# Patient Record
Sex: MALE | Race: BLACK OR AFRICAN AMERICAN | Employment: FULL TIME | URBAN - METROPOLITAN AREA
[De-identification: names, ages, dates, MRNs, and addresses within clinical notes are randomized per-mention and may not be internally consistent; named-entity substitution may affect disease eponyms.]

---

## 2017-03-21 ENCOUNTER — HOSPITAL ENCOUNTER (OUTPATIENT)
Dept: GENERAL RADIOLOGY | Age: 62
Discharge: HOME | End: 2017-03-21
Attending: FAMILY MEDICINE
Payer: COMMERCIAL

## 2017-03-21 DIAGNOSIS — J18.9 PNEUMONIA OF BOTH LOWER LOBES DUE TO INFECTIOUS ORGANISM: ICD-10-CM

## 2017-03-21 PROCEDURE — 71020 XR CHEST PA LAT: CPT

## 2017-03-27 ENCOUNTER — HOSPITAL ENCOUNTER (OUTPATIENT)
Dept: LAB | Age: 62
Discharge: HOME | End: 2017-03-27
Payer: COMMERCIAL

## 2017-03-27 DIAGNOSIS — C92.10 CML (CHRONIC MYELOID LEUKEMIA) (HCC): ICD-10-CM

## 2017-03-27 LAB
ALBUMIN SERPL BCP-MCNC: 3.6 G/DL (ref 3.2–4.6)
ALBUMIN/GLOB SERPL: 1.3 {RATIO} (ref 1.2–3.5)
ALP SERPL-CCNC: 87 U/L (ref 50–136)
ALT SERPL-CCNC: 25 U/L (ref 12–65)
ANION GAP BLD CALC-SCNC: 8 MMOL/L (ref 7–16)
AST SERPL W P-5'-P-CCNC: 17 U/L (ref 15–37)
BASOPHILS # BLD AUTO: 0.1 K/UL (ref 0–0.2)
BASOPHILS # BLD: 1 % (ref 0–2)
BILIRUB SERPL-MCNC: 0.6 MG/DL (ref 0.2–1.1)
BUN SERPL-MCNC: 16 MG/DL (ref 8–23)
CALCIUM SERPL-MCNC: 8.6 MG/DL (ref 8.3–10.4)
CHLORIDE SERPL-SCNC: 106 MMOL/L (ref 98–107)
CO2 SERPL-SCNC: 28 MMOL/L (ref 23–32)
CREAT SERPL-MCNC: 1.63 MG/DL (ref 0.8–1.5)
DIFFERENTIAL METHOD BLD: ABNORMAL
EOSINOPHIL # BLD: 0.3 K/UL (ref 0–0.8)
EOSINOPHIL NFR BLD: 6 % (ref 0.5–7.8)
ERYTHROCYTE [DISTWIDTH] IN BLOOD BY AUTOMATED COUNT: 14 % (ref 11.9–14.6)
GLOBULIN SER CALC-MCNC: 2.8 G/DL (ref 2.3–3.5)
GLUCOSE SERPL-MCNC: 107 MG/DL (ref 65–100)
HCT VFR BLD AUTO: 43.6 % (ref 41.1–50.3)
HGB BLD-MCNC: 14.9 G/DL (ref 13.6–17.2)
LYMPHOCYTES # BLD AUTO: 27 % (ref 13–44)
LYMPHOCYTES # BLD: 1.4 K/UL (ref 0.5–4.6)
MCH RBC QN AUTO: 31.2 PG (ref 26.1–32.9)
MCHC RBC AUTO-ENTMCNC: 34.2 G/DL (ref 31.4–35)
MCV RBC AUTO: 91.2 FL (ref 79.6–97.8)
MONOCYTES # BLD: 0.4 K/UL (ref 0.1–1.3)
MONOCYTES NFR BLD AUTO: 8 % (ref 4–12)
NEUTS SEG # BLD: 2.9 K/UL (ref 1.7–8.2)
NEUTS SEG NFR BLD AUTO: 58 % (ref 43–78)
NRBC # BLD: 0 K/UL (ref 0–0.2)
PLATELET # BLD AUTO: 191 K/UL (ref 150–450)
PMV BLD AUTO: 10 FL (ref 10.8–14.1)
POTASSIUM SERPL-SCNC: 4.4 MMOL/L (ref 3.5–5.1)
PROT SERPL-MCNC: 6.4 G/DL (ref 6.3–8.2)
RBC # BLD AUTO: 4.78 M/UL (ref 4.23–5.67)
SODIUM SERPL-SCNC: 142 MMOL/L (ref 136–145)
WBC # BLD AUTO: 5.1 K/UL (ref 4.3–11.1)

## 2017-03-27 PROCEDURE — 80053 COMPREHEN METABOLIC PANEL: CPT | Performed by: INTERNAL MEDICINE

## 2017-03-27 PROCEDURE — 36415 COLL VENOUS BLD VENIPUNCTURE: CPT | Performed by: INTERNAL MEDICINE

## 2017-03-27 PROCEDURE — 85025 COMPLETE CBC W/AUTO DIFF WBC: CPT | Performed by: INTERNAL MEDICINE

## 2017-04-13 ENCOUNTER — HOSPITAL ENCOUNTER (OUTPATIENT)
Dept: LAB | Age: 62
Discharge: HOME | End: 2017-04-13
Payer: COMMERCIAL

## 2017-04-13 DIAGNOSIS — I50.22 SYSTOLIC CHF, CHRONIC (HCC): ICD-10-CM

## 2017-04-13 LAB
ALBUMIN SERPL BCP-MCNC: 3.7 G/DL (ref 3.2–4.6)
ALBUMIN/GLOB SERPL: 1.3 {RATIO} (ref 1.2–3.5)
ALP SERPL-CCNC: 77 U/L (ref 50–136)
ALT SERPL-CCNC: 37 U/L (ref 12–65)
ANION GAP BLD CALC-SCNC: 8 MMOL/L (ref 7–16)
ANION GAP BLD CALC-SCNC: 9 MMOL/L
AST SERPL W P-5'-P-CCNC: 25 U/L (ref 15–37)
BASOPHILS # BLD AUTO: 0 K/UL (ref 0–0.2)
BASOPHILS # BLD: 1 % (ref 0–2)
BILIRUB SERPL-MCNC: 0.8 MG/DL (ref 0.2–1.1)
BNP SERPL-MCNC: 412 PG/ML
BUN SERPL-MCNC: 14 MG/DL (ref 8–23)
BUN SERPL-MCNC: 14 MG/DL (ref 8–23)
CALCIUM SERPL-MCNC: 8.3 MG/DL (ref 8.3–10.4)
CALCIUM SERPL-MCNC: 8.4 MG/DL (ref 8.3–10.4)
CHLORIDE SERPL-SCNC: 107 MMOL/L (ref 98–107)
CHLORIDE SERPL-SCNC: 107 MMOL/L (ref 98–107)
CO2 SERPL-SCNC: 27 MMOL/L (ref 23–32)
CO2 SERPL-SCNC: 30 MMOL/L (ref 21–32)
CREAT SERPL-MCNC: 1.3 MG/DL (ref 0.6–1)
CREAT SERPL-MCNC: 1.44 MG/DL (ref 0.8–1.5)
DIFFERENTIAL METHOD BLD: ABNORMAL
EOSINOPHIL # BLD: 0.2 K/UL (ref 0–0.8)
EOSINOPHIL NFR BLD: 5 % (ref 0.5–7.8)
ERYTHROCYTE [DISTWIDTH] IN BLOOD BY AUTOMATED COUNT: 13.8 % (ref 11.9–14.6)
GLOBULIN SER CALC-MCNC: 2.8 G/DL (ref 2.3–3.5)
GLUCOSE SERPL-MCNC: 76 MG/DL (ref 65–100)
GLUCOSE SERPL-MCNC: 96 MG/DL (ref 65–100)
HCT VFR BLD AUTO: 41.2 % (ref 41.1–50.3)
HGB BLD-MCNC: 14.2 G/DL (ref 13.6–17.2)
INR PPP: 1.1 (ref 0.9–1.2)
LYMPHOCYTES # BLD AUTO: 29 % (ref 13–44)
LYMPHOCYTES # BLD: 1.4 K/UL (ref 0.5–4.6)
MCH RBC QN AUTO: 31.3 PG (ref 26.1–32.9)
MCHC RBC AUTO-ENTMCNC: 34.5 G/DL (ref 31.4–35)
MCV RBC AUTO: 90.7 FL (ref 79.6–97.8)
MONOCYTES # BLD: 0.4 K/UL (ref 0.1–1.3)
MONOCYTES NFR BLD AUTO: 9 % (ref 4–12)
NEUTS SEG # BLD: 2.6 K/UL (ref 1.7–8.2)
NEUTS SEG NFR BLD AUTO: 56 % (ref 43–78)
PLATELET # BLD AUTO: 190 K/UL (ref 150–450)
PMV BLD AUTO: 10.2 FL (ref 10.8–14.1)
POTASSIUM SERPL-SCNC: 3.9 MMOL/L (ref 3.5–5.1)
POTASSIUM SERPL-SCNC: 4.3 MMOL/L (ref 3.5–5.1)
PROT SERPL-MCNC: 6.5 G/DL (ref 6.3–8.2)
PROTHROMBIN TIME: 10.9 SEC (ref 9.6–12)
RBC # BLD AUTO: 4.54 M/UL (ref 4.23–5.67)
SODIUM SERPL-SCNC: 143 MMOL/L (ref 136–145)
SODIUM SERPL-SCNC: 145 MMOL/L (ref 136–145)
WBC # BLD AUTO: 4.7 K/UL (ref 4.3–11.1)

## 2017-04-13 PROCEDURE — 80053 COMPREHEN METABOLIC PANEL: CPT | Performed by: FAMILY MEDICINE

## 2017-04-13 PROCEDURE — 83880 ASSAY OF NATRIURETIC PEPTIDE: CPT | Performed by: FAMILY MEDICINE

## 2017-04-20 ENCOUNTER — HOSPITAL ENCOUNTER (OUTPATIENT)
Dept: CARDIAC CATH/INVASIVE PROCEDURES | Age: 62
Discharge: HOME | End: 2017-04-20
Attending: INTERNAL MEDICINE | Admitting: INTERNAL MEDICINE
Payer: COMMERCIAL

## 2017-04-20 VITALS
SYSTOLIC BLOOD PRESSURE: 113 MMHG | BODY MASS INDEX: 26.31 KG/M2 | RESPIRATION RATE: 16 BRPM | OXYGEN SATURATION: 97 % | DIASTOLIC BLOOD PRESSURE: 70 MMHG | HEIGHT: 74 IN | WEIGHT: 205 LBS | TEMPERATURE: 97.9 F | HEART RATE: 80 BPM

## 2017-04-20 LAB
ANION GAP BLD CALC-SCNC: 9 MMOL/L (ref 7–16)
ATRIAL RATE: 93 BPM
BUN SERPL-MCNC: 17 MG/DL (ref 8–23)
CALCIUM SERPL-MCNC: 8.7 MG/DL (ref 8.3–10.4)
CALCULATED P AXIS, ECG09: 64 DEGREES
CALCULATED R AXIS, ECG10: 55 DEGREES
CALCULATED T AXIS, ECG11: 60 DEGREES
CHLORIDE SERPL-SCNC: 107 MMOL/L (ref 98–107)
CO2 SERPL-SCNC: 28 MMOL/L (ref 21–32)
CREAT SERPL-MCNC: 1.42 MG/DL (ref 0.8–1.5)
DIAGNOSIS, 93000: NORMAL
ERYTHROCYTE [DISTWIDTH] IN BLOOD BY AUTOMATED COUNT: 13.7 % (ref 11.9–14.6)
GLUCOSE SERPL-MCNC: 119 MG/DL (ref 65–100)
HCT VFR BLD AUTO: 43.9 % (ref 41.1–50.3)
HGB BLD-MCNC: 15.3 G/DL (ref 13.6–17.2)
INR PPP: 1 (ref 0.9–1.2)
MCH RBC QN AUTO: 31.2 PG (ref 26.1–32.9)
MCHC RBC AUTO-ENTMCNC: 34.9 G/DL (ref 31.4–35)
MCV RBC AUTO: 89.6 FL (ref 79.6–97.8)
P-R INTERVAL, ECG05: 172 MS
PLATELET # BLD AUTO: 224 K/UL (ref 150–450)
PMV BLD AUTO: 10.9 FL (ref 10.8–14.1)
POTASSIUM SERPL-SCNC: 4.2 MMOL/L (ref 3.5–5.1)
PROTHROMBIN TIME: 11 SEC (ref 9.6–12)
Q-T INTERVAL, ECG07: 390 MS
QRS DURATION, ECG06: 92 MS
QTC CALCULATION (BEZET), ECG08: 484 MS
RBC # BLD AUTO: 4.9 M/UL (ref 4.23–5.67)
SODIUM SERPL-SCNC: 144 MMOL/L (ref 136–145)
VENTRICULAR RATE, ECG03: 93 BPM
WBC # BLD AUTO: 5.1 K/UL (ref 4.3–11.1)

## 2017-04-20 PROCEDURE — 99152 MOD SED SAME PHYS/QHP 5/>YRS: CPT

## 2017-04-20 PROCEDURE — C1769 GUIDE WIRE: HCPCS

## 2017-04-20 PROCEDURE — 74011250636 HC RX REV CODE- 250/636: Performed by: INTERNAL MEDICINE

## 2017-04-20 PROCEDURE — 85610 PROTHROMBIN TIME: CPT | Performed by: INTERNAL MEDICINE

## 2017-04-20 PROCEDURE — 74011636320 HC RX REV CODE- 636/320: Performed by: INTERNAL MEDICINE

## 2017-04-20 PROCEDURE — C1894 INTRO/SHEATH, NON-LASER: HCPCS

## 2017-04-20 PROCEDURE — 93005 ELECTROCARDIOGRAM TRACING: CPT | Performed by: INTERNAL MEDICINE

## 2017-04-20 PROCEDURE — 80048 BASIC METABOLIC PNL TOTAL CA: CPT | Performed by: INTERNAL MEDICINE

## 2017-04-20 PROCEDURE — 74011250637 HC RX REV CODE- 250/637: Performed by: INTERNAL MEDICINE

## 2017-04-20 PROCEDURE — 85027 COMPLETE CBC AUTOMATED: CPT | Performed by: INTERNAL MEDICINE

## 2017-04-20 PROCEDURE — 77030004534 HC CATH ANGI DX INFN CARD -A

## 2017-04-20 PROCEDURE — 74011250636 HC RX REV CODE- 250/636

## 2017-04-20 PROCEDURE — 77030029997 HC DEV COM RDL R BND TELE -B

## 2017-04-20 PROCEDURE — 74011000250 HC RX REV CODE- 250: Performed by: INTERNAL MEDICINE

## 2017-04-20 PROCEDURE — 77030015766

## 2017-04-20 PROCEDURE — 93458 L HRT ARTERY/VENTRICLE ANGIO: CPT

## 2017-04-20 RX ORDER — ACETAMINOPHEN 325 MG/1
1000 TABLET ORAL ONCE
Status: DISCONTINUED | OUTPATIENT
Start: 2017-04-20 | End: 2017-04-20

## 2017-04-20 RX ORDER — SODIUM CHLORIDE 0.9 % (FLUSH) 0.9 %
5-10 SYRINGE (ML) INJECTION EVERY 8 HOURS
Status: CANCELLED | OUTPATIENT
Start: 2017-04-20

## 2017-04-20 RX ORDER — DIAZEPAM 5 MG/1
5 TABLET ORAL ONCE
Status: COMPLETED | OUTPATIENT
Start: 2017-04-20 | End: 2017-04-20

## 2017-04-20 RX ORDER — SODIUM CHLORIDE 0.9 % (FLUSH) 0.9 %
5-10 SYRINGE (ML) INJECTION AS NEEDED
Status: CANCELLED | OUTPATIENT
Start: 2017-04-20

## 2017-04-20 RX ORDER — MIDAZOLAM HYDROCHLORIDE 1 MG/ML
.5-5 INJECTION, SOLUTION INTRAMUSCULAR; INTRAVENOUS
Status: DISCONTINUED | OUTPATIENT
Start: 2017-04-20 | End: 2017-04-20 | Stop reason: HOSPADM

## 2017-04-20 RX ORDER — LIDOCAINE HYDROCHLORIDE 20 MG/ML
1-20 INJECTION, SOLUTION INFILTRATION; PERINEURAL
Status: DISCONTINUED | OUTPATIENT
Start: 2017-04-20 | End: 2017-04-20 | Stop reason: HOSPADM

## 2017-04-20 RX ORDER — HEPARIN SODIUM 200 [USP'U]/100ML
3 INJECTION, SOLUTION INTRAVENOUS CONTINUOUS
Status: DISCONTINUED | OUTPATIENT
Start: 2017-04-20 | End: 2017-04-20 | Stop reason: HOSPADM

## 2017-04-20 RX ORDER — GUAIFENESIN 100 MG/5ML
324 LIQUID (ML) ORAL ONCE
Status: COMPLETED | OUTPATIENT
Start: 2017-04-20 | End: 2017-04-20

## 2017-04-20 RX ORDER — SODIUM CHLORIDE 9 MG/ML
75 INJECTION, SOLUTION INTRAVENOUS CONTINUOUS
Status: DISCONTINUED | OUTPATIENT
Start: 2017-04-20 | End: 2017-04-20 | Stop reason: HOSPADM

## 2017-04-20 RX ORDER — SODIUM CHLORIDE 9 MG/ML
75 INJECTION, SOLUTION INTRAVENOUS CONTINUOUS
Status: CANCELLED | OUTPATIENT
Start: 2017-04-20

## 2017-04-20 RX ORDER — FENTANYL CITRATE 50 UG/ML
25-100 INJECTION, SOLUTION INTRAMUSCULAR; INTRAVENOUS
Status: DISCONTINUED | OUTPATIENT
Start: 2017-04-20 | End: 2017-04-20 | Stop reason: HOSPADM

## 2017-04-20 RX ORDER — ACETAMINOPHEN 325 MG/1
650 TABLET ORAL ONCE
Status: COMPLETED | OUTPATIENT
Start: 2017-04-20 | End: 2017-04-20

## 2017-04-20 RX ADMIN — ASPIRIN 324 MG: 81 TABLET, CHEWABLE ORAL at 10:03

## 2017-04-20 RX ADMIN — IOPAMIDOL 86 ML: 755 INJECTION, SOLUTION INTRAVENOUS at 13:10

## 2017-04-20 RX ADMIN — DIAZEPAM 5 MG: 5 TABLET ORAL at 10:03

## 2017-04-20 RX ADMIN — HEPARIN SODIUM 2 ML: 10000 INJECTION, SOLUTION INTRAVENOUS; SUBCUTANEOUS at 13:00

## 2017-04-20 RX ADMIN — HEPARIN SODIUM 3 ML/HR: 200 INJECTION, SOLUTION INTRAVENOUS at 12:29

## 2017-04-20 RX ADMIN — FENTANYL CITRATE 50 MCG: 50 INJECTION, SOLUTION INTRAMUSCULAR; INTRAVENOUS at 12:53

## 2017-04-20 RX ADMIN — ACETAMINOPHEN 650 MG: 325 TABLET, FILM COATED ORAL at 16:00

## 2017-04-20 RX ADMIN — MIDAZOLAM HYDROCHLORIDE 2 MG: 1 INJECTION, SOLUTION INTRAMUSCULAR; INTRAVENOUS at 12:54

## 2017-04-20 RX ADMIN — LIDOCAINE HYDROCHLORIDE 40 MG: 20 INJECTION, SOLUTION INFILTRATION; PERINEURAL at 12:58

## 2017-04-20 RX ADMIN — SODIUM CHLORIDE 75 ML/HR: 900 INJECTION, SOLUTION INTRAVENOUS at 10:03

## 2017-04-20 NOTE — ROUTINE PROCESS
TRANSFER - OUT REPORT:    [facility]  Dr. Chuyita Ruth  RRA access    Versed 2mg, fentanyl 50mcg  Diagnostic cath, medical management  R band placed @ 1310 with 12ml air    Verbal report given to [facility] RN(name) on [employer].  being transferred to cpru(unit) for routine progression of care       Report consisted of patients Situation, Background, Assessment and   Recommendations(SBAR). Information from the following report(s) Procedure Summary was reviewed with the receiving nurse. Lines:   Peripheral IV 04/20/17 Right Antecubital (Active)       Peripheral IV 04/20/17 Left Arm (Active)        Opportunity for questions and clarification was provided.       Patient transported with:   [employer]

## 2017-04-20 NOTE — DISCHARGE INSTRUCTIONS
HEART CATHETERIZATION/ANGIOGRAPHY DISCHARGE INSTRUCTIONS  Follow-up appointment: May 3rd @ 1:00pm    You will  Need to stop the ENTRESTO and COREG   Dr. Colby Blood office will be call in a prescription for Lisonopril    1. Check puncture site frequently for swelling or bleeding. If there is any bleeding, lie down and apply pressure over the area with a clean towel or washcloth. Notify your doctor for any redness, swelling, drainage, or oozing from the puncture site. Notify your doctor for any fever or chills. 2. If the extremity becomes cold, numb, or painful call [facility] Cardiology at [phone].  3. Activity should be limited for the next 48 hours. Climb stairs as little as possible and avoid any stooping, bending, or strenuous activity for 48 hours. No heavy lifting (anything over 10 pounds) for 3 days. 4. You may resume your usual diet. Drink more fluids than usual.  5. Have a responsible person drive you home and stay with you for at least 24 hours after your heart catheterization/angiography. 6. You may remove bandage from your Right wrist in 24 hours. You may shower in 24 hours. No tub baths, hot tubs, or swimming for 1 week. Do not place any lotions, creams, powders, or ointments over puncture site for 1 week. You may place a clean band-aid over the puncture site each day for 5 days. Change daily. I have read the above instructions and have had the opportunity to ask questions.       Patient: ________________________   Date: 4/20/2017    Witness: _______________________   Date: 4/20/2017

## 2017-04-20 NOTE — PROGRESS NOTES
BP in 70'L to 454'B systolic. Dr. Tilman Seip notifed. Orders given to discontinue Entresto and Coreg and to start Lisinopril. Spoke with Dr. Rika Velázquez. Verified prescription would be called in to [facility] today. Susan Mendez

## 2017-04-20 NOTE — PROGRESS NOTES
Report received from [address] [place] [address] Procedural findings communicated. Intra procedural  medication administration reviewed. Progression of care discussed.      Patient received into [id number] [facility] 2 post sheath removal.     Access site without bleeding or swelling yes    Dressing dry and intact yes    Patient instructed to limit movement to right upper extremity    Routine post procedural vital signs and site assessment initiated yes

## 2017-04-20 NOTE — OP NOTES
Cardiac Catheterization Procedure Note    Patient ID:     Name: Nelida Dsouza. Medical Record Number: [id number]   YOB: 1955    Date of Procedure: 4/20/2017    Physician: Adelaida López MD    Referring [facility]    Indications: This is a 62 yrs male who presents with chf.     Blood loss less than 5 ml    Sedation. Pt received 2 mg versed and 50 mcg fentanyl for monitored conscious sedation in 1 15 minute intervals. Specimen: None    No complications    No assistants    Time out, Mallampati, and ASA performed    Procedure:  After informed consent, patient was prepped and draped in the usual sterile fashion. The right wrist was infiltrated with lidocaine. The right radial artery was accessed via the modified Seldinger technique with a 6 [place] sheath. 80cc Visipaque contrast were utilized for the entire procedure. no closure device used    Catheters. TIG4, PIG    FINDINGS    Left Ventricle: 20%  LVEDP: 20    Left Main:normal large artery    Left Anterior descending coronary artery: large normal system with no disease in the lad or diagonals    Left Circumflex coronary artery: non dominant but two moderate OM vessels.  No disease    Right coronary artery: dominant artery with pda and brian normal      Graft anatomy: NA    Intervention if done: NA    Conclusions: normal coronaries but low ef    Recommentations: med therapy    No complications      Signed By: Adelaida López MD

## 2017-04-20 NOTE — IP AVS SNAPSHOT
[facility] 
 
 
 [address] [facility] 
[phone] Patient: Marisol Montgomery. MRN: [id number] :1955 Discharge Summary 2017 Marisol Madrigal MRN[de-[id number]]  [id number] Admission Information Provider Pager Service Admission Date Expected D/C Date Sonia Rosario MD  CARDIAC CATH LAB 2017 Actual LOS Patient Class 0 days OUTPATIENT Follow-up Information None Current Discharge Medication List  
  
ASK your doctor about these medications Dose & Instructions Dispensing Information Comments Morning Noon Evening Bedtime  
 albuterol 90 mcg/actuation inhaler Commonly known as:  PROVENTIL HFA, VENTOLIN HFA, PROAIR HFA Your last dose was: Your next dose is:    
   
   
 Dose:  2 Puff Take 2 Puffs by inhalation every four (4) hours as needed for Wheezing. Quantity:  1 Inhaler Refills:  0  
     
   
   
   
  
 carvedilol 6.25 mg tablet Commonly known as:  Anoop Cowdrey Your last dose was: Your next dose is:    
   
   
 Dose:  6.25 mg Take 1 Tab by mouth two (2) times daily (with meals). Quantity:  60 Tab Refills:  1  
     
   
   
   
  
 furosemide 40 mg tablet Commonly known as:  LASIX Your last dose was: Your next dose is:    
   
   
 Dose:  40 mg Take 1 Tab by mouth daily. Quantity:  90 Tab Refills:  3  
     
   
   
   
  
 lisinopril 5 mg tablet Commonly known as:  Dorean Peeks Your last dose was: Your next dose is:    
   
   
 Dose:  5 mg Take 1 Tab by mouth daily. Quantity:  30 Tab Refills:  5  
     
   
   
   
  
 multivitamin, stress formula tablet Commonly known as:  STRESS TAB Your last dose was: Your next dose is:    
   
   
 Dose:  1 Tab Take 1 Tab by mouth daily. Refills:  0  
     
   
   
   
  
 potassium chloride 20 mEq tablet Commonly known as:  K-DUR, KLOR-CON Your last dose was: Your next dose is:    
   
   
 Dose:  20 mEq Take 1 Tab by mouth two (2) times a day. Quantity:  30 Tab Refills:  0  
     
   
   
   
  
 sacubitril-valsartan 24-26 mg tablet Commonly known as:  ENTRESTO Your last dose was: Your next dose is:    
   
   
 Dose:  1 Tab Take 1 Tab by mouth two (2) times a day. Quantity:  30 Tab Refills:  1 Where to Get Your Medications These medications were sent to [facility], [address] [place] [address] 28, [address] [place] [address] Way [address] Hours:  24-hours Phone:  [phone]  
  lisinopril 5 mg tablet General Information Please provide this summary of care documentation to your next provider. Allergies No Known Allergies Current Immunizations  Never Reviewed Name Date Tdap 2/27/2016 Discharge Instructions Discharge Instructions HEART CATHETERIZATION/ANGIOGRAPHY DISCHARGE INSTRUCTIONS Follow-up appointment: May 3rd @ 1:00pm 
 
You will  Need to stop the ENTRESTO and COREG Dr. Zoey Moreno office will be call in a prescription for Lisonopril 1. Check puncture site frequently for swelling or bleeding. If there is any bleeding, lie down and apply pressure over the area with a clean towel or washcloth. Notify your doctor for any redness, swelling, drainage, or oozing from the puncture site. Notify your doctor for any fever or chills. 2. If the extremity becomes cold, numb, or painful call [address] Cardiology at [phone]. 
3. Activity should be limited for the next 48 hours. Climb stairs as little as possible and avoid any stooping, bending, or strenuous activity for 48 hours. No heavy lifting (anything over 10 pounds) for 3 days. 4. You may resume your usual diet.  Drink more fluids than usual. 
 5. Have a responsible person drive you home and stay with you for at least 24 hours after your heart catheterization/angiography. 6. You may remove bandage from your Right wrist in 24 hours. You may shower in 24 hours. No tub baths, hot tubs, or swimming for 1 week. Do not place any lotions, creams, powders, or ointments over puncture site for 1 week. You may place a clean band-aid over the puncture site each day for 5 days. Change daily. I have read the above instructions and have had the opportunity to ask questions. Patient: ________________________   Date: 4/20/2017 Witness: _______________________   Date: 4/20/2017 Discharge Orders None  
  
` Patient Signature:  ____________________________________________________________ Date:  ____________________________________________________________  
  
 Pancho Has Provider Signature:  ____________________________________________________________ Date:  ____________________________________________________________

## 2017-05-01 ENCOUNTER — HOSPITAL ENCOUNTER (OUTPATIENT)
Dept: LAB | Age: 62
Discharge: HOME | End: 2017-05-01
Attending: INTERNAL MEDICINE
Payer: COMMERCIAL

## 2017-05-01 DIAGNOSIS — C92.10 CML (CHRONIC MYELOID LEUKEMIA) (HCC): ICD-10-CM

## 2017-05-01 DIAGNOSIS — M79.10 MUSCLE ACHE: ICD-10-CM

## 2017-05-01 DIAGNOSIS — R53.83 FATIGUE, UNSPECIFIED TYPE: ICD-10-CM

## 2017-05-01 LAB
ANION GAP BLD CALC-SCNC: 8 MMOL/L
BNP SERPL-MCNC: 200 PG/ML
BUN SERPL-MCNC: 23 MG/DL (ref 8–23)
CALCIUM SERPL-MCNC: 8.4 MG/DL (ref 8.3–10.4)
CHLORIDE SERPL-SCNC: 105 MMOL/L (ref 98–107)
CO2 SERPL-SCNC: 28 MMOL/L (ref 21–32)
CREAT SERPL-MCNC: 1.5 MG/DL (ref 0.8–1.5)
GLUCOSE SERPL-MCNC: 101 MG/DL (ref 65–100)
POTASSIUM SERPL-SCNC: 3.9 MMOL/L (ref 3.5–5.1)
SODIUM SERPL-SCNC: 141 MMOL/L (ref 136–145)

## 2017-05-01 PROCEDURE — 36415 COLL VENOUS BLD VENIPUNCTURE: CPT | Performed by: INTERNAL MEDICINE

## 2017-05-01 PROCEDURE — 83880 ASSAY OF NATRIURETIC PEPTIDE: CPT | Performed by: INTERNAL MEDICINE

## 2017-05-01 PROCEDURE — 80048 BASIC METABOLIC PNL TOTAL CA: CPT | Performed by: INTERNAL MEDICINE

## 2017-05-03 PROBLEM — I50.22 CHRONIC SYSTOLIC CONGESTIVE HEART FAILURE (HCC): Status: ACTIVE | Noted: 2017-05-03

## 2017-06-07 ENCOUNTER — HOSPITAL ENCOUNTER (OUTPATIENT)
Dept: LAB | Age: 62
Discharge: HOME | End: 2017-06-07
Payer: COMMERCIAL

## 2017-06-07 DIAGNOSIS — C92.10 CML (CHRONIC MYELOID LEUKEMIA) (HCC): ICD-10-CM

## 2017-06-07 LAB
ALBUMIN SERPL BCP-MCNC: 3.5 G/DL (ref 3.2–4.6)
ALBUMIN/GLOB SERPL: 1.1 {RATIO} (ref 1.2–3.5)
ALP SERPL-CCNC: 85 U/L (ref 50–136)
ALT SERPL-CCNC: 29 U/L (ref 12–65)
ANION GAP BLD CALC-SCNC: 10 MMOL/L (ref 7–16)
AST SERPL W P-5'-P-CCNC: 22 U/L (ref 15–37)
BASOPHILS # BLD AUTO: 0 K/UL (ref 0–0.2)
BASOPHILS # BLD: 1 % (ref 0–2)
BILIRUB SERPL-MCNC: 0.4 MG/DL (ref 0.2–1.1)
BUN SERPL-MCNC: 23 MG/DL (ref 8–23)
CALCIUM SERPL-MCNC: 8.8 MG/DL (ref 8.3–10.4)
CHLORIDE SERPL-SCNC: 106 MMOL/L (ref 98–107)
CO2 SERPL-SCNC: 24 MMOL/L (ref 21–32)
CREAT SERPL-MCNC: 1.52 MG/DL (ref 0.8–1.5)
DIFFERENTIAL METHOD BLD: ABNORMAL
EOSINOPHIL # BLD: 0.2 K/UL (ref 0–0.8)
EOSINOPHIL NFR BLD: 4 % (ref 0.5–7.8)
ERYTHROCYTE [DISTWIDTH] IN BLOOD BY AUTOMATED COUNT: 12.8 % (ref 11.9–14.6)
GLOBULIN SER CALC-MCNC: 3.3 G/DL (ref 2.3–3.5)
GLUCOSE SERPL-MCNC: 101 MG/DL (ref 65–100)
HCT VFR BLD AUTO: 39.1 % (ref 41.1–50.3)
HGB BLD-MCNC: 13.9 G/DL (ref 13.6–17.2)
LYMPHOCYTES # BLD AUTO: 26 % (ref 13–44)
LYMPHOCYTES # BLD: 1.4 K/UL (ref 0.5–4.6)
MCH RBC QN AUTO: 31.1 PG (ref 26.1–32.9)
MCHC RBC AUTO-ENTMCNC: 35.5 G/DL (ref 31.4–35)
MCV RBC AUTO: 87.5 FL (ref 79.6–97.8)
MONOCYTES # BLD: 0.5 K/UL (ref 0.1–1.3)
MONOCYTES NFR BLD AUTO: 10 % (ref 4–12)
NEUTS SEG # BLD: 3.3 K/UL (ref 1.7–8.2)
NEUTS SEG NFR BLD AUTO: 60 % (ref 43–78)
NRBC # BLD: 0 K/UL (ref 0–0.2)
PLATELET # BLD AUTO: 192 K/UL (ref 150–450)
PMV BLD AUTO: 10.5 FL (ref 10.8–14.1)
POTASSIUM SERPL-SCNC: 3.9 MMOL/L (ref 3.5–5.1)
PROT SERPL-MCNC: 6.8 G/DL (ref 6.3–8.2)
RBC # BLD AUTO: 4.47 M/UL (ref 4.23–5.67)
SODIUM SERPL-SCNC: 140 MMOL/L (ref 136–145)
WBC # BLD AUTO: 5.5 K/UL (ref 4.3–11.1)

## 2017-06-07 PROCEDURE — 36415 COLL VENOUS BLD VENIPUNCTURE: CPT | Performed by: INTERNAL MEDICINE

## 2017-06-07 PROCEDURE — 80053 COMPREHEN METABOLIC PANEL: CPT | Performed by: INTERNAL MEDICINE

## 2017-06-07 PROCEDURE — 85025 COMPLETE CBC W/AUTO DIFF WBC: CPT | Performed by: INTERNAL MEDICINE

## 2017-06-12 LAB
Lab: NORMAL
TEST DESCRIPTION:,ATST: NORMAL

## 2017-07-12 ENCOUNTER — HOSPITAL ENCOUNTER (OUTPATIENT)
Dept: LAB | Age: 62
Discharge: HOME | End: 2017-07-12
Payer: COMMERCIAL

## 2017-07-12 DIAGNOSIS — C92.10 CML (CHRONIC MYELOID LEUKEMIA) (HCC): ICD-10-CM

## 2017-07-12 LAB
ALBUMIN SERPL BCP-MCNC: 3.7 G/DL (ref 3.2–4.6)
ALBUMIN/GLOB SERPL: 1 {RATIO} (ref 1.2–3.5)
ALP SERPL-CCNC: 76 U/L (ref 50–136)
ALT SERPL-CCNC: 26 U/L (ref 12–65)
ANION GAP BLD CALC-SCNC: 8 MMOL/L (ref 7–16)
AST SERPL W P-5'-P-CCNC: 21 U/L (ref 15–37)
BASOPHILS # BLD AUTO: 0 K/UL (ref 0–0.2)
BASOPHILS # BLD: 1 % (ref 0–2)
BILIRUB SERPL-MCNC: 0.8 MG/DL (ref 0.2–1.1)
BUN SERPL-MCNC: 21 MG/DL (ref 8–23)
CALCIUM SERPL-MCNC: 9.1 MG/DL (ref 8.3–10.4)
CHLORIDE SERPL-SCNC: 109 MMOL/L (ref 98–107)
CO2 SERPL-SCNC: 25 MMOL/L (ref 21–32)
CREAT SERPL-MCNC: 1.43 MG/DL (ref 0.8–1.5)
DIFFERENTIAL METHOD BLD: ABNORMAL
EOSINOPHIL # BLD: 0.1 K/UL (ref 0–0.8)
EOSINOPHIL NFR BLD: 2 % (ref 0.5–7.8)
ERYTHROCYTE [DISTWIDTH] IN BLOOD BY AUTOMATED COUNT: 12.7 % (ref 11.9–14.6)
GLOBULIN SER CALC-MCNC: 3.7 G/DL (ref 2.3–3.5)
GLUCOSE SERPL-MCNC: 93 MG/DL (ref 65–100)
HCT VFR BLD AUTO: 40.7 % (ref 41.1–50.3)
HGB BLD-MCNC: 14.4 G/DL (ref 13.6–17.2)
LYMPHOCYTES # BLD AUTO: 20 % (ref 13–44)
LYMPHOCYTES # BLD: 1 K/UL (ref 0.5–4.6)
MAGNESIUM SERPL-MCNC: 2.2 MG/DL (ref 1.8–2.4)
MCH RBC QN AUTO: 30.1 PG (ref 26.1–32.9)
MCHC RBC AUTO-ENTMCNC: 35.4 G/DL (ref 31.4–35)
MCV RBC AUTO: 85 FL (ref 79.6–97.8)
MONOCYTES # BLD: 0.4 K/UL (ref 0.1–1.3)
MONOCYTES NFR BLD AUTO: 8 % (ref 4–12)
NEUTS SEG # BLD: 3.6 K/UL (ref 1.7–8.2)
NEUTS SEG NFR BLD AUTO: 69 % (ref 43–78)
NRBC # BLD: 0 K/UL (ref 0–0.2)
PLATELET # BLD AUTO: 181 K/UL (ref 150–450)
PMV BLD AUTO: 10.2 FL (ref 10.8–14.1)
POTASSIUM SERPL-SCNC: 4 MMOL/L (ref 3.5–5.1)
PROT SERPL-MCNC: 7.4 G/DL (ref 6.3–8.2)
RBC # BLD AUTO: 4.79 M/UL (ref 4.23–5.67)
SODIUM SERPL-SCNC: 142 MMOL/L (ref 136–145)
WBC # BLD AUTO: 5.1 K/UL (ref 4.3–11.1)

## 2017-07-12 PROCEDURE — 83735 ASSAY OF MAGNESIUM: CPT | Performed by: INTERNAL MEDICINE

## 2017-07-12 PROCEDURE — 85025 COMPLETE CBC W/AUTO DIFF WBC: CPT | Performed by: INTERNAL MEDICINE

## 2017-07-12 PROCEDURE — 80053 COMPREHEN METABOLIC PANEL: CPT | Performed by: INTERNAL MEDICINE

## 2017-07-12 PROCEDURE — 36415 COLL VENOUS BLD VENIPUNCTURE: CPT | Performed by: INTERNAL MEDICINE

## 2017-08-23 ENCOUNTER — HOSPITAL ENCOUNTER (OUTPATIENT)
Dept: LAB | Age: 62
Discharge: HOME | End: 2017-08-23
Payer: COMMERCIAL

## 2017-08-23 DIAGNOSIS — C92.10 CML (CHRONIC MYELOID LEUKEMIA) (HCC): ICD-10-CM

## 2017-08-23 LAB
ALBUMIN SERPL-MCNC: 3.6 G/DL (ref 3.2–4.6)
ALBUMIN/GLOB SERPL: 1.1 {RATIO} (ref 1.2–3.5)
ALP SERPL-CCNC: 87 U/L (ref 50–136)
ALT SERPL-CCNC: 27 U/L (ref 12–65)
ANION GAP SERPL CALC-SCNC: 5 MMOL/L (ref 7–16)
AST SERPL-CCNC: 21 U/L (ref 15–37)
BASOPHILS # BLD: 0 K/UL (ref 0–0.2)
BASOPHILS NFR BLD: 1 % (ref 0–2)
BILIRUB SERPL-MCNC: 0.7 MG/DL (ref 0.2–1.1)
BUN SERPL-MCNC: 17 MG/DL (ref 8–23)
CALCIUM SERPL-MCNC: 8.5 MG/DL (ref 8.3–10.4)
CHLORIDE SERPL-SCNC: 106 MMOL/L (ref 98–107)
CO2 SERPL-SCNC: 30 MMOL/L (ref 21–32)
CREAT SERPL-MCNC: 1.6 MG/DL (ref 0.8–1.5)
DIFFERENTIAL METHOD BLD: ABNORMAL
EOSINOPHIL # BLD: 0.1 K/UL (ref 0–0.8)
EOSINOPHIL NFR BLD: 4 % (ref 0.5–7.8)
ERYTHROCYTE [DISTWIDTH] IN BLOOD BY AUTOMATED COUNT: 14.4 % (ref 11.9–14.6)
GLOBULIN SER CALC-MCNC: 3.2 G/DL (ref 2.3–3.5)
GLUCOSE SERPL-MCNC: 111 MG/DL (ref 65–100)
HCT VFR BLD AUTO: 38.5 % (ref 41.1–50.3)
HGB BLD-MCNC: 13.6 G/DL (ref 13.6–17.2)
LYMPHOCYTES # BLD: 1 K/UL (ref 0.5–4.6)
LYMPHOCYTES NFR BLD: 28 % (ref 13–44)
MCH RBC QN AUTO: 30.4 PG (ref 26.1–32.9)
MCHC RBC AUTO-ENTMCNC: 35.3 G/DL (ref 31.4–35)
MCV RBC AUTO: 85.9 FL (ref 79.6–97.8)
MONOCYTES # BLD: 0.4 K/UL (ref 0.1–1.3)
MONOCYTES NFR BLD: 10 % (ref 4–12)
NEUTS SEG # BLD: 2 K/UL (ref 1.7–8.2)
NEUTS SEG NFR BLD: 57 % (ref 43–78)
NRBC # BLD: 0 K/UL (ref 0–0.2)
PLATELET # BLD AUTO: 164 K/UL (ref 150–450)
PMV BLD AUTO: 10.3 FL (ref 10.8–14.1)
POTASSIUM SERPL-SCNC: 3.6 MMOL/L (ref 3.5–5.1)
PROT SERPL-MCNC: 6.8 G/DL (ref 6.3–8.2)
RBC # BLD AUTO: 4.48 M/UL (ref 4.23–5.67)
SODIUM SERPL-SCNC: 141 MMOL/L (ref 136–145)
WBC # BLD AUTO: 3.5 K/UL (ref 4.3–11.1)

## 2017-08-23 PROCEDURE — 85025 COMPLETE CBC W/AUTO DIFF WBC: CPT | Performed by: INTERNAL MEDICINE

## 2017-08-23 PROCEDURE — 36415 COLL VENOUS BLD VENIPUNCTURE: CPT | Performed by: INTERNAL MEDICINE

## 2017-08-23 PROCEDURE — 80053 COMPREHEN METABOLIC PANEL: CPT | Performed by: INTERNAL MEDICINE

## 2017-09-15 ENCOUNTER — HOSPITAL ENCOUNTER (OUTPATIENT)
Dept: LAB | Age: 62
Discharge: HOME | End: 2017-09-15
Payer: COMMERCIAL

## 2017-09-15 DIAGNOSIS — C92.10 CML (CHRONIC MYELOID LEUKEMIA) (HCC): ICD-10-CM

## 2017-09-15 LAB
ALBUMIN SERPL-MCNC: 3.6 G/DL (ref 3.2–4.6)
ALBUMIN/GLOB SERPL: 1.2 {RATIO} (ref 1.2–3.5)
ALP SERPL-CCNC: 95 U/L (ref 50–136)
ALT SERPL-CCNC: 29 U/L (ref 12–65)
ANION GAP SERPL CALC-SCNC: 6 MMOL/L (ref 7–16)
AST SERPL-CCNC: 23 U/L (ref 15–37)
BASOPHILS # BLD: 0 K/UL (ref 0–0.2)
BASOPHILS NFR BLD: 1 % (ref 0–2)
BILIRUB SERPL-MCNC: 0.8 MG/DL (ref 0.2–1.1)
BUN SERPL-MCNC: 23 MG/DL (ref 8–23)
CALCIUM SERPL-MCNC: 8.7 MG/DL (ref 8.3–10.4)
CHLORIDE SERPL-SCNC: 106 MMOL/L (ref 98–107)
CO2 SERPL-SCNC: 28 MMOL/L (ref 21–32)
CREAT SERPL-MCNC: 1.57 MG/DL (ref 0.8–1.5)
DIFFERENTIAL METHOD BLD: ABNORMAL
EOSINOPHIL # BLD: 0.2 K/UL (ref 0–0.8)
EOSINOPHIL NFR BLD: 4 % (ref 0.5–7.8)
ERYTHROCYTE [DISTWIDTH] IN BLOOD BY AUTOMATED COUNT: 15.2 % (ref 11.9–14.6)
GLOBULIN SER CALC-MCNC: 3.1 G/DL (ref 2.3–3.5)
GLUCOSE SERPL-MCNC: 102 MG/DL (ref 65–100)
HCT VFR BLD AUTO: 38.2 % (ref 41.1–50.3)
HGB BLD-MCNC: 13.7 G/DL (ref 13.6–17.2)
LYMPHOCYTES # BLD: 1.1 K/UL (ref 0.5–4.6)
LYMPHOCYTES NFR BLD: 26 % (ref 13–44)
MCH RBC QN AUTO: 31.1 PG (ref 26.1–32.9)
MCHC RBC AUTO-ENTMCNC: 35.9 G/DL (ref 31.4–35)
MCV RBC AUTO: 86.6 FL (ref 79.6–97.8)
MONOCYTES # BLD: 0.4 K/UL (ref 0.1–1.3)
MONOCYTES NFR BLD: 8 % (ref 4–12)
NEUTS SEG # BLD: 2.6 K/UL (ref 1.7–8.2)
NEUTS SEG NFR BLD: 61 % (ref 43–78)
NRBC # BLD: 0 K/UL (ref 0–0.2)
PLATELET # BLD AUTO: 162 K/UL (ref 150–450)
PMV BLD AUTO: 10.4 FL (ref 10.8–14.1)
POTASSIUM SERPL-SCNC: 3.7 MMOL/L (ref 3.5–5.1)
PROT SERPL-MCNC: 6.7 G/DL (ref 6.3–8.2)
RBC # BLD AUTO: 4.41 M/UL (ref 4.23–5.67)
SODIUM SERPL-SCNC: 140 MMOL/L (ref 136–145)
WBC # BLD AUTO: 4.3 K/UL (ref 4.3–11.1)

## 2017-09-15 PROCEDURE — 80053 COMPREHEN METABOLIC PANEL: CPT | Performed by: INTERNAL MEDICINE

## 2017-09-15 PROCEDURE — 85025 COMPLETE CBC W/AUTO DIFF WBC: CPT | Performed by: INTERNAL MEDICINE

## 2017-09-15 PROCEDURE — 36415 COLL VENOUS BLD VENIPUNCTURE: CPT | Performed by: INTERNAL MEDICINE

## 2017-09-22 LAB
Lab: NORMAL
REFERENCE LAB,REFLB: NORMAL
TEST DESCRIPTION:,ATST: NORMAL

## 2017-10-30 ENCOUNTER — HOSPITAL ENCOUNTER (OUTPATIENT)
Dept: LAB | Age: 62
Discharge: HOME | End: 2017-10-30
Payer: COMMERCIAL

## 2017-10-30 DIAGNOSIS — C92.10 CML (CHRONIC MYELOID LEUKEMIA) (HCC): ICD-10-CM

## 2017-10-30 LAB
ALBUMIN SERPL-MCNC: 3.6 G/DL (ref 3.2–4.6)
ALBUMIN/GLOB SERPL: 1.2 {RATIO} (ref 1.2–3.5)
ALP SERPL-CCNC: 94 U/L (ref 50–136)
ALT SERPL-CCNC: 28 U/L (ref 12–65)
ANION GAP SERPL CALC-SCNC: 6 MMOL/L (ref 7–16)
AST SERPL-CCNC: 19 U/L (ref 15–37)
BASOPHILS # BLD: 0.1 K/UL (ref 0–0.2)
BASOPHILS NFR BLD: 1 % (ref 0–2)
BILIRUB SERPL-MCNC: 0.5 MG/DL (ref 0.2–1.1)
BUN SERPL-MCNC: 21 MG/DL (ref 8–23)
CALCIUM SERPL-MCNC: 8.6 MG/DL (ref 8.3–10.4)
CHLORIDE SERPL-SCNC: 105 MMOL/L (ref 98–107)
CO2 SERPL-SCNC: 27 MMOL/L (ref 21–32)
CREAT SERPL-MCNC: 1.47 MG/DL (ref 0.8–1.5)
DIFFERENTIAL METHOD BLD: ABNORMAL
EOSINOPHIL # BLD: 0.2 K/UL (ref 0–0.8)
EOSINOPHIL NFR BLD: 4 % (ref 0.5–7.8)
ERYTHROCYTE [DISTWIDTH] IN BLOOD BY AUTOMATED COUNT: 13.8 % (ref 11.9–14.6)
GLOBULIN SER CALC-MCNC: 3 G/DL (ref 2.3–3.5)
GLUCOSE SERPL-MCNC: 88 MG/DL (ref 65–100)
HCT VFR BLD AUTO: 41.4 % (ref 41.1–50.3)
HGB BLD-MCNC: 14.7 G/DL (ref 13.6–17.2)
LYMPHOCYTES # BLD: 1.2 K/UL (ref 0.5–4.6)
LYMPHOCYTES NFR BLD: 25 % (ref 13–44)
MCH RBC QN AUTO: 31.9 PG (ref 26.1–32.9)
MCHC RBC AUTO-ENTMCNC: 35.5 G/DL (ref 31.4–35)
MCV RBC AUTO: 89.8 FL (ref 79.6–97.8)
MONOCYTES # BLD: 0.4 K/UL (ref 0.1–1.3)
MONOCYTES NFR BLD: 7 % (ref 4–12)
NEUTS SEG # BLD: 3.1 K/UL (ref 1.7–8.2)
NEUTS SEG NFR BLD: 63 % (ref 43–78)
NRBC # BLD: 0 K/UL (ref 0–0.2)
PLATELET # BLD AUTO: 184 K/UL (ref 150–450)
PMV BLD AUTO: 10.4 FL (ref 10.8–14.1)
POTASSIUM SERPL-SCNC: 4.3 MMOL/L (ref 3.5–5.1)
PROT SERPL-MCNC: 6.6 G/DL (ref 6.3–8.2)
RBC # BLD AUTO: 4.61 M/UL (ref 4.23–5.67)
SODIUM SERPL-SCNC: 138 MMOL/L (ref 136–145)
WBC # BLD AUTO: 4.9 K/UL (ref 4.3–11.1)

## 2017-10-30 PROCEDURE — 36415 COLL VENOUS BLD VENIPUNCTURE: CPT | Performed by: INTERNAL MEDICINE

## 2017-10-30 PROCEDURE — 85025 COMPLETE CBC W/AUTO DIFF WBC: CPT | Performed by: INTERNAL MEDICINE

## 2017-10-30 PROCEDURE — 80053 COMPREHEN METABOLIC PANEL: CPT | Performed by: INTERNAL MEDICINE

## 2017-12-29 ENCOUNTER — HOSPITAL ENCOUNTER (OUTPATIENT)
Dept: LAB | Age: 62
Discharge: HOME | End: 2017-12-29
Payer: COMMERCIAL

## 2017-12-29 DIAGNOSIS — C92.10 CML (CHRONIC MYELOID LEUKEMIA) (HCC): ICD-10-CM

## 2017-12-29 LAB
ALBUMIN SERPL-MCNC: 3.7 G/DL (ref 3.2–4.6)
ALBUMIN/GLOB SERPL: 1.2 {RATIO} (ref 1.2–3.5)
ALP SERPL-CCNC: 78 U/L (ref 50–136)
ALT SERPL-CCNC: 34 U/L (ref 12–65)
ANION GAP SERPL CALC-SCNC: 7 MMOL/L (ref 7–16)
AST SERPL-CCNC: 19 U/L (ref 15–37)
BASOPHILS # BLD: 0 K/UL (ref 0–0.2)
BASOPHILS NFR BLD: 1 % (ref 0–2)
BILIRUB SERPL-MCNC: 0.6 MG/DL (ref 0.2–1.1)
BUN SERPL-MCNC: 18 MG/DL (ref 8–23)
CALCIUM SERPL-MCNC: 8.6 MG/DL (ref 8.3–10.4)
CHLORIDE SERPL-SCNC: 104 MMOL/L (ref 98–107)
CO2 SERPL-SCNC: 30 MMOL/L (ref 21–32)
CREAT SERPL-MCNC: 1.44 MG/DL (ref 0.8–1.5)
DIFFERENTIAL METHOD BLD: ABNORMAL
EOSINOPHIL # BLD: 0.2 K/UL (ref 0–0.8)
EOSINOPHIL NFR BLD: 4 % (ref 0.5–7.8)
ERYTHROCYTE [DISTWIDTH] IN BLOOD BY AUTOMATED COUNT: 13.5 % (ref 11.9–14.6)
GLOBULIN SER CALC-MCNC: 3.2 G/DL (ref 2.3–3.5)
GLUCOSE SERPL-MCNC: 130 MG/DL (ref 65–100)
HCT VFR BLD AUTO: 43 % (ref 41.1–50.3)
HGB BLD-MCNC: 15.2 G/DL (ref 13.6–17.2)
LYMPHOCYTES # BLD: 1.2 K/UL (ref 0.5–4.6)
LYMPHOCYTES NFR BLD: 27 % (ref 13–44)
MCH RBC QN AUTO: 31.9 PG (ref 26.1–32.9)
MCHC RBC AUTO-ENTMCNC: 35.3 G/DL (ref 31.4–35)
MCV RBC AUTO: 90.3 FL (ref 79.6–97.8)
MONOCYTES # BLD: 0.4 K/UL (ref 0.1–1.3)
MONOCYTES NFR BLD: 9 % (ref 4–12)
NEUTS SEG # BLD: 2.6 K/UL (ref 1.7–8.2)
NEUTS SEG NFR BLD: 59 % (ref 43–78)
NRBC # BLD: 0 K/UL (ref 0–0.2)
PLATELET # BLD AUTO: 170 K/UL (ref 150–450)
PMV BLD AUTO: 10.6 FL (ref 10.8–14.1)
POTASSIUM SERPL-SCNC: 3.8 MMOL/L (ref 3.5–5.1)
PROT SERPL-MCNC: 6.9 G/DL (ref 6.3–8.2)
RBC # BLD AUTO: 4.76 M/UL (ref 4.23–5.67)
SODIUM SERPL-SCNC: 141 MMOL/L (ref 136–145)
WBC # BLD AUTO: 4.3 K/UL (ref 4.3–11.1)

## 2017-12-29 PROCEDURE — 36415 COLL VENOUS BLD VENIPUNCTURE: CPT | Performed by: INTERNAL MEDICINE

## 2017-12-29 PROCEDURE — 80053 COMPREHEN METABOLIC PANEL: CPT | Performed by: INTERNAL MEDICINE

## 2017-12-29 PROCEDURE — 85025 COMPLETE CBC W/AUTO DIFF WBC: CPT | Performed by: INTERNAL MEDICINE

## 2018-03-02 ENCOUNTER — HOSPITAL ENCOUNTER (OUTPATIENT)
Dept: LAB | Age: 63
Discharge: HOME | End: 2018-03-02
Payer: COMMERCIAL

## 2018-03-02 DIAGNOSIS — C92.10 CML (CHRONIC MYELOID LEUKEMIA) (HCC): ICD-10-CM

## 2018-03-02 LAB
ALBUMIN SERPL-MCNC: 3.7 G/DL (ref 3.2–4.6)
ALBUMIN/GLOB SERPL: 1.1 {RATIO} (ref 1.2–3.5)
ALP SERPL-CCNC: 85 U/L (ref 50–136)
ALT SERPL-CCNC: 31 U/L (ref 12–65)
ANION GAP SERPL CALC-SCNC: 5 MMOL/L (ref 7–16)
AST SERPL-CCNC: 22 U/L (ref 15–37)
BASOPHILS # BLD: 0 K/UL (ref 0–0.2)
BASOPHILS NFR BLD: 1 % (ref 0–2)
BILIRUB SERPL-MCNC: 0.7 MG/DL (ref 0.2–1.1)
BUN SERPL-MCNC: 14 MG/DL (ref 8–23)
CALCIUM SERPL-MCNC: 8.5 MG/DL (ref 8.3–10.4)
CHLORIDE SERPL-SCNC: 107 MMOL/L (ref 98–107)
CO2 SERPL-SCNC: 29 MMOL/L (ref 21–32)
CREAT SERPL-MCNC: 1.46 MG/DL (ref 0.8–1.5)
DIFFERENTIAL METHOD BLD: ABNORMAL
EOSINOPHIL # BLD: 0.1 K/UL (ref 0–0.8)
EOSINOPHIL NFR BLD: 3 % (ref 0.5–7.8)
ERYTHROCYTE [DISTWIDTH] IN BLOOD BY AUTOMATED COUNT: 13.4 % (ref 11.9–14.6)
GLOBULIN SER CALC-MCNC: 3.3 G/DL (ref 2.3–3.5)
GLUCOSE SERPL-MCNC: 102 MG/DL (ref 65–100)
HCT VFR BLD AUTO: 38.9 % (ref 41.1–50.3)
HGB BLD-MCNC: 13.9 G/DL (ref 13.6–17.2)
LYMPHOCYTES # BLD: 1 K/UL (ref 0.5–4.6)
LYMPHOCYTES NFR BLD: 22 % (ref 13–44)
MCH RBC QN AUTO: 31.9 PG (ref 26.1–32.9)
MCHC RBC AUTO-ENTMCNC: 35.7 G/DL (ref 31.4–35)
MCV RBC AUTO: 89.2 FL (ref 79.6–97.8)
MONOCYTES # BLD: 0.3 K/UL (ref 0.1–1.3)
MONOCYTES NFR BLD: 6 % (ref 4–12)
NEUTS SEG # BLD: 3.2 K/UL (ref 1.7–8.2)
NEUTS SEG NFR BLD: 69 % (ref 43–78)
NRBC # BLD: 0 K/UL (ref 0–0.2)
PLATELET # BLD AUTO: 181 K/UL (ref 150–450)
PMV BLD AUTO: 9.9 FL (ref 10.8–14.1)
POTASSIUM SERPL-SCNC: 4.2 MMOL/L (ref 3.5–5.1)
PROT SERPL-MCNC: 7 G/DL (ref 6.3–8.2)
RBC # BLD AUTO: 4.36 M/UL (ref 4.23–5.67)
REFERENCE LAB,REFLB: NORMAL
SODIUM SERPL-SCNC: 141 MMOL/L (ref 136–145)
TEST DESCRIPTION:,ATST: NORMAL
WBC # BLD AUTO: 4.7 K/UL (ref 4.3–11.1)

## 2018-03-02 PROCEDURE — 80053 COMPREHEN METABOLIC PANEL: CPT | Performed by: INTERNAL MEDICINE

## 2018-03-02 PROCEDURE — 36415 COLL VENOUS BLD VENIPUNCTURE: CPT | Performed by: INTERNAL MEDICINE

## 2018-03-02 PROCEDURE — 85025 COMPLETE CBC W/AUTO DIFF WBC: CPT | Performed by: INTERNAL MEDICINE

## 2018-03-27 ENCOUNTER — APPOINTMENT (OUTPATIENT)
Dept: GENERAL RADIOLOGY | Age: 63
DRG: 194 | End: 2018-03-27
Attending: EMERGENCY MEDICINE
Payer: COMMERCIAL

## 2018-03-27 ENCOUNTER — HOSPITAL ENCOUNTER (INPATIENT)
Age: 63
LOS: 1 days | Discharge: HOME | DRG: 194 | End: 2018-03-28
Attending: EMERGENCY MEDICINE | Admitting: FAMILY MEDICINE
Payer: COMMERCIAL

## 2018-03-27 DIAGNOSIS — R06.02 SOB (SHORTNESS OF BREATH): Primary | ICD-10-CM

## 2018-03-27 PROBLEM — J18.9 CAP (COMMUNITY ACQUIRED PNEUMONIA): Status: ACTIVE | Noted: 2018-03-27

## 2018-03-27 LAB
ALBUMIN SERPL-MCNC: 3.5 G/DL (ref 3.2–4.6)
ALBUMIN/GLOB SERPL: 1.1 {RATIO} (ref 1.2–3.5)
ALP SERPL-CCNC: 105 U/L (ref 50–136)
ALT SERPL-CCNC: 34 U/L (ref 12–65)
ANION GAP SERPL CALC-SCNC: 10 MMOL/L (ref 7–16)
ANION GAP SERPL CALC-SCNC: 8 MMOL/L (ref 7–16)
AST SERPL-CCNC: 52 U/L (ref 15–37)
ATRIAL RATE: 104 BPM
BASOPHILS # BLD: 0 K/UL (ref 0–0.2)
BASOPHILS # BLD: 0 K/UL (ref 0–0.2)
BASOPHILS NFR BLD: 0 % (ref 0–2)
BASOPHILS NFR BLD: 0 % (ref 0–2)
BILIRUB SERPL-MCNC: 0.6 MG/DL (ref 0.2–1.1)
BNP SERPL-MCNC: 53 PG/ML
BUN SERPL-MCNC: 15 MG/DL (ref 8–23)
BUN SERPL-MCNC: 17 MG/DL (ref 8–23)
CALCIUM SERPL-MCNC: 8 MG/DL (ref 8.3–10.4)
CALCIUM SERPL-MCNC: 8.5 MG/DL (ref 8.3–10.4)
CALCULATED P AXIS, ECG09: 53 DEGREES
CALCULATED R AXIS, ECG10: 38 DEGREES
CALCULATED T AXIS, ECG11: 20 DEGREES
CHLORIDE SERPL-SCNC: 103 MMOL/L (ref 98–107)
CHLORIDE SERPL-SCNC: 105 MMOL/L (ref 98–107)
CO2 SERPL-SCNC: 26 MMOL/L (ref 21–32)
CO2 SERPL-SCNC: 30 MMOL/L (ref 21–32)
CREAT SERPL-MCNC: 1.44 MG/DL (ref 0.8–1.5)
CREAT SERPL-MCNC: 1.61 MG/DL (ref 0.8–1.5)
DIAGNOSIS, 93000: NORMAL
DIFFERENTIAL METHOD BLD: ABNORMAL
DIFFERENTIAL METHOD BLD: ABNORMAL
EOSINOPHIL # BLD: 0.1 K/UL (ref 0–0.8)
EOSINOPHIL # BLD: 0.2 K/UL (ref 0–0.8)
EOSINOPHIL NFR BLD: 3 % (ref 0.5–7.8)
EOSINOPHIL NFR BLD: 3 % (ref 0.5–7.8)
ERYTHROCYTE [DISTWIDTH] IN BLOOD BY AUTOMATED COUNT: 14.7 % (ref 11.9–14.6)
ERYTHROCYTE [DISTWIDTH] IN BLOOD BY AUTOMATED COUNT: 14.9 % (ref 11.9–14.6)
GLOBULIN SER CALC-MCNC: 3.2 G/DL (ref 2.3–3.5)
GLUCOSE SERPL-MCNC: 165 MG/DL (ref 65–100)
GLUCOSE SERPL-MCNC: 93 MG/DL (ref 65–100)
HCT VFR BLD AUTO: 39.4 % (ref 41.1–50.3)
HCT VFR BLD AUTO: 40.7 % (ref 41.1–50.3)
HGB BLD-MCNC: 13.6 G/DL (ref 13.6–17.2)
HGB BLD-MCNC: 13.9 G/DL (ref 13.6–17.2)
IMM GRANULOCYTES # BLD: 0 K/UL (ref 0–0.5)
IMM GRANULOCYTES # BLD: 0 K/UL (ref 0–0.5)
IMM GRANULOCYTES NFR BLD AUTO: 0 % (ref 0–5)
IMM GRANULOCYTES NFR BLD AUTO: 0 % (ref 0–5)
LYMPHOCYTES # BLD: 0.9 K/UL (ref 0.5–4.6)
LYMPHOCYTES # BLD: 1.2 K/UL (ref 0.5–4.6)
LYMPHOCYTES NFR BLD: 19 % (ref 13–44)
LYMPHOCYTES NFR BLD: 22 % (ref 13–44)
MCH RBC QN AUTO: 31 PG (ref 26.1–32.9)
MCH RBC QN AUTO: 31.2 PG (ref 26.1–32.9)
MCHC RBC AUTO-ENTMCNC: 34.2 G/DL (ref 31.4–35)
MCHC RBC AUTO-ENTMCNC: 34.5 G/DL (ref 31.4–35)
MCV RBC AUTO: 90.4 FL (ref 79.6–97.8)
MCV RBC AUTO: 90.6 FL (ref 79.6–97.8)
MONOCYTES # BLD: 0.4 K/UL (ref 0.1–1.3)
MONOCYTES # BLD: 0.4 K/UL (ref 0.1–1.3)
MONOCYTES NFR BLD: 7 % (ref 4–12)
MONOCYTES NFR BLD: 9 % (ref 4–12)
NEUTS SEG # BLD: 3.3 K/UL (ref 1.7–8.2)
NEUTS SEG # BLD: 3.7 K/UL (ref 1.7–8.2)
NEUTS SEG NFR BLD: 68 % (ref 43–78)
NEUTS SEG NFR BLD: 69 % (ref 43–78)
P-R INTERVAL, ECG05: 184 MS
PLATELET # BLD AUTO: 196 K/UL (ref 150–450)
PLATELET # BLD AUTO: 201 K/UL (ref 150–450)
PMV BLD AUTO: 11.1 FL (ref 10.8–14.1)
PMV BLD AUTO: 11.1 FL (ref 10.8–14.1)
POTASSIUM SERPL-SCNC: 3.8 MMOL/L (ref 3.5–5.1)
POTASSIUM SERPL-SCNC: 4.7 MMOL/L (ref 3.5–5.1)
PROT SERPL-MCNC: 6.7 G/DL (ref 6.3–8.2)
Q-T INTERVAL, ECG07: 352 MS
QRS DURATION, ECG06: 90 MS
QTC CALCULATION (BEZET), ECG08: 462 MS
RBC # BLD AUTO: 4.36 M/UL (ref 4.23–5.67)
RBC # BLD AUTO: 4.49 M/UL (ref 4.23–5.67)
SODIUM SERPL-SCNC: 141 MMOL/L (ref 136–145)
SODIUM SERPL-SCNC: 141 MMOL/L (ref 136–145)
TROPONIN I BLD-MCNC: 0.01 NG/ML (ref 0.02–0.05)
TROPONIN I SERPL-MCNC: <0.04 NG/ML (ref 0.02–0.05)
VENTRICULAR RATE, ECG03: 104 BPM
WBC # BLD AUTO: 4.8 K/UL (ref 4.3–11.1)
WBC # BLD AUTO: 5.4 K/UL (ref 4.3–11.1)

## 2018-03-27 PROCEDURE — 87040 BLOOD CULTURE FOR BACTERIA: CPT | Performed by: EMERGENCY MEDICINE

## 2018-03-27 PROCEDURE — 94760 N-INVAS EAR/PLS OXIMETRY 1: CPT

## 2018-03-27 PROCEDURE — 74011000258 HC RX REV CODE- 258: Performed by: FAMILY MEDICINE

## 2018-03-27 PROCEDURE — 77010033678 HC OXYGEN DAILY

## 2018-03-27 PROCEDURE — 85025 COMPLETE CBC W/AUTO DIFF WBC: CPT | Performed by: EMERGENCY MEDICINE

## 2018-03-27 PROCEDURE — 93005 ELECTROCARDIOGRAM TRACING: CPT | Performed by: EMERGENCY MEDICINE

## 2018-03-27 PROCEDURE — 36415 COLL VENOUS BLD VENIPUNCTURE: CPT | Performed by: FAMILY MEDICINE

## 2018-03-27 PROCEDURE — 80048 BASIC METABOLIC PNL TOTAL CA: CPT | Performed by: FAMILY MEDICINE

## 2018-03-27 PROCEDURE — 84484 ASSAY OF TROPONIN QUANT: CPT | Performed by: EMERGENCY MEDICINE

## 2018-03-27 PROCEDURE — 90471 IMMUNIZATION ADMIN: CPT

## 2018-03-27 PROCEDURE — 99284 EMERGENCY DEPT VISIT MOD MDM: CPT | Performed by: EMERGENCY MEDICINE

## 2018-03-27 PROCEDURE — 96365 THER/PROPH/DIAG IV INF INIT: CPT | Performed by: EMERGENCY MEDICINE

## 2018-03-27 PROCEDURE — 83880 ASSAY OF NATRIURETIC PEPTIDE: CPT | Performed by: EMERGENCY MEDICINE

## 2018-03-27 PROCEDURE — 74011250637 HC RX REV CODE- 250/637: Performed by: FAMILY MEDICINE

## 2018-03-27 PROCEDURE — 80053 COMPREHEN METABOLIC PANEL: CPT | Performed by: EMERGENCY MEDICINE

## 2018-03-27 PROCEDURE — 74011250636 HC RX REV CODE- 250/636: Performed by: FAMILY MEDICINE

## 2018-03-27 PROCEDURE — 90686 IIV4 VACC NO PRSV 0.5 ML IM: CPT | Performed by: FAMILY MEDICINE

## 2018-03-27 PROCEDURE — 74011250636 HC RX REV CODE- 250/636: Performed by: EMERGENCY MEDICINE

## 2018-03-27 PROCEDURE — 71045 X-RAY EXAM CHEST 1 VIEW: CPT

## 2018-03-27 PROCEDURE — 65270000029 HC RM PRIVATE

## 2018-03-27 RX ORDER — LORAZEPAM 1 MG/1
1 TABLET ORAL
Status: DISCONTINUED | OUTPATIENT
Start: 2018-03-27 | End: 2018-03-28 | Stop reason: HOSPADM

## 2018-03-27 RX ORDER — SODIUM CHLORIDE 9 MG/ML
100 INJECTION, SOLUTION INTRAVENOUS CONTINUOUS
Status: DISCONTINUED | OUTPATIENT
Start: 2018-03-27 | End: 2018-03-28

## 2018-03-27 RX ORDER — CARVEDILOL 6.25 MG/1
3.12 TABLET ORAL 2 TIMES DAILY WITH MEALS
Status: DISCONTINUED | OUTPATIENT
Start: 2018-03-27 | End: 2018-03-28 | Stop reason: HOSPADM

## 2018-03-27 RX ORDER — BISACODYL 5 MG
5 TABLET, DELAYED RELEASE (ENTERIC COATED) ORAL DAILY PRN
Status: DISCONTINUED | OUTPATIENT
Start: 2018-03-27 | End: 2018-03-28 | Stop reason: HOSPADM

## 2018-03-27 RX ORDER — AZITHROMYCIN 250 MG/1
500 TABLET, FILM COATED ORAL EVERY 24 HOURS
Status: DISCONTINUED | OUTPATIENT
Start: 2018-03-27 | End: 2018-03-28 | Stop reason: HOSPADM

## 2018-03-27 RX ORDER — ENOXAPARIN SODIUM 100 MG/ML
40 INJECTION SUBCUTANEOUS EVERY 24 HOURS
Status: DISCONTINUED | OUTPATIENT
Start: 2018-03-27 | End: 2018-03-28 | Stop reason: HOSPADM

## 2018-03-27 RX ORDER — SPIRONOLACTONE 25 MG/1
25 TABLET ORAL DAILY
Status: DISCONTINUED | OUTPATIENT
Start: 2018-03-27 | End: 2018-03-28 | Stop reason: HOSPADM

## 2018-03-27 RX ORDER — HYDROCODONE BITARTRATE AND ACETAMINOPHEN 5; 325 MG/1; MG/1
1 TABLET ORAL
Status: DISCONTINUED | OUTPATIENT
Start: 2018-03-27 | End: 2018-03-28 | Stop reason: HOSPADM

## 2018-03-27 RX ORDER — LOSARTAN POTASSIUM 25 MG/1
25 TABLET ORAL DAILY
Status: DISCONTINUED | OUTPATIENT
Start: 2018-03-27 | End: 2018-03-28 | Stop reason: HOSPADM

## 2018-03-27 RX ORDER — FUROSEMIDE 40 MG/1
40 TABLET ORAL DAILY
Status: DISCONTINUED | OUTPATIENT
Start: 2018-03-27 | End: 2018-03-28 | Stop reason: HOSPADM

## 2018-03-27 RX ORDER — SODIUM CHLORIDE 0.9 % (FLUSH) 0.9 %
5-10 SYRINGE (ML) INJECTION AS NEEDED
Status: DISCONTINUED | OUTPATIENT
Start: 2018-03-27 | End: 2018-03-28 | Stop reason: HOSPADM

## 2018-03-27 RX ORDER — LEVOFLOXACIN 5 MG/ML
500 INJECTION, SOLUTION INTRAVENOUS
Status: COMPLETED | OUTPATIENT
Start: 2018-03-27 | End: 2018-03-27

## 2018-03-27 RX ORDER — IMATINIB MESYLATE 400 MG/1
400 TABLET, FILM COATED ORAL DAILY
Status: DISCONTINUED | OUTPATIENT
Start: 2018-03-27 | End: 2018-03-28 | Stop reason: HOSPADM

## 2018-03-27 RX ORDER — ACETAMINOPHEN 325 MG/1
650 TABLET ORAL
Status: DISCONTINUED | OUTPATIENT
Start: 2018-03-27 | End: 2018-03-28 | Stop reason: HOSPADM

## 2018-03-27 RX ORDER — SODIUM CHLORIDE 0.9 % (FLUSH) 0.9 %
5-10 SYRINGE (ML) INJECTION EVERY 8 HOURS
Status: DISCONTINUED | OUTPATIENT
Start: 2018-03-27 | End: 2018-03-28 | Stop reason: HOSPADM

## 2018-03-27 RX ADMIN — SODIUM CHLORIDE 100 ML/HR: 900 INJECTION, SOLUTION INTRAVENOUS at 17:30

## 2018-03-27 RX ADMIN — CEFTRIAXONE 1 G: 1 INJECTION, POWDER, FOR SOLUTION INTRAMUSCULAR; INTRAVENOUS at 04:15

## 2018-03-27 RX ADMIN — AZITHROMYCIN 500 MG: 250 TABLET, FILM COATED ORAL at 04:14

## 2018-03-27 RX ADMIN — FUROSEMIDE 40 MG: 40 TABLET ORAL at 08:45

## 2018-03-27 RX ADMIN — SODIUM CHLORIDE 100 ML/HR: 900 INJECTION, SOLUTION INTRAVENOUS at 04:15

## 2018-03-27 RX ADMIN — LEVOFLOXACIN 500 MG: 5 INJECTION, SOLUTION INTRAVENOUS at 02:34

## 2018-03-27 RX ADMIN — LOSARTAN POTASSIUM 25 MG: 25 TABLET ORAL at 08:45

## 2018-03-27 RX ADMIN — Medication 5 ML: at 22:10

## 2018-03-27 RX ADMIN — SPIRONOLACTONE 25 MG: 25 TABLET, FILM COATED ORAL at 08:44

## 2018-03-27 RX ADMIN — CARVEDILOL 3.12 MG: 6.25 TABLET, FILM COATED ORAL at 08:45

## 2018-03-27 RX ADMIN — CARVEDILOL 3.12 MG: 6.25 TABLET, FILM COATED ORAL at 17:30

## 2018-03-27 RX ADMIN — ENOXAPARIN SODIUM 40 MG: 40 INJECTION SUBCUTANEOUS at 08:45

## 2018-03-27 RX ADMIN — INFLUENZA VIRUS VACCINE 0.5 ML: 15; 15; 15; 15 SUSPENSION INTRAMUSCULAR at 14:11

## 2018-03-27 NOTE — ED PROVIDER NOTES
HPI Comments: Patient is a 66 yo male with PMH significant for CML currently undergoing therapy presents with SOB. States SOB since earlier today and states when he attempted to lay down was unable to due to pain. States also some pain in his right chest since yesterday. States mild cough which is non-productive. No nausea or vomiting, no fevers or chills, no swelling of his legs, no long car trips, no further complaints. Patient is a 65 y.o. male presenting with shortness of breath. The history is provided by the patient. No  was used. Shortness of Breath   Associated symptoms include cough and chest pain. Pertinent negatives include no fever, no headaches, no rhinorrhea, no sore throat, no neck pain, no vomiting, no abdominal pain, no rash and no leg swelling. Past Medical History:   Diagnosis Date    Anxiety     Chronic systolic congestive heart failure ([facility] [address] 5/3/2017    CML (chronic myeloid leukemia) ([facility] [address] 8/25/2012    Depression 11/2/2012    Erectile dysfunction     Leukemia, acute ([facility] [address] 8/24/2012       Past Surgical History:   Procedure Laterality Date    HX HERNIA REPAIR      left inguinal 1996         Family History:   Problem Relation Age of Onset    Lung Disease Father 79     Black lung\"   [facility] Cancer Mother 72     pancreatic cancer       Social History     Social History    Marital status:      Spouse name: N/A    Number of children: N/A    Years of education: N/A     Occupational History          [employer]     Social History Main Topics    Smoking status: Never Smoker    Smokeless tobacco: Never Used    Alcohol use No    Drug use: Not on file    Sexual activity: Not on file     Other Topics Concern    Not on file     Social History Narrative    Pt  with one son         ALLERGIES: Review of patient's allergies indicates no known allergies. Review of Systems   Constitutional: Negative for chills and fever.    HENT: Negative for rhinorrhea and sore throat. Eyes: Negative for visual disturbance. Respiratory: Positive for cough and shortness of breath. Cardiovascular: Positive for chest pain. Negative for leg swelling. Gastrointestinal: Negative for abdominal pain, diarrhea, nausea and vomiting. Genitourinary: Negative for dysuria. Musculoskeletal: Negative for back pain and neck pain. Skin: Negative for rash. Neurological: Negative for weakness and headaches. Psychiatric/Behavioral: The patient is not nervous/anxious. Vitals:    03/27/18 0128   BP: 153/67   Resp: 20   SpO2: 96%            Physical Exam   Constitutional: He is oriented to person, place, and time. He appears well-developed and well-nourished. HENT:   Head: Normocephalic. Right Ear: External ear normal.   Left Ear: External ear normal.   Eyes: Conjunctivae and EOM are normal. Pupils are equal, round, and reactive to light. Neck: Normal range of motion. Neck supple. No tracheal deviation present. Cardiovascular: Normal rate, regular rhythm, normal heart sounds and intact distal pulses. No murmur heard. Pulmonary/Chest: Effort normal and breath sounds normal. No respiratory distress. He has no wheezes. He has no rales. Abdominal: Soft. There is no tenderness. Musculoskeletal: Normal range of motion. Neurological: He is alert and oriented to person, place, and time. No cranial nerve deficit. Skin: No rash noted. Nursing note and vitals reviewed.        MDM  Number of Diagnoses or Management Options  SOB (shortness of breath): new and requires workup     Amount and/or Complexity of Data Reviewed  Clinical lab tests: reviewed and ordered  Tests in the radiology section of CPT®: ordered and reviewed  Tests in the medicine section of CPT®: ordered and reviewed  Review and summarize past medical records: yes    Risk of Complications, Morbidity, and/or Mortality  Presenting problems: high  Diagnostic procedures: high  Management options: high    Patient Progress  Patient progress: stable        ED Course       Procedures  Recent Results (from the past 12 hour(s))   CBC WITH AUTOMATED DIFF    Collection Time: 03/27/18  1:35 AM   Result Value Ref Range    WBC 5.4 4.3 - 11.1 K/uL    RBC 4.36 4.23 - 5.67 M/uL    HGB 13.6 13.6 - 17.2 g/dL    HCT 39.4 (L) 41.1 - 50.3 %    MCV 90.4 79.6 - 97.8 FL    MCH 31.2 26.1 - 32.9 PG    MCHC 34.5 31.4 - 35.0 g/dL    RDW 14.7 (H) 11.9 - 14.6 %    PLATELET 400 157 - 413 K/uL    MPV 11.1 10.8 - 14.1 FL    DF AUTOMATED      NEUTROPHILS 68 43 - 78 %    LYMPHOCYTES 22 13 - 44 %    MONOCYTES 7 4.0 - 12.0 %    EOSINOPHILS 3 0.5 - 7.8 %    BASOPHILS 0 0.0 - 2.0 %    IMMATURE GRANULOCYTES 0 0.0 - 5.0 %    ABS. NEUTROPHILS 3.7 1.7 - 8.2 K/UL    ABS. LYMPHOCYTES 1.2 0.5 - 4.6 K/UL    ABS. MONOCYTES 0.4 0.1 - 1.3 K/UL    ABS. EOSINOPHILS 0.2 0.0 - 0.8 K/UL    ABS. BASOPHILS 0.0 0.0 - 0.2 K/UL    ABS. IMM. GRANS. 0.0 0.0 - 0.5 K/UL   METABOLIC PANEL, COMPREHENSIVE    Collection Time: 03/27/18  1:35 AM   Result Value Ref Range    Sodium 141 136 - 145 mmol/L    Potassium 4.7 3.5 - 5.1 mmol/L    Chloride 103 98 - 107 mmol/L    CO2 30 21 - 32 mmol/L    Anion gap 8 7 - 16 mmol/L    Glucose 165 (H) 65 - 100 mg/dL    BUN 15 8 - 23 MG/DL    Creatinine 1.61 (H) 0.8 - 1.5 MG/DL    GFR est AA 56 (L) >60 ml/min/1.73m2    GFR est non-AA 47 (L) >60 ml/min/1.73m2    Calcium 8.5 8.3 - 10.4 MG/DL    Bilirubin, total 0.6 0.2 - 1.1 MG/DL    ALT (SGPT) 34 12 - 65 U/L    AST (SGOT) 52 (H) 15 - 37 U/L    Alk.  phosphatase 105 50 - 136 U/L    Protein, total 6.7 6.3 - 8.2 g/dL    Albumin 3.5 3.2 - 4.6 g/dL    Globulin 3.2 2.3 - 3.5 g/dL    A-G Ratio 1.1 (L) 1.2 - 3.5     BNP    Collection Time: 03/27/18  1:35 AM   Result Value Ref Range    BNP 53 pg/mL   TROPONIN I    Collection Time: 03/27/18  1:35 AM   Result Value Ref Range    Troponin-I, Qt. <0.04 0.02 - 0.05 NG/ML   POC TROPONIN-I    Collection Time: 03/27/18  1:39 AM   Result Value Ref Range Troponin-I (POC) 0.01 (L) 0.02 - 0.05 ng/ml     Xr Chest Port    Result Date: 3/27/2018  Portable chest x-ray INDICATION: Severe shortness of breath COMPARISON: 03/21/2017 FINDINGS: EKG leads are present over the chest. Enlarged cardiac silhouette. Patchy right lung densities most compatible with multifocal pneumonia. Left lung is clear. No pneumothorax or large pleural effusion. IMPRESSION: Findings most compatible with multifocal right pneumonia.       64 yo male with SOB:       ABX, Cultures, admit

## 2018-03-27 NOTE — PROGRESS NOTES
Problem: Interdisciplinary Rounds  Goal: Interdisciplinary Rounds  Interdisciplinary team rounds were held 3/27/2018 with the following team members:Care Management, Nursing, Nutrition, Pharmacy and Physician and the patient. Plan of care discussed. See clinical pathway and/or care plan for interventions and desired outcomes.

## 2018-03-27 NOTE — LETTER
NOTIFICATION RETURN TO WORK / SCHOOL 
 
3/28/2018 12:11 PM 
 
Mr. Virginia Schulz. [address] [facility] [address] To Whom It May Concern: 
 
Virginia Schulz. is currently under the care of RAHEEM ADAM. Kingsbury Sarika was at the ED on 3/27/2018 to care for family member. If there are questions or concerns please have the patient contact our office. Sincerely, No name on file.

## 2018-03-27 NOTE — PROGRESS NOTES
TRANSFER - IN REPORT:    Verbal report received from [facility] (name) on Una Castillo.  being received from ED (unit) for routine progression of care      Report consisted of patients Situation, Background, Assessment and   Recommendations(SBAR). Information from the following report(s) SBAR, Kardex, ED Summary, Intake/Output, MAR, Accordion and Recent Results was reviewed with the receiving nurse. Opportunity for questions and clarification was provided. Assessment completed upon patients arrival to unit and care assumed.

## 2018-03-27 NOTE — ED NOTES
TRANSFER - OUT REPORT:    Verbal report given to CLARISSA Gabriel 23.  being transferred to [facility] for routine progression of care       Report consisted of patients Situation, Background, Assessment and   Recommendations(SBAR). Information from the following report(s) ED Summary was reviewed with the receiving nurse. Lines:   Peripheral IV 03/27/18 Right Antecubital (Active)   Site Assessment Clean, dry, & intact 3/27/2018  1:41 AM   Phlebitis Assessment 0 3/27/2018  1:41 AM   Infiltration Assessment 0 3/27/2018  1:41 AM   Dressing Status Clean, dry, & intact 3/27/2018  1:41 AM        Opportunity for questions and clarification was provided.       Patient transported with:   Registered Nurse

## 2018-03-27 NOTE — H&P
HOSPITALIST H&P/CONSULT  NAME:  Robert Kaplan. Age:  65 y.o.  :   1955   MRN:   [id number]  PCP: Nakia Daily MD  Treatment Team: Attending Provider: Kassidy Bradley MD; Primary Nurse: Dulce Rossa RN    Prior     CC: Reason for admission is: CAP    HPI:   Patient history was obtained from the ER provider prior to seeing the patient. Patient is a 65 y.o. male who presents to the ER due to SOB. He states that it started a few days ago and has progressively worsened. SOB worst with exertion, and lying flat. Also has rt sided chest pain when lying down. Has mild cough- non-productive. Denies fever/chills, n/v/d, edema or leg pain. In ER, O2 sats low, requiring supplem oxygen. ROS:  All systems have been reviewed and are negative except as stated in HPI or elsewhere. Past Medical History:   Diagnosis Date    Anxiety     Chronic systolic congestive heart failure ([facility] 5/3/2017    CML (chronic myeloid leukemia) ([facility] 2012    Depression 2012    Erectile dysfunction     Leukemia, acute ([facility] 2012      Past Surgical History:   Procedure Laterality Date    HX HERNIA REPAIR      left inguinal       Social History   Substance Use Topics    Smoking status: Never Smoker    Smokeless tobacco: Never Used    Alcohol use No      Family History   Problem Relation Age of Onset    Lung Disease Father 79     Black lung\"    Cancer Mother 72     pancreatic cancer       FH Reviewed and non-contributory to admitting diagnosis    No Known Allergies   Prior to Admission Medications   Prescriptions Last Dose Informant Patient Reported? Taking? HYDROcodone-acetaminophen (NORCO) 5-325 mg per tablet   No No   Sig: Take 1 Tab by mouth every six (6) hours as needed for Pain. Max Daily Amount: 4 Tabs. carvedilol (COREG) 3.125 mg tablet   No No   Sig: Take 1 Tab by mouth two (2) times daily (with meals).    furosemide (LASIX) 40 mg tablet   No No   Sig: Take 2 Tabs by mouth every other day. Patient taking differently: Take 40 mg by mouth daily. imatinib (GLEEVEC) 400 mg tablet   No No   Sig: Take 1 Tab by mouth daily. losartan (COZAAR) 25 mg tablet   No No   Sig: Take 1 Tab by mouth daily. multivitamin, stress formula (STRESS TAB) tablet   Yes No   Sig: Take 1 Tab by mouth daily. mupirocin (BACTROBAN) 2 % ointment   Yes No   Sig: APPLY TO FINGERTIP TWICE DAILY FOR 7 DAYS   spironolactone (ALDACTONE) 25 mg tablet   No No   Sig: Take 1 Tab by mouth daily. valACYclovir (VALTREX) 1 gram tablet   No No   Sig: Take 1 Tab by mouth three (3) times daily. Indications: herpes zoster      Facility-Administered Medications: None         Objective:   Patient Vitals for the past 24 hrs:   Pulse Resp BP SpO2   03/27/18 0309 98 25 - 97 %   03/27/18 0235 96 19 153/70 99 %   03/27/18 0205 99 20 147/65 99 %   03/27/18 0128 - 20 153/67 96 %     No intake or output data in the 24 hours ending 03/27/18 0323   No data recorded. Oxygen Therapy  O2 Sat (%): 97 % (03/27/18 0309)  Pulse via Oximetry: 99 beats per minute (03/27/18 0309)  O2 Device: Room air (03/27/18 0128)   There is no height or weight on file to calculate BMI. Physical Exam:    General:    WD and WN, No apparent distress. Head:   Normocephalic, without obvious abnormality, atraumatic. Eyes:  PERRL; EOMI; sclera normal/non-icteric  ENT:  Hearing is normal.  Oropharynx is clear with tacky mucous membranes   Resp:    Clear to auscultation on left, but diminished on right. No Wheezing or Rhonchi. Resp are even and unlabored  Heart[de-identified]  Regular rate and rhythm,  no murmur,   No LE edema  Abdomen:   Soft, non-tender. Not distended. Bowel sounds normal.  hepato-splenomegaly-none  Musc/SK: Muscle strength is good and tone normal; No cyanosis. No clubbing  Skin:     Texture, turgor normal. No significant rashes or lesions.    Neurologic: CN II - XII are grossly intact - other than Eye exam as noted above  Psych: Alert and oriented x 4;  Judgement and insight are normal     Data Review:   Recent Results (from the past 24 hour(s))   CBC WITH AUTOMATED DIFF    Collection Time: 03/27/18  1:35 AM   Result Value Ref Range    WBC 5.4 4.3 - 11.1 K/uL    RBC 4.36 4.23 - 5.67 M/uL    HGB 13.6 13.6 - 17.2 g/dL    HCT 39.4 (L) 41.1 - 50.3 %    MCV 90.4 79.6 - 97.8 FL    MCH 31.2 26.1 - 32.9 PG    MCHC 34.5 31.4 - 35.0 g/dL    RDW 14.7 (H) 11.9 - 14.6 %    PLATELET 898 294 - 712 K/uL    MPV 11.1 10.8 - 14.1 FL    DF AUTOMATED      NEUTROPHILS 68 43 - 78 %    LYMPHOCYTES 22 13 - 44 %    MONOCYTES 7 4.0 - 12.0 %    EOSINOPHILS 3 0.5 - 7.8 %    BASOPHILS 0 0.0 - 2.0 %    IMMATURE GRANULOCYTES 0 0.0 - 5.0 %    ABS. NEUTROPHILS 3.7 1.7 - 8.2 K/UL    ABS. LYMPHOCYTES 1.2 0.5 - 4.6 K/UL    ABS. MONOCYTES 0.4 0.1 - 1.3 K/UL    ABS. EOSINOPHILS 0.2 0.0 - 0.8 K/UL    ABS. BASOPHILS 0.0 0.0 - 0.2 K/UL    ABS. IMM. GRANS. 0.0 0.0 - 0.5 K/UL   METABOLIC PANEL, COMPREHENSIVE    Collection Time: 03/27/18  1:35 AM   Result Value Ref Range    Sodium 141 136 - 145 mmol/L    Potassium 4.7 3.5 - 5.1 mmol/L    Chloride 103 98 - 107 mmol/L    CO2 30 21 - 32 mmol/L    Anion gap 8 7 - 16 mmol/L    Glucose 165 (H) 65 - 100 mg/dL    BUN 15 8 - 23 MG/DL    Creatinine 1.61 (H) 0.8 - 1.5 MG/DL    GFR est AA 56 (L) >60 ml/min/1.73m2    GFR est non-AA 47 (L) >60 ml/min/1.73m2    Calcium 8.5 8.3 - 10.4 MG/DL    Bilirubin, total 0.6 0.2 - 1.1 MG/DL    ALT (SGPT) 34 12 - 65 U/L    AST (SGOT) 52 (H) 15 - 37 U/L    Alk.  phosphatase 105 50 - 136 U/L    Protein, total 6.7 6.3 - 8.2 g/dL    Albumin 3.5 3.2 - 4.6 g/dL    Globulin 3.2 2.3 - 3.5 g/dL    A-G Ratio 1.1 (L) 1.2 - 3.5     BNP    Collection Time: 03/27/18  1:35 AM   Result Value Ref Range    BNP 53 pg/mL   TROPONIN I    Collection Time: 03/27/18  1:35 AM   Result Value Ref Range    Troponin-I, Qt. <0.04 0.02 - 0.05 NG/ML   POC TROPONIN-I    Collection Time: 03/27/18  1:39 AM   Result Value Ref Range    Troponin-I (POC) 0.01 (L) 0.02 - 0.05 ng/ml     CXR Results  (Last 48 hours)               03/27/18 0145  XR CHEST PORT Final result    Impression:  IMPRESSION: Findings most compatible with multifocal right pneumonia. Narrative:  Portable chest x-ray       INDICATION: Severe shortness of breath       COMPARISON: 03/21/2017       FINDINGS: EKG leads are present over the chest. Enlarged cardiac silhouette. Patchy right lung densities most compatible with multifocal pneumonia. Left lung   is clear. No pneumothorax or large pleural effusion. CT Results  (Last 48 hours)    None              Assessment and Plan: Active Hospital Problems    Diagnosis Date Noted    CAP (community acquired pneumonia) 03/27/2018    Chronic systolic congestive heart failure ([facility] 05/03/2017    [facility] 08/25/2012 8/25/12. Probable diagnosis. Will do bone marrow exam today  8/26/12. Bone marrow aspiration and biopsy done. Marrow aspirate and peripheral blood sent for flow, cytogenetics and molecular testing. No complications with the procedure           · PLAN   · IV abx  · supplem O2  · Cont appropriate home meds (see MAR)  · Control symptoms (pain, n/v, fever, etc)  · Monitor appropriate labs   · DVT prophylaxis:  Lovenox  · Code status: Full  · Risk: high  · Anticipated DC needs:  · Estimated LOS:  Greater than 2 midnights  · Plans discussed with patient and/or caregiver; questions answered.       Med records reviewed if applicable; findings:     Critical care time if applicable:      Signed By: Ashley Mckeon MD     March 27, 2018

## 2018-03-27 NOTE — IP AVS SNAPSHOT
[address] 
[phone] Patient: Benedict Urena. MRN: [id number] :1955 About your hospitalization You were admitted on:  2018 You last received care in the:  [address] [place] [address] You were discharged on:  2018 Why you were hospitalized Your primary diagnosis was:  Cap (Community Acquired Pneumonia) Your diagnoses also included:  Cml (Chronic Myeloid Leukemia) (Hcc), Chronic Systolic Congestive Heart Failure (Hcc) Follow-up Information Follow up With Details Comments Contact Info Xiomara Pedraza MD In 1 week APPT.  @ 2:45 12 [address] [address] 
[phone] Your Scheduled Appointments 2018  2:45 PM EDT Extended Office Visit with Xiomara Pedraza MD  
[facility] ([address]) [address]  
[phone]  10:00 AM EDT Office Visit with Deana Roque MD  
[address] [facility] [place] [facility] ([facility]) [address] 
[address] [facility]  
[phone] Friday May 04, 2018  8:00 AM EDT  
LAB with [address]  
8628 [facility] OUTREACH INSURANCE [facility] [address] [facility] [address] [place] [address]  
[phone] Friday May 04, 2018  8:30 AM EDT Follow Up with Carmelo Navarro MD  
[facility] Hematology and Oncology [facility] RALPH/ Wlaker Diaz [address] [facility] [address]  
[phone] Discharge Orders None A check vijay indicates which time of day the medication should be taken. My Medications START taking these medications Instructions Each Dose to Equal  
 Morning Noon Evening Bedtime  
 levoFLOXacin 750 mg tablet Commonly known as:  Vivian Hernandez  
 Your next dose is:  TOMORROW MORNING Take 1 Tab by mouth daily. 750 mg CHANGE how you take these medications Instructions Each Dose to Equal  
 Morning Noon Evening Bedtime  
 furosemide 40 mg tablet Commonly known as:  LASIX What changed:   
- how much to take - when to take this Your next dose is:  Matt Hung Take 2 Tabs by mouth every other day. 80 mg CONTINUE taking these medications Instructions Each Dose to Equal  
 Morning Noon Evening Bedtime  
 carvedilol 3.125 mg tablet Commonly known as:  Kate Lima Your next dose is:  TODAY Take 1 Tab by mouth two (2) times daily (with meals). 3.125 mg HYDROcodone-acetaminophen 5-325 mg per tablet Commonly known as:  Aletha Milton Take 1 Tab by mouth every six (6) hours as needed for Pain. Max Daily Amount: 4 Tabs. 1 Tab  
    
   
   
   
  
 imatinib 400 mg tablet Commonly known as:  GLEEVEC Take 1 Tab by mouth daily. 400 mg  
    
   
   
   
  
 losartan 25 mg tablet Commonly known as:  COZAAR Your next dose is:  TOMORROW Take 1 Tab by mouth daily. 25 mg  
    
   
   
   
  
 multivitamin, stress formula tablet Commonly known as:  STRESS TAB Your next dose is:  TOMORROW Take 1 Tab by mouth daily. 1 Tab  
    
   
   
   
  
 mupirocin 2 % ointment Commonly known as:  BACTROBAN  
   
 APPLY TO FINGERTIP TWICE DAILY FOR 7 DAYS  
     
   
   
   
  
 spironolactone 25 mg tablet Commonly known as:  ALDACTONE Your next dose is:  TOMORROW Take 1 Tab by mouth daily. 25 mg  
    
   
   
   
  
 valACYclovir 1 gram tablet Commonly known as:  VALTREX Your next dose is:  TODAY Take 1 Tab by mouth three (3) times daily. Indications: herpes zoster 1000 mg Where to Get Your Medications Information on where to get these meds will be given to you by the nurse or doctor. ! Ask your nurse or doctor about these medications  
  levoFLOXacin 750 mg tablet Opioid Education Prescription Opioids: What You Need to Know: 
 
 
Dental Appliances: None Visual Aid: None Home Medications: None Jewelry: None Clothing: Jacket/Coat, Shirt, Undergarments, With patient, Erika Marsh Other Valuables: Cell Phone PATIENT INSTRUCTIONS: 
 
After general anesthesia or intravenous sedation, for 24 hours or while taking prescription Narcotics: · Limit your activities · Do not drive and operate hazardous machinery · Do not make important personal or business decisions · Do  not drink alcoholic beverages · If you have not urinated within 8 hours after discharge, please contact your surgeon on call. Report the following to your surgeon: 
· Excessive pain, swelling, redness or odor of or around the surgical area · Temperature over 100.5 · Nausea and vomiting lasting longer than 4 hours or if unable to take medications · Any signs of decreased circulation or nerve impairment to extremity: change in color, persistent  numbness, tingling, coldness or increase pain · Any questions What to do at Home: 
Recommended activity: Activity as tolerated, per MD 
 
If you experience any of the following symptoms fever>101, pain unrelieved with medication, nausea/vomiting, shortness of breath, dizziness/fainting, chest pain. , please follow up with your doctor. *  Please give a list of your current medications to your Primary Care Provider. *  Please update this list whenever your medications are discontinued, doses are 
    changed, or new medications (including over-the-counter products) are added. *  Please carry medication information at all times in case of emergency situations. These are general instructions for a healthy lifestyle: No smoking/ No tobacco products/ Avoid exposure to second hand smoke Surgeon General's Warning:  Quitting smoking now greatly reduces serious risk to your health. Obesity, smoking, and sedentary lifestyle greatly increases your risk for illness A healthy diet, regular physical exercise & weight monitoring are important for maintaining a healthy lifestyle You may be retaining fluid if you have a history of heart failure or if you experience any of the following symptoms:  Weight gain of 3 pounds or more overnight or 5 pounds in a week, increased swelling in our hands or feet or shortness of breath while lying flat in bed. Please call your doctor as soon as you notice any of these symptoms; do not wait until your next office visit. Recognize signs and symptoms of STROKE: 
 
F-face looks uneven A-arms unable to move or move unevenly S-speech slurred or non-existent T-time-call 911 as soon as signs and symptoms begin-DO NOT go Back to bed or wait to see if you get better-TIME IS BRAIN. Warning Signs of HEART ATTACK Call 911 if you have these symptoms: 
? Chest discomfort. Most heart attacks involve discomfort in the center of the chest that lasts more than a few minutes, or that goes away and comes back. It can feel like uncomfortable pressure, squeezing, fullness, or pain. ? Discomfort in other areas of the upper body. Symptoms can include pain or discomfort in one or both arms, the back, neck, jaw, or stomach. ? Shortness of breath with or without chest discomfort. ? Other signs may include breaking out in a cold sweat, nausea, or lightheadedness. Don't wait more than five minutes to call [address]! Fast action can save your life. Calling 911 is almost always the fastest way to get lifesaving treatment. Emergency Medical Services staff can begin treatment when they arrive  up to an hour sooner than if someone gets to the hospital by car. The discharge information has been reviewed with the patient. The patient verbalized understanding. Discharge medications reviewed with the patient and appropriate educational materials and side effects teaching were provided. Pneumonia: Care Instructions Your Care Instructions Pneumonia is an infection of the lungs. Most cases are caused by infections from bacteria or viruses. Pneumonia may be mild or very severe. If it is caused by bacteria, you will be treated with antibiotics. It may take a few weeks to a few months to recover fully from pneumonia, depending on how sick you were and whether your overall health is good. Follow-up care is a key part of your treatment and safety. Be sure to make and go to all appointments, and call your doctor if you are having problems. It's also a good idea to know your test results and keep a list of the medicines you take. How can you care for yourself at home? · Take your antibiotics exactly as directed. Do not stop taking the medicine just because you are feeling better. You need to take the full course of antibiotics. · Take your medicines exactly as prescribed. Call your doctor if you think you are having a problem with your medicine. · Get plenty of rest and sleep. You may feel weak and tired for a while, but your energy level will improve with time. · To prevent dehydration, drink plenty of fluids, enough so that your urine is light yellow or clear like water. Choose water and other caffeine-free clear liquids until you feel better.  If you have kidney, heart, or liver disease and have to limit fluids, talk with your doctor before you increase the amount of fluids you drink. · Take care of your cough so you can rest. A cough that brings up mucus from your lungs is common with pneumonia. It is one way your body gets rid of the infection. But if coughing keeps you from resting or causes severe fatigue and chest-wall pain, talk to your doctor. He or she may suggest that you take a medicine to reduce the cough. · Use a vaporizer or humidifier to add moisture to your bedroom. Follow the directions for cleaning the machine. · Do not smoke or allow others to smoke around you. Smoke will make your cough last longer. If you need help quitting, talk to your doctor about stop-smoking programs and medicines. These can increase your chances of quitting for good. · Take an over-the-counter pain medicine, such as acetaminophen (Tylenol), ibuprofen (Advil, Motrin), or naproxen (Aleve). Read and follow all instructions on the label. · Do not take two or more pain medicines at the same time unless the doctor told you to. Many pain medicines have acetaminophen, which is Tylenol. Too much acetaminophen (Tylenol) can be harmful. · If you were given a spirometer to measure how well your lungs are working, use it as instructed. This can help your doctor tell how your recovery is going. · To prevent pneumonia in the future, talk to your doctor about getting a flu vaccine (once a year) and a pneumococcal vaccine (one time only for most people). When should you call for help? Call 911 anytime you think you may need emergency care. For example, call if: 
? · You have severe trouble breathing. ?Call your doctor now or seek immediate medical care if: 
? · You cough up dark brown or bloody mucus (sputum). ? · You have new or worse trouble breathing. ? · You are dizzy or lightheaded, or you feel like you may faint. ? Watch closely for changes in your health, and be sure to contact your doctor if: 
? · You have a new or higher fever. ? · You are coughing more deeply or more often. ? · You are not getting better after 2 days (48 hours). ? · You do not get better as expected. Where can you learn more? Go to http://lc-anca.info/. Enter [id number] in the search box to learn more about \"Pneumonia: Care Instructions. \" Current as of: May 12, 2017 Content Version: 11.4 © 4246-7879 [employer]. Care instructions adapted under license by [employer] (which disclaims liability or warranty for this information). If you have questions about a medical condition or this instruction, always ask your healthcare professional. [address] any warranty or liability for your use of this information. Color Promos Announcement We are excited to announce that we are making your provider's discharge notes available to you in Color Promos. You will see these notes when they are completed and signed by the physician that discharged you from your recent hospital stay. If you have any questions or concerns about any information you see in Color Promos, please call the Health Information Department where you were seen or reach out to your Primary Care Provider for more information about your plan of care. Introducing [facility]! Janny Person introduces Color Promos patient portal. Now you can access parts of your medical record, email your doctor's office, and request medication refills online. 1. In your internet browser, go to [email or web address] 2. Click on the First Time User? Click Here link in the Sign In box. You will see the New Member Sign Up page. 3. Enter your Color Promos Access Code exactly as it appears below. You will not need to use this code after youve completed the sign-up process. If you do not sign up before the expiration date, you must request a new code. · Color Promos Access Code: [email or web address] Expires: 3/29/2018 10:10 AM 
 
 4. Enter the last four digits of your Social Security Number (xxxx) and Date of Birth (mm/dd/yyyy) as indicated and click Submit. You will be taken to the next sign-up page. 5. Create a Speedshape ID. This will be your Speedshape login ID and cannot be changed, so think of one that is secure and easy to remember. 6. Create a Speedshape password. You can change your password at any time. 7. Enter your Password Reset Question and Answer. This can be used at a later time if you forget your password. 8. Enter your e-mail address. You will receive e-mail notification when new information is available in [address] 9. Click Sign Up. You can now view and download portions of your medical record. 10. Click the Download Summary menu link to download a portable copy of your medical information. If you have questions, please visit the Frequently Asked Questions section of the Speedshape website. Remember, Speedshape is NOT to be used for urgent needs. For medical emergencies, dial 911. Now available from your iPhone and Android! Introducing Cy Sher As a [facility] patient, I wanted to make you aware of our electronic visit tool called Cy Alhajishandrafin. [facility] [facility] 24/7 allows you to connect within minutes with a medical provider 24 hours a day, seven days a week via a mobile device or tablet or logging into a secure website from your computer. You can access Cy Sher from anywhere in the [place]. A virtual visit might be right for you when you have a simple condition and feel like you just dont want to get out of bed, or cant get away from work for an appointment, when your regular [facility] provider is not available (evenings, weekends or holidays), or when youre out of town and need minor care.   Electronic visits cost only $49 and if the Cy Alhajishandrashane provider determines a prescription is needed to treat your condition, one can be electronically transmitted to a nearby pharmacy*. Please take a moment to enroll today if you have not already done so. The enrollment process is free and takes just a few minutes. To enroll, please download the [place] [facility] 24/7 maryuri to your tablet or phone, or visit [email or web address] to enroll on your computer. And, as an [address] [place] [address] patient with a [employer] account, the results of your visits will be scanned into your electronic medical record and your primary care provider will be able to view the scanned results. We urge you to continue to see your regular [facility] provider for your ongoing medical care. And while your primary care provider may not be the one available when you seek a [employer]ur[employer] virtual visit, the peace of mind you get from getting a real diagnosis real time can be priceless. For more information on Insuritas, view our Frequently Asked Questions (FAQs) at [email or web address]. Sincerely, 
 
Veronica Gonzales MD 
Chief Medical Officer [address] [place] [address] *:  certain medications cannot be prescribed via Ins[employer] Unresulted Labs-Please follow up with your PCP about these lab tests Order Current Status CULTURE, BLOOD Preliminary result CULTURE, BLOOD Preliminary result Providers Seen During Your Hospitalization Provider Specialty Primary office phone Ghazal Carlson MD Emergency Medicine [phone] Andres Jose MD Family Practice [phone] Immunizations Administered for This Admission Name Date Influenza Vaccine (Quad) PF 3/27/2018 Your Primary Care Physician (PCP) Primary Care Physician Office Phone Office Fax Velvet Mcarthur [phone] You are allergic to the following No active allergies Recent Documentation Height Weight BMI Smoking Status 1.88 m 94.4 kg 26.72 kg/m2 Never Smoker Emergency Contacts Name Discharge Info Relation Home Work Mobile [address] [place] CAREGIVER [3] Spouse [3] [phone] Patient Belongings The following personal items are in your possession at time of discharge: 
  Dental Appliances: None  Visual Aid: None      Home Medications: None   Jewelry: None  Clothing: Jacket/Coat, Shirt, Undergarments, With patient, Socks, Slippers    Other Valuables: Cell Phone Please provide this summary of care documentation to your next provider. Signatures-by signing, you are acknowledging that this After Visit Summary has been reviewed with you and you have received a copy. Patient Signature:  ____________________________________________________________ Date:  ____________________________________________________________  
  
[facility] Provider Signature:  ____________________________________________________________ Date:  ____________________________________________________________

## 2018-03-27 NOTE — PROGRESS NOTES
Initial visit with patient by Hillary Worthy, Spiritual Care Volunteer. Support given. Jennifer Anderson M.Div.

## 2018-03-27 NOTE — PROGRESS NOTES
Patient admitted after midnight for CAP. Patient stated is breathing better this morning. Patient also reported having an episode of PNA last year with similar presentation. Family at bedside all questions answered. Physical exam remarkable for slightly diminished breath sound on R side.   BC pending  Cont IV abxs    Patient not to be billed for this progress note

## 2018-03-27 NOTE — PROGRESS NOTES
Assessment done via doc flow sheet. Pt resting in bed, alert & oriented, resp easy & regular, lungs diminished at the bases. Denies pain. Pt instructed how to order meals. Call light within reach.

## 2018-03-28 VITALS
OXYGEN SATURATION: 97 % | TEMPERATURE: 98 F | DIASTOLIC BLOOD PRESSURE: 80 MMHG | HEIGHT: 74 IN | HEART RATE: 87 BPM | WEIGHT: 208.1 LBS | RESPIRATION RATE: 16 BRPM | SYSTOLIC BLOOD PRESSURE: 117 MMHG | BODY MASS INDEX: 26.71 KG/M2

## 2018-03-28 LAB
ANION GAP SERPL CALC-SCNC: 8 MMOL/L (ref 7–16)
BASOPHILS # BLD: 0 K/UL (ref 0–0.2)
BASOPHILS NFR BLD: 1 % (ref 0–2)
BUN SERPL-MCNC: 15 MG/DL (ref 8–23)
CALCIUM SERPL-MCNC: 7.9 MG/DL (ref 8.3–10.4)
CHLORIDE SERPL-SCNC: 106 MMOL/L (ref 98–107)
CO2 SERPL-SCNC: 27 MMOL/L (ref 21–32)
CREAT SERPL-MCNC: 1.28 MG/DL (ref 0.8–1.5)
DIFFERENTIAL METHOD BLD: ABNORMAL
EOSINOPHIL # BLD: 0.1 K/UL (ref 0–0.8)
EOSINOPHIL NFR BLD: 4 % (ref 0.5–7.8)
ERYTHROCYTE [DISTWIDTH] IN BLOOD BY AUTOMATED COUNT: 14.6 % (ref 11.9–14.6)
GLUCOSE SERPL-MCNC: 117 MG/DL (ref 65–100)
HCT VFR BLD AUTO: 40.2 % (ref 41.1–50.3)
HGB BLD-MCNC: 13.6 G/DL (ref 13.6–17.2)
IMM GRANULOCYTES # BLD: 0 K/UL (ref 0–0.5)
IMM GRANULOCYTES NFR BLD AUTO: 0 % (ref 0–5)
LYMPHOCYTES # BLD: 1.1 K/UL (ref 0.5–4.6)
LYMPHOCYTES NFR BLD: 27 % (ref 13–44)
MCH RBC QN AUTO: 30.6 PG (ref 26.1–32.9)
MCHC RBC AUTO-ENTMCNC: 33.8 G/DL (ref 31.4–35)
MCV RBC AUTO: 90.3 FL (ref 79.6–97.8)
MONOCYTES # BLD: 0.4 K/UL (ref 0.1–1.3)
MONOCYTES NFR BLD: 10 % (ref 4–12)
NEUTS SEG # BLD: 2.4 K/UL (ref 1.7–8.2)
NEUTS SEG NFR BLD: 58 % (ref 43–78)
PLATELET # BLD AUTO: 175 K/UL (ref 150–450)
PMV BLD AUTO: 10.6 FL (ref 10.8–14.1)
POTASSIUM SERPL-SCNC: 3.8 MMOL/L (ref 3.5–5.1)
RBC # BLD AUTO: 4.45 M/UL (ref 4.23–5.67)
SODIUM SERPL-SCNC: 141 MMOL/L (ref 136–145)
WBC # BLD AUTO: 4.1 K/UL (ref 4.3–11.1)

## 2018-03-28 PROCEDURE — 74011250636 HC RX REV CODE- 250/636: Performed by: FAMILY MEDICINE

## 2018-03-28 PROCEDURE — 74011250637 HC RX REV CODE- 250/637: Performed by: FAMILY MEDICINE

## 2018-03-28 PROCEDURE — 77030027138 HC INCENT SPIROMETER -A

## 2018-03-28 PROCEDURE — 74011000258 HC RX REV CODE- 258: Performed by: FAMILY MEDICINE

## 2018-03-28 PROCEDURE — 85025 COMPLETE CBC W/AUTO DIFF WBC: CPT | Performed by: FAMILY MEDICINE

## 2018-03-28 PROCEDURE — 36415 COLL VENOUS BLD VENIPUNCTURE: CPT | Performed by: FAMILY MEDICINE

## 2018-03-28 PROCEDURE — 80048 BASIC METABOLIC PNL TOTAL CA: CPT | Performed by: FAMILY MEDICINE

## 2018-03-28 RX ORDER — LEVOFLOXACIN 750 MG/1
750 TABLET ORAL DAILY
Qty: 5 TAB | Refills: 0 | Status: SHIPPED | OUTPATIENT
Start: 2018-03-28 | End: 2018-04-04 | Stop reason: ALTCHOICE

## 2018-03-28 RX ADMIN — ENOXAPARIN SODIUM 40 MG: 40 INJECTION SUBCUTANEOUS at 08:54

## 2018-03-28 RX ADMIN — CEFTRIAXONE 1 G: 1 INJECTION, POWDER, FOR SOLUTION INTRAMUSCULAR; INTRAVENOUS at 04:17

## 2018-03-28 RX ADMIN — LOSARTAN POTASSIUM 25 MG: 25 TABLET ORAL at 08:54

## 2018-03-28 RX ADMIN — AZITHROMYCIN 500 MG: 250 TABLET, FILM COATED ORAL at 04:17

## 2018-03-28 RX ADMIN — FUROSEMIDE 40 MG: 40 TABLET ORAL at 08:54

## 2018-03-28 RX ADMIN — CARVEDILOL 3.12 MG: 6.25 TABLET, FILM COATED ORAL at 08:54

## 2018-03-28 RX ADMIN — SODIUM CHLORIDE 100 ML/HR: 900 INJECTION, SOLUTION INTRAVENOUS at 04:17

## 2018-03-28 RX ADMIN — SPIRONOLACTONE 25 MG: 25 TABLET, FILM COATED ORAL at 08:54

## 2018-03-28 NOTE — PROGRESS NOTES
Admission assessment completed via doc flow sheet. Patient is alert and oriented x 4. Respirations even and unlabored on room air. Lung sounds diminished. Heart sounds S1, S2 auscultated and regular. Abdomen soft and non tender. Bowel sounds active to all 4 quadrants. IV flushed without difficulty. Patient denies pain and other needs at this time. Bed is locked and in low position. Family at bedside. Bed rails x 3. Patient is encouraged to call for assistance. Call light within reach.

## 2018-03-28 NOTE — PROGRESS NOTES
Shift assessment completed via doc flow sheet. Patient is alert and oriented x 4. Respirations even and unlabored on room air. Lung sounds diminished at base. Heart sounds S1, S2 auscultated and regular. Abdomen soft and non tender. Bowel sounds active to all 4 quadrants. IV fluids infusing  without difficulty. Patient ambulates to bathroom as needed with assistance. Patient denies pain and other needs at this time. Bed is locked and in low position. Bed rails x 3. Patient is encouraged to call for assistance. Call light within reach.

## 2018-03-28 NOTE — DISCHARGE INSTRUCTIONS
DISCHARGE SUMMARY from Nurse    The following personal items are in your possession at time of discharge:    Dental Appliances: None  Visual Aid: None     Home Medications: None  Jewelry: None  Clothing: Jacket/Coat, Shirt, Undergarments, With patient, Socks, Slippers  Other Valuables: Cell Phone             PATIENT INSTRUCTIONS:    After general anesthesia or intravenous sedation, for 24 hours or while taking prescription Narcotics:  · Limit your activities  · Do not drive and operate hazardous machinery  · Do not make important personal or business decisions  · Do  not drink alcoholic beverages  · If you have not urinated within 8 hours after discharge, please contact your surgeon on call. Report the following to your surgeon:  · Excessive pain, swelling, redness or odor of or around the surgical area  · Temperature over 100.5  · Nausea and vomiting lasting longer than 4 hours or if unable to take medications  · Any signs of decreased circulation or nerve impairment to extremity: change in color, persistent  numbness, tingling, coldness or increase pain  · Any questions        What to do at Home:  Recommended activity: Activity as tolerated, per MD    If you experience any of the following symptoms fever>101, pain unrelieved with medication, nausea/vomiting, shortness of breath, dizziness/fainting, chest pain. , please follow up with your doctor. *  Please give a list of your current medications to your Primary Care Provider. *  Please update this list whenever your medications are discontinued, doses are      changed, or new medications (including over-the-counter products) are added. *  Please carry medication information at all times in case of emergency situations.           These are general instructions for a healthy lifestyle:    No smoking/ No tobacco products/ Avoid exposure to second hand smoke    Surgeon General's Warning:  Quitting smoking now greatly reduces serious risk to your health. Obesity, smoking, and sedentary lifestyle greatly increases your risk for illness    A healthy diet, regular physical exercise & weight monitoring are important for maintaining a healthy lifestyle    You may be retaining fluid if you have a history of heart failure or if you experience any of the following symptoms:  Weight gain of 3 pounds or more overnight or 5 pounds in a week, increased swelling in our hands or feet or shortness of breath while lying flat in bed. Please call your doctor as soon as you notice any of these symptoms; do not wait until your next office visit. Recognize signs and symptoms of STROKE:    F-face looks uneven    A-arms unable to move or move unevenly    S-speech slurred or non-existent    T-time-call 911 as soon as signs and symptoms begin-DO NOT go       Back to bed or wait to see if you get better-TIME IS BRAIN. Warning Signs of HEART ATTACK     Call 911 if you have these symptoms:   Chest discomfort. Most heart attacks involve discomfort in the center of the chest that lasts more than a few minutes, or that goes away and comes back. It can feel like uncomfortable pressure, squeezing, fullness, or pain.  Discomfort in other areas of the upper body. Symptoms can include pain or discomfort in one or both arms, the back, neck, jaw, or stomach.  Shortness of breath with or without chest discomfort.  Other signs may include breaking out in a cold sweat, nausea, or lightheadedness. Don't wait more than five minutes to call 911 - MINUTES MATTER! Fast action can save your life. Calling 911 is almost always the fastest way to get lifesaving treatment. Emergency Medical Services staff can begin treatment when they arrive -- up to an hour sooner than if someone gets to the hospital by car. The discharge information has been reviewed with the patient. The patient verbalized understanding.     Discharge medications reviewed with the patient and appropriate educational materials and side effects teaching were provided. Pneumonia: Care Instructions  Your Care Instructions    Pneumonia is an infection of the lungs. Most cases are caused by infections from bacteria or viruses. Pneumonia may be mild or very severe. If it is caused by bacteria, you will be treated with antibiotics. It may take a few weeks to a few months to recover fully from pneumonia, depending on how sick you were and whether your overall health is good. Follow-up care is a key part of your treatment and safety. Be sure to make and go to all appointments, and call your doctor if you are having problems. It's also a good idea to know your test results and keep a list of the medicines you take. How can you care for yourself at home? · Take your antibiotics exactly as directed. Do not stop taking the medicine just because you are feeling better. You need to take the full course of antibiotics. · Take your medicines exactly as prescribed. Call your doctor if you think you are having a problem with your medicine. · Get plenty of rest and sleep. You may feel weak and tired for a while, but your energy level will improve with time. · To prevent dehydration, drink plenty of fluids, enough so that your urine is light yellow or clear like water. Choose water and other caffeine-free clear liquids until you feel better. If you have kidney, heart, or liver disease and have to limit fluids, talk with your doctor before you increase the amount of fluids you drink. · Take care of your cough so you can rest. A cough that brings up mucus from your lungs is common with pneumonia. It is one way your body gets rid of the infection. But if coughing keeps you from resting or causes severe fatigue and chest-wall pain, talk to your doctor. He or she may suggest that you take a medicine to reduce the cough. · Use a vaporizer or humidifier to add moisture to your bedroom.  Follow the directions for cleaning the machine. · Do not smoke or allow others to smoke around you. Smoke will make your cough last longer. If you need help quitting, talk to your doctor about stop-smoking programs and medicines. These can increase your chances of quitting for good. · Take an over-the-counter pain medicine, such as acetaminophen (Tylenol), ibuprofen (Advil, Motrin), or naproxen (Aleve). Read and follow all instructions on the label. · Do not take two or more pain medicines at the same time unless the doctor told you to. Many pain medicines have acetaminophen, which is Tylenol. Too much acetaminophen (Tylenol) can be harmful. · If you were given a spirometer to measure how well your lungs are working, use it as instructed. This can help your doctor tell how your recovery is going. · To prevent pneumonia in the future, talk to your doctor about getting a flu vaccine (once a year) and a pneumococcal vaccine (one time only for most people). When should you call for help? Call 911 anytime you think you may need emergency care. For example, call if:  ? · You have severe trouble breathing. ?Call your doctor now or seek immediate medical care if:  ? · You cough up dark brown or bloody mucus (sputum). ? · You have new or worse trouble breathing. ? · You are dizzy or lightheaded, or you feel like you may faint. ? Watch closely for changes in your health, and be sure to contact your doctor if:  ? · You have a new or higher fever. ? · You are coughing more deeply or more often. ? · You are not getting better after 2 days (48 hours). ? · You do not get better as expected. Where can you learn more? Go to http://lc-anca.info/. Enter [id number] in the search box to learn more about \"Pneumonia: Care Instructions. \"  Current as of: May 12, 2017  Content Version: 11.4  © 5632-7436 [employer], [employer].  Care instructions adapted under license by [employer] (which disclaims liability or warranty for this information). If you have questions about a medical condition or this instruction, always ask your healthcare professional. [place] [address] any warranty or liability for your use of this information.

## 2018-03-28 NOTE — DISCHARGE SUMMARY
Hospitalist Discharge Summary     Admit Date:  3/27/2018  1:20 AM   Name:  Una Castillo. Age:  65 y.o.  :  1955   MRN:  [id number]   PCP:  Malvin Vergara MD  Treatment Team: Attending Provider: Blanca Mitchell MD; Utilization Review: Jessica Ortiz RN    Problem List for this Hospitalization:  Hospital Problems as of 3/28/2018  Date Reviewed: 3/2/2018          Codes Class Noted - Resolved POA    * (Principal)CAP (community acquired pneumonia) ICD-10-CM: J18.9  ICD-9-CM: 486  3/27/2018 - Present Yes        Chronic systolic congestive heart failure ([facility] ICD-10-CM: I50.22  ICD-9-CM: 428.22, 428.0  5/3/2017 - Present Yes        CML (chronic myeloid leukemia) ([facility] ICD-10-CM: C92.10  ICD-9-CM: 205.10  2012 - Present Yes    Overview Addendum 2012  7:23 AM by David Lopes MD     12. Probable diagnosis. Will do bone marrow exam today  12. Bone marrow aspiration and biopsy done. Marrow aspirate and peripheral blood sent for flow, cytogenetics and molecular testing. No complications with the procedure                     Admission HPI from 3/27/2018:    65 y.o. male who presents to the ER due to SOB. He states that it started a few days ago and has progressively worsened. SOB worst with exertion, and lying flat. Also has rt sided chest pain when lying down. Has mild cough- non-productive. Denies fever/chills, n/v/d, edema or leg pain. Hospital Course: In the ED troponin was negative and CXR was showing R side PNA likely CAP. Patient was started on IV abxs and was transferred to the floor. In the floor patient had repeated troponin that was negative. Atypical chest pain likely MSK due to cough, pain resolved while in patient. In the floor patient clinically improved. Patient was able to ambulate in the room with no issues. Patient stated he was feeling around his baseline.  Patient is stable to be discharge home to complete abxs treatment and to have a close follow up with PCP upon discharge. Patient will need repeated CXR at PCP office to check for resolution of PNA. Follow up instructions below. Plan was discussed with patient/care team..  All questions answered. Patient was stable at time of discharge and was instructed to call or return if there are any concerns or recurrence of symptoms. Diagnostic Imaging/Tests:        All Micro Results     Procedure Component Value Units Date/Time    CULTURE, BLOOD [[id number]] Collected:  03/27/18 0233    Order Status:  Completed Specimen:  Blood from Blood Updated:  03/28/18 0808     Special Requests: --        NO SPECIAL REQUESTS  RIGHT  Antecubital       Culture result: NO GROWTH 1 DAY       CULTURE, BLOOD [[id number]] Collected:  03/27/18 0233    Order Status:  Completed Specimen:  Blood from Blood Updated:  03/28/18 0808     Special Requests: --        NO SPECIAL REQUESTS  RIGHT  FOREARM       Culture result: NO GROWTH 1 DAY             Labs: Results:       BMP, Mg, Phos Recent Labs      03/28/18   0639  03/27/18   0536 03/27/18 0135   NA  141  141  141   K  3.8  3.8  4.7   CL  106  105  103   CO2  27  26  30   AGAP  8  10  8   BUN  15  17  15   CREA  1.28  1.44  1.61*   CA  7.9*  8.0*  8.5   GLU  117*  93  165*      CBC Recent Labs      03/28/18 0639 03/27/18   0536 03/27/18   0135   WBC  4.1*  4.8  5.4   RBC  4.45  4.49  4.36   HGB  13.6  13.9  13.6   HCT  40.2*  40.7*  39.4*   PLT  175  201  196   GRANS  58  69  68   LYMPH  27  19  22   EOS  4  3  3   MONOS  10  9  7   BASOS  1  0  0   IG  0  0  0   ANEU  2.4  3.3  3.7   ABL  1.1  0.9  1.2   S[place]OR  0.1  0.1  0.2   ABM  0.4  0.4  0.4   ABB  0.0  0.0  0.0   AIG  0.0  0.0  0.0      LFT Recent Labs      03/27/18   0135   SGOT  52*   ALT  34   AP  105   TP  6.7   ALB  3.5   GLOB  3.2   AGRAT  1.1*      Cardiac Testing Lab Results   Component Value Date/Time    BNP 53 03/27/2018 01:35 AM     05/01/2017 09:51 AM     04/13/2017 12:30 PM    Troponin-I, Qt. <0.04 03/27/2018 01:35 AM      Coagulation Tests Lab Results   Component Value Date/Time    Prothrombin time 11.0 04/20/2017 09:33 AM    Prothrombin time 10.9 04/13/2017 05:37 PM    INR 1.0 04/20/2017 09:33 AM    INR 1.1 04/13/2017 05:37 PM      A1c No results found for: HBA1C, HGBE8, ZPY8SRYG, JFX6VIYR   Lipid Panel No results found for: CHOL, CHOLPOCT, CHOLX, CHLST, CHOLV, 591286, HDL, LDL, LDLC, DLDLP, 198423, VLDLC, VLDL, TGLX, TRIGL, TRIGP, TGLPOCT, CHHD, CHHDX   Thyroid Panel No results found for: T4, T3U, TSH, TSHEXT, TSHEXT     Most Recent UA No results found for: COLOR, APPRN, REFSG, VALE, PROTU, GLUCU, KETU, BILU, BLDU, UROU, SOPHIA, LEUKU     No Known Allergies  Immunization History   Administered Date(s) Administered    Influenza Vaccine (Quad) PF 03/27/2018    Tdap 02/27/2016       All Labs from Last 24 Hrs:  Recent Results (from the past 24 hour(s))   METABOLIC PANEL, BASIC    Collection Time: 03/28/18  6:39 AM   Result Value Ref Range    Sodium 141 136 - 145 mmol/L    Potassium 3.8 3.5 - 5.1 mmol/L    Chloride 106 98 - 107 mmol/L    CO2 27 21 - 32 mmol/L    Anion gap 8 7 - 16 mmol/L    Glucose 117 (H) 65 - 100 mg/dL    BUN 15 8 - 23 MG/DL    Creatinine 1.28 0.8 - 1.5 MG/DL    GFR est AA >60 >60 ml/min/1.73m2    GFR est non-AA >60 >60 ml/min/1.73m2    Calcium 7.9 (L) 8.3 - 10.4 MG/DL   CBC WITH AUTOMATED DIFF    Collection Time: 03/28/18  6:39 AM   Result Value Ref Range    WBC 4.1 (L) 4.3 - 11.1 K/uL    RBC 4.45 4.23 - 5.67 M/uL    HGB 13.6 13.6 - 17.2 g/dL    HCT 40.2 (L) 41.1 - 50.3 %    MCV 90.3 79.6 - 97.8 FL    MCH 30.6 26.1 - 32.9 PG    MCHC 33.8 31.4 - 35.0 g/dL    RDW 14.6 11.9 - 14.6 %    PLATELET 351 287 - 180 K/uL    MPV 10.6 (L) 10.8 - 14.1 FL    DF AUTOMATED      NEUTROPHILS 58 43 - 78 %    LYMPHOCYTES 27 13 - 44 %    MONOCYTES 10 4.0 - 12.0 %    EOSINOPHILS 4 0.5 - 7.8 %    BASOPHILS 1 0.0 - 2.0 %    IMMATURE GRANULOCYTES 0 0.0 - 5.0 %    ABS. NEUTROPHILS 2.4 1.7 - 8.2 K/UL    ABS. LYMPHOCYTES 1.1 0.5 - 4.6 K/UL    ABS. MONOCYTES 0.4 0.1 - 1.3 K/UL    ABS. EOSINOPHILS 0.1 0.0 - 0.8 K/UL    ABS. BASOPHILS 0.0 0.0 - 0.2 K/UL    ABS. IMM. GRANS. 0.0 0.0 - 0.5 K/UL       Discharge Exam:  Patient Vitals for the past 24 hrs:   Temp Pulse Resp BP SpO2   03/28/18 1011 98 °F (36.7 °C) 87 16 117/80 97 %   03/28/18 0630 98 °F (36.7 °C) 74 16 112/77 98 %   03/28/18 0236 97.8 °F (36.6 °C) 80 18 128/73 100 %   03/27/18 2215 98.2 °F (36.8 °C) 72 16 125/70 98 %   03/27/18 1840 96.8 °F (36 °C) 83 24 115/73 99 %   03/27/18 1427 97.9 °F (36.6 °C) 81 20 121/74 100 %   03/27/18 1049 96.1 °F (35.6 °C) 82 20 112/65 100 %     Oxygen Therapy  O2 Sat (%): 97 % (03/28/18 1011)  Pulse via Oximetry: 99 beats per minute (03/27/18 0322)  O2 Device: Room air (03/28/18 0850)    Intake/Output Summary (Last 24 hours) at 03/28/18 1041  Last data filed at 03/28/18 0559   Gross per 24 hour   Intake             2120 ml   Output                0 ml   Net             2120 ml       General:    Well nourished. Alert. No distress. Eyes:   Normal sclera. Extraocular movements intact. ENT:  Normocephalic, atraumatic. Moist mucous membranes  CV:   Regular rate and rhythm. No murmur, rub, or gallop. Lungs: No wheezing, rhonchi, or rales. Abdomen: Soft, nontender, nondistended. Bowel sounds normal.   Extremities: Warm and dry. No cyanosis or edema. Neurologic: CN II-XII grossly intact. Sensation intact. Moving all extremities        Discharge Info:   Current Discharge Medication List      START taking these medications    Details   levoFLOXacin (LEVAQUIN) 750 mg tablet Take 1 Tab by mouth daily. Qty: 5 Tab, Refills: 0         CONTINUE these medications which have NOT CHANGED    Details   furosemide (LASIX) 40 mg tablet Take 2 Tabs by mouth every other day. Qty: 180 Tab, Refills: 3      HYDROcodone-acetaminophen (NORCO) 5-325 mg per tablet Take 1 Tab by mouth every six (6) hours as needed for Pain.  Max Daily Amount: 4 Tabs.  Qty: 20 Tab, Refills: 0    Associated Diagnoses: Finger pain, right      spironolactone (ALDACTONE) 25 mg tablet Take 1 Tab by mouth daily. Qty: 90 Tab, Refills: 3      imatinib (GLEEVEC) 400 mg tablet Take 1 Tab by mouth daily. Qty: 30 Tab, Refills: 6    Associated Diagnoses: CML (chronic myeloid leukemia) (HCC)      carvedilol (COREG) 3.125 mg tablet Take 1 Tab by mouth two (2) times daily (with meals). Qty: 180 Tab, Refills: 3      losartan (COZAAR) 25 mg tablet Take 1 Tab by mouth daily. Qty: 30 Tab, Refills: 11      multivitamin, stress formula (STRESS TAB) tablet Take 1 Tab by mouth daily. mupirocin (BACTROBAN) 2 % ointment APPLY TO FINGERTIP TWICE DAILY FOR 7 DAYS  Refills: 0      valACYclovir (VALTREX) 1 gram tablet Take 1 Tab by mouth three (3) times daily. Indications: herpes zoster  Qty: 21 Tab, Refills: 0    Associated Diagnoses: Herpes zoster ophthalmicus               Disposition: home  Activity: Activity as tolerated. Advised to start working next week if stable  Diet: Regular Diet    Follow-up Information     Follow up With Details Pascual Philip MD   [address] [facility]  [phone]                  Signed:   Catrachito Crook MD

## 2018-03-28 NOTE — PROGRESS NOTES
[address] March 28, 2018       RE: Antoinette Beyer. To Whom It May Concern,    This is to certify that Antoinette Beyer. has been in our care from 03/37/18 to 03/28/18 he may return to work on 04/03/18. Please feel free to contact my office if you have any questions or concerns. Thank you for your assistance in this matter.       Sincerely,  Deepak Carroll RN    [phone]

## 2018-03-28 NOTE — PROGRESS NOTES
Received bedside shift report from off going nurse Luis Jordan Rn which included SBAR, MAR, and Plan of Care. Patient is resting quietly with eyes closed and respirations present. Will continue to follow patient's progression through hourly rounding. Will meet all requests as needed and appropriate.

## 2018-03-28 NOTE — PROGRESS NOTES
Shift assessment complete. Patient alert and oriented person, place, and time, respirations present, even and unlabored, crackles present to right middle lobe. HOB is elevated, patient denies any SOB at this time. S1 & S2 auscultated, heart with regular rate and rhythm, abdomen soft and non tender, bowel sounds active in all 4 quadrants. No pressure ulcers or edema noted. Patient's skin assessment shows skin to be intact. Patient is up with no assistance to the restroom and is voiding adequate amounts of clear, yellow urine. IVF infusing without difficulty. Patient denies any pain at this time and instructed to call for assistance. Patient verbalizes understanding. The bed is low and locked in position, side rails x3, call light within reach. Patient re-instructed on use of incentive spirometer and patient can demonstrate it's use as well. Will continue to follow patient's progression through hourly rounding. All patient's needs will be met as requested and as appropriate.

## 2018-04-01 LAB
BACTERIA SPEC CULT: NORMAL
BACTERIA SPEC CULT: NORMAL
SERVICE CMNT-IMP: NORMAL
SERVICE CMNT-IMP: NORMAL

## 2018-04-27 ENCOUNTER — HOSPITAL ENCOUNTER (OUTPATIENT)
Age: 63
Setting detail: OUTPATIENT SURGERY
Discharge: HOME | End: 2018-04-27
Attending: INTERNAL MEDICINE | Admitting: INTERNAL MEDICINE
Payer: COMMERCIAL

## 2018-04-27 VITALS
SYSTOLIC BLOOD PRESSURE: 137 MMHG | HEART RATE: 99 BPM | RESPIRATION RATE: 16 BRPM | DIASTOLIC BLOOD PRESSURE: 85 MMHG | OXYGEN SATURATION: 100 % | TEMPERATURE: 98.7 F | WEIGHT: 207 LBS | HEIGHT: 74 IN | BODY MASS INDEX: 26.56 KG/M2

## 2018-04-27 DIAGNOSIS — J90 PLEURAL EFFUSION: ICD-10-CM

## 2018-04-27 DIAGNOSIS — J18.9 COMMUNITY ACQUIRED PNEUMONIA OF RIGHT MIDDLE LOBE OF LUNG: ICD-10-CM

## 2018-04-27 DIAGNOSIS — R91.8 PULMONARY INFILTRATE: ICD-10-CM

## 2018-04-27 DIAGNOSIS — R93.89 ABNORMAL CHEST CT: ICD-10-CM

## 2018-04-27 DIAGNOSIS — C92.10 CML (CHRONIC MYELOID LEUKEMIA) (HCC): ICD-10-CM

## 2018-04-27 PROCEDURE — 87102 FUNGUS ISOLATION CULTURE: CPT | Performed by: INTERNAL MEDICINE

## 2018-04-27 PROCEDURE — 87116 MYCOBACTERIA CULTURE: CPT | Performed by: INTERNAL MEDICINE

## 2018-04-27 PROCEDURE — 87205 SMEAR GRAM STAIN: CPT | Performed by: INTERNAL MEDICINE

## 2018-04-27 PROCEDURE — 88305 TISSUE EXAM BY PATHOLOGIST: CPT | Performed by: INTERNAL MEDICINE

## 2018-04-27 PROCEDURE — 88112 CYTOPATH CELL ENHANCE TECH: CPT | Performed by: INTERNAL MEDICINE

## 2018-04-27 PROCEDURE — 88341 IMHCHEM/IMCYTCHM EA ADD ANTB: CPT | Performed by: INTERNAL MEDICINE

## 2018-04-27 PROCEDURE — 88185 FLOWCYTOMETRY/TC ADD-ON: CPT | Performed by: INTERNAL MEDICINE

## 2018-04-27 PROCEDURE — 89050 BODY FLUID CELL COUNT: CPT | Performed by: INTERNAL MEDICINE

## 2018-04-27 PROCEDURE — C1729 CATH, DRAINAGE: HCPCS | Performed by: INTERNAL MEDICINE

## 2018-04-27 PROCEDURE — 99205 OFFICE O/P NEW HI 60 MIN: CPT | Performed by: INTERNAL MEDICINE

## 2018-04-27 PROCEDURE — 76040000019: Performed by: INTERNAL MEDICINE

## 2018-04-27 PROCEDURE — 82945 GLUCOSE OTHER FLUID: CPT | Performed by: INTERNAL MEDICINE

## 2018-04-27 PROCEDURE — 32555 ASPIRATE PLEURA W/ IMAGING: CPT | Performed by: INTERNAL MEDICINE

## 2018-04-27 PROCEDURE — 88184 FLOWCYTOMETRY/ TC 1 MARKER: CPT | Performed by: INTERNAL MEDICINE

## 2018-04-27 PROCEDURE — 84157 ASSAY OF PROTEIN OTHER: CPT | Performed by: INTERNAL MEDICINE

## 2018-04-27 PROCEDURE — 88342 IMHCHEM/IMCYTCHM 1ST ANTB: CPT | Performed by: INTERNAL MEDICINE

## 2018-04-27 PROCEDURE — 83615 LACTATE (LD) (LDH) ENZYME: CPT | Performed by: INTERNAL MEDICINE

## 2018-04-27 NOTE — PROGRESS NOTES
Date of Surgery Update:  Roman Sonja. was seen and examined. History and physical has been reviewed. The patient has been examined.  There have been no significant clinical changes since the completion of the originally dated History and Physical.    Signed By: Shayy Jacobs MD     April 27, 2018 12:56 PM

## 2018-04-27 NOTE — PROCEDURES
PROCEDURE:    DIAGNOSTIC/THERAPEUTIC THORACENTESIS. PRE-OP DIAGNOSIS:     R PLEURAL EFFUSION    POST-OP DIAGNOSIS:    R PLEURAL EFFUSION        ANESTHESIA:    LOCAL ANESTHESIA WITH 1% LIDOCAINE 10 CC TOTAL. CHEST ULTRASOUND FINDINGS:    A Turbo-M, Sonosite ultrasound with a 5-16 mHz probe was used to image the chest and localize the pleural effusion on the Right chest.    A moderate anechoic space was seen on the Right consistent with an uncomplicated pleural effusion. DESCRIPTION OF PROCEDURE:    After obtaining informed consent and localizing the safest location for thoracentesis, the  9 intercostal space was marked with a blunt, plastic needle cap in the mid scapular line. An [employer] AK-0100 Pleral-Seal thoracentesis kit was used to perform the procedure. The skin was  cleansed with the supplied  chlorhexididne swab and then draped in the usual fasion. Using the previously marked location as a giude, a 22 G 1.5 inch needle was used to inject 10 cc of 1% lidocaine into the skin and subcutaneous tissue, as well as onto the underlying rib and inter-costal muscles, pleural fluid was aspirated to assure proper location, prior to removing the anesthesia needle. A 3mm  incision was then made, with the supplied scalpel in the usual fashion to facilitate the insertiopn of the thoracentesis needle. The needle with an 8French thoracentesis catheter was then introduced into the chest through the previously made incision in the usual fashion, the rib localized with the needle, and the catheter then marched over the rib into the pleural space. After aspirating fluid, the thoracentesis catheter was then placed into the chest using the needle itself as a trocar. The needle was then removed and the catheter was attached to the supplied tubing without complication. 400  cc of Bloody fluid, was aspirated and sent for analysis.       Fluid was sent for the following tests:    Cell count with differential  LDH  Glucose  Total protein  Cytology  Hematocrit  AFB  Fungus  Routine culture and Gram stain  Flow cytometry     Post procedure US confirmed complete drainage of the effusion. EBL:   1 cc       COMPLICATIONS:    None      Plan :   Will try to schedule for navigational bronchoscopy  Elmer Lima MD

## 2018-04-27 NOTE — CONSULTS
CONSULT NOTE    Mariola Skinner.    4/27/2018    Date of Admission:  4/27/2018    The patient's chart is reviewed and the patient is discussed with the staff. Subjective:     Patient is a 65 y.o.  male seen and evaluated at the request of Dr. Faith Mcdermott for the evaluation of pleural effusion and abnormal chest CT. 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 He was complaining of SOB. He was discharged and then saw his PCP as he was complaining of R sided chest pain and SOB. He had chest CT which showed R pleural effusino and RML infiltrate and he was referred to us in consultation. .      Review of Systems  A comprehensive review of systems was negative except for that written in the HPI. Patient Active Problem List   Diagnosis Code    Allergic rhinitis J30.9    Erectile dysfunction N52.9    CML (chronic myeloid leukemia) ([facility]) C92.10    Depression F32.9    Chronic systolic congestive heart failure ([facility]) I50.22    CAP (community acquired pneumonia) J18.9    Pleural effusion J90    Abnormal chest CT R93.8    Pulmonary infiltrate R91.8           Prior to Admission Medications   Prescriptions Last Dose Informant Patient Reported? Taking?   carvedilol (COREG) 3.125 mg tablet 4/26/2018 at Unknown time  No Yes   Sig: Take 1 Tab by mouth two (2) times daily (with meals). cyclobenzaprine (FLEXERIL) 5 mg tablet 4/26/2018 at Unknown time  No Yes   Sig: Take 1 Tab by mouth three (3) times daily as needed for Muscle Spasm(s). furosemide (LASIX) 40 mg tablet 4/26/2018 at Unknown time  Yes Yes   Sig: Take  by mouth daily. imatinib (GLEEVEC) 400 mg tablet 4/26/2018 at Unknown time  Yes Yes   losartan (COZAAR) 25 mg tablet 4/26/2018 at Unknown time  No Yes   Sig: Take 1 Tab by mouth daily. multivitamin, stress formula (STRESS TAB) tablet Not Taking at Unknown time  Yes No   Sig: Take 1 Tab by mouth daily.      potassium chloride (KLOR-CON M20) 20 mEq tablet 4/26/2018 at Unknown time  Yes Yes   Sig: Take  by mouth two (2) times a day. spironolactone (ALDACTONE) 25 mg tablet 4/26/2018 at Unknown time  No Yes   Sig: Take 1 Tab by mouth daily. Facility-Administered Medications: None       Past Medical History:   Diagnosis Date    Anxiety     Chronic systolic congestive heart failure ([facility] 5/3/2017    CML (chronic myeloid leukemia) ([facility] 8/25/2012    Depression 11/2/2012    Erectile dysfunction     Leukemia, acute ([facility] 8/24/2012     Past Surgical History:   Procedure Laterality Date    HX HERNIA REPAIR      left inguinal 1996     Social History     Social History    Marital status:      Spouse name: N/A    Number of children: N/A    Years of education: N/A     Occupational History          [employer]     Social History Main Topics    Smoking status: Never Smoker    Smokeless tobacco: Never Used    Alcohol use No    Drug use: Not on file    Sexual activity: Not on file     Other Topics Concern    Not on file     Social History Narrative    Pt  with one son     Family History   Problem Relation Age of Onset    Lung Disease Father 79     Black lung\"    Cancer Mother 72     pancreatic cancer     No Known Allergies    No current facility-administered medications for this encounter. Current Outpatient Prescriptions   Medication Sig    imatinib (GLEEVEC) 400 mg tablet     cyclobenzaprine (FLEXERIL) 5 mg tablet Take 1 Tab by mouth three (3) times daily as needed for Muscle Spasm(s).  potassium chloride (KLOR-CON M20) 20 mEq tablet Take  by mouth two (2) times a day.  furosemide (LASIX) 40 mg tablet Take  by mouth daily.  spironolactone (ALDACTONE) 25 mg tablet Take 1 Tab by mouth daily.  carvedilol (COREG) 3.125 mg tablet Take 1 Tab by mouth two (2) times daily (with meals).  losartan (COZAAR) 25 mg tablet Take 1 Tab by mouth daily.     multivitamin, stress formula (STRESS TAB) tablet Take 1 Tab by mouth daily. Objective:     Vitals:    04/27/18 1141 04/27/18 1146 04/27/18 1151 04/27/18 1155   BP: 131/90 131/79 140/87 137/85   Pulse: 100 98 100 99   Resp: 18 16 15 16   Temp:       SpO2: 98% 100% 100% 100%   Weight:       Height:           PHYSICAL EXAM     Constitutional:  the patient is well developed and in no acute distress  EENMT:  Sclera clear, pupils equal, oral mucosa moist  Respiratory: clear,slightly diminished on R  Cardiovascular:  RRR without M,G,R  Gastrointestinal: soft and non-tender; with positive bowel sounds. Musculoskeletal: warm without cyanosis. There is no lower leg edema. Skin:  no jaundice or rashes, no3 wounds   Neurologic: no gross neuro deficits     Psychiatric:  alert and oriented x 3    Chest CT reviewed        Assessment:  (Medical Decision Making)     Hospital Problems  Date Reviewed: 4/25/2018          Codes Class Noted POA    Pleural effusion    Reviewed chest CT by myself effusion seems small ,will try to tap but he has persistent infiltrate 1 month after being treated for PNA and is concerning  / malignancy ,need bronch with navigation  ICD-10-CM: J90  ICD-9-CM: 511.9  4/27/2018 Unknown        Abnormal chest CT ICD-10-CM: R93.8  ICD-9-CM: 793.2  4/27/2018 Unknown        Pulmonary infiltrate ICD-10-CM: R91.8  ICD-9-CM: 793.19  4/27/2018 Unknown              Plan:  (Medical Decision Making)     -- thoracentesis  - he will need navigational biopsy and will discuss with     More than 50% of the time documented was spent in face-to-face contact with the patient and in the care of the patient on the floor/unit where the patient is located. Thank you very much for this referral.  We appreciate the opportunity to participate in this patient's care. Will follow along with above stated plan.     Lon Nava MD\

## 2018-04-27 NOTE — IP AVS SNAPSHOT
Romeo Olmos 
 
 
 [address] [facility] [address] [facility] 
[phone] Patient: Sanchez Montes. MRN: [id number] :1955 A check vijay indicates which time of day the medication should be taken. My Medications ASK your doctor about these medications Instructions Each Dose to Equal  
 Morning Noon Evening Bedtime  
 carvedilol 3.125 mg tablet Commonly known as:  Bonifacio Hirschfeld Your last dose was: Your next dose is: Take 1 Tab by mouth two (2) times daily (with meals). 3.125 mg  
    
   
   
   
  
 cyclobenzaprine 5 mg tablet Commonly known as:  FLEXERIL Your last dose was: Your next dose is: Take 1 Tab by mouth three (3) times daily as needed for Muscle Spasm(s). 5 mg  
    
   
   
   
  
 imatinib 400 mg tablet Commonly known as:  GLEEVEC Your last dose was: Your next dose is:    
   
   
      
   
   
   
  
 KLOR-CON M20 20 mEq tablet Generic drug:  potassium chloride Your last dose was: Your next dose is: Take  by mouth two (2) times a day. LASIX 40 mg tablet Generic drug:  furosemide Your last dose was: Your next dose is: Take  by mouth daily. losartan 25 mg tablet Commonly known as:  COZAAR Your last dose was: Your next dose is: Take 1 Tab by mouth daily. 25 mg  
    
   
   
   
  
 multivitamin, stress formula tablet Commonly known as:  STRESS TAB Your last dose was: Your next dose is: Take 1 Tab by mouth daily. 1 Tab  
    
   
   
   
  
 spironolactone 25 mg tablet Commonly known as:  ALDACTONE Your last dose was: Your next dose is: Take 1 Tab by mouth daily.   
 25 mg

## 2018-04-27 NOTE — PROGRESS NOTES
Pt sat up on side of bed for thoracentesis. Consent obtained. Time out performed. Pts vitals monitored throughout procedure. Left and right ultrasound done and pic taken of pleural fluid. 500 ml bloody pleural fluid from right side removed. Pt tolerated procedure well with no adverse rxn. Specimens sent to the lab x 4 and labeled appropriately per Dr Aracely Kaur. Site dressed appropriately.

## 2018-04-27 NOTE — DISCHARGE INSTRUCTIONS
Thoracentesis: What to Expect at Home  Your Recovery  Thoracentesis (say \"hgkw-fe-cuh-JESUS-sis\") is a procedure to remove fluid from the space between the lungs and the chest wall (pleural space). This procedure may also be called a \"chest tap. \" It is normal to have a small amount of fluid in the pleural space. But too much fluid can build up because of problems such as infection, heart failure, or lung cancer. The procedure may have been done to help with shortness of breath and pain caused by the fluid buildup. Or you may have had this procedure so the doctor could test the fluid to find the cause of the buildup. Your chest may be sore where the doctor put the needle or catheter into your skin (the puncture site). This usually gets better after a day or two. You can go back to work or your normal activities as soon as you feel up to it. If the doctor sent the fluid to a lab for testing, it may take several days to get the results. The doctor or nurse will discuss the results with you. This care sheet gives you a general idea about how long it will take for you to recover. But each person recovers at a different pace. Follow the steps below to feel better as quickly as possible. How can you care for yourself at home? Activity  ? · Rest when you feel tired. Getting enough sleep will help you recover. ? · Avoid strenuous activities, such as bicycle riding, jogging, weight lifting, or aerobic exercise, until your doctor says it is okay. ? · You may shower. Do not take a bath until the puncture site has healed, or until your doctor tells you it is okay. ? · Ask your doctor when you can drive again. ? · You may need to take 1 or 2 days off from work. It depends on the type of work you do and how you feel. Diet  ? · You can eat your normal diet. ? · Drink plenty of fluids (unless your doctor tells you not to). Medicines  ? · Your doctor will tell you if and when you can restart your medicines.  Lalo or she will also give you instructions about taking any new medicines. ? · If you take blood thinners, such as warfarin (Coumadin), clopidogrel (Plavix), or aspirin, be sure to talk to your doctor. He or she will tell you if and when to start taking those medicines again. Make sure that you understand exactly what your doctor wants you to do. ? · Be safe with medicines. Take pain medicines exactly as directed. ¨ If the doctor gave you a prescription medicine for pain, take it as prescribed. ¨ If you are not taking a prescription pain medicine, ask your doctor if you can take an over-the-counter medicine. ¨ Do not take two or more pain medicines at the same time unless the doctor told you to. Many pain medicines have acetaminophen, which is Tylenol. Too much acetaminophen (Tylenol) can be harmful. ? · If you think your pain medicine is making you sick to your stomach:  ¨ Take your medicine after meals (unless your doctor has told you not to). ¨ Ask your doctor for a different pain medicine. ? · If your doctor prescribed antibiotics, take them as directed. Do not stop taking them just because you feel better. You need to take the full course of antibiotics. ?Care of the puncture site  ? · Wash the area daily with warm, soapy water, and pat it dry. Don't use hydrogen peroxide or alcohol, which may delay healing. You may cover the area with a gauze bandage if it weeps or rubs against clothing. Change the bandage every day. ? · Keep the area clean and dry. Follow-up care is a key part of your treatment and safety. Be sure to make and go to all appointments, and call your doctor if you are having problems. It's also a good idea to know your test results and keep a list of the medicines you take. When should you call for help? Call 911 anytime you think you may need emergency care. For example, call if:  ? · You passed out (lost consciousness). ? · You have severe trouble breathing.    ? · You have sudden chest pain and shortness of breath, or you cough up blood. ?Call your doctor now or seek immediate medical care if:  ? · You have shortness of breath that is new or getting worse. ? · You have new or worse pain in your chest, especially when you take a deep breath. ? · You are sick to your stomach or cannot keep fluids down. ? · You have a fever over 100°F.   ? · Bright red blood has soaked through the bandage over your puncture site. ? · You have signs of infection, such as:  ¨ Increased pain, swelling, warmth, or redness. ¨ Red streaks leading from the puncture site. ¨ Pus draining from the puncture site. ¨ Swollen lymph nodes in your neck, armpits, or groin. ¨ A fever. ? · You cough up a lot more mucus than normal, or your mucus changes color. ? Watch closely for changes in your health, and be sure to contact your doctor if you have any problems. Where can you learn more? Go to http://lc-anca.info/. Enter C168 in the search box to learn more about \"Thoracentesis: What to Expect at Home. \"  Current as of: May 12, 2017  Content Version: 11.4  © 1582-8802 [employer], [employer]. Care instructions adapted under license by [employer] (which disclaims liability or warranty for this information). If you have questions about a medical condition or this instruction, always ask your healthcare professional. [address] any warranty or liability for your use of this information. Please call [facility] at [phone] with any issues.

## 2018-04-27 NOTE — IP AVS SNAPSHOT
[address] 
[phone] Patient: Sukhjinder Fisher. MRN: [id number] :1955 About your hospitalization You were admitted on:  2018 You last received care in the:  [facility] You were discharged on:  2018 Why you were hospitalized Your primary diagnosis was:  Not on File Follow-up Information None Your Scheduled Appointments Friday May 04, 2018  8:00 AM EDT  
LAB with [address]  
1808 [facility] INSURANCE [facility] [address] 187 [facility]  
[phone] Friday May 04, 2018  8:30 AM EDT Follow Up with Hya Love MD  
[facility] Hematology and Oncology [facility] RALPH/ Walker Diaz [address] [facility] [address]  
[phone] Wednesday May 09, 2018  9:00 AM EDT Office Visit with Kierra Jimenez MD  
[facility] [address]  
[phone] Discharge Orders None A check vijay indicates which time of day the medication should be taken. My Medications ASK your doctor about these medications Instructions Each Dose to Equal  
 Morning Noon Evening Bedtime  
 carvedilol 3.125 mg tablet Commonly known as:  Jessica Solid Your last dose was: Your next dose is: Take 1 Tab by mouth two (2) times daily (with meals). 3.125 mg  
    
   
   
   
  
 cyclobenzaprine 5 mg tablet Commonly known as:  FLEXERIL Your last dose was: Your next dose is: Take 1 Tab by mouth three (3) times daily as needed for Muscle Spasm(s). 5 mg  
    
   
   
   
  
 imatinib 400 mg tablet Commonly known as:  GLEEVEC Your last dose was: Your next dose is:    
   
   
      
   
   
   
  
 KLOR-CON M20 20 mEq tablet Generic drug:  potassium chloride Your last dose was: Your next dose is: Take  by mouth two (2) times a day. LASIX 40 mg tablet Generic drug:  furosemide Your last dose was: Your next dose is: Take  by mouth daily. losartan 25 mg tablet Commonly known as:  COZAAR Your last dose was: Your next dose is: Take 1 Tab by mouth daily. 25 mg  
    
   
   
   
  
 multivitamin, stress formula tablet Commonly known as:  STRESS TAB Your last dose was: Your next dose is: Take 1 Tab by mouth daily. 1 Tab  
    
   
   
   
  
 spironolactone 25 mg tablet Commonly known as:  ALDACTONE Your last dose was: Your next dose is: Take 1 Tab by mouth daily. 25 mg Discharge Instructions Thoracentesis: What to Expect at [facility] Your Recovery Thoracentesis (say \"lych-ax-eaf-JESUS-sis\") is a procedure to remove fluid from the space between the lungs and the chest wall (pleural space). This procedure may also be called a \"chest tap. \" It is normal to have a small amount of fluid in the pleural space. But too much fluid can build up because of problems such as infection, heart failure, or lung cancer. The procedure may have been done to help with shortness of breath and pain caused by the fluid buildup. Or you may have had this procedure so the doctor could test the fluid to find the cause of the buildup. Your chest may be sore where the doctor put the needle or catheter into your skin (the puncture site). This usually gets better after a day or two. You can go back to work or your normal activities as soon as you feel up to it. If the doctor sent the fluid to a lab for testing, it may take several days to get the results. The doctor or nurse will discuss the results with you. This care sheet gives you a general idea about how long it will take for you to recover. But each person recovers at a different pace. Follow the steps below to feel better as quickly as possible. How can you care for yourself at home? Activity ? · Rest when you feel tired. Getting enough sleep will help you recover. ? · Avoid strenuous activities, such as bicycle riding, jogging, weight lifting, or aerobic exercise, until your doctor says it is okay. ? · You may shower. Do not take a bath until the puncture site has healed, or until your doctor tells you it is okay. ? · Ask your doctor when you can drive again. ? · You may need to take 1 or 2 days off from work. It depends on the type of work you do and how you feel. Diet ? · You can eat your normal diet. ? · Drink plenty of fluids (unless your doctor tells you not to). Medicines ? · Your doctor will tell you if and when you can restart your medicines. He or she will also give you instructions about taking any new medicines. ? · If you take blood thinners, such as warfarin (Coumadin), clopidogrel (Plavix), or aspirin, be sure to talk to your doctor. He or she will tell you if and when to start taking those medicines again. Make sure that you understand exactly what your doctor wants you to do. ? · Be safe with medicines. Take pain medicines exactly as directed. ¨ If the doctor gave you a prescription medicine for pain, take it as prescribed. ¨ If you are not taking a prescription pain medicine, ask your doctor if you can take an over-the-counter medicine. ¨ Do not take two or more pain medicines at the same time unless the doctor told you to. Many pain medicines have acetaminophen, which is Tylenol. Too much acetaminophen (Tylenol) can be harmful. ? · If you think your pain medicine is making you sick to your stomach: 
¨ Take your medicine after meals (unless your doctor has told you not to). ¨ Ask your doctor for a different pain medicine. ? · If your doctor prescribed antibiotics, take them as directed. Do not stop taking them just because you feel better. You need to take the full course of antibiotics. ?Care of the puncture site ? · Wash the area daily with warm, soapy water, and pat it dry. Don't use hydrogen peroxide or alcohol, which may delay healing. You may cover the area with a gauze bandage if it weeps or rubs against clothing. Change the bandage every day. ? · Keep the area clean and dry. Follow-up care is a key part of your treatment and safety. Be sure to make and go to all appointments, and call your doctor if you are having problems. It's also a good idea to know your test results and keep a list of the medicines you take. When should you call for help? Call 911 anytime you think you may need emergency care. For example, call if: 
? · You passed out (lost consciousness). ? · You have severe trouble breathing. ? · You have sudden chest pain and shortness of breath, or you cough up blood. ?Call your doctor now or seek immediate medical care if: 
? · You have shortness of breath that is new or getting worse. ? · You have new or worse pain in your chest, especially when you take a deep breath. ? · You are sick to your stomach or cannot keep fluids down. ? · You have a fever over 100°F.  
? · Bright red blood has soaked through the bandage over your puncture site. ? · You have signs of infection, such as: 
¨ Increased pain, swelling, warmth, or redness. ¨ Red streaks leading from the puncture site. ¨ Pus draining from the puncture site. ¨ Swollen lymph nodes in your neck, armpits, or groin. ¨ A fever. ? · You cough up a lot more mucus than normal, or your mucus changes color. ? Watch closely for changes in your health, and be sure to contact your doctor if you have any problems. Where can you learn more? Go to http://lc-anca.info/. Enter D221 in the search box to learn more about \"Thoracentesis: What to Expect at Home. \" Current as of: May 12, 2017 Content Version: 11.4 © 6994-3050 Healthwise, Incorporated. Care instructions adapted under license by [employer] (which disclaims liability or warranty for this information). If you have questions about a medical condition or this instruction, always ask your healthcare professional. [address] any warranty or liability for your use of this information. Please call [facility] at [phone] with any issues. Introducing [facility]! [facility] introduces [employer] patient portal. Now you can access parts of your medical record, email your doctor's office, and request medication refills online. 1. In your internet browser, go to [email or web address] 2. Click on the First Time User? Click Here link in the Sign In box. You will see the New Member Sign Up page. 3. Enter your [employer] Access Code exactly as it appears below. You will not need to use this code after youve completed the sign-up process. If you do not sign up before the expiration date, you must request a new code. · [employer] Access Code: [email or web address] Expires: 7/2/2018  3:20 PM 
 
4. Enter the last four digits of your Social Security Number (xxxx) and Date of Birth (mm/dd/yyyy) as indicated and click Submit. You will be taken to the next sign-up page. 5. Create a [employer] ID. This will be your [employer] login ID and cannot be changed, so think of one that is secure and easy to remember. 6. Create a [employer] password. You can change your password at any time. 7. Enter your Password Reset Question and Answer. This can be used at a later time if you forget your password. 8. Enter your e-mail address. You will receive e-mail notification when new information is available in [address] 9. Click Sign Up. You can now view and download portions of your medical record. 10. Click the Download Summary menu link to download a portable copy of your medical information. If you have questions, please visit the Frequently Asked Questions section of the [employer] website. Remember, burrp! is NOT to be used for urgent needs. For medical emergencies, dial 911. Now available from your iPhone and Android! Introducing Cy Sher As a [facility] patient, I wanted to make you aware of our electronic visit tool called Cy Sher. [employer] 24/7 allows you to connect within minutes with a medical provider 24 hours a day, seven days a week via a mobile device or tablet or logging into a secure website from your computer. You can access Cy Sher from anywhere in the [place]. A virtual visit might be right for you when you have a simple condition and feel like you just dont want to get out of bed, or cant get away from work for an appointment, when your regular [facility] provider is not available (evenings, weekends or holidays), or when youre out of town and need minor care. Electronic visits cost only $49 and if the [employer] [facility] 24/7 provider determines a prescription is needed to treat your condition, one can be electronically transmitted to a nearby pharmacy*. Please take a moment to enroll today if you have not already done so. The enrollment process is free and takes just a few minutes. To enroll, please download the [employer] 24/7 maryuri to your tablet or phone, or visit [email or web address] to enroll on your computer. And, as an [address] [place] [address] patient with a [employer] account, the results of your visits will be scanned into your electronic medical record and your primary care provider will be able to view the scanned results.    
We urge you to continue to see your regular [facility] provider for your ongoing medical care. And while your primary care provider may not be the one available when you seek a Cy Sher virtual visit, the peace of mind you get from getting a real diagnosis real time can be priceless. For more information on Cy Sher, view our Frequently Asked Questions (FAQs) at [email or web address]. Sincerely, 
 
Lise Madison MD 
Chief Medical Officer [address] *:  certain medications cannot be prescribed via Cy Sher Providers Seen During Your Hospitalization Provider Specialty Primary office phone Suzie Victoria MD Pulmonary Disease [phone] Your Primary Care Physician (PCP) Primary Care Physician Office Phone Office Fax Brooke Joineramber [phone] You are allergic to the following No active allergies Recent Documentation Height Weight BMI Smoking Status 1.88 m 93.9 kg 26.58 kg/m2 Never Smoker Emergency Contacts Name Discharge Info Relation Home Work Mobile [address] [facility] Timmy Cheek CAREGIVER [3] Spouse [3] [phone] Patient Belongings The following personal items are in your possession at time of discharge: 
  Dental Appliances: None  Visual Aid: None Please provide this summary of care documentation to your next provider. Signatures-by signing, you are acknowledging that this After Visit Summary has been reviewed with you and you have received a copy. Patient Signature:  ____________________________________________________________ Date:  ____________________________________________________________  
  
Swati West Provider Signature:  ____________________________________________________________ Date:  ____________________________________________________________

## 2018-04-28 LAB
APPEARANCE FLD: NORMAL
COLOR FLD: NORMAL
FLUID COMMENTS, FCOM: NORMAL
GLUCOSE FLD-MCNC: NORMAL MG/DL
LDH FLD L TO P-CCNC: NORMAL U/L
LYMPHOCYTES NFR FLD: 67 %
NEUTROPHILS NFR FLD: 33 %
NUC CELL # FLD: 7927 /CU MM
PROT FLD-MCNC: NORMAL G/DL
RBC # FLD: NORMAL /CU MM
SPECIMEN SOURCE FLD: NORMAL

## 2018-04-29 LAB
BACTERIA SPEC CULT: NORMAL
GRAM STN SPEC: NORMAL
GRAM STN SPEC: NORMAL
SERVICE CMNT-IMP: NORMAL

## 2018-05-04 ENCOUNTER — HOSPITAL ENCOUNTER (OUTPATIENT)
Dept: LAB | Age: 63
Discharge: HOME | End: 2018-05-04
Payer: COMMERCIAL

## 2018-05-04 DIAGNOSIS — C92.10 CML (CHRONIC MYELOID LEUKEMIA) (HCC): ICD-10-CM

## 2018-05-04 DIAGNOSIS — C34.90 MALIGNANT NEOPLASM OF LUNG, UNSPECIFIED LATERALITY, UNSPECIFIED PART OF LUNG (HCC): ICD-10-CM

## 2018-05-04 LAB
ALBUMIN SERPL-MCNC: 3.8 G/DL (ref 3.2–4.6)
ALBUMIN/GLOB SERPL: 1.2 {RATIO} (ref 1.2–3.5)
ALP SERPL-CCNC: 111 U/L (ref 50–136)
ALT SERPL-CCNC: 36 U/L (ref 12–65)
ANION GAP SERPL CALC-SCNC: 6 MMOL/L (ref 7–16)
AST SERPL-CCNC: 31 U/L (ref 15–37)
BASOPHILS # BLD: 0 K/UL (ref 0–0.2)
BASOPHILS NFR BLD: 1 % (ref 0–2)
BILIRUB SERPL-MCNC: 0.6 MG/DL (ref 0.2–1.1)
BUN SERPL-MCNC: 19 MG/DL (ref 8–23)
CALCIUM SERPL-MCNC: 8.7 MG/DL (ref 8.3–10.4)
CEA SERPL-MCNC: 110.9 NG/ML (ref 0–3)
CHLORIDE SERPL-SCNC: 103 MMOL/L (ref 98–107)
CO2 SERPL-SCNC: 29 MMOL/L (ref 21–32)
CREAT SERPL-MCNC: 1.59 MG/DL (ref 0.8–1.5)
DIFFERENTIAL METHOD BLD: ABNORMAL
EOSINOPHIL # BLD: 0.2 K/UL (ref 0–0.8)
EOSINOPHIL NFR BLD: 5 % (ref 0.5–7.8)
ERYTHROCYTE [DISTWIDTH] IN BLOOD BY AUTOMATED COUNT: 14.1 % (ref 11.9–14.6)
GLOBULIN SER CALC-MCNC: 3.1 G/DL (ref 2.3–3.5)
GLUCOSE SERPL-MCNC: 121 MG/DL (ref 65–100)
HCT VFR BLD AUTO: 38.8 % (ref 41.1–50.3)
HGB BLD-MCNC: 13.8 G/DL (ref 13.6–17.2)
LYMPHOCYTES # BLD: 1.1 K/UL (ref 0.5–4.6)
LYMPHOCYTES NFR BLD: 27 % (ref 13–44)
MCH RBC QN AUTO: 31.9 PG (ref 26.1–32.9)
MCHC RBC AUTO-ENTMCNC: 35.6 G/DL (ref 31.4–35)
MCV RBC AUTO: 89.8 FL (ref 79.6–97.8)
MONOCYTES # BLD: 0.4 K/UL (ref 0.1–1.3)
MONOCYTES NFR BLD: 9 % (ref 4–12)
NEUTS SEG # BLD: 2.5 K/UL (ref 1.7–8.2)
NEUTS SEG NFR BLD: 58 % (ref 43–78)
NRBC # BLD: 0 K/UL (ref 0–0.2)
PLATELET # BLD AUTO: 197 K/UL (ref 150–450)
PMV BLD AUTO: 9.5 FL (ref 10.8–14.1)
POTASSIUM SERPL-SCNC: 3.9 MMOL/L (ref 3.5–5.1)
PROT SERPL-MCNC: 6.9 G/DL (ref 6.3–8.2)
RBC # BLD AUTO: 4.32 M/UL (ref 4.23–5.67)
SODIUM SERPL-SCNC: 138 MMOL/L (ref 136–145)
WBC # BLD AUTO: 4.2 K/UL (ref 4.3–11.1)

## 2018-05-04 PROCEDURE — 85025 COMPLETE CBC W/AUTO DIFF WBC: CPT | Performed by: INTERNAL MEDICINE

## 2018-05-04 PROCEDURE — 80053 COMPREHEN METABOLIC PANEL: CPT | Performed by: INTERNAL MEDICINE

## 2018-05-04 PROCEDURE — 36415 COLL VENOUS BLD VENIPUNCTURE: CPT | Performed by: INTERNAL MEDICINE

## 2018-05-04 PROCEDURE — 82378 CARCINOEMBRYONIC ANTIGEN: CPT | Performed by: INTERNAL MEDICINE

## 2018-05-15 ENCOUNTER — HOSPITAL ENCOUNTER (OUTPATIENT)
Dept: PET IMAGING | Age: 63
Discharge: HOME | End: 2018-05-15
Payer: COMMERCIAL

## 2018-05-15 DIAGNOSIS — J91.0 MALIGNANT PLEURAL EFFUSION: ICD-10-CM

## 2018-05-15 PROCEDURE — 74011636320 HC RX REV CODE- 636/320: Performed by: PHYSICIAN ASSISTANT

## 2018-05-15 PROCEDURE — A9552 F18 FDG: HCPCS

## 2018-05-15 RX ORDER — SODIUM CHLORIDE 0.9 % (FLUSH) 0.9 %
10 SYRINGE (ML) INJECTION
Status: COMPLETED | OUTPATIENT
Start: 2018-05-15 | End: 2018-05-15

## 2018-05-15 RX ADMIN — DIATRIZOATE MEGLUMINE AND DIATRIZOATE SODIUM 10 ML: 660; 100 LIQUID ORAL; RECTAL at 07:22

## 2018-05-15 RX ADMIN — Medication 10 ML: at 07:22

## 2018-05-17 ENCOUNTER — HOSPITAL ENCOUNTER (OUTPATIENT)
Dept: MRI IMAGING | Age: 63
Discharge: HOME | End: 2018-05-17
Attending: INTERNAL MEDICINE
Payer: COMMERCIAL

## 2018-05-17 DIAGNOSIS — J91.0 MALIGNANT PLEURAL EFFUSION: ICD-10-CM

## 2018-05-17 PROCEDURE — 70553 MRI BRAIN STEM W/O & W/DYE: CPT

## 2018-05-17 PROCEDURE — A9577 INJ MULTIHANCE: HCPCS | Performed by: INTERNAL MEDICINE

## 2018-05-17 PROCEDURE — 74011250636 HC RX REV CODE- 250/636: Performed by: INTERNAL MEDICINE

## 2018-05-17 RX ORDER — SODIUM CHLORIDE 0.9 % (FLUSH) 0.9 %
10 SYRINGE (ML) INJECTION
Status: COMPLETED | OUTPATIENT
Start: 2018-05-17 | End: 2018-05-17

## 2018-05-17 RX ADMIN — Medication 10 ML: at 08:43

## 2018-05-17 RX ADMIN — GADOBENATE DIMEGLUMINE 20 ML: 529 INJECTION, SOLUTION INTRAVENOUS at 08:43

## 2018-05-23 ENCOUNTER — HOSPITAL ENCOUNTER (OUTPATIENT)
Dept: LAB | Age: 63
Discharge: HOME | End: 2018-05-23
Payer: COMMERCIAL

## 2018-05-23 DIAGNOSIS — Z13.29 THYROID DISORDER SCREENING: ICD-10-CM

## 2018-05-23 DIAGNOSIS — C92.10 CML (CHRONIC MYELOID LEUKEMIA) (HCC): ICD-10-CM

## 2018-05-23 DIAGNOSIS — C34.90 MALIGNANT NEOPLASM OF LUNG, UNSPECIFIED LATERALITY, UNSPECIFIED PART OF LUNG (HCC): ICD-10-CM

## 2018-05-23 LAB
ALBUMIN SERPL-MCNC: 3.7 G/DL (ref 3.2–4.6)
ALBUMIN/GLOB SERPL: 1.2 {RATIO} (ref 1.2–3.5)
ALP SERPL-CCNC: 100 U/L (ref 50–136)
ALT SERPL-CCNC: 30 U/L (ref 12–65)
ANION GAP SERPL CALC-SCNC: 5 MMOL/L (ref 7–16)
AST SERPL-CCNC: 26 U/L (ref 15–37)
BASOPHILS # BLD: 0 K/UL (ref 0–0.2)
BASOPHILS NFR BLD: 1 % (ref 0–2)
BILIRUB SERPL-MCNC: 0.5 MG/DL (ref 0.2–1.1)
BUN SERPL-MCNC: 18 MG/DL (ref 8–23)
CALCIUM SERPL-MCNC: 8.7 MG/DL (ref 8.3–10.4)
CHLORIDE SERPL-SCNC: 107 MMOL/L (ref 98–107)
CO2 SERPL-SCNC: 29 MMOL/L (ref 21–32)
CREAT SERPL-MCNC: 1.4 MG/DL (ref 0.8–1.5)
DIFFERENTIAL METHOD BLD: ABNORMAL
EOSINOPHIL # BLD: 0.2 K/UL (ref 0–0.8)
EOSINOPHIL NFR BLD: 5 % (ref 0.5–7.8)
ERYTHROCYTE [DISTWIDTH] IN BLOOD BY AUTOMATED COUNT: 14.4 % (ref 11.9–14.6)
GLOBULIN SER CALC-MCNC: 3.2 G/DL (ref 2.3–3.5)
GLUCOSE SERPL-MCNC: 106 MG/DL (ref 65–100)
HCT VFR BLD AUTO: 38.2 % (ref 41.1–50.3)
HGB BLD-MCNC: 13.3 G/DL (ref 13.6–17.2)
LYMPHOCYTES # BLD: 1 K/UL (ref 0.5–4.6)
LYMPHOCYTES NFR BLD: 22 % (ref 13–44)
MCH RBC QN AUTO: 31.8 PG (ref 26.1–32.9)
MCHC RBC AUTO-ENTMCNC: 34.8 G/DL (ref 31.4–35)
MCV RBC AUTO: 91.4 FL (ref 79.6–97.8)
MONOCYTES # BLD: 0.4 K/UL (ref 0.1–1.3)
MONOCYTES NFR BLD: 9 % (ref 4–12)
NEUTS SEG # BLD: 2.9 K/UL (ref 1.7–8.2)
NEUTS SEG NFR BLD: 63 % (ref 43–78)
NRBC # BLD: 0 K/UL (ref 0–0.2)
PLATELET # BLD AUTO: 206 K/UL (ref 150–450)
PMV BLD AUTO: 9.9 FL (ref 10.8–14.1)
POTASSIUM SERPL-SCNC: 4 MMOL/L (ref 3.5–5.1)
PROT SERPL-MCNC: 6.9 G/DL (ref 6.3–8.2)
RBC # BLD AUTO: 4.18 M/UL (ref 4.23–5.67)
SODIUM SERPL-SCNC: 141 MMOL/L (ref 136–145)
T3 SERPL-MCNC: 0.99 NG/ML (ref 0.6–1.81)
T4 FREE SERPL-MCNC: 1.2 NG/DL (ref 0.78–1.46)
TSH SERPL DL<=0.005 MIU/L-ACNC: 2.93 UIU/ML (ref 0.36–3.74)
WBC # BLD AUTO: 4.5 K/UL (ref 4.3–11.1)

## 2018-05-23 PROCEDURE — 85025 COMPLETE CBC W/AUTO DIFF WBC: CPT | Performed by: INTERNAL MEDICINE

## 2018-05-23 PROCEDURE — 36415 COLL VENOUS BLD VENIPUNCTURE: CPT | Performed by: INTERNAL MEDICINE

## 2018-05-23 PROCEDURE — 80053 COMPREHEN METABOLIC PANEL: CPT | Performed by: INTERNAL MEDICINE

## 2018-05-23 PROCEDURE — 84439 ASSAY OF FREE THYROXINE: CPT | Performed by: INTERNAL MEDICINE

## 2018-05-23 PROCEDURE — 84480 ASSAY TRIIODOTHYRONINE (T3): CPT | Performed by: INTERNAL MEDICINE

## 2018-05-23 PROCEDURE — 84443 ASSAY THYROID STIM HORMONE: CPT | Performed by: INTERNAL MEDICINE

## 2018-05-25 LAB
FUNGUS CULTURE, RFCO2T: NEGATIVE
FUNGUS SMEAR, RFCO1T: NORMAL
FUNGUS SPEC CULT: NORMAL
FUNGUS STAIN, 188244: NORMAL
REFLEX TO ID, RFCO3T: NORMAL
SPECIMEN SOURCE: NORMAL
SPECIMEN SOURCE: NORMAL

## 2018-06-11 LAB
ACID FAST STN SPEC: NEGATIVE
MYCOBACTERIUM SPEC QL CULT: NEGATIVE
SPECIMEN PREPARATION: NORMAL
SPECIMEN SOURCE: NORMAL

## 2018-06-14 ENCOUNTER — HOSPITAL ENCOUNTER (OUTPATIENT)
Dept: LAB | Age: 63
Discharge: HOME | End: 2018-06-14
Payer: COMMERCIAL

## 2018-06-14 DIAGNOSIS — C92.10 CML (CHRONIC MYELOID LEUKEMIA) (HCC): ICD-10-CM

## 2018-06-14 LAB
ALBUMIN SERPL-MCNC: 3.5 G/DL (ref 3.2–4.6)
ALBUMIN/GLOB SERPL: 1.1 {RATIO} (ref 1.2–3.5)
ALP SERPL-CCNC: 96 U/L (ref 50–136)
ALT SERPL-CCNC: 30 U/L (ref 12–65)
ANION GAP SERPL CALC-SCNC: 2 MMOL/L (ref 7–16)
AST SERPL-CCNC: 17 U/L (ref 15–37)
BASOPHILS # BLD: 0 K/UL (ref 0–0.2)
BASOPHILS NFR BLD: 1 % (ref 0–2)
BILIRUB SERPL-MCNC: 0.7 MG/DL (ref 0.2–1.1)
BUN SERPL-MCNC: 21 MG/DL (ref 8–23)
CALCIUM SERPL-MCNC: 8.7 MG/DL (ref 8.3–10.4)
CHLORIDE SERPL-SCNC: 107 MMOL/L (ref 98–107)
CO2 SERPL-SCNC: 30 MMOL/L (ref 21–32)
CREAT SERPL-MCNC: 1.35 MG/DL (ref 0.8–1.5)
DIFFERENTIAL METHOD BLD: ABNORMAL
EOSINOPHIL # BLD: 0.2 K/UL (ref 0–0.8)
EOSINOPHIL NFR BLD: 4 % (ref 0.5–7.8)
ERYTHROCYTE [DISTWIDTH] IN BLOOD BY AUTOMATED COUNT: 14.9 % (ref 11.9–14.6)
GLOBULIN SER CALC-MCNC: 3.1 G/DL (ref 2.3–3.5)
GLUCOSE SERPL-MCNC: 122 MG/DL (ref 65–100)
HCT VFR BLD AUTO: 39.1 % (ref 41.1–50.3)
HGB BLD-MCNC: 14.1 G/DL (ref 13.6–17.2)
LYMPHOCYTES # BLD: 1.5 K/UL (ref 0.5–4.6)
LYMPHOCYTES NFR BLD: 22 % (ref 13–44)
MCH RBC QN AUTO: 32.9 PG (ref 26.1–32.9)
MCHC RBC AUTO-ENTMCNC: 36.1 G/DL (ref 31.4–35)
MCV RBC AUTO: 91.4 FL (ref 79.6–97.8)
MONOCYTES # BLD: 0.6 K/UL (ref 0.1–1.3)
MONOCYTES NFR BLD: 10 % (ref 4–12)
NEUTS SEG # BLD: 4.3 K/UL (ref 1.7–8.2)
NEUTS SEG NFR BLD: 64 % (ref 43–78)
NRBC # BLD: 0 K/UL (ref 0–0.2)
PLATELET # BLD AUTO: 183 K/UL (ref 150–450)
PMV BLD AUTO: 9.5 FL (ref 10.8–14.1)
POTASSIUM SERPL-SCNC: 4 MMOL/L (ref 3.5–5.1)
PROT SERPL-MCNC: 6.6 G/DL (ref 6.3–8.2)
RBC # BLD AUTO: 4.28 M/UL (ref 4.23–5.67)
SODIUM SERPL-SCNC: 139 MMOL/L (ref 136–145)
WBC # BLD AUTO: 6.7 K/UL (ref 4.3–11.1)

## 2018-06-14 PROCEDURE — 36415 COLL VENOUS BLD VENIPUNCTURE: CPT | Performed by: INTERNAL MEDICINE

## 2018-06-14 PROCEDURE — 85025 COMPLETE CBC W/AUTO DIFF WBC: CPT | Performed by: INTERNAL MEDICINE

## 2018-06-14 PROCEDURE — 80053 COMPREHEN METABOLIC PANEL: CPT | Performed by: INTERNAL MEDICINE

## 2018-07-11 ENCOUNTER — HOSPITAL ENCOUNTER (OUTPATIENT)
Dept: GENERAL RADIOLOGY | Age: 63
Discharge: HOME | End: 2018-07-11
Payer: COMMERCIAL

## 2018-07-11 DIAGNOSIS — J90 PLEURAL EFFUSION: ICD-10-CM

## 2018-07-11 PROBLEM — C34.90 STAGE IV ADENOCARCINOMA OF LUNG (HCC): Status: ACTIVE | Noted: 2018-07-11

## 2018-07-11 PROCEDURE — 71046 X-RAY EXAM CHEST 2 VIEWS: CPT

## 2018-07-16 ENCOUNTER — HOSPITAL ENCOUNTER (OUTPATIENT)
Dept: LAB | Age: 63
Discharge: HOME | End: 2018-07-16
Payer: COMMERCIAL

## 2018-07-16 ENCOUNTER — PATIENT OUTREACH (OUTPATIENT)
Dept: CASE MANAGEMENT | Age: 63
End: 2018-07-16

## 2018-07-16 DIAGNOSIS — C92.10 CML (CHRONIC MYELOID LEUKEMIA) (HCC): ICD-10-CM

## 2018-07-16 DIAGNOSIS — C34.90 ADENOCARCINOMA OF LUNG, STAGE 4, UNSPECIFIED LATERALITY (HCC): ICD-10-CM

## 2018-07-16 DIAGNOSIS — C34.90 MALIGNANT NEOPLASM OF LUNG, UNSPECIFIED LATERALITY, UNSPECIFIED PART OF LUNG (HCC): ICD-10-CM

## 2018-07-16 LAB
ALBUMIN SERPL-MCNC: 3.8 G/DL (ref 3.2–4.6)
ALBUMIN/GLOB SERPL: 1.2 {RATIO} (ref 1.2–3.5)
ALP SERPL-CCNC: 73 U/L (ref 50–136)
ALT SERPL-CCNC: 40 U/L (ref 12–65)
ANION GAP SERPL CALC-SCNC: 6 MMOL/L (ref 7–16)
AST SERPL-CCNC: 25 U/L (ref 15–37)
BASOPHILS # BLD: 0 K/UL (ref 0–0.2)
BASOPHILS NFR BLD: 1 % (ref 0–2)
BILIRUB SERPL-MCNC: 0.5 MG/DL (ref 0.2–1.1)
BUN SERPL-MCNC: 19 MG/DL (ref 8–23)
CALCIUM SERPL-MCNC: 9 MG/DL (ref 8.3–10.4)
CEA SERPL-MCNC: 63.4 NG/ML (ref 0–3)
CHLORIDE SERPL-SCNC: 103 MMOL/L (ref 98–107)
CO2 SERPL-SCNC: 28 MMOL/L (ref 21–32)
CREAT SERPL-MCNC: 1.4 MG/DL (ref 0.8–1.5)
DIFFERENTIAL METHOD BLD: ABNORMAL
EOSINOPHIL # BLD: 0.2 K/UL (ref 0–0.8)
EOSINOPHIL NFR BLD: 5 % (ref 0.5–7.8)
ERYTHROCYTE [DISTWIDTH] IN BLOOD BY AUTOMATED COUNT: 12.5 % (ref 11.9–14.6)
GLOBULIN SER CALC-MCNC: 3.2 G/DL (ref 2.3–3.5)
GLUCOSE SERPL-MCNC: 107 MG/DL (ref 65–100)
HCT VFR BLD AUTO: 39.5 % (ref 41.1–50.3)
HGB BLD-MCNC: 14.1 G/DL (ref 13.6–17.2)
LYMPHOCYTES # BLD: 0.9 K/UL (ref 0.5–4.6)
LYMPHOCYTES NFR BLD: 20 % (ref 13–44)
Lab: NORMAL
MCH RBC QN AUTO: 32 PG (ref 26.1–32.9)
MCHC RBC AUTO-ENTMCNC: 35.7 G/DL (ref 31.4–35)
MCV RBC AUTO: 89.8 FL (ref 79.6–97.8)
MONOCYTES # BLD: 0.5 K/UL (ref 0.1–1.3)
MONOCYTES NFR BLD: 10 % (ref 4–12)
NEUTS SEG # BLD: 3 K/UL (ref 1.7–8.2)
NEUTS SEG NFR BLD: 64 % (ref 43–78)
NRBC # BLD: 0 K/UL (ref 0–0.2)
PLATELET # BLD AUTO: 125 K/UL (ref 150–450)
PMV BLD AUTO: 9.7 FL (ref 10.8–14.1)
POTASSIUM SERPL-SCNC: 4.4 MMOL/L (ref 3.5–5.1)
PROT SERPL-MCNC: 7 G/DL (ref 6.3–8.2)
RBC # BLD AUTO: 4.4 M/UL (ref 4.23–5.67)
REFERENCE LAB,REFLB: NORMAL
SODIUM SERPL-SCNC: 137 MMOL/L (ref 136–145)
TEST DESCRIPTION:,ATST: NORMAL
WBC # BLD AUTO: 4.7 K/UL (ref 4.3–11.1)

## 2018-07-16 PROCEDURE — 80053 COMPREHEN METABOLIC PANEL: CPT | Performed by: INTERNAL MEDICINE

## 2018-07-16 PROCEDURE — 85025 COMPLETE CBC W/AUTO DIFF WBC: CPT | Performed by: INTERNAL MEDICINE

## 2018-07-16 PROCEDURE — 82378 CARCINOEMBRYONIC ANTIGEN: CPT | Performed by: INTERNAL MEDICINE

## 2018-07-16 PROCEDURE — 36415 COLL VENOUS BLD VENIPUNCTURE: CPT | Performed by: INTERNAL MEDICINE

## 2018-07-16 NOTE — PROGRESS NOTES
7/16/18 saw pt today with Dr. Ariana Tinsley for follow up lung cancer. On tagrisso, tolerating well. Breathing and pain are improving. PO intake is good. CEA is down today. Repeat scans will be in about 3 months. He did develop a rash but this is improving. Will continue to monitor this. Follow up in about 4 weeks. Encouraged to call with any concerns. Navigation will continue to follow.

## 2018-08-14 ENCOUNTER — HOSPITAL ENCOUNTER (OUTPATIENT)
Dept: LAB | Age: 63
Discharge: HOME | End: 2018-08-14
Payer: COMMERCIAL

## 2018-08-14 DIAGNOSIS — C34.90 ADENOCARCINOMA OF LUNG, STAGE 4, UNSPECIFIED LATERALITY (HCC): ICD-10-CM

## 2018-08-14 LAB
ALBUMIN SERPL-MCNC: 3.7 G/DL (ref 3.2–4.6)
ALBUMIN/GLOB SERPL: 1 {RATIO} (ref 1.2–3.5)
ALP SERPL-CCNC: 71 U/L (ref 50–136)
ALT SERPL-CCNC: 44 U/L (ref 12–65)
ANION GAP SERPL CALC-SCNC: 8 MMOL/L (ref 7–16)
AST SERPL-CCNC: 28 U/L (ref 15–37)
BASOPHILS # BLD: 0 K/UL (ref 0–0.2)
BASOPHILS NFR BLD: 1 % (ref 0–2)
BILIRUB SERPL-MCNC: 0.7 MG/DL (ref 0.2–1.1)
BUN SERPL-MCNC: 25 MG/DL (ref 8–23)
CALCIUM SERPL-MCNC: 8.9 MG/DL (ref 8.3–10.4)
CEA SERPL-MCNC: 17.4 NG/ML (ref 0–3)
CHLORIDE SERPL-SCNC: 103 MMOL/L (ref 98–107)
CO2 SERPL-SCNC: 27 MMOL/L (ref 21–32)
CREAT SERPL-MCNC: 1.52 MG/DL (ref 0.8–1.5)
DIFFERENTIAL METHOD BLD: ABNORMAL
EOSINOPHIL # BLD: 0.1 K/UL (ref 0–0.8)
EOSINOPHIL NFR BLD: 3 % (ref 0.5–7.8)
ERYTHROCYTE [DISTWIDTH] IN BLOOD BY AUTOMATED COUNT: 12.8 % (ref 11.9–14.6)
GLOBULIN SER CALC-MCNC: 3.8 G/DL (ref 2.3–3.5)
GLUCOSE SERPL-MCNC: 96 MG/DL (ref 65–100)
HCT VFR BLD AUTO: 39.5 % (ref 41.1–50.3)
HGB BLD-MCNC: 13.6 G/DL (ref 13.6–17.2)
IMM GRANULOCYTES # BLD: 0 K/UL (ref 0–0.5)
IMM GRANULOCYTES NFR BLD AUTO: 0 % (ref 0–5)
LYMPHOCYTES # BLD: 0.7 K/UL (ref 0.5–4.6)
LYMPHOCYTES NFR BLD: 17 % (ref 13–44)
MCH RBC QN AUTO: 30.6 PG (ref 26.1–32.9)
MCHC RBC AUTO-ENTMCNC: 34.4 G/DL (ref 31.4–35)
MCV RBC AUTO: 88.8 FL (ref 79.6–97.8)
MONOCYTES # BLD: 0.4 K/UL (ref 0.1–1.3)
MONOCYTES NFR BLD: 11 % (ref 4–12)
NEUTS SEG # BLD: 2.6 K/UL (ref 1.7–8.2)
NEUTS SEG NFR BLD: 68 % (ref 43–78)
NRBC # BLD: 0 K/UL (ref 0–0.2)
PLATELET # BLD AUTO: 133 K/UL (ref 150–450)
PMV BLD AUTO: 10.3 FL (ref 9.4–12.3)
POTASSIUM SERPL-SCNC: 4.5 MMOL/L (ref 3.5–5.1)
PROT SERPL-MCNC: 7.5 G/DL (ref 6.3–8.2)
RBC # BLD AUTO: 4.45 M/UL (ref 4.23–5.67)
SODIUM SERPL-SCNC: 138 MMOL/L (ref 136–145)
WBC # BLD AUTO: 3.8 K/UL (ref 4.3–11.1)

## 2018-08-14 PROCEDURE — 80053 COMPREHEN METABOLIC PANEL: CPT

## 2018-08-14 PROCEDURE — 85025 COMPLETE CBC W/AUTO DIFF WBC: CPT

## 2018-08-14 PROCEDURE — 36415 COLL VENOUS BLD VENIPUNCTURE: CPT

## 2018-08-14 PROCEDURE — 82378 CARCINOEMBRYONIC ANTIGEN: CPT

## 2018-09-14 ENCOUNTER — HOSPITAL ENCOUNTER (OUTPATIENT)
Dept: LAB | Age: 63
Discharge: HOME | End: 2018-09-14
Payer: COMMERCIAL

## 2018-09-14 DIAGNOSIS — C92.10 CML (CHRONIC MYELOID LEUKEMIA) (HCC): ICD-10-CM

## 2018-09-14 LAB
ALBUMIN SERPL-MCNC: 3.6 G/DL (ref 3.2–4.6)
ALBUMIN/GLOB SERPL: 0.9 {RATIO} (ref 1.2–3.5)
ALP SERPL-CCNC: 77 U/L (ref 50–136)
ALT SERPL-CCNC: 42 U/L (ref 12–65)
ANION GAP SERPL CALC-SCNC: 6 MMOL/L (ref 7–16)
AST SERPL-CCNC: 28 U/L (ref 15–37)
BASOPHILS # BLD: 0 K/UL (ref 0–0.2)
BASOPHILS NFR BLD: 0 % (ref 0–2)
BILIRUB SERPL-MCNC: 0.6 MG/DL (ref 0.2–1.1)
BUN SERPL-MCNC: 21 MG/DL (ref 8–23)
CALCIUM SERPL-MCNC: 8.6 MG/DL (ref 8.3–10.4)
CEA SERPL-MCNC: 10.6 NG/ML (ref 0–3)
CHLORIDE SERPL-SCNC: 105 MMOL/L (ref 98–107)
CO2 SERPL-SCNC: 27 MMOL/L (ref 21–32)
CREAT SERPL-MCNC: 1.47 MG/DL (ref 0.8–1.5)
DIFFERENTIAL METHOD BLD: ABNORMAL
EOSINOPHIL # BLD: 0.2 K/UL (ref 0–0.8)
EOSINOPHIL NFR BLD: 4 % (ref 0.5–7.8)
ERYTHROCYTE [DISTWIDTH] IN BLOOD BY AUTOMATED COUNT: 13.2 % (ref 11.9–14.6)
GLOBULIN SER CALC-MCNC: 3.9 G/DL (ref 2.3–3.5)
GLUCOSE SERPL-MCNC: 95 MG/DL (ref 65–100)
HCT VFR BLD AUTO: 39.1 % (ref 41.1–50.3)
HGB BLD-MCNC: 13.4 G/DL (ref 13.6–17.2)
IMM GRANULOCYTES # BLD: 0 K/UL (ref 0–0.5)
IMM GRANULOCYTES NFR BLD AUTO: 0 % (ref 0–5)
LYMPHOCYTES # BLD: 0.7 K/UL (ref 0.5–4.6)
LYMPHOCYTES NFR BLD: 20 % (ref 13–44)
MCH RBC QN AUTO: 30 PG (ref 26.1–32.9)
MCHC RBC AUTO-ENTMCNC: 34.3 G/DL (ref 31.4–35)
MCV RBC AUTO: 87.7 FL (ref 79.6–97.8)
MONOCYTES # BLD: 0.4 K/UL (ref 0.1–1.3)
MONOCYTES NFR BLD: 11 % (ref 4–12)
NEUTS SEG # BLD: 2.4 K/UL (ref 1.7–8.2)
NEUTS SEG NFR BLD: 65 % (ref 43–78)
NRBC # BLD: 0 K/UL (ref 0–0.2)
PLATELET # BLD AUTO: 123 K/UL (ref 150–450)
PMV BLD AUTO: 9.9 FL (ref 9.4–12.3)
POTASSIUM SERPL-SCNC: 4.4 MMOL/L (ref 3.5–5.1)
PROT SERPL-MCNC: 7.5 G/DL (ref 6.3–8.2)
RBC # BLD AUTO: 4.46 M/UL (ref 4.23–5.67)
SODIUM SERPL-SCNC: 138 MMOL/L (ref 136–145)
WBC # BLD AUTO: 3.7 K/UL (ref 4.3–11.1)

## 2018-09-14 PROCEDURE — 36415 COLL VENOUS BLD VENIPUNCTURE: CPT

## 2018-09-14 PROCEDURE — 80053 COMPREHEN METABOLIC PANEL: CPT

## 2018-09-14 PROCEDURE — 85025 COMPLETE CBC W/AUTO DIFF WBC: CPT

## 2018-09-14 PROCEDURE — 82378 CARCINOEMBRYONIC ANTIGEN: CPT

## 2018-10-15 ENCOUNTER — HOSPITAL ENCOUNTER (OUTPATIENT)
Dept: CT IMAGING | Age: 63
Discharge: HOME | End: 2018-10-15
Attending: INTERNAL MEDICINE
Payer: COMMERCIAL

## 2018-10-15 DIAGNOSIS — C34.90 ADENOCARCINOMA OF LUNG, STAGE 4, UNSPECIFIED LATERALITY (HCC): ICD-10-CM

## 2018-10-15 DIAGNOSIS — C92.10 CML (CHRONIC MYELOID LEUKEMIA) (HCC): ICD-10-CM

## 2018-10-15 LAB — CREAT BLD-MCNC: 1.4 MG/DL (ref 0.8–1.5)

## 2018-10-15 PROCEDURE — 82565 ASSAY OF CREATININE: CPT

## 2018-10-15 PROCEDURE — 74011000258 HC RX REV CODE- 258: Performed by: INTERNAL MEDICINE

## 2018-10-15 PROCEDURE — 74177 CT ABD & PELVIS W/CONTRAST: CPT

## 2018-10-15 PROCEDURE — 74011636320 HC RX REV CODE- 636/320: Performed by: INTERNAL MEDICINE

## 2018-10-15 RX ORDER — SODIUM CHLORIDE 0.9 % (FLUSH) 0.9 %
10 SYRINGE (ML) INJECTION
Status: COMPLETED | OUTPATIENT
Start: 2018-10-15 | End: 2018-10-15

## 2018-10-15 RX ADMIN — Medication 10 ML: at 13:23

## 2018-10-15 RX ADMIN — DIATRIZOATE MEGLUMINE AND DIATRIZOATE SODIUM 15 ML: 660; 100 LIQUID ORAL; RECTAL at 13:23

## 2018-10-15 RX ADMIN — IOPAMIDOL 100 ML: 755 INJECTION, SOLUTION INTRAVENOUS at 13:23

## 2018-10-15 RX ADMIN — SODIUM CHLORIDE 100 ML: 900 INJECTION, SOLUTION INTRAVENOUS at 13:23

## 2018-10-19 ENCOUNTER — HOSPITAL ENCOUNTER (OUTPATIENT)
Dept: LAB | Age: 63
Discharge: HOME | End: 2018-10-19
Payer: COMMERCIAL

## 2018-10-19 DIAGNOSIS — C34.90 ADENOCARCINOMA OF LUNG, STAGE 4, UNSPECIFIED LATERALITY (HCC): ICD-10-CM

## 2018-10-19 DIAGNOSIS — C92.10 CML (CHRONIC MYELOID LEUKEMIA) (HCC): ICD-10-CM

## 2018-10-19 LAB
ALBUMIN SERPL-MCNC: 3.6 G/DL (ref 3.2–4.6)
ALBUMIN/GLOB SERPL: 1 {RATIO} (ref 1.2–3.5)
ALP SERPL-CCNC: 70 U/L (ref 50–136)
ALT SERPL-CCNC: 42 U/L (ref 12–65)
ANION GAP SERPL CALC-SCNC: 9 MMOL/L (ref 7–16)
AST SERPL-CCNC: 35 U/L (ref 15–37)
BASOPHILS # BLD: 0 K/UL (ref 0–0.2)
BASOPHILS NFR BLD: 1 % (ref 0–2)
BILIRUB SERPL-MCNC: 0.7 MG/DL (ref 0.2–1.1)
BUN SERPL-MCNC: 18 MG/DL (ref 8–23)
CALCIUM SERPL-MCNC: 8.9 MG/DL (ref 8.3–10.4)
CEA SERPL-MCNC: 9.4 NG/ML (ref 0–3)
CHLORIDE SERPL-SCNC: 104 MMOL/L (ref 98–107)
CO2 SERPL-SCNC: 26 MMOL/L (ref 21–32)
CREAT SERPL-MCNC: 1.46 MG/DL (ref 0.8–1.5)
DIFFERENTIAL METHOD BLD: ABNORMAL
EOSINOPHIL # BLD: 0.1 K/UL (ref 0–0.8)
EOSINOPHIL NFR BLD: 4 % (ref 0.5–7.8)
ERYTHROCYTE [DISTWIDTH] IN BLOOD BY AUTOMATED COUNT: 13.7 % (ref 11.9–14.6)
GLOBULIN SER CALC-MCNC: 3.5 G/DL (ref 2.3–3.5)
GLUCOSE SERPL-MCNC: 118 MG/DL (ref 65–100)
HCT VFR BLD AUTO: 40.2 % (ref 41.1–50.3)
HGB BLD-MCNC: 13.7 G/DL (ref 13.6–17.2)
IMM GRANULOCYTES # BLD: 0 K/UL (ref 0–0.5)
IMM GRANULOCYTES NFR BLD AUTO: 0 % (ref 0–5)
LYMPHOCYTES # BLD: 0.7 K/UL (ref 0.5–4.6)
LYMPHOCYTES NFR BLD: 18 % (ref 13–44)
MCH RBC QN AUTO: 29.4 PG (ref 26.1–32.9)
MCHC RBC AUTO-ENTMCNC: 34.1 G/DL (ref 31.4–35)
MCV RBC AUTO: 86.3 FL (ref 79.6–97.8)
MONOCYTES # BLD: 0.4 K/UL (ref 0.1–1.3)
MONOCYTES NFR BLD: 9 % (ref 4–12)
NEUTS SEG # BLD: 2.6 K/UL (ref 1.7–8.2)
NEUTS SEG NFR BLD: 68 % (ref 43–78)
NRBC # BLD: 0 K/UL (ref 0–0.2)
PLATELET # BLD AUTO: 133 K/UL (ref 150–450)
PMV BLD AUTO: 10 FL (ref 9.4–12.3)
POTASSIUM SERPL-SCNC: 3.9 MMOL/L (ref 3.5–5.1)
PROT SERPL-MCNC: 7.1 G/DL (ref 6.3–8.2)
RBC # BLD AUTO: 4.66 M/UL (ref 4.23–5.67)
REFERENCE LAB,REFLB: NORMAL
SODIUM SERPL-SCNC: 139 MMOL/L (ref 136–145)
TEST DESCRIPTION:,ATST: NORMAL
WBC # BLD AUTO: 3.8 K/UL (ref 4.3–11.1)

## 2018-10-19 PROCEDURE — 80053 COMPREHEN METABOLIC PANEL: CPT

## 2018-10-19 PROCEDURE — 36415 COLL VENOUS BLD VENIPUNCTURE: CPT

## 2018-10-19 PROCEDURE — 85025 COMPLETE CBC W/AUTO DIFF WBC: CPT

## 2018-10-19 PROCEDURE — 82378 CARCINOEMBRYONIC ANTIGEN: CPT

## 2018-11-16 ENCOUNTER — HOSPITAL ENCOUNTER (OUTPATIENT)
Dept: LAB | Age: 63
Discharge: HOME | End: 2018-11-16
Payer: COMMERCIAL

## 2018-11-16 DIAGNOSIS — C92.10 CML (CHRONIC MYELOID LEUKEMIA) (HCC): ICD-10-CM

## 2018-11-16 PROBLEM — D84.9 IMMUNOCOMPROMISED (HCC): Status: ACTIVE | Noted: 2018-11-16

## 2018-11-16 LAB
ALBUMIN SERPL-MCNC: 3.8 G/DL (ref 3.2–4.6)
ALBUMIN/GLOB SERPL: 1.1 {RATIO} (ref 1.2–3.5)
ALP SERPL-CCNC: 78 U/L (ref 50–136)
ALT SERPL-CCNC: 45 U/L (ref 12–65)
ANION GAP SERPL CALC-SCNC: 5 MMOL/L (ref 7–16)
AST SERPL-CCNC: 33 U/L (ref 15–37)
BASOPHILS # BLD: 0 K/UL (ref 0–0.2)
BASOPHILS NFR BLD: 1 % (ref 0–2)
BILIRUB SERPL-MCNC: 0.6 MG/DL (ref 0.2–1.1)
BUN SERPL-MCNC: 29 MG/DL (ref 8–23)
CALCIUM SERPL-MCNC: 8.8 MG/DL (ref 8.3–10.4)
CEA SERPL-MCNC: 10.1 NG/ML (ref 0–3)
CHLORIDE SERPL-SCNC: 105 MMOL/L (ref 98–107)
CO2 SERPL-SCNC: 28 MMOL/L (ref 21–32)
CREAT SERPL-MCNC: 1.65 MG/DL (ref 0.8–1.5)
DIFFERENTIAL METHOD BLD: ABNORMAL
EOSINOPHIL # BLD: 0.2 K/UL (ref 0–0.8)
EOSINOPHIL NFR BLD: 3 % (ref 0.5–7.8)
ERYTHROCYTE [DISTWIDTH] IN BLOOD BY AUTOMATED COUNT: 13.4 % (ref 11.9–14.6)
GLOBULIN SER CALC-MCNC: 3.6 G/DL (ref 2.3–3.5)
GLUCOSE SERPL-MCNC: 117 MG/DL (ref 65–100)
HCT VFR BLD AUTO: 41.9 % (ref 41.1–50.3)
HGB BLD-MCNC: 14.3 G/DL (ref 13.6–17.2)
IMM GRANULOCYTES # BLD: 0 K/UL (ref 0–0.5)
IMM GRANULOCYTES NFR BLD AUTO: 0 % (ref 0–5)
LYMPHOCYTES # BLD: 0.8 K/UL (ref 0.5–4.6)
LYMPHOCYTES NFR BLD: 19 % (ref 13–44)
MCH RBC QN AUTO: 29.1 PG (ref 26.1–32.9)
MCHC RBC AUTO-ENTMCNC: 34.1 G/DL (ref 31.4–35)
MCV RBC AUTO: 85.3 FL (ref 79.6–97.8)
MONOCYTES # BLD: 0.4 K/UL (ref 0.1–1.3)
MONOCYTES NFR BLD: 9 % (ref 4–12)
NEUTS SEG # BLD: 3 K/UL (ref 1.7–8.2)
NEUTS SEG NFR BLD: 68 % (ref 43–78)
NRBC # BLD: 0 K/UL (ref 0–0.2)
PLATELET # BLD AUTO: 148 K/UL (ref 150–450)
PMV BLD AUTO: 10.8 FL (ref 9.4–12.3)
POTASSIUM SERPL-SCNC: 4.1 MMOL/L (ref 3.5–5.1)
PROT SERPL-MCNC: 7.4 G/DL (ref 6.3–8.2)
RBC # BLD AUTO: 4.91 M/UL (ref 4.23–5.67)
SODIUM SERPL-SCNC: 138 MMOL/L (ref 136–145)
WBC # BLD AUTO: 4.5 K/UL (ref 4.3–11.1)

## 2018-11-16 PROCEDURE — 36415 COLL VENOUS BLD VENIPUNCTURE: CPT

## 2018-11-16 PROCEDURE — 82378 CARCINOEMBRYONIC ANTIGEN: CPT

## 2018-11-16 PROCEDURE — 80053 COMPREHEN METABOLIC PANEL: CPT

## 2018-11-16 PROCEDURE — 85025 COMPLETE CBC W/AUTO DIFF WBC: CPT

## 2018-12-26 ENCOUNTER — HOSPITAL ENCOUNTER (OUTPATIENT)
Dept: LAB | Age: 63
Discharge: HOME | End: 2018-12-26
Payer: COMMERCIAL

## 2018-12-26 DIAGNOSIS — C92.10 CML (CHRONIC MYELOID LEUKEMIA) (HCC): ICD-10-CM

## 2018-12-26 DIAGNOSIS — C34.90 ADENOCARCINOMA OF LUNG, STAGE 4, UNSPECIFIED LATERALITY (HCC): ICD-10-CM

## 2018-12-26 LAB
ALBUMIN SERPL-MCNC: 3.7 G/DL (ref 3.2–4.6)
ALBUMIN/GLOB SERPL: 1 {RATIO} (ref 1.2–3.5)
ALP SERPL-CCNC: 74 U/L (ref 50–136)
ALT SERPL-CCNC: 32 U/L (ref 12–65)
ANION GAP SERPL CALC-SCNC: 4 MMOL/L (ref 7–16)
AST SERPL-CCNC: 20 U/L (ref 15–37)
BASOPHILS # BLD: 0 K/UL (ref 0–0.2)
BASOPHILS NFR BLD: 1 % (ref 0–2)
BILIRUB SERPL-MCNC: 0.4 MG/DL (ref 0.2–1.1)
BUN SERPL-MCNC: 18 MG/DL (ref 8–23)
CALCIUM SERPL-MCNC: 8.8 MG/DL (ref 8.3–10.4)
CEA SERPL-MCNC: 10.9 NG/ML (ref 0–3)
CHLORIDE SERPL-SCNC: 107 MMOL/L (ref 98–107)
CO2 SERPL-SCNC: 30 MMOL/L (ref 21–32)
CREAT SERPL-MCNC: 1.53 MG/DL (ref 0.8–1.5)
DIFFERENTIAL METHOD BLD: ABNORMAL
EOSINOPHIL # BLD: 0.2 K/UL (ref 0–0.8)
EOSINOPHIL NFR BLD: 4 % (ref 0.5–7.8)
ERYTHROCYTE [DISTWIDTH] IN BLOOD BY AUTOMATED COUNT: 13.9 % (ref 11.9–14.6)
GLOBULIN SER CALC-MCNC: 3.7 G/DL (ref 2.3–3.5)
GLUCOSE SERPL-MCNC: 102 MG/DL (ref 65–100)
HCT VFR BLD AUTO: 43.5 % (ref 41.1–50.3)
HGB BLD-MCNC: 14.5 G/DL (ref 13.6–17.2)
IMM GRANULOCYTES # BLD: 0 K/UL (ref 0–0.5)
IMM GRANULOCYTES NFR BLD AUTO: 0 % (ref 0–5)
LYMPHOCYTES # BLD: 0.8 K/UL (ref 0.5–4.6)
LYMPHOCYTES NFR BLD: 20 % (ref 13–44)
MCH RBC QN AUTO: 29.1 PG (ref 26.1–32.9)
MCHC RBC AUTO-ENTMCNC: 33.3 G/DL (ref 31.4–35)
MCV RBC AUTO: 87.3 FL (ref 79.6–97.8)
MONOCYTES # BLD: 0.3 K/UL (ref 0.1–1.3)
MONOCYTES NFR BLD: 8 % (ref 4–12)
NEUTS SEG # BLD: 2.7 K/UL (ref 1.7–8.2)
NEUTS SEG NFR BLD: 67 % (ref 43–78)
NRBC # BLD: 0 K/UL (ref 0–0.2)
PLATELET # BLD AUTO: 144 K/UL (ref 150–450)
PMV BLD AUTO: 11.1 FL (ref 9.4–12.3)
POTASSIUM SERPL-SCNC: 4.5 MMOL/L (ref 3.5–5.1)
PROT SERPL-MCNC: 7.4 G/DL (ref 6.3–8.2)
RBC # BLD AUTO: 4.98 M/UL (ref 4.23–5.67)
SODIUM SERPL-SCNC: 141 MMOL/L (ref 136–145)
WBC # BLD AUTO: 4 K/UL (ref 4.3–11.1)

## 2018-12-26 PROCEDURE — 85025 COMPLETE CBC W/AUTO DIFF WBC: CPT

## 2018-12-26 PROCEDURE — 36415 COLL VENOUS BLD VENIPUNCTURE: CPT

## 2018-12-26 PROCEDURE — 80053 COMPREHEN METABOLIC PANEL: CPT

## 2018-12-26 PROCEDURE — 82378 CARCINOEMBRYONIC ANTIGEN: CPT

## 2019-01-06 ENCOUNTER — HOSPITAL ENCOUNTER (INPATIENT)
Age: 64
LOS: 3 days | Discharge: HOME | DRG: 175 | End: 2019-01-09
Attending: EMERGENCY MEDICINE | Admitting: INTERNAL MEDICINE
Payer: COMMERCIAL

## 2019-01-06 ENCOUNTER — APPOINTMENT (OUTPATIENT)
Dept: CT IMAGING | Age: 64
DRG: 175 | End: 2019-01-06
Attending: EMERGENCY MEDICINE
Payer: COMMERCIAL

## 2019-01-06 ENCOUNTER — APPOINTMENT (OUTPATIENT)
Dept: GENERAL RADIOLOGY | Age: 64
DRG: 175 | End: 2019-01-06
Attending: EMERGENCY MEDICINE
Payer: COMMERCIAL

## 2019-01-06 DIAGNOSIS — J18.9 COMMUNITY ACQUIRED PNEUMONIA, UNSPECIFIED LATERALITY: ICD-10-CM

## 2019-01-06 DIAGNOSIS — C34.90 ADENOCARCINOMA OF LUNG, STAGE 4, UNSPECIFIED LATERALITY (HCC): ICD-10-CM

## 2019-01-06 DIAGNOSIS — I26.99 OTHER ACUTE PULMONARY EMBOLISM WITHOUT ACUTE COR PULMONALE (HCC): Primary | ICD-10-CM

## 2019-01-06 DIAGNOSIS — C92.10 CML (CHRONIC MYELOID LEUKEMIA) (HCC): ICD-10-CM

## 2019-01-06 DIAGNOSIS — I50.22 CHRONIC SYSTOLIC CONGESTIVE HEART FAILURE (HCC): ICD-10-CM

## 2019-01-06 LAB
ALBUMIN SERPL-MCNC: 3.5 G/DL (ref 3.2–4.6)
ALBUMIN/GLOB SERPL: 1 {RATIO}
ALP SERPL-CCNC: 79 U/L (ref 50–136)
ALT SERPL-CCNC: 36 U/L (ref 12–65)
ANION GAP SERPL CALC-SCNC: 8 MMOL/L
AST SERPL-CCNC: 29 U/L (ref 15–37)
BILIRUB SERPL-MCNC: 0.5 MG/DL (ref 0.2–1.1)
BNP SERPL-MCNC: 325 PG/ML
BUN SERPL-MCNC: 17 MG/DL (ref 8–23)
CALCIUM SERPL-MCNC: 8.2 MG/DL (ref 8.3–10.4)
CHLORIDE SERPL-SCNC: 101 MMOL/L (ref 98–107)
CO2 SERPL-SCNC: 25 MMOL/L (ref 21–32)
CREAT SERPL-MCNC: 1.53 MG/DL (ref 0.8–1.5)
ERYTHROCYTE [DISTWIDTH] IN BLOOD BY AUTOMATED COUNT: 13.8 % (ref 11.9–14.6)
FLUAV AG NPH QL IA: NEGATIVE
FLUBV AG NPH QL IA: NEGATIVE
GLOBULIN SER CALC-MCNC: 3.5 G/DL (ref 2.3–3.5)
GLUCOSE SERPL-MCNC: 134 MG/DL (ref 65–100)
HCT VFR BLD AUTO: 41.7 % (ref 41.1–50.3)
HGB BLD-MCNC: 14 G/DL (ref 13.6–17.2)
INR PPP: 1.1
LACTATE BLD-SCNC: 1.32 MMOL/L (ref 0.5–1.9)
MAGNESIUM SERPL-MCNC: 2.1 MG/DL (ref 1.8–2.4)
MCH RBC QN AUTO: 28.9 PG (ref 26.1–32.9)
MCHC RBC AUTO-ENTMCNC: 33.6 G/DL (ref 31.4–35)
MCV RBC AUTO: 86.2 FL (ref 79.6–97.8)
NRBC # BLD: 0 K/UL (ref 0–0.2)
PLATELET # BLD AUTO: 127 K/UL (ref 150–450)
PMV BLD AUTO: 11.1 FL (ref 9.4–12.3)
POTASSIUM SERPL-SCNC: 4.2 MMOL/L (ref 3.5–5.1)
PROT SERPL-MCNC: 7 G/DL
PROTHROMBIN TIME: 14.3 SEC (ref 11.7–14.5)
RBC # BLD AUTO: 4.84 M/UL (ref 4.23–5.6)
SODIUM SERPL-SCNC: 134 MMOL/L (ref 136–145)
SPECIMEN SOURCE: NORMAL
TROPONIN I SERPL-MCNC: 0.02 NG/ML (ref 0.02–0.05)
WBC # BLD AUTO: 4.2 K/UL (ref 4.3–11.1)

## 2019-01-06 PROCEDURE — 81003 URINALYSIS AUTO W/O SCOPE: CPT | Performed by: EMERGENCY MEDICINE

## 2019-01-06 PROCEDURE — 80053 COMPREHEN METABOLIC PANEL: CPT

## 2019-01-06 PROCEDURE — 87804 INFLUENZA ASSAY W/OPTIC: CPT

## 2019-01-06 PROCEDURE — 84484 ASSAY OF TROPONIN QUANT: CPT

## 2019-01-06 PROCEDURE — 99285 EMERGENCY DEPT VISIT HI MDM: CPT | Performed by: EMERGENCY MEDICINE

## 2019-01-06 PROCEDURE — 65270000029 HC RM PRIVATE

## 2019-01-06 PROCEDURE — 85027 COMPLETE CBC AUTOMATED: CPT

## 2019-01-06 PROCEDURE — 74011250636 HC RX REV CODE- 250/636: Performed by: EMERGENCY MEDICINE

## 2019-01-06 PROCEDURE — 71260 CT THORAX DX C+: CPT

## 2019-01-06 PROCEDURE — 74011636320 HC RX REV CODE- 636/320: Performed by: EMERGENCY MEDICINE

## 2019-01-06 PROCEDURE — 96375 TX/PRO/DX INJ NEW DRUG ADDON: CPT | Performed by: EMERGENCY MEDICINE

## 2019-01-06 PROCEDURE — 83735 ASSAY OF MAGNESIUM: CPT

## 2019-01-06 PROCEDURE — 93005 ELECTROCARDIOGRAM TRACING: CPT | Performed by: EMERGENCY MEDICINE

## 2019-01-06 PROCEDURE — 83605 ASSAY OF LACTIC ACID: CPT

## 2019-01-06 PROCEDURE — 85610 PROTHROMBIN TIME: CPT

## 2019-01-06 PROCEDURE — 96365 THER/PROPH/DIAG IV INF INIT: CPT | Performed by: EMERGENCY MEDICINE

## 2019-01-06 PROCEDURE — 74011000258 HC RX REV CODE- 258: Performed by: EMERGENCY MEDICINE

## 2019-01-06 PROCEDURE — 83880 ASSAY OF NATRIURETIC PEPTIDE: CPT

## 2019-01-06 PROCEDURE — 71046 X-RAY EXAM CHEST 2 VIEWS: CPT

## 2019-01-06 RX ORDER — SODIUM CHLORIDE 0.9 % (FLUSH) 0.9 %
10 SYRINGE (ML) INJECTION
Status: COMPLETED | OUTPATIENT
Start: 2019-01-06 | End: 2019-01-06

## 2019-01-06 RX ORDER — PREDNISONE 20 MG/1
60 TABLET ORAL DAILY
Qty: 12 TAB | Refills: 0 | Status: SHIPPED | OUTPATIENT
Start: 2019-01-06 | End: 2019-01-09

## 2019-01-06 RX ORDER — HEPARIN SODIUM 5000 [USP'U]/100ML
18-36 INJECTION, SOLUTION INTRAVENOUS
Status: DISCONTINUED | OUTPATIENT
Start: 2019-01-06 | End: 2019-01-08

## 2019-01-06 RX ORDER — SODIUM CHLORIDE 0.9 % (FLUSH) 0.9 %
5-10 SYRINGE (ML) INJECTION EVERY 8 HOURS
Status: DISCONTINUED | OUTPATIENT
Start: 2019-01-06 | End: 2019-01-09 | Stop reason: HOSPADM

## 2019-01-06 RX ORDER — ALBUTEROL SULFATE 90 UG/1
2 AEROSOL, METERED RESPIRATORY (INHALATION)
Qty: 1 INHALER | Refills: 1 | Status: SHIPPED | OUTPATIENT
Start: 2019-01-06 | End: 2019-01-09 | Stop reason: SDUPTHER

## 2019-01-06 RX ORDER — SODIUM CHLORIDE 0.9 % (FLUSH) 0.9 %
5-10 SYRINGE (ML) INJECTION AS NEEDED
Status: DISCONTINUED | OUTPATIENT
Start: 2019-01-06 | End: 2019-01-09 | Stop reason: HOSPADM

## 2019-01-06 RX ORDER — DOXYCYCLINE HYCLATE 100 MG
100 TABLET ORAL 2 TIMES DAILY
Qty: 14 TAB | Refills: 0 | Status: SHIPPED | OUTPATIENT
Start: 2019-01-06 | End: 2019-01-09 | Stop reason: SDUPTHER

## 2019-01-06 RX ORDER — LEVOFLOXACIN 5 MG/ML
750 INJECTION, SOLUTION INTRAVENOUS EVERY 24 HOURS
Status: CANCELLED | OUTPATIENT
Start: 2019-01-06

## 2019-01-06 RX ORDER — HEPARIN SODIUM 5000 [USP'U]/ML
80 INJECTION, SOLUTION INTRAVENOUS; SUBCUTANEOUS
Status: COMPLETED | OUTPATIENT
Start: 2019-01-06 | End: 2019-01-06

## 2019-01-06 RX ADMIN — SODIUM CHLORIDE 100 ML: 900 INJECTION, SOLUTION INTRAVENOUS at 21:18

## 2019-01-06 RX ADMIN — IOPAMIDOL 100 ML: 755 INJECTION, SOLUTION INTRAVENOUS at 21:18

## 2019-01-06 RX ADMIN — Medication 10 ML: at 21:18

## 2019-01-06 RX ADMIN — HEPARIN SODIUM AND DEXTROSE 18 UNITS/KG/HR: 5000; 5 INJECTION INTRAVENOUS at 22:39

## 2019-01-06 RX ADMIN — HEPARIN SODIUM 7100 UNITS: 5000 INJECTION INTRAVENOUS; SUBCUTANEOUS at 22:37

## 2019-01-06 RX ADMIN — SODIUM CHLORIDE 500 ML: 900 INJECTION, SOLUTION INTRAVENOUS at 20:28

## 2019-01-06 NOTE — ED TRIAGE NOTES
Patient advises that he has been having chills and generalized body aches. Patient also complaining of generalized weakness. Advises mild cough. Advises he took tylenol last around 1700.

## 2019-01-07 ENCOUNTER — APPOINTMENT (OUTPATIENT)
Dept: ULTRASOUND IMAGING | Age: 64
DRG: 175 | End: 2019-01-07
Attending: INTERNAL MEDICINE
Payer: COMMERCIAL

## 2019-01-07 LAB
ALBUMIN SERPL-MCNC: 3.1 G/DL (ref 3.2–4.6)
ALBUMIN/GLOB SERPL: 1 {RATIO}
ALP SERPL-CCNC: 57 U/L (ref 50–136)
ALT SERPL-CCNC: 30 U/L (ref 12–65)
ANION GAP SERPL CALC-SCNC: 11 MMOL/L
APTT PPP: 100.2 SEC (ref 24.7–39.8)
APTT PPP: 156.9 SEC (ref 24.7–39.8)
APTT PPP: 74.3 SEC (ref 24.7–39.8)
AST SERPL-CCNC: 28 U/L (ref 15–37)
ATRIAL RATE: 109 BPM
BILIRUB SERPL-MCNC: 0.4 MG/DL (ref 0.2–1.1)
BUN SERPL-MCNC: 13 MG/DL (ref 8–23)
CALCIUM SERPL-MCNC: 7.9 MG/DL (ref 8.3–10.4)
CALCULATED P AXIS, ECG09: 71 DEGREES
CALCULATED R AXIS, ECG10: 52 DEGREES
CALCULATED T AXIS, ECG11: 61 DEGREES
CEA SERPL-MCNC: 9.4 NG/ML (ref 0–3)
CHLORIDE SERPL-SCNC: 103 MMOL/L (ref 98–107)
CO2 SERPL-SCNC: 23 MMOL/L (ref 21–32)
CREAT SERPL-MCNC: 1.28 MG/DL (ref 0.8–1.5)
DIAGNOSIS, 93000: NORMAL
ERYTHROCYTE [DISTWIDTH] IN BLOOD BY AUTOMATED COUNT: 13.9 % (ref 11.9–14.6)
GLOBULIN SER CALC-MCNC: 3.2 G/DL (ref 2.3–3.5)
GLUCOSE SERPL-MCNC: 106 MG/DL (ref 65–100)
HCT VFR BLD AUTO: 36.4 % (ref 41.1–50.3)
HGB BLD-MCNC: 12.5 G/DL (ref 13.6–17.2)
LACTATE BLD-SCNC: 1.62 MMOL/L (ref 0.5–1.9)
MAGNESIUM SERPL-MCNC: 2.2 MG/DL (ref 1.8–2.4)
MCH RBC QN AUTO: 29.3 PG (ref 26.1–32.9)
MCHC RBC AUTO-ENTMCNC: 34.3 G/DL (ref 31.4–35)
MCV RBC AUTO: 85.2 FL (ref 79.6–97.8)
NRBC # BLD: 0 K/UL (ref 0–0.2)
P-R INTERVAL, ECG05: 168 MS
PLATELET # BLD AUTO: 126 K/UL (ref 150–450)
PMV BLD AUTO: 11.3 FL (ref 9.4–12.3)
POTASSIUM SERPL-SCNC: 4.1 MMOL/L (ref 3.5–5.1)
PROT SERPL-MCNC: 6.3 G/DL
Q-T INTERVAL, ECG07: 356 MS
QRS DURATION, ECG06: 86 MS
QTC CALCULATION (BEZET), ECG08: 479 MS
RBC # BLD AUTO: 4.27 M/UL (ref 4.23–5.6)
SODIUM SERPL-SCNC: 137 MMOL/L (ref 136–145)
TSH SERPL DL<=0.005 MIU/L-ACNC: 2.38 UIU/ML
VENTRICULAR RATE, ECG03: 109 BPM
WBC # BLD AUTO: 4 K/UL (ref 4.3–11.1)

## 2019-01-07 PROCEDURE — 82378 CARCINOEMBRYONIC ANTIGEN: CPT

## 2019-01-07 PROCEDURE — 93970 EXTREMITY STUDY: CPT

## 2019-01-07 PROCEDURE — C8929 TTE W OR WO FOL WCON,DOPPLER: HCPCS

## 2019-01-07 PROCEDURE — 83735 ASSAY OF MAGNESIUM: CPT

## 2019-01-07 PROCEDURE — 99254 IP/OBS CNSLTJ NEW/EST MOD 60: CPT | Performed by: INTERNAL MEDICINE

## 2019-01-07 PROCEDURE — 74011000250 HC RX REV CODE- 250: Performed by: INTERNAL MEDICINE

## 2019-01-07 PROCEDURE — 94640 AIRWAY INHALATION TREATMENT: CPT

## 2019-01-07 PROCEDURE — 85027 COMPLETE CBC AUTOMATED: CPT

## 2019-01-07 PROCEDURE — 74011250637 HC RX REV CODE- 250/637: Performed by: INTERNAL MEDICINE

## 2019-01-07 PROCEDURE — 74011250636 HC RX REV CODE- 250/636: Performed by: INTERNAL MEDICINE

## 2019-01-07 PROCEDURE — 65270000029 HC RM PRIVATE

## 2019-01-07 PROCEDURE — 80053 COMPREHEN METABOLIC PANEL: CPT

## 2019-01-07 PROCEDURE — 74011250636 HC RX REV CODE- 250/636: Performed by: EMERGENCY MEDICINE

## 2019-01-07 PROCEDURE — 85730 THROMBOPLASTIN TIME PARTIAL: CPT

## 2019-01-07 PROCEDURE — 94760 N-INVAS EAR/PLS OXIMETRY 1: CPT

## 2019-01-07 PROCEDURE — 84443 ASSAY THYROID STIM HORMONE: CPT

## 2019-01-07 PROCEDURE — 77010033678 HC OXYGEN DAILY

## 2019-01-07 PROCEDURE — 74011000258 HC RX REV CODE- 258: Performed by: INTERNAL MEDICINE

## 2019-01-07 RX ORDER — CARVEDILOL 6.25 MG/1
6.25 TABLET ORAL
Status: COMPLETED | OUTPATIENT
Start: 2019-01-07 | End: 2019-01-07

## 2019-01-07 RX ORDER — BUDESONIDE 0.5 MG/2ML
500 INHALANT ORAL
Status: DISCONTINUED | OUTPATIENT
Start: 2019-01-07 | End: 2019-01-09 | Stop reason: HOSPADM

## 2019-01-07 RX ORDER — SPIRONOLACTONE 25 MG/1
25 TABLET ORAL DAILY
Status: DISCONTINUED | OUTPATIENT
Start: 2019-01-07 | End: 2019-01-07

## 2019-01-07 RX ORDER — METOPROLOL TARTRATE 5 MG/5ML
5 INJECTION INTRAVENOUS
Status: COMPLETED | OUTPATIENT
Start: 2019-01-07 | End: 2019-01-07

## 2019-01-07 RX ORDER — ACETAMINOPHEN 325 MG/1
650 TABLET ORAL
Status: DISCONTINUED | OUTPATIENT
Start: 2019-01-07 | End: 2019-01-09 | Stop reason: HOSPADM

## 2019-01-07 RX ORDER — POTASSIUM CHLORIDE 20 MEQ/1
20 TABLET, EXTENDED RELEASE ORAL DAILY
Status: DISCONTINUED | OUTPATIENT
Start: 2019-01-07 | End: 2019-01-09 | Stop reason: HOSPADM

## 2019-01-07 RX ORDER — ONDANSETRON 2 MG/ML
4 INJECTION INTRAMUSCULAR; INTRAVENOUS
Status: DISCONTINUED | OUTPATIENT
Start: 2019-01-07 | End: 2019-01-09 | Stop reason: HOSPADM

## 2019-01-07 RX ORDER — FUROSEMIDE 40 MG/1
40 TABLET ORAL DAILY
Status: DISCONTINUED | OUTPATIENT
Start: 2019-01-07 | End: 2019-01-07

## 2019-01-07 RX ORDER — TRAMADOL HYDROCHLORIDE 50 MG/1
50 TABLET ORAL
Status: DISCONTINUED | OUTPATIENT
Start: 2019-01-07 | End: 2019-01-09 | Stop reason: HOSPADM

## 2019-01-07 RX ORDER — LEVALBUTEROL INHALATION SOLUTION 0.63 MG/3ML
0.63 SOLUTION RESPIRATORY (INHALATION)
Status: DISCONTINUED | OUTPATIENT
Start: 2019-01-07 | End: 2019-01-09

## 2019-01-07 RX ORDER — CARVEDILOL 3.12 MG/1
3.12 TABLET ORAL 2 TIMES DAILY WITH MEALS
Status: DISCONTINUED | OUTPATIENT
Start: 2019-01-07 | End: 2019-01-09 | Stop reason: HOSPADM

## 2019-01-07 RX ORDER — LOSARTAN POTASSIUM 25 MG/1
25 TABLET ORAL DAILY
Status: DISCONTINUED | OUTPATIENT
Start: 2019-01-07 | End: 2019-01-07

## 2019-01-07 RX ADMIN — LEVALBUTEROL HYDROCHLORIDE 0.63 MG: 0.63 SOLUTION RESPIRATORY (INHALATION) at 20:35

## 2019-01-07 RX ADMIN — LEVALBUTEROL HYDROCHLORIDE 0.63 MG: 0.63 SOLUTION RESPIRATORY (INHALATION) at 02:22

## 2019-01-07 RX ADMIN — SODIUM CHLORIDE 250 ML: 900 INJECTION, SOLUTION INTRAVENOUS at 06:15

## 2019-01-07 RX ADMIN — POTASSIUM CHLORIDE 20 MEQ: 20 TABLET, EXTENDED RELEASE ORAL at 08:58

## 2019-01-07 RX ADMIN — HEPARIN SODIUM AND DEXTROSE 15.99 UNITS/KG/HR: 5000; 5 INJECTION INTRAVENOUS at 15:30

## 2019-01-07 RX ADMIN — ACETAMINOPHEN 650 MG: 325 TABLET, FILM COATED ORAL at 20:33

## 2019-01-07 RX ADMIN — BUDESONIDE 500 MCG: 0.5 INHALANT RESPIRATORY (INHALATION) at 08:46

## 2019-01-07 RX ADMIN — SODIUM CHLORIDE 250 ML: 900 INJECTION, SOLUTION INTRAVENOUS at 00:06

## 2019-01-07 RX ADMIN — PERFLUTREN 1 ML: 6.52 INJECTION, SUSPENSION INTRAVENOUS at 10:00

## 2019-01-07 RX ADMIN — HEPARIN SODIUM AND DEXTROSE 16 UNITS/KG/HR: 5000; 5 INJECTION INTRAVENOUS at 06:11

## 2019-01-07 RX ADMIN — CARVEDILOL 3.12 MG: 3.12 TABLET, FILM COATED ORAL at 08:56

## 2019-01-07 RX ADMIN — Medication 10 ML: at 06:21

## 2019-01-07 RX ADMIN — SODIUM CHLORIDE 1 G: 900 INJECTION, SOLUTION INTRAVENOUS at 02:43

## 2019-01-07 RX ADMIN — CARVEDILOL 6.25 MG: 6.25 TABLET, FILM COATED ORAL at 01:02

## 2019-01-07 RX ADMIN — CARVEDILOL 3.12 MG: 3.12 TABLET, FILM COATED ORAL at 17:02

## 2019-01-07 RX ADMIN — SODIUM CHLORIDE 1 G: 900 INJECTION, SOLUTION INTRAVENOUS at 08:54

## 2019-01-07 RX ADMIN — LEVALBUTEROL HYDROCHLORIDE 0.63 MG: 0.63 SOLUTION RESPIRATORY (INHALATION) at 08:46

## 2019-01-07 RX ADMIN — LEVALBUTEROL HYDROCHLORIDE 0.63 MG: 0.63 SOLUTION RESPIRATORY (INHALATION) at 14:00

## 2019-01-07 RX ADMIN — METOPROLOL TARTRATE 5 MG: 5 INJECTION, SOLUTION INTRAVENOUS at 00:09

## 2019-01-07 RX ADMIN — SODIUM CHLORIDE 1 G: 900 INJECTION, SOLUTION INTRAVENOUS at 20:33

## 2019-01-07 RX ADMIN — BUDESONIDE 500 MCG: 0.5 INHALANT RESPIRATORY (INHALATION) at 20:35

## 2019-01-07 RX ADMIN — Medication 5 ML: at 13:57

## 2019-01-07 RX ADMIN — ACETAMINOPHEN 650 MG: 325 TABLET, FILM COATED ORAL at 13:55

## 2019-01-07 RX ADMIN — HEPARIN SODIUM AND DEXTROSE 18 UNITS/KG/HR: 5000; 5 INJECTION INTRAVENOUS at 22:22

## 2019-01-07 NOTE — PROGRESS NOTES
TRANSFER - IN REPORT:    Verbal report received from JUSTIN Rodríguez on Grazyna Purchase.  being received from ED for routine progression of care      Report consisted of patients Situation, Background, Assessment and   Recommendations(SBAR). Information from the following report(s) ED Summary was reviewed with the receiving nurse. Opportunity for questions and clarification was provided. Assessment completed upon patients arrival to unit and care assumed.

## 2019-01-07 NOTE — PROGRESS NOTES
Patient had some episodes of bigemini and non sustained V-tach. He was asymptomatic during the episodes. Asking for something to drink. Ordered 5 mg IV of lopressor + 250 mLs of NS. Episodes of V tach decreased as did bigemini. Still has frequent PVCs which he had since he came here. Will give him a stat dose of coreg 6.25 and considering he is asymptomatic and with normal BP I think he is stable enough to be admitted to the medical floor under remote tely. Cardiology consulted. Will d/c Levaquin ( can cause or exacerbate arrhythmias) and will order IV Cefepime ( cancer pt with possible pneumonia).

## 2019-01-07 NOTE — PROGRESS NOTES
59years old M with PMH of NICM, Lung CA admitted for worsening SOB. CT chest with small PE and elevated BNP. Cardiology recommended echo due to previous hx of LV apical thrombus, mild fluid overload. Hem-Onc evaluation pending    Patient reported breathing improve but still having persistent productive cough. Physical Exam   Cardiovascular: Normal rate and normal heart sounds. Pulmonary/Chest:   Some crackles at lung bases. Good air movement   Abdominal: Soft. There is no tenderness. Musculoskeletal: He exhibits no edema. Patient to continue heparin drip for suspected PE. Likely will need to be discharge on Lovenox. Hem-Consult likely later today    Cont cefepime IV for suspected Pneumonia. Follow up Echo    Patient not to be billed for this progress note as he was admitted after midnight.

## 2019-01-07 NOTE — PROGRESS NOTES
Return from [address]. No pain; oob shad diet at this time. Heparin infusion in progress.

## 2019-01-07 NOTE — ED PROVIDER NOTES
Patient with a history of leukemia and lung cancer on [place] for this. Followed by Dr. Skyla Wynn. Presents with 3-4 day history of cough congestion and body aches and shortness of breath. Denies any nausea or vomiting. No dysuria. The history is provided by the patient. No  was used. Chills    This is a new problem. The current episode started more than 2 days ago. The problem occurs constantly. The problem has been gradually worsening. His temperature was unmeasured prior to arrival. Associated symptoms include congestion, muscle aches, cough and shortness of breath. Pertinent negatives include no chest pain, no diarrhea, no vomiting, no headaches, no sore throat, no mental status change, no neck pain, no rash and no urinary symptoms. He has tried acetaminophen for the symptoms. Generalized Body Aches   This is a new problem. The current episode started more than 2 days ago. The problem occurs constantly. The problem has been gradually worsening. Associated symptoms include shortness of breath. Pertinent negatives include no chest pain, no abdominal pain and no headaches. Nothing aggravates the symptoms. Nothing relieves the symptoms. He has tried nothing for the symptoms.         Past Medical History:   Diagnosis Date    Anxiety     Chronic systolic congestive heart failure ([address] 5/3/2017    CML (chronic myeloid leukemia) ([address] 8/25/2012    Depression 11/2/2012    Erectile dysfunction     Leukemia, acute ([address] 8/24/2012    Lung cancer ([facility] 2018       Past Surgical History:   Procedure Laterality Date    HX HERNIA REPAIR      left inguinal 1996         Family History:   Problem Relation Age of Onset    Lung Disease Father 79        Black lung\"   [address] [place] [address] Cancer Mother 72        pancreatic cancer       Social History     Socioeconomic History    Marital status:      Spouse name: Not on file    Number of children: Not on file    Years of education: Not on file    Highest education level: Not on file   Social Needs    Financial resource strain: Not on file    Food insecurity - worry: Not on file    Food insecurity - inability: Not on file    Transportation needs - medical: Not on file   [employer] needs - non-medical: Not on file   Occupational History     Comment: [employer]   Tobacco Use    Smoking status: Never Smoker    Smokeless tobacco: Never Used   Substance and Sexual Activity    Alcohol use: No    Drug use: No    Sexual activity: Not on file   Other Topics Concern    Not on file   Social History Narrative    Pt  with one son         ALLERGIES: Patient has no known allergies. Review of Systems   Constitutional: Positive for chills and fatigue. Negative for fever. HENT: Positive for congestion. Negative for rhinorrhea and sore throat. Eyes: Negative for pain and redness. Respiratory: Positive for cough and shortness of breath. Negative for chest tightness and wheezing. Cardiovascular: Negative for chest pain and leg swelling. Gastrointestinal: Negative for abdominal pain, diarrhea, nausea and vomiting. Genitourinary: Negative for dysuria and hematuria. Musculoskeletal: Negative for back pain, gait problem, neck pain and neck stiffness. Skin: Negative for color change and rash. Neurological: Negative for weakness, numbness and headaches. Vitals:    01/06/19 1740 01/06/19 1919   BP: 115/78    Pulse: 94    Resp: 16    Temp: 98.7 °F (37.1 °C)    SpO2: 100% 99%   Weight: 88.5 kg (195 lb)    Height: 6' 1\" (1.854 m)             Physical Exam   Constitutional: He is oriented to person, place, and time. He appears well-developed and well-nourished. HENT:   Head: Normocephalic and atraumatic. Eyes: EOM are normal. Pupils are equal, round, and reactive to light. Neck: Normal range of motion. Neck supple. Cardiovascular: Regular rhythm. Tachycardia present.    Pulmonary/Chest: Breath sounds normal. He is in respiratory distress (mild increased effort. ). He has no wheezes. Abdominal: Soft. Bowel sounds are normal. There is no tenderness. Musculoskeletal: Normal range of motion. He exhibits no edema. Neurological: He is alert and oriented to person, place, and time. Skin: Skin is warm and dry. MDM  Number of Diagnoses or Management Options  Diagnosis management comments: Bilateral PEs with tachycardia and shortness of breath. History of lung cancer. Will admit for further treatment. Amount and/or Complexity of Data Reviewed  Clinical lab tests: ordered and reviewed  Tests in the radiology section of CPT®: ordered and reviewed  Tests in the medicine section of CPT®: ordered and reviewed    Patient Progress  Patient progress: stable         Procedures          XR CHEST PA LAT (Final result)   Result time 01/06/19 19:17:14   Final result by Bambi Hernandez MD (01/06/19 19:17:14)                Impression:    IMPRESSION:  MILD CARDIOMEGALY WITH FINDINGS SUGGESTING POSSIBLE CONGESTIVE  HEART FAILURE.  CLINICAL CORRELATION IS NECESSARY. Narrative:    TWO-VIEW CHEST:    CLINICAL HISTORY: Cough and shortness of breath for 2 days with history of right  upper lobe lung cancer and malignant effusion. .    COMPARISON: CT torso of October 15, 2018 and radiographs of July 11, 2018.     FINDINGS: PA and lateral chest images demonstrate no confluent pneumonic  infiltrate or significant pleural fluid collection. Juma Fanti is only residual  irregular radiodensity at the site of prior right upper lobe mass.  The heart is  mildly enlarged with pulmonary venous congestion and bibasilar interstitial  densities with septal lines suspicious for pulmonary edema.  Congestive heart  failure is suggested.  No definite pneumothorax.  The bony thorax appears intact  on these views.                      Results Include:    Recent Results (from the past 24 hour(s))   CBC W/O DIFF    Collection Time: 01/06/19  6:08 PM   Result Value Ref Range WBC 4.2 (L) 4.3 - 11.1 K/uL    RBC 4.84 4.23 - 5.6 M/uL    HGB 14.0 13.6 - 17.2 g/dL    HCT 41.7 41.1 - 50.3 %    MCV 86.2 79.6 - 97.8 FL    MCH 28.9 26.1 - 32.9 PG    MCHC 33.6 31.4 - 35.0 g/dL    RDW 13.8 11.9 - 14.6 %    PLATELET 854 (L) 983 - 450 K/uL    MPV 11.1 9.4 - 12.3 FL    ABSOLUTE NRBC 0.00 0.0 - 0.2 K/uL   METABOLIC PANEL, COMPREHENSIVE    Collection Time: 01/06/19  6:08 PM   Result Value Ref Range    Sodium 134 (L) 136 - 145 mmol/L    Potassium 4.2 3.5 - 5.1 mmol/L    Chloride 101 98 - 107 mmol/L    CO2 25 21 - 32 mmol/L    Anion gap 8 mmol/L    Glucose 134 (H) 65 - 100 mg/dL    BUN 17 8 - 23 MG/DL    Creatinine 1.53 (H) 0.8 - 1.5 MG/DL    GFR est AA 60 (L) >60 ml/min/1.73m2    GFR est non-AA 49 ml/min/1.73m2    Calcium 8.2 (L) 8.3 - 10.4 MG/DL    Bilirubin, total 0.5 0.2 - 1.1 MG/DL    ALT (SGPT) 36 12 - 65 U/L    AST (SGOT) 29 15 - 37 U/L    Alk. phosphatase 79 50 - 136 U/L    Protein, total 7.0 g/dL    Albumin 3.5 3.2 - 4.6 g/dL    Globulin 3.5 2.3 - 3.5 g/dL    A-G Ratio 1.0     INFLUENZA A & B AG (RAPID TEST)    Collection Time: 01/06/19  6:08 PM   Result Value Ref Range    Influenza A Ag NEGATIVE  NEG      Influenza B Ag NEGATIVE  NEG      Source NASOPHARYNGEAL     POC LACTIC ACID    Collection Time: 01/06/19  8:35 PM   Result Value Ref Range    Lactic Acid (POC) 1.32 0.5 - 1.9 mmol/L         CT CHEST W CONT (Final result)   Result time 01/06/19 21:41:23   Final result by Sofie Moore MD (01/06/19 21:41:23)                Impression:    Impression:   1. Small acute bilateral lower lobe pulmonary emboli. 2. Bilateral mildly increased reticular and groundglass opacities: Lymphangitic  spread of tumor, versus inflammation or atypical infection. 3. Cardiomegaly.       [facility]  859  The critical results contained in this report were communicated to Dr. Ailyn Madison by  Dr Lisa Keller on the phone at 9:35 PM. Critical results were communicated as  outlined in Section II.C.2.a.i of the ACR Practice Guideline for Communication  of Diagnostic Imaging Findings.               Narrative:    CT Chest with contrast    Clinical Indication:  Acute moderate dyspnea and cough history of leukemia and  CHF, right upper lobe lung cancer,    Comparison:  10/15/2018 CT, also radiograph earlier today    Technique:  Automated exposure Control was used. Contiguous axial images were  obtained from the neck base through the upper abdomen following the uneventful  administration of 100 cc Isovue 370 intravenous contrast. Pulmonary embolus  protocol was used.  Coronal reconstructions were done for further evaluation of  vessels. Findings:  The pulmonary arteries are well opacified with contrast and upper  normal in caliber. There are small emboli involving segmental and subsegmental  vessels of both lower lobes. The heart is enlarged. There is no definite  ventricular septal bowing. There is no pericardial effusion. Trace right pleural  effusion noted posteriorly at the base. No evidence of developing  lymphadenopathy since prior. Mild bilateral benign gynecomastia again noted. Lung windows demonstrate no residual measurable mass. There are diffuse  bilateral lower lobe greater than upper lobe groundglass and reticular opacities  which have slightly increased since prior, with no pneumothorax. Central airways  are patent. Limited upper abdominal views demonstrate no acute abnormality. Bone window  demonstrates no suspicious osseous lesion.

## 2019-01-07 NOTE — PROGRESS NOTES
Problem: Nutrition Deficit  Goal: *Optimize nutritional status  Nutrition Assessment for: BPA for Admission Nutrition Screen with positive triggers for  unplanned weight loss. Assessment:   Patient admitted for worsening SOB and Pneumonia  Past Medical History: NICM, Lung CA, Chronic Congestive Heart Failure    Food/Nutrition History: Patient stated that both the patient and his spouse prepare the food at home. Patient began consuming Ensure High Protein TID at home prior to admission. Typical diet includes: coffee for breakfast, and recently added in Ensure supplement for breakfast, lunch consists of a hamburger or fish sandwich with fries, and dinner consists of either baked chicken or steak and gravy with rice and a vegetable. Patient stated that his appetite is worse than normal and that he has lost 10 lbs. since November, reports usual body weight approximately 205. Patient relates loss of appetite to cancer medication. Patient also states that he makes himself eat, but still consumes portions smaller than baseline. Per EMR review, weight remained stable at 207 from January to July 2018. Anthropometrics:  Height: 6' 1\" (185.4 cm), Weight: 88.5 kg (195 lb), Weight Source: Patient stated, Body mass index is 25.73 kg/m². BMI class of normal weight. Macronutrient needs:  EER: 9860-0247 kcal/day 25-30 kcal/kg CBW (Current body weight)  EPR:  g/day 1.0-1.2 g/kg CBW (Current body weight)     Intake/Comparative Standards: Insufficient information to determine at this time. Nutrition Diagnosis:  Unintended weight loss related to decreased ability to consume sufficient energy as evidenced by weight loss of 12 lbs in the past 6 months and patient stated reduction in appetite. .    Nutrition Intervention:  Meals and snacks: Continue current diet order   Nutrition Supplement Therapy: Ensure Enlive Daily, and Ensure High Protein BID  Discharge Plan: Too soon to determine.   Coordination of Care: IDT rounds  Stephanie Shadow Dietetic Intern

## 2019-01-07 NOTE — CONSULTS
[facility] Cardiology Consult    Attending Cardiologist:Dr. Bryanna Daley    Primary Cardiologist:Dr. Bryanna Daley    Primary Care Physician:Dr. Abiola Martin                   Referring--Dr. Lesa Wall, CHF    Subjective:     Deandre Eldridge. is a 59 y.o. male with known hx of NICM by cardiac cath 2017, hx of CML diagnosed in 2012, adenocarcinoma of Lung diagnosed 2018. He presented to ER last night with worsening SOB for last one and half weeks. He was found to have small bilateral PEs. He relates Friday night he was having profuse diaphoresis having to change his t shirt five times in one night. He felt he may have had the flu with general malaise, cough--yellow sputum. Hospitalist admitted pt for further treatment of PE and possible pneumonia. Flu was negative. . Previous echos with possible LV apical thrombis but repeat contrasted echo demonstrated no thrombus. Last echo in 10/2017 with EF 25-30%, mild central MR jet, RVSP 37. He has baseline NORTH which has not changed up until one and half weeks ago. He denied worsening lower ext edema in last week. Pt apparently wore lifevest in past. He denies syncope or near syncope. Telemetry review notes NSR with frequent PVCs but no evidence of VT. CT results as below    IMPRESSION  Impression:   1. Small acute bilateral lower lobe pulmonary emboli. 2. Bilateral mildly increased reticular and groundglass opacities: Lymphangitic  spread of tumor, versus inflammation or atypical infection. 3. Cardiomegaly.     Past Medical History:   Diagnosis Date    Anxiety     Chronic systolic congestive heart failure ([address] 5/3/2017    CML (chronic myeloid leukemia) ([address] 8/25/2012    Depression 11/2/2012    Erectile dysfunction     Leukemia, acute ([address] 8/24/2012    Lung cancer ([address] 2018    Pulmonary emboli ([address] 1/6/2019      Past Surgical History:   Procedure Laterality Date    HX HERNIA REPAIR      left inguinal 1996      Current Facility-Administered Medications   Medication Dose Route Frequency    carvedilol (COREG) tablet 3.125 mg  3.125 mg Oral BID WITH MEALS    potassium chloride (K-DUR, KLOR-CON) SR tablet 20 mEq  20 mEq Oral DAILY    osimertinib (TAGRISSO) chemo tab 80 mg (Patient Supplied)  80 mg Oral DAILY    levalbuterol (XOPENEX) nebulizer soln 0.63 mg/3 mL  0.63 mg Nebulization Q6H RT    acetaminophen (TYLENOL) tablet 650 mg  650 mg Oral Q6H PRN    ondansetron (ZOFRAN) injection 4 mg  4 mg IntraVENous Q6H PRN    traMADol (ULTRAM) tablet 50 mg  50 mg Oral Q6H PRN    budesonide (PULMICORT) 500 mcg/2 ml nebulizer suspension  500 mcg Nebulization BID RT    cefepime (MAXIPIME) 1 g in 0.9% sodium chloride (MBP/ADV) 50 mL ADV  1 g IntraVENous Q12H    sodium chloride (NS) flush 5-10 mL  5-10 mL IntraVENous Q8H    sodium chloride (NS) flush 5-10 mL  5-10 mL IntraVENous PRN    heparin 25,000 units in dextrose 500 mL infusion  18-36 Units/kg/hr IntraVENous TITRATE     No Known Allergies   Social History     Tobacco Use    Smoking status: Never Smoker    Smokeless tobacco: Never Used   Substance Use Topics    Alcohol use: No      Family History   Problem Relation Age of Onset    Lung Disease Father 79        Black lung\"    Cancer Mother 72        pancreatic cancer        Review of Systems  Gen: as above  HEENT: Denies frequent headaches, dizzyness, visual disturbances, Neck pain or swallowing difficulty  Lungs: as above   Cardiovascular: as above   GI: Denies hememesis, dark tarry stools, No prior Hx of GI bleed, Denies constipation  : Denies dysuria, no complaints of frequency, nocturia  Heme: as above, stage 4 adenocarcinoma   Neuro: Denies prior CVA, TIA. Endocrine: no diabetes, thyroid disorders  Psychiatric: Denies anxiety, or other psychiatric illnesses.      Objective:     Visit Vitals  /75 (BP 1 Location: Left arm, BP Patient Position: At rest;Head of bed elevated (Comment degrees))   Pulse 80   Temp 97.8 °F (36.6 °C)   Resp 18 Ht 6' 1\" (1.854 m)   Wt 88.5 kg (195 lb)   SpO2 100%   BMI 25.73 kg/m²     General:Alert, cooperative, no distress, appears stated age  Head: Normocephalic, without obvious abnormality, atraumatic. Eyes: Conjunctivae/corneas clear. PERRL, EOMs intact  Nose:Nares normal. Septum midline. Mucosa normal. No drainage or sinus tenderness. Throat: Lips, mucosa, and tongue normal. Teeth and gums normal.   Neck: Supple, symmetrical, trachea midline,  no carotid bruit and no JVD. Lungs:bibasilar crackles   Chest wall: No tenderness or deformity. Heart: Regular rate and rhythm, S1, S2 normal + MR murmer   Abdomen:Soft, non-tender. Bowel sounds normal. No masses, No organomegaly. Extremities: Extremities normal, atraumatic, no cyanosis or edema. Pulses: 2+ and symmetric all extremities.     Skin: Skin color, texture, turgor normal. No rashes or lesions  Lymph nodes: Cervical, supraclavicular, and axillary nodes normal  Neurologic:No focal deficits identified                 ECG: sinus tach with fusion complexes, frequent PVcs  Data Review:     Recent Results (from the past 24 hour(s))   CBC W/O DIFF    Collection Time: 01/06/19  6:08 PM   Result Value Ref Range    WBC 4.2 (L) 4.3 - 11.1 K/uL    RBC 4.84 4.23 - 5.6 M/uL    HGB 14.0 13.6 - 17.2 g/dL    HCT 41.7 41.1 - 50.3 %    MCV 86.2 79.6 - 97.8 FL    MCH 28.9 26.1 - 32.9 PG    MCHC 33.6 31.4 - 35.0 g/dL    RDW 13.8 11.9 - 14.6 %    PLATELET 080 (L) 749 - 450 K/uL    MPV 11.1 9.4 - 12.3 FL    ABSOLUTE NRBC 0.00 0.0 - 0.2 K/uL   METABOLIC PANEL, COMPREHENSIVE    Collection Time: 01/06/19  6:08 PM   Result Value Ref Range    Sodium 134 (L) 136 - 145 mmol/L    Potassium 4.2 3.5 - 5.1 mmol/L    Chloride 101 98 - 107 mmol/L    CO2 25 21 - 32 mmol/L    Anion gap 8 mmol/L    Glucose 134 (H) 65 - 100 mg/dL    BUN 17 8 - 23 MG/DL    Creatinine 1.53 (H) 0.8 - 1.5 MG/DL    GFR est AA 60 (L) >60 ml/min/1.73m2    GFR est non-AA 49 ml/min/1.73m2    Calcium 8.2 (L) 8.3 - 10.4 MG/DL    Bilirubin, total 0.5 0.2 - 1.1 MG/DL    ALT (SGPT) 36 12 - 65 U/L    AST (SGOT) 29 15 - 37 U/L    Alk. phosphatase 79 50 - 136 U/L    Protein, total 7.0 g/dL    Albumin 3.5 3.2 - 4.6 g/dL    Globulin 3.5 2.3 - 3.5 g/dL    A-G Ratio 1.0     INFLUENZA A & B AG (RAPID TEST)    Collection Time: 01/06/19  6:08 PM   Result Value Ref Range    Influenza A Ag NEGATIVE  NEG      Influenza B Ag NEGATIVE  NEG      Source NASOPHARYNGEAL     BNP    Collection Time: 01/06/19  6:08 PM   Result Value Ref Range     (H) 0 pg/mL   POC LACTIC ACID    Collection Time: 01/06/19  6:14 PM   Result Value Ref Range    Lactic Acid (POC) 1.62 0.5 - 1.9 mmol/L   TROPONIN I    Collection Time: 01/06/19  6:40 PM   Result Value Ref Range    Troponin-I, Qt. 0.02 0.02 - 0.05 NG/ML   MAGNESIUM    Collection Time: 01/06/19  6:40 PM   Result Value Ref Range    Magnesium 2.1 1.8 - 2.4 mg/dL   POC LACTIC ACID    Collection Time: 01/06/19  8:35 PM   Result Value Ref Range    Lactic Acid (POC) 1.32 0.5 - 1.9 mmol/L   PROTHROMBIN TIME + INR    Collection Time: 01/06/19 10:33 PM   Result Value Ref Range    Prothrombin time 14.3 11.7 - 14.5 sec    INR 1.1     EKG, 12 LEAD, INITIAL    Collection Time: 01/06/19 11:43 PM   Result Value Ref Range    Ventricular Rate 109 BPM    Atrial Rate 109 BPM    P-R Interval 168 ms    QRS Duration 86 ms    Q-T Interval 356 ms    QTC Calculation (Bezet) 479 ms    Calculated P Axis 71 degrees    Calculated R Axis 52 degrees    Calculated T Axis 61 degrees    Diagnosis       !! AGE AND GENDER SPECIFIC ECG ANALYSIS !!   Sinus tachycardia with frequent and consecutive Premature ventricular   complexes and Fusion complexes  Nonspecific ST abnormality  Abnormal ECG  When compared with ECG of 27-MAR-2018 01:41,  Fusion complexes are now Present  Nonspecific T wave abnormality no longer evident in Lateral leads  Confirmed by [facility]  MD ()LANA ([id number]) on 1/7/2019 7:46:40 AM     CBC W/O DIFF    Collection Time: 01/07/19  5:25 AM   Result Value Ref Range    WBC 4.0 (L) 4.3 - 11.1 K/uL    RBC 4.27 4.23 - 5.6 M/uL    HGB 12.5 (L) 13.6 - 17.2 g/dL    HCT 36.4 (L) 41.1 - 50.3 %    MCV 85.2 79.6 - 97.8 FL    MCH 29.3 26.1 - 32.9 PG    MCHC 34.3 31.4 - 35.0 g/dL    RDW 13.9 11.9 - 14.6 %    PLATELET 359 (L) 441 - 450 K/uL    MPV 11.3 9.4 - 12.3 FL    ABSOLUTE NRBC 0.00 0.0 - 0.2 K/uL   METABOLIC PANEL, COMPREHENSIVE    Collection Time: 01/07/19  5:25 AM   Result Value Ref Range    Sodium 137 136 - 145 mmol/L    Potassium 4.1 3.5 - 5.1 mmol/L    Chloride 103 98 - 107 mmol/L    CO2 23 21 - 32 mmol/L    Anion gap 11 mmol/L    Glucose 106 (H) 65 - 100 mg/dL    BUN 13 8 - 23 MG/DL    Creatinine 1.28 0.8 - 1.5 MG/DL    GFR est AA >60 >60 ml/min/1.73m2    GFR est non-AA >60 ml/min/1.73m2    Calcium 7.9 (L) 8.3 - 10.4 MG/DL    Bilirubin, total 0.4 0.2 - 1.1 MG/DL    ALT (SGPT) 30 12 - 65 U/L    AST (SGOT) 28 15 - 37 U/L    Alk. phosphatase 57 50 - 136 U/L    Protein, total 6.3 g/dL    Albumin 3.1 (L) 3.2 - 4.6 g/dL    Globulin 3.2 2.3 - 3.5 g/dL    A-G Ratio 1.0     MAGNESIUM    Collection Time: 01/07/19  5:25 AM   Result Value Ref Range    Magnesium 2.2 1.8 - 2.4 mg/dL   TSH 3RD GENERATION    Collection Time: 01/07/19  5:25 AM   Result Value Ref Range    TSH 2.380 uIU/mL   PTT    Collection Time: 01/07/19  5:25 AM   Result Value Ref Range    aPTT 156.9 (H) 24.7 - 39.8 SEC         Assessment / Plan     Active Problems:    Shortness of breath likely multifactorial with advanced Lung CA, known SHF with documented EF 25%, now with new small bilateral PEs, possible pneumonia. Prior hx of possible LV apical thrombus. Will repeat contrasted echo today but will now require systemic anticoagulation due to PE. Concern in his prothrombotic state with lung CA that he will continue to require this if his blood counts can tolerate. Question is which agent will be appropriate --Lovenox vrs. Oral anticoagulant.        Allergic rhinitis (8/24/2012)      CML (chronic myeloid leukemia) ([address] (8/25/2012)      Overview: 8/25/12. Probable diagnosis. Will do bone marrow exam today      8/26/12. Bone marrow aspiration and biopsy done. Marrow aspirate and       peripheral blood sent for flow, cytogenetics and molecular testing. No       complications with the procedure      Depression (11/2/2012)      Chronic systolic congestive heart failure (HCC) (5/3/2017)--mild volume overload by exam but overall volume status appears to be well compensated. He has frequent PVcs but if not uncommonly seen given his EF documented in past--limiited in ability to up titrate coreg given his BP. CAP (community acquired pneumonia) (3/27/2018)--abx per primary      Stage IV adenocarcinoma of lung ([address] (7/11/2018)--as above, defer to oncology regarding appropriate anticoagulant. Pneumonia (1/6/2019)      Pulmonary emboli ([address] (1/6/2019)--see above               Sinai Arreola NP       [facility] CARDIOLOGY     1/7/2019     5:50 PM    I have personally seen and examined Mp Dimas. with the physician extender listed above. .  I agree and confirm findings in history, physical exam, and assessment/plan as outlined above with following pertinent additions/exceptions:   Male with CHF sx stable with PE. Defer anticoagualtion to oncology. Additional holter may be helpful to characterize PVC burden if a high burden is suspected.        Orly Cedeño MD

## 2019-01-07 NOTE — PROGRESS NOTES
01/07/19 0100   Dual Skin Pressure Injury Assessment   Dual Skin Pressure Injury Assessment WDL   Second Care Provider (Based on [address] [place] [address]) Bertha Swanson RN   Skin Integumentary   Skin Integumentary (WDL) WDL   Pressure  Injury Documentation No Pressure Injury Noted-Pressure Ulcer Prevention Initiated

## 2019-01-07 NOTE — H&P
[facility]  HISTORY AND PHYSICAL      Chen Coronel  MR#: [id number]  : 1955  ACCOUNT #: [de-identified]   ADMIT DATE: 2019    TIME OF ADMISSION:  11 p.m. CHIEF COMPLAINT:  Cough and shortness of breath. HISTORY OF PRESENT ILLNESS:  This is a 72-year-old male patient who has a past medical history of chronic myeloid leukemia diagnosed in 2012. At that time, the patient was started on treatment with [address] and he did well. In  The patient was admitted to the hospital with acute systolic congestive heart failure and he was found to have an ejection fraction below 20%. It was thought that [address] could be related to days and for that reason, he stopped the treatment with this oral chemotherapy for a while. He followed up with cardiology. His ejection fraction improved to 30% and at some point his Gleevec was resumed. Unfortunately, last year in April, the patient was diagnosed with a primary lung adenocarcinoma. He had a pleural effusion containing malignant cells and it was considered stage IV disease. At that time, the patient was started on treatment with [place] and because he did not want to be on 2 chemotherapies Gleevec was stopped. The patient follows with Dr. Kierra Singh from oncology for this problem. He had been stable until 3 days ago when he started having cough, whitish sputum production and progressive shortness of breath with exertion. He became so short of breath that he decided to come into the emergency room today. He denies fever, chills, diarrhea, abdominal pain or any other symptoms. When he arrived into the emergency room, his vital signs were blood pressure of 115/78, heart rate of 123, respiratory rate of 16, O2 saturation of 95%. He was awake and alert. He had decreased breath sounds bilaterally. He seemed to be in mild distress.   His initial blood workup reported normal white blood cell count, stable hemoglobin, stable kidney function with a creatinine of 1.5, normal liver enzymes. BNP slightly elevated to 325. A chest x-ray was done and reported mild cardiomegaly with findings suggestive of possible congestive heart failure. A CT scan of the chest with IV contrast was done and reported small acute bilateral lower lobe pulmonary emboli. The CT scan also reported bilateral mildly increased reticular and ground glass opacities, lymphangitic spread of tumor versus inflammation or atypical infection was suggested. The patient was started on a heparin drip in the emergency room and he was presented to the hospitalist team to be admitted. REVIEW OF SYSTEMS:  A review of 14 systems was performed and it was negative except for the findings that are reported in the HPI. PAST MEDICAL HISTORY:  Chronic myeloid leukemia, chronic systolic congestive heart failure, stage IV adenocarcinoma of the lung, anxiety. PAST SURGICAL HISTORY:  Hernia repair. SOCIAL HISTORY:  Denies smoking, alcohol use or illicit drug use. FAMILY HISTORY:  Positive for lung disease. ALLERGIES:  NO KNOWN DRUG ALLERGIES. PHYSICAL EXAMINATION:  VITAL SIGNS:  Blood pressure 116/72, heart rate of 109, respiratory rate of 13, O2 saturation of 95%. EYES:  PERRLA. EARS, NOSE, MOUTH AND THROAT:  There is no evidence of pharyngeal erythema, edema or purulent exudates. RESPIRATORY:  Decreased breath sounds in both lung bases. CARDIOVASCULAR:  Regular rate and rhythm with no murmurs. The patient is tachycardic. GASTROINTESTINAL:  Abdomen is soft, nontender with positive bowel sounds. GENITOURINARY:  Unremarkable. MUSCULOSKELETAL:  No evidence of trauma. SKIN:  No evidence of active skin lesions. NEUROLOGIC:  Patient is alert and oriented with no evidence of focal weakness. PSYCHIATRIC:  Normal mood. HEMATOLOGIC, LYMPHATIC, IMMUNOLOGIC:  No evidence of active bleeding. No abnormal lymphadenopathy.     LABORATORY AND IMAGING RESULTS:  White blood cell count 4.2, hemoglobin 14.0, platelet count 660. Sodium 134, potassium 4.2, CO2 of 25, glucose 134, BUN 17, creatinine 1.53, calcium 8.2, total bilirubin 0.5, troponin I 0.02. . Influenza test negative. CT scan of the chest with IV contrast, small acute bilateral lower lobe pulmonary emboli. Bilateral mildly increased reticular and ground glass opacities, lymphangitic spread of tumor versus inflammation or atypical infection suggested. EKG seen and analyzed by me, sinus tachycardia with several PVCs. ASSESSMENT:  This is a 27-year-old male patient who came into the emergency room with cough and shortness of breath. He was found to have bilateral pulmonary embolus PE and possible lung infection versus advancing malignancy. He will be admitted to the hospital and his length of stay is calculated to be more than 2 midnights. PLAN:  1. Small bilateral pulmonary emboli. The patient has been started on anticoagulation with heparin. Venous ultrasound of the lower extremities has been ordered. An echocardiogram has been ordered. Most likely he can be switched to oral anticoagulation tomorrow. His pulmonary emboli are small and I doubt that they would explain his shortness of breath. 2.  Possible atypical pneumonia versus advancing lung malignancy. The CT scan of his chest has reported bilateral lung infiltrates in both lung bases. For now, he is going to be covered with IV Levaquin and he will receive nebulizations with albuterol and Pulmicort. He has a history of congestive heart failure and his BNP is slightly elevated. I will not give him IV diuretics because today he has received IV contrast and diuresis could make him more prone to develop contrast-induced nephropathy. I will just resume his regular dose of oral diuretics tomorrow. 3.  History of systolic congestive heart failure.   The last echocardiogram in our system is from 10/2017 and at that time, he had an ejection fraction of 30%. A new echocardiogram has been ordered. I have resumed his regular home medications, which are Coreg, losartan, Lasix and spironolactone. 4.  Deep venous thrombosis prophylaxis, on heparin drip.       MD PAN Jaime/ENOC  D: 01/06/2019 23:35     T: 01/07/2019 04:34  JOB #: [id number]

## 2019-01-07 NOTE — PROGRESS NOTES
BP has been on the low side but patient remains asymptomatic. Will order another bolus of NS ( 250 mLs) and will hold for now his regular dose of losartan, lasix, aldactone. Will continue low dose coreg. Re-evaluate VS in the am to decide the best time to resume his BP meds.

## 2019-01-07 NOTE — ED NOTES
Dr Zeeshan Bee called with concerns over the patient's EKG showing Bigeminy and short runs of Ventricular Tachycardia with up to 45 PVC/ minute.

## 2019-01-07 NOTE — PROGRESS NOTES
Admission assessment complete. A&OX4. Lungs clear bilaterally. Respirations present. Even and unlabored. Apical HR is regular. No s/s of distress. Zero c/o pain at this time. Call light within reach. Encouraged patient to call for assistance. Patient verbalizes understanding. See Doc Flowsheet for assessment details. Patient resting in bed. Will continue to monitor.

## 2019-01-07 NOTE — ED NOTES
TRANSFER - OUT REPORT:    Verbal report given to Andrea Connor RN on [employer].  being transferred to [address] [place] [address] for routine progression of care       Report consisted of patients Situation, Background, Assessment and   Recommendations(SBAR). Information from the following report(s) ED Summary, MAR, Recent Results and Cardiac Rhythm Sinus tachycardia was reviewed with the receiving nurse. Lines:   Peripheral IV 01/06/19 Right Antecubital (Active)   Site Assessment Clean, dry, & intact 1/6/2019  6:06 PM   Phlebitis Assessment 0 1/6/2019  6:06 PM   Infiltration Assessment 0 1/6/2019  6:06 PM   Dressing Status Clean, dry, & intact 1/6/2019  6:06 PM   Dressing Type Transparent;Tape 1/6/2019  6:06 PM   Hub Color/Line Status Pink;Patent 1/6/2019  6:06 PM   Action Taken Blood drawn 1/6/2019  6:06 PM       Peripheral IV 01/06/19 Anterior;Distal;Left;Superior; Upper Arm (Active)        Opportunity for questions and clarification was provided.       Patient transported with:   Monitor  O2 @ 4 liters  Registered Nurse

## 2019-01-08 LAB
ANION GAP SERPL CALC-SCNC: 9 MMOL/L
APTT PPP: 49 SEC (ref 24.7–39.8)
BASOPHILS # BLD: 0 K/UL (ref 0–0.2)
BASOPHILS NFR BLD: 0 % (ref 0–2)
BUN SERPL-MCNC: 10 MG/DL (ref 8–23)
CALCIUM SERPL-MCNC: 8.6 MG/DL (ref 8.3–10.4)
CHLORIDE SERPL-SCNC: 106 MMOL/L (ref 98–107)
CO2 SERPL-SCNC: 25 MMOL/L (ref 21–32)
CREAT SERPL-MCNC: 1.3 MG/DL (ref 0.8–1.5)
DIFFERENTIAL METHOD BLD: ABNORMAL
EOSINOPHIL # BLD: 0.2 K/UL (ref 0–0.8)
EOSINOPHIL NFR BLD: 5 % (ref 0.5–7.8)
ERYTHROCYTE [DISTWIDTH] IN BLOOD BY AUTOMATED COUNT: 14.1 % (ref 11.9–14.6)
GLUCOSE SERPL-MCNC: 128 MG/DL (ref 65–100)
HCT VFR BLD AUTO: 37.7 % (ref 41.1–50.3)
HGB BLD-MCNC: 12.8 G/DL (ref 13.6–17.2)
IMM GRANULOCYTES # BLD: 0 K/UL (ref 0–0.5)
IMM GRANULOCYTES NFR BLD AUTO: 0 % (ref 0–5)
LYMPHOCYTES # BLD: 0.8 K/UL (ref 0.5–4.6)
LYMPHOCYTES NFR BLD: 22 % (ref 13–44)
MCH RBC QN AUTO: 29.3 PG (ref 26.1–32.9)
MCHC RBC AUTO-ENTMCNC: 34 G/DL (ref 31.4–35)
MCV RBC AUTO: 86.3 FL (ref 79.6–97.8)
MONOCYTES # BLD: 0.3 K/UL (ref 0.1–1.3)
MONOCYTES NFR BLD: 8 % (ref 4–12)
NEUTS SEG # BLD: 2.4 K/UL (ref 1.7–8.2)
NEUTS SEG NFR BLD: 64 % (ref 43–78)
NRBC # BLD: 0 K/UL (ref 0–0.2)
PLATELET # BLD AUTO: 135 K/UL (ref 150–450)
PMV BLD AUTO: 11.8 FL (ref 9.4–12.3)
POTASSIUM SERPL-SCNC: 4.2 MMOL/L (ref 3.5–5.1)
RBC # BLD AUTO: 4.37 M/UL (ref 4.23–5.6)
SODIUM SERPL-SCNC: 140 MMOL/L (ref 136–145)
WBC # BLD AUTO: 3.7 K/UL (ref 4.3–11.1)

## 2019-01-08 PROCEDURE — 74011250637 HC RX REV CODE- 250/637: Performed by: FAMILY MEDICINE

## 2019-01-08 PROCEDURE — 65270000029 HC RM PRIVATE

## 2019-01-08 PROCEDURE — 74011250636 HC RX REV CODE- 250/636: Performed by: INTERNAL MEDICINE

## 2019-01-08 PROCEDURE — 85025 COMPLETE CBC W/AUTO DIFF WBC: CPT

## 2019-01-08 PROCEDURE — 94640 AIRWAY INHALATION TREATMENT: CPT

## 2019-01-08 PROCEDURE — 74011250637 HC RX REV CODE- 250/637: Performed by: INTERNAL MEDICINE

## 2019-01-08 PROCEDURE — 74011000250 HC RX REV CODE- 250: Performed by: INTERNAL MEDICINE

## 2019-01-08 PROCEDURE — 94760 N-INVAS EAR/PLS OXIMETRY 1: CPT

## 2019-01-08 PROCEDURE — 74011000258 HC RX REV CODE- 258: Performed by: INTERNAL MEDICINE

## 2019-01-08 PROCEDURE — 85730 THROMBOPLASTIN TIME PARTIAL: CPT

## 2019-01-08 PROCEDURE — 80048 BASIC METABOLIC PNL TOTAL CA: CPT

## 2019-01-08 PROCEDURE — 36415 COLL VENOUS BLD VENIPUNCTURE: CPT

## 2019-01-08 RX ADMIN — LEVALBUTEROL HYDROCHLORIDE 0.63 MG: 0.63 SOLUTION RESPIRATORY (INHALATION) at 08:06

## 2019-01-08 RX ADMIN — LEVALBUTEROL HYDROCHLORIDE 0.63 MG: 0.63 SOLUTION RESPIRATORY (INHALATION) at 19:32

## 2019-01-08 RX ADMIN — SODIUM CHLORIDE 1 G: 900 INJECTION, SOLUTION INTRAVENOUS at 22:17

## 2019-01-08 RX ADMIN — BUDESONIDE 500 MCG: 0.5 INHALANT RESPIRATORY (INHALATION) at 19:32

## 2019-01-08 RX ADMIN — CARVEDILOL 3.12 MG: 3.12 TABLET, FILM COATED ORAL at 17:10

## 2019-01-08 RX ADMIN — Medication 5 ML: at 14:04

## 2019-01-08 RX ADMIN — CARVEDILOL 3.12 MG: 3.12 TABLET, FILM COATED ORAL at 07:58

## 2019-01-08 RX ADMIN — POTASSIUM CHLORIDE 20 MEQ: 20 TABLET, EXTENDED RELEASE ORAL at 08:26

## 2019-01-08 RX ADMIN — Medication 10 ML: at 22:17

## 2019-01-08 RX ADMIN — LEVALBUTEROL HYDROCHLORIDE 0.63 MG: 0.63 SOLUTION RESPIRATORY (INHALATION) at 13:33

## 2019-01-08 RX ADMIN — BUDESONIDE 500 MCG: 0.5 INHALANT RESPIRATORY (INHALATION) at 08:06

## 2019-01-08 RX ADMIN — RIVAROXABAN 15 MG: 15 TABLET, FILM COATED ORAL at 15:03

## 2019-01-08 RX ADMIN — Medication 10 ML: at 06:29

## 2019-01-08 RX ADMIN — SODIUM CHLORIDE 1 G: 900 INJECTION, SOLUTION INTRAVENOUS at 08:27

## 2019-01-08 RX ADMIN — LEVALBUTEROL HYDROCHLORIDE 0.63 MG: 0.63 SOLUTION RESPIRATORY (INHALATION) at 00:53

## 2019-01-08 NOTE — PROGRESS NOTES
Problem: Interdisciplinary Rounds  Goal: Interdisciplinary Rounds  Interdisciplinary team rounds were held 1/8/2019 with the following team members:Care Management, Nursing, Nutrition, Pharmacy, Physical Therapy and Physician. Plan of care discussed. See clinical pathway and/or care plan for interventions and desired outcomes.

## 2019-01-08 NOTE — PROGRESS NOTES
Care Management Interventions  PCP Verified by CM: Yes  Mode of Transport at Discharge: Other (see comment)  Transition of Care Consult (CM Consult): Other  Current Support Network: Lives with Spouse  Confirm Follow Up Transport: Family  Plan discussed with Pt/Family/Caregiver: Yes  Freedom of Choice Offered: Yes  Discharge Location  Discharge Placement: Home  Visited with pt regarding plans for discharge, pt plans to return home with spouse, he works/drives and otherwise completely inde with all ADL's. Has no needs at this time, but will continue to follow until d/c.

## 2019-01-08 NOTE — PROGRESS NOTES
Shift assessment complete. A&OX4. Lungs diminished bilaterally in lower bases. Respirations present. Even and unlabored. No s/s of distress. Zero c/o pain at this time. Call light within reach. Encouraged patient to call for assistance. Patient verbalizes understanding. See Doc Flowsheet for assessment details. Patient resting in bed. Will continue to monitor.

## 2019-01-08 NOTE — PROGRESS NOTES
Care assumed, assess complete. No new issues. Pt feeling well; anticipating discharge today. Continues on heparin gtt for now which has been therapeutic last 24 hours.

## 2019-01-08 NOTE — PROGRESS NOTES
Initial visit by  to convey care and concern and encourage patient that  services are available if desired. Mr. Kalia Quarles and his wife welcomed the visit. I offered spiritual interventions, including affirmation of emotions and rossi along with prayer as requested. Provided business card for future reference.      Barney Mcmahan 69  Board Certified

## 2019-01-09 VITALS
DIASTOLIC BLOOD PRESSURE: 69 MMHG | OXYGEN SATURATION: 100 % | HEART RATE: 92 BPM | SYSTOLIC BLOOD PRESSURE: 103 MMHG | WEIGHT: 178.8 LBS | HEIGHT: 73 IN | TEMPERATURE: 97 F | BODY MASS INDEX: 23.7 KG/M2 | RESPIRATION RATE: 18 BRPM

## 2019-01-09 LAB
ANION GAP SERPL CALC-SCNC: 9 MMOL/L
BUN SERPL-MCNC: 14 MG/DL (ref 8–23)
CALCIUM SERPL-MCNC: 8.5 MG/DL (ref 8.3–10.4)
CHLORIDE SERPL-SCNC: 105 MMOL/L (ref 98–107)
CO2 SERPL-SCNC: 24 MMOL/L (ref 21–32)
CREAT SERPL-MCNC: 1.21 MG/DL (ref 0.8–1.5)
GLUCOSE SERPL-MCNC: 99 MG/DL (ref 65–100)
POTASSIUM SERPL-SCNC: 4 MMOL/L (ref 3.5–5.1)
SODIUM SERPL-SCNC: 138 MMOL/L (ref 136–145)

## 2019-01-09 PROCEDURE — 36415 COLL VENOUS BLD VENIPUNCTURE: CPT

## 2019-01-09 PROCEDURE — 94760 N-INVAS EAR/PLS OXIMETRY 1: CPT

## 2019-01-09 PROCEDURE — 74011250637 HC RX REV CODE- 250/637: Performed by: INTERNAL MEDICINE

## 2019-01-09 PROCEDURE — 74011000250 HC RX REV CODE- 250: Performed by: FAMILY MEDICINE

## 2019-01-09 PROCEDURE — 74011000258 HC RX REV CODE- 258: Performed by: INTERNAL MEDICINE

## 2019-01-09 PROCEDURE — 80048 BASIC METABOLIC PNL TOTAL CA: CPT

## 2019-01-09 PROCEDURE — 74011250637 HC RX REV CODE- 250/637: Performed by: FAMILY MEDICINE

## 2019-01-09 PROCEDURE — 94640 AIRWAY INHALATION TREATMENT: CPT

## 2019-01-09 PROCEDURE — 74011250636 HC RX REV CODE- 250/636: Performed by: INTERNAL MEDICINE

## 2019-01-09 PROCEDURE — 74011000250 HC RX REV CODE- 250: Performed by: INTERNAL MEDICINE

## 2019-01-09 RX ORDER — DOXYCYCLINE HYCLATE 100 MG
100 TABLET ORAL 2 TIMES DAILY
Qty: 14 TAB | Refills: 0 | Status: SHIPPED | OUTPATIENT
Start: 2019-01-09 | End: 2019-01-16

## 2019-01-09 RX ORDER — ALBUTEROL SULFATE 90 UG/1
2 AEROSOL, METERED RESPIRATORY (INHALATION)
Qty: 1 INHALER | Refills: 1 | Status: SHIPPED | OUTPATIENT
Start: 2019-01-09 | End: 2019-05-16 | Stop reason: SDUPTHER

## 2019-01-09 RX ORDER — POTASSIUM CHLORIDE 750 MG/1
10 TABLET, EXTENDED RELEASE ORAL DAILY
Qty: 30 TAB | Refills: 0 | Status: SHIPPED | OUTPATIENT
Start: 2019-01-09 | End: 2019-01-11 | Stop reason: ALTCHOICE

## 2019-01-09 RX ORDER — LISINOPRIL 2.5 MG/1
2.5 TABLET ORAL DAILY
Qty: 30 TAB | Refills: 0 | Status: SHIPPED | OUTPATIENT
Start: 2019-01-09 | End: 2019-02-11 | Stop reason: SDUPTHER

## 2019-01-09 RX ORDER — LEVALBUTEROL INHALATION SOLUTION 0.63 MG/3ML
0.63 SOLUTION RESPIRATORY (INHALATION)
Status: DISCONTINUED | OUTPATIENT
Start: 2019-01-09 | End: 2019-01-09 | Stop reason: HOSPADM

## 2019-01-09 RX ORDER — FUROSEMIDE 20 MG/1
20 TABLET ORAL DAILY
Qty: 30 TAB | Refills: 0 | Status: SHIPPED | OUTPATIENT
Start: 2019-01-09 | End: 2019-01-11 | Stop reason: ALTCHOICE

## 2019-01-09 RX ADMIN — BUDESONIDE 500 MCG: 0.5 INHALANT RESPIRATORY (INHALATION) at 07:27

## 2019-01-09 RX ADMIN — RIVAROXABAN 15 MG: 15 TABLET, FILM COATED ORAL at 08:11

## 2019-01-09 RX ADMIN — Medication 10 ML: at 08:12

## 2019-01-09 RX ADMIN — SODIUM CHLORIDE 1 G: 900 INJECTION, SOLUTION INTRAVENOUS at 08:12

## 2019-01-09 RX ADMIN — POTASSIUM CHLORIDE 20 MEQ: 20 TABLET, EXTENDED RELEASE ORAL at 08:11

## 2019-01-09 RX ADMIN — Medication 10 ML: at 05:40

## 2019-01-09 RX ADMIN — CARVEDILOL 3.12 MG: 3.12 TABLET, FILM COATED ORAL at 08:11

## 2019-01-09 RX ADMIN — LEVALBUTEROL HYDROCHLORIDE 0.63 MG: 0.63 SOLUTION RESPIRATORY (INHALATION) at 07:27

## 2019-01-09 NOTE — PROGRESS NOTES
1/9/2019      RE: George Carranza. To Whom it May Concern: This is to certify that George Carranza. may return to work on 1/10/19      Please feel free to contact my office if you have any questions or concerns. Thank you for your assistance in this matter.     Sincerely,      Saritha Pickett MD

## 2019-01-09 NOTE — PROGRESS NOTES
[facility] CARDIOLOGY PROGRESS NOTE           1/8/2019 8:26 PM    Admit Date: 1/6/2019      Subjective:     No o/e; states feels better    ROS:  Cardiovascular:  As noted above    Objective:      Vitals:    01/08/19 1140 01/08/19 1333 01/08/19 1610 01/08/19 1932   BP: 113/73  120/71    Pulse: 77  94    Resp: 18  16    Temp: 97.3 °F (36.3 °C)  97.4 °F (36.3 °C)    SpO2: 97% 98% 95% 98%   Weight:       Height:           Physical Exam:  General-No Acute Distress  Neck- supple, no JVD  CV- regular rate and rhythm; HSM at SB  Lung- clear bilaterally  Abd- soft, nontender, nondistended  Ext- no edema bilaterally. Skin- warm and dry    Data Review:   Recent Labs     01/08/19  0839 01/07/19  0525 01/06/19  2233 01/06/19  1840    137  --   --    K 4.2 4.1  --   --    MG  --  2.2  --  2.1   BUN 10 13  --   --    CREA 1.30 1.28  --   --    * 106*  --   --    WBC 3.7* 4.0*  --   --    HGB 12.8* 12.5*  --   --    HCT 37.7* 36.4*  --   --    * 126*  --   --    INR  --   --  1.1  --    TROIQ  --   --   --  0.02       Assessment/Plan:     Active Problems:    Allergic rhinitis (8/24/2012)      CML [facility]chronic myeloid leukemia) ([facility] (8/25/2012)      Depression (11/2/2012)      Chronic systolic congestive heart failure ([facility] (5/3/2017)      CAP (community acquired pneumonia) (3/27/2018)      Stage IV adenocarcinoma of lung ([facility] (7/11/2018)      Pneumonia (1/6/2019)      Pulmonary emboli ([facility] (1/6/2019)    Hx of NICM (cath in 3958); uncertain etiology but consideration for prior chemo. Also reported prior increased PVCs and consideration for PVC induced CM. Holter consideration as outpt. Exam with no sig vol o/l. Restart LV dysfn meds as BPs tolerate  Started on xarelto per onc with noted PE.        Km Herrera MD  1/8/2019 8:26 PM

## 2019-01-09 NOTE — DISCHARGE INSTRUCTIONS
DISCHARGE SUMMARY from Nurse    PATIENT INSTRUCTIONS:    After general anesthesia or intravenous sedation, for 24 hours or while taking prescription Narcotics:  · Limit your activities  · Do not drive and operate hazardous machinery  · Do not make important personal or business decisions  · Do  not drink alcoholic beverages  · If you have not urinated within 8 hours after discharge, please contact your surgeon on call. Report the following to your surgeon:  · Excessive pain, swelling, redness or odor of or around the surgical area  · Temperature over 100.5  · Nausea and vomiting lasting longer than 4 hours or if unable to take medications  · Any signs of decreased circulation or nerve impairment to extremity: change in color, persistent  numbness, tingling, coldness or increase pain  · Any questions    What to do at Home:  Recommended activity: Activity as tolerated, regular diet, follow up with Cardiology, PCP, and Pulm as scheduled. If you experience any of the following symptoms worsening chest pain/shortness of breath, fever, chills, racing heart, any other concerning symptoms, return to ER and/or, please follow up with PCP. *  Please give a list of your current medications to your Primary Care Provider. *  Please update this list whenever your medications are discontinued, doses are      changed, or new medications (including over-the-counter products) are added. *  Please carry medication information at all times in case of emergency situations. These are general instructions for a healthy lifestyle:    No smoking/ No tobacco products/ Avoid exposure to second hand smoke  Surgeon General's Warning:  Quitting smoking now greatly reduces serious risk to your health.     Obesity, smoking, and sedentary lifestyle greatly increases your risk for illness    A healthy diet, regular physical exercise & weight monitoring are important for maintaining a healthy lifestyle    You may be retaining fluid if you have a history of heart failure or if you experience any of the following symptoms:  Weight gain of 3 pounds or more overnight or 5 pounds in a week, increased swelling in our hands or feet or shortness of breath while lying flat in bed. Please call your doctor as soon as you notice any of these symptoms; do not wait until your next office visit. Recognize signs and symptoms of STROKE:    F-face looks uneven    A-arms unable to move or move unevenly    S-speech slurred or non-existent    T-time-call 911 as soon as signs and symptoms begin-DO NOT go       Back to bed or wait to see if you get better-TIME IS BRAIN. Warning Signs of HEART ATTACK     Call 911 if you have these symptoms:   Chest discomfort. Most heart attacks involve discomfort in the center of the chest that lasts more than a few minutes, or that goes away and comes back. It can feel like uncomfortable pressure, squeezing, fullness, or pain.  Discomfort in other areas of the upper body. Symptoms can include pain or discomfort in one or both arms, the back, neck, jaw, or stomach.  Shortness of breath with or without chest discomfort.  Other signs may include breaking out in a cold sweat, nausea, or lightheadedness. Don't wait more than five minutes to call 911 - MINUTES MATTER! Fast action can save your life. Calling 911 is almost always the fastest way to get lifesaving treatment. Emergency Medical Services staff can begin treatment when they arrive -- up to an hour sooner than if someone gets to the hospital by car. The discharge information has been reviewed with the patient. The patient verbalized understanding. Discharge medications reviewed with the patient and appropriate educational materials and side effects teaching were provided.   ___________________________________________________________________________________________________________________________________    Rivaroxaban (By mouth)   Rivaroxaban ([id number]-a-ban)  Treats and prevents blood clots, which lowers the risk of stroke, deep vein thrombosis (DVT), pulmonary embolism (PE), and similar conditions. This medicine is a blood thinner. Brand Name(s): Xarelto, Xarelto Starter Pack   There may be other brand names for this medicine. When This Medicine Should Not Be Used: This medicine is not right for everyone. Do not use it if you had an allergic reaction to rivaroxaban, or you have severe bleeding. How to Use This Medicine:   Tablet  · Take this medicine as directed, and take it at the same time each day. · 10-milligram (mg) tablet: Take with or without food. · 15-mg or 20-mg tablet: Take with food. · If you cannot swallow the tablets, you may crush the tablet and mix it with applesauce. Eat some food after you swallow the mixture. · Tube feeding: You may crush the tablet and mix the medicine in 50 milliliters (mL) of water before giving it via the tube. This must be followed by a feeding. · This medicine should come with a Medication Guide. Ask your pharmacist for a copy if you do not have one. · Missed dose:   ¨ Ask your doctor or pharmacist if you are not sure what to do if you miss a dose. ¨ Once-daily dose: If you miss a dose or forget to use your medicine, use it as soon as you can on the same day. Do not use extra medicine to make up for a missed dose. ¨ Twice-daily dose to treat a blood clot (15-mg tablet): If you miss a dose or forget to use your medicine, use it as soon as you can on the same day. You may take 2 doses at the same time to make up for the missed dose. This is only for people who take a total of 30 mg per day. · Store the medicine in a closed container at room temperature, away from heat, moisture, and direct light. Drugs and Foods to Avoid:   Ask your doctor or pharmacist before using any other medicine, including over-the-counter medicines, vitamins, and herbal products.   · Some foods and medicines can affect how rivaroxaban works. Tell your doctor if you are using any of the following:  ¨ NSAID medicine (including aspirin, celecoxib, diclofenac, ibuprofen, naproxen)  ¨ Ketoconazole, itraconazole, lopinavir, ritonavir, indinavir, conivaptan, carbamazepine, phenytoin, rifampin, Jt's wort  ¨ Another blood thinner (including clopidogrel, enoxaparin, heparin, warfarin)  Warnings While Using This Medicine:   · Tell your doctor if you are pregnant or breastfeeding, or if you have kidney disease, liver disease, bleeding problems, or an artificial heart valve. · This medicine may increase your risk of bleeding. Be careful to avoid injuries that could cause bleeding. Stay away from rough sports or other situations where you could be bruised, cut, or hurt. Brush and floss your teeth gently. Be careful when using sharp objects, including razors and fingernail clippers. Avoid picking your nose. If you need to blow your nose, blow it gently. · This medicine may cause nerve damage if you have a medical procedure done to your back, including anesthesia or a spinal puncture. This is more likely to happen if you have a history of back injury, back surgery, problems with your spine, or procedures or punctures to your back. Tell your doctor if you are also taking another blood thinner, because this also increases the risk. · Do not stop using this medicine suddenly without asking your doctor. You might have a higher risk of stroke for a short time after you stop using this medicine. · Tell any doctor or dentist who treats you that you are using this medicine. · Your doctor will do lab tests at regular visits to check on the effects of this medicine. Keep all appointments. · Keep all medicine out of the reach of children. Never share your medicine with anyone.   Possible Side Effects While Using This Medicine:   Call your doctor right away if you notice any of these side effects:  · Allergic reaction: Itching or hives, swelling in your face or hands, swelling or tingling in your mouth or throat, chest tightness, trouble breathing  · Blistering, peeling, or red skin rash  · Decrease in how much or how often you urinate  · Heavy menstrual bleeding, or vaginal bleeding  · Red or brown urine, bloody or black, tarry stools  · Unusual bleeding or bruising, including frequent nosebleeds  · Vomiting blood or material that looks like coffee grounds  If you notice other side effects that you think are caused by this medicine, tell your doctor. Call your doctor for medical advice about side effects. You may report side effects to FDA at 3-019-KQY-6506  © 2017 [facility] Information is for End User's use only and may not be sold, redistributed or otherwise used for commercial purposes. The above information is an  only. It is not intended as medical advice for individual conditions or treatments. Talk to your doctor, nurse or pharmacist before following any medical regimen to see if it is safe and effective for you. Pulmonary Embolism: Care Instructions  Your Care Instructions    Pulmonary embolism is the sudden blockage of an artery in the lung. Blood clots in the deep veins of the leg or pelvis (deep vein thrombosis, or DVT) are the most common cause. These blood clots can travel to the lungs. Pulmonary embolism can be very serious. Because you have had one pulmonary embolism, you are at greater risk for having another one. But you can take steps to prevent another pulmonary embolism by following your doctor's instructions. You will probably take a prescription blood-thinning medicine to prevent blood clots. A blood thinner can stop a blood clot from growing larger and prevent new clots from forming. Follow-up care is a key part of your treatment and safety. Be sure to make and go to all appointments, and call your doctor if you are having problems.  It's also a good idea to know your test results and keep a list of the medicines you take. How can you care for yourself at home? · Take your medicines exactly as prescribed. Call your doctor if you think you are having a problem with your medicine. You will get more details on the specific medicines your doctor prescribes. · If you are taking a blood thinner, be sure you get instructions about how to take your medicine safely. Blood thinners can cause serious bleeding problems. Preventing future pulmonary embolisms  · Exercise. Keep blood moving in your legs to keep clots from forming. If you are traveling by car, stop every hour or so. Get out and walk around for a few minutes. If you are traveling by bus, train, or plane, get out of your seat and walk up and down the aisles every hour or so. You also can do leg exercises while you are seated. Pump your feet up and down by pulling your toes up toward your knees then pointing them down. · Get up out of bed as soon as possible after an illness or surgery. · Do not smoke. If you need help quitting, talk to your doctor about stop-smoking programs and medicines. These can increase your chances of quitting for good. · Check with your doctor before taking hormone or birth control pills. These may increase your risk of blot clots. · Ask your doctor about wearing compression stockings to help prevent blood clots in your legs. There are different types of stockings, and they need to fit right. So your doctor will recommend what you need. When should you call for help? Call 911 anytime you think you may need emergency care. For example, call if:    · You have shortness of breath.     · You have chest pain.     · You passed out (lost consciousness).     · You cough up blood.    Call your doctor now or seek immediate medical care if:    · You have new or worsening pain or swelling in your leg.    Watch closely for changes in your health, and be sure to contact your doctor if:    · You do not get better as expected.    Where can you learn more? Go to http://lc-anca.info/. Enter B600 in the search box to learn more about \"Pulmonary Embolism: Care Instructions. \"  Current as of: November 21, 2017  Content Version: 11.8  © 9533-6408 [employer]. Care instructions adapted under license by [employer] (which disclaims liability or warranty for this information). If you have questions about a medical condition or this instruction, always ask your healthcare professional. [place] [address] any warranty or liability for your use of this information.

## 2019-01-09 NOTE — PROGRESS NOTES
Respiratory Care Services     Policy Number: -[id number]    Title: Patient Care Assessment Program    Effective Date: 01/1999    Revised Date: 05/2014    Reviewed Date: 06/2013/ 03/2015, 03/2016, 06/2017     Overview  In an effort to improve quality and reduce costs of respiratory care at [facility], the Respiratory Department has developed a number of Patient Care Protocols. These protocols have been developed according to Gissell 3 and are utilized for those patients who are ordered respiratory therapy using therapeutic indications and standardized approaches for accomplishing objectives. Patient Care Protocols are intended to improve care by:   Defining the indications and standards of care agreed upon by the Pulmonary Medicine and [address] [place] [address] of [facility].  Training respiratory care practitioners to apply those criteria to individual patients and modify therapy as indicated by the protocols.  Documenting the indication and care plan as part of the initial ordering process.  Tapering or discontinuing treatments once the indication for therapy changes. The Patient Care Protocols shall be universally applied throughout the hospital as determined by   the Pulmonary Medicine and [address] [place] [address].     Rationale for Patient Care Assessment Protocols:   Continuous Quality Improvement   Cost containment   Standardization of care   Enhanced continuity of care   Utilization review   Timely intervention    The following patient care assessment protocols have been developed:   Aerosolized Medication    Bronchial Hygiene    Oxygen Weaning Protocol   Volume Expansion/Secretion Clearance Non-Vented   Ventilator Weaning Protocol   CVRU Fast Track Weaning Protocol   Asthma Treatment Protocol ER   Pediatric Asthma Treatment Protocol ER   Alpha-1 Antitrypsin Deficiency Protocol   Prone Positioning Protocol    The Director of [address] [place] [address] oversees the Patient Care Assessment Program. The Clinical/ is responsible for protocol development and training. The Supervisor is responsible for implementation and  activities. Each patient with an order for respiratory treatments will receive an evaluation. All Registered Respiratory Care Practitioners (RCPs) will perform the evaluations. The same evaluation tool will be utilized for all initial and follow-up assessments. If the patient does not meet criteria for ordered therapy, the therapy will be discontinued. If the patient demonstrates an adverse response to initially ordered therapy, the therapy will be discontinued and the physician will be contacted. Specific physician's orders that deviate from protocols and are deemed \"inappropriate\" or \"unsafe\" will be addressed with ordering physician and/or medical director as required. Patients of [facility] will not be assessed for the first 24 hours as the Medical Director of [address] [place] [address] has requested that changes in therapy should not be made during that time frame. Respiratory Patient Care Assessment Protocols                           I.  Policy: In an effort to provide quality patient care and effective utilization of services, physicians who order respiratory therapy will have their patients treated via the protocols established (see attached). All Registered [address] (RCPs) will complete the initial assessment. This assessment will indicate patient needs, and the care plan developed for the patient and will performed within 24 hours of admission. Frequency of the therapy will be set according to the results of the respiratory therapy evaluation and frequency guidelines policy.  Reassessment will be continued done every 48 hours and more frequently as needed for the individual patient. II. Purpose:     A. To provide a process that will allow for ongoing assessment and care plan modification for patients receiving respiratory services based on both objective and or subjective patient responses to interventions. This process of protocol utilization will assist in patient care progression while eliminating the need for the physician to continually update respiratory therapy orders. B. To assure continuity of respiratory care that meets [facility] Clinical Practice Guidelines. III. Initiation:  Implement Respiratory Care Protocols for patients who are ordered by physician          to receive respiratory therapy procedures or for ventilator management. IV. Protocol:  A. Upon receiving an order for therapy the RCP will review the patients EMR (electronic medical record) for all pertinent information includin. Physicians order for therapy  2. Patient history and physical examination  3. Physician progress notes  4. Diagnostic. X-rays, PFTs, arterial blood gases etc.      B. The RCP will perform a respiratory assessment in the following manner:  1. Perform hand hygiene per hospital policy utilizing Standard Precautions for all patients and following transmission-based isolation as indicated per policy. 2. Identify patient via ID bracelet verifying patient name and birth date. 3. General observations: color, pattern and effort of breathing, chest expansion, (symmetrical and bilateral), level of consciousness and the ability to ambulate. 4. The RCP will assess patients cough ability and determine if Nasotracheal suctioning is needed. If patient is unable to produce sputum, at that time, the RCP should question the patient with regard to their sputum: production, color consistency, frequency and amount.   5. Auscultation: Using a stethoscope, the RCP will listen and note quality of breath sounds and presence or absence of adventitious breath sounds in all lung fields, both anteriorly and posteriorly. 6. Upon completing the EMR review and physical assessment, the RCP will document findings in the RT Assessment section of the EMR. The score level will be provided and will be used to determine the frequency of therapy. V. Indications:   A. Indications for Bronchial Hygiene Protocol will include:  1. Potential for or presence of atelectasis. 2. Need for hydration and removal of retained secretions. 3. Need for improvement of cough effectiveness. 4. Presence of conditions associated with disorder of pulmonary clearance:  a. Cystic fibrosis  b. Bronchiectasis  B. Indications for Aerosolized Medication(s) Protocol should include:  1. Treatment of bronchospasm/wheezing  2. Improvement of mucociliary clearance  3. Treatment of stridor  4. History of Asthma or COPD             C.  Indications for Oxygen Therapy Protocol should include:  1. Documented hypoxemia  2. Severe trauma  3. Acute myocardial infarction  4. Short-term therapy (e.g. post anesthesia recovery)    VI. Maintenance:    A. Timely patient assessment is an integral part of this protocol therefore the following will be applied:  1. All non- critical care patients will be evaluated upon receiving initial respiratory care orders within 24 hours and re-evaluated within 48 hours (or more as needed). 2. Orders requesting a Respiratory Consult will be responded to in the following manner:  a. In patient emergency situations, the RCP assigned to the floor will respond immediately to the patient, provide an initial respiratory assessment, and contact the patients physician as necessary for appropriate orders. b. In non-emergent situations, the RCP assigned to the floor will respond to the patient within 90 minutes and provide an initial respiratory assessment and contact patients physician as necessary for appropriate orders. c. An RCP will provide a comprehensive assessment as soon as possible.   3. Upon completion of an evaluation, the RCP will complete documentation in the patients EMR in the RT Assessment section. 4. The RCP who completes the assessment will document orders for therapy in the orders section of the patients EMR selecting new order. Next, per protocol should be selected indicating it is a protocol order and sign orders should be selected to complete the process. 5. The Pharmacy and Therapeutics (P&T) Committee has mandated that the medication Xopenex may be changed to unit dose albuterol without an order, except for those patients receiving Xopenex due to cardiac arrhythmias. The dosage for these patients should be 0.63 mg. and may be changed from 1.25 mg. to 0.63 mg per P & T Committee by the RCP completing the assessment. 6. Patients who are not experiencing cardiac arrhythmias, and are ordered Xopenex and Atrovent may be changed to Duoneb. VII. Safety:        A. The following safety issues shall be monitored:  1. The RCP will perform hand hygiene per hospital policy utilizing Standard Precautions for all patients and following transmission-based isolation as indicated per hospital policy. 2. The RCP must exercise professional judgment in classifying the patient for frequency of therapy. 3. Appropriate classification of the patient will require an evaluation utilizing the Therapy Assessment Protocol Guidelines. 4. The RCP will confer with the physician concerning the care of the patient at any time questions or problems arise. 5. If during therapy, the patient exhibits no improvement or deterioration in clinical status the RCP will notify the physician and the patients nurse. VIII. Interventions:   A. The patients nurse is responsible concerning all items related to his/her care. Ongoing communication with nursing is essential to successful protocol management. B. The RCP recognizes the value of the team approach in meeting the patients needs.  Nursing input regarding the patients pulmonary condition will be sought as needed. IX. Reportable conditions: The RCP will inform the physician if:  1. There are acute changes in patients respiratory status. 2. The therapist is unable to determine appropriate care plan upon assessment. 3. The patient fails to reach therapeutic objective. 4. A change or additional medication is needed. X.  Patient Education:    A. Patient will receive instruction on the followin. The treatment modality, including objectives and proper technique of therapy  2. Respiratory medications  B. Documentation shall occur in the patient education section of the patients EMR. XI. Documentation: Record all findings as described above in the patients EMR. Related Protocols: A. Aerosolized Medication Protocol  B. Bronchial Hygiene  C. Oxygen Protocol   D. Volume Expansion/Secretion Clearance  E. Ventilator Weaning Protocols    References:  N   Joint Commission Consolidated [place] Standard   L    Respiratory Care Department Policy, Procedure and Protocol Guideline Manual, , JAMSHID Garcia. L  Therapist Driven Respiratory Care Protocols - A Practitioners Guide for Criteria-Based Respiratory Care by Ariana Child M.D., and JAMSHID Quiñonez RRT. L  The rationale for therapist-driven protocols: an update. Respiratory Care 1998; [address] [facility] Guidelines. Respiratory Care Services     Policy Number: -[id number]    Title: Aerosolized Medication Protocol    Effective Date: 10/1998    Revised Date: 2013, 2016    Reviewed Date: 2014/ 2015 , 2017       I. Policy: The Aerosolized Medication Protocol shall by implemented by Respiratory Care              Practitioners (RCP) for patients with orders to receive aerosol therapy with medication. II. Purpose:   To open and maintain obstructed airways, the RCP, will utilize the following   protocol to select the indicated aerosolized medication(s) and determine the most effective method of delivery to the patient. III. Patient Type: All patients who are determined to meet aerosolized medication criteria as          outlined in this protocol. IV. Responsibility: Director, [address], registered Respiratory Care                                                     Practitioners (RCPs) with documented competency in the performance of                                     respiratory therapeutic techniques. V. Equipment needed:  A. Stethoscope  B. Pulse oximeter  C. IPPB machine and circuit  D. Aerosol nebulizer  E. MDI Inhaler     VI. Protocol:   A. The following conditions are accepted indications for aerosolized medication therapy. 1. Bronchospasm/wheezing  2. Impaired mucociliary clearance  3. Tracheobronchial mucosal congestion/and laryngeal stridor  4. Diseases which commonly require aerosolized medication therapy include, but are not limited to:  a. Asthma/reactive airway disease  b. Bronchitis/emphysema (COPD)  c. Cystic fibrosis  d. Severe laryngitis/tracheitis  e. Bronchiectasis  f. Smoke inhalation or chemical trauma to the lung or upper airway  g. Physical trauma to the upper airway  h. Laryngotracheobronchitis  i. Bronchiolitis  j. Non-specific wheezing              B. Indications for bronchodilator medications will include:  a. Bronchospasm/ wheezing  b. Asthma/reactive airway disease  c. Chronic obstructive pulmonary disease  d. Obstructive defect on pulmonary function testing  C. Administration of medications  1. If a bronchodilator or any other type of respiratory medication is needed, a physician order must be indicated in the medication section in the patients EMR. 2. When the physician specifies the medication and dosage at the time of request, the ordered medication will be used as part of the care plan. D.  The following guidelines will be utilized in the evaluation and selection of the appropriate delivery device for indicated medication(s):  1. IPPB  a. Ventilation is inadequate (rapid shallow breathing)  b. Refractory atelectasis has developed, other forms of therapy are unsuccessful  c. Inability to clear secretions  d. Need to improve lung expansion  2. Unassisted aerosol (UA) is the preferred method of aerosol delivery and indicated if  a. Ventilation is inadequate  b. Patient demonstrates wheezing   c. patient is unable to perform MDI effectively   d. Patient preference  3. Metered Dose Inhaler (MDI)   a. Patient is alert/cooperative  b. Medication(s) available in this delivery method. c. Able to perform 3 second breath hold. d. Patient has demonstrated ability to use MDI effectively  e. Patient has used MDI therapy previously, either at home or in the hospital.  f. Note: The only approved inhalers on formulary are albuterol and Spiriva. VII. Guidelines:   Monitor patients vital signs and evaluate patients clinical status. The need to change medication and/or modality may be indicated by:  1. A pulse greater than 120 bpm, or if a pulse increase of 20 bpm occurs with bronchodilator medications. 2. Significant worsening of dyspnea or wheezing occurring during or within 30 minutes of discontinuing therapy. 3. Worsening of patients sensorium (e.g. patient becomes confused or obtunded, and unable to follow directions). 4. Worsening of patients chest x-ray. 5. Change in sputum (e.g. increased pulmonary infiltrate, which might indicate need for volume expansion therapy). 6. Patient has difficulty coughing up secretions, which might indicate need for acetylcysteine and/or bronchial hygiene therapy. 7. Call physician immediately if dyspnea worsens and is not responsive to modifications allowed by protocol. VIII. Clinical Responsibility:  A.  The therapy assessment guidelines will be used to evaluate all patients receiving aerosolized medications with the exception of critical care areas. 1. RCPs will perform changes in therapy per protocol. 2. It will be the responsibility of RCP to provide instruction regarding respiratory medications, aerosol therapy and proper MDI technique, as well as, spacer usage to patients ordered MDI therapy. 3. Current therapy that is part of a patients home regimen will not be discontinued. IX. Documentation  A. Document assessment findings in the respiratory assessment section of the patients EMR. B. Document changes in therapy per protocol in the respiratory orders section and in the care plan section of the patients EMR. C. Document patient education in the patient education section of the patients EMR. X. Outcome Criteria:  A. Relief of wheezes and obstruction  B. Improved cough and sputum color and consistency  C. Improved chest x-ray  D. Improved arterial oxygen tension and or SaO2  E. Improved Peak Flow on asthmatic patients        XI. Related Protocols:  A. Respiratory Patient Care Protocols  B. Bronchial Hygiene Therapy  C.  Oxygen Protocol    Reference:

## 2019-01-09 NOTE — PROGRESS NOTES
[facility]. PT is AAO x 4 with normal unlabored respirations present on RA. Both IV sites are CDI and saline locked. Denies pain or other needs. Bed is low and locked with call light in reach. Will continue to monitor.

## 2019-01-09 NOTE — PROGRESS NOTES
Hospitalist Progress Note     Admit Date:  2019  6:59 PM   Name:  Monica Bhatti. Age:  59 y.o.  :  1955   MRN:  [id number]   PCP:  Ruben Hightower MD  Treatment Team: Attending Provider: Osmar Curry MD; Consulting Provider: Marta Pedraza MD; Consulting Provider: Tabitha Mcmillan MD; Utilization Review: Wilfred Moreno RN    Subjective:   59years old M with PMH of NICM, Lung CA admitted for worsening SOB. CT chest with small PE and elevated BNP    19  Pt says his sob better  Blood pressures in 180B systolic  Mild tachycardia on telemetry      Objective:     Patient Vitals for the past 24 hrs:   Temp Pulse Resp BP SpO2   19 1610 97.4 °F (36.3 °C) 94 16 120/71 95 %   19 1333 -- -- -- -- 98 %   19 1140 97.3 °F (36.3 °C) 77 18 113/73 97 %   19 0806 -- -- -- -- 95 %   19 0750 96.7 °F (35.9 °C) 100 18 116/76 98 %   19 0358 97.2 °F (36.2 °C) 86 12 117/82 94 %   19 0104 97.3 °F (36.3 °C) 96 18 108/75 92 %   19 2032 -- -- -- -- 97 %   19 1935 96.5 °F (35.8 °C) 91 22 97/66 99 %     Oxygen Therapy  O2 Sat (%): 95 % (19 1610)  Pulse via Oximetry: 99 beats per minute (19 1333)  O2 Device: Room air (19 1333)  O2 Flow Rate (L/min): 0 l/min (19 08)    Intake/Output Summary (Last 24 hours) at 2019 1901  Last data filed at 2019 1255  Gross per 24 hour   Intake 986.78 ml   Output --   Net 986.78 ml         General:    Well nourished. Alert.    heent- normal  CV:   RRR,mild tachy fluctuation- /min. No murmur, rub, or gallop. Lungs:   Minimal basilar crepitations  Abdomen:   Soft, nontender, nondistended. Cns- no focal neurological deficits  Extremities: Warm and dry. No cyanosis or edema. Skin:     No rashes or jaundice. Data Review:  I have reviewed all labs, meds, telemetry events, and studies from the last 24 hours.     Recent Results (from the past 24 hour(s))   CBC WITH AUTOMATED DIFF    Collection Time: 01/08/19  8:39 AM   Result Value Ref Range    WBC 3.7 (L) 4.3 - 11.1 K/uL    RBC 4.37 4.23 - 5.6 M/uL    HGB 12.8 (L) 13.6 - 17.2 g/dL    HCT 37.7 (L) 41.1 - 50.3 %    MCV 86.3 79.6 - 97.8 FL    MCH 29.3 26.1 - 32.9 PG    MCHC 34.0 31.4 - 35.0 g/dL    RDW 14.1 11.9 - 14.6 %    PLATELET 146 (L) 903 - 450 K/uL    MPV 11.8 9.4 - 12.3 FL    ABSOLUTE NRBC 0.00 0.0 - 0.2 K/uL    DF AUTOMATED      NEUTROPHILS 64 43 - 78 %    LYMPHOCYTES 22 13 - 44 %    MONOCYTES 8 4.0 - 12.0 %    EOSINOPHILS 5 0.5 - 7.8 %    BASOPHILS 0 0.0 - 2.0 %    IMMATURE GRANULOCYTES 0 0.0 - 5.0 %    ABS. NEUTROPHILS 2.4 1.7 - 8.2 K/UL    ABS. LYMPHOCYTES 0.8 0.5 - 4.6 K/UL    ABS. MONOCYTES 0.3 0.1 - 1.3 K/UL    ABS. EOSINOPHILS 0.2 0.0 - 0.8 K/UL    ABS. BASOPHILS 0.0 0.0 - 0.2 K/UL    ABS. IMM. GRANS. 0.0 0.0 - 0.5 K/UL   METABOLIC PANEL, BASIC    Collection Time: 01/08/19  8:39 AM   Result Value Ref Range    Sodium 140 136 - 145 mmol/L    Potassium 4.2 3.5 - 5.1 mmol/L    Chloride 106 98 - 107 mmol/L    CO2 25 21 - 32 mmol/L    Anion gap 9 mmol/L    Glucose 128 (H) 65 - 100 mg/dL    BUN 10 8 - 23 MG/DL    Creatinine 1.30 0.8 - 1.5 MG/DL    GFR est AA >60 >60 ml/min/1.73m2    GFR est non-AA 59 ml/min/1.73m2    Calcium 8.6 8.3 - 10.4 MG/DL        All Micro Results     Procedure Component Value Units Date/Time    INFLUENZA A & B AG (RAPID TEST) [[id number]] Collected:  01/06/19 9948    Order Status:  Completed Specimen:  Nasopharyngeal from Nasal washing Updated:  01/06/19 1827     Influenza A Ag NEGATIVE         Comment: NEGATIVE FOR THE PRESENCE OF INFLUENZA A ANTIGEN  INFECTION DUE TO INFLUENZA A CANNOT BE RULED OUT. BECAUSE THE ANTIGEN PRESENT IN THE SAMPLE MAY BE BELOW  THE DETECTION LIMIT OF THE TEST. A NEGATIVE TEST IS PRESUMPTIVE AND IT IS RECOMMENDED THAT THESE RESULTS BE CONFIRMED BY VIRAL CULTURE OR AN FDA-CLEARED INFLUENZA A AND B MOLECULAR ASSAY.           Influenza B Ag NEGATIVE         Comment: NEGATIVE FOR THE PRESENCE OF INFLUENZA B ANTIGEN  INFECTION DUE TO INFLUENZA B CANNOT BE RULED OUT. BECAUSE THE ANTIGEN PRESENT IN THE SAMPLE MAY BE BELOW  THE DETECTION LIMIT OF THE TEST. A NEGATIVE TEST IS PRESUMPTIVE AND IT IS RECOMMENDED THAT THESE RESULTS BE CONFIRMED BY VIRAL CULTURE OR AN FDA-CLEARED INFLUENZA A AND B MOLECULAR ASSAY. Source NASOPHARYNGEAL             Current Meds:  Current Facility-Administered Medications   Medication Dose Route Frequency    rivaroxaban (XARELTO) tablet 15 mg  15 mg Oral BID WITH MEALS    carvedilol (COREG) tablet 3.125 mg  3.125 mg Oral BID WITH MEALS    potassium chloride (K-DUR, KLOR-CON) SR tablet 20 mEq  20 mEq Oral DAILY    osimertinib (TAGRISSO) chemo tab 80 mg (Patient Supplied)  80 mg Oral DAILY    levalbuterol (XOPENEX) nebulizer soln 0.63 mg/3 mL  0.63 mg Nebulization Q6H RT    acetaminophen (TYLENOL) tablet 650 mg  650 mg Oral Q6H PRN    ondansetron (ZOFRAN) injection 4 mg  4 mg IntraVENous Q6H PRN    traMADol (ULTRAM) tablet 50 mg  50 mg Oral Q6H PRN    budesonide (PULMICORT) 500 mcg/2 ml nebulizer suspension  500 mcg Nebulization BID RT    cefepime (MAXIPIME) 1 g in 0.9% sodium chloride (MBP/ADV) 50 mL ADV  1 g IntraVENous Q12H    sodium chloride (NS) flush 5-10 mL  5-10 mL IntraVENous Q8H    sodium chloride (NS) flush 5-10 mL  5-10 mL IntraVENous PRN       Other Studies (last 24 hours):  No results found.     Assessment and Plan:     Hospital Problems as of 1/8/2019 Date Reviewed: 12/26/2018          Codes Class Noted - Resolved POA    Pneumonia ICD-10-CM: J18.9  ICD-9-CM: 356  1/6/2019 - Present Unknown        Pulmonary emboli ([facility] ICD-10-CM: I26.99  ICD-9-CM: 415.19  1/6/2019 - Present Unknown        Stage IV adenocarcinoma of lung ([facility] ICD-10-CM: C34.90  ICD-9-CM: 162.9  7/11/2018 - Present Yes        CAP (community acquired pneumonia) ICD-10-CM: J18.9  ICD-9-CM: 486  3/27/2018 - Present Yes        Chronic systolic congestive heart failure ([facility] ICD-10-CM: I50.22  ICD-9-CM: 428.22, 428.0  5/3/2017 - Present Yes        Depression ICD-10-CM: F32.9  ICD-9-CM: 187  11/2/2012 - Present Yes        CML (chronic myeloid leukemia) ([facility] ICD-10-CM: C92.10  ICD-9-CM: 205.10  8/25/2012 - Present Yes    Overview Addendum 8/26/2012  7:23 AM by Theodora Rosales MD     8/25/12. Probable diagnosis. Will do bone marrow exam today  8/26/12. Bone marrow aspiration and biopsy done. Marrow aspirate and peripheral blood sent for flow, cytogenetics and molecular testing. No complications with the procedure             Allergic rhinitis ICD-10-CM: J30.9  ICD-9-CM: 477.9  8/24/2012 - Present Yes              PLAN:    Bilateral PE- on heparin drip- will d/c heparin- start on xarelto as per oncology recommendations. Elevated bnp,h/o cardiac thrombus- present echo ef ,no thrombus- cardiology following. Low normal bp- only on coreg- other medications held. Stage 4 adenocarcinoma of lung- only oral cancer medication. ? pna- on cefepime. DC planning/Dispo:  Probable tomorrow.   DVT ppx:  xarelto    Signed:  Jose José MD

## 2019-01-09 NOTE — DISCHARGE SUMMARY
Hospitalist Discharge Summary     Admit Date:  2019  6:59 PM   Name:  Justin Merrill. Age:  59 y.o.  :  1955   MRN:  [id number]   PCP:  John Roblero MD  Treatment Team: Attending Provider: Stanley Vizcaino MD; Consulting Provider: Amber Guardado MD; Consulting Provider: Susan Grey MD; Utilization Review: Jade Alford RN    Problem List for this Hospitalization:  Hospital Problems as of 2019 Date Reviewed: 2018          Codes Class Noted - Resolved POA    Pneumonia ICD-10-CM: J18.9  ICD-9-CM: 660  2019 - Present Unknown        * (Principal) Pulmonary emboli ([facility] ICD-10-CM: I26.99  ICD-9-CM: 415.19  2019 - Present Unknown        Stage IV adenocarcinoma of lung ([facility] ICD-10-CM: C34.90  ICD-9-CM: 162.9  2018 - Present Yes        CAP (community acquired pneumonia) ICD-10-CM: J18.9  ICD-9-CM: 486  3/27/2018 - Present Yes        Chronic systolic congestive heart failure ([facility] ICD-10-CM: I50.22  ICD-9-CM: 428.22, 428.0  5/3/2017 - Present Yes        Depression ICD-10-CM: F32.9  ICD-9-CM: 265  2012 - Present Yes        CML (chronic myeloid leukemia) ([facility] ICD-10-CM: C92.10  ICD-9-CM: 205.10  2012 - Present Yes    Overview Addendum 2012  7:23 AM by Maricarmen Garrido MD     12. Probable diagnosis. Will do bone marrow exam today  12. Bone marrow aspiration and biopsy done. Marrow aspirate and peripheral blood sent for flow, cytogenetics and molecular testing. No complications with the procedure             Allergic rhinitis ICD-10-CM: J30.9  ICD-9-CM: 477.9  2012 - Present Yes                Admission HPI from 2019: This is a 56-year-old male patient who has a past medical history of chronic myeloid leukemia diagnosed in 2012. At that time, the patient was started on treatment with [address] and he did well.   In  The patient was admitted to the hospital with acute systolic congestive heart failure and he was found to have an ejection fraction below 20%. It was thought that [address] could be related to days and for that reason, he stopped the treatment with this oral chemotherapy for a while. He followed up with cardiology. His ejection fraction improved to 30% and at some point his Gleevec was resumed. Unfortunately, last year in April, the patient was diagnosed with a primary lung adenocarcinoma. He had a pleural effusion containing malignant cells and it was considered stage IV disease. At that time, the patient was started on treatment with [place] and because he did not want to be on 2 chemotherapies Gleevec was stopped. The patient follows with Dr. Jorge Luis Lange from oncology for this problem. He had been stable until 3 days ago when he started having cough, whitish sputum production and progressive shortness of breath with exertion. He became so short of breath that he decided to come into the emergency room today. He denies fever, chills, diarrhea, abdominal pain or any other symptoms. When he arrived into the emergency room, his vital signs were blood pressure of 115/78, heart rate of 123, respiratory rate of 16, O2 saturation of 95%. He was awake and alert. He had decreased breath sounds bilaterally. He seemed to be in mild distress. His initial blood workup reported normal white blood cell count, stable hemoglobin, stable kidney function with a creatinine of 1.5, normal liver enzymes. BNP slightly elevated to 325. A chest x-ray was done and reported mild cardiomegaly with findings suggestive of possible congestive heart failure. A CT scan of the chest with IV contrast was done and reported small acute bilateral lower lobe pulmonary emboli. The CT scan also reported bilateral mildly increased reticular and ground glass opacities, lymphangitic spread of tumor versus inflammation or atypical infection was suggested.   The patient was started on a heparin drip in the emergency room and he was presented to the hospitalist team to be admitted. Hospital Course:   Pt was started on heparin drip.started on cefepime. Pt tachypnea and tachycardia gradually improved during the stay. Oncology has seen the pt and recommended xarelto 15 mg bid for 3 weeks and then 20 mg po q daily. Pt hb is stable. Pts echo ef 25-30%,cardiology has been following the pt. Pt has low normal blood pressure- with improvement-- mostly blood pressure 276 -310 mmgh systolic. Unable to give his lisinopril ,lasix and spironolactone. Gave prescription for smaller dose of potassium,lasix and lisinopril. Did put parameters of blood pressure- when to take medications. Pt is stable for discharge. Follow up instructions below. Plan was discussed with patient. .  All questions answered. Patient was stable at time of discharge and was instructed to call or return if there are any concerns or recurrence of symptoms. Diagnostic Imaging/Tests:   Xr Chest Pa Lat    Result Date: 1/6/2019  TWO-VIEW CHEST: CLINICAL HISTORY: Cough and shortness of breath for 2 days with history of right upper lobe lung cancer and malignant effusion. . COMPARISON: CT torso of October 15, 2018 and radiographs of July 11, 2018. FINDINGS: PA and lateral chest images demonstrate no confluent pneumonic infiltrate or significant pleural fluid collection. There is only residual irregular radiodensity at the site of prior right upper lobe mass. The heart is mildly enlarged with pulmonary venous congestion and bibasilar interstitial densities with septal lines suspicious for pulmonary edema. Congestive heart failure is suggested. No definite pneumothorax. The bony thorax appears intact on these views. IMPRESSION:  MILD CARDIOMEGALY WITH FINDINGS SUGGESTING POSSIBLE CONGESTIVE HEART FAILURE. CLINICAL CORRELATION IS NECESSARY.      Ct Chest W Cont    Result Date: 1/6/2019  CT Chest with contrast Clinical Indication:  Acute moderate dyspnea and cough history of leukemia and CHF, right upper lobe lung cancer, Comparison:  10/15/2018 CT, also radiograph earlier today Technique:  Automated exposure Control was used. Contiguous axial images were obtained from the neck base through the upper abdomen following the uneventful administration of 100 cc Isovue 370 intravenous contrast. Pulmonary embolus protocol was used. Coronal reconstructions were done for further evaluation of vessels. Findings:  The pulmonary arteries are well opacified with contrast and upper normal in caliber. There are small emboli involving segmental and subsegmental vessels of both lower lobes. The heart is enlarged. There is no definite ventricular septal bowing. There is no pericardial effusion. Trace right pleural effusion noted posteriorly at the base. No evidence of developing lymphadenopathy since prior. Mild bilateral benign gynecomastia again noted. Lung windows demonstrate no residual measurable mass. There are diffuse bilateral lower lobe greater than upper lobe groundglass and reticular opacities which have slightly increased since prior, with no pneumothorax. Central airways are patent. Limited upper abdominal views demonstrate no acute abnormality. Bone window demonstrates no suspicious osseous lesion. Impression: 1. Small acute bilateral lower lobe pulmonary emboli. 2. Bilateral mildly increased reticular and groundglass opacities: Lymphangitic spread of tumor, versus inflammation or atypical infection. 3. Cardiomegaly. OI5  951 The critical results contained in this report were communicated to Dr. Kane Davis by Dr Bryan Ace on the phone at 9:35 PM. Critical results were communicated as outlined in Section II.C.2.a.i of the ACR Practice Guideline for Communication of Diagnostic Imaging Findings. Duplex Lower Ext Venous Bilat    Result Date: 1/7/2019  Bilateral lower extremity venous Doppler evaluation 01/07/2019 HISTORY: Recent diagnosis of pulmonary embolism. Assess for DVT.  Grayscale, color-flow and Doppler evaluation the major veins of the lower extremities was performed. Comparison: None. FINDINGS: There is normal compression and augmentation of the common femoral, superficial femoral and popliteal veins bilaterally. No grayscale or color-flow findings within the vessels or within the distal greater saphenous or profunda femoral veins were noted to suggest deep venous thrombosis. Interrogated portions of peroneal and posterior tibial veins were also unremarkable bilaterally. No popliteal cyst was identified on either side. IMPRESSION: Negative evaluation for deep venous thrombosis within either lower extremity. Echocardiogram results:  Results for orders placed or performed during the hospital encounter of 19   2D ECHO COMPLETE ADULT (TTE) [address]  [address] [facility], [address] [facility]  [phone]    Transthoracic Echocardiogram  2D, M-mode, Doppler, and Color Doppler    Patient: Sofia Patel  MR #: [id number]  : 1955  Age: 59 years  Gender: Male  Study date: 2019  Account #: [de-identified]  Height: 73 in  Weight: 194.7 lb  BSA: 2.13 mï¾²  Status:Routine  Location: [facility]  BP: 89/ 60    Allergies: NO KNOWN ALLERGENS    Sonographer:  Norberto Roldan RDCS  Group:  [facility]  Referring Physician:  Mushtaq Banks MD  Reading Physician:  LIZETH Sears MD [facility] [place]    INDICATIONS: Assess left ventricular function. PROCEDURE: This was a routine study. A transthoracic echocardiogram was  performed. The study included complete 2D imaging, M-mode, complete spectral  Doppler, and color Doppler. Intravenous contrast (Definity, 2 ml) was  administered to opacify the left ventricle. Image quality was adequate. LEFT VENTRICLE: The ventricle was severely dilated. Systolic function was  severely reduced. Ejection fraction was estimated in the range of 25 % to 30   %. There was severe diffuse hypokinesis. Wall thickness was normal. There was  doppler evidence for diastolic dysfunction. RIGHT VENTRICLE: The size was normal. Systolic function was reduced. The  tricuspid jet envelope definition was inadequate for estimation of RV   systolic  pressure. LEFT ATRIUM: The atrium was markedly dilated. ATRIAL SEPTUM: No defect or patent foramen ovale was identified. RIGHT ATRIUM: Size was normal. No thrombus was identified. SYSTEMIC VEINS: IVC: The inferior vena cava was normal in size and course. AORTIC VALVE: The valve was trileaflet. Leaflets exhibited mild sclerosis. There was no evidence for stenosis. There was moderate regurgitation. MITRAL VALVE: There was mild annular calcification. Valve structure was   normal.  There was no evidence for stenosis. There was moderate regurgitation. TRICUSPID VALVE: The valve structure was normal. There was no evidence for  stenosis. There was moderate regurgitation. PULMONIC VALVE: The valve structure was normal. Not well visualized. There   was  no evidence for stenosis. There was trivial regurgitation. There was no  insufficiency. PERICARDIUM: There was no pericardial effusion. AORTA: The root exhibited mild dilatation. SUMMARY:    -  Left ventricle: The ventricle was severely dilated. Systolic function was  severely reduced. Ejection fraction was estimated in the range of 25 % to 30   %. There was severe diffuse hypokinesis. There was doppler evidence for   diastolic  dysfunction.    -  Right ventricle: Systolic function was reduced. -  Left atrium: The atrium was markedly dilated. -  Atrial septum: No defect or patent foramen ovale was identified. -  Aortic valve: The valve was trileaflet. Leaflets exhibited mild sclerosis. There was moderate regurgitation.    -  Mitral valve: There was mild annular calcification. There was moderate  regurgitation.    -  Tricuspid valve:  There was moderate regurgitation.    -  Aorta, systemic arteries: The root exhibited mild dilatation. SYSTEM MEASUREMENT TABLES    2D mode  AoR Diam (2D): 3.9 cm  LA Dimension (2D): 4.1 cm  Left Atrium Systolic Volume Index; Method of Disks, Biplane; 2D mode;: 45.5  ml/m2  IVS/LVPW (2D): 1.2  IVSd (2D): 1.1 cm  LVIDd (2D): 7.3 cm  LVIDs (2D): 6.2 cm  LVPWd (2D): 0.8 cm  RVIDd (2D): 2.9 cm    Unspecified Scan Mode  Peak Grad; Mean; Antegrade Flow: 7 mm[Hg]  Vmax; Antegrade Flow: 136 cm/s    Prepared and signed by    LIZETH Pablo MD [facility] [place]  Signed 07-Jan-2019 16:38:07           All Micro Results     Procedure Component Value Units Date/Time    INFLUENZA A & B AG (RAPID TEST) [[id number]] Collected:  01/06/19 1808    Order Status:  Completed Specimen:  Nasopharyngeal from Nasal washing Updated:  01/06/19 1827     Influenza A Ag NEGATIVE         Comment: NEGATIVE FOR THE PRESENCE OF INFLUENZA A ANTIGEN  INFECTION DUE TO INFLUENZA A CANNOT BE RULED OUT. BECAUSE THE ANTIGEN PRESENT IN THE SAMPLE MAY BE BELOW  THE DETECTION LIMIT OF THE TEST. A NEGATIVE TEST IS PRESUMPTIVE AND IT IS RECOMMENDED THAT THESE RESULTS BE CONFIRMED BY VIRAL CULTURE OR AN FDA-CLEARED INFLUENZA A AND B MOLECULAR ASSAY. Influenza B Ag NEGATIVE         Comment: NEGATIVE FOR THE PRESENCE OF INFLUENZA B ANTIGEN  INFECTION DUE TO INFLUENZA B CANNOT BE RULED OUT. BECAUSE THE ANTIGEN PRESENT IN THE SAMPLE MAY BE BELOW  THE DETECTION LIMIT OF THE TEST. A NEGATIVE TEST IS PRESUMPTIVE AND IT IS RECOMMENDED THAT THESE RESULTS BE CONFIRMED BY VIRAL CULTURE OR AN FDA-CLEARED INFLUENZA A AND B MOLECULAR ASSAY.           Source NASOPHARYNGEAL             Labs: Results:       BMP, Mg, Phos Recent Labs     01/09/19  0634 01/08/19  0839 01/07/19  0525 01/06/19  1840    140 137  --    K 4.0 4.2 4.1  --     106 103  --    CO2 24 25 23  --    AGAP 9 9 11  --    BUN 14 10 13  --    CREA 1.21 1.30 1.28  --    CA 8.5 8.6 7.9*  --    GLU 99 128* 106*  --    MG  --   --  2.2 2.1      CBC Recent Labs     01/08/19  0839 01/07/19  0525 01/06/19  1808   WBC 3.7* 4.0* 4.2*   RBC 4.37 4.27 4.84   HGB 12.8* 12.5* 14.0   HCT 37.7* 36.4* 41.7   * 126* 127*   GRANS 64  --   --    LYMPH 22  --   --    EOS 5  --   --    MONOS 8  --   --    BASOS 0  --   --    IG 0  --   --    ANEU 2.4  --   --    ABL 0.8  --   --    [place] 0.2  --   --    ABM 0.3  --   --    ABB 0.0  --   --    AIG 0.0  --   --       LFT Recent Labs     01/07/19  0525 01/06/19  1808   SGOT 28 29   ALT 30 36   AP 57 79   TP 6.3 7.0   ALB 3.1* 3.5   GLOB 3.2 3.5   AGRAT 1.0 1.0      Cardiac Testing Lab Results   Component Value Date/Time     (H) 01/06/2019 06:08 PM    BNP 53 03/27/2018 01:35 AM     05/01/2017 09:51 AM    Troponin-I, Qt. 0.02 01/06/2019 06:40 PM    Troponin-I, Qt. <0.04 03/27/2018 01:35 AM      Coagulation Tests Lab Results   Component Value Date/Time    Prothrombin time 14.3 01/06/2019 10:33 PM    Prothrombin time 11.0 04/20/2017 09:33 AM    Prothrombin time 10.9 04/13/2017 05:37 PM    INR 1.1 01/06/2019 10:33 PM    INR 1.0 04/20/2017 09:33 AM    INR 1.1 04/13/2017 05:37 PM    aPTT 49.0 (H) 01/08/2019 07:16 PM    aPTT 100.2 (H) 01/07/2019 07:01 PM    aPTT 74.3 (H) 01/07/2019 12:18 PM      A1c No results found for: HBA1C, HGBE8, BFG2TXFT   Lipid Panel No results found for: CHOL, CHOLPOCT, CHOLX, CHLST, CHOLV, 476311, HDL, LDL, LDLC, DLDLP, 720250, VLDLC, VLDL, TGLX, TRIGL, TRIGP, TGLPOCT, CHHD, [facility]   Thyroid Panel Lab Results   Component Value Date/Time    TSH 2.380 01/07/2019 05:25 AM    TSH 2.930 05/23/2018 09:51 AM        Most Recent UA Lab Results   Component Value Date/Time    Color Yellow 04/25/2018 09:32 AM    Appearance Clear 04/25/2018 09:32 AM    pH (UA) 6.0 04/25/2018 09:32 AM    Protein Negative 04/25/2018 09:32 AM    Glucose Negative 04/25/2018 09:32 AM    Ketone Negative 04/25/2018 09:32 AM    Bilirubin Negative 04/25/2018 09:32 AM    Blood Negative 04/25/2018 09:32 AM    Urobilinogen 0.2 E.U./dL 04/25/2018 09:32 AM    Nitrites Negative 04/25/2018 09:32 AM    Leukocyte Esterase Negative 04/25/2018 09:32 AM        No Known Allergies  Immunization History   Administered Date(s) Administered    Influenza Vaccine (Quad) PF 03/27/2018    Tdap 02/27/2016       All Labs from Last 24 Hrs:  Recent Results (from the past 24 hour(s))   PTT    Collection Time: 01/08/19  7:16 PM   Result Value Ref Range    aPTT 49.0 (H) 24.7 - 51.6 SEC   METABOLIC PANEL, BASIC    Collection Time: 01/09/19  6:34 AM   Result Value Ref Range    Sodium 138 136 - 145 mmol/L    Potassium 4.0 3.5 - 5.1 mmol/L    Chloride 105 98 - 107 mmol/L    CO2 24 21 - 32 mmol/L    Anion gap 9 mmol/L    Glucose 99 65 - 100 mg/dL    BUN 14 8 - 23 MG/DL    Creatinine 1.21 0.8 - 1.5 MG/DL    GFR est AA >60 >60 ml/min/1.73m2    GFR est non-AA >60 ml/min/1.73m2    Calcium 8.5 8.3 - 10.4 MG/DL       Discharge Exam:  Patient Vitals for the past 24 hrs:   Temp Pulse Resp BP SpO2   01/09/19 0810 97 °F (36.1 °C) 92 18 103/69 100 %   01/09/19 0727 -- -- -- -- 96 %   01/09/19 0523 96.5 °F (35.8 °C) 71 16 121/66 98 %   01/09/19 0129 98.8 °F (37.1 °C) 100 16 123/74 100 %   01/08/19 2140 97.9 °F (36.6 °C) (!) 107 18 117/78 99 %   01/08/19 1932 -- -- -- -- 98 %   01/08/19 1610 97.4 °F (36.3 °C) 94 16 120/71 95 %   01/08/19 1333 -- -- -- -- 98 %   01/08/19 1140 97.3 °F (36.3 °C) 77 18 113/73 97 %     Oxygen Therapy  O2 Sat (%): 100 % (01/09/19 0810)  Pulse via Oximetry: 98 beats per minute (01/09/19 0727)  O2 Device: Room air (01/09/19 0727)  O2 Flow Rate (L/min): 0 l/min (01/09/19 0727)    Intake/Output Summary (Last 24 hours) at 1/9/2019 1020  Last data filed at 1/8/2019 1255  Gross per 24 hour   Intake 636.78 ml   Output --   Net 636.78 ml       General:    Well nourished. Alert. No distress. Eyes:   Normal sclera. Extraocular movements intact. ENT:  Normocephalic, atraumatic. Moist mucous membranes  CV:   Regular rate and rhythm. No murmur, rub, or gallop. Lungs:  Clear to auscultation bilaterally. No wheezing, rhonchi, or rales. Abdomen: Soft, nontender, nondistended. Bowel sounds normal.   Extremities: Warm and dry. No cyanosis or edema. Neurologic: CN II-XII grossly intact. Sensation intact. Skin:     No rashes or jaundice. Psych:  Normal mood and affect. Discharge Info:   Current Discharge Medication List      START taking these medications    Details   albuterol (PROVENTIL HFA, VENTOLIN HFA, PROAIR HFA) 90 mcg/actuation inhaler Take 2 Puffs by inhalation every four (4) hours as needed for Wheezing. Qty: 1 Inhaler, Refills: 1      doxycycline (VIBRA-TABS) 100 mg tablet Take 1 Tab by mouth two (2) times a day for 7 days. Qty: 14 Tab, Refills: 0      !! rivaroxaban (XARELTO) 15 mg tab tablet Take 1 Tab by mouth two (2) times daily (with meals) for 20 days. Qty: 40 Tab, Refills: 0      !! rivaroxaban (XARELTO) 20 mg tab tablet Take 1 Tab by mouth daily (with breakfast). Take after finishing 15mg bid course. Qty: 30 Tab, Refills: 0      lisinopril (PRINIVIL, ZESTRIL) 2.5 mg tablet Take 1 Tab by mouth daily. Qty: 30 Tab, Refills: 0       !! - Potential duplicate medications found. Please discuss with provider. CONTINUE these medications which have CHANGED    Details   furosemide (LASIX) 20 mg tablet Take 1 Tab by mouth daily. Qty: 30 Tab, Refills: 0      potassium chloride (KLOR-CON) 10 mEq tablet Take 1 Tab by mouth daily. Qty: 30 Tab, Refills: 0         CONTINUE these medications which have NOT CHANGED    Details   osimertinib (TAGRISSO) 80 mg tablet Take 1 Tab by mouth daily. Qty: 30 Tab, Refills: 3    Associated Diagnoses: CML (chronic myeloid leukemia) ([address] Malignant neoplasm of lung, unspecified laterality, unspecified part of lung ([address]      OXYGEN-AIR DELIVERY SYSTEMS by Does Not Apply route.  3LPM      carvedilol (COREG) 3.125 mg tablet TAKE 1 TABLET BY MOUTH TWICE A DAY WITH FOOD  Qty: 180 Tab, Refills: 2      multivitamin, stress formula (STRESS TAB) tablet Take 1 Tab by mouth daily. STOP taking these medications       spironolactone (ALDACTONE) 25 mg tablet Comments:   Reason for Stopping:         cyclobenzaprine (FLEXERIL) 5 mg tablet Comments:   Reason for Stopping:         losartan (COZAAR) 25 mg tablet Comments:   Reason for Stopping:                 Disposition: Home  Activity: No strenuous work  Diet: Cardiac  DIET NUTRITIONAL SUPPLEMENTS Breakfast; Ensure Enlive  DIET NUTRITIONAL SUPPLEMENTS Lunch, Dinner; Ensure High Protein    Follow-up Appointments   Procedures    FOLLOW UP VISIT Appointment in: One Week F/u with cbc, bmp,magnesium, bnp in a week with pcp. Keep appointment with cardiology on Monday. F/U with pulmonary in 2-3 weeks. Take lasix if blood pressure systolic>100 mmhg. Take lisinopril 2.5 mg if bl. .. F/u with cbc, bmp,magnesium, bnp in a week with pcp. Keep appointment with cardiology on Monday. F/U with pulmonary in 2-3 weeks. Take lasix if blood pressure systolic>100 mmhg. Take lisinopril 2.5 mg if blood pressure >110 mmhg. Stop Xarelto if noticed any bleeding- call pcp or come to er. Standing Status:   Standing     Number of Occurrences:   1     Order Specific Question:   Appointment in     Answer: One Week     Advised to keep a log of blood pressure at home and give it to pcp at follow up. Follow-up Information     Follow up With Specialties Details Why Contact Info    Devon Farias MD [address]  [phone]            Time spent in patient discharge planning and coordination 25 minutes.     Signed:  Clovis Epley, MD

## 2019-01-09 NOTE — PROGRESS NOTES
Assessment done via doc flow sheet. Pt up in chair at bedside, alert & oriented x4, resp easy & regular, denies pain, in NAD. Call light within reach, pt encouraged to call for assistance as needed.

## 2019-01-28 ENCOUNTER — HOSPITAL ENCOUNTER (OUTPATIENT)
Dept: LAB | Age: 64
Discharge: HOME | End: 2019-01-28
Payer: COMMERCIAL

## 2019-01-28 ENCOUNTER — PATIENT OUTREACH (OUTPATIENT)
Dept: CASE MANAGEMENT | Age: 64
End: 2019-01-28

## 2019-01-28 DIAGNOSIS — I50.20 SYSTOLIC CONGESTIVE HEART FAILURE, UNSPECIFIED HF CHRONICITY (HCC): ICD-10-CM

## 2019-01-28 DIAGNOSIS — C34.90 ADENOCARCINOMA OF LUNG, STAGE 4, UNSPECIFIED LATERALITY (HCC): ICD-10-CM

## 2019-01-28 LAB
ALBUMIN SERPL-MCNC: 3.7 G/DL (ref 3.2–4.6)
ALBUMIN/GLOB SERPL: 1 {RATIO} (ref 1.2–3.5)
ALP SERPL-CCNC: 76 U/L (ref 50–136)
ALT SERPL-CCNC: 37 U/L (ref 12–65)
ANION GAP SERPL CALC-SCNC: 4 MMOL/L (ref 7–16)
AST SERPL-CCNC: 18 U/L (ref 15–37)
BASOPHILS # BLD: 0 K/UL (ref 0–0.2)
BASOPHILS NFR BLD: 1 % (ref 0–2)
BILIRUB SERPL-MCNC: 0.5 MG/DL (ref 0.2–1.1)
BUN SERPL-MCNC: 21 MG/DL (ref 8–23)
CALCIUM SERPL-MCNC: 8.5 MG/DL (ref 8.3–10.4)
CEA SERPL-MCNC: 10.9 NG/ML (ref 0–3)
CHLORIDE SERPL-SCNC: 105 MMOL/L (ref 98–107)
CO2 SERPL-SCNC: 29 MMOL/L (ref 21–32)
CREAT SERPL-MCNC: 1.44 MG/DL (ref 0.8–1.5)
DIFFERENTIAL METHOD BLD: ABNORMAL
EOSINOPHIL # BLD: 0.2 K/UL (ref 0–0.8)
EOSINOPHIL NFR BLD: 5 % (ref 0.5–7.8)
ERYTHROCYTE [DISTWIDTH] IN BLOOD BY AUTOMATED COUNT: 14.3 % (ref 11.9–14.6)
GLOBULIN SER CALC-MCNC: 3.6 G/DL (ref 2.3–3.5)
GLUCOSE SERPL-MCNC: 101 MG/DL (ref 65–100)
HCT VFR BLD AUTO: 42.1 % (ref 41.1–50.3)
HGB BLD-MCNC: 14.2 G/DL (ref 13.6–17.2)
IMM GRANULOCYTES # BLD AUTO: 0 K/UL (ref 0–0.5)
IMM GRANULOCYTES NFR BLD AUTO: 0 % (ref 0–5)
LYMPHOCYTES # BLD: 0.7 K/UL (ref 0.5–4.6)
LYMPHOCYTES NFR BLD: 17 % (ref 13–44)
MCH RBC QN AUTO: 29.4 PG (ref 26.1–32.9)
MCHC RBC AUTO-ENTMCNC: 33.7 G/DL (ref 31.4–35)
MCV RBC AUTO: 87.2 FL (ref 79.6–97.8)
MONOCYTES # BLD: 0.3 K/UL (ref 0.1–1.3)
MONOCYTES NFR BLD: 8 % (ref 4–12)
NEUTS SEG # BLD: 2.8 K/UL (ref 1.7–8.2)
NEUTS SEG NFR BLD: 69 % (ref 43–78)
NRBC # BLD: 0 K/UL (ref 0–0.2)
PLATELET # BLD AUTO: 127 K/UL (ref 150–450)
PMV BLD AUTO: 10.2 FL (ref 9.4–12.3)
POTASSIUM SERPL-SCNC: 4.2 MMOL/L (ref 3.5–5.1)
PROT SERPL-MCNC: 7.3 G/DL (ref 6.3–8.2)
RBC # BLD AUTO: 4.83 M/UL (ref 4.23–5.67)
SODIUM SERPL-SCNC: 138 MMOL/L (ref 136–145)
WBC # BLD AUTO: 4.1 K/UL (ref 4.3–11.1)

## 2019-01-28 PROCEDURE — 85025 COMPLETE CBC W/AUTO DIFF WBC: CPT

## 2019-01-28 PROCEDURE — 82378 CARCINOEMBRYONIC ANTIGEN: CPT

## 2019-01-28 PROCEDURE — 36415 COLL VENOUS BLD VENIPUNCTURE: CPT

## 2019-01-28 PROCEDURE — 80053 COMPREHEN METABOLIC PANEL: CPT

## 2019-01-28 NOTE — PROGRESS NOTES
Saw patient today with Dr Juvencio Mcfadden. Pt feels he is doing well and will remain on Tagrisso. Nasacort therapy sent to pharmacy for nasal/sinus allergies. Pt will return 1 wk prior to next visit in 1 month for BCR/ABL PCR labs which are ordered.  Navigation is following

## 2019-02-19 ENCOUNTER — HOSPITAL ENCOUNTER (OUTPATIENT)
Dept: LAB | Age: 64
Discharge: HOME | End: 2019-02-19
Payer: COMMERCIAL

## 2019-02-19 DIAGNOSIS — C92.10 CML (CHRONIC MYELOID LEUKEMIA) (HCC): ICD-10-CM

## 2019-02-19 DIAGNOSIS — C34.90 ADENOCARCINOMA OF LUNG, STAGE 4, UNSPECIFIED LATERALITY (HCC): ICD-10-CM

## 2019-02-19 LAB
ALBUMIN SERPL-MCNC: 3.6 G/DL (ref 3.2–4.6)
ALBUMIN/GLOB SERPL: 1.1 {RATIO} (ref 1.2–3.5)
ALP SERPL-CCNC: 75 U/L (ref 50–136)
ALT SERPL-CCNC: 37 U/L (ref 12–65)
ANION GAP SERPL CALC-SCNC: 5 MMOL/L (ref 7–16)
AST SERPL-CCNC: 20 U/L (ref 15–37)
BASOPHILS # BLD: 0 K/UL (ref 0–0.2)
BASOPHILS NFR BLD: 1 % (ref 0–2)
BILIRUB SERPL-MCNC: 0.3 MG/DL (ref 0.2–1.1)
BUN SERPL-MCNC: 23 MG/DL (ref 8–23)
CALCIUM SERPL-MCNC: 8.6 MG/DL (ref 8.3–10.4)
CEA SERPL-MCNC: 9.5 NG/ML (ref 0–3)
CHLORIDE SERPL-SCNC: 108 MMOL/L (ref 98–107)
CO2 SERPL-SCNC: 27 MMOL/L (ref 21–32)
CREAT SERPL-MCNC: 1.41 MG/DL (ref 0.8–1.5)
DIFFERENTIAL METHOD BLD: ABNORMAL
EOSINOPHIL # BLD: 0.2 K/UL (ref 0–0.8)
EOSINOPHIL NFR BLD: 3 % (ref 0.5–7.8)
ERYTHROCYTE [DISTWIDTH] IN BLOOD BY AUTOMATED COUNT: 14.2 % (ref 11.9–14.6)
GLOBULIN SER CALC-MCNC: 3.4 G/DL (ref 2.3–3.5)
GLUCOSE SERPL-MCNC: 121 MG/DL (ref 65–100)
HCT VFR BLD AUTO: 41.3 % (ref 41.1–50.3)
HGB BLD-MCNC: 13.9 G/DL (ref 13.6–17.2)
IMM GRANULOCYTES # BLD AUTO: 0 K/UL (ref 0–0.5)
IMM GRANULOCYTES NFR BLD AUTO: 0 % (ref 0–5)
LYMPHOCYTES # BLD: 0.8 K/UL (ref 0.5–4.6)
LYMPHOCYTES NFR BLD: 15 % (ref 13–44)
MAGNESIUM SERPL-MCNC: 2.3 MG/DL (ref 1.8–2.4)
MCH RBC QN AUTO: 29.8 PG (ref 26.1–32.9)
MCHC RBC AUTO-ENTMCNC: 33.7 G/DL (ref 31.4–35)
MCV RBC AUTO: 88.6 FL (ref 79.6–97.8)
MONOCYTES # BLD: 0.3 K/UL (ref 0.1–1.3)
MONOCYTES NFR BLD: 6 % (ref 4–12)
NEUTS SEG # BLD: 3.8 K/UL (ref 1.7–8.2)
NEUTS SEG NFR BLD: 75 % (ref 43–78)
NRBC # BLD: 0 K/UL (ref 0–0.2)
PLATELET # BLD AUTO: 140 K/UL (ref 150–450)
PMV BLD AUTO: 11.5 FL (ref 9.4–12.3)
POTASSIUM SERPL-SCNC: 4.3 MMOL/L (ref 3.5–5.1)
PROT SERPL-MCNC: 7 G/DL (ref 6.3–8.2)
RBC # BLD AUTO: 4.66 M/UL (ref 4.23–5.67)
REFERENCE LAB,REFLB: NORMAL
SODIUM SERPL-SCNC: 140 MMOL/L (ref 136–145)
TEST DESCRIPTION:,ATST: NORMAL
WBC # BLD AUTO: 5.1 K/UL (ref 4.3–11.1)

## 2019-02-19 PROCEDURE — 83735 ASSAY OF MAGNESIUM: CPT

## 2019-02-19 PROCEDURE — 80053 COMPREHEN METABOLIC PANEL: CPT

## 2019-02-19 PROCEDURE — 36415 COLL VENOUS BLD VENIPUNCTURE: CPT

## 2019-02-19 PROCEDURE — 82378 CARCINOEMBRYONIC ANTIGEN: CPT

## 2019-02-19 PROCEDURE — 85025 COMPLETE CBC W/AUTO DIFF WBC: CPT

## 2019-02-26 ENCOUNTER — HOSPITAL ENCOUNTER (OUTPATIENT)
Dept: LAB | Age: 64
Discharge: HOME | End: 2019-02-26
Payer: COMMERCIAL

## 2019-02-26 DIAGNOSIS — C92.10 CML (CHRONIC MYELOID LEUKEMIA) (HCC): ICD-10-CM

## 2019-02-26 LAB
ALBUMIN SERPL-MCNC: 3.7 G/DL (ref 3.2–4.6)
ALBUMIN/GLOB SERPL: 1.1 {RATIO} (ref 1.2–3.5)
ALP SERPL-CCNC: 77 U/L (ref 50–136)
ALT SERPL-CCNC: 44 U/L (ref 12–65)
ANION GAP SERPL CALC-SCNC: 3 MMOL/L (ref 7–16)
AST SERPL-CCNC: 22 U/L (ref 15–37)
BASOPHILS # BLD: 0 K/UL (ref 0–0.2)
BASOPHILS NFR BLD: 1 % (ref 0–2)
BILIRUB SERPL-MCNC: 0.4 MG/DL (ref 0.2–1.1)
BUN SERPL-MCNC: 18 MG/DL (ref 8–23)
CALCIUM SERPL-MCNC: 8.5 MG/DL (ref 8.3–10.4)
CHLORIDE SERPL-SCNC: 105 MMOL/L (ref 98–107)
CO2 SERPL-SCNC: 29 MMOL/L (ref 21–32)
CREAT SERPL-MCNC: 1.4 MG/DL (ref 0.8–1.5)
DIFFERENTIAL METHOD BLD: ABNORMAL
EOSINOPHIL # BLD: 0.2 K/UL (ref 0–0.8)
EOSINOPHIL NFR BLD: 4 % (ref 0.5–7.8)
ERYTHROCYTE [DISTWIDTH] IN BLOOD BY AUTOMATED COUNT: 13.7 % (ref 11.9–14.6)
GLOBULIN SER CALC-MCNC: 3.4 G/DL (ref 2.3–3.5)
GLUCOSE SERPL-MCNC: 113 MG/DL (ref 65–100)
HCT VFR BLD AUTO: 40.5 % (ref 41.1–50.3)
HGB BLD-MCNC: 13.5 G/DL (ref 13.6–17.2)
IMM GRANULOCYTES # BLD AUTO: 0 K/UL (ref 0–0.5)
IMM GRANULOCYTES NFR BLD AUTO: 0 % (ref 0–5)
LYMPHOCYTES # BLD: 0.6 K/UL (ref 0.5–4.6)
LYMPHOCYTES NFR BLD: 14 % (ref 13–44)
MCH RBC QN AUTO: 29.4 PG (ref 26.1–32.9)
MCHC RBC AUTO-ENTMCNC: 33.3 G/DL (ref 31.4–35)
MCV RBC AUTO: 88.2 FL (ref 79.6–97.8)
MONOCYTES # BLD: 0.3 K/UL (ref 0.1–1.3)
MONOCYTES NFR BLD: 7 % (ref 4–12)
NEUTS SEG # BLD: 3.4 K/UL (ref 1.7–8.2)
NEUTS SEG NFR BLD: 75 % (ref 43–78)
NRBC # BLD: 0 K/UL (ref 0–0.2)
PLATELET # BLD AUTO: 132 K/UL (ref 150–450)
PMV BLD AUTO: 10 FL (ref 9.4–12.3)
POTASSIUM SERPL-SCNC: 4.4 MMOL/L (ref 3.5–5.1)
PROT SERPL-MCNC: 7.1 G/DL (ref 6.3–8.2)
RBC # BLD AUTO: 4.59 M/UL (ref 4.23–5.67)
SODIUM SERPL-SCNC: 137 MMOL/L (ref 136–145)
WBC # BLD AUTO: 4.5 K/UL (ref 4.3–11.1)

## 2019-02-26 PROCEDURE — 80053 COMPREHEN METABOLIC PANEL: CPT

## 2019-02-26 PROCEDURE — 36415 COLL VENOUS BLD VENIPUNCTURE: CPT

## 2019-02-26 PROCEDURE — 85025 COMPLETE CBC W/AUTO DIFF WBC: CPT

## 2019-03-11 ENCOUNTER — HOSPITAL ENCOUNTER (OUTPATIENT)
Dept: CT IMAGING | Age: 64
Discharge: HOME | End: 2019-03-11
Attending: INTERNAL MEDICINE
Payer: COMMERCIAL

## 2019-03-11 DIAGNOSIS — J90 PLEURAL EFFUSION: ICD-10-CM

## 2019-03-11 DIAGNOSIS — R91.8 PULMONARY INFILTRATE: ICD-10-CM

## 2019-03-11 PROCEDURE — 71250 CT THORAX DX C-: CPT

## 2019-03-20 ENCOUNTER — HOSPITAL ENCOUNTER (OUTPATIENT)
Age: 64
Setting detail: OUTPATIENT SURGERY
Discharge: HOME | End: 2019-03-20
Attending: INTERNAL MEDICINE | Admitting: INTERNAL MEDICINE
Payer: COMMERCIAL

## 2019-03-20 VITALS
TEMPERATURE: 98.8 F | WEIGHT: 193 LBS | RESPIRATION RATE: 14 BRPM | BODY MASS INDEX: 25.58 KG/M2 | HEART RATE: 103 BPM | OXYGEN SATURATION: 97 % | DIASTOLIC BLOOD PRESSURE: 70 MMHG | SYSTOLIC BLOOD PRESSURE: 111 MMHG | HEIGHT: 73 IN

## 2019-03-20 DIAGNOSIS — R91.8 PULMONARY INFILTRATE: ICD-10-CM

## 2019-03-20 DIAGNOSIS — J90 PLEURAL EFFUSION: ICD-10-CM

## 2019-03-20 LAB
APPEARANCE FLD: NORMAL
COLOR FLD: COLORLESS
EOSINOPHIL NFR FLD MANUAL: 6 %
LYMPHOCYTES NFR FLD: 28 %
MONOS+MACROS NFR FLD: 18 %
NEUTROPHILS NFR FLD: 48 %
NUC CELL # FLD: NORMAL /CU MM
RBC # FLD: NORMAL /CU MM
SPECIMEN SOURCE FLD: NORMAL
TOTAL CELLS COUNTED SPEC: 50
WBC MORPH BLD: NORMAL

## 2019-03-20 PROCEDURE — 88305 TISSUE EXAM BY PATHOLOGIST: CPT

## 2019-03-20 PROCEDURE — 74011000250 HC RX REV CODE- 250: Performed by: INTERNAL MEDICINE

## 2019-03-20 PROCEDURE — 76040000025: Performed by: INTERNAL MEDICINE

## 2019-03-20 PROCEDURE — 87799 DETECT AGENT NOS DNA QUANT: CPT

## 2019-03-20 PROCEDURE — 87070 CULTURE OTHR SPECIMN AEROBIC: CPT

## 2019-03-20 PROCEDURE — 99152 MOD SED SAME PHYS/QHP 5/>YRS: CPT | Performed by: INTERNAL MEDICINE

## 2019-03-20 PROCEDURE — 87305 ASPERGILLUS AG IA: CPT

## 2019-03-20 PROCEDURE — 31624 DX BRONCHOSCOPE/LAVAGE: CPT | Performed by: INTERNAL MEDICINE

## 2019-03-20 PROCEDURE — 87102 FUNGUS ISOLATION CULTURE: CPT

## 2019-03-20 PROCEDURE — 74011250636 HC RX REV CODE- 250/636: Performed by: INTERNAL MEDICINE

## 2019-03-20 PROCEDURE — 74011250636 HC RX REV CODE- 250/636

## 2019-03-20 PROCEDURE — 76604 US EXAM CHEST: CPT | Performed by: INTERNAL MEDICINE

## 2019-03-20 PROCEDURE — 77030012699 HC VLV SUC CNTRL OCOA -A: Performed by: INTERNAL MEDICINE

## 2019-03-20 PROCEDURE — 88112 CYTOPATH CELL ENHANCE TECH: CPT

## 2019-03-20 PROCEDURE — 86356 MONONUCLEAR CELL ANTIGEN: CPT

## 2019-03-20 PROCEDURE — 83520 IMMUNOASSAY QUANT NOS NONAB: CPT

## 2019-03-20 PROCEDURE — 32555 ASPIRATE PLEURA W/ IMAGING: CPT | Performed by: INTERNAL MEDICINE

## 2019-03-20 PROCEDURE — 87633 RESP VIRUS 12-25 TARGETS: CPT

## 2019-03-20 PROCEDURE — 87116 MYCOBACTERIA CULTURE: CPT

## 2019-03-20 PROCEDURE — 89050 BODY FLUID CELL COUNT: CPT

## 2019-03-20 PROCEDURE — 87486 CHLMYD PNEUM DNA AMP PROBE: CPT

## 2019-03-20 RX ORDER — FLUMAZENIL 0.1 MG/ML
0.2 INJECTION INTRAVENOUS
Status: DISCONTINUED | OUTPATIENT
Start: 2019-03-20 | End: 2019-03-20 | Stop reason: HOSPADM

## 2019-03-20 RX ORDER — MIDAZOLAM HYDROCHLORIDE 1 MG/ML
.25-5 INJECTION, SOLUTION INTRAMUSCULAR; INTRAVENOUS
Status: DISCONTINUED | OUTPATIENT
Start: 2019-03-20 | End: 2019-03-20 | Stop reason: HOSPADM

## 2019-03-20 RX ORDER — LIDOCAINE HYDROCHLORIDE 40 MG/ML
SOLUTION TOPICAL ONCE
Status: COMPLETED | OUTPATIENT
Start: 2019-03-20 | End: 2019-03-20

## 2019-03-20 RX ORDER — NALOXONE HYDROCHLORIDE 0.4 MG/ML
0.2 INJECTION, SOLUTION INTRAMUSCULAR; INTRAVENOUS; SUBCUTANEOUS
Status: DISCONTINUED | OUTPATIENT
Start: 2019-03-20 | End: 2019-03-20 | Stop reason: HOSPADM

## 2019-03-20 RX ORDER — SODIUM CHLORIDE 9 MG/ML
1000 INJECTION, SOLUTION INTRAVENOUS CONTINUOUS
Status: DISCONTINUED | OUTPATIENT
Start: 2019-03-20 | End: 2019-03-20 | Stop reason: HOSPADM

## 2019-03-20 RX ORDER — LIDOCAINE HYDROCHLORIDE 20 MG/ML
JELLY TOPICAL ONCE
Status: COMPLETED | OUTPATIENT
Start: 2019-03-20 | End: 2019-03-20

## 2019-03-20 RX ORDER — ONDANSETRON 2 MG/ML
4 INJECTION INTRAMUSCULAR; INTRAVENOUS
Status: DISCONTINUED | OUTPATIENT
Start: 2019-03-20 | End: 2019-03-20 | Stop reason: HOSPADM

## 2019-03-20 RX ORDER — FENTANYL CITRATE 50 UG/ML
50 INJECTION, SOLUTION INTRAMUSCULAR; INTRAVENOUS
Status: DISCONTINUED | OUTPATIENT
Start: 2019-03-20 | End: 2019-03-20 | Stop reason: HOSPADM

## 2019-03-20 RX ORDER — SODIUM CHLORIDE 0.9 % (FLUSH) 0.9 %
5-40 SYRINGE (ML) INJECTION EVERY 8 HOURS
Status: DISCONTINUED | OUTPATIENT
Start: 2019-03-20 | End: 2019-03-20 | Stop reason: HOSPADM

## 2019-03-20 RX ORDER — SODIUM CHLORIDE 0.9 % (FLUSH) 0.9 %
5-40 SYRINGE (ML) INJECTION AS NEEDED
Status: DISCONTINUED | OUTPATIENT
Start: 2019-03-20 | End: 2019-03-20 | Stop reason: HOSPADM

## 2019-03-20 RX ADMIN — FENTANYL CITRATE 50 MCG: 50 INJECTION, SOLUTION INTRAMUSCULAR; INTRAVENOUS at 12:05

## 2019-03-20 RX ADMIN — SODIUM CHLORIDE 1000 ML: 900 INJECTION, SOLUTION INTRAVENOUS at 10:33

## 2019-03-20 RX ADMIN — LIDOCAINE HYDROCHLORIDE: 40 SOLUTION TOPICAL at 12:10

## 2019-03-20 RX ADMIN — LIDOCAINE HYDROCHLORIDE: 20 JELLY TOPICAL at 12:10

## 2019-03-20 RX ADMIN — MIDAZOLAM HYDROCHLORIDE 1 MG: 1 INJECTION, SOLUTION INTRAMUSCULAR; INTRAVENOUS at 12:08

## 2019-03-20 RX ADMIN — MIDAZOLAM HYDROCHLORIDE 2 MG: 1 INJECTION, SOLUTION INTRAMUSCULAR; INTRAVENOUS at 12:05

## 2019-03-20 RX ADMIN — FENTANYL CITRATE 50 MCG: 50 INJECTION, SOLUTION INTRAMUSCULAR; INTRAVENOUS at 12:10

## 2019-03-20 RX ADMIN — FENTANYL CITRATE 50 MCG: 50 INJECTION, SOLUTION INTRAMUSCULAR; INTRAVENOUS at 12:08

## 2019-03-20 NOTE — H&P
Date of Surgery Update:  Laureen Roa. was seen and examined. History and physical has been reviewed. The patient has been examined.  There have been no significant clinical changes since the completion of the originally dated History and Physical.    Signed By: Sang Jim MD     March 20, 2019 12:02 PM

## 2019-03-20 NOTE — PROCEDURES
PROCEDURE    Bronchoscopy with airway inspection/cleanout/BAL. TBBX/EBBX. INDICATION   Pulmonary infiltrates    EQUIPMENT:    Olympus Q 180 Bronchhoscope. ANESTHESIA    Concious sedation with: Fentanyl 150 mcg IV; Versed 3 mg IV; Lidocaine 140 mg to tracheo-bronchial tree and vocal cords; . AIRWAY INSPECTION    After obtaining informed consent, using a bite block/ET tube adapter, an Olympus q 180 video/fiberoptic bronchoscope was  introduced into the trachea through the vocal chords/ET tube, without complication. RIGHT    LOCATION NORM/ABNORM DESCRIPTION   VOCAL CORDS NL    TRACHEA NL    OLIVIA NL    RMSB NL Bal 120 ml RML,  Small endobronchial nodule seen   RUL NL    BI NL Moderate amount white secretions   RML NL    SUP SEGM RLL NL    MED BASAL NL    ANTERIOR BASAL NL    LATERAL BASAL NL    POSTERIOR BASAL NL                                              LEFT    LOCATION NORMAL/ABNORMAL TYPE   LMSB NL    KARYN NL    LINGULA NL    SUPERIOR DIVISION NL    SUPERIOR SEG LLL NL    DAVID-MEDIAL LLL NL    LATERAL LLL NL    POSTERIOR LLL NL      The following samples were obtained:    BAL:    Samples were sent for:  Cultures , cytology, cd4/cd8  The procedure was completed  without complication and the patient tolerated the procedure well. Estimated blood loss-  0 to minimum  Recommendations:   Follow up [id number]  Jonh Julien MD

## 2019-03-20 NOTE — PROCEDURES
PROCEDURE:    DIAGNOSTIC/THERAPEUTIC THORACENTESIS/PLEURAL MANNOMETRY.         PRE-OP DIAGNOSIS:    R PLEURAL EFFUSION    POST-OP DIAGNOSIS:    R PLEURAL EFFUSION    ASSISTANT:    Plumbly    ANESTHESIA:    none    CHEST ULTRASOUND FINDINGS:    A Turbo-M, Sonosite ultrasound with a 5-16 mHz probe was used to image the chest and localize the pleural effusion on the Left/and/Right chest.    A very/small anechoic space was seen on the /Right consistent with an uncomplicated pleural effusion too small to tap                :                    Bennett Llanos MD

## 2019-03-20 NOTE — DISCHARGE INSTRUCTIONS
RESPIRATORY CARE - BRONCHOSCOPY - DISCHARGE INSTRUCTIONS      You received a lot of numbing medication for your throat and nose, and you also received medication to make you sleepy during your procedure. Because of this and because the bronchoscopy may have irritated your airways, we ask that you follow these directions:    1. Do not eat or drink until  1:15 . After that, you may have what you please. You may want to try some liquids first, because your throat may be a little sore. 2. Medication may cause drowsiness for several hours, therefore, do not drive or operate machinery for remainder of the day. No alcohol today. 3. You may cough up more mucus than usual and you may see some blood, but this is expected and should subside by the following day. 4. If severe throat irritation, coughing, or bleeding continue, call your doctor. 5.         If you run a fever greater than 102, call [facility] at [phone]. 6.         Dr. Alonso Medina has asked that you:                A. Call the doctor's office at [phone] in 1 week for results from the samples (washing), taken today. Follow-up as needed in the office.              Discharge Medications:  Restart Xarelto            Instructions given to Grazyna Gus and other family members

## 2019-03-20 NOTE — ROUTINE PROCESS
VSS. No complaints noted. Education given and reviewed with wife who voiced understanding.  Pt wheeled out via wheelchair by Marissa.

## 2019-03-22 LAB
C. PNEUMONAIE, CPPT: NOT DETECTED
CD3 CELLS # SPEC: 90 %
CD3+CD4+ CELLS # BLD: 71 %
CD4:CD8 RATIO, C48RBT: 3.94 RATIO
CD8, CD8BT: 18 %
GALACTOMANNAN AG SERPL IA-ACNC: 0.07
HSV1 DNA # BAL NAA+PROBE: NOT DETECTED COPIES/ML
HSV2 DNA # BAL NAA+PROBE: NOT DETECTED COPIES/ML
M. PNEUMONIAE, MPPT: NOT DETECTED
SPECIMEN SOURCE: NORMAL

## 2019-03-23 LAB
A FUMIGATUS DNA SPEC QL NAA+PROBE: NOT DETECTED
A TERREUS DNA SPEC QL NAA+PROBE: NOT DETECTED
ASPERGILLUS DNA SPEC QL NAA+PROBE: NOT DETECTED
BACTERIA SPEC CULT: NORMAL
GRAM STN SPEC: NORMAL
SERVICE CMNT-IMP: NORMAL

## 2019-03-26 LAB
FLUAV RNA SPEC QL NAA+PROBE: NEGATIVE
FLUBV RNA SPEC QL NAA+PROBE: NEGATIVE
HADV DNA SPEC QL NAA+PROBE: NEGATIVE
HMPV RNA SPEC QL NAA+PROBE: NEGATIVE
HPIV1 RNA SPEC QL NAA+PROBE: NEGATIVE
HPIV2 RNA SPEC QL NAA+PROBE: NEGATIVE
HPIV3 RNA SPEC QL NAA+PROBE: NEGATIVE
P JIROVECII DNA # BAL NAA+PROBE: NEGATIVE
RHINOVIRUS RNA SPEC QL NAA+PROBE: NEGATIVE
RSV A RNA SPEC QL NAA+PROBE: NEGATIVE
RSV B RNA SPEC QL NAA+PROBE: NEGATIVE
SOURCE, PJPB1: NORMAL
SPECIMEN SOURCE: NORMAL

## 2019-04-12 ENCOUNTER — PATIENT OUTREACH (OUTPATIENT)
Dept: CASE MANAGEMENT | Age: 64
End: 2019-04-12

## 2019-04-12 ENCOUNTER — HOSPITAL ENCOUNTER (OUTPATIENT)
Dept: LAB | Age: 64
Discharge: HOME | End: 2019-04-12
Payer: COMMERCIAL

## 2019-04-12 DIAGNOSIS — C34.90 ADENOCARCINOMA OF LUNG, STAGE 4, UNSPECIFIED LATERALITY (HCC): ICD-10-CM

## 2019-04-12 LAB
ALBUMIN SERPL-MCNC: 3.2 G/DL (ref 3.2–4.6)
ALBUMIN/GLOB SERPL: 0.9 {RATIO} (ref 1.2–3.5)
ALP SERPL-CCNC: 62 U/L (ref 50–136)
ALT SERPL-CCNC: 37 U/L (ref 12–65)
ANION GAP SERPL CALC-SCNC: 4 MMOL/L (ref 7–16)
AST SERPL-CCNC: 22 U/L (ref 15–37)
BASOPHILS # BLD: 0 K/UL (ref 0–0.2)
BASOPHILS NFR BLD: 1 % (ref 0–2)
BILIRUB SERPL-MCNC: 0.7 MG/DL (ref 0.2–1.1)
BUN SERPL-MCNC: 17 MG/DL (ref 8–23)
CALCIUM SERPL-MCNC: 8.5 MG/DL (ref 8.3–10.4)
CEA SERPL-MCNC: 10 NG/ML (ref 0–3)
CHLORIDE SERPL-SCNC: 105 MMOL/L (ref 98–107)
CO2 SERPL-SCNC: 28 MMOL/L (ref 21–32)
CREAT SERPL-MCNC: 1.27 MG/DL (ref 0.8–1.5)
DIFFERENTIAL METHOD BLD: ABNORMAL
EOSINOPHIL # BLD: 0.3 K/UL (ref 0–0.8)
EOSINOPHIL NFR BLD: 5 % (ref 0.5–7.8)
ERYTHROCYTE [DISTWIDTH] IN BLOOD BY AUTOMATED COUNT: 14.7 % (ref 11.9–14.6)
GLOBULIN SER CALC-MCNC: 3.5 G/DL (ref 2.3–3.5)
GLUCOSE SERPL-MCNC: 110 MG/DL (ref 65–100)
HCT VFR BLD AUTO: 38.7 % (ref 41.1–50.3)
HGB BLD-MCNC: 13 G/DL (ref 13.6–17.2)
IMM GRANULOCYTES # BLD AUTO: 0 K/UL (ref 0–0.5)
IMM GRANULOCYTES NFR BLD AUTO: 0 % (ref 0–5)
LYMPHOCYTES # BLD: 0.9 K/UL (ref 0.5–4.6)
LYMPHOCYTES NFR BLD: 16 % (ref 13–44)
MCH RBC QN AUTO: 29 PG (ref 26.1–32.9)
MCHC RBC AUTO-ENTMCNC: 33.6 G/DL (ref 31.4–35)
MCV RBC AUTO: 86.4 FL (ref 79.6–97.8)
MONOCYTES # BLD: 0.4 K/UL (ref 0.1–1.3)
MONOCYTES NFR BLD: 8 % (ref 4–12)
NEUTS SEG # BLD: 3.8 K/UL (ref 1.7–8.2)
NEUTS SEG NFR BLD: 70 % (ref 43–78)
NRBC # BLD: 0 K/UL (ref 0–0.2)
PLATELET # BLD AUTO: 152 K/UL (ref 150–450)
PMV BLD AUTO: 9.9 FL (ref 9.4–12.3)
POTASSIUM SERPL-SCNC: 4.2 MMOL/L (ref 3.5–5.1)
PROT SERPL-MCNC: 6.7 G/DL (ref 6.3–8.2)
RBC # BLD AUTO: 4.48 M/UL (ref 4.23–5.67)
SODIUM SERPL-SCNC: 137 MMOL/L (ref 136–145)
WBC # BLD AUTO: 5.5 K/UL (ref 4.3–11.1)

## 2019-04-12 PROCEDURE — 85025 COMPLETE CBC W/AUTO DIFF WBC: CPT

## 2019-04-12 PROCEDURE — 82378 CARCINOEMBRYONIC ANTIGEN: CPT

## 2019-04-12 PROCEDURE — 80053 COMPREHEN METABOLIC PANEL: CPT

## 2019-04-12 NOTE — PROGRESS NOTES
Saw patient with Dr Lynda Gil for lung cancer. Pt c/o SOB; currently being treated with prednisone by Pulmonary. Pt is on Nasacort and anti-histamines and has albuterol inhaler as needed. Pt has known heart murmur and is treated by Cardiology. Will continue Suasn Maki and Jigar Prieto. Encouraged to call with questions. Navigation will continue to follow.

## 2019-05-17 ENCOUNTER — HOSPITAL ENCOUNTER (OUTPATIENT)
Dept: LAB | Age: 64
Discharge: HOME | End: 2019-05-17
Payer: COMMERCIAL

## 2019-05-17 DIAGNOSIS — C34.90 MALIGNANT NEOPLASM OF LUNG, UNSPECIFIED LATERALITY, UNSPECIFIED PART OF LUNG (HCC): ICD-10-CM

## 2019-05-17 LAB
ALBUMIN SERPL-MCNC: 3.6 G/DL (ref 3.2–4.6)
ALBUMIN/GLOB SERPL: 1.1 {RATIO} (ref 1.2–3.5)
ALP SERPL-CCNC: 84 U/L (ref 50–136)
ALT SERPL-CCNC: 29 U/L (ref 12–65)
ANION GAP SERPL CALC-SCNC: 7 MMOL/L (ref 7–16)
AST SERPL-CCNC: 20 U/L (ref 15–37)
BASOPHILS # BLD: 0 K/UL (ref 0–0.2)
BASOPHILS NFR BLD: 0 % (ref 0–2)
BILIRUB SERPL-MCNC: 0.4 MG/DL (ref 0.2–1.1)
BUN SERPL-MCNC: 21 MG/DL (ref 8–23)
CALCIUM SERPL-MCNC: 9 MG/DL (ref 8.3–10.4)
CEA SERPL-MCNC: 11.1 NG/ML (ref 0–3)
CHLORIDE SERPL-SCNC: 110 MMOL/L (ref 98–107)
CO2 SERPL-SCNC: 25 MMOL/L (ref 21–32)
CREAT SERPL-MCNC: 1.31 MG/DL (ref 0.8–1.5)
DIFFERENTIAL METHOD BLD: ABNORMAL
EOSINOPHIL # BLD: 0 K/UL (ref 0–0.8)
EOSINOPHIL NFR BLD: 0 % (ref 0.5–7.8)
ERYTHROCYTE [DISTWIDTH] IN BLOOD BY AUTOMATED COUNT: 14.5 % (ref 11.9–14.6)
GLOBULIN SER CALC-MCNC: 3.2 G/DL (ref 2.3–3.5)
GLUCOSE SERPL-MCNC: 103 MG/DL (ref 65–100)
HCT VFR BLD AUTO: 39.5 % (ref 41.1–50.3)
HGB BLD-MCNC: 13.4 G/DL (ref 13.6–17.2)
IMM GRANULOCYTES # BLD AUTO: 0 K/UL (ref 0–0.5)
IMM GRANULOCYTES NFR BLD AUTO: 0 % (ref 0–5)
LYMPHOCYTES # BLD: 1 K/UL (ref 0.5–4.6)
LYMPHOCYTES NFR BLD: 13 % (ref 13–44)
MCH RBC QN AUTO: 29.3 PG (ref 26.1–32.9)
MCHC RBC AUTO-ENTMCNC: 33.9 G/DL (ref 31.4–35)
MCV RBC AUTO: 86.4 FL (ref 79.6–97.8)
MONOCYTES # BLD: 0.8 K/UL (ref 0.1–1.3)
MONOCYTES NFR BLD: 12 % (ref 4–12)
NEUTS SEG # BLD: 5.3 K/UL (ref 1.7–8.2)
NEUTS SEG NFR BLD: 74 % (ref 43–78)
NRBC # BLD: 0 K/UL (ref 0–0.2)
PLATELET # BLD AUTO: 152 K/UL (ref 150–450)
PMV BLD AUTO: 10 FL (ref 9.4–12.3)
POTASSIUM SERPL-SCNC: 4.3 MMOL/L (ref 3.5–5.1)
PROT SERPL-MCNC: 6.8 G/DL (ref 6.3–8.2)
RBC # BLD AUTO: 4.57 M/UL (ref 4.23–5.67)
SODIUM SERPL-SCNC: 142 MMOL/L (ref 136–145)
WBC # BLD AUTO: 7.2 K/UL (ref 4.3–11.1)

## 2019-05-17 PROCEDURE — 36415 COLL VENOUS BLD VENIPUNCTURE: CPT

## 2019-05-17 PROCEDURE — 85025 COMPLETE CBC W/AUTO DIFF WBC: CPT

## 2019-05-17 PROCEDURE — 82378 CARCINOEMBRYONIC ANTIGEN: CPT

## 2019-05-17 PROCEDURE — 80053 COMPREHEN METABOLIC PANEL: CPT

## 2019-06-13 ENCOUNTER — HOSPITAL ENCOUNTER (OUTPATIENT)
Dept: CT IMAGING | Age: 64
Discharge: HOME | End: 2019-06-13
Attending: INTERNAL MEDICINE

## 2019-06-13 DIAGNOSIS — R91.8 PULMONARY INFILTRATE: ICD-10-CM

## 2019-06-17 ENCOUNTER — HOSPITAL ENCOUNTER (OUTPATIENT)
Dept: LAB | Age: 64
Discharge: HOME | End: 2019-06-17
Payer: COMMERCIAL

## 2019-06-17 DIAGNOSIS — C92.10 CML (CHRONIC MYELOID LEUKEMIA) (HCC): ICD-10-CM

## 2019-06-17 DIAGNOSIS — C34.92 ADENOCARCINOMA OF LUNG, LEFT (HCC): ICD-10-CM

## 2019-06-17 LAB
ALBUMIN SERPL-MCNC: 3.6 G/DL (ref 3.2–4.6)
ALBUMIN/GLOB SERPL: 1.2 {RATIO} (ref 1.2–3.5)
ALP SERPL-CCNC: 61 U/L (ref 50–136)
ALT SERPL-CCNC: 26 U/L (ref 12–65)
ANION GAP SERPL CALC-SCNC: 6 MMOL/L (ref 7–16)
AST SERPL-CCNC: 20 U/L (ref 15–37)
BASOPHILS # BLD: 0 K/UL (ref 0–0.2)
BASOPHILS NFR BLD: 0 % (ref 0–2)
BILIRUB SERPL-MCNC: 0.7 MG/DL (ref 0.2–1.1)
BUN SERPL-MCNC: 18 MG/DL (ref 8–23)
CALCIUM SERPL-MCNC: 8.6 MG/DL (ref 8.3–10.4)
CEA SERPL-MCNC: 8.9 NG/ML (ref 0–3)
CHLORIDE SERPL-SCNC: 108 MMOL/L (ref 98–107)
CO2 SERPL-SCNC: 27 MMOL/L (ref 21–32)
CREAT SERPL-MCNC: 1.42 MG/DL (ref 0.8–1.5)
DIFFERENTIAL METHOD BLD: ABNORMAL
EOSINOPHIL # BLD: 0.2 K/UL (ref 0–0.8)
EOSINOPHIL NFR BLD: 5 % (ref 0.5–7.8)
ERYTHROCYTE [DISTWIDTH] IN BLOOD BY AUTOMATED COUNT: 14.3 % (ref 11.9–14.6)
GLOBULIN SER CALC-MCNC: 3 G/DL (ref 2.3–3.5)
GLUCOSE SERPL-MCNC: 107 MG/DL (ref 65–100)
HCT VFR BLD AUTO: 42.3 % (ref 41.1–50.3)
HGB BLD-MCNC: 14.4 G/DL (ref 13.6–17.2)
IMM GRANULOCYTES # BLD AUTO: 0 K/UL (ref 0–0.5)
IMM GRANULOCYTES NFR BLD AUTO: 0 % (ref 0–5)
LYMPHOCYTES # BLD: 0.8 K/UL (ref 0.5–4.6)
LYMPHOCYTES NFR BLD: 21 % (ref 13–44)
Lab: NORMAL
MCH RBC QN AUTO: 29.6 PG (ref 26.1–32.9)
MCHC RBC AUTO-ENTMCNC: 34 G/DL (ref 31.4–35)
MCV RBC AUTO: 87 FL (ref 79.6–97.8)
MONOCYTES # BLD: 0.4 K/UL (ref 0.1–1.3)
MONOCYTES NFR BLD: 11 % (ref 4–12)
NEUTS SEG # BLD: 2.4 K/UL (ref 1.7–8.2)
NEUTS SEG NFR BLD: 62 % (ref 43–78)
NRBC # BLD: 0 K/UL (ref 0–0.2)
PLATELET # BLD AUTO: 145 K/UL (ref 150–450)
PMV BLD AUTO: 10.6 FL (ref 9.4–12.3)
POTASSIUM SERPL-SCNC: 4.3 MMOL/L (ref 3.5–5.1)
PROT SERPL-MCNC: 6.6 G/DL (ref 6.3–8.2)
RBC # BLD AUTO: 4.86 M/UL (ref 4.23–5.67)
REFERENCE LAB,REFLB: NORMAL
SODIUM SERPL-SCNC: 141 MMOL/L (ref 136–145)
TEST DESCRIPTION:,ATST: NORMAL
WBC # BLD AUTO: 3.8 K/UL (ref 4.3–11.1)

## 2019-06-17 PROCEDURE — 36415 COLL VENOUS BLD VENIPUNCTURE: CPT

## 2019-06-17 PROCEDURE — 85025 COMPLETE CBC W/AUTO DIFF WBC: CPT

## 2019-06-17 PROCEDURE — 80053 COMPREHEN METABOLIC PANEL: CPT

## 2019-06-17 PROCEDURE — 82378 CARCINOEMBRYONIC ANTIGEN: CPT

## 2019-07-15 ENCOUNTER — HOSPITAL ENCOUNTER (OUTPATIENT)
Dept: LAB | Age: 64
Discharge: HOME | End: 2019-07-15

## 2019-07-15 DIAGNOSIS — C34.92 ADENOCARCINOMA OF LUNG, LEFT (HCC): ICD-10-CM

## 2019-08-12 ENCOUNTER — HOSPITAL ENCOUNTER (OUTPATIENT)
Dept: LAB | Age: 64
Discharge: HOME | End: 2019-08-12

## 2019-08-12 DIAGNOSIS — C34.92 ADENOCARCINOMA OF LUNG, LEFT (HCC): ICD-10-CM

## 2019-09-14 ENCOUNTER — HOSPITAL ENCOUNTER (OUTPATIENT)
Dept: CT IMAGING | Age: 64
Discharge: HOME | DRG: 292 | End: 2019-09-14
Attending: INTERNAL MEDICINE
Payer: COMMERCIAL

## 2019-09-14 DIAGNOSIS — C34.92 ADENOCARCINOMA OF LUNG, LEFT (HCC): ICD-10-CM

## 2019-09-14 LAB — CREAT BLD-MCNC: 1.2 MG/DL (ref 0.8–1.5)

## 2019-09-14 PROCEDURE — 82565 ASSAY OF CREATININE: CPT

## 2019-09-14 PROCEDURE — 74177 CT ABD & PELVIS W/CONTRAST: CPT

## 2019-09-14 PROCEDURE — 74011636320 HC RX REV CODE- 636/320: Performed by: INTERNAL MEDICINE

## 2019-09-14 PROCEDURE — 74011000258 HC RX REV CODE- 258: Performed by: INTERNAL MEDICINE

## 2019-09-14 RX ORDER — SODIUM CHLORIDE 0.9 % (FLUSH) 0.9 %
10 SYRINGE (ML) INJECTION
Status: COMPLETED | OUTPATIENT
Start: 2019-09-14 | End: 2019-09-14

## 2019-09-14 RX ADMIN — Medication 10 ML: at 10:30

## 2019-09-14 RX ADMIN — DIATRIZOATE MEGLUMINE AND DIATRIZOATE SODIUM 15 ML: 660; 100 LIQUID ORAL; RECTAL at 10:30

## 2019-09-14 RX ADMIN — IOPAMIDOL 100 ML: 755 INJECTION, SOLUTION INTRAVENOUS at 10:30

## 2019-09-14 RX ADMIN — SODIUM CHLORIDE 100 ML: 900 INJECTION, SOLUTION INTRAVENOUS at 10:30

## 2019-09-16 ENCOUNTER — HOSPITAL ENCOUNTER (INPATIENT)
Age: 64
LOS: 2 days | Discharge: HOME | DRG: 292 | End: 2019-09-18
Attending: EMERGENCY MEDICINE | Admitting: HOSPITALIST
Payer: COMMERCIAL

## 2019-09-16 DIAGNOSIS — I50.9 ACUTE ON CHRONIC CONGESTIVE HEART FAILURE, UNSPECIFIED HEART FAILURE TYPE (HCC): Primary | ICD-10-CM

## 2019-09-16 DIAGNOSIS — C95.90 LEUKEMIA NOT HAVING ACHIEVED REMISSION, UNSPECIFIED LEUKEMIA TYPE (HCC): ICD-10-CM

## 2019-09-16 LAB
ALBUMIN SERPL-MCNC: 3.2 G/DL (ref 3.2–4.6)
ALBUMIN/GLOB SERPL: 1 {RATIO} (ref 1.2–3.5)
ALP SERPL-CCNC: 63 U/L (ref 50–136)
ALT SERPL-CCNC: 62 U/L (ref 12–65)
ANION GAP SERPL CALC-SCNC: 6 MMOL/L (ref 7–16)
AST SERPL-CCNC: 48 U/L (ref 15–37)
ATRIAL RATE: 110 BPM
BASOPHILS # BLD: 0 K/UL (ref 0–0.2)
BASOPHILS NFR BLD: 1 % (ref 0–2)
BILIRUB SERPL-MCNC: 0.9 MG/DL (ref 0.2–1.1)
BNP SERPL-MCNC: 1331 PG/ML
BUN SERPL-MCNC: 15 MG/DL (ref 8–23)
CALCIUM SERPL-MCNC: 8.6 MG/DL (ref 8.3–10.4)
CALCULATED P AXIS, ECG09: 67 DEGREES
CALCULATED R AXIS, ECG10: 59 DEGREES
CALCULATED T AXIS, ECG11: 79 DEGREES
CHLORIDE SERPL-SCNC: 101 MMOL/L (ref 98–107)
CO2 SERPL-SCNC: 25 MMOL/L (ref 21–32)
CREAT SERPL-MCNC: 1.24 MG/DL (ref 0.8–1.5)
DIAGNOSIS, 93000: NORMAL
DIFFERENTIAL METHOD BLD: ABNORMAL
EOSINOPHIL # BLD: 0.1 K/UL (ref 0–0.8)
EOSINOPHIL NFR BLD: 3 % (ref 0.5–7.8)
ERYTHROCYTE [DISTWIDTH] IN BLOOD BY AUTOMATED COUNT: 14.1 % (ref 11.9–14.6)
GLOBULIN SER CALC-MCNC: 3.1 G/DL (ref 2.3–3.5)
GLUCOSE SERPL-MCNC: 125 MG/DL (ref 65–100)
HCT VFR BLD AUTO: 35.7 % (ref 41.1–50.3)
HGB BLD-MCNC: 12.1 G/DL (ref 13.6–17.2)
IMM GRANULOCYTES # BLD AUTO: 0 K/UL (ref 0–0.5)
IMM GRANULOCYTES NFR BLD AUTO: 0 % (ref 0–5)
LYMPHOCYTES # BLD: 0.8 K/UL (ref 0.5–4.6)
LYMPHOCYTES NFR BLD: 19 % (ref 13–44)
MCH RBC QN AUTO: 30.3 PG (ref 26.1–32.9)
MCHC RBC AUTO-ENTMCNC: 33.9 G/DL (ref 31.4–35)
MCV RBC AUTO: 89.5 FL (ref 79.6–97.8)
MONOCYTES # BLD: 0.6 K/UL (ref 0.1–1.3)
MONOCYTES NFR BLD: 14 % (ref 4–12)
NEUTS SEG # BLD: 2.6 K/UL (ref 1.7–8.2)
NEUTS SEG NFR BLD: 64 % (ref 43–78)
NRBC # BLD: 0 K/UL (ref 0–0.2)
P-R INTERVAL, ECG05: 170 MS
PLATELET # BLD AUTO: 138 K/UL (ref 150–450)
PMV BLD AUTO: 11 FL (ref 9.4–12.3)
POTASSIUM SERPL-SCNC: 3.8 MMOL/L (ref 3.5–5.1)
PROT SERPL-MCNC: 6.3 G/DL (ref 6.3–8.2)
Q-T INTERVAL, ECG07: 340 MS
QRS DURATION, ECG06: 98 MS
QTC CALCULATION (BEZET), ECG08: 460 MS
RBC # BLD AUTO: 3.99 M/UL (ref 4.23–5.6)
SODIUM SERPL-SCNC: 132 MMOL/L (ref 136–145)
TROPONIN I SERPL-MCNC: 0.05 NG/ML (ref 0.02–0.05)
VENTRICULAR RATE, ECG03: 110 BPM
WBC # BLD AUTO: 4.1 K/UL (ref 4.3–11.1)

## 2019-09-16 PROCEDURE — 85025 COMPLETE CBC W/AUTO DIFF WBC: CPT

## 2019-09-16 PROCEDURE — 80053 COMPREHEN METABOLIC PANEL: CPT

## 2019-09-16 PROCEDURE — 93005 ELECTROCARDIOGRAM TRACING: CPT | Performed by: EMERGENCY MEDICINE

## 2019-09-16 PROCEDURE — 65270000029 HC RM PRIVATE

## 2019-09-16 PROCEDURE — 83880 ASSAY OF NATRIURETIC PEPTIDE: CPT

## 2019-09-16 PROCEDURE — 84484 ASSAY OF TROPONIN QUANT: CPT

## 2019-09-16 PROCEDURE — 99285 EMERGENCY DEPT VISIT HI MDM: CPT | Performed by: EMERGENCY MEDICINE

## 2019-09-16 PROCEDURE — 74011250636 HC RX REV CODE- 250/636: Performed by: EMERGENCY MEDICINE

## 2019-09-16 RX ORDER — CARVEDILOL 6.25 MG/1
3.12 TABLET ORAL 2 TIMES DAILY WITH MEALS
Status: DISCONTINUED | OUTPATIENT
Start: 2019-09-17 | End: 2019-09-17

## 2019-09-16 RX ORDER — LISINOPRIL 5 MG/1
2.5 TABLET ORAL DAILY
Status: DISCONTINUED | OUTPATIENT
Start: 2019-09-17 | End: 2019-09-18 | Stop reason: HOSPADM

## 2019-09-16 RX ORDER — ACETAMINOPHEN 325 MG/1
650 TABLET ORAL
Status: DISCONTINUED | OUTPATIENT
Start: 2019-09-16 | End: 2019-09-18 | Stop reason: HOSPADM

## 2019-09-16 RX ORDER — FUROSEMIDE 10 MG/ML
40 INJECTION INTRAMUSCULAR; INTRAVENOUS 2 TIMES DAILY
Status: DISCONTINUED | OUTPATIENT
Start: 2019-09-17 | End: 2019-09-18

## 2019-09-16 RX ORDER — FUROSEMIDE 10 MG/ML
40 INJECTION INTRAMUSCULAR; INTRAVENOUS
Status: COMPLETED | OUTPATIENT
Start: 2019-09-16 | End: 2019-09-16

## 2019-09-16 RX ORDER — ALBUTEROL SULFATE 0.83 MG/ML
2.5 SOLUTION RESPIRATORY (INHALATION)
Status: DISCONTINUED | OUTPATIENT
Start: 2019-09-16 | End: 2019-09-18 | Stop reason: HOSPADM

## 2019-09-16 RX ORDER — FLUTICASONE PROPIONATE 50 MCG
1 SPRAY, SUSPENSION (ML) NASAL DAILY
Status: DISCONTINUED | OUTPATIENT
Start: 2019-09-17 | End: 2019-09-18 | Stop reason: HOSPADM

## 2019-09-16 RX ORDER — NITROGLYCERIN 0.4 MG/1
0.4 TABLET SUBLINGUAL
Status: DISCONTINUED | OUTPATIENT
Start: 2019-09-16 | End: 2019-09-18 | Stop reason: HOSPADM

## 2019-09-16 RX ORDER — SPIRONOLACTONE 25 MG/1
25 TABLET ORAL EVERY OTHER DAY
Status: DISCONTINUED | OUTPATIENT
Start: 2019-09-18 | End: 2019-09-17

## 2019-09-16 RX ORDER — IMATINIB MESYLATE 400 MG/1
400 TABLET, FILM COATED ORAL DAILY
Status: DISCONTINUED | OUTPATIENT
Start: 2019-09-17 | End: 2019-09-18 | Stop reason: HOSPADM

## 2019-09-16 RX ORDER — NITROGLYCERIN 0.4 MG/1
0.4 TABLET SUBLINGUAL
Status: DISCONTINUED | OUTPATIENT
Start: 2019-09-16 | End: 2019-09-16 | Stop reason: SDUPTHER

## 2019-09-16 RX ORDER — BENZONATATE 100 MG/1
200 CAPSULE ORAL
Status: DISCONTINUED | OUTPATIENT
Start: 2019-09-16 | End: 2019-09-18 | Stop reason: HOSPADM

## 2019-09-16 RX ORDER — ONDANSETRON 2 MG/ML
4 INJECTION INTRAMUSCULAR; INTRAVENOUS
Status: DISCONTINUED | OUTPATIENT
Start: 2019-09-16 | End: 2019-09-18 | Stop reason: HOSPADM

## 2019-09-16 RX ADMIN — FUROSEMIDE 40 MG: 10 INJECTION, SOLUTION INTRAMUSCULAR; INTRAVENOUS at 20:18

## 2019-09-16 NOTE — ED PROVIDER NOTES
Patient has a history of lung cancer, leukemia and congestive heart failure. He is currently on chemotherapy for both his leukemia and lung cancer. He is on home oxygen as needed for his congestive heart failure, currently only takes Aldactone every other day. He states that for the past week he has had some congestion and a cough with shortness of breath. For the past couple days he has had progressively worsening orthopnea. He states the last 2 days he has had to sleep upright in a chair. He denies any lower extremity edema. He states he has had similar symptoms in the past with his congestive heart failure. He denies any fever.            Past Medical History:   Diagnosis Date    Anxiety     Chronic systolic congestive heart failure ([address] 5/3/2017    CML (chronic myeloid leukemia) [facility] 8/25/2012    Depression 11/2/2012    Erectile dysfunction     Leukemia, acute ([facility] 8/24/2012    Lung cancer ([facility] 2018    Pulmonary emboli ([facility] 1/6/2019       Past Surgical History:   Procedure Laterality Date    HX HERNIA REPAIR      left inguinal 1996         Family History:   Problem Relation Age of Onset    Lung Disease Father 79        Black lung\"   [facility] Cancer Mother 72        pancreatic cancer       Social History     Socioeconomic History    Marital status:      Spouse name: Not on file    Number of children: Not on file    Years of education: Not on file    Highest education level: Not on file   Occupational History     Comment: [employer]   Social Needs    Financial resource strain: Not on file    Food insecurity:     Worry: Not on file     Inability: Not on file    Transportation needs:     Medical: Not on file     Non-medical: Not on file   Tobacco Use    Smoking status: Never Smoker    Smokeless tobacco: Never Used   Substance and Sexual Activity    Alcohol use: No    Drug use: No    Sexual activity: Not on file   Lifestyle    Physical activity:     Days per week: Not on file Minutes per session: Not on file    Stress: Not on file   Relationships    Social connections:     Talks on phone: Not on file     Gets together: Not on file     Attends Shinto service: Not on file     Active member of club or organization: Not on file     Attends meetings of clubs or organizations: Not on file     Relationship status: Not on file    Intimate partner violence:     Fear of current or ex partner: Not on file     Emotionally abused: Not on file     Physically abused: Not on file     Forced sexual activity: Not on file   Other Topics Concern    Not on file   Social History Narrative    Pt  with one son         ALLERGIES: Patient has no known allergies. Review of Systems   Constitutional: Negative for chills and fever. Respiratory: Positive for shortness of breath. Gastrointestinal: Negative for nausea and vomiting. All other systems reviewed and are negative. Vitals:    09/16/19 1654   BP: 111/68   Pulse: 65   Resp: 18   Temp: 97.5 °F (36.4 °C)   SpO2: 95%   Weight: 88.5 kg (195 lb)   Height: 6' 2\" (1.88 m)            Physical Exam   Constitutional: He is oriented to person, place, and time. He appears well-developed and well-nourished. HENT:   Head: Normocephalic and atraumatic. Eyes: Pupils are equal, round, and reactive to light. Conjunctivae are normal.   Neck: Normal range of motion. Neck supple. Cardiovascular: Normal rate. Murmur heard. Pulmonary/Chest: Effort normal. He has wheezes. He has rales. Mild bibasilar wheezes and rales posteriorly   Abdominal: Soft. He exhibits no distension. There is no tenderness. Musculoskeletal: Normal range of motion. He exhibits no edema. Neurological: He is alert and oriented to person, place, and time. Skin: Skin is warm and dry. Nursing note and vitals reviewed.        MDM  Number of Diagnoses or Management Options  Acute on chronic congestive heart failure, unspecified heart failure type ([address]): established and worsening  Leukemia not having achieved remission, unspecified leukemia type [facility]): established and worsening  Diagnosis management comments: 1/7/19 echo report:  -  Left ventricle: The ventricle was severely dilated. Systolic function was severely reduced. Ejection fraction was estimated in the range of 25 % to 30 %. There was severe diffuse hypokinesis. There was doppler evidence for diastolic dysfunction. -  Right ventricle: Systolic function was reduced. -  Left atrium: The atrium was markedly dilated. -  Atrial septum: No defect or patent foramen ovale was identified. -  Aortic valve: The valve was trileaflet. Leaflets exhibited mild sclerosis. There was moderate regurgitation. -  Mitral valve: There was mild annular calcification. There was moderate regurgitation. -  Tricuspid valve: There was moderate regurgitation. Ranny Lovelady, systemic arteries: The root exhibited mild dilatation. 7:15 PM spoke with Dr. Avis Kaplan of [facility] cardiology, discussed details of case. He feels the patient would best be served as an inpatient given his chronic respiratory disease processes. Discussed results with patient, need for admission. He is agreeable. 7:40 PM spoke with Dr. Lory Kenyon, to see patient for admission.        Amount and/or Complexity of Data Reviewed  Clinical lab tests: ordered and reviewed  Tests in the medicine section of CPT®: ordered and reviewed  Decide to obtain previous medical records or to obtain history from someone other than the patient: yes  Review and summarize past medical records: yes  Discuss the patient with other providers: yes    Risk of Complications, Morbidity, and/or Mortality  Presenting problems: moderate  Diagnostic procedures: moderate  Management options: moderate    Patient Progress  Patient progress: improved         Procedures

## 2019-09-16 NOTE — ED TRIAGE NOTES
Pt presents to the ED with shortness of breath and cold like sx x 1 week. Reports she was seen Saturday and had chest ct and was sent today by Dr Frankie Saul for possible pneumonia.

## 2019-09-17 ENCOUNTER — APPOINTMENT (OUTPATIENT)
Dept: GENERAL RADIOLOGY | Age: 64
DRG: 292 | End: 2019-09-17
Attending: INTERNAL MEDICINE
Payer: COMMERCIAL

## 2019-09-17 LAB
ANION GAP SERPL CALC-SCNC: 9 MMOL/L (ref 7–16)
BNP SERPL-MCNC: 798 PG/ML
BUN SERPL-MCNC: 16 MG/DL (ref 8–23)
CALCIUM SERPL-MCNC: 8.2 MG/DL (ref 8.3–10.4)
CHLORIDE SERPL-SCNC: 100 MMOL/L (ref 98–107)
CO2 SERPL-SCNC: 28 MMOL/L (ref 21–32)
CREAT SERPL-MCNC: 1.25 MG/DL (ref 0.8–1.5)
ERYTHROCYTE [DISTWIDTH] IN BLOOD BY AUTOMATED COUNT: 14.2 % (ref 11.9–14.6)
GLUCOSE SERPL-MCNC: 104 MG/DL (ref 65–100)
HCT VFR BLD AUTO: 35.4 % (ref 41.1–50.3)
HGB BLD-MCNC: 11.9 G/DL (ref 13.6–17.2)
INR PPP: 1.4
LACTATE SERPL-SCNC: 0.9 MMOL/L (ref 0.4–2)
MCH RBC QN AUTO: 30.2 PG (ref 26.1–32.9)
MCHC RBC AUTO-ENTMCNC: 33.6 G/DL (ref 31.4–35)
MCV RBC AUTO: 89.8 FL (ref 79.6–97.8)
NRBC # BLD: 0 K/UL (ref 0–0.2)
PHOSPHATE SERPL-MCNC: 3.4 MG/DL (ref 2.3–3.7)
PLATELET # BLD AUTO: 138 K/UL (ref 150–450)
PMV BLD AUTO: 11.3 FL (ref 9.4–12.3)
POTASSIUM SERPL-SCNC: 3.7 MMOL/L (ref 3.5–5.1)
PROTHROMBIN TIME: 17.7 SEC (ref 11.7–14.5)
RBC # BLD AUTO: 3.94 M/UL (ref 4.23–5.6)
SODIUM SERPL-SCNC: 137 MMOL/L (ref 136–145)
TROPONIN I SERPL-MCNC: 0.05 NG/ML (ref 0.02–0.05)
TSH SERPL DL<=0.005 MIU/L-ACNC: 1.82 UIU/ML
WBC # BLD AUTO: 3.6 K/UL (ref 4.3–11.1)

## 2019-09-17 PROCEDURE — 80048 BASIC METABOLIC PNL TOTAL CA: CPT

## 2019-09-17 PROCEDURE — 83605 ASSAY OF LACTIC ACID: CPT

## 2019-09-17 PROCEDURE — 74011250636 HC RX REV CODE- 250/636: Performed by: HOSPITALIST

## 2019-09-17 PROCEDURE — 84100 ASSAY OF PHOSPHORUS: CPT

## 2019-09-17 PROCEDURE — 84443 ASSAY THYROID STIM HORMONE: CPT

## 2019-09-17 PROCEDURE — 85027 COMPLETE CBC AUTOMATED: CPT

## 2019-09-17 PROCEDURE — 85610 PROTHROMBIN TIME: CPT

## 2019-09-17 PROCEDURE — 71046 X-RAY EXAM CHEST 2 VIEWS: CPT

## 2019-09-17 PROCEDURE — 83880 ASSAY OF NATRIURETIC PEPTIDE: CPT

## 2019-09-17 PROCEDURE — 74011250637 HC RX REV CODE- 250/637: Performed by: HOSPITALIST

## 2019-09-17 PROCEDURE — 65270000029 HC RM PRIVATE

## 2019-09-17 PROCEDURE — 36415 COLL VENOUS BLD VENIPUNCTURE: CPT

## 2019-09-17 PROCEDURE — 84484 ASSAY OF TROPONIN QUANT: CPT

## 2019-09-17 RX ORDER — METOPROLOL SUCCINATE 25 MG/1
25 TABLET, EXTENDED RELEASE ORAL DAILY
Status: DISCONTINUED | OUTPATIENT
Start: 2019-09-18 | End: 2019-09-18

## 2019-09-17 RX ORDER — SPIRONOLACTONE 25 MG/1
12.5 TABLET ORAL DAILY
Status: DISCONTINUED | OUTPATIENT
Start: 2019-09-18 | End: 2019-09-18 | Stop reason: HOSPADM

## 2019-09-17 RX ADMIN — IMATINIB MESYLATE 400 MG: 400 TABLET, FILM COATED ORAL at 18:13

## 2019-09-17 RX ADMIN — LISINOPRIL 2.5 MG: 5 TABLET ORAL at 09:00

## 2019-09-17 RX ADMIN — CARVEDILOL 3.12 MG: 6.25 TABLET, FILM COATED ORAL at 10:03

## 2019-09-17 RX ADMIN — FLUTICASONE PROPIONATE 1 SPRAY: 50 SPRAY, METERED NASAL at 10:04

## 2019-09-17 RX ADMIN — FUROSEMIDE 40 MG: 10 INJECTION, SOLUTION INTRAMUSCULAR; INTRAVENOUS at 18:18

## 2019-09-17 RX ADMIN — FUROSEMIDE 40 MG: 10 INJECTION, SOLUTION INTRAMUSCULAR; INTRAVENOUS at 10:03

## 2019-09-17 RX ADMIN — B-COMPLEX W/ C & FOLIC ACID TAB 1 TABLET: TAB at 10:03

## 2019-09-17 RX ADMIN — RIVAROXABAN 20 MG: 20 TABLET, FILM COATED ORAL at 10:03

## 2019-09-17 NOTE — ED NOTES
TRANSFER - OUT REPORT:    Verbal report given to JUSTIN Rios (name) on Luke Farooq.  being transferred to remote tele (unit) for routine progression of care       Report consisted of patients Situation, Background, Assessment and   Recommendations(SBAR). Information from the following report(s) SBAR and ED Summary was reviewed with the receiving nurse. Lines:   Peripheral IV 09/16/19 Left Forearm (Active)   Site Assessment Clean, dry, & intact 9/16/2019  5:28 PM   Phlebitis Assessment 0 9/16/2019  5:28 PM   Infiltration Assessment 0 9/16/2019  5:28 PM   Dressing Type Gauze;Tape;Transparent 9/16/2019  5:28 PM   Hub Color/Line Status Pink;Flushed 9/16/2019  5:28 PM        Opportunity for questions and clarification was provided.       Patient transported with:   Registered Nurse

## 2019-09-17 NOTE — H&P
Hospitalist H&P/Consult Note     Admit Date:  2019  5:24 PM   Name:  Krishna Mcdowell. Age:  59 y.o.  :  1955   MRN:  [id number]   PCP:  Kimi Gaytan MD  Treatment Team: Attending Provider: Matthew Ludwig MD    HPI:   Patient is a 58 y/o male with hx chronic CHF, CML and stage IV adenocarcinoma of lung, on oral chemotherapy with Gleevec and Tagrisso, hx PE--on Xarelto who presents to ED with 1 week hx progressive shortness of breath and orthopnea. He has been taking his medications as instructed. Denies chest pain, fever or chills. The only diuretic he takes is aldactone every other day. Used to be on lasix but this was discontinued at least a year ago. Workup in ED today shows an elevated BNP of 1331 and recent chest xray with edema. Troponin only 0.05. He was given IV lasix in ED and Hospitalist service consulted for admission. 10 systems reviewed and negative except as noted in HPI. Past Medical History:   Diagnosis Date    Anxiety     Chronic systolic congestive heart failure ([address] 5/3/2017    [facility]ML (chronic myeloid [facility]) [facility] 2012    Depression 2012    Erectile dysfunction     Leukemia, acute ([facility] [facility] 2012    Lung cancer ([facility] [facility] 2018    Pulmonary emboli ([facility] [facility] 2019      Past Surgical History:   Procedure Laterality Date    HX HERNIA REPAIR      left inguinal       Prior to Admission Medications   Prescriptions Last Dose Informant Patient Reported? Taking? OXYGEN-AIR DELIVERY SYSTEMS   Yes No   Sig: by Does Not Apply route. 3LPM   albuterol (PROVENTIL HFA, VENTOLIN HFA, PROAIR HFA) 90 mcg/actuation inhaler   No No   Sig: Take 2 Puffs by inhalation every four (4) hours as needed for Wheezing. benzonatate (TESSALON) 200 mg capsule   No No   Sig: Take 1 Cap by mouth three (3) times daily as needed for Cough.    carvedilol (COREG) 3.125 mg tablet   No No   Sig: TAKE 1 TABLET BY MOUTH TWICE A DAY WITH FOOD   imatinib (GLEEVEC) 400 mg tablet Yes No   Sig: Take 400 mg by mouth daily. lisinopril (PRINIVIL, ZESTRIL) 2.5 mg tablet   No No   Sig: Take 1 Tab by mouth daily. multivitamin, stress formula (STRESS TAB) tablet   Yes No   Sig: Take 1 Tab by mouth daily. osimertinib (TAGRISSO) 80 mg tablet   No No   Sig: Take 1 Tab by mouth daily. rivaroxaban (XARELTO) 20 mg tab tablet   No No   Sig: Take 1 Tab by mouth daily (with breakfast). Take after finishing 15mg bid course. spironolactone (ALDACTONE) 25 mg tablet   No No   Sig: Take 1 Tab by mouth daily. Patient taking differently: Take 25 mg by mouth every other day. triamcinolone (NASACORT) 55 mcg nasal inhaler   No No   Si Sprays by Both Nostrils route two (2) times a day.       Facility-Administered Medications: None     No Known Allergies   Social History     Tobacco Use    Smoking status: Never Smoker    Smokeless tobacco: Never Used   Substance Use Topics    Alcohol use: No      Family History   Problem Relation Age of Onset    Lung Disease Father 79        Black lung\"    Cancer Mother 72        pancreatic cancer      Immunization History   Administered Date(s) Administered    Influenza Vaccine (Quad) PF 2018    Tdap 2016       Objective:     Patient Vitals for the past 24 hrs:   Temp Pulse Resp BP SpO2   19 0000 -- 88 25 90/65 100 %   19 -- 92 22 103/66 100 %   19 -- 100 29 100/70 100 %   19 -- (!) 103 (!) 31 -- 100 %   19 -- (!) 108 (!) 36 107/80 --   19 2044 97.6 °F (36.4 °C) (!) 104 18 (!) 125/93 --   19 -- 98 18 105/72 100 %   19 1654 97.5 °F (36.4 °C) 65 18 111/68 95 %     Oxygen Therapy  O2 Sat (%): 100 % (19 0000)  Pulse via Oximetry: 85 beats per minute (19)  O2 Device: Room air (19)    Intake/Output Summary (Last 24 hours) at 2019 0313  Last data filed at 2019 0003  Gross per 24 hour   Intake 200 ml   Output 1250 ml   Net -1050 ml       Physical Exam:  General:    Well nourished. Alert. Ill-appearing   Eyes:   Normal sclera. Extraocular movements intact. ENT:  Normocephalic, atraumatic. Moist mucous membranes  CV:   RRR. murmur  Lungs:  Bilateral crackles  Abdomen: Soft, nontender, nondistended. Bowel sounds normal.   Extremities: Warm and dry. No cyanosis or edema. Neurologic: CN II-XII grossly intact. Sensation intact. Skin:     No rashes or jaundice. No wounds. Psych:  Normal mood and affect. I reviewed the labs, imaging, EKGs, telemetry, and other studies done this admission. Data Review:   Recent Results (from the past 24 hour(s))   EKG, 12 LEAD, INITIAL    Collection Time: 09/16/19  5:18 PM   Result Value Ref Range    Ventricular Rate 110 BPM    Atrial Rate 110 BPM    P-R Interval 170 ms    QRS Duration 98 ms    Q-T Interval 340 ms    QTC Calculation (Bezet) 460 ms    Calculated P Axis 67 degrees    Calculated R Axis 59 degrees    Calculated T Axis 79 degrees    Diagnosis       Sinus tachycardia  Otherwise normal ECG  When compared with ECG of 06-JAN-2019 23:43,  Fusion complexes are no longer Present  Premature ventricular complexes are no longer Present  ST no longer depressed in Anterior leads  Confirmed by [facility]  MD (ELROY)ELOY ([id number]) on 9/16/2019 11:02:24 PM     CBC WITH AUTOMATED DIFF    Collection Time: 09/16/19  5:28 PM   Result Value Ref Range    WBC 4.1 (L) 4.3 - 11.1 K/uL    RBC 3.99 (L) 4.23 - 5.6 M/uL    HGB 12.1 (L) 13.6 - 17.2 g/dL    HCT 35.7 (L) 41.1 - 50.3 %    MCV 89.5 79.6 - 97.8 FL    MCH 30.3 26.1 - 32.9 PG    MCHC 33.9 31.4 - 35.0 g/dL    RDW 14.1 11.9 - 14.6 %    PLATELET 358 (L) 135 - 450 K/uL    MPV 11.0 9.4 - 12.3 FL    ABSOLUTE NRBC 0.00 0.0 - 0.2 K/uL    DF AUTOMATED      NEUTROPHILS 64 43 - 78 %    LYMPHOCYTES 19 13 - 44 %    MONOCYTES 14 (H) 4.0 - 12.0 %    EOSINOPHILS 3 0.5 - 7.8 %    BASOPHILS 1 0.0 - 2.0 %    IMMATURE GRANULOCYTES 0 0.0 - 5.0 %    ABS. NEUTROPHILS 2.6 1.7 - 8.2 K/UL    ABS. LYMPHOCYTES 0.8 0.5 - 4.6 K/UL    ABS. MONOCYTES 0.6 0.1 - 1.3 K/UL    ABS. EOSINOPHILS 0.1 0.0 - 0.8 K/UL    ABS. BASOPHILS 0.0 0.0 - 0.2 K/UL    ABS. IMM. GRANS. 0.0 0.0 - 0.5 K/UL   METABOLIC PANEL, COMPREHENSIVE    Collection Time: 09/16/19  5:28 PM   Result Value Ref Range    Sodium 132 (L) 136 - 145 mmol/L    Potassium 3.8 3.5 - 5.1 mmol/L    Chloride 101 98 - 107 mmol/L    CO2 25 21 - 32 mmol/L    Anion gap 6 (L) 7 - 16 mmol/L    Glucose 125 (H) 65 - 100 mg/dL    BUN 15 8 - 23 MG/DL    Creatinine 1.24 0.8 - 1.5 MG/DL    GFR est AA >60 >60 ml/min/1.73m2    GFR est non-AA >60 >60 ml/min/1.73m2    Calcium 8.6 8.3 - 10.4 MG/DL    Bilirubin, total 0.9 0.2 - 1.1 MG/DL    ALT (SGPT) 62 12 - 65 U/L    AST (SGOT) 48 (H) 15 - 37 U/L    Alk.  phosphatase 63 50 - 136 U/L    Protein, total 6.3 6.3 - 8.2 g/dL    Albumin 3.2 3.2 - 4.6 g/dL    Globulin 3.1 2.3 - 3.5 g/dL    A-G Ratio 1.0 (L) 1.2 - 3.5     TROPONIN I    Collection Time: 09/16/19  5:28 PM   Result Value Ref Range    Troponin-I, Qt. 0.05 0.02 - 0.05 NG/ML   BNP    Collection Time: 09/16/19  5:28 PM   Result Value Ref Range    BNP 1,331 pg/mL       Imaging Studies:  CXR Results  (Last 48 hours)    None        CT Results  (Last 48 hours)    None          Assessment and Plan:     Hospital Problems as of 9/17/2019 Date Reviewed: 9/3/2019          Codes Class Noted - Resolved POA    * (Principal) Acute CHF (congestive heart failure) ([facility] ICD-10-CM: I50.9  ICD-9-CM: 428.0  9/16/2019 - Present Yes        Immunocompromised ([facility] ICD-10-CM: D84.9  ICD-9-CM: 279.3  11/16/2018 - Present Yes        Stage IV adenocarcinoma of lung ([facility] ICD-10-CM: C34.90  ICD-9-CM: 162.9  7/11/2018 - Present Yes        Pleural effusion ICD-10-CM: J90  ICD-9-CM: 511.9  4/27/2018 - Present Yes        Chronic systolic congestive heart failure (HCC) ICD-10-CM: I50.22  ICD-9-CM: 428.22, 428.0  5/3/2017 - Present Yes        CML (chronic myeloid leukemia) (HCC) ICD-10-CM: C92.10  ICD-9-CM: 205.10 8/25/2012 - Present Yes    Overview Addendum 8/26/2012  7:23 AM by Thor Gutiérrez MD     8/25/12. Probable diagnosis. Will do bone marrow exam today  8/26/12. Bone marrow aspiration and biopsy done. Marrow aspirate and peripheral blood sent for flow, cytogenetics and molecular testing. No complications with the procedure                   PLAN:  · Admit inpatient to remote telemetry  · Supplemental O2 as needed  · CHF careset utilized. Ins/outs. Daily weights  · Continue diuresis with IV lasix 40 mg BID  · Make aldactone daily  · Serial troponin. Monitor BMP, Mg. Check TSH  · Consult Cardiology in am for further recommendations  · Hx CML and stage ! V adenocarcinoma of lung, continue Gleevec and Tagresso  · Daily CBC.  Anticoagulated wit xarelto with hx pulmonary embolus  · Case management consult in am      Estimated LOS:  2 midnights    Signed:  Wheeler Closs, MD

## 2019-09-17 NOTE — PROGRESS NOTES
Patient alert and oriented. Follow commands. Denies pain. Head of bed elevated. Respiration even and unlabored. Spot oxygen saturation check 100% on room air. Sinus tach on cardiac monitor. Heart rate 101. Call light and urinal within reach. Instructed patient to call for any needs. Verbalize an understanding. Bed in low/lock position.

## 2019-09-17 NOTE — PROGRESS NOTES
Hospitalist Progress Note    2019  Admit Date: 2019  5:24 PM   NAME: Jason Cheung. :  1955   MRN:  [id number]   Attending: David Funes MD  PCP:  Pippa Verma MD    SUBJECTIVE:   Julieta Anderson is a [address] [place] [address] yo M with history of stage 4 lung adenocarcinoma and CML on [place] and [address], who was admitted yesterday with suspected acute on chronic systolic heart failure. He has diuresed 1 L overnight and reports he is breathing better. States he is able to lie down further now without severely worsening orthopnea. He denies chest pain or cough. Review of Systems negative with exception of pertinent positives noted above  PHYSICAL EXAM     Visit Vitals  BP 90/65   Pulse 88   Temp 98.5 °F (36.9 °C)   Resp 25   Ht 6' 2\" (1.88 m)   Wt 88.5 kg (195 lb)   SpO2 100%   BMI 25.04 kg/m²      Temp (24hrs), Av.9 °F (36.6 °C), Min:97.5 °F (36.4 °C), Max:98.5 °F (36.9 °C)    Patient Vitals for the past 24 hrs:   Temp Pulse Resp BP SpO2   19 0722 98.5 °F (36.9 °C) -- -- -- --   19 0000 -- 88 25 90/65 100 %   19 -- 92 22 103/66 100 %   19 -- 100 29 100/70 100 %   19 -- (!) 103 (!) 31 -- 100 %   19 -- (!) 108 (!) 36 107/80 --   19 97.6 °F (36.4 °C) (!) 104 18 (!) 125/93 --   19 -- 98 18 105/72 100 %   19 1654 97.5 °F (36.4 °C) 65 18 111/68 95 %       Oxygen Therapy  O2 Sat (%): 100 % (19 0000)  Pulse via Oximetry: 85 beats per minute (199)  O2 Device: Room air (19)    Intake/Output Summary (Last 24 hours) at 2019 0756  Last data filed at 2019 0003  Gross per 24 hour   Intake 200 ml   Output 1250 ml   Net -1050 ml      General: No acute distress    Lungs:  Clear anteriorly, bilateral rales posteriorly R>L.    Heart:  Regular rate and rhythm,  No murmur, rub, or gallop  Abdomen: Soft, Non distended, Non tender, Positive bowel sounds  Extremities: No cyanosis, clubbing or edema  Neurologic:  No focal deficits    Recent Results (from the past 24 hour(s))   EKG, 12 LEAD, INITIAL    Collection Time: 09/16/19  5:18 PM   Result Value Ref Range    Ventricular Rate 110 BPM    Atrial Rate 110 BPM    P-R Interval 170 ms    QRS Duration 98 ms    Q-T Interval 340 ms    QTC Calculation (Bezet) 460 ms    Calculated P Axis 67 degrees    Calculated R Axis 59 degrees    Calculated T Axis 79 degrees    Diagnosis       Sinus tachycardia  Otherwise normal ECG  When compared with ECG of 06-JAN-2019 23:43,  Fusion complexes are no longer Present  Premature ventricular complexes are no longer Present  ST no longer depressed in Anterior leads  Confirmed by Stacy Craft MD (), ELOY SILVA ([id number]) on 9/16/2019 11:02:24 PM     CBC WITH AUTOMATED DIFF    Collection Time: 09/16/19  5:28 PM   Result Value Ref Range    WBC 4.1 (L) 4.3 - 11.1 K/uL    RBC 3.99 (L) 4.23 - 5.6 M/uL    HGB 12.1 (L) 13.6 - 17.2 g/dL    HCT 35.7 (L) 41.1 - 50.3 %    MCV 89.5 79.6 - 97.8 FL    MCH 30.3 26.1 - 32.9 PG    MCHC 33.9 31.4 - 35.0 g/dL    RDW 14.1 11.9 - 14.6 %    PLATELET 472 (L) 875 - 450 K/uL    MPV 11.0 9.4 - 12.3 FL    ABSOLUTE NRBC 0.00 0.0 - 0.2 K/uL    DF AUTOMATED      NEUTROPHILS 64 43 - 78 %    LYMPHOCYTES 19 13 - 44 %    MONOCYTES 14 (H) 4.0 - 12.0 %    EOSINOPHILS 3 0.5 - 7.8 %    BASOPHILS 1 0.0 - 2.0 %    IMMATURE GRANULOCYTES 0 0.0 - 5.0 %    ABS. NEUTROPHILS 2.6 1.7 - 8.2 K/UL    ABS. LYMPHOCYTES 0.8 0.5 - 4.6 K/UL    ABS. MONOCYTES 0.6 0.1 - 1.3 K/UL    ABS. EOSINOPHILS 0.1 0.0 - 0.8 K/UL    ABS. BASOPHILS 0.0 0.0 - 0.2 K/UL    ABS. IMM.  GRANS. 0.0 0.0 - 0.5 K/UL   METABOLIC PANEL, COMPREHENSIVE    Collection Time: 09/16/19  5:28 PM   Result Value Ref Range    Sodium 132 (L) 136 - 145 mmol/L    Potassium 3.8 3.5 - 5.1 mmol/L    Chloride 101 98 - 107 mmol/L    CO2 25 21 - 32 mmol/L    Anion gap 6 (L) 7 - 16 mmol/L    Glucose 125 (H) 65 - 100 mg/dL    BUN 15 8 - 23 MG/DL    Creatinine 1.24 0.8 - 1.5 MG/DL    GFR est AA >60 >60 ml/min/1.73m2    GFR est non-AA >60 >60 ml/min/1.73m2    Calcium 8.6 8.3 - 10.4 MG/DL    Bilirubin, total 0.9 0.2 - 1.1 MG/DL    ALT (SGPT) 62 12 - 65 U/L    AST (SGOT) 48 (H) 15 - 37 U/L    Alk.  phosphatase 63 50 - 136 U/L    Protein, total 6.3 6.3 - 8.2 g/dL    Albumin 3.2 3.2 - 4.6 g/dL    Globulin 3.1 2.3 - 3.5 g/dL    A-G Ratio 1.0 (L) 1.2 - 3.5     TROPONIN I    Collection Time: 09/16/19  5:28 PM   Result Value Ref Range    Troponin-I, Qt. 0.05 0.02 - 0.05 NG/ML   BNP    Collection Time: 09/16/19  5:28 PM   Result Value Ref Range    BNP 1,331 pg/mL   PROTHROMBIN TIME + INR    Collection Time: 09/17/19  3:18 AM   Result Value Ref Range    Prothrombin time 17.7 (H) 11.7 - 14.5 sec    INR 1.4     PHOSPHORUS    Collection Time: 09/17/19  3:18 AM   Result Value Ref Range    Phosphorus 3.4 2.3 - 3.7 MG/DL   LACTIC ACID    Collection Time: 09/17/19  3:18 AM   Result Value Ref Range    Lactic acid 0.9 0.4 - 2.0 MMOL/L   TSH 3RD GENERATION    Collection Time: 09/17/19  3:18 AM   Result Value Ref Range    TSH 8.786 uIU/mL   METABOLIC PANEL, BASIC    Collection Time: 09/17/19  3:18 AM   Result Value Ref Range    Sodium 137 136 - 145 mmol/L    Potassium 3.7 3.5 - 5.1 mmol/L    Chloride 100 98 - 107 mmol/L    CO2 28 21 - 32 mmol/L    Anion gap 9 7 - 16 mmol/L    Glucose 104 (H) 65 - 100 mg/dL    BUN 16 8 - 23 MG/DL    Creatinine 1.25 0.8 - 1.5 MG/DL    GFR est AA >60 >60 ml/min/1.73m2    GFR est non-AA >60 >60 ml/min/1.73m2    Calcium 8.2 (L) 8.3 - 10.4 MG/DL   CBC W/O DIFF    Collection Time: 09/17/19  3:18 AM   Result Value Ref Range    WBC 3.6 (L) 4.3 - 11.1 K/uL    RBC 3.94 (L) 4.23 - 5.6 M/uL    HGB 11.9 (L) 13.6 - 17.2 g/dL    HCT 35.4 (L) 41.1 - 50.3 %    MCV 89.8 79.6 - 97.8 FL    MCH 30.2 26.1 - 32.9 PG    MCHC 33.6 31.4 - 35.0 g/dL    RDW 14.2 11.9 - 14.6 %    PLATELET 224 (L) 068 - 450 K/uL    MPV 11.3 9.4 - 12.3 FL    ABSOLUTE NRBC 0.00 0.0 - 0.2 K/uL   TROPONIN I    Collection Time: 09/17/19 3:18 AM   Result Value Ref Range    Troponin-I, Qt. 0.05 0.02 - 0.05 NG/ML     Imaging:    XR CHEST PA LAT    (Results Pending)         ASSESSMENT      Hospital Problems as of 9/17/2019 Date Reviewed: 9/3/2019          Codes Class Noted - Resolved POA    * (Principal) Acute CHF (congestive heart failure) ([facility] ICD-10-CM: I50.9  ICD-9-CM: 428.0  9/16/2019 - Present Yes        Immunocompromised ([facility] ICD-10-CM: D84.9  ICD-9-CM: 279.3  11/16/2018 - Present Yes        Stage IV adenocarcinoma of lung ([facility] ICD-10-CM: C34.90  ICD-9-CM: 162.9  7/11/2018 - Present Yes        Pleural effusion ICD-10-CM: J90  ICD-9-CM: 511.9  4/27/2018 - Present Yes        Chronic systolic congestive heart failure (HCC) ICD-10-CM: I50.22  ICD-9-CM: 428.22, 428.0  5/3/2017 - Present Yes        CML (chronic myeloid leukemia) ([facility] ICD-10-CM: C92.10  ICD-9-CM: 205.10  8/25/2012 - Present Yes    Overview Addendum 8/26/2012  7:23 AM by Janie Wong MD     8/25/12. Probable diagnosis. Will do bone marrow exam today  8/26/12. Bone marrow aspiration and biopsy done. Marrow aspirate and peripheral blood sent for flow, cytogenetics and molecular testing. No complications with the procedure                 Plan:  · Continue IV lasix and PO aldactone, strict I/O  · Await cardiology input  · Monitor BNP and BMP  · Continue home dose [place] and [address] for now. DVT Prophylaxis: Xarelto    Signed By: Cy Echols.  Mega Degroot MD     September 17, 2019

## 2019-09-17 NOTE — PROGRESS NOTES
TRANSFER - IN REPORT:    Verbal report received from Julio Cesar Suarez, [facility] on [address].  being received from ER for routine progression of care      Report consisted of patients Situation, Background, Assessment and   Recommendations(SBAR). Information from the following report(s) SBAR, Kardex, ED Summary, Procedure Summary, Intake/Output, MAR and Recent Results was reviewed with the receiving nurse. Opportunity for questions and clarification was provided. Assessment completed upon patients arrival to unit and care assumed.

## 2019-09-17 NOTE — PROGRESS NOTES
Care Management Interventions  PCP Verified by CM: Yes  Mode of Transport at Discharge: Other (see comment)  Transition of Care Consult (CM Consult): Other  Current Support Network: Lives with Spouse  Confirm Follow Up Transport: Family  Plan discussed with Pt/Family/Caregiver: Yes  Freedom of Choice Offered: Yes  Discharge Location  Discharge Placement: Home  Visited with pt regarding plans for discharge, pt loves at home with wife and twin 16 yr old dtr's. Is on dep with ADL's, currently undergoing chemotherapy for Leukemia and Lung CA. No further needs at this time, but will continue to follow until d/c in case something arises.

## 2019-09-17 NOTE — PROGRESS NOTES
Interdisciplinary team rounds were held 9/17/2019 with the following team members:Care Management, Nursing, Pastoral Care, Physical Therapy and Physician and the patient. Plan of care discussed. See clinical pathway and/or care plan for interventions and desired outcomes.

## 2019-09-17 NOTE — CONSULTS
[address] Cardiology Consult                Date of  Admission: 9/16/2019  5:24 PM     Primary Care Physician: Michael Zhao MD  Primary Cardiologist:  Dr Wang Montano  Referring Physician: Dr Chaparro Suarez  Consulting Physician: Dr Karla Sears    CC/Reason for consult: Claudio Montes. is a 59 y.o. male with hx of HFrEF, DCM, Mild to Moderate AI, severe MR (last echo from 8/19; EF 20-25%), moderate TR, RVSP 41.5, stage 4 lung adenocarcinoma, CML, anxiety, ED, lung CA, and PE. The patient came to the ED with 1 week of progressive SOB, and orthopnea with reports of medication compliance at home. Also some increased coughs/occasional chills; no fevers. The patient was last seen in the office on 8/27/2019 where he was on coreg, lisinopril, aldactone, and on [address]  for hx of PE with referal to [facility] for consideration for mitral clip with worsening MR (appeared functional per records); pending evalutation this week. The patient has been referred for ICD in the past but has refused. Review of previous medications show patient has been on Entresto in the past but has had issues with BP tolerance to medications. After IV lasix the patient has diuresed out over 1 L of fluid and has less orthopnea than yesterday. Feels almost back to his baseline currently and is on room air. Recent Cardiac Synopsis w/ Labs  Cardiac History:   1. Echocardiogram 04/13/2017 for dilated left ventricle, profound left ventricular systolic dysfunctio OG<78%, probable mural thrombus noted in the left ventricular apex. Moderate MR  2. Gleevec therapy discontinued 04/2017. 3. The patient was initiated on heart failure medications 04/13/2017, Entresto, Carvedilol, Lasix. 4. The patient was [facility] for anticoagulation therapy. This was canceled when left ventricular thrombus was excluded on contrasted echocardiogram.  5. Left heart catheterization 04/2017 - no coronary artery disease. 6. The patient presented 05/03/2017. Discontinued Carvedilol. Lisinopril was switched to Losartan. 7. 05/03/2017 - Carvedilol 3.125 mg p.o. b.i.d. initiated. Continue Losartan. Change Lasix to once daily. Change potassium to once daily. 8. Echocardiogram 07/2017 - left ventricular systolic function improved 30 - 35%. 9. Electrophysiology referral rediscussed at appointment 10/11/2017. The patient agrees to see electrophysiology. 10. Echo 10/2017 ef 25-30%  11. Aldactone initiated 10/11/2017. 12. He was hospitalized 03/2018 for pneumonia, multifocal felt community acquired pneumonia. 13. 4/2018 Osimertinib stage IV lung adenocarcinoma  14. 1/7/2019 Echo EF 20-30% Moderate MR  15. 1/11/2019. Low-dose carvedilol resumed, lisinopril 2.5 mg daily.   Lasix 20 mg discontinued resumed Aldactone 25 mg stopped potassium  16. 1/28/2019 BMP creatine 1.44 K 4.2   17. Echo 8/2019  MR severe cental jet severe LV diliation.        Lab Results   Component Value Date     09/17/2019    K 3.7 09/17/2019    MG 2.3 02/19/2019    BUN 16 09/17/2019    CREA 1.25 09/17/2019    WBC 3.6 (L) 09/17/2019    HGB 11.9 (L) 09/17/2019     (L) 09/17/2019    INR 1.4 09/17/2019    TSH 1.820 09/17/2019        All Cardiac Markers in the last 24 hours:    Lab Results   Component Value Date/Time    TROIQ 0.05 09/17/2019 03:18 AM    TROIQ 0.05 09/16/2019 05:28 PM     09/17/2019 03:18 AM    BNP 1,331 09/16/2019 05:28 PM       Wt Readings from Last 3 Encounters:   09/16/19 88.5 kg (195 lb)   09/03/19 88.9 kg (196 lb)   08/27/19 88.5 kg (195 lb)       Intake/Output Summary (Last 24 hours) at 9/17/2019 1014  Last data filed at 9/17/2019 0003  Gross per 24 hour   Intake 200 ml   Output 1250 ml   Net -1050 ml         Patient Active Problem List   Diagnosis Code    Allergic rhinitis J30.9    Erectile dysfunction N52.9    CML [facility]chronic myeloid leukemia) [facility] C92.10    Depression F32.9    Chronic systolic congestive heart failure (HCC) I50.22    CAP (community acquired pneumonia) J18.9    Pleural effusion J90    Abnormal chest CT R93.89    Pulmonary infiltrate R91.8    Stage IV adenocarcinoma of lung ([facility]) C34.90    Immunocompromised (HCC) D84.9    Pneumonia J18.9    Pulmonary emboli ([facility]) I26.99    Acute CHF (congestive heart failure) ([facility]) I50.9       Past Medical History:   Diagnosis Date    Anxiety     Chronic systolic congestive heart failure ([facility] 5/3/2017    CML (chronic myeloid leukemia) ([facility] 8/25/2012    Depression 11/2/2012    Erectile dysfunction     Leukemia, acute ([facility] 8/24/2012    Lung cancer ([facility] 2018    [facility]oli [facility] 1/6/2019      Past Surgical History:   Procedure Laterality Date    HX HERNIA REPAIR      left inguinal 1996     No Known Allergies   Family History   Problem Relation Age of Onset    Lung Disease Father 79        Black lung\"    Cancer Mother 72        pancreatic cancer      Social History     Socioeconomic History    Marital status:      Spouse name: Not on file    Number of children: Not on file    Years of education: Not on file    Highest education level: Not on file   Occupational History     Comment: [employer]   Social Needs    Financial resource strain: Not on file    Food insecurity:     Worry: Not on file     Inability: Not on file    Transportation needs:     Medical: Not on file     Non-medical: Not on file   Tobacco Use    Smoking status: Never Smoker    Smokeless tobacco: Never Used   Substance and Sexual Activity    Alcohol use: No    Drug use: No    Sexual activity: Not on file   Lifestyle    Physical activity:     Days per week: Not on file     Minutes per session: Not on file    Stress: Not on file   Relationships    Social connections:     Talks on phone: Not on file     Gets together: Not on file     Attends Sabianist service: Not on file     Active member of club or organization: Not on file     Attends meetings of clubs or organizations: Not on file     Relationship status: Not on file    Intimate partner violence:     Fear of current or ex partner: Not on file     Emotionally abused: Not on file     Physically abused: Not on file     Forced sexual activity: Not on file   Other Topics Concern    Not on file   Social History Narrative    Pt  with one son       Current Facility-Administered Medications   Medication Dose Route Frequency    carvedilol (COREG) tablet 3.125 mg  3.125 mg Oral BID WITH MEALS    albuterol (PROVENTIL VENTOLIN) nebulizer solution 2.5 mg  2.5 mg Nebulization Q4H PRN    imatinib (GLEEVEC) chemo tablet 400 mg  400 mg Oral DAILY    lisinopril (PRINIVIL, ZESTRIL) tablet 2.5 mg  2.5 mg Oral DAILY    multivitamin, stress formula (STRESS TAB) tablet 1 Tab  1 Tab Oral DAILY    osimertinib (TAGRISSO) chemo tab 80 mg  80 mg Oral DAILY    rivaroxaban (XARELTO) tablet 20 mg  20 mg Oral DAILY WITH BREAKFAST    benzonatate (TESSALON) capsule 200 mg  200 mg Oral TID PRN    spironolactone (ALDACTONE) tablet 25 mg  25 mg Oral DAILY    fluticasone propionate (FLONASE) 50 mcg/actuation nasal spray 1 Spray  1 Spray Both Nostrils DAILY    ondansetron (ZOFRAN) injection 4 mg  4 mg IntraVENous Q6H PRN    acetaminophen (TYLENOL) tablet 650 mg  650 mg Oral Q6H PRN    furosemide (LASIX) injection 40 mg  40 mg IntraVENous BID    nitroglycerin (NITROSTAT) tablet 0.4 mg  0.4 mg SubLINGual Q5MIN PRN       Review of Systems   Constitution: Positive for chills and weight gain. Negative for fever. Cardiovascular: Negative for chest pain. Respiratory: Positive for cough and shortness of breath.        Rest per HPI; all other systems reviewed and are negative       Physical Exam  Vitals:    09/16/19 2159 09/16/19 2259 09/17/19 0000 09/17/19 0722   BP: 100/70 103/66 90/65    Pulse: 100 92 88    Resp: 29 22 25    Temp:    98.5 °F (36.9 °C)   SpO2: 100% 100% 100%    Weight:       Height:           Physical Exam:  General: Well Developed, Well Nourished, No Acute Distress  HEENT: pupils equal and round, no abnormalities noted  Neck: supple, no JVD, no carotid bruits  Heart: S1S2 with RRR; apical HSM 3/6  Lungs: Basilar rales  Abd: soft, nontender, nondistended, with good bowel sounds  Ext: warm, no edema, calves supple/nontender, pulses 2+ bilaterally  Skin: warm and dry  Psychiatric: Normal mood and affect  Neurologic: Alert and oriented X 3      Labs:   Recent Labs     09/17/19  0318 09/16/19  1728    132*   K 3.7 3.8   BUN 16 15   CREA 1.25 1.24   * 125*   WBC 3.6* 4.1*   HGB 11.9* 12.1*   HCT 35.4* 35.7*   * 138*   INR 1.4  --        Echo Results  (Last 48 hours)    None        No results found. Assessment/Plan:     Assessment:      Principal Problem:    Acute CH[facility] (congestive heart failure) ([facility] (9/16/2019)    Active Problems:    CML (chronic myeloid leukemia) ([facility] (8/25/2012)      Overview: 8/25/12. Probable diagnosis. Will do bone marrow exam today      8/26/12. Bone marrow aspiration and biopsy done. Marrow aspirate and       peripheral blood sent for flow, cytogenetics and molecular testing. No       complications with the procedure      Chronic systolic congestive heart failure ([facility] (5/3/2017)      Pleural effusion (4/27/2018)      Stage IV adenocarcinoma of lung ([facility] (7/11/2018)      Immunocompromised ([facility] (11/16/2018)    Hx of NICM (cath in 9017); uncertain etiology but consideration for prior chemotherapy. Also reported prior increased PVCs and consideration for PVC induced CM. Holter consideration as outpt. Exam with improved volume status. Continue lasix; currently on aldactone every other day (25mg) with some lower BPs; decrease dose to 12.5mg with daily dosing. Persistent issues with lower BPs and change coreg to low dose toprol XL for more BP room. Also on low dose lisinopril and continue. Prior could not tolerate entresto with BP limiting.  On xarelto per oncology with noted PE from 1/2019. Also appears not on maintenance diuretics at home; prior was on lasix which was stopped per pt; will need to address maintenance diuretics on discharge  EKG with normal QRS duration. Pending evaluation in [place] this week per Dr Aristides Simpson for mitraclip consideration  Further recommendations pending clinical course. Thank you very much for this referral. We appreciate the opportunity to participate in this patient's care. We will follow along with above stated plan.     Issa Nicolas MD  9/17/2019  5:54 PM

## 2019-09-17 NOTE — PROGRESS NOTES
Patient admitted to room 372. He is alert and oriented and in no apparent distress. Patient walked to bed and was assisted to change into gown. Skin assessment done with Blake Mondragon RN. Skin is intact. Patient oriented to room and is sitting up in bed eating a sandwich and watching TV. Will continue to monitor.

## 2019-09-18 VITALS
BODY MASS INDEX: 25.03 KG/M2 | RESPIRATION RATE: 22 BRPM | TEMPERATURE: 97.9 F | DIASTOLIC BLOOD PRESSURE: 64 MMHG | SYSTOLIC BLOOD PRESSURE: 93 MMHG | WEIGHT: 195 LBS | HEART RATE: 102 BPM | HEIGHT: 74 IN | OXYGEN SATURATION: 93 %

## 2019-09-18 PROBLEM — J81.0 ACUTE PULMONARY EDEMA (HCC): Status: ACTIVE | Noted: 2019-09-18

## 2019-09-18 LAB
ANION GAP SERPL CALC-SCNC: 8 MMOL/L (ref 7–16)
ATRIAL RATE: 93 BPM
BNP SERPL-MCNC: 746 PG/ML
BUN SERPL-MCNC: 20 MG/DL (ref 8–23)
CALCIUM SERPL-MCNC: 8.3 MG/DL (ref 8.3–10.4)
CALCULATED P AXIS, ECG09: 46 DEGREES
CALCULATED R AXIS, ECG10: 66 DEGREES
CALCULATED T AXIS, ECG11: 71 DEGREES
CHLORIDE SERPL-SCNC: 99 MMOL/L (ref 98–107)
CO2 SERPL-SCNC: 27 MMOL/L (ref 21–32)
CREAT SERPL-MCNC: 1.27 MG/DL (ref 0.8–1.5)
DIAGNOSIS, 93000: NORMAL
GLUCOSE SERPL-MCNC: 86 MG/DL (ref 65–100)
P-R INTERVAL, ECG05: 186 MS
POTASSIUM SERPL-SCNC: 3.8 MMOL/L (ref 3.5–5.1)
Q-T INTERVAL, ECG07: 394 MS
QRS DURATION, ECG06: 100 MS
QTC CALCULATION (BEZET), ECG08: 489 MS
SODIUM SERPL-SCNC: 134 MMOL/L (ref 136–145)
VENTRICULAR RATE, ECG03: 93 BPM

## 2019-09-18 PROCEDURE — 80048 BASIC METABOLIC PNL TOTAL CA: CPT

## 2019-09-18 PROCEDURE — 74011250637 HC RX REV CODE- 250/637: Performed by: HOSPITALIST

## 2019-09-18 PROCEDURE — 94761 N-INVAS EAR/PLS OXIMETRY MLT: CPT

## 2019-09-18 PROCEDURE — 74011250637 HC RX REV CODE- 250/637: Performed by: INTERNAL MEDICINE

## 2019-09-18 PROCEDURE — 93005 ELECTROCARDIOGRAM TRACING: CPT | Performed by: INTERNAL MEDICINE

## 2019-09-18 PROCEDURE — 36415 COLL VENOUS BLD VENIPUNCTURE: CPT

## 2019-09-18 PROCEDURE — 83880 ASSAY OF NATRIURETIC PEPTIDE: CPT

## 2019-09-18 RX ORDER — FUROSEMIDE 20 MG/1
20 TABLET ORAL DAILY
Qty: 90 TAB | Refills: 0 | Status: SHIPPED | OUTPATIENT
Start: 2019-09-19 | End: 2020-01-01 | Stop reason: SDUPTHER

## 2019-09-18 RX ORDER — SPIRONOLACTONE 25 MG/1
12.5 TABLET ORAL DAILY
Qty: 30 TAB | Refills: 3 | Status: SHIPPED
Start: 2019-09-18 | End: 2019-10-21

## 2019-09-18 RX ORDER — METOPROLOL SUCCINATE 25 MG/1
12.5 TABLET, EXTENDED RELEASE ORAL DAILY
Qty: 90 TAB | Refills: 0 | Status: SHIPPED | OUTPATIENT
Start: 2019-09-18 | End: 2020-01-01 | Stop reason: SDUPTHER

## 2019-09-18 RX ORDER — METOPROLOL SUCCINATE 25 MG/1
12.5 TABLET, EXTENDED RELEASE ORAL DAILY
Status: DISCONTINUED | OUTPATIENT
Start: 2019-09-18 | End: 2019-09-18 | Stop reason: HOSPADM

## 2019-09-18 RX ADMIN — RIVAROXABAN 20 MG: 20 TABLET, FILM COATED ORAL at 08:54

## 2019-09-18 RX ADMIN — METOPROLOL SUCCINATE 12.5 MG: 25 TABLET, EXTENDED RELEASE ORAL at 08:55

## 2019-09-18 RX ADMIN — B-COMPLEX W/ C & FOLIC ACID TAB 1 TABLET: TAB at 08:54

## 2019-09-18 RX ADMIN — FLUTICASONE PROPIONATE 1 SPRAY: 50 SPRAY, METERED NASAL at 08:54

## 2019-09-18 RX ADMIN — SPIRONOLACTONE 12.5 MG: 25 TABLET, FILM COATED ORAL at 08:54

## 2019-09-18 NOTE — PROGRESS NOTES
Care Management Interventions  PCP Verified by CM: Yes  Mode of Transport at Discharge: Other (see comment)  Transition of Care Consult (CM Consult):  Other  Current Support Network: Lives with Spouse  Confirm Follow Up Transport: Family  Plan discussed with Pt/Family/Caregiver: Yes  Freedom of Choice Offered: Yes  Discharge Location  Discharge Placement: Home  Pt ready to d/c home with family, no further needs at this time, CM signing off

## 2019-09-18 NOTE — PROGRESS NOTES
Interdisciplinary team rounds were held 9/18/2019 with the following team members:Care Management, Nursing, Pastoral Care, Physical Therapy, Physician and Clinical Coordinator and the patient. Plan of care discussed. See clinical pathway and/or care plan for interventions and desired outcomes.

## 2019-09-18 NOTE — DISCHARGE SUMMARY
Hospitalist Discharge Summary     Patient ID:  Matilde Snider  [id number]  59 y.o.  1955  Admit date: 9/16/2019  5:24 PM  Discharge date and time: 9/18/2019  Attending: Juana Jones MD  PCP:  Nerissa Parker MD  Treatment Team: Attending Provider: Juana Jones MD; Consulting Provider: Dany Browning MD; Care Manager: Essie Ho, RN; Utilization Review: Bro Ramírez    Principal Diagnosis Acute CHF (congestive heart failure) [facility])   Hospital Problems as of 9/18/2019 Date Reviewed: 9/3/2019          Codes Class Noted - Resolved POA    Acute pulmonary edema ([facility] ICD-10-CM: J81.0  ICD-9-CM: 518.4  9/18/2019 - Present Yes        * (Principal) Acute CHF (congestive heart failure) ([facility] ICD-10-CM: I50.9  ICD-9-CM: 428.0  9/16/2019 - Present Yes        Immunocompromised ([facility] ICD-10-CM: D84.9  ICD-9-CM: 279.3  11/16/2018 - Present Yes        Stage IV adenocarcinoma of lung ([facility] ICD-10-CM: C34.90  ICD-9-CM: 162.9  7/11/2018 - Present Yes        Pleural effusion ICD-10-CM: J90  ICD-9-CM: 511.9  4/27/2018 - Present Yes        Chronic systolic congestive heart failure ([facility] ICD-10-CM: I50.22  ICD-9-CM: 428.22, 428.0  5/3/2017 - Present Yes        CML (chronic myeloid leukemia) ([facility] ICD-10-CM: C92.10  ICD-9-CM: 205.10  8/25/2012 - Present Yes    Overview Addendum 8/26/2012  7:23 AM by Heather Li MD     8/25/12. Probable diagnosis. Will do bone marrow exam today  8/26/12. Bone marrow aspiration and biopsy done. Marrow aspirate and peripheral blood sent for flow, cytogenetics and molecular testing. No complications with the procedure                      HPI:  'Patient is a 60 y/o male with hx chronic CHF, CML and stage IV adenocarcinoma of lung, on oral chemotherapy with Gleevec and Tagrisso, hx PE--on Xarelto who presents to ED with 1 week hx progressive shortness of breath and orthopnea. He has been taking his medications as instructed.  Denies chest pain, fever or chills. The only diuretic he takes is aldactone every other day. Used to be on lasix but this was discontinued at least a year ago. Workup in ED today shows an elevated BNP of 1331 and recent chest xray with edema. Troponin only 0.05. He was given IV lasix in ED and Hospitalist service consulted for admission.'    Hospital Course:  Please refer to the admission H&P for details of presentation. In summary, the patient is a 58 yo M with history of dilated cardiomyopathy and severe MR, who was admitted in acute on chronic systolic heart failure and symptoms of significant orthopnea. He was treated with IV lasix and he diuresed 1.4 L. His breathing improved significantly and he was able to lie flat without significant shortness of breath. He was evaluated by cardiology. Due to low normal blood pressures, his carvedilol was changed to Toprol 12.5 mg daily. His spironolactone was also changed from every other day to 12.5 mg daily. As he was not on a loop diuretic prior to admission, lasix 20 mg daily will be added. As he is symptomatically improved, he will be discharged home in an improved and stable condition. He has follow up at [facility] tomorrow for evaluation for mitral valve clip. Significant Diagnostic Studies:       Labs: Results:       Chemistry Recent Labs     09/18/19  0307 09/17/19 0318 09/16/19  1728   GLU 86 104* 125*   * 137 132*   K 3.8 3.7 3.8   CL 99 100 101   CO2 27 28 25   BUN 20 16 15   CREA 1.27 1.25 1.24   CA 8.3 8.2* 8.6   AGAP 8 9 6*   AP  --   --  63   TP  --   --  6.3   ALB  --   --  3.2   GLOB  --   --  3.1   AGRAT  --   --  1.0*      CBC w/Diff Recent Labs     09/17/19 0318 09/16/19  1728   WBC 3.6* 4.1*   RBC 3.94* 3.99*   HGB 11.9* 12.1*   HCT 35.4* 35.7*   * 138*   GRANS  --  64   LYMPH  --  19   EOS  --  3      Cardiac Enzymes No results for input(s): CPK, CKND1, RADHA in the last 72 hours.     No lab exists for component: CKRMB, TROIP   Coagulation Recent Labs     09/17/19  0318   PTP 17.7*   INR 1.4       Lipid Panel No results found for: CHOL, CHOLPOCT, CHOLX, CHLST, CHOLV, 382366, HDL, HDLP, LDL, LDLC, DLDLP, 387934, VLDLC, VLDL, TGLX, TRIGL, TRIGP, TGLPOCT, CHHD, CHHDX   BNP No results for input(s): BNPP in the last 72 hours. Liver Enzymes Recent Labs     09/16/19  1728   TP 6.3   ALB 3.2   AP 63   SGOT 48*      Thyroid Studies Lab Results   Component Value Date/Time    TSH 1.820 09/17/2019 03:18 AM          XR CHEST PA LAT   Final Result   Impression:  Probable acute moderate pulmonary edema with associated bibasilar   atelectasis and or consolidation. Discharge Exam:  Visit Vitals  BP 99/68   Pulse 88   Temp 97.9 °F (36.6 °C)   Resp 22   Ht 6' 2\" (1.88 m)   Wt 88.5 kg (195 lb)   SpO2 93%   BMI 25.04 kg/m²     Patient Vitals for the past 24 hrs:   Temp Pulse Resp BP SpO2   09/18/19 0725 97.9 °F (36.6 °C) 88 22 99/68 --   09/18/19 0256 98 °F (36.7 °C) (!) 104 22 (!) 89/63 93 %   09/17/19 2306 98.2 °F (36.8 °C) 100 (!) 31 97/70 99 %   09/17/19 1848 98 °F (36.7 °C) (!) 101 22 104/69 100 %   09/17/19 1635 98.5 °F (36.9 °C) 96 16 96/60 --   09/17/19 1255 -- 88 (!) 33 96/62 --   09/17/19 1157 98.7 °F (37.1 °C) -- -- -- --   09/17/19 1000 -- (!) 103 (!) 39 -- 93 %       General appearance: alert, cooperative, no distress, appears stated age  Lungs: rales at bases (improved from yesterday)  Heart: regular rate and rhythm, S1, S2 normal, no murmur, click, rub or gallop  Abdomen: soft, non-tender. Bowel sounds normal. No masses,  no organomegaly  Extremities: no cyanosis or edema  Neurologic: Grossly normal    Disposition: home  Discharge Condition: stable  Patient Instructions:   Current Discharge Medication List      START taking these medications    Details   metoprolol succinate (TOPROL-XL) 25 mg XL tablet Take 0.5 Tabs by mouth daily. Indications: chronic heart failure  Qty: 90 Tab, Refills: 0      furosemide (LASIX) 20 mg tablet Take 1 Tab by mouth daily. Indications: Fluid in the Lungs due to Chronic Heart Failure  Qty: 90 Tab, Refills: 0         CONTINUE these medications which have CHANGED    Details   spironolactone (ALDACTONE) 25 mg tablet Take 0.5 Tabs by mouth daily. Indications: chronic heart failure  Qty: 30 Tab, Refills: 3         CONTINUE these medications which have NOT CHANGED    Details   albuterol (PROVENTIL HFA, VENTOLIN HFA, PROAIR HFA) 90 mcg/actuation inhaler Take 2 Puffs by inhalation every four (4) hours as needed for Wheezing. Qty: 1 Inhaler, Refills: 11      benzonatate (TESSALON) 200 mg capsule Take 1 Cap by mouth three (3) times daily as needed for Cough. Qty: 90 Cap, Refills: 5      rivaroxaban (XARELTO) 20 mg tab tablet Take 1 Tab by mouth daily (with breakfast). Take after finishing 15mg bid course. Qty: 30 Tab, Refills: 3      osimertinib (TAGRISSO) 80 mg tablet Take 1 Tab by mouth daily. Qty: 30 Tab, Refills: 3    Associated Diagnoses: CML (chronic myeloid leukemia) ([facility] Malignant neoplasm of lung, unspecified laterality, unspecified part of lung (HCC)      imatinib (GLEEVEC) 400 mg tablet Take 400 mg by mouth daily. lisinopril (PRINIVIL, ZESTRIL) 2.5 mg tablet Take 1 Tab by mouth daily. Qty: 30 Tab, Refills: 11      triamcinolone (NASACORT) 55 mcg nasal inhaler 2 Sprays by Both Nostrils route two (2) times a day. Qty: 1 Bottle, Refills: 3      OXYGEN-AIR DELIVERY SYSTEMS by Does Not Apply route. 3LPM      multivitamin, stress formula (STRESS TAB) tablet Take 1 Tab by mouth daily. STOP taking these medications       carvedilol (COREG) 3.125 mg tablet Comments:   Reason for Stopping:               Activity: Activity as tolerated  Diet: Cardiac Diet- restrict to 4 grams sodium and 2 L fluid/day    Other instructions:  Check daily weight in the morning after using the bathroom and before eating/drinking.   If more than 2 pound weight gain in 24 hour period or 5 pound weight gain over a week, take an afternoon dose of lasix (2 doses/day) until weight returns to baseline. Follow-up      Follow-up Appointments   Procedures    FOLLOW UP VISIT Appointment in: Other (Specify) 1) [facility] for TC-7 visit for heart failure 2) PCP in 1-2 weeks for hospital follow up     1) [facility] for TC-7 visit for heart failure  2) PCP in 1-2 weeks for hospital follow up     Standing Status:   Standing     Number of Occurrences:   1     Order Specific Question:   Appointment in     Answer: Other (Specify)   ·   ·   Time spent to discharge patient 31 minutes  Signed:  Alejandro Wilkins.  Eugenie Baker MD  9/18/2019  7:56 AM

## 2019-09-18 NOTE — PROGRESS NOTES
Oxygen Qualifier       Room air: SpO2 with O2 and liter flow   Resting SpO2  96%  N/A   Ambulating SpO2  92%  N/A    Patient denied SOB, and no distress noted while ambulating.      Completed by:    Allan Smith RT

## 2019-09-18 NOTE — DISCHARGE INSTRUCTIONS
Check daily weight in the morning after using the bathroom and before eating/drinking. If more than 2 pound weight gain in 24 hour period or 5 pound weight gain over a week, take an afternoon dose of lasix (2 doses/day) until weight returns to baseline.

## 2019-09-24 ENCOUNTER — HOSPITAL ENCOUNTER (OUTPATIENT)
Dept: LAB | Age: 64
Discharge: HOME | End: 2019-09-24

## 2019-09-24 ENCOUNTER — PATIENT OUTREACH (OUTPATIENT)
Dept: CASE MANAGEMENT | Age: 64
End: 2019-09-24

## 2019-09-24 DIAGNOSIS — C92.10 CML (CHRONIC MYELOID LEUKEMIA) (HCC): ICD-10-CM

## 2019-09-24 DIAGNOSIS — C34.92 ADENOCARCINOMA OF LUNG, LEFT (HCC): ICD-10-CM

## 2019-09-24 LAB
Lab: NORMAL
REFERENCE LAB,REFLB: NORMAL
TEST DESCRIPTION:,ATST: NORMAL

## 2019-09-24 NOTE — PROGRESS NOTES
Pt saw Dr Venita Nick today for lung cancer and CLL. Pt is to continue Gleevec and Tagrisso at current dosing. Will continue to monitor CEA and Bcr-ABL. Tolerating well. Navigation signing off.

## 2019-09-27 NOTE — PROGRESS NOTES
Patient pre-assessment complete for RHC with Dr Ana Tyson scheduled for 19 at 8am , arrival time 6am. Patient verified using . Patient instructed to bring all home medications in labeled bottles on the day of procedure. NPO status reinforced. Patient informed to take a full dose aspirin 325mg  or 81 mg x 4 on the day of procedure. Patient instructed to HOLD xarelto x 2 day & HOLD lasix & spironolactone on the am of procedure. Instructed they can take all other medications excluding vitamins & supplements. Patient verbalizes understanding of all instructions & denies any questions at this time.

## 2019-09-30 ENCOUNTER — HOSPITAL ENCOUNTER (OUTPATIENT)
Dept: CARDIAC CATH/INVASIVE PROCEDURES | Age: 64
Discharge: HOME | End: 2019-09-30
Attending: INTERNAL MEDICINE | Admitting: INTERNAL MEDICINE
Payer: COMMERCIAL

## 2019-09-30 VITALS
SYSTOLIC BLOOD PRESSURE: 95 MMHG | DIASTOLIC BLOOD PRESSURE: 60 MMHG | OXYGEN SATURATION: 97 % | RESPIRATION RATE: 18 BRPM | WEIGHT: 190 LBS | HEART RATE: 83 BPM | TEMPERATURE: 98 F | HEIGHT: 74 IN | BODY MASS INDEX: 24.38 KG/M2

## 2019-09-30 PROCEDURE — C1894 INTRO/SHEATH, NON-LASER: HCPCS

## 2019-09-30 PROCEDURE — 74011250636 HC RX REV CODE- 250/636

## 2019-09-30 PROCEDURE — C1769 GUIDE WIRE: HCPCS

## 2019-09-30 PROCEDURE — 99152 MOD SED SAME PHYS/QHP 5/>YRS: CPT

## 2019-09-30 PROCEDURE — 93451 RIGHT HEART CATH: CPT

## 2019-09-30 PROCEDURE — 74011250637 HC RX REV CODE- 250/637: Performed by: INTERNAL MEDICINE

## 2019-09-30 PROCEDURE — 82810 BLOOD GASES O2 SAT ONLY: CPT

## 2019-09-30 PROCEDURE — 99153 MOD SED SAME PHYS/QHP EA: CPT

## 2019-09-30 PROCEDURE — 74011250636 HC RX REV CODE- 250/636: Performed by: INTERNAL MEDICINE

## 2019-09-30 PROCEDURE — C1751 CATH, INF, PER/CENT/MIDLINE: HCPCS

## 2019-09-30 RX ORDER — HEPARIN SODIUM 200 [USP'U]/100ML
2 INJECTION, SOLUTION INTRAVENOUS CONTINUOUS
Status: DISCONTINUED | OUTPATIENT
Start: 2019-09-30 | End: 2019-09-30 | Stop reason: HOSPADM

## 2019-09-30 RX ORDER — HYDROCODONE BITARTRATE AND ACETAMINOPHEN 5; 325 MG/1; MG/1
1 TABLET ORAL
Status: CANCELLED | OUTPATIENT
Start: 2019-09-30

## 2019-09-30 RX ORDER — SODIUM CHLORIDE 0.9 % (FLUSH) 0.9 %
5-40 SYRINGE (ML) INJECTION EVERY 8 HOURS
Status: CANCELLED | OUTPATIENT
Start: 2019-09-30

## 2019-09-30 RX ORDER — GUAIFENESIN 100 MG/5ML
81-324 LIQUID (ML) ORAL
Status: COMPLETED | OUTPATIENT
Start: 2019-09-30 | End: 2019-09-30

## 2019-09-30 RX ORDER — ONDANSETRON 2 MG/ML
4 INJECTION INTRAMUSCULAR; INTRAVENOUS
Status: CANCELLED | OUTPATIENT
Start: 2019-09-30 | End: 2019-10-01

## 2019-09-30 RX ORDER — SODIUM CHLORIDE 9 MG/ML
75 INJECTION, SOLUTION INTRAVENOUS CONTINUOUS
Status: CANCELLED | OUTPATIENT
Start: 2019-09-30

## 2019-09-30 RX ORDER — SODIUM CHLORIDE 0.9 % (FLUSH) 0.9 %
5-40 SYRINGE (ML) INJECTION AS NEEDED
Status: CANCELLED | OUTPATIENT
Start: 2019-09-30

## 2019-09-30 RX ORDER — SODIUM CHLORIDE 0.9 % (FLUSH) 0.9 %
5 SYRINGE (ML) INJECTION AS NEEDED
Status: DISCONTINUED | OUTPATIENT
Start: 2019-09-30 | End: 2019-09-30 | Stop reason: HOSPADM

## 2019-09-30 RX ORDER — MIDAZOLAM HYDROCHLORIDE 1 MG/ML
.5-2 INJECTION, SOLUTION INTRAMUSCULAR; INTRAVENOUS
Status: DISCONTINUED | OUTPATIENT
Start: 2019-09-30 | End: 2019-09-30 | Stop reason: HOSPADM

## 2019-09-30 RX ORDER — SODIUM CHLORIDE 9 MG/ML
75 INJECTION, SOLUTION INTRAVENOUS CONTINUOUS
Status: DISCONTINUED | OUTPATIENT
Start: 2019-09-30 | End: 2019-09-30 | Stop reason: HOSPADM

## 2019-09-30 RX ORDER — FENTANYL CITRATE 50 UG/ML
25-50 INJECTION, SOLUTION INTRAMUSCULAR; INTRAVENOUS
Status: DISCONTINUED | OUTPATIENT
Start: 2019-09-30 | End: 2019-09-30 | Stop reason: HOSPADM

## 2019-09-30 RX ORDER — ACETAMINOPHEN 325 MG/1
650 TABLET ORAL
Status: CANCELLED | OUTPATIENT
Start: 2019-09-30

## 2019-09-30 RX ADMIN — MIDAZOLAM HYDROCHLORIDE 1 MG: 1 INJECTION, SOLUTION INTRAMUSCULAR; INTRAVENOUS at 08:41

## 2019-09-30 RX ADMIN — SODIUM CHLORIDE 75 ML/HR: 900 INJECTION, SOLUTION INTRAVENOUS at 06:50

## 2019-09-30 RX ADMIN — MIDAZOLAM HYDROCHLORIDE 1 MG: 1 INJECTION, SOLUTION INTRAMUSCULAR; INTRAVENOUS at 08:33

## 2019-09-30 RX ADMIN — HEPARIN SODIUM 2 UNITS/HR: 200 INJECTION, SOLUTION INTRAVENOUS at 08:34

## 2019-09-30 RX ADMIN — ASPIRIN 81 MG 324 MG: 81 TABLET ORAL at 07:00

## 2019-09-30 RX ADMIN — FENTANYL CITRATE 25 MCG: 50 INJECTION, SOLUTION INTRAMUSCULAR; INTRAVENOUS at 08:33

## 2019-09-30 NOTE — PROGRESS NOTES
Patient received to [address] [place] [address] room # 9  Ambulatory from [facility]. Patient scheduled for RHC today with Dr Audie Litten. Procedure reviewed & questions answered, voiced good understanding consent obtained & placed on chart. All medications and medical history reviewed. Will prep patient per orders. Patient & family updated on plan of care. The patient has a fraility score of 3-MANAGING WELL, based on independent of ADLs/ambulation. Increased symptoms with exertion.

## 2019-09-30 NOTE — DISCHARGE INSTRUCTIONS

## 2019-09-30 NOTE — PROGRESS NOTES
Report received from [address] [place] [address]. Procedural findings communicated. Intra procedural  medication administration reviewed. Progression of care discussed.      Patient received into CPRU Foard 4 post sheath removal.     right Radial access site without bleeding or swelling     TR band dry and intact     Patient instructed to limit movement to right upper extremity    Routine post procedural vital signs and site assessment initiated

## 2019-09-30 NOTE — PROCEDURES
[facility]  CARDIAC CATH    Name:  Hany Mills  MR#:  [id number]  :  1955  ACCOUNT #:  [de-identified]  DATE OF SERVICE:  2019    PROCEDURES PERFORMED:  Right heart catheterization via the right brachial approach. PREOPERATIVE DIAGNOSIS:  Severe mitral regurgitation. The patient will be evaluated for mitral intervention with mitral clip. POSTOPERATIVE DIAGNOSES:  1. Right heart catheterization was done to assess pulmonary hypertension. 2.  Mild pulmonary hypertension. SURGEON:  Lillian Toscano. MD Carlos    ASSISTANT:  None. ESTIMATED BLOOD LOSS:  Less than 5 mL. SPECIMENS REMOVED:  None. COMPLICATIONS:  None. FINDINGS:  As below. IMPLANTS:  None. ANESTHESIA:  The patient received moderate supervised conscious sedation with 2 mg of Versed and 25 mcg of fentanyl. Sedation was administered by Naila Tatum RN under my supervision. Sedation start time was 08:42 a.m. with a procedure completion time of 09:09 a.m.    TECHNICAL FACTORS:  After informed consent was obtained, the patient was brought to the cardiac catheterization lab. The right brachial area was prepped and draped in the usual sterile fashion. An existing right brachial IV was present. Using sterile technique and modified Seldinger technique, this was exchanged with a micropuncture kit to a 7-Kyrgyz sheath. Right heart cath was then advanced into the right heart chambers. Hemodynamic assessment, pulse oximetry run and thermodilution cardiac outputs were obtained. At the conclusion of the diagnostic procedure, the brachial sheath was removed and hemostasis was achieved. CONTRAST:  None. RIGHT HEART RESULTS:  1. Right atrial pressure showed A-wave of 7, V-wave of 3 with a mean pressure of 4 with a right atrial saturation of 73%. 2.  SVC saturation was 73%. 3.  IVC saturation was 72.5%. 4.  Right ventricular pressure was 35/9 with a right ventricular saturation of 71.5%.   5. Pulmonary pressure was 38/16 with a mean of 26 with a pulmonary artery saturation of 71%. 6.  Pulmonary capillary wedge pressure showed A-wave of 25, V-wave 19 and a mean pressure of 17.  7.  Ventura cardiac output was 5.67 with a cardiac index of 2.67. Thermodilution cardiac output was 4.72 with a cardiac index of 2.22.  8.  There was no significant gradient across the pulmonic or tricuspid valves. CONCLUSIONS:  Mild pulmonary hypertension with right heart hemodynamics as outlined above.       Sunita Craft MD      [id number]  D:  09/30/2019 9:12  T:  09/30/2019 10:49  JOB #:  [id number]  CC:  MD Anitra Chen MD

## 2019-09-30 NOTE — PROGRESS NOTES
TRANSFER - OUT REPORT:    R brachial RHC with Dr Jessica Ortega 2 mg  Fentanyl 25 mcg  7 fr sheath closed with manual pressure  Tegaderm applied to site  No bleeding or hematoma noted at site     Verbal report given to Trinity(name) on Antoni [facility].  being transferred to CPRU(unit) for routine progression of care       Report consisted of patients Situation, Background, Assessment and   Recommendations(SBAR). Information from the following report(s) Procedure Summary was reviewed with the receiving nurse. Lines:   Peripheral IV 09/30/19 Right Antecubital (Active)       Peripheral IV 09/30/19 Left Antecubital (Active)        Opportunity for questions and clarification was provided.       Patient transported with:   Registered Nurse

## 2019-09-30 NOTE — PROGRESS NOTES
Discharge instructions and home medications reviewed with patient. Time allowed for questions and answers. discharged to home via wheel chair

## 2019-09-30 NOTE — PROCEDURES
Brief Cardiac Procedure Note    Patient: Sanchez Talbert MRN: [id number]  SSN: [id number]    YOB: 1955  Age: 59 y.o. Sex: male      Date of Procedure: 9/30/2019     Pre-procedure Diagnosis: Mitral Valve Disorder    Post-procedure Diagnosis: Mitral Valve Disorder    Procedure: Right heart cath    Brief Description of Procedure: As above    Performed By: Marquez Mayes MD     Assistants: None    Anesthesia: Moderate Sedation    Estimated Blood Loss: Less than 10 mL      Specimens: None    Implants: None    Findings: Mild pulmonary HTN. Complications: None    Recommendations: Continue medical therapy.     Signed By: Marquez Mayes MD     September 30, 2019

## 2019-10-28 ENCOUNTER — HOSPITAL ENCOUNTER (OUTPATIENT)
Dept: LAB | Age: 64
Discharge: HOME | End: 2019-10-28

## 2019-10-28 DIAGNOSIS — C34.92 ADENOCARCINOMA OF LUNG, LEFT (HCC): ICD-10-CM

## 2019-10-28 DIAGNOSIS — C34.90 ADENOCARCINOMA OF LUNG, STAGE 4, UNSPECIFIED LATERALITY (HCC): ICD-10-CM

## 2019-11-25 ENCOUNTER — HOSPITAL ENCOUNTER (OUTPATIENT)
Dept: CT IMAGING | Age: 64
Discharge: HOME | End: 2019-11-25
Attending: INTERNAL MEDICINE

## 2019-11-25 ENCOUNTER — HOSPITAL ENCOUNTER (OUTPATIENT)
Dept: LAB | Age: 64
Discharge: HOME | End: 2019-11-25

## 2019-11-25 DIAGNOSIS — C34.92 ADENOCARCINOMA OF LUNG, LEFT (HCC): ICD-10-CM

## 2019-11-25 DIAGNOSIS — C92.10 CML (CHRONIC MYELOID LEUKEMIA) (HCC): ICD-10-CM

## 2019-12-26 ENCOUNTER — HOSPITAL ENCOUNTER (OUTPATIENT)
Dept: LAB | Age: 64
Discharge: HOME | End: 2019-12-26

## 2019-12-26 DIAGNOSIS — C34.92 ADENOCARCINOMA OF LUNG, LEFT (HCC): ICD-10-CM

## 2019-12-26 DIAGNOSIS — C92.10 CML (CHRONIC MYELOID LEUKEMIA) (HCC): ICD-10-CM

## 2019-12-26 LAB
Lab: NORMAL
REFERENCE LAB,REFLB: NORMAL
TEST DESCRIPTION:,ATST: NORMAL

## 2020-01-01 ENCOUNTER — PATIENT OUTREACH (OUTPATIENT)
Dept: CASE MANAGEMENT | Age: 65
End: 2020-01-01

## 2020-01-01 ENCOUNTER — HOSPITAL ENCOUNTER (OUTPATIENT)
Dept: RADIATION ONCOLOGY | Age: 65
Discharge: HOME | End: 2020-11-02
Payer: COMMERCIAL

## 2020-01-01 ENCOUNTER — HOSPITAL ENCOUNTER (OUTPATIENT)
Dept: INFUSION THERAPY | Age: 65
Discharge: HOME | End: 2020-10-19
Payer: COMMERCIAL

## 2020-01-01 ENCOUNTER — HOSPITAL ENCOUNTER (OUTPATIENT)
Dept: RADIATION ONCOLOGY | Age: 65
Discharge: HOME | End: 2020-10-09
Payer: COMMERCIAL

## 2020-01-01 ENCOUNTER — HOSPITAL ENCOUNTER (OUTPATIENT)
Dept: CT IMAGING | Age: 65
Discharge: HOME | End: 2020-06-18
Attending: INTERNAL MEDICINE
Payer: COMMERCIAL

## 2020-01-01 ENCOUNTER — HOSPITAL ENCOUNTER (OUTPATIENT)
Dept: INFUSION THERAPY | Age: 65
Discharge: HOME | End: 2020-10-05
Payer: COMMERCIAL

## 2020-01-01 ENCOUNTER — HOSPITAL ENCOUNTER (OUTPATIENT)
Dept: INFUSION THERAPY | Age: 65
Discharge: HOME | End: 2020-10-26
Payer: COMMERCIAL

## 2020-01-01 ENCOUNTER — HOSPITAL ENCOUNTER (OUTPATIENT)
Dept: RADIATION ONCOLOGY | Age: 65
Discharge: HOME | End: 2020-10-22
Payer: COMMERCIAL

## 2020-01-01 ENCOUNTER — HOSPITAL ENCOUNTER (OUTPATIENT)
Dept: INFUSION THERAPY | Age: 65
Discharge: HOME | End: 2020-07-08
Payer: COMMERCIAL

## 2020-01-01 ENCOUNTER — HOSPITAL ENCOUNTER (OUTPATIENT)
Dept: MRI IMAGING | Age: 65
Discharge: HOME | End: 2020-10-21
Attending: RADIOLOGY
Payer: COMMERCIAL

## 2020-01-01 ENCOUNTER — HOSPITAL ENCOUNTER (OUTPATIENT)
Dept: INFUSION THERAPY | Age: 65
Discharge: HOME | End: 2020-09-28
Payer: COMMERCIAL

## 2020-01-01 ENCOUNTER — HOSPITAL ENCOUNTER (OUTPATIENT)
Dept: RADIATION ONCOLOGY | Age: 65
Discharge: HOME | End: 2020-11-09
Payer: COMMERCIAL

## 2020-01-01 ENCOUNTER — HOSPITAL ENCOUNTER (OUTPATIENT)
Dept: INFUSION THERAPY | Age: 65
Discharge: HOME | End: 2020-06-29
Payer: COMMERCIAL

## 2020-01-01 ENCOUNTER — HOSPITAL ENCOUNTER (OUTPATIENT)
Dept: INFUSION THERAPY | Age: 65
Discharge: HOME | End: 2020-07-29
Payer: COMMERCIAL

## 2020-01-01 ENCOUNTER — HOSPITAL ENCOUNTER (OUTPATIENT)
Dept: CT IMAGING | Age: 65
Discharge: HOME | End: 2020-09-25
Attending: INTERNAL MEDICINE
Payer: COMMERCIAL

## 2020-01-01 ENCOUNTER — HOSPITAL ENCOUNTER (OUTPATIENT)
Dept: INFUSION THERAPY | Age: 65
Discharge: HOME | End: 2020-07-06
Payer: COMMERCIAL

## 2020-01-01 ENCOUNTER — HOSPITAL ENCOUNTER (OUTPATIENT)
Dept: INFUSION THERAPY | Age: 65
Discharge: HOME | End: 2020-12-28
Payer: COMMERCIAL

## 2020-01-01 ENCOUNTER — HOSPITAL ENCOUNTER (OUTPATIENT)
Dept: RADIATION ONCOLOGY | Age: 65
Discharge: HOME | End: 2020-10-16
Payer: COMMERCIAL

## 2020-01-01 ENCOUNTER — HOSPITAL ENCOUNTER (OUTPATIENT)
Dept: INFUSION THERAPY | Age: 65
End: 2020-01-01

## 2020-01-01 ENCOUNTER — HOSPITAL ENCOUNTER (OUTPATIENT)
Dept: INFUSION THERAPY | Age: 65
Discharge: HOME | End: 2020-07-27
Payer: COMMERCIAL

## 2020-01-01 ENCOUNTER — HOSPITAL ENCOUNTER (OUTPATIENT)
Dept: INFUSION THERAPY | Age: 65
Discharge: HOME | End: 2020-11-16
Payer: COMMERCIAL

## 2020-01-01 ENCOUNTER — HOSPITAL ENCOUNTER (OUTPATIENT)
Dept: RADIATION ONCOLOGY | Age: 65
Discharge: HOME | End: 2020-11-05
Payer: COMMERCIAL

## 2020-01-01 ENCOUNTER — HOSPITAL ENCOUNTER (OUTPATIENT)
Dept: INFUSION THERAPY | Age: 65
Discharge: HOME | End: 2020-12-07
Payer: COMMERCIAL

## 2020-01-01 ENCOUNTER — HOSPITAL ENCOUNTER (OUTPATIENT)
Dept: RADIATION ONCOLOGY | Age: 65
Discharge: HOME | End: 2020-11-10
Payer: COMMERCIAL

## 2020-01-01 ENCOUNTER — HOSPITAL ENCOUNTER (OUTPATIENT)
Dept: RADIATION ONCOLOGY | Age: 65
Discharge: HOME | End: 2020-11-13
Payer: COMMERCIAL

## 2020-01-01 ENCOUNTER — HOSPITAL ENCOUNTER (OUTPATIENT)
Dept: INFUSION THERAPY | Age: 65
Discharge: HOME | End: 2020-08-17
Payer: COMMERCIAL

## 2020-01-01 ENCOUNTER — HOSPITAL ENCOUNTER (OUTPATIENT)
Dept: INFUSION THERAPY | Age: 65
Discharge: HOME | End: 2020-07-28
Payer: COMMERCIAL

## 2020-01-01 ENCOUNTER — HOSPITAL ENCOUNTER (OUTPATIENT)
Dept: LAB | Age: 65
Discharge: HOME | End: 2020-10-09
Payer: COMMERCIAL

## 2020-01-01 ENCOUNTER — HOSPITAL ENCOUNTER (OUTPATIENT)
Dept: RADIATION ONCOLOGY | Age: 65
Discharge: HOME | End: 2020-11-06
Payer: COMMERCIAL

## 2020-01-01 ENCOUNTER — HOSPITAL ENCOUNTER (OUTPATIENT)
Dept: RADIATION ONCOLOGY | Age: 65
Discharge: HOME | End: 2020-11-11
Payer: COMMERCIAL

## 2020-01-01 ENCOUNTER — HOSPITAL ENCOUNTER (OUTPATIENT)
Dept: RADIATION ONCOLOGY | Age: 65
Discharge: HOME | End: 2020-11-12
Payer: COMMERCIAL

## 2020-01-01 ENCOUNTER — HOSPITAL ENCOUNTER (OUTPATIENT)
Dept: INFUSION THERAPY | Age: 65
Discharge: HOME | End: 2020-07-07
Payer: COMMERCIAL

## 2020-01-01 ENCOUNTER — HOSPITAL ENCOUNTER (OUTPATIENT)
Dept: RADIATION ONCOLOGY | Age: 65
Discharge: HOME | End: 2020-11-04
Payer: COMMERCIAL

## 2020-01-01 ENCOUNTER — HOSPITAL ENCOUNTER (OUTPATIENT)
Dept: RADIATION ONCOLOGY | Age: 65
Discharge: HOME | End: 2020-11-03
Payer: COMMERCIAL

## 2020-01-01 ENCOUNTER — HOSPITAL ENCOUNTER (OUTPATIENT)
Dept: INFUSION THERAPY | Age: 65
Discharge: HOME | End: 2020-09-08
Payer: COMMERCIAL

## 2020-01-01 ENCOUNTER — HOSPITAL ENCOUNTER (OUTPATIENT)
Dept: ULTRASOUND IMAGING | Age: 65
Discharge: HOME | End: 2020-10-19
Attending: INTERNAL MEDICINE
Payer: COMMERCIAL

## 2020-01-01 VITALS — HEIGHT: 73 IN | BODY MASS INDEX: 23.06 KG/M2

## 2020-01-01 VITALS
DIASTOLIC BLOOD PRESSURE: 68 MMHG | BODY MASS INDEX: 24.14 KG/M2 | WEIGHT: 183 LBS | SYSTOLIC BLOOD PRESSURE: 119 MMHG | TEMPERATURE: 98.3 F | OXYGEN SATURATION: 98 % | HEART RATE: 69 BPM

## 2020-01-01 VITALS
HEART RATE: 44 BPM | WEIGHT: 181.2 LBS | BODY MASS INDEX: 23.91 KG/M2 | TEMPERATURE: 97.5 F | DIASTOLIC BLOOD PRESSURE: 61 MMHG | SYSTOLIC BLOOD PRESSURE: 113 MMHG | OXYGEN SATURATION: 98 %

## 2020-01-01 VITALS
TEMPERATURE: 98.7 F | WEIGHT: 194.2 LBS | SYSTOLIC BLOOD PRESSURE: 105 MMHG | DIASTOLIC BLOOD PRESSURE: 57 MMHG | BODY MASS INDEX: 26.34 KG/M2 | HEART RATE: 91 BPM | RESPIRATION RATE: 16 BRPM | OXYGEN SATURATION: 98 %

## 2020-01-01 VITALS
SYSTOLIC BLOOD PRESSURE: 117 MMHG | DIASTOLIC BLOOD PRESSURE: 66 MMHG | TEMPERATURE: 98.2 F | WEIGHT: 184.2 LBS | HEART RATE: 72 BPM | BODY MASS INDEX: 24.3 KG/M2 | OXYGEN SATURATION: 96 % | RESPIRATION RATE: 16 BRPM

## 2020-01-01 VITALS
TEMPERATURE: 98.8 F | WEIGHT: 193.4 LBS | RESPIRATION RATE: 16 BRPM | OXYGEN SATURATION: 98 % | SYSTOLIC BLOOD PRESSURE: 95 MMHG | HEART RATE: 83 BPM | DIASTOLIC BLOOD PRESSURE: 56 MMHG | BODY MASS INDEX: 26.23 KG/M2

## 2020-01-01 VITALS — BODY MASS INDEX: 23.8 KG/M2 | HEIGHT: 73 IN

## 2020-01-01 VITALS
WEIGHT: 191.8 LBS | HEART RATE: 77 BPM | BODY MASS INDEX: 26.01 KG/M2 | DIASTOLIC BLOOD PRESSURE: 58 MMHG | TEMPERATURE: 97.7 F | RESPIRATION RATE: 18 BRPM | SYSTOLIC BLOOD PRESSURE: 105 MMHG

## 2020-01-01 VITALS
BODY MASS INDEX: 26.23 KG/M2 | WEIGHT: 193.4 LBS | HEART RATE: 82 BPM | OXYGEN SATURATION: 99 % | SYSTOLIC BLOOD PRESSURE: 109 MMHG | DIASTOLIC BLOOD PRESSURE: 64 MMHG | RESPIRATION RATE: 18 BRPM | TEMPERATURE: 97.8 F

## 2020-01-01 DIAGNOSIS — D84.9 IMMUNOCOMPROMISED (HCC): Primary | ICD-10-CM

## 2020-01-01 DIAGNOSIS — C34.91 SCLC (SMALL CELL LUNG CARCINOMA), RIGHT (HCC): ICD-10-CM

## 2020-01-01 DIAGNOSIS — E83.42 HYPOMAGNESEMIA: ICD-10-CM

## 2020-01-01 DIAGNOSIS — C34.90 ADENOCARCINOMA OF LUNG, STAGE 4, UNSPECIFIED LATERALITY (HCC): ICD-10-CM

## 2020-01-01 DIAGNOSIS — C34.91 SCLC (SMALL CELL LUNG CARCINOMA), RIGHT (HCC): Primary | ICD-10-CM

## 2020-01-01 DIAGNOSIS — R39.198 DIFFICULTY URINATING: ICD-10-CM

## 2020-01-01 DIAGNOSIS — R79.89 ELEVATED SERUM CREATININE: ICD-10-CM

## 2020-01-01 DIAGNOSIS — C34.11 MALIGNANT NEOPLASM OF UPPER LOBE OF RIGHT LUNG (HCC): Primary | ICD-10-CM

## 2020-01-01 DIAGNOSIS — D84.9 IMMUNOCOMPROMISED (HCC): ICD-10-CM

## 2020-01-01 DIAGNOSIS — R79.89 CREATININE ELEVATION: Primary | ICD-10-CM

## 2020-01-01 DIAGNOSIS — C92.10 CML (CHRONIC MYELOID LEUKEMIA) (HCC): Primary | ICD-10-CM

## 2020-01-01 DIAGNOSIS — C34.11 MALIGNANT NEOPLASM OF UPPER LOBE OF RIGHT LUNG (HCC): ICD-10-CM

## 2020-01-01 DIAGNOSIS — E83.42 HYPOMAGNESEMIA: Primary | ICD-10-CM

## 2020-01-01 DIAGNOSIS — R30.0 BURNING WITH URINATION: ICD-10-CM

## 2020-01-01 LAB
ALBUMIN SERPL-MCNC: 3 G/DL (ref 3.2–4.6)
ALBUMIN SERPL-MCNC: 3.1 G/DL (ref 3.2–4.6)
ALBUMIN SERPL-MCNC: 3.1 G/DL (ref 3.2–4.6)
ALBUMIN SERPL-MCNC: 3.2 G/DL (ref 3.2–4.6)
ALBUMIN SERPL-MCNC: 3.2 G/DL (ref 3.2–4.6)
ALBUMIN SERPL-MCNC: 3.3 G/DL (ref 3.2–4.6)
ALBUMIN SERPL-MCNC: 3.3 G/DL (ref 3.2–4.6)
ALBUMIN SERPL-MCNC: 3.4 G/DL (ref 3.2–4.6)
ALBUMIN SERPL-MCNC: 3.5 G/DL (ref 3.2–4.6)
ALBUMIN SERPL-MCNC: 3.6 G/DL (ref 3.2–4.6)
ALBUMIN/GLOB SERPL: 0.8 {RATIO} (ref 1.2–3.5)
ALBUMIN/GLOB SERPL: 0.8 {RATIO} (ref 1.2–3.5)
ALBUMIN/GLOB SERPL: 0.9 {RATIO} (ref 1.2–3.5)
ALBUMIN/GLOB SERPL: 1 {RATIO} (ref 1.2–3.5)
ALBUMIN/GLOB SERPL: 1.1 {RATIO} (ref 1.2–3.5)
ALP SERPL-CCNC: 102 U/L (ref 50–136)
ALP SERPL-CCNC: 104 U/L (ref 50–136)
ALP SERPL-CCNC: 75 U/L (ref 50–136)
ALP SERPL-CCNC: 78 U/L (ref 50–136)
ALP SERPL-CCNC: 80 U/L (ref 50–136)
ALP SERPL-CCNC: 81 U/L (ref 50–136)
ALP SERPL-CCNC: 83 U/L (ref 50–136)
ALP SERPL-CCNC: 84 U/L (ref 50–136)
ALP SERPL-CCNC: 91 U/L (ref 50–136)
ALP SERPL-CCNC: 92 U/L (ref 50–136)
ALP SERPL-CCNC: 93 U/L (ref 50–136)
ALP SERPL-CCNC: 94 U/L (ref 50–136)
ALT SERPL-CCNC: 13 U/L (ref 12–65)
ALT SERPL-CCNC: 13 U/L (ref 12–65)
ALT SERPL-CCNC: 15 U/L (ref 12–65)
ALT SERPL-CCNC: 15 U/L (ref 12–65)
ALT SERPL-CCNC: 16 U/L (ref 12–65)
ALT SERPL-CCNC: 17 U/L (ref 12–65)
ALT SERPL-CCNC: 19 U/L (ref 12–65)
ALT SERPL-CCNC: 19 U/L (ref 12–65)
ALT SERPL-CCNC: 20 U/L (ref 12–65)
ALT SERPL-CCNC: 21 U/L (ref 12–65)
ANION GAP SERPL CALC-SCNC: 3 MMOL/L (ref 7–16)
ANION GAP SERPL CALC-SCNC: 4 MMOL/L (ref 7–16)
ANION GAP SERPL CALC-SCNC: 5 MMOL/L (ref 7–16)
ANION GAP SERPL CALC-SCNC: 6 MMOL/L (ref 7–16)
ANION GAP SERPL CALC-SCNC: 7 MMOL/L (ref 7–16)
ANION GAP SERPL CALC-SCNC: 7 MMOL/L (ref 7–16)
APPEARANCE UR: ABNORMAL
APPEARANCE UR: ABNORMAL
AST SERPL-CCNC: 11 U/L (ref 15–37)
AST SERPL-CCNC: 12 U/L (ref 15–37)
AST SERPL-CCNC: 13 U/L (ref 15–37)
AST SERPL-CCNC: 14 U/L (ref 15–37)
AST SERPL-CCNC: 15 U/L (ref 15–37)
AST SERPL-CCNC: 16 U/L (ref 15–37)
AST SERPL-CCNC: 20 U/L (ref 15–37)
AST SERPL-CCNC: 21 U/L (ref 15–37)
BACTERIA SPEC CULT: ABNORMAL
BACTERIA URNS QL MICRO: ABNORMAL /HPF
BACTERIA URNS QL MICRO: ABNORMAL /HPF
BASOPHILS # BLD: 0 K/UL (ref 0–0.2)
BASOPHILS # BLD: 0.1 K/UL (ref 0–0.2)
BASOPHILS NFR BLD: 0 % (ref 0–2)
BASOPHILS NFR BLD: 1 % (ref 0–2)
BILIRUB SERPL-MCNC: 0.3 MG/DL (ref 0.2–1.1)
BILIRUB SERPL-MCNC: 0.4 MG/DL (ref 0.2–1.1)
BILIRUB SERPL-MCNC: 0.5 MG/DL (ref 0.2–1.1)
BILIRUB SERPL-MCNC: 0.6 MG/DL (ref 0.2–1.1)
BILIRUB UR QL: NEGATIVE
BILIRUB UR QL: NEGATIVE
BUN SERPL-MCNC: 21 MG/DL (ref 8–23)
BUN SERPL-MCNC: 24 MG/DL (ref 8–23)
BUN SERPL-MCNC: 25 MG/DL (ref 8–23)
BUN SERPL-MCNC: 29 MG/DL (ref 8–23)
BUN SERPL-MCNC: 30 MG/DL (ref 8–23)
BUN SERPL-MCNC: 31 MG/DL (ref 8–23)
BUN SERPL-MCNC: 31 MG/DL (ref 8–23)
BUN SERPL-MCNC: 32 MG/DL (ref 8–23)
BUN SERPL-MCNC: 35 MG/DL (ref 8–23)
BUN SERPL-MCNC: 36 MG/DL (ref 8–23)
CALCIUM SERPL-MCNC: 8.2 MG/DL (ref 8.3–10.4)
CALCIUM SERPL-MCNC: 8.3 MG/DL (ref 8.3–10.4)
CALCIUM SERPL-MCNC: 8.4 MG/DL (ref 8.3–10.4)
CALCIUM SERPL-MCNC: 8.4 MG/DL (ref 8.3–10.4)
CALCIUM SERPL-MCNC: 8.6 MG/DL (ref 8.3–10.4)
CALCIUM SERPL-MCNC: 8.6 MG/DL (ref 8.3–10.4)
CALCIUM SERPL-MCNC: 8.7 MG/DL (ref 8.3–10.4)
CALCIUM SERPL-MCNC: 8.7 MG/DL (ref 8.3–10.4)
CALCIUM SERPL-MCNC: 8.8 MG/DL (ref 8.3–10.4)
CASTS URNS QL MICRO: 0 /LPF
CASTS URNS QL MICRO: 0 /LPF
CEA SERPL-MCNC: 131.6 NG/ML (ref 0–3)
CEA SERPL-MCNC: 138.1 NG/ML (ref 0–3)
CEA SERPL-MCNC: 163.2 NG/ML (ref 0–3)
CEA SERPL-MCNC: 27.4 NG/ML (ref 0–3)
CEA SERPL-MCNC: 31.7 NG/ML (ref 0–3)
CEA SERPL-MCNC: 33.6 NG/ML (ref 0–3)
CEA SERPL-MCNC: 48 NG/ML (ref 0–3)
CEA SERPL-MCNC: 55 NG/ML (ref 0–3)
CEA SERPL-MCNC: 60.7 NG/ML (ref 0–3)
CEA SERPL-MCNC: 78.1 NG/ML (ref 0–3)
CHLORIDE SERPL-SCNC: 106 MMOL/L (ref 98–107)
CHLORIDE SERPL-SCNC: 109 MMOL/L (ref 98–107)
CHLORIDE SERPL-SCNC: 110 MMOL/L (ref 98–107)
CHLORIDE SERPL-SCNC: 111 MMOL/L (ref 98–107)
CHLORIDE SERPL-SCNC: 112 MMOL/L (ref 98–107)
CO2 SERPL-SCNC: 23 MMOL/L (ref 21–32)
CO2 SERPL-SCNC: 23 MMOL/L (ref 21–32)
CO2 SERPL-SCNC: 24 MMOL/L (ref 21–32)
CO2 SERPL-SCNC: 24 MMOL/L (ref 21–32)
CO2 SERPL-SCNC: 25 MMOL/L (ref 21–32)
CO2 SERPL-SCNC: 26 MMOL/L (ref 21–32)
CO2 SERPL-SCNC: 26 MMOL/L (ref 21–32)
CO2 SERPL-SCNC: 27 MMOL/L (ref 21–32)
COLOR UR: YELLOW
COLOR UR: YELLOW
CREAT SERPL-MCNC: 1.8 MG/DL (ref 0.8–1.5)
CREAT SERPL-MCNC: 1.8 MG/DL (ref 0.8–1.5)
CREAT SERPL-MCNC: 1.9 MG/DL (ref 0.8–1.5)
CREAT SERPL-MCNC: 2.3 MG/DL (ref 0.8–1.5)
CREAT SERPL-MCNC: 2.3 MG/DL (ref 0.8–1.5)
CREAT SERPL-MCNC: 2.4 MG/DL (ref 0.8–1.5)
CREAT SERPL-MCNC: 2.4 MG/DL (ref 0.8–1.5)
CREAT SERPL-MCNC: 2.5 MG/DL (ref 0.8–1.5)
CREAT SERPL-MCNC: 2.5 MG/DL (ref 0.8–1.5)
CREAT SERPL-MCNC: 2.6 MG/DL (ref 0.8–1.5)
CREAT SERPL-MCNC: 2.7 MG/DL (ref 0.8–1.5)
CREAT SERPL-MCNC: 2.7 MG/DL (ref 0.8–1.5)
CREAT UR-MCNC: 153 MG/DL
CREAT UR-MCNC: 61 MG/DL
CRYSTALS URNS QL MICRO: 0 /LPF
CRYSTALS URNS QL MICRO: 0 /LPF
DIFFERENTIAL METHOD BLD: ABNORMAL
EOSINOPHIL # BLD: 0 K/UL (ref 0–0.8)
EOSINOPHIL # BLD: 0.1 K/UL (ref 0–0.8)
EOSINOPHIL # BLD: 0.2 K/UL (ref 0–0.8)
EOSINOPHIL # BLD: 0.2 K/UL (ref 0–0.8)
EOSINOPHIL # BLD: 0.3 K/UL (ref 0–0.8)
EOSINOPHIL # BLD: 0.3 K/UL (ref 0–0.8)
EOSINOPHIL NFR BLD: 0 % (ref 0.5–7.8)
EOSINOPHIL NFR BLD: 1 % (ref 0.5–7.8)
EOSINOPHIL NFR BLD: 2 % (ref 0.5–7.8)
EOSINOPHIL NFR BLD: 2 % (ref 0.5–7.8)
EOSINOPHIL NFR BLD: 3 % (ref 0.5–7.8)
EOSINOPHIL NFR BLD: 4 % (ref 0.5–7.8)
EOSINOPHIL NFR BLD: 5 % (ref 0.5–7.8)
EOSINOPHIL NFR BLD: 5 % (ref 0.5–7.8)
EOSINOPHIL NFR BLD: 6 % (ref 0.5–7.8)
EPI CELLS #/AREA URNS HPF: ABNORMAL /HPF
EPI CELLS #/AREA URNS HPF: ABNORMAL /HPF
ERYTHROCYTE [DISTWIDTH] IN BLOOD BY AUTOMATED COUNT: 13.6 % (ref 11.9–14.6)
ERYTHROCYTE [DISTWIDTH] IN BLOOD BY AUTOMATED COUNT: 14 % (ref 11.9–14.6)
ERYTHROCYTE [DISTWIDTH] IN BLOOD BY AUTOMATED COUNT: 14 % (ref 11.9–14.6)
ERYTHROCYTE [DISTWIDTH] IN BLOOD BY AUTOMATED COUNT: 14.4 % (ref 11.9–14.6)
ERYTHROCYTE [DISTWIDTH] IN BLOOD BY AUTOMATED COUNT: 14.7 % (ref 11.9–14.6)
ERYTHROCYTE [DISTWIDTH] IN BLOOD BY AUTOMATED COUNT: 14.8 % (ref 11.9–14.6)
ERYTHROCYTE [DISTWIDTH] IN BLOOD BY AUTOMATED COUNT: 14.8 % (ref 11.9–14.6)
ERYTHROCYTE [DISTWIDTH] IN BLOOD BY AUTOMATED COUNT: 15.1 % (ref 11.9–14.6)
ERYTHROCYTE [DISTWIDTH] IN BLOOD BY AUTOMATED COUNT: 15.9 % (ref 11.9–14.6)
ERYTHROCYTE [DISTWIDTH] IN BLOOD BY AUTOMATED COUNT: 16.3 % (ref 11.9–14.6)
ERYTHROCYTE [DISTWIDTH] IN BLOOD BY AUTOMATED COUNT: 16.3 % (ref 11.9–14.6)
ERYTHROCYTE [DISTWIDTH] IN BLOOD BY AUTOMATED COUNT: 16.4 % (ref 11.9–14.6)
GLOBULIN SER CALC-MCNC: 3 G/DL (ref 2.3–3.5)
GLOBULIN SER CALC-MCNC: 3.1 G/DL (ref 2.3–3.5)
GLOBULIN SER CALC-MCNC: 3.1 G/DL (ref 2.3–3.5)
GLOBULIN SER CALC-MCNC: 3.2 G/DL (ref 2.3–3.5)
GLOBULIN SER CALC-MCNC: 3.2 G/DL (ref 2.3–3.5)
GLOBULIN SER CALC-MCNC: 3.3 G/DL (ref 2.3–3.5)
GLOBULIN SER CALC-MCNC: 3.3 G/DL (ref 2.3–3.5)
GLOBULIN SER CALC-MCNC: 3.5 G/DL (ref 2.3–3.5)
GLOBULIN SER CALC-MCNC: 3.6 G/DL (ref 2.3–3.5)
GLOBULIN SER CALC-MCNC: 3.6 G/DL (ref 2.3–3.5)
GLOBULIN SER CALC-MCNC: 3.7 G/DL (ref 2.3–3.5)
GLOBULIN SER CALC-MCNC: 3.9 G/DL (ref 2.3–3.5)
GLUCOSE SERPL-MCNC: 100 MG/DL (ref 65–100)
GLUCOSE SERPL-MCNC: 101 MG/DL (ref 65–100)
GLUCOSE SERPL-MCNC: 102 MG/DL (ref 65–100)
GLUCOSE SERPL-MCNC: 103 MG/DL (ref 65–100)
GLUCOSE SERPL-MCNC: 106 MG/DL (ref 65–100)
GLUCOSE SERPL-MCNC: 108 MG/DL (ref 65–100)
GLUCOSE SERPL-MCNC: 109 MG/DL (ref 65–100)
GLUCOSE SERPL-MCNC: 116 MG/DL (ref 65–100)
GLUCOSE SERPL-MCNC: 137 MG/DL (ref 65–100)
GLUCOSE SERPL-MCNC: 150 MG/DL (ref 65–100)
GLUCOSE SERPL-MCNC: 90 MG/DL (ref 65–100)
GLUCOSE SERPL-MCNC: 98 MG/DL (ref 65–100)
GLUCOSE UR STRIP.AUTO-MCNC: NEGATIVE MG/DL
GLUCOSE UR STRIP.AUTO-MCNC: NEGATIVE MG/DL
HCT VFR BLD AUTO: 25.7 % (ref 41.1–50.3)
HCT VFR BLD AUTO: 26.9 % (ref 41.1–50.3)
HCT VFR BLD AUTO: 27.8 % (ref 41.1–50.3)
HCT VFR BLD AUTO: 28.1 % (ref 41.1–50.3)
HCT VFR BLD AUTO: 28.2 % (ref 41.1–50.3)
HCT VFR BLD AUTO: 28.9 % (ref 41.1–50.3)
HCT VFR BLD AUTO: 28.9 % (ref 41.1–50.3)
HCT VFR BLD AUTO: 29.4 % (ref 41.1–50.3)
HCT VFR BLD AUTO: 29.6 % (ref 41.1–50.3)
HCT VFR BLD AUTO: 30.1 % (ref 41.1–50.3)
HCT VFR BLD AUTO: 30.4 % (ref 41.1–50.3)
HCT VFR BLD AUTO: 31.4 % (ref 41.1–50.3)
HGB BLD-MCNC: 10 G/DL (ref 13.6–17.2)
HGB BLD-MCNC: 10.5 G/DL (ref 13.6–17.2)
HGB BLD-MCNC: 8.5 G/DL (ref 13.6–17.2)
HGB BLD-MCNC: 8.9 G/DL (ref 13.6–17.2)
HGB BLD-MCNC: 9.1 G/DL (ref 13.6–17.2)
HGB BLD-MCNC: 9.2 G/DL (ref 13.6–17.2)
HGB BLD-MCNC: 9.2 G/DL (ref 13.6–17.2)
HGB BLD-MCNC: 9.4 G/DL (ref 13.6–17.2)
HGB BLD-MCNC: 9.5 G/DL (ref 13.6–17.2)
HGB BLD-MCNC: 9.7 G/DL (ref 13.6–17.2)
HGB BLD-MCNC: 9.9 G/DL (ref 13.6–17.2)
HGB BLD-MCNC: 9.9 G/DL (ref 13.6–17.2)
HGB UR QL STRIP: ABNORMAL
HGB UR QL STRIP: ABNORMAL
IMM GRANULOCYTES # BLD AUTO: 0 K/UL (ref 0–0.5)
IMM GRANULOCYTES # BLD AUTO: 0.1 K/UL (ref 0–0.5)
IMM GRANULOCYTES # BLD AUTO: 0.1 K/UL (ref 0–0.5)
IMM GRANULOCYTES NFR BLD AUTO: 0 % (ref 0–5)
IMM GRANULOCYTES NFR BLD AUTO: 1 % (ref 0–5)
IMM GRANULOCYTES NFR BLD AUTO: 2 % (ref 0–5)
IMM GRANULOCYTES NFR BLD AUTO: 3 % (ref 0–5)
IMM GRANULOCYTES NFR BLD AUTO: 3 % (ref 0–5)
KETONES UR QL STRIP.AUTO: NEGATIVE MG/DL
KETONES UR QL STRIP.AUTO: NEGATIVE MG/DL
LEUKOCYTE ESTERASE UR QL STRIP.AUTO: ABNORMAL
LEUKOCYTE ESTERASE UR QL STRIP.AUTO: ABNORMAL
LYMPHOCYTES # BLD: 0.3 K/UL (ref 0.5–4.6)
LYMPHOCYTES # BLD: 0.4 K/UL (ref 0.5–4.6)
LYMPHOCYTES # BLD: 0.4 K/UL (ref 0.5–4.6)
LYMPHOCYTES # BLD: 0.5 K/UL (ref 0.5–4.6)
LYMPHOCYTES # BLD: 0.6 K/UL (ref 0.5–4.6)
LYMPHOCYTES # BLD: 0.7 K/UL (ref 0.5–4.6)
LYMPHOCYTES NFR BLD: 10 % (ref 13–44)
LYMPHOCYTES NFR BLD: 12 % (ref 13–44)
LYMPHOCYTES NFR BLD: 15 % (ref 13–44)
LYMPHOCYTES NFR BLD: 17 % (ref 13–44)
LYMPHOCYTES NFR BLD: 18 % (ref 13–44)
LYMPHOCYTES NFR BLD: 22 % (ref 13–44)
LYMPHOCYTES NFR BLD: 25 % (ref 13–44)
LYMPHOCYTES NFR BLD: 33 % (ref 13–44)
LYMPHOCYTES NFR BLD: 7 % (ref 13–44)
Lab: NORMAL
MAGNESIUM SERPL-MCNC: 1.7 MG/DL (ref 1.8–2.4)
MAGNESIUM SERPL-MCNC: 1.7 MG/DL (ref 1.8–2.4)
MAGNESIUM SERPL-MCNC: 1.9 MG/DL (ref 1.8–2.4)
MAGNESIUM SERPL-MCNC: 1.9 MG/DL (ref 1.8–2.4)
MAGNESIUM SERPL-MCNC: 2 MG/DL (ref 1.8–2.4)
MAGNESIUM SERPL-MCNC: 2 MG/DL (ref 1.8–2.4)
MAGNESIUM SERPL-MCNC: 2.1 MG/DL (ref 1.8–2.4)
MAGNESIUM SERPL-MCNC: 2.1 MG/DL (ref 1.8–2.4)
MAGNESIUM SERPL-MCNC: 2.2 MG/DL (ref 1.8–2.4)
MAGNESIUM SERPL-MCNC: 2.4 MG/DL (ref 1.8–2.4)
MCH RBC QN AUTO: 28.4 PG (ref 26.1–32.9)
MCH RBC QN AUTO: 28.5 PG (ref 26.1–32.9)
MCH RBC QN AUTO: 28.6 PG (ref 26.1–32.9)
MCH RBC QN AUTO: 28.6 PG (ref 26.1–32.9)
MCH RBC QN AUTO: 28.7 PG (ref 26.1–32.9)
MCH RBC QN AUTO: 29.3 PG (ref 26.1–32.9)
MCHC RBC AUTO-ENTMCNC: 32.5 G/DL (ref 31.4–35)
MCHC RBC AUTO-ENTMCNC: 32.6 G/DL (ref 31.4–35)
MCHC RBC AUTO-ENTMCNC: 32.7 G/DL (ref 31.4–35)
MCHC RBC AUTO-ENTMCNC: 32.7 G/DL (ref 31.4–35)
MCHC RBC AUTO-ENTMCNC: 32.9 G/DL (ref 31.4–35)
MCHC RBC AUTO-ENTMCNC: 33 G/DL (ref 31.4–35)
MCHC RBC AUTO-ENTMCNC: 33.1 G/DL (ref 31.4–35)
MCHC RBC AUTO-ENTMCNC: 33.1 G/DL (ref 31.4–35)
MCHC RBC AUTO-ENTMCNC: 33.4 G/DL (ref 31.4–35)
MCHC RBC AUTO-ENTMCNC: 33.4 G/DL (ref 31.4–35)
MCV RBC AUTO: 85.1 FL (ref 79.6–97.8)
MCV RBC AUTO: 85.1 FL (ref 79.6–97.8)
MCV RBC AUTO: 87 FL (ref 79.6–97.8)
MCV RBC AUTO: 87.3 FL (ref 79.6–97.8)
MCV RBC AUTO: 87.4 FL (ref 79.6–97.8)
MCV RBC AUTO: 87.4 FL (ref 79.6–97.8)
MCV RBC AUTO: 87.5 FL (ref 79.6–97.8)
MCV RBC AUTO: 88.5 FL (ref 79.6–97.8)
MCV RBC AUTO: 88.6 FL (ref 79.6–97.8)
MCV RBC AUTO: 88.8 FL (ref 79.6–97.8)
MCV RBC AUTO: 89.8 FL (ref 79.6–97.8)
MCV RBC AUTO: 90 FL (ref 79.6–97.8)
MONOCYTES # BLD: 0.3 K/UL (ref 0.1–1.3)
MONOCYTES # BLD: 0.3 K/UL (ref 0.1–1.3)
MONOCYTES # BLD: 0.4 K/UL (ref 0.1–1.3)
MONOCYTES # BLD: 0.5 K/UL (ref 0.1–1.3)
MONOCYTES # BLD: 0.6 K/UL (ref 0.1–1.3)
MONOCYTES # BLD: 0.6 K/UL (ref 0.1–1.3)
MONOCYTES NFR BLD: 10 % (ref 4–12)
MONOCYTES NFR BLD: 11 % (ref 4–12)
MONOCYTES NFR BLD: 13 % (ref 4–12)
MONOCYTES NFR BLD: 18 % (ref 4–12)
MONOCYTES NFR BLD: 20 % (ref 4–12)
MONOCYTES NFR BLD: 23 % (ref 4–12)
MONOCYTES NFR BLD: 6 % (ref 4–12)
MONOCYTES NFR BLD: 7 % (ref 4–12)
MONOCYTES NFR BLD: 8 % (ref 4–12)
MUCOUS THREADS URNS QL MICRO: 0 /LPF
MUCOUS THREADS URNS QL MICRO: 0 /LPF
NEUTS SEG # BLD: 0.9 K/UL (ref 1.7–8.2)
NEUTS SEG # BLD: 1.2 K/UL (ref 1.7–8.2)
NEUTS SEG # BLD: 1.5 K/UL (ref 1.7–8.2)
NEUTS SEG # BLD: 2.2 K/UL (ref 1.7–8.2)
NEUTS SEG # BLD: 2.9 K/UL (ref 1.7–8.2)
NEUTS SEG # BLD: 3.3 K/UL (ref 1.7–8.2)
NEUTS SEG # BLD: 3.6 K/UL (ref 1.7–8.2)
NEUTS SEG # BLD: 3.8 K/UL (ref 1.7–8.2)
NEUTS SEG # BLD: 4.8 K/UL (ref 1.7–8.2)
NEUTS SEG # BLD: 6.6 K/UL (ref 1.7–8.2)
NEUTS SEG NFR BLD: 39 % (ref 43–78)
NEUTS SEG NFR BLD: 50 % (ref 43–78)
NEUTS SEG NFR BLD: 57 % (ref 43–78)
NEUTS SEG NFR BLD: 65 % (ref 43–78)
NEUTS SEG NFR BLD: 66 % (ref 43–78)
NEUTS SEG NFR BLD: 70 % (ref 43–78)
NEUTS SEG NFR BLD: 75 % (ref 43–78)
NEUTS SEG NFR BLD: 77 % (ref 43–78)
NEUTS SEG NFR BLD: 78 % (ref 43–78)
NEUTS SEG NFR BLD: 80 % (ref 43–78)
NEUTS SEG NFR BLD: 83 % (ref 43–78)
NEUTS SEG NFR BLD: 85 % (ref 43–78)
NITRITE UR QL STRIP.AUTO: NEGATIVE
NITRITE UR QL STRIP.AUTO: NEGATIVE
NRBC # BLD: 0 K/UL (ref 0–0.2)
OTHER OBSERVATIONS,UCOM: ABNORMAL
OTHER OBSERVATIONS,UCOM: ABNORMAL
PH UR STRIP: 5 [PH] (ref 5–9)
PH UR STRIP: 5 [PH] (ref 5–9)
PHOSPHATE SERPL-MCNC: 2.9 MG/DL (ref 2.3–3.7)
PHOSPHATE SERPL-MCNC: 3.6 MG/DL (ref 2.3–3.7)
PLATELET # BLD AUTO: 135 K/UL (ref 150–450)
PLATELET # BLD AUTO: 135 K/UL (ref 150–450)
PLATELET # BLD AUTO: 160 K/UL (ref 150–450)
PLATELET # BLD AUTO: 164 K/UL (ref 150–450)
PLATELET # BLD AUTO: 169 K/UL (ref 150–450)
PLATELET # BLD AUTO: 190 K/UL (ref 150–450)
PLATELET # BLD AUTO: 203 K/UL (ref 150–450)
PLATELET # BLD AUTO: 245 K/UL (ref 150–450)
PLATELET # BLD AUTO: 257 K/UL (ref 150–450)
PLATELET # BLD AUTO: 257 K/UL (ref 150–450)
PLATELET # BLD AUTO: 259 K/UL (ref 150–450)
PLATELET # BLD AUTO: 261 K/UL (ref 150–450)
PMV BLD AUTO: 10 FL (ref 9.4–12.3)
PMV BLD AUTO: 10 FL (ref 9.4–12.3)
PMV BLD AUTO: 8.6 FL (ref 9.4–12.3)
PMV BLD AUTO: 8.7 FL (ref 9.4–12.3)
PMV BLD AUTO: 9 FL (ref 9.4–12.3)
PMV BLD AUTO: 9 FL (ref 9.4–12.3)
PMV BLD AUTO: 9.2 FL (ref 9.4–12.3)
PMV BLD AUTO: 9.4 FL (ref 9.4–12.3)
PMV BLD AUTO: 9.5 FL (ref 9.4–12.3)
PMV BLD AUTO: 9.6 FL (ref 9.4–12.3)
PMV BLD AUTO: 9.7 FL (ref 9.4–12.3)
PMV BLD AUTO: 9.9 FL (ref 9.4–12.3)
POTASSIUM SERPL-SCNC: 4 MMOL/L (ref 3.5–5.1)
POTASSIUM SERPL-SCNC: 4.1 MMOL/L (ref 3.5–5.1)
POTASSIUM SERPL-SCNC: 4.1 MMOL/L (ref 3.5–5.1)
POTASSIUM SERPL-SCNC: 4.2 MMOL/L (ref 3.5–5.1)
POTASSIUM SERPL-SCNC: 4.3 MMOL/L (ref 3.5–5.1)
POTASSIUM SERPL-SCNC: 4.4 MMOL/L (ref 3.5–5.1)
POTASSIUM SERPL-SCNC: 4.5 MMOL/L (ref 3.5–5.1)
POTASSIUM SERPL-SCNC: 4.6 MMOL/L (ref 3.5–5.1)
POTASSIUM SERPL-SCNC: 4.9 MMOL/L (ref 3.5–5.1)
PROT SERPL-MCNC: 6.3 G/DL (ref 6.3–8.2)
PROT SERPL-MCNC: 6.4 G/DL (ref 6.3–8.2)
PROT SERPL-MCNC: 6.5 G/DL (ref 6.3–8.2)
PROT SERPL-MCNC: 6.5 G/DL (ref 6.3–8.2)
PROT SERPL-MCNC: 6.6 G/DL (ref 6.3–8.2)
PROT SERPL-MCNC: 6.6 G/DL (ref 6.3–8.2)
PROT SERPL-MCNC: 6.7 G/DL (ref 6.3–8.2)
PROT SERPL-MCNC: 6.9 G/DL (ref 6.3–8.2)
PROT SERPL-MCNC: 7 G/DL (ref 6.3–8.2)
PROT SERPL-MCNC: 7.1 G/DL (ref 6.3–8.2)
PROT UR STRIP-MCNC: 30 MG/DL
PROT UR STRIP-MCNC: ABNORMAL MG/DL
RBC # BLD AUTO: 2.9 M/UL (ref 4.23–5.67)
RBC # BLD AUTO: 3.04 M/UL (ref 4.23–5.67)
RBC # BLD AUTO: 3.14 M/UL (ref 4.23–5.67)
RBC # BLD AUTO: 3.18 M/UL (ref 4.23–5.67)
RBC # BLD AUTO: 3.21 M/UL (ref 4.23–5.67)
RBC # BLD AUTO: 3.21 M/UL (ref 4.23–5.67)
RBC # BLD AUTO: 3.31 M/UL (ref 4.23–5.67)
RBC # BLD AUTO: 3.31 M/UL (ref 4.23–5.67)
RBC # BLD AUTO: 3.46 M/UL (ref 4.23–5.67)
RBC # BLD AUTO: 3.48 M/UL (ref 4.23–5.67)
RBC # BLD AUTO: 3.48 M/UL (ref 4.23–5.67)
RBC # BLD AUTO: 3.69 M/UL (ref 4.23–5.67)
RBC #/AREA URNS HPF: ABNORMAL /HPF
RBC #/AREA URNS HPF: ABNORMAL /HPF
REFERENCE LAB,REFLB: NORMAL
SERVICE CMNT-IMP: ABNORMAL
SODIUM SERPL-SCNC: 137 MMOL/L (ref 136–145)
SODIUM SERPL-SCNC: 139 MMOL/L (ref 136–145)
SODIUM SERPL-SCNC: 140 MMOL/L (ref 136–145)
SODIUM SERPL-SCNC: 140 MMOL/L (ref 136–145)
SODIUM SERPL-SCNC: 141 MMOL/L (ref 136–145)
SODIUM SERPL-SCNC: 141 MMOL/L (ref 136–145)
SODIUM SERPL-SCNC: 142 MMOL/L (ref 136–145)
SODIUM SERPL-SCNC: 142 MMOL/L (ref 136–145)
SODIUM UR-SCNC: 100 MMOL/L
SODIUM UR-SCNC: 59 MMOL/L
SP GR UR REFRACTOMETRY: 1.02 (ref 1–1.02)
SP GR UR REFRACTOMETRY: <=1.005 (ref 1–1.02)
T3 SERPL-MCNC: 1.01 NG/ML (ref 0.6–1.81)
T4 FREE SERPL-MCNC: 0.9 NG/DL (ref 0.78–1.46)
TEST DESCRIPTION:,ATST: NORMAL
TSH SERPL DL<=0.005 MIU/L-ACNC: 2.05 UIU/ML (ref 0.36–3.74)
TSH SERPL DL<=0.005 MIU/L-ACNC: 2.38 UIU/ML (ref 0.36–3.74)
UA: UC IF INDICATED,UAUC: ABNORMAL
UROBILINOGEN UR QL STRIP.AUTO: 0.2 EU/DL (ref 0.2–1)
UROBILINOGEN UR QL STRIP.AUTO: 0.2 EU/DL (ref 0.2–1)
WBC # BLD AUTO: 2.2 K/UL (ref 4.3–11.1)
WBC # BLD AUTO: 2.5 K/UL (ref 4.3–11.1)
WBC # BLD AUTO: 2.7 K/UL (ref 4.3–11.1)
WBC # BLD AUTO: 3.4 K/UL (ref 4.3–11.1)
WBC # BLD AUTO: 4.2 K/UL (ref 4.3–11.1)
WBC # BLD AUTO: 4.6 K/UL (ref 4.3–11.1)
WBC # BLD AUTO: 4.6 K/UL (ref 4.3–11.1)
WBC # BLD AUTO: 4.9 K/UL (ref 4.3–11.1)
WBC # BLD AUTO: 5 K/UL (ref 4.3–11.1)
WBC # BLD AUTO: 5.1 K/UL (ref 4.3–11.1)
WBC # BLD AUTO: 6.2 K/UL (ref 4.3–11.1)
WBC # BLD AUTO: 7.8 K/UL (ref 4.3–11.1)
WBC URNS QL MICRO: >100 /HPF
WBC URNS QL MICRO: ABNORMAL /HPF

## 2020-01-01 PROCEDURE — 82378 CARCINOEMBRYONIC ANTIGEN: CPT

## 2020-01-01 PROCEDURE — 77336 RADIATION PHYSICS CONSULT: CPT

## 2020-01-01 PROCEDURE — 84443 ASSAY THYROID STIM HORMONE: CPT

## 2020-01-01 PROCEDURE — 74011250636 HC RX REV CODE- 250/636: Performed by: INTERNAL MEDICINE

## 2020-01-01 PROCEDURE — 77334 RADIATION TREATMENT AID(S): CPT

## 2020-01-01 PROCEDURE — 77412 RADIATION TX DELIVERY LVL 3: CPT

## 2020-01-01 PROCEDURE — 36591 DRAW BLOOD OFF VENOUS DEVICE: CPT

## 2020-01-01 PROCEDURE — 80053 COMPREHEN METABOLIC PANEL: CPT

## 2020-01-01 PROCEDURE — 74011000250 HC RX REV CODE- 250: Performed by: INTERNAL MEDICINE

## 2020-01-01 PROCEDURE — 84300 ASSAY OF URINE SODIUM: CPT

## 2020-01-01 PROCEDURE — 96368 THER/DIAG CONCURRENT INF: CPT

## 2020-01-01 PROCEDURE — 84480 ASSAY TRIIODOTHYRONINE (T3): CPT

## 2020-01-01 PROCEDURE — 77417 THER RADIOLOGY PORT IMAGE(S): CPT

## 2020-01-01 PROCEDURE — 96375 TX/PRO/DX INJ NEW DRUG ADDON: CPT

## 2020-01-01 PROCEDURE — 77470 SPECIAL RADIATION TREATMENT: CPT

## 2020-01-01 PROCEDURE — 83735 ASSAY OF MAGNESIUM: CPT

## 2020-01-01 PROCEDURE — 96413 CHEMO IV INFUSION 1 HR: CPT

## 2020-01-01 PROCEDURE — 77280 THER RAD SIMULAJ FIELD SMPL: CPT

## 2020-01-01 PROCEDURE — 96415 CHEMO IV INFUSION ADDL HR: CPT

## 2020-01-01 PROCEDURE — 87186 SC STD MICRODIL/AGAR DIL: CPT

## 2020-01-01 PROCEDURE — 85025 COMPLETE CBC W/AUTO DIFF WBC: CPT

## 2020-01-01 PROCEDURE — 84439 ASSAY OF FREE THYROXINE: CPT

## 2020-01-01 PROCEDURE — 87086 URINE CULTURE/COLONY COUNT: CPT

## 2020-01-01 PROCEDURE — 74011000258 HC RX REV CODE- 258: Performed by: INTERNAL MEDICINE

## 2020-01-01 PROCEDURE — 82570 ASSAY OF URINE CREATININE: CPT

## 2020-01-01 PROCEDURE — 96367 TX/PROPH/DG ADDL SEQ IV INF: CPT

## 2020-01-01 PROCEDURE — 99211 OFF/OP EST MAY X REQ PHY/QHP: CPT

## 2020-01-01 PROCEDURE — 74011250636 HC RX REV CODE- 250/636: Performed by: RADIOLOGY

## 2020-01-01 PROCEDURE — 84100 ASSAY OF PHOSPHORUS: CPT

## 2020-01-01 PROCEDURE — 74177 CT ABD & PELVIS W/CONTRAST: CPT

## 2020-01-01 PROCEDURE — 77295 3-D RADIOTHERAPY PLAN: CPT

## 2020-01-01 PROCEDURE — 87088 URINE BACTERIA CULTURE: CPT

## 2020-01-01 PROCEDURE — 96417 CHEMO IV INFUS EACH ADDL SEQ: CPT

## 2020-01-01 PROCEDURE — A9575 INJ GADOTERATE MEGLUMI 0.1ML: HCPCS | Performed by: RADIOLOGY

## 2020-01-01 PROCEDURE — 81003 URINALYSIS AUTO W/O SCOPE: CPT

## 2020-01-01 PROCEDURE — 81001 URINALYSIS AUTO W/SCOPE: CPT

## 2020-01-01 PROCEDURE — 76857 US EXAM PELVIC LIMITED: CPT

## 2020-01-01 PROCEDURE — 70553 MRI BRAIN STEM W/O & W/DYE: CPT

## 2020-01-01 PROCEDURE — 77300 RADIATION THERAPY DOSE PLAN: CPT

## 2020-01-01 PROCEDURE — 77290 THER RAD SIMULAJ FIELD CPLX: CPT

## 2020-01-01 PROCEDURE — 74011000636 HC RX REV CODE- 636: Performed by: INTERNAL MEDICINE

## 2020-01-01 PROCEDURE — 74011636320 HC RX REV CODE- 636/320: Performed by: INTERNAL MEDICINE

## 2020-01-01 PROCEDURE — 81015 MICROSCOPIC EXAM OF URINE: CPT

## 2020-01-01 RX ORDER — GADOTERATE MEGLUMINE 376.9 MG/ML
17 INJECTION INTRAVENOUS
Status: COMPLETED | OUTPATIENT
Start: 2020-01-01 | End: 2020-01-01

## 2020-01-01 RX ORDER — SODIUM CHLORIDE 0.9 % (FLUSH) 0.9 %
10 SYRINGE (ML) INJECTION
Status: CANCELLED | OUTPATIENT
Start: 2020-01-01 | End: 2020-01-01

## 2020-01-01 RX ORDER — SODIUM CHLORIDE 0.9 % (FLUSH) 0.9 %
10 SYRINGE (ML) INJECTION AS NEEDED
Status: DISCONTINUED | OUTPATIENT
Start: 2020-01-01 | End: 2020-01-01 | Stop reason: HOSPADM

## 2020-01-01 RX ORDER — SODIUM CHLORIDE 0.9 % (FLUSH) 0.9 %
10 SYRINGE (ML) INJECTION AS NEEDED
Status: ACTIVE | OUTPATIENT
Start: 2020-01-01 | End: 2020-01-01

## 2020-01-01 RX ORDER — HEPARIN 100 UNIT/ML
300-500 SYRINGE INTRAVENOUS AS NEEDED
Status: ACTIVE | OUTPATIENT
Start: 2020-01-01 | End: 2020-01-01

## 2020-01-01 RX ORDER — SODIUM CHLORIDE 9 MG/ML
25 INJECTION, SOLUTION INTRAVENOUS CONTINUOUS
Status: DISCONTINUED | OUTPATIENT
Start: 2020-01-01 | End: 2020-01-01 | Stop reason: HOSPADM

## 2020-01-01 RX ORDER — SODIUM CHLORIDE 9 MG/ML
25 INJECTION, SOLUTION INTRAVENOUS CONTINUOUS
Status: ACTIVE | OUTPATIENT
Start: 2020-01-01 | End: 2020-01-01

## 2020-01-01 RX ORDER — HEPARIN 100 UNIT/ML
500 SYRINGE INTRAVENOUS ONCE
Status: COMPLETED | OUTPATIENT
Start: 2020-01-01 | End: 2020-01-01

## 2020-01-01 RX ORDER — DEXAMETHASONE SODIUM PHOSPHATE 4 MG/ML
8 INJECTION, SOLUTION INTRA-ARTICULAR; INTRALESIONAL; INTRAMUSCULAR; INTRAVENOUS; SOFT TISSUE ONCE
Status: COMPLETED | OUTPATIENT
Start: 2020-01-01 | End: 2020-01-01

## 2020-01-01 RX ORDER — SODIUM CHLORIDE 0.9 % (FLUSH) 0.9 %
10 SYRINGE (ML) INJECTION
Status: COMPLETED | OUTPATIENT
Start: 2020-01-01 | End: 2020-01-01

## 2020-01-01 RX ORDER — SODIUM CHLORIDE 0.9 % (FLUSH) 0.9 %
5-10 SYRINGE (ML) INJECTION
Status: COMPLETED | OUTPATIENT
Start: 2020-01-01 | End: 2020-01-01

## 2020-01-01 RX ORDER — MAGNESIUM SULFATE HEPTAHYDRATE 40 MG/ML
2 INJECTION, SOLUTION INTRAVENOUS ONCE
Status: COMPLETED | OUTPATIENT
Start: 2020-01-01 | End: 2020-01-01

## 2020-01-01 RX ORDER — SODIUM CHLORIDE 9 MG/ML
10 INJECTION INTRAMUSCULAR; INTRAVENOUS; SUBCUTANEOUS AS NEEDED
Status: ACTIVE | OUTPATIENT
Start: 2020-01-01 | End: 2020-01-01

## 2020-01-01 RX ORDER — ONDANSETRON 2 MG/ML
8 INJECTION INTRAMUSCULAR; INTRAVENOUS AS NEEDED
Status: ACTIVE | OUTPATIENT
Start: 2020-01-01 | End: 2020-01-01

## 2020-01-01 RX ORDER — DIPHENHYDRAMINE HYDROCHLORIDE 50 MG/ML
25 INJECTION, SOLUTION INTRAMUSCULAR; INTRAVENOUS AS NEEDED
Status: ACTIVE | OUTPATIENT
Start: 2020-01-01 | End: 2020-01-01

## 2020-01-01 RX ORDER — LORAZEPAM 2 MG/ML
0.5 INJECTION INTRAMUSCULAR
Status: DISCONTINUED | OUTPATIENT
Start: 2020-01-01 | End: 2020-01-01 | Stop reason: HOSPADM

## 2020-01-01 RX ORDER — ONDANSETRON 2 MG/ML
8 INJECTION INTRAMUSCULAR; INTRAVENOUS ONCE
Status: COMPLETED | OUTPATIENT
Start: 2020-01-01 | End: 2020-01-01

## 2020-01-01 RX ORDER — HYDROCORTISONE SODIUM SUCCINATE 100 MG/2ML
100 INJECTION, POWDER, FOR SOLUTION INTRAMUSCULAR; INTRAVENOUS AS NEEDED
Status: ACTIVE | OUTPATIENT
Start: 2020-01-01 | End: 2020-01-01

## 2020-01-01 RX ORDER — SODIUM CHLORIDE 9 MG/ML
25 INJECTION, SOLUTION INTRAVENOUS CONTINUOUS
Status: CANCELLED | OUTPATIENT
Start: 2020-01-01

## 2020-01-01 RX ADMIN — SODIUM CHLORIDE 25 ML/HR: 900 INJECTION, SOLUTION INTRAVENOUS at 13:18

## 2020-01-01 RX ADMIN — MAGNESIUM SULFATE IN WATER 2 G: 40 INJECTION, SOLUTION INTRAVENOUS at 13:00

## 2020-01-01 RX ADMIN — ETOPOSIDE 248.4 MG: 20 INJECTION, SOLUTION INTRAVENOUS at 15:28

## 2020-01-01 RX ADMIN — DEXAMETHASONE SODIUM PHOSPHATE 8 MG: 4 INJECTION, SOLUTION INTRAMUSCULAR; INTRAVENOUS at 08:35

## 2020-01-01 RX ADMIN — DEXAMETHASONE SODIUM PHOSPHATE 8 MG: 4 INJECTION, SOLUTION INTRAMUSCULAR; INTRAVENOUS at 14:20

## 2020-01-01 RX ADMIN — ONDANSETRON 8 MG: 2 INJECTION INTRAMUSCULAR; INTRAVENOUS at 11:45

## 2020-01-01 RX ADMIN — ATEZOLIZUMAB 1200 MG: 1200 INJECTION, SOLUTION INTRAVENOUS at 12:18

## 2020-01-01 RX ADMIN — SODIUM CHLORIDE 150 MG: 900 INJECTION, SOLUTION INTRAVENOUS at 13:50

## 2020-01-01 RX ADMIN — ATEZOLIZUMAB 1200 MG: 1200 INJECTION, SOLUTION INTRAVENOUS at 12:55

## 2020-01-01 RX ADMIN — Medication 10 ML: at 11:55

## 2020-01-01 RX ADMIN — SODIUM CHLORIDE 25 ML/HR: 900 INJECTION, SOLUTION INTRAVENOUS at 09:50

## 2020-01-01 RX ADMIN — ATEZOLIZUMAB 1200 MG: 1200 INJECTION, SOLUTION INTRAVENOUS at 12:33

## 2020-01-01 RX ADMIN — DEXAMETHASONE SODIUM PHOSPHATE 12 MG: 4 INJECTION, SOLUTION INTRAMUSCULAR; INTRAVENOUS at 14:04

## 2020-01-01 RX ADMIN — SODIUM CHLORIDE 25 ML/HR: 900 INJECTION, SOLUTION INTRAVENOUS at 14:45

## 2020-01-01 RX ADMIN — DEXAMETHASONE SODIUM PHOSPHATE 12 MG: 4 INJECTION, SOLUTION INTRAMUSCULAR; INTRAVENOUS at 13:35

## 2020-01-01 RX ADMIN — DIATRIZOATE MEGLUMINE AND DIATRIZOATE SODIUM 15 ML: 660; 100 LIQUID ORAL; RECTAL at 11:44

## 2020-01-01 RX ADMIN — Medication 10 ML: at 11:13

## 2020-01-01 RX ADMIN — SODIUM CHLORIDE 25 ML/HR: 900 INJECTION, SOLUTION INTRAVENOUS at 10:54

## 2020-01-01 RX ADMIN — Medication 10 ML: at 15:32

## 2020-01-01 RX ADMIN — Medication 10 ML: at 15:15

## 2020-01-01 RX ADMIN — SODIUM CHLORIDE 25 ML/HR: 9 INJECTION, SOLUTION INTRAVENOUS at 11:20

## 2020-01-01 RX ADMIN — SODIUM CHLORIDE 150 MG: 900 INJECTION, SOLUTION INTRAVENOUS at 12:05

## 2020-01-01 RX ADMIN — SODIUM CHLORIDE 25 ML/HR: 900 INJECTION, SOLUTION INTRAVENOUS at 14:31

## 2020-01-01 RX ADMIN — CISPLATIN 93.2 MG: 1 INJECTION, SOLUTION INTRAVENOUS at 16:46

## 2020-01-01 RX ADMIN — SODIUM CHLORIDE 100 ML: 900 INJECTION, SOLUTION INTRAVENOUS at 11:13

## 2020-01-01 RX ADMIN — ETOPOSIDE 252 MG: 20 INJECTION, SOLUTION INTRAVENOUS at 10:59

## 2020-01-01 RX ADMIN — ETOPOSIDE 249.6 MG: 20 INJECTION INTRAVENOUS at 14:45

## 2020-01-01 RX ADMIN — Medication 10 ML: at 16:22

## 2020-01-01 RX ADMIN — SODIUM CHLORIDE 150 MG: 900 INJECTION, SOLUTION INTRAVENOUS at 14:20

## 2020-01-01 RX ADMIN — SODIUM CHLORIDE 25 ML/HR: 900 INJECTION, SOLUTION INTRAVENOUS at 15:15

## 2020-01-01 RX ADMIN — Medication 10 ML: at 12:10

## 2020-01-01 RX ADMIN — IOPAMIDOL 100 ML: 755 INJECTION, SOLUTION INTRAVENOUS at 11:12

## 2020-01-01 RX ADMIN — SODIUM CHLORIDE 25 ML/HR: 900 INJECTION, SOLUTION INTRAVENOUS at 12:39

## 2020-01-01 RX ADMIN — ONDANSETRON 8 MG: 2 INJECTION INTRAMUSCULAR; INTRAVENOUS at 14:02

## 2020-01-01 RX ADMIN — SODIUM CHLORIDE 25 ML/HR: 9 INJECTION, SOLUTION INTRAVENOUS at 11:33

## 2020-01-01 RX ADMIN — ATEZOLIZUMAB 1200 MG: 1200 INJECTION, SOLUTION INTRAVENOUS at 12:08

## 2020-01-01 RX ADMIN — SODIUM CHLORIDE 100 ML: 900 INJECTION, SOLUTION INTRAVENOUS at 11:44

## 2020-01-01 RX ADMIN — Medication 10 ML: at 11:15

## 2020-01-01 RX ADMIN — Medication 10 ML: at 08:55

## 2020-01-01 RX ADMIN — ATEZOLIZUMAB 1200 MG: 1200 INJECTION, SOLUTION INTRAVENOUS at 12:30

## 2020-01-01 RX ADMIN — SODIUM CHLORIDE 10 ML: 9 INJECTION INTRAMUSCULAR; INTRAVENOUS; SUBCUTANEOUS at 11:55

## 2020-01-01 RX ADMIN — ETOPOSIDE 253.2 MG: 20 INJECTION INTRAVENOUS at 15:11

## 2020-01-01 RX ADMIN — ETOPOSIDE 248.4 MG: 20 INJECTION, SOLUTION INTRAVENOUS at 09:07

## 2020-01-01 RX ADMIN — SODIUM CHLORIDE 25 ML/HR: 900 INJECTION, SOLUTION INTRAVENOUS at 11:40

## 2020-01-01 RX ADMIN — HEPARIN SODIUM (PORCINE) LOCK FLUSH IV SOLN 100 UNIT/ML 500 UNITS: 100 SOLUTION at 11:26

## 2020-01-01 RX ADMIN — DEXAMETHASONE SODIUM PHOSPHATE 8 MG: 4 INJECTION, SOLUTION INTRAMUSCULAR; INTRAVENOUS at 14:30

## 2020-01-01 RX ADMIN — POTASSIUM CHLORIDE: 2 INJECTION, SOLUTION, CONCENTRATE INTRAVENOUS at 14:11

## 2020-01-01 RX ADMIN — ATEZOLIZUMAB 1200 MG: 1200 INJECTION, SOLUTION INTRAVENOUS at 11:21

## 2020-01-01 RX ADMIN — CISPLATIN 124.8 MG: 1 INJECTION, SOLUTION INTRAVENOUS at 15:50

## 2020-01-01 RX ADMIN — SODIUM CHLORIDE 25 ML/HR: 900 INJECTION, SOLUTION INTRAVENOUS at 08:22

## 2020-01-01 RX ADMIN — POTASSIUM CHLORIDE: 2 INJECTION, SOLUTION, CONCENTRATE INTRAVENOUS at 13:00

## 2020-01-01 RX ADMIN — Medication 10 ML: at 17:57

## 2020-01-01 RX ADMIN — Medication 10 ML: at 14:05

## 2020-01-01 RX ADMIN — ONDANSETRON 8 MG: 2 INJECTION INTRAMUSCULAR; INTRAVENOUS at 13:34

## 2020-01-01 RX ADMIN — ATEZOLIZUMAB 1200 MG: 1200 INJECTION, SOLUTION INTRAVENOUS at 14:57

## 2020-01-01 RX ADMIN — DEXAMETHASONE SODIUM PHOSPHATE 8 MG: 4 INJECTION, SOLUTION INTRAMUSCULAR; INTRAVENOUS at 10:18

## 2020-01-01 RX ADMIN — Medication 10 ML: at 18:55

## 2020-01-01 RX ADMIN — Medication 10 ML: at 14:21

## 2020-01-01 RX ADMIN — Medication 10 ML: at 15:58

## 2020-01-01 RX ADMIN — ATEZOLIZUMAB 1200 MG: 1200 INJECTION, SOLUTION INTRAVENOUS at 15:25

## 2020-01-01 RX ADMIN — ETOPOSIDE 254.4 MG: 20 INJECTION INTRAVENOUS at 14:50

## 2020-01-01 RX ADMIN — Medication 10 ML: at 11:33

## 2020-01-01 RX ADMIN — SODIUM CHLORIDE 25 ML/HR: 900 INJECTION, SOLUTION INTRAVENOUS at 12:00

## 2020-01-01 RX ADMIN — Medication 10 ML: at 12:39

## 2020-01-01 RX ADMIN — Medication 10 ML: at 16:10

## 2020-01-01 RX ADMIN — SODIUM CHLORIDE 25 ML/HR: 9 INJECTION, SOLUTION INTRAVENOUS at 14:18

## 2020-01-01 RX ADMIN — Medication 10 ML: at 13:18

## 2020-01-01 RX ADMIN — SODIUM CHLORIDE 10 ML: 9 INJECTION INTRAMUSCULAR; INTRAVENOUS; SUBCUTANEOUS at 10:25

## 2020-01-01 RX ADMIN — ATEZOLIZUMAB 1200 MG: 1200 INJECTION, SOLUTION INTRAVENOUS at 13:30

## 2020-01-01 RX ADMIN — GADOTERATE MEGLUMINE 17 ML: 376.9 INJECTION INTRAVENOUS at 08:55

## 2020-01-01 RX ADMIN — Medication 10 ML: at 11:44

## 2020-01-01 RX ADMIN — Medication 10 ML: at 13:14

## 2020-01-01 RX ADMIN — DEXAMETHASONE SODIUM PHOSPHATE 12 MG: 4 INJECTION, SOLUTION INTRAMUSCULAR; INTRAVENOUS at 11:49

## 2020-01-01 RX ADMIN — PROCHLORPERAZINE EDISYLATE 10 MG: 5 INJECTION INTRAMUSCULAR; INTRAVENOUS at 09:35

## 2020-01-01 RX ADMIN — DIATRIZOATE MEGLUMINE AND DIATRIZOATE SODIUM 15 ML: 660; 100 LIQUID ORAL; RECTAL at 11:13

## 2020-01-01 RX ADMIN — IOPAMIDOL 100 ML: 755 INJECTION, SOLUTION INTRAVENOUS at 11:44

## 2020-01-27 ENCOUNTER — HOSPITAL ENCOUNTER (OUTPATIENT)
Dept: LAB | Age: 65
Discharge: HOME | End: 2020-01-27

## 2020-01-27 DIAGNOSIS — C92.10 CML (CHRONIC MYELOID LEUKEMIA) (HCC): ICD-10-CM

## 2020-01-27 DIAGNOSIS — C34.90 ADENOCARCINOMA OF LUNG, STAGE 4, UNSPECIFIED LATERALITY (HCC): ICD-10-CM

## 2020-02-24 ENCOUNTER — HOSPITAL ENCOUNTER (OUTPATIENT)
Dept: LAB | Age: 65
Discharge: HOME | End: 2020-02-24

## 2020-02-24 ENCOUNTER — HOSPITAL ENCOUNTER (OUTPATIENT)
Dept: CT IMAGING | Age: 65
Discharge: HOME | End: 2020-02-24
Attending: INTERNAL MEDICINE

## 2020-02-24 DIAGNOSIS — C34.90 ADENOCARCINOMA OF LUNG, STAGE 4, UNSPECIFIED LATERALITY (HCC): ICD-10-CM

## 2020-02-24 RX ORDER — SODIUM CHLORIDE 0.9 % (FLUSH) 0.9 %
10 SYRINGE (ML) INJECTION
Status: COMPLETED | OUTPATIENT
Start: 2020-02-24 | End: 2020-02-24

## 2020-02-24 RX ADMIN — Medication 10 ML: at 09:56

## 2020-03-05 ENCOUNTER — DOCUMENTATION ONLY (OUTPATIENT)
Dept: HEMATOLOGY | Age: 65
End: 2020-03-05

## 2020-03-05 NOTE — PROGRESS NOTES
I spoke with Mr. Janet Portillo regarding his insurance coverage, hospital financial assistance, potential oral medication authorizations, enrollment in the [employer]) and the [address] [place] [address] ([address]), and assistance organization resource sheet. Mr. Janet Portillo has [employer]. He has met his $1,000 deductible and $5,000 max out of pocket. Insurance is paying approved claims at 100%. To help with past balances that are due, I spoke with Mr Janet Portillo regarding the hospital financial assistance program.  I went over the application with him and gave him the application to fill out. I also went over the LPOA with Mr. Janet Portillo that would authorize a [facility] representative to enroll him into a patient assistance program.  He reviewed the document and signed it. Next, I spoke with Mr. Janet Portillo regarding potential oral medication authorizations. I told him that if he ever had any problems getting his oral medications filled to give the dedicated [facility] , Evelia Solares, a call. Most of the time, it is simply an authorization that needs to be done with the insurance company. Next, I spoke with Mr. Janet Portillo regarding enrolling with [facility] and [facility]. I went over some of the services that [facility] and [facility] offers and the enrollment process. Next, I gave Mr. Janet Portillo flyers about the free Yoga classes for cancer survivors and the Oncology Massage program.  I let him know that he can get a free 30 minute massage. Lastly, I gave Mr. Janet Portillo a form with various resource organizations that could assist with specific needs (example:  transportation, lodging, preparing meals, home cleaning)     Faxed Patient Referral form to the [employer] at [phone]. Phone [phone]. Form scanned into chart. Faxed Physician's Statement to the [address] [place] [address] at [phone]. Phone [phone]. Form scanned into chart.   Patient expressed understanding of the information above and all questions were answered to his satisfaction. LPOA will be scanned into chart.

## 2020-03-09 ENCOUNTER — HOSPITAL ENCOUNTER (OUTPATIENT)
Dept: CT IMAGING | Age: 65
Discharge: HOME | End: 2020-03-09
Attending: INTERNAL MEDICINE
Payer: COMMERCIAL

## 2020-03-09 ENCOUNTER — HOSPITAL ENCOUNTER (OUTPATIENT)
Dept: GENERAL RADIOLOGY | Age: 65
Discharge: HOME | End: 2020-03-09
Attending: RADIOLOGY
Payer: COMMERCIAL

## 2020-03-09 VITALS
RESPIRATION RATE: 16 BRPM | OXYGEN SATURATION: 96 % | HEART RATE: 64 BPM | SYSTOLIC BLOOD PRESSURE: 107 MMHG | TEMPERATURE: 97.7 F | DIASTOLIC BLOOD PRESSURE: 63 MMHG

## 2020-03-09 DIAGNOSIS — C34.90 ADENOCARCINOMA OF LUNG, STAGE 4, UNSPECIFIED LATERALITY (HCC): ICD-10-CM

## 2020-03-09 PROCEDURE — 74011000250 HC RX REV CODE- 250: Performed by: RADIOLOGY

## 2020-03-09 PROCEDURE — 88305 TISSUE EXAM BY PATHOLOGIST: CPT

## 2020-03-09 PROCEDURE — 71045 X-RAY EXAM CHEST 1 VIEW: CPT

## 2020-03-09 PROCEDURE — 32405 CT BX LUNG NDL PERC: CPT

## 2020-03-09 PROCEDURE — 99152 MOD SED SAME PHYS/QHP 5/>YRS: CPT

## 2020-03-09 PROCEDURE — 74011250636 HC RX REV CODE- 250/636: Performed by: RADIOLOGY

## 2020-03-09 RX ORDER — LIDOCAINE HYDROCHLORIDE 20 MG/ML
40-120 INJECTION, SOLUTION INFILTRATION; PERINEURAL ONCE
Status: COMPLETED | OUTPATIENT
Start: 2020-03-09 | End: 2020-03-09

## 2020-03-09 RX ORDER — MIDAZOLAM HYDROCHLORIDE 1 MG/ML
.25-2 INJECTION, SOLUTION INTRAMUSCULAR; INTRAVENOUS
Status: DISCONTINUED | OUTPATIENT
Start: 2020-03-09 | End: 2020-03-13 | Stop reason: HOSPADM

## 2020-03-09 RX ORDER — FENTANYL CITRATE 50 UG/ML
12.5-1 INJECTION, SOLUTION INTRAMUSCULAR; INTRAVENOUS
Status: DISCONTINUED | OUTPATIENT
Start: 2020-03-09 | End: 2020-03-13 | Stop reason: HOSPADM

## 2020-03-09 RX ORDER — SODIUM CHLORIDE 9 MG/ML
25 INJECTION, SOLUTION INTRAVENOUS CONTINUOUS
Status: DISCONTINUED | OUTPATIENT
Start: 2020-03-09 | End: 2020-03-13 | Stop reason: HOSPADM

## 2020-03-09 RX ADMIN — FENTANYL CITRATE 50 MCG: 50 INJECTION, SOLUTION INTRAMUSCULAR; INTRAVENOUS at 10:39

## 2020-03-09 RX ADMIN — MIDAZOLAM 1 MG: 1 INJECTION INTRAMUSCULAR; INTRAVENOUS at 10:31

## 2020-03-09 RX ADMIN — FENTANYL CITRATE 50 MCG: 50 INJECTION, SOLUTION INTRAMUSCULAR; INTRAVENOUS at 10:31

## 2020-03-09 RX ADMIN — SODIUM CHLORIDE 25 ML/HR: 900 INJECTION, SOLUTION INTRAVENOUS at 11:25

## 2020-03-09 RX ADMIN — MIDAZOLAM 1 MG: 1 INJECTION INTRAMUSCULAR; INTRAVENOUS at 10:34

## 2020-03-09 RX ADMIN — LIDOCAINE HYDROCHLORIDE 90 MG: 20 INJECTION, SOLUTION INFILTRATION; PERINEURAL at 10:40

## 2020-03-09 NOTE — H&P
History and Physical    Patient: Bonny Farley MRN: [id number]  SSN: [id number]    YOB: 1955  Age: 62 y.o. Sex: male      Subjective:      Bonny Farley is a 62 y.o. male who has an indeterminate right lung mass. NPO. Past Medical History:   Diagnosis Date    Anxiety     Arthritis     [facility] 5/3/2017    CML (chronic myeloid leukemia) ([facility] 8/25/2012    Depression 11/2/2012    Erectile dysfunction     Hypertension     Leukemia, acute ([facility] 8/24/2012    Lung cancer ([facility] 2018    Pulmonary emboli ([facility] 1/6/2019     Past Surgical History:   Procedure Laterality Date    HX HERNIA REPAIR      left inguinal 1996      Family History   Problem Relation Age of Onset    Lung Disease Father 79        Black lung\"   Verlinda Smoker Cancer Mother 72        pancreatic cancer     Social History     Tobacco Use    Smoking status: Never Smoker    Smokeless tobacco: Never Used   Substance Use Topics    Alcohol use: No      Prior to Admission medications    Medication Sig Start Date End Date Taking? Authorizing Provider   osimertinib (TAGRISSO) 80 mg tablet Take 1 Tab by mouth daily. 2/20/20  Yes Shwetha Mooney MD   imatinib (GLEEVEC) 400 mg tablet Take 1 Tab by mouth daily. 2/20/20  Yes Shwetha Mooney MD   ENTRESTO 24-26 mg tablet Take 1 Tab by mouth two (2) times a day. 1/23/20  Yes Glenda Torre MD   spironolactone (ALDACTONE) 25 mg tablet Take 1 Tab by mouth daily. Indications: chronic heart failure  Patient taking differently: Take 25 mg by mouth daily. 1/2 tab  Indications: chronic heart failure 10/21/19  Yes Glenda Torre MD   metoprolol succinate (TOPROL-XL) 25 mg XL tablet Take 0.5 Tabs by mouth daily. Indications: chronic heart failure 9/18/19  Yes Beto Vogel MD   furosemide (LASIX) 20 mg tablet Take 1 Tab by mouth daily.  Indications: Fluid in the Lungs due to Chronic Heart Failure 9/19/19  Yes Beto Vogel MD benzonatate (TESSALON) 200 mg capsule Take 1 Cap by mouth three (3) times daily as needed for Cough. 9/3/19  Yes Kale Sy MD   folic acid (FOLVITE) 1 mg tablet Take 1 Tab by mouth daily. 3/5/20   Sam Barrios MD   prochlorperazine (COMPAZINE) 10 mg tablet Take 1 Tab by mouth every six (6) hours as needed for Nausea. 3/2/20   Sam Barrios MD   ondansetron hcl [facility]) 8 mg tablet Take 1 Tab by mouth every eight (8) hours as needed for Nausea. 3/2/20   Sam Barrios MD   lidocaine-prilocaine (EMLA) topical cream Apply  to affected area as needed for Pain. Apply to port site 45-60 minutes prior to port access. Cover with plastic. 3/2/20   Sam Barrios MD   rivaroxaban (XARELTO) 20 mg tab tablet Take 1 Tab by mouth daily (with breakfast). Take after finishing 15mg bid course. 11/18/19   Sam Barrios MD   albuterol (PROVENTIL HFA, VENTOLIN HFA, PROAIR HFA) 90 mcg/actuation inhaler Take 2 Puffs by inhalation every four (4) hours as needed for Wheezing. 9/3/19   Kale Sy MD   triamcinolone (NASACORT) 55 mcg nasal inhaler 2 Sprays by Both Nostrils route two (2) times a day. 1/28/19   Sam Barrios MD   OXYGEN-AIR DELIVERY SYSTEMS by Does Not Apply route. 3LPM PRN    Provider, Historical   multivitamin, stress formula (STRESS TAB) tablet Take 1 Tab by mouth daily. Provider, Historical        No Known Allergies    Review of Systems:  Pertinent items are noted in the History of Present Illness. Objective:     Vitals:    03/09/20 0936   BP: 107/68   Pulse: 90   Resp: 18   Temp: 97.7 °F (36.5 °C)   SpO2: 100%        Physical Exam:  LUNG: clear to auscultation bilaterally  HEART: regular rate and rhythm    Assessment:     Hospital Problems  Date Reviewed: 3/2/2020    None          Plan:     CT guided lung mass biopsy. Moderate sedation.     Signed By: Chuck Copeland MD     March 9, 2020

## 2020-03-09 NOTE — DISCHARGE INSTRUCTIONS
[address]  Department of Interventional Radiology  [address] [facility]  [phone] Office  [phone] Fax    BIOPSY DISCHARGE INSTRUCTIONS    General Instructions:     A biopsy is the removal of a small piece of tissue for microscopic examination or testing. Healthy tissue can be obtained for the purpose of tissue-type matching for transplants. Unhealthy tissues are more commonly biopsied to diagnose disease. Lung Biopsy:     A needle lung biopsy is performed when there is a mass discovered in the lung area. The most serious risk is infection, bleeding, and pneumothorax (a collapsed lung). Signs of pneumothorax include shortness of breath, rapid heart rate, and blueness of the skin. If any of these occur, call 911. Liver Biopsy: This test helps detect cancer, infections, and the cause of an enlargement of the liver or elevated liver enzymes. It also helps to diagnose a number of liver diseases. The pain associated with the procedure may be felt in the shoulder. The risks include internal bleeding, pneumothorax, and injury to the surrounding organs. Renal Biopsy: This procedure is sometimes done to evaluate a transplanted kidney. It is also used to evaluate unexplained decrease in kidney function, blood, or protein in the urine. You may see bright red blood in the urine the first 24 hours after the test. If the bleeding lasts longer, you need to call your doctor. There is a risk of infection and bleeding into the muscle, which may cause soreness. Spinal Biopsy: This test is sometimes done in conjunction with other procedures. Your back will be sore, as we are taking a small sample of bone, which is slightly more difficult to sample than tissue. General Biopsy:     A mass can grow in any area of the body, and we are taking a specimen as ordered by your doctor. The risks are the same.  They include bleeding, pain, and infection. Home Care Instructions: You may resume your regular diet and medication regimen. Do not drink alcohol, drive, or make any important legal decisions in the next 24 hours. Do not lift anything heavier than a gallon of milk until the soreness goes away. You may use over the counter acetaminophen or ibuprofen for the soreness. You may apply an ice pack to the affected area for 20-30 minutes at time for the first 24 hours. After that, you may apply a heat pack. Call If: You should call your Physician and/or the Radiology Nurse if you have any questions or concerns about the biopsy site. Call if you should have increased pain, fever, redness, drainage, or bleeding more than a small spot on the bandage. Follow-Up Instructions: Please see your ordering doctor as he/she has requested. To Reach Us: If you have any questions about your procedure, please call the Interventional Radiology department at [phone]. After business hours (5pm) and weekends, call the answering service at [phone] and ask for the Radiologist on call to be paged. nterventional Radiology General Nurse Discharge    After general anesthesia or intravenous sedation, for 24 hours or while taking prescription Narcotics:  · Limit your activities  · Do not drive and operate hazardous machinery  · Do not make important personal or business decisions  · Do  not drink alcoholic beverages  · If you have not urinated within 8 hours after discharge, please contact your surgeon on call. * Please give a list of your current medications to your Primary Care Provider. * Please update this list whenever your medications are discontinued, doses are     changed, or new medications (including over-the-counter products) are added. * Please carry medication information at all times in case of emergency situations.     These are general instructions for a healthy lifestyle:    No smoking/ No tobacco products/ Avoid exposure to second hand smoke  Surgeon General's Warning:  Quitting smoking now greatly reduces serious risk to your health. Obesity, smoking, and sedentary lifestyle greatly increases your risk for illness  A healthy diet, regular physical exercise & weight monitoring are important for maintaining a healthy lifestyle    You may be retaining fluid if you have a history of heart failure or if you experience any of the following symptoms:  Weight gain of 3 pounds or more overnight or 5 pounds in a week, increased swelling in our hands or feet or shortness of breath while lying flat in bed. Please call your doctor as soon as you notice any of these symptoms; do not wait until your next office visit. Recognize signs and symptoms of STROKE:  F-face looks uneven    A-arms unable to move or move unevenly    S-speech slurred or non-existent    T-time-call 911 as soon as signs and symptoms begin-DO NOT go       Back to bed or wait to see if you get better-TIME IS BRAIN.         Patient Signature:  Date: 3/9/2020  Discharging Nurse: Herman Everett RN

## 2020-03-09 NOTE — PROCEDURES
Department of Interventional Radiology  [phone]        Interventional Radiology Brief Procedure Note    Patient: Yury Romero MRN: [id number]  SSN: [id number]    YOB: 1955  Age: 62 y.o.   Sex: male      Date of Procedure: 3/9/2020    Pre-Procedure Diagnosis: Lung mass    Post-Procedure Diagnosis: SAME    Procedure(s): Image Guided Biopsy    Brief Description of Procedure: CT guided biopsy    Performed By: Allison Vasquez MD     Assistants: None    Anesthesia:Moderate Sedation    Estimated Blood Loss: Less than 10ml    Specimens:  Pathology    Implants:  None    Findings: Pleural based RLL mass    Complications: None    Recommendations: routine post care     Follow Up: CXR in 2 hours    Signed By: Allison Vasquez MD     March 9, 2020

## 2020-03-10 ENCOUNTER — ANESTHESIA EVENT (OUTPATIENT)
Dept: SURGERY | Age: 65
End: 2020-03-10
Payer: COMMERCIAL

## 2020-03-10 RX ORDER — FLUMAZENIL 0.1 MG/ML
0.2 INJECTION INTRAVENOUS
Status: CANCELLED | OUTPATIENT
Start: 2020-03-10

## 2020-03-10 RX ORDER — SODIUM CHLORIDE, SODIUM LACTATE, POTASSIUM CHLORIDE, CALCIUM CHLORIDE 600; 310; 30; 20 MG/100ML; MG/100ML; MG/100ML; MG/100ML
75 INJECTION, SOLUTION INTRAVENOUS CONTINUOUS
Status: CANCELLED | OUTPATIENT
Start: 2020-03-10

## 2020-03-10 RX ORDER — SODIUM CHLORIDE, SODIUM LACTATE, POTASSIUM CHLORIDE, CALCIUM CHLORIDE 600; 310; 30; 20 MG/100ML; MG/100ML; MG/100ML; MG/100ML
100 INJECTION, SOLUTION INTRAVENOUS CONTINUOUS
Status: CANCELLED | OUTPATIENT
Start: 2020-03-10 | End: 2020-03-11

## 2020-03-10 RX ORDER — DIPHENHYDRAMINE HYDROCHLORIDE 50 MG/ML
12.5 INJECTION, SOLUTION INTRAMUSCULAR; INTRAVENOUS
Status: CANCELLED | OUTPATIENT
Start: 2020-03-10

## 2020-03-10 RX ORDER — MIDAZOLAM HYDROCHLORIDE 1 MG/ML
2 INJECTION, SOLUTION INTRAMUSCULAR; INTRAVENOUS
Status: CANCELLED | OUTPATIENT
Start: 2020-03-10 | End: 2020-03-11

## 2020-03-10 RX ORDER — LIDOCAINE HYDROCHLORIDE 10 MG/ML
0.1 INJECTION INFILTRATION; PERINEURAL AS NEEDED
Status: CANCELLED | OUTPATIENT
Start: 2020-03-10

## 2020-03-10 RX ORDER — OXYCODONE HYDROCHLORIDE 5 MG/1
5 TABLET ORAL
Status: CANCELLED | OUTPATIENT
Start: 2020-03-10 | End: 2020-03-11

## 2020-03-10 RX ORDER — HYDROMORPHONE HYDROCHLORIDE 2 MG/ML
0.5 INJECTION, SOLUTION INTRAMUSCULAR; INTRAVENOUS; SUBCUTANEOUS
Status: CANCELLED | OUTPATIENT
Start: 2020-03-10

## 2020-03-10 RX ORDER — NALOXONE HYDROCHLORIDE 0.4 MG/ML
0.1 INJECTION, SOLUTION INTRAMUSCULAR; INTRAVENOUS; SUBCUTANEOUS AS NEEDED
Status: CANCELLED | OUTPATIENT
Start: 2020-03-10

## 2020-03-11 ENCOUNTER — ANESTHESIA (OUTPATIENT)
Dept: SURGERY | Age: 65
End: 2020-03-11
Payer: COMMERCIAL

## 2020-03-11 ENCOUNTER — HOSPITAL ENCOUNTER (OUTPATIENT)
Age: 65
Setting detail: OUTPATIENT SURGERY
Discharge: HOME | End: 2020-03-11
Attending: RADIOLOGY | Admitting: RADIOLOGY
Payer: COMMERCIAL

## 2020-03-11 ENCOUNTER — HOSPITAL ENCOUNTER (OUTPATIENT)
Dept: INTERVENTIONAL RADIOLOGY/VASCULAR | Age: 65
Discharge: HOME | End: 2020-03-11
Attending: INTERNAL MEDICINE
Payer: COMMERCIAL

## 2020-03-11 VITALS
HEIGHT: 73 IN | SYSTOLIC BLOOD PRESSURE: 103 MMHG | WEIGHT: 190 LBS | TEMPERATURE: 98.1 F | RESPIRATION RATE: 16 BRPM | DIASTOLIC BLOOD PRESSURE: 68 MMHG | HEART RATE: 76 BPM | OXYGEN SATURATION: 100 % | BODY MASS INDEX: 25.18 KG/M2

## 2020-03-11 VITALS
DIASTOLIC BLOOD PRESSURE: 58 MMHG | OXYGEN SATURATION: 100 % | HEART RATE: 82 BPM | RESPIRATION RATE: 14 BRPM | TEMPERATURE: 97.6 F | SYSTOLIC BLOOD PRESSURE: 115 MMHG

## 2020-03-11 DIAGNOSIS — C34.90 ADENOCARCINOMA OF LUNG, STAGE 4, UNSPECIFIED LATERALITY (HCC): ICD-10-CM

## 2020-03-11 PROCEDURE — 74011250636 HC RX REV CODE- 250/636: Performed by: PHYSICIAN ASSISTANT

## 2020-03-11 PROCEDURE — 74011250636 HC RX REV CODE- 250/636: Performed by: ANESTHESIOLOGY

## 2020-03-11 PROCEDURE — 77030031139 HC SUT VCRL2 J&J -A

## 2020-03-11 PROCEDURE — 77030031131 HC SUT MXN P COVD -B

## 2020-03-11 PROCEDURE — 36561 INSERT TUNNELED CV CATH: CPT

## 2020-03-11 PROCEDURE — 76060000032 HC ANESTHESIA 0.5 TO 1 HR: Performed by: RADIOLOGY

## 2020-03-11 PROCEDURE — C1894 INTRO/SHEATH, NON-LASER: HCPCS

## 2020-03-11 PROCEDURE — 74011250636 HC RX REV CODE- 250/636: Performed by: NURSE ANESTHETIST, CERTIFIED REGISTERED

## 2020-03-11 PROCEDURE — 77030010507 HC ADH SKN DERMBND J&J -B

## 2020-03-11 PROCEDURE — 74011000250 HC RX REV CODE- 250: Performed by: NURSE ANESTHETIST, CERTIFIED REGISTERED

## 2020-03-11 PROCEDURE — C1788 PORT, INDWELLING, IMP: HCPCS

## 2020-03-11 PROCEDURE — 74011000250 HC RX REV CODE- 250: Performed by: PHYSICIAN ASSISTANT

## 2020-03-11 RX ORDER — CEFAZOLIN SODIUM/WATER 2 G/20 ML
2 SYRINGE (ML) INTRAVENOUS ONCE
Status: COMPLETED | OUTPATIENT
Start: 2020-03-11 | End: 2020-03-11

## 2020-03-11 RX ORDER — PROPOFOL 10 MG/ML
INJECTION, EMULSION INTRAVENOUS
Status: DISCONTINUED | OUTPATIENT
Start: 2020-03-11 | End: 2020-03-11 | Stop reason: HOSPADM

## 2020-03-11 RX ORDER — MIDAZOLAM HYDROCHLORIDE 1 MG/ML
2 INJECTION, SOLUTION INTRAMUSCULAR; INTRAVENOUS
Status: DISCONTINUED | OUTPATIENT
Start: 2020-03-11 | End: 2020-03-12 | Stop reason: HOSPADM

## 2020-03-11 RX ORDER — PROPOFOL 10 MG/ML
INJECTION, EMULSION INTRAVENOUS AS NEEDED
Status: DISCONTINUED | OUTPATIENT
Start: 2020-03-11 | End: 2020-03-11 | Stop reason: HOSPADM

## 2020-03-11 RX ORDER — SODIUM CHLORIDE, SODIUM LACTATE, POTASSIUM CHLORIDE, CALCIUM CHLORIDE 600; 310; 30; 20 MG/100ML; MG/100ML; MG/100ML; MG/100ML
100 INJECTION, SOLUTION INTRAVENOUS CONTINUOUS
Status: DISCONTINUED | OUTPATIENT
Start: 2020-03-11 | End: 2020-03-12 | Stop reason: HOSPADM

## 2020-03-11 RX ORDER — LIDOCAINE HYDROCHLORIDE 10 MG/ML
0.1 INJECTION INFILTRATION; PERINEURAL AS NEEDED
Status: DISCONTINUED | OUTPATIENT
Start: 2020-03-11 | End: 2020-03-14 | Stop reason: HOSPADM

## 2020-03-11 RX ORDER — HEPARIN SODIUM (PORCINE) LOCK FLUSH IV SOLN 100 UNIT/ML 100 UNIT/ML
500 SOLUTION INTRAVENOUS ONCE
Status: COMPLETED | OUTPATIENT
Start: 2020-03-11 | End: 2020-03-11

## 2020-03-11 RX ORDER — LIDOCAINE HYDROCHLORIDE 20 MG/ML
INJECTION, SOLUTION EPIDURAL; INFILTRATION; INTRACAUDAL; PERINEURAL AS NEEDED
Status: DISCONTINUED | OUTPATIENT
Start: 2020-03-11 | End: 2020-03-11 | Stop reason: HOSPADM

## 2020-03-11 RX ADMIN — SODIUM CHLORIDE, SODIUM LACTATE, POTASSIUM CHLORIDE, AND CALCIUM CHLORIDE: 600; 310; 30; 20 INJECTION, SOLUTION INTRAVENOUS at 09:16

## 2020-03-11 RX ADMIN — PROPOFOL 30 MG: 10 INJECTION, EMULSION INTRAVENOUS at 09:31

## 2020-03-11 RX ADMIN — LIDOCAINE HYDROCHLORIDE 40 MG: 20 INJECTION, SOLUTION EPIDURAL; INFILTRATION; INTRACAUDAL; PERINEURAL at 09:21

## 2020-03-11 RX ADMIN — PROPOFOL 75 MCG/KG/MIN: 10 INJECTION, EMULSION INTRAVENOUS at 09:21

## 2020-03-11 RX ADMIN — Medication 2 G: at 09:25

## 2020-03-11 RX ADMIN — PROPOFOL 50 MG: 10 INJECTION, EMULSION INTRAVENOUS at 09:21

## 2020-03-11 RX ADMIN — LIDOCAINE HYDROCHLORIDE 10 ML: 10; .005 INJECTION, SOLUTION EPIDURAL; INFILTRATION; INTRACAUDAL; PERINEURAL at 09:46

## 2020-03-11 RX ADMIN — HEPARIN SODIUM (PORCINE) LOCK FLUSH IV SOLN 100 UNIT/ML 500 UNITS: 100 SOLUTION at 09:27

## 2020-03-11 NOTE — ANESTHESIA POSTPROCEDURE EVALUATION
Procedure(s): 
IR ANESTHESIA/ PORT PLACEMENT. total IV anesthesia Anesthesia Post Evaluation Patient location during evaluation: PACU Patient participation: complete - patient participated Level of consciousness: awake and alert Pain management: adequate Airway patency: patent Anesthetic complications: no 
Cardiovascular status: acceptable Respiratory status: acceptable Hydration status: acceptable Post anesthesia nausea and vomiting:  controlled Visit Vitals /58 Pulse 82 Temp 36.4 °C (97.6 °F) Resp 14 SpO2 100%

## 2020-03-11 NOTE — PROGRESS NOTES
's pre-procedure visit requested by patient. Conveyed care and concern for patient and family. Offered prayer as requested for patient, family, and staff.     Scottie Ugalde MDiv, BS  Board Certified

## 2020-03-11 NOTE — PROGRESS NOTES
TRANSFER - OUT REPORT:    Verbal report given to [facility] RN(name) on [address].  being transferred to IR BED 6(unit) for ordered procedure       Report consisted of patients Situation, Background, Assessment and   Recommendations(SBAR). Information from the following report(s) SBAR was reviewed with the receiving nurse. Lines:   Venous Access Device POWER PORT 03/11/20 Upper chest (subclavicular area, right (Active)       Peripheral IV 03/11/20 Left Forearm (Active)   Site Assessment Clean, dry, & intact 3/11/2020  8:58 AM   Phlebitis Assessment 0 3/11/2020  8:58 AM   Infiltration Assessment 0 3/11/2020  8:58 AM   Dressing Status Clean, dry, & intact 3/11/2020  8:58 AM   Dressing Type Transparent 3/11/2020  8:58 AM   Hub Color/Line Status Pink 3/11/2020  8:58 AM       Saline Lock 03/09/20 Left;Posterior Forearm (Active)        Opportunity for questions and clarification was provided.       Patient transported with:   Registered Nurse

## 2020-03-11 NOTE — PROCEDURES
Department of Interventional Radiology  [phone]        Interventional Radiology Brief Procedure Note    Patient: Alli Hancock. MRN: [id number]  SSN: [id number]    YOB: 1955  Age: 62 y.o. Sex: male      Date of Procedure: 3/11/2020    Pre-Procedure Diagnosis: lung cancer    Post-Procedure Diagnosis: SAME    Procedure(s): Venous Chest Port Placement    Brief Description of Procedure: US, fluoro guided right IJ venous chest port placed.     Performed By: Simone Avilez PA-C     Assistants: None    Anesthesia:TIVS/MAC    Estimated Blood Loss: Less than 10ml    Specimens:  None    Implants:  Chest Port Placement    Findings: catheter tip in right atrium     Complications: None    Recommendations: ok to use port     Follow Up: referring MD    Signed By: Simone Avilez PA-C     March 11, 2020

## 2020-03-11 NOTE — ANESTHESIA PREPROCEDURE EVALUATION
Relevant Problems No relevant active problems Anesthetic History No history of anesthetic complications Review of Systems / Medical History Patient summary reviewed and pertinent labs reviewed Pulmonary Comments: H/o of DVT/PE on xarelto - held since Saturday Neuro/Psych Within defined limits Cardiovascular Hypertension Valvular problems/murmurs (severe MR - currently just monitoring - at one point was being considered for a mitral clip): mitral insufficiency CHF Pertinent negatives: Hyperlipidemia: EF 23% Exercise tolerance: >4 METS: He reports that he is active without chest pain, SOB, syncope Comments: Echo 2019 - EF 24%, normal RV function, mild to mod AI, severe MR, mild to mod TR, RVSP 42 GI/Hepatic/Renal 
Within defined limits Endo/Other Arthritis and cancer (CML) Other Findings Physical Exam 
 
Airway Mallampati: II 
TM Distance: > 6 cm Neck ROM: normal range of motion Mouth opening: Normal 
 
 Cardiovascular Rhythm: regular Rate: normal 
 
Murmur, Mitral area Pertinent negatives: No JVD and peripheral edema Comments: No signs of decompensated heart failure Dental 
No notable dental hx Pulmonary Breath sounds clear to auscultation Abdominal 
GI exam deferred Other Findings Anesthetic Plan ASA: 4 Anesthesia type: total IV anesthesia Induction: Intravenous Anesthetic plan and risks discussed with: Patient

## 2020-03-11 NOTE — DISCHARGE INSTRUCTIONS
[address]  Department of Interventional Radiology  [facility]  [phone] Office  [phone] Fax  Implanted Port Discharge Instructions      General Instructions:   A port is like an implanted IV. They are usually ordered for patients who will be getting chemotherapy, but can also be used as an IV for long term antibiotics, large amounts of fluids, and/or blood products. Your blood can be drawn from your port for labs also. Those patients who do not have good veins find the ports convenient as they can get the IV they need with one stick. The port can be used long term, and the care is easy. The device is under the skin, and once the skin heals, care is minimal. All that is required is the nurse who accesses the port will need to flush it with heparinized saline after each use. Ports are usually placed in the chest wall, usually on the right side. But they can be place in the arms and in the abdomen. Home Care Instructions: If your port is in your arm, do not allow blood pressure or other IVs to be place in that arm. Do not allow bra straps or any clothing to rub the skin over the port. Do not bathe or swim until the skin has healed and if the port is accessed. Once it is healed, and when the port is not accessed, it is okay to bathe and swim. Restrict yourself to light activity for the first 5 days after getting the port put in, after that, resume normal activity slowly. You may resume your normal diet and medications. Follow-Up Instructions: Please see your oncologist, or whatever physician ordered the port as he/she has requested of you. Call If: You should call your Physician and/or the Radiology Nurse if you notice redness, pus, swelling, or pain from the area of your incision. Call if you should develop a fever. The nurses who access your port will know to call your doctor if the port does not seem to be working properly. You need to tell the nurses who use the port if you should have any pain or swelling at the site during an infusion. To Reach Us: If you have any questions about your procedure, please call the Interventional Radiology department at [phone]. After business hours (5pm) and weekends, call the answering service at [phone] and ask for the Radiologist on call to be paged. Interventional Radiology General Nurse Discharge    After general anesthesia or intravenous sedation, for 24 hours or while taking prescription Narcotics:  · Limit your activities  · Do not drive and operate hazardous machinery  · Do not make important personal or business decisions  · Do  not drink alcoholic beverages  · If you have not urinated within 8 hours after discharge, please contact your surgeon on call. * Please give a list of your current medications to your Primary Care Provider. * Please update this list whenever your medications are discontinued, doses are     changed, or new medications (including over-the-counter products) are added. * Please carry medication information at all times in case of emergency situations. These are general instructions for a healthy lifestyle:    No smoking/ No tobacco products/ Avoid exposure to second hand smoke  Surgeon General's Warning:  Quitting smoking now greatly reduces serious risk to your health. Obesity, smoking, and sedentary lifestyle greatly increases your risk for illness  A healthy diet, regular physical exercise & weight monitoring are important for maintaining a healthy lifestyle    You may be retaining fluid if you have a history of heart failure or if you experience any of the following symptoms:  Weight gain of 3 pounds or more overnight or 5 pounds in a week, increased swelling in our hands or feet or shortness of breath while lying flat in bed.   Please call your doctor as soon as you notice any of these symptoms; do not wait until your next office visit. Recognize signs and symptoms of STROKE:  F-face looks uneven    A-arms unable to move or move unevenly    S-speech slurred or non-existent    T-time-call 911 as soon as signs and symptoms begin-DO NOT go       Back to bed or wait to see if you get better-TIME IS BRAIN. Patient Signature:  Date: 3/11/2020  Discharging Nurse:  Valencia Villareal RN

## 2020-03-11 NOTE — H&P
Department of Interventional Radiology  [phone]    History and Physical    Patient:  Bonny Wilson. MRN:  [id number]  SSN:  [id number]    YOB: 1955  Age:  62 y.o. Sex:  male      Primary Care Provider:  Rudolpho Klinefelter, MD  Referring Physician:  Shwetha Mooney MD    Subjective:     Chief Complaint: port    History of the Present Illness: The patient is a 62 y.o. male with hx CLL and lung cancer who presents for venous chest port placement. Lung mass biopsy results from 3/9 pending. Xarelto for PE held. NPO. Past Medical History:   Diagnosis Date    Anxiety     Arthritis     Chronic systolic congestive heart failure ([facility] [address] 5/3/2017    CML (chronic myeloid leukemia) ([facility] [address] 8/25/2012    Depression 11/2/2012    Erectile dysfunction     Hypertension     Leukemia, acute ([facility] [address] 8/24/2012    Lung cancer ([facility] [address] 2018    Pulmonary emboli ([facility] [address] 1/6/2019     Past Surgical History:   Procedure Laterality Date    HX HERNIA REPAIR      left inguinal 1996        Review of Systems:    Pertinent items are noted in the History of Present Illness. Prior to Admission medications    Medication Sig Start Date End Date Taking? Authorizing Provider   osimertinib (TAGRISSO) 80 mg tablet Take 1 Tab by mouth daily. 2/20/20  Yes Shwetha Mooney MD   imatinib (GLEEVEC) 400 mg tablet Take 1 Tab by mouth daily. 2/20/20  Yes Shwetha Mooney MD   ENTRESTO 24-26 mg tablet Take 1 Tab by mouth two (2) times a day. 1/23/20  Yes Glenda Torre MD   spironolactone (ALDACTONE) 25 mg tablet Take 1 Tab by mouth daily. Indications: chronic heart failure  Patient taking differently: Take 25 mg by mouth daily. 1/2 tab  Indications: chronic heart failure 10/21/19  Yes Glenda Torre MD   metoprolol succinate (TOPROL-XL) 25 mg XL tablet Take 0.5 Tabs by mouth daily. Indications: chronic heart failure 9/18/19  Yes Beto Vogel MD   furosemide (LASIX) 20 mg tablet Take 1 Tab by mouth daily. Indications: Fluid in the Lungs due to Chronic Heart Failure  Patient taking differently: Take 10 mg by mouth daily. Indications: fluid in the lungs due to chronic heart failure 9/19/19  Yes Iva Peterson MD   benzonatate (TESSALON) 200 mg capsule Take 1 Cap by mouth three (3) times daily as needed for Cough. 9/3/19  Yes Gavin Jamison MD   folic acid (FOLVITE) 1 mg tablet Take 1 Tab by mouth daily. 3/5/20   Andria Houston MD   prochlorperazine (COMPAZINE) 10 mg tablet Take 1 Tab by mouth every six (6) hours as needed for Nausea. 3/2/20   Andria Houston MD   ondansetron hcl [facility]) 8 mg tablet Take 1 Tab by mouth every eight (8) hours as needed for Nausea. 3/2/20   Andria Houston MD   lidocaine-prilocaine (EMLA) topical cream Apply  to affected area as needed for Pain. Apply to port site 45-60 minutes prior to port access. Cover with plastic. 3/2/20   Andria Houston MD   rivaroxaban (XARELTO) 20 mg tab tablet Take 1 Tab by mouth daily (with breakfast). Take after finishing 15mg bid course. 11/18/19   Andria Houston MD   albuterol (PROVENTIL HFA, VENTOLIN HFA, PROAIR HFA) 90 mcg/actuation inhaler Take 2 Puffs by inhalation every four (4) hours as needed for Wheezing. 9/3/19   Gavin Jamison MD   triamcinolone (NASACORT) 55 mcg nasal inhaler 2 Sprays by Both Nostrils route two (2) times a day. 1/28/19   Andria Houston MD   OXYGEN-AIR DELIVERY SYSTEMS by Does Not Apply route. 3LPM PRN    Provider, Historical   multivitamin, stress formula (STRESS TAB) tablet Take 1 Tab by mouth daily.       Provider, Historical        No Known Allergies    Family History   Problem Relation Age of Onset    Lung Disease Father 79        Black lung\"   [facility] Cancer Mother 72        pancreatic cancer     Social History     Tobacco Use    Smoking status: Never Smoker    Smokeless tobacco: Never Used   Substance Use Topics    Alcohol use: No        Objective:       Physical Examination:    Vitals:    03/11/20 0840 03/11/20 0848 03/11/20 0850   BP: 115/72 115/72    Pulse:  100    Resp:  18    Temp:  98.1 °F (36.7 °C)    SpO2: 100% 100%    Weight:   86.2 kg (190 lb)   Height:   6' 1\" (1.854 m)       Pain Assessment  Pain Intensity 1: 0 (03/11/20 0846)        Patient Stated Pain Goal: 0      HEART: regular rate and rhythm  LUNG: clear to auscultation bilaterally  ABDOMEN: normal findings: soft, non-tender  EXTREMITIES: normal strength, tone, and muscle mass    Laboratory:     Lab Results   Component Value Date/Time    Sodium 141 02/24/2020 12:00 AM    Sodium 141 01/27/2020 12:00 AM    Potassium 4.8 02/24/2020 12:00 AM    Potassium 4.8 01/27/2020 12:00 AM    Chloride 104 02/24/2020 12:00 AM    Chloride 102 01/27/2020 12:00 AM    CO2 23 02/24/2020 12:00 AM    CO2 26 01/27/2020 12:00 AM    Anion gap 8 09/18/2019 03:07 AM    Anion gap 9 09/17/2019 03:18 AM    Glucose 96 02/24/2020 12:00 AM    Glucose 95 01/27/2020 12:00 AM    BUN 18 02/24/2020 12:00 AM    BUN 18 01/27/2020 12:00 AM    Creatinine 1.61 (H) 02/24/2020 12:00 AM    Creatinine 1.29 (H) 01/27/2020 12:00 AM    GFR est AA 52 (L) 02/24/2020 12:00 AM    GFR est AA 68 01/27/2020 12:00 AM    GFR est non-AA 45 (L) 02/24/2020 12:00 AM    GFR est non-AA 59 (L) 01/27/2020 12:00 AM    Calcium 8.7 02/24/2020 12:00 AM    Calcium 8.7 01/27/2020 12:00 AM    Magnesium 2.3 02/19/2019 09:01 AM    Magnesium 2.1 01/16/2019 12:41 PM    Phosphorus 3.4 09/17/2019 03:18 AM    Phosphorus 4.1 08/25/2012 04:10 AM    Albumin 4.0 02/24/2020 12:00 AM    Albumin 4.3 01/27/2020 12:00 AM    Protein, total 6.3 02/24/2020 12:00 AM    Protein, total 6.2 01/27/2020 12:00 AM    Globulin 3.1 09/16/2019 05:28 PM    Globulin 3.0 06/17/2019 08:37 AM    A-G Ratio 1.7 02/24/2020 12:00 AM    A-G Ratio 2.3 (H) 01/27/2020 12:00 AM    AST (SGOT) 19 02/24/2020 12:00 AM    AST (SGOT) 19 01/27/2020 12:00 AM    ALT (SGPT) 15 02/24/2020 12:00 AM    ALT (SGPT) 15 01/27/2020 12:00 AM     Lab Results   Component Value Date/Time    WBC 3.6 02/24/2020 12:00 AM    WBC 3.7 01/27/2020 12:00 AM    HGB 14.5 02/24/2020 12:00 AM    HGB 13.8 01/27/2020 12:00 AM    HCT 42.6 02/24/2020 12:00 AM    HCT 40.0 01/27/2020 12:00 AM    PLATELET 484 (L) 75/83/8147 12:00 AM    PLATELET 96 (LL) 34/14/3477 12:00 AM     Lab Results   Component Value Date/Time    aPTT 49.0 (H) 01/08/2019 07:16 PM    aPTT 100.2 (H) 01/07/2019 07:01 PM    Prothrombin time 17.7 (H) 09/17/2019 03:18 AM    Prothrombin time 14.3 01/06/2019 10:33 PM    INR 1.4 09/17/2019 03:18 AM    INR 1.1 01/06/2019 10:33 PM       Assessment:     Lung cancer    Hospital Problems  Date Reviewed: 3/2/2020    None          Plan:     Planned Procedure:  Port placement    Risks, benefits, and alternatives reviewed with patient and he agrees to proceed with the procedure.       Signed By: Shamir Land PA-C     March 11, 2020

## 2020-03-13 ENCOUNTER — HOSPITAL ENCOUNTER (OUTPATIENT)
Dept: LAB | Age: 65
Discharge: HOME | End: 2020-03-13

## 2020-03-13 DIAGNOSIS — C34.92 ADENOCARCINOMA OF LUNG, LEFT (HCC): ICD-10-CM

## 2020-03-13 PROBLEM — C34.91 SCLC (SMALL CELL LUNG CARCINOMA), RIGHT (HCC): Status: ACTIVE | Noted: 2020-03-13

## 2020-03-14 ENCOUNTER — HOSPITAL ENCOUNTER (OUTPATIENT)
Dept: INFUSION THERAPY | Age: 65
End: 2020-03-14
Payer: COMMERCIAL

## 2020-03-18 ENCOUNTER — HOSPITAL ENCOUNTER (OUTPATIENT)
Dept: RADIATION ONCOLOGY | Age: 65
Discharge: HOME | End: 2020-03-18
Payer: COMMERCIAL

## 2020-03-18 VITALS
SYSTOLIC BLOOD PRESSURE: 106 MMHG | BODY MASS INDEX: 24.79 KG/M2 | DIASTOLIC BLOOD PRESSURE: 72 MMHG | HEART RATE: 82 BPM | TEMPERATURE: 97.7 F | WEIGHT: 187.9 LBS

## 2020-03-18 PROCEDURE — 99211 OFF/OP EST MAY X REQ PHY/QHP: CPT

## 2020-03-18 PROCEDURE — 77470 SPECIAL RADIATION TREATMENT: CPT

## 2020-03-18 NOTE — NURSE NAVIGATOR
Consult SCLC. CT C/A/P 2-24-20. Biopsy 3-9-20. F/u Dr. Elmer Lauren 3-27-20. Taking [address] and [address]. History of CML and Adenocarcinoma to lung. CONSENTS SIGNED FOR RT TO THE LUNG. CT SIM SCHEDULED. CONCURRENT CHEMO/RT.     John Cohen RN

## 2020-03-18 NOTE — CONSULTS
Patient: Gerhardt Bolds. MRN: [id number]  SSN: [id number]    YOB: 1955  Age: 62 y.o. Sex: male      Other Providers: Brea Liao MD    CHIEF COMPLAINT: Lung cancer    DIAGNOSIS: Limited stage SCLC, T4N0M0. 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 PREVIOUS TREATMENT:  1) None for SCLC but osimertinib and imatinib for pulmonary adeno and CML respectively    HISTORY OF PRESENT ILLNESS:  Gerhardt Bolds. is a 62 y.o. male who I am seeing at the request of Dr. Jada Deleon for lung cancer. He also has a long history of CML having received first line therapy beginning in 2012. He was originally found To have CML after presenting with a 2 month history of fatigue and respiratory infection. His workup included a bone marrow confirm the diagnosis of CML with a BCR/ABL translocation. He also endorsed weight loss with intermittent fevers and chills and increased fatigue  He does therefore started on Gleevec and early September 2012 with an excellent response. He was followed with repeat evaluations at regular intervals through oncology and overall was feeling well in response to therapy. However, in spring 4/2018 he developed chest pain and evaluations showed a right middle lobe infiltrate and right pleural effusion which after thoracentesis proved lung adenocarcinoma. PET CT showed disease confined to the chest with a pleural effusion but no disease elsewhere. MRI was negative. He was started on osimertinib 6/14/18 and did well until disease was thought to progress with a progressive RLL and RUL infiltrate and biopsy was recommended. This surprisingly showed SCLC 3/2020. With the sense that his disease was confined to the chest, a course of definitive chemoradiation was considered and he was referred to our office and seen in initial consultation 3/18/2020.   Dr. Jada Deleon had outlined a course of Cis/Etoposide and radiation. PAST MEDICAL HISTORY:    Past Medical History:   Diagnosis Date    Anxiety     Arthritis     [facility] systolic [facility] 5/3/2017    CML (chronic myeloid leukemia) ([facility] 8/25/2012    Depression 11/2/2012    Erectile dysfunction     Hypertension     Leukemia, acute ([facility] 8/24/2012    Lung cancer ([facility] 2018    Pulmonary emboli ([facility] 1/6/2019       The patient denies history of collagen vascular diseases, pacemaker insertion, or prior radiation but has had prior chemotherapy. PAST SURGICAL HISTORY:   Past Surgical History:   Procedure Laterality Date    HX HERNIA REPAIR      left inguinal 1996    IR INSERT TUNL CVC W PORT OVER 5 YEARS  3/11/2020       MEDICATIONS:     Current Outpatient Medications:     oseltamivir (TAMIFLU) 75 mg capsule, Take 1 Cap by mouth daily for 10 days. , Disp: 10 Cap, Rfl: 0    folic acid (FOLVITE) 1 mg tablet, Take 1 Tab by mouth daily. , Disp: 90 Tab, Rfl: 0    prochlorperazine (COMPAZINE) 10 mg tablet, Take 1 Tab by mouth every six (6) hours as needed for Nausea., Disp: 90 Tab, Rfl: 2    ondansetron hcl (ZOFRAN) 8 mg tablet, Take 1 Tab by mouth every eight (8) hours as needed for Nausea., Disp: 90 Tab, Rfl: 2    lidocaine-prilocaine (EMLA) topical cream, Apply  to affected area as needed for Pain. Apply to port site 45-60 minutes prior to port access. Cover with plastic., Disp: 30 g, Rfl: 0    osimertinib (TAGRISSO) 80 mg tablet, Take 1 Tab by mouth daily. , Disp: 30 Tab, Rfl: 3    imatinib (GLEEVEC) 400 mg tablet, Take 1 Tab by mouth daily. , Disp: 30 Tab, Rfl: 5    ENTRESTO 24-26 mg tablet, Take 1 Tab by mouth two (2) times a day., Disp: 180 Tab, Rfl: 4    rivaroxaban (XARELTO) 20 mg tab tablet, Take 1 Tab by mouth daily (with breakfast). Take after finishing 15mg bid course., Disp: 90 Tab, Rfl: 1    spironolactone (ALDACTONE) 25 mg tablet, Take 1 Tab by mouth daily.  Indications: chronic heart failure (Patient taking differently: Take 25 mg by mouth daily. 1/2 tab  Indications: chronic heart failure), Disp: 30 Tab, Rfl: 3    metoprolol succinate (TOPROL-XL) 25 mg XL tablet, Take 0.5 Tabs by mouth daily. Indications: chronic heart failure, Disp: 90 Tab, Rfl: 0    furosemide (LASIX) 20 mg tablet, Take 1 Tab by mouth daily. Indications: Fluid in the Lungs due to Chronic Heart Failure (Patient taking differently: Take 10 mg by mouth daily. Indications: fluid in the lungs due to chronic heart failure), Disp: 90 Tab, Rfl: 0    albuterol (PROVENTIL HFA, VENTOLIN HFA, PROAIR HFA) 90 mcg/actuation inhaler, Take 2 Puffs by inhalation every four (4) hours as needed for Wheezing., Disp: 1 Inhaler, Rfl: 11    benzonatate (TESSALON) 200 mg capsule, Take 1 Cap by mouth three (3) times daily as needed for Cough. , Disp: 90 Cap, Rfl: 5    triamcinolone (NASACORT) 55 mcg nasal inhaler, 2 Sprays by Both Nostrils route two (2) times a day. (Patient taking differently: 2 Sprays by Both Nostrils route two (2) times daily as needed. Indications: inflammation of the nose due to an allergy), Disp: 1 Bottle, Rfl: 3    OXYGEN-AIR DELIVERY SYSTEMS, by Does Not Apply route. 3LPM PRN, Disp: , Rfl:     multivitamin, stress formula (STRESS TAB) tablet, Take 1 Tab by mouth daily. , Disp: , Rfl:     ALLERGIES:   No Known Allergies    SOCIAL HISTORY:   Social History     Socioeconomic History    Marital status:      Spouse name: Not on file    Number of children: Not on file    Years of education: Not on file    Highest education level: Not on file   Occupational History     Comment: [employer]   Social Needs    Financial resource strain: Not on file    Food insecurity     Worry: Not on file     Inability: Not on file    Transportation needs     Medical: Not on file     Non-medical: Not on file   Tobacco Use    Smoking status: Never Smoker    Smokeless tobacco: Never Used   Substance and Sexual Activity    Alcohol use:  No  Drug use: No    Sexual activity: Not on file   Lifestyle    Physical activity     Days per week: Not on file     Minutes per session: Not on file    Stress: Not on file   Relationships    Social connections     Talks on phone: Not on file     Gets together: Not on file     Attends [employer] service: Not on file     Active member of club or organization: Not on file     Attends meetings of clubs or organizations: Not on file     Relationship status: Not on file    Intimate partner violence     Fear of current or ex partner: Not on file     Emotionally abused: Not on file     Physically abused: Not on file     Forced sexual activity: Not on file   Other Topics Concern    Not on file   Social History Narrative    Pt  with one son       FAMILY HISTORY:   Family History   Problem Relation Age of Onset    Lung Disease Father 79        Black lung\"    Cancer Mother 72        pancreatic cancer       REVIEW OF SYSTEMS: Please see the completed review of systems sheet in the chart that I have reviewed today. PHYSICAL EXAMINATION:   ECOG Performance status 0  VITAL SIGNS:   Visit Vitals  /72 (BP 1 Location: Left arm, BP Patient Position: Sitting)   Pulse 82   Temp 97.7 °F (36.5 °C)   Wt 85.2 kg (187 lb 14.4 oz)   BMI 24.79 kg/m²        GENERAL: The patient is well-developed, ambulatory, alert and in no acute distress. HEENT: Head is normocephalic, atraumatic. Pupils are equal, round and reactive to light and accommodation. Extraocular movement intact. Hearing is intact bilaterally to finger rub. Oral cavity reveals no lesions. Mucous membranes are moist. NECK: Neck is supple with no masses. CARDIOVASCULAR: Heart is regular rate and rhythm. There are no murmurs rubs or gallups. Radial pulses are 2+ RESPIRATORY: Lungs are clear to auscultation and percussion. There is normal respiratory effort. GASTROINTESTINAL: The abdomen is soft, non-tender, nondistended with no hepatospelnomagaly.  Digital rectal examination: deferred LYMPHATIC: There is no cervical, supraclavicular or axillary lymphadenopathy bilaterally. MUSCULOSKELETAL: Extremities reveal no cyanosis, clubbing or edema.  is 5+/5. NEURO:  Cranial nerves II-XII grossly intact. Muscular strength and sensation are intact throughout all four extremities. PATHOLOGY:         SCLC:         LABORATORY:   Lab Results   Component Value Date/Time    Sodium 141 03/13/2020 12:00 AM    Potassium 4.5 03/13/2020 12:00 AM    Chloride 103 03/13/2020 12:00 AM    CO2 23 03/13/2020 12:00 AM    Anion gap 8 09/18/2019 03:07 AM    Glucose 84 03/13/2020 12:00 AM    BUN 13 03/13/2020 12:00 AM    Creatinine 1.32 (H) 03/13/2020 12:00 AM    GFR est AA 66 03/13/2020 12:00 AM    GFR est non-AA 57 (L) 03/13/2020 12:00 AM    Calcium 8.8 03/13/2020 12:00 AM    Magnesium 2.3 02/19/2019 09:01 AM    Phosphorus 3.4 09/17/2019 03:18 AM    Albumin 4.0 03/13/2020 12:00 AM    Protein, total 6.5 03/13/2020 12:00 AM    Globulin 3.1 09/16/2019 05:28 PM    A-G Ratio 1.6 03/13/2020 12:00 AM    AST (SGOT) 24 03/13/2020 12:00 AM    ALT (SGPT) 15 03/13/2020 12:00 AM     Lab Results   Component Value Date/Time    WBC 4.3 03/13/2020 12:00 AM    HGB 13.9 03/13/2020 12:00 AM    HCT 40.2 03/13/2020 12:00 AM    PLATELET 837 (L) 91/06/1809 12:00 AM       RADIOLOGY:    I have personally reviewed the imaging and agree with the reports below. Ct Bx Lung Ndl Perc    Result Date: 3/9/2020  Title: CT guided lung mass biopsy. History: Right lower lung mass. Consent: Informed written and oral consent was obtained from the patient after explanation of benefits and risks (including, but not limited to: Infection, Hemorrhage, Visceral Injury, Insufficient biopsy, Pneumothorax). The patient's questions were answered to their satisfaction. The patient stated understanding and requested that we proceed. Technique:   All CT scans at this facility are performed using dose reduction/dose modulation techniques, as appropriate the performed exam, including the following:  Automated Exposure Control; Adjustment of the mA and/or kV according to patient size (this includes techniques or standardized protocols for targeted exams where dose is matched to indication/reason for exam); and Use of Iterative Reconstruction Technique. Procedure:  Sterile barrier technique (including:  cap, mask, sterile gloves, sterile sheet, hand hygiene, and cutaneous antisepsis) were used. With the patient prone a preliminary CT scan through the posterior chest was performed with radio-opaque markers on the skin. Following routine prep and drape of the right posterior chest, a local field block with lidocaine was achieved. Using intermittent CT technique, a 19-gauge needle was advanced into the pleural based lesion. Using a 20-gauge needle, 4 : biopsies were performed. A post-biopsy CT scan was obtained. Hemostasis was achieved with manual compression. A bandage was applied. Complications: None. Radiation Exposure Indices: Total Exam DLP = 347 mGy-cm Contrast:  0 milliliters. Medications:  Under physician supervision, 11 minutes of moderate intravenous conscious sedation was performed by administering Versed, Fentanyl intravenously. Continuous pulse oximetry, heart rate, and blood pressure monitoring was performed with an independent, trained observer present. Ancef. Specimen: To Pathology. Impression: Uncomplicated CT guided lung mass biopsy. Plan:  Bedrest observation for 2 hours followed by chest x-ray. Ct Chest Abd Pelv W Cont    Result Date: 2/24/2020  CT CHEST, ABDOMEN AND PELVIS WITH INTRAVENOUS CONTRAST DATED 2/24/2020. History: Stage IV lung cancer.  Comparison: CT chest without contrast 11/25/2019, and CT chest, abdomen, and pelvis 9/14/2019 Technique:   Multiple contiguous helical CT images reconstructed at 5 mm were obtained from the base of the neck to the ischial tuberosities following oral and 100 cc Isovue-[id number] without acute complication. All CT scans performed at this facility use one or all of the following: Automated exposure control, adjustment of the mA and/or kVp according to patient's size, iterative reconstruction. Findings: CT Chest: The base of the neck is unremarkable in appearance. No evolving adenopathy is seen. Prominent lymph nodes previously seen in the AP window, precarinal mediastinal, subcarinal mediastinum, and right hilum are unchanged. The thoracic aorta is normal in caliber. Stable moderate cardiomegaly is seen. Evaluation with lung windows once again demonstrate progression of clustered nodules seen in the right upper lobe with now nearly confluent soft tissue density best appreciated on axial image 29 overall measuring 3.7 cm x 1.5 cm in size. Thickening of the directly adjacent minor fissure is unchanged. However, a dependent left pleural effusion is slightly increased from the most recent study and there is a new pleural-based mass seen in the right posterior hemithorax on image 48 measuring 3.6 cm x 2.2 cm suggesting evolving pleural metastatic disease. No pleural effusion is seen. Lungs are expanded without evidence for pneumothorax. No evolving aggressive osseous lesion is seen. CT ABDOMEN:  The Liver is homogeneous in attenuation. The spleen is homogeneous in attenuation. No contour deforming or enhancing mass lesions are seen of the pancreas or adrenal glands. The gallbladder has an unremarkable CT appearance without radioopaque stones or pericholecystic fluid/inflammatory changes. The kidneys enhance symmetrically and no evidence of hydronephrosis is seen. The visualized loops of small bowel and colon are normal in caliber. The appendix is seen on image 97 without acute abnormality. Mild to moderate diverticulosis is seen throughout the colon most prominently involving the proximal sigmoid colon. No free fluid and no free air is seen in the abdomen.  No evolving adenopathy is seen of the abdomen. The abdominal aorta demonstrates mild atherosclerotic calcification. No evolving aggressive osseous lesion is seen. CT PELVIS: No abnormal pelvic fluid collections are present. No evolving pelvic adenopathy is seen. The urinary bladder is unremarkable. No evolving aggressive osseous lesion is seen. IMPRESSION:  1. Continued progression of clustered nodules in the right upper lobe once again concerning for local progression with near confluent soft tissue density now seen at this level measuring 3.7 cm x 1.5 cm size. 2. Increasing right pleural effusion from the most recent study with a large pleural mass now seen in the posterior right hemithorax measuring 3.6 cm x 2.2 cm. This is concerning for evolving pleural metastatic disease. Additional prominent mediastinal and right hilar lymph nodes are not clearly changed from the most recent CT scan. No findings concerning for evolving metastatic disease below the diaphragms is seen. IMPRESSION:  Sandhya Landis is a 62 y.o. male with limited stage small cell lung cancer. We discussed the natural history of lung cancer and the implications of various tumor and patient characteristics including tumor markers, stage with both primary tumor stage and involvement of hilar and mediastinal lymph nodes, and performance status. We discussed the role of surgery which is limited in patients with small cell lung cancer. There is substantial clinical data in support of concurrent chemotherapy delivered early during the course of treatment (generally starting with cycle 1 or 2 of chemotherapy) which comes at the cost of increased esophagitis. Certainly his case is highly unusual with a concurrent diagnosis of CML and a pulmonary adenocarcinoma, stage IV by criteria with a malignant pleural effusion.   However, this has shown no sign of progression since its original diagnosis in May 2018 and therefore, giving him the benefit of the doubt, he does have treatable and potentially curable small cell lung cancer with limited stage disease. We then discussed the logistics of radiation delivering definitive radiation to the grossly involved tumor and lymph nodes without elective [place] coverage over the course of 6 weeks delivering 45 Gy in 30 fractions at 1.5 Gy per fraction or 60 Gy at 2 Gy per fraction. We reviewed Marlene et al suggesting improved survival with treatment twice daily but that the conventionally fractionated arm only treated to 45 Gy which is felt to be inadequate so the dosing is still unknown and part of a current clinical trial, CALGB 63891 which closed this year. Furthermore, the CONVERT trial (ASCO 2016) showed no difference in 66 Gy daily vs 45 Gy BID so I prefer the once daily regimen except for patients motivated to be completed in 3 weeks. We discussed the potential toxicities related to the location of the disease but mainly including esophagitis, pneumonitis, pericarditis, fatigue, and rare but possible side effects such as brachial plexus or spinal cord injury. At the completion of our visit, all of the patient's questions were answered and informed consent was obtained. I will plan for CT simulation shortly with radiation to begin generally 7-10 days after this time. This start date will be coordinated with Dr. Thomas Kaufman in medical oncology. PLAN:    1) Consented patient for treatment with external beam radiation after discussing risk,  benefits, and side effects from treatment. 2) Reviewed available research treatment and cancer care protocols for which patient  may be eligible. Unfortunately there are no matching clinical trials available at  this time. 3) Coordinate care and start date with medical oncology. 4) CT Simulation planned shortly with radiation to begin 7-10 days after this time.       Earnestine Ellis MD   March 18, 2020

## 2020-03-24 ENCOUNTER — HOSPITAL ENCOUNTER (OUTPATIENT)
Dept: RADIATION ONCOLOGY | Age: 65
Discharge: HOME | End: 2020-03-24
Payer: COMMERCIAL

## 2020-03-28 ENCOUNTER — HOSPITAL ENCOUNTER (INPATIENT)
Age: 65
LOS: 4 days | Discharge: HOME | DRG: 189 | End: 2020-04-01
Attending: EMERGENCY MEDICINE | Admitting: INTERNAL MEDICINE
Payer: COMMERCIAL

## 2020-03-28 ENCOUNTER — APPOINTMENT (OUTPATIENT)
Dept: GENERAL RADIOLOGY | Age: 65
DRG: 189 | End: 2020-03-28
Attending: EMERGENCY MEDICINE
Payer: COMMERCIAL

## 2020-03-28 DIAGNOSIS — J90 PLEURAL EFFUSION ON RIGHT: ICD-10-CM

## 2020-03-28 DIAGNOSIS — R06.00 DYSPNEA, UNSPECIFIED TYPE: Primary | ICD-10-CM

## 2020-03-28 DIAGNOSIS — R06.02 SHORTNESS OF BREATH: ICD-10-CM

## 2020-03-28 DIAGNOSIS — C34.91 SCLC (SMALL CELL LUNG CARCINOMA), RIGHT (HCC): ICD-10-CM

## 2020-03-28 DIAGNOSIS — J90 PLEURAL EFFUSION: ICD-10-CM

## 2020-03-28 LAB
ALBUMIN SERPL-MCNC: 3.4 G/DL (ref 3.2–4.6)
ALBUMIN/GLOB SERPL: 1 {RATIO} (ref 1.2–3.5)
ALP SERPL-CCNC: 71 U/L (ref 50–136)
ALT SERPL-CCNC: 23 U/L (ref 12–65)
ANION GAP SERPL CALC-SCNC: 9 MMOL/L (ref 7–16)
AST SERPL-CCNC: 22 U/L (ref 15–37)
BASOPHILS # BLD: 0 K/UL (ref 0–0.2)
BASOPHILS NFR BLD: 0 % (ref 0–2)
BILIRUB SERPL-MCNC: 0.6 MG/DL (ref 0.2–1.1)
BNP SERPL-MCNC: 549 PG/ML (ref 5–125)
BUN SERPL-MCNC: 15 MG/DL (ref 8–23)
CALCIUM SERPL-MCNC: 8.9 MG/DL (ref 8.3–10.4)
CHLORIDE SERPL-SCNC: 98 MMOL/L (ref 98–107)
CO2 SERPL-SCNC: 25 MMOL/L (ref 21–32)
CREAT SERPL-MCNC: 1.38 MG/DL (ref 0.8–1.5)
DIFFERENTIAL METHOD BLD: ABNORMAL
EOSINOPHIL # BLD: 0.1 K/UL (ref 0–0.8)
EOSINOPHIL NFR BLD: 2 % (ref 0.5–7.8)
ERYTHROCYTE [DISTWIDTH] IN BLOOD BY AUTOMATED COUNT: 13.2 % (ref 11.9–14.6)
GLOBULIN SER CALC-MCNC: 3.4 G/DL (ref 2.3–3.5)
GLUCOSE SERPL-MCNC: 190 MG/DL (ref 65–100)
HCT VFR BLD AUTO: 29.3 % (ref 41.1–50.3)
HGB BLD-MCNC: 9.9 G/DL (ref 13.6–17.2)
IMM GRANULOCYTES # BLD AUTO: 0 K/UL (ref 0–0.5)
IMM GRANULOCYTES NFR BLD AUTO: 0 % (ref 0–5)
LACTATE SERPL-SCNC: 1.7 MMOL/L (ref 0.4–2)
LYMPHOCYTES # BLD: 0.8 K/UL (ref 0.5–4.6)
LYMPHOCYTES NFR BLD: 11 % (ref 13–44)
MAGNESIUM SERPL-MCNC: 2.1 MG/DL (ref 1.8–2.4)
MCH RBC QN AUTO: 30.3 PG (ref 26.1–32.9)
MCHC RBC AUTO-ENTMCNC: 33.8 G/DL (ref 31.4–35)
MCV RBC AUTO: 89.6 FL (ref 79.6–97.8)
MONOCYTES # BLD: 0.7 K/UL (ref 0.1–1.3)
MONOCYTES NFR BLD: 10 % (ref 4–12)
NEUTS SEG # BLD: 5.2 K/UL (ref 1.7–8.2)
NEUTS SEG NFR BLD: 76 % (ref 43–78)
NRBC # BLD: 0 K/UL (ref 0–0.2)
PLATELET # BLD AUTO: 188 K/UL (ref 150–450)
PMV BLD AUTO: 10.4 FL (ref 9.4–12.3)
POTASSIUM SERPL-SCNC: 4 MMOL/L (ref 3.5–5.1)
PROCALCITONIN SERPL-MCNC: 0.08 NG/ML
PROT SERPL-MCNC: 6.8 G/DL (ref 6.3–8.2)
RBC # BLD AUTO: 3.27 M/UL (ref 4.23–5.6)
SODIUM SERPL-SCNC: 132 MMOL/L (ref 136–145)
TROPONIN I SERPL-MCNC: <0.02 NG/ML (ref 0.02–0.05)
WBC # BLD AUTO: 6.8 K/UL (ref 4.3–11.1)

## 2020-03-28 PROCEDURE — 94760 N-INVAS EAR/PLS OXIMETRY 1: CPT

## 2020-03-28 PROCEDURE — 80053 COMPREHEN METABOLIC PANEL: CPT

## 2020-03-28 PROCEDURE — 96374 THER/PROPH/DIAG INJ IV PUSH: CPT

## 2020-03-28 PROCEDURE — 94664 DEMO&/EVAL PT USE INHALER: CPT

## 2020-03-28 PROCEDURE — 83605 ASSAY OF LACTIC ACID: CPT

## 2020-03-28 PROCEDURE — 94640 AIRWAY INHALATION TREATMENT: CPT

## 2020-03-28 PROCEDURE — 74011250637 HC RX REV CODE- 250/637: Performed by: FAMILY MEDICINE

## 2020-03-28 PROCEDURE — 99285 EMERGENCY DEPT VISIT HI MDM: CPT

## 2020-03-28 PROCEDURE — 83735 ASSAY OF MAGNESIUM: CPT

## 2020-03-28 PROCEDURE — 84145 PROCALCITONIN (PCT): CPT

## 2020-03-28 PROCEDURE — 77010033678 HC OXYGEN DAILY

## 2020-03-28 PROCEDURE — 74011000250 HC RX REV CODE- 250: Performed by: INTERNAL MEDICINE

## 2020-03-28 PROCEDURE — 85025 COMPLETE CBC W/AUTO DIFF WBC: CPT

## 2020-03-28 PROCEDURE — 93005 ELECTROCARDIOGRAM TRACING: CPT | Performed by: EMERGENCY MEDICINE

## 2020-03-28 PROCEDURE — 71045 X-RAY EXAM CHEST 1 VIEW: CPT

## 2020-03-28 PROCEDURE — 83880 ASSAY OF NATRIURETIC PEPTIDE: CPT

## 2020-03-28 PROCEDURE — 65270000029 HC RM PRIVATE

## 2020-03-28 PROCEDURE — 84484 ASSAY OF TROPONIN QUANT: CPT

## 2020-03-28 PROCEDURE — 74011250636 HC RX REV CODE- 250/636: Performed by: INTERNAL MEDICINE

## 2020-03-28 PROCEDURE — 77030021668 HC NEB PREFIL KT VYRM -A

## 2020-03-28 PROCEDURE — 74011250636 HC RX REV CODE- 250/636: Performed by: EMERGENCY MEDICINE

## 2020-03-28 RX ORDER — FUROSEMIDE 10 MG/ML
40 INJECTION INTRAMUSCULAR; INTRAVENOUS
Status: COMPLETED | OUTPATIENT
Start: 2020-03-28 | End: 2020-03-28

## 2020-03-28 RX ORDER — LIDOCAINE AND PRILOCAINE 25; 25 MG/G; MG/G
CREAM TOPICAL AS NEEDED
Status: DISCONTINUED | OUTPATIENT
Start: 2020-03-28 | End: 2020-04-01 | Stop reason: HOSPADM

## 2020-03-28 RX ORDER — ACETAMINOPHEN 325 MG/1
650 TABLET ORAL
Status: DISCONTINUED | OUTPATIENT
Start: 2020-03-28 | End: 2020-04-01 | Stop reason: HOSPADM

## 2020-03-28 RX ORDER — FLUTICASONE PROPIONATE 50 MCG
1 SPRAY, SUSPENSION (ML) NASAL DAILY
Status: DISCONTINUED | OUTPATIENT
Start: 2020-03-29 | End: 2020-04-01 | Stop reason: HOSPADM

## 2020-03-28 RX ORDER — BENZONATATE 100 MG/1
200 CAPSULE ORAL
Status: DISCONTINUED | OUTPATIENT
Start: 2020-03-28 | End: 2020-04-01 | Stop reason: HOSPADM

## 2020-03-28 RX ORDER — ALPRAZOLAM 0.5 MG/1
0.5 TABLET ORAL
Status: DISCONTINUED | OUTPATIENT
Start: 2020-03-28 | End: 2020-04-01 | Stop reason: HOSPADM

## 2020-03-28 RX ORDER — FOLIC ACID 1 MG/1
1 TABLET ORAL DAILY
Status: DISCONTINUED | OUTPATIENT
Start: 2020-03-29 | End: 2020-04-01 | Stop reason: HOSPADM

## 2020-03-28 RX ORDER — SPIRONOLACTONE 25 MG/1
25 TABLET ORAL DAILY
Status: DISCONTINUED | OUTPATIENT
Start: 2020-03-29 | End: 2020-04-01 | Stop reason: HOSPADM

## 2020-03-28 RX ORDER — FUROSEMIDE 10 MG/ML
40 INJECTION INTRAMUSCULAR; INTRAVENOUS EVERY 12 HOURS
Status: DISCONTINUED | OUTPATIENT
Start: 2020-03-28 | End: 2020-03-29

## 2020-03-28 RX ORDER — IMATINIB MESYLATE 400 MG/1
400 TABLET, FILM COATED ORAL DAILY
Status: DISCONTINUED | OUTPATIENT
Start: 2020-03-29 | End: 2020-03-30

## 2020-03-28 RX ORDER — ONDANSETRON 2 MG/ML
4 INJECTION INTRAMUSCULAR; INTRAVENOUS
Status: DISCONTINUED | OUTPATIENT
Start: 2020-03-28 | End: 2020-04-01 | Stop reason: HOSPADM

## 2020-03-28 RX ORDER — IPRATROPIUM BROMIDE AND ALBUTEROL SULFATE 2.5; .5 MG/3ML; MG/3ML
3 SOLUTION RESPIRATORY (INHALATION)
Status: DISCONTINUED | OUTPATIENT
Start: 2020-03-28 | End: 2020-04-01 | Stop reason: HOSPADM

## 2020-03-28 RX ORDER — METOPROLOL SUCCINATE 25 MG/1
12.5 TABLET, EXTENDED RELEASE ORAL DAILY
Status: DISCONTINUED | OUTPATIENT
Start: 2020-03-29 | End: 2020-04-01 | Stop reason: HOSPADM

## 2020-03-28 RX ORDER — HYDROCODONE BITARTRATE AND ACETAMINOPHEN 5; 325 MG/1; MG/1
1 TABLET ORAL
Status: DISCONTINUED | OUTPATIENT
Start: 2020-03-28 | End: 2020-04-01 | Stop reason: HOSPADM

## 2020-03-28 RX ORDER — SODIUM CHLORIDE 0.9 % (FLUSH) 0.9 %
5-40 SYRINGE (ML) INJECTION EVERY 8 HOURS
Status: DISCONTINUED | OUTPATIENT
Start: 2020-03-28 | End: 2020-04-01 | Stop reason: HOSPADM

## 2020-03-28 RX ORDER — SODIUM CHLORIDE 0.9 % (FLUSH) 0.9 %
5-40 SYRINGE (ML) INJECTION AS NEEDED
Status: DISCONTINUED | OUTPATIENT
Start: 2020-03-28 | End: 2020-04-01 | Stop reason: HOSPADM

## 2020-03-28 RX ADMIN — Medication 10 ML: at 22:00

## 2020-03-28 RX ADMIN — FUROSEMIDE 40 MG: 10 INJECTION, SOLUTION INTRAMUSCULAR; INTRAVENOUS at 22:07

## 2020-03-28 RX ADMIN — HYDROCODONE BITARTRATE AND ACETAMINOPHEN 1 TABLET: 5; 325 TABLET ORAL at 22:42

## 2020-03-28 RX ADMIN — IPRATROPIUM BROMIDE AND ALBUTEROL SULFATE 3 ML: .5; 3 SOLUTION RESPIRATORY (INHALATION) at 20:45

## 2020-03-28 RX ADMIN — FUROSEMIDE 40 MG: 10 INJECTION, SOLUTION INTRAMUSCULAR; INTRAVENOUS at 17:37

## 2020-03-28 NOTE — ED PROVIDER NOTES
Patient presents the ER complaint of worsening shortness of breath. Patient states for the past 3 days he has had worsening shortness of breath particularly short of breath when he lays flat. States symptoms worsened today. Has some minimal exertional dyspnea. Denies any chest pain. Is adamant that he has had no fever, worsening cough, change in smell or diarrhea. Has a history of CHF, metastatic lung cancer as well as PE. The history is provided by the patient. Respiratory Distress   This is a new problem. The problem occurs frequently. The current episode started yesterday. Associated symptoms include orthopnea. Pertinent negatives include no fever, no cough, no sputum production, no hemoptysis, no abdominal pain, no leg pain and no leg swelling. Associated medical issues include chronic lung disease and heart failure.         Past Medical History:   Diagnosis Date    Anxiety     Arthritis     Chronic systolic congestive heart failure ([address] 5/3/2017    CML (chronic myeloid leukemia) ([address] 8/25/2012    Depression 11/2/2012    Erectile dysfunction     Hypertension     Leukemia, acute ([address] 8/24/2012    Lung cancer ([address] 2018    Pulmonary emboli ([address] 1/6/2019       Past Surgical History:   Procedure Laterality Date    HX HERNIA REPAIR      left inguinal 1996    IR INSERT TUNL CVC W PORT OVER 5 YEARS  3/11/2020         Family History:   Problem Relation Age of Onset    Lung Disease Father 79        Black lung\"   [place] Cancer Mother 72        pancreatic cancer       Social History     Socioeconomic History    Marital status:      Spouse name: Not on file    Number of children: Not on file    Years of education: Not on file    Highest education level: Not on file   Occupational History     Comment: [employer]   Social Needs    Financial resource strain: Not on file    Food insecurity     Worry: Not on file     Inability: Not on file    Transportation needs     Medical: Not on file Non-medical: Not on file   Tobacco Use    Smoking status: Never Smoker    Smokeless tobacco: Never Used   Substance and Sexual Activity    Alcohol use: No    Drug use: No    Sexual activity: Not on file   Lifestyle    Physical activity     Days per week: Not on file     Minutes per session: Not on file    Stress: Not on file   Relationships    Social connections     Talks on phone: Not on file     Gets together: Not on file     Attends Mandaeism service: Not on file     Active member of club or organization: Not on file     Attends meetings of clubs or organizations: Not on file     Relationship status: Not on file    Intimate partner violence     Fear of current or ex partner: Not on file     Emotionally abused: Not on file     Physically abused: Not on file     Forced sexual activity: Not on file   Other Topics Concern    Not on file   Social History Narrative    Pt  with one son         ALLERGIES: Patient has no known allergies. Review of Systems   Constitutional: Negative for fever. Respiratory: Positive for shortness of breath. Negative for cough, hemoptysis, sputum production and choking. Cardiovascular: Positive for orthopnea. Negative for leg swelling. Gastrointestinal: Negative for abdominal pain. Genitourinary: Negative for dysuria and urgency. Musculoskeletal: Negative for back pain and gait problem. Skin: Negative for color change and pallor. Neurological: Negative for speech difficulty, light-headedness and numbness. Hematological: Negative for adenopathy. Does not bruise/bleed easily. Psychiatric/Behavioral: Negative for behavioral problems and confusion. All other systems reviewed and are negative. Vitals:    03/28/20 1439   BP: 128/74   Pulse: 94   Resp: 24   Temp: 97.9 °F (36.6 °C)   SpO2: 96%   Weight: 86.2 kg (190 lb)   Height: 6' 1\" (1.854 m)            Physical Exam  Vitals signs reviewed. Constitutional:       Appearance: He is ill-appearing. HENT:      Head: Normocephalic and atraumatic. Nose: Nose normal.      Mouth/Throat:      Mouth: Mucous membranes are moist.      Pharynx: No oropharyngeal exudate or posterior oropharyngeal erythema. Eyes:      Extraocular Movements: Extraocular movements intact. Conjunctiva/sclera: Conjunctivae normal.      Pupils: Pupils are equal, round, and reactive to light. Neck:      Musculoskeletal: Normal range of motion and neck supple. Cardiovascular:      Rate and Rhythm: Regular rhythm. Tachycardia present. Pulses: Normal pulses. Heart sounds: Normal heart sounds. Pulmonary:      Effort: Pulmonary effort is normal.      Breath sounds: Examination of the right-lower field reveals decreased breath sounds. Decreased breath sounds and rhonchi present. Abdominal:      General: Abdomen is flat. Bowel sounds are normal.      Palpations: Abdomen is soft. Musculoskeletal: Normal range of motion. General: No swelling or tenderness. Skin:     General: Skin is warm and dry. Capillary Refill: Capillary refill takes less than 2 seconds. Coloration: Skin is not jaundiced. Neurological:      General: No focal deficit present. Mental Status: Mental status is at baseline. MDM  Number of Diagnoses or Management Options  Dyspnea, unspecified type:   Pleural effusion:   Diagnosis management comments: Differential diagnosis in this patient is broad to include pneumonia, volume overload, PE, pleural effusions    4:09 PM  White count is 6.8, hemoglobin 9.9  Chest x-ray showed worsening pleural effusion with increase in bibasilar consolidations    Tachycardia remains.   Will discuss case with pulmonary, may warrant admission for further evaluation and possible thoracentesis       Amount and/or Complexity of Data Reviewed  Clinical lab tests: ordered and reviewed  Tests in the radiology section of CPT®: ordered and reviewed  Discuss the patient with other providers: yes    Risk of Complications, Morbidity, and/or Mortality  Presenting problems: high  Diagnostic procedures: moderate  Management options: moderate    Critical Care  Total time providing critical care: 30-74 minutes    Patient Progress  Patient progress: stable         Procedures        Results Include:    Recent Results (from the past 24 hour(s))   CBC WITH AUTOMATED DIFF    Collection Time: 03/28/20  2:51 PM   Result Value Ref Range    WBC 6.8 4.3 - 11.1 K/uL    RBC 3.27 (L) 4.23 - 5.6 M/uL    HGB 9.9 (L) 13.6 - 17.2 g/dL    HCT 29.3 (L) 41.1 - 50.3 %    MCV 89.6 79.6 - 97.8 FL    MCH 30.3 26.1 - 32.9 PG    MCHC 33.8 31.4 - 35.0 g/dL    RDW 13.2 11.9 - 14.6 %    PLATELET 518 264 - 647 K/uL    MPV 10.4 9.4 - 12.3 FL    ABSOLUTE NRBC 0.00 0.0 - 0.2 K/uL    DF AUTOMATED      NEUTROPHILS 76 43 - 78 %    LYMPHOCYTES 11 (L) 13 - 44 %    MONOCYTES 10 4.0 - 12.0 %    EOSINOPHILS 2 0.5 - 7.8 %    BASOPHILS 0 0.0 - 2.0 %    IMMATURE GRANULOCYTES 0 0.0 - 5.0 %    ABS. NEUTROPHILS 5.2 1.7 - 8.2 K/UL    ABS. LYMPHOCYTES 0.8 0.5 - 4.6 K/UL    ABS. MONOCYTES 0.7 0.1 - 1.3 K/UL    ABS. EOSINOPHILS 0.1 0.0 - 0.8 K/UL    ABS. BASOPHILS 0.0 0.0 - 0.2 K/UL    ABS. IMM. GRANS. 0.0 0.0 - 0.5 K/UL   METABOLIC PANEL, COMPREHENSIVE    Collection Time: 03/28/20  2:51 PM   Result Value Ref Range    Sodium 132 (L) 136 - 145 mmol/L    Potassium 4.0 3.5 - 5.1 mmol/L    Chloride 98 98 - 107 mmol/L    CO2 25 21 - 32 mmol/L    Anion gap 9 7 - 16 mmol/L    Glucose 190 (H) 65 - 100 mg/dL    BUN 15 8 - 23 MG/DL    Creatinine 1.38 0.8 - 1.5 MG/DL    GFR est AA >60 >60 ml/min/1.73m2    GFR est non-AA 55 (L) >60 ml/min/1.73m2    Calcium 8.9 8.3 - 10.4 MG/DL    Bilirubin, total 0.6 0.2 - 1.1 MG/DL    ALT (SGPT) 23 12 - 65 U/L    AST (SGOT) 22 15 - 37 U/L    Alk.  phosphatase 71 50 - 136 U/L    Protein, total 6.8 6.3 - 8.2 g/dL    Albumin 3.4 3.2 - 4.6 g/dL    Globulin 3.4 2.3 - 3.5 g/dL    A-G Ratio 1.0 (L) 1.2 - 3.5     MAGNESIUM    Collection Time: 03/28/20  2:51 PM   Result Value Ref Range    Magnesium 2.1 1.8 - 2.4 mg/dL   TROPONIN I    Collection Time: 03/28/20  2:51 PM   Result Value Ref Range    Troponin-I, Qt. <0.02 (L) 0.02 - 0.05 NG/ML   NT-PRO BNP    Collection Time: 03/28/20  2:51 PM   Result Value Ref Range    NT pro- (H) 5 - 125 PG/ML   EKG, 12 LEAD, INITIAL    Collection Time: 03/28/20  3:00 PM   Result Value Ref Range    Ventricular Rate 118 BPM    Atrial Rate 118 BPM    P-R Interval 170 ms    QRS Duration 94 ms    Q-T Interval 318 ms    QTC Calculation (Bezet) 445 ms    Calculated P Axis 71 degrees    Calculated R Axis 61 degrees    Calculated T Axis 33 degrees    Diagnosis       Sinus tachycardia  Minimal voltage criteria for LVH, may be normal variant  Nonspecific ST and T wave abnormality  Abnormal ECG  When compared with ECG of 17-SEP-2019 06:56,  ST now depressed in Lateral leads     LACTIC ACID    Collection Time: 03/28/20  3:44 PM   Result Value Ref Range    Lactic acid 1.7 0.4 - 2.0 MMOL/L     Voice dictation software was used during the making of this note. This software is not perfect and grammatical and other typographical errors may be present. This note has been proofread, but may still contain errors.   Prashant Sinha MD; 3/28/2020 @4:38 PM   ===================================================================

## 2020-03-28 NOTE — PROGRESS NOTES
TRANSFER - IN REPORT:    Verbal report received from [address] (name) on [facility].  being received from ER for routine progression of care      Report consisted of patients Situation, Background, Assessment and   Recommendations(SBAR). Information from the following report(s) SBAR was reviewed with the receiving nurse. Opportunity for questions and clarification was provided. Assessment completed upon patients arrival to unit and care assumed. Patient not on floor at this time.

## 2020-03-28 NOTE — PROGRESS NOTES
03/28/20 1832   Dual Skin Pressure Injury Assessment   Dual Skin Pressure Injury Assessment WDL   Second Care Provider (Based on Facility Policy) JUSTIN Power   Skin Integumentary   Skin Integumentary (WDL) WDL

## 2020-03-28 NOTE — H&P
Hospitalist H&P Note     Admit Date:  3/28/2020  2:44 PM   Name:  Laurie Worthington. Age:  62 y.o.  :  1955   MRN:  [id number]   PCP:  Arnol Nelson MD  Treatment Team: Attending Provider: Ethan Farooq MD; Physician: Jessica Zapata MD    HPI:     The patient is a 81-year-old -American gentleman with lung cancer, chronic systolic CHF with reduced EF of 25 to 30%, CML presently in remission, HTN, history of PE on Xarelto, anxiety/depression, presented to the ED at [address] [place] [address] complaining of shortness of breath. The patient states that he has been short of breath for the last 2 days. He has also been unable to lie flat in bed due to orthopnea. He denies any cough, fever or chills. Denies any sick contacts. He recently had right-sided Port-A-Cath inserted for initiation of chemotherapy for treatment of what is believed to be stage IV lung cancer due to the presence of malignant pleural effusion. On arrival to the ED, the patient was tachycardic and tachypneic with heart rate in the 110s-120s respiratory rate in the 30s and 40s. He was also noted to be hypoxic with oxygen saturation dropping to 87% on room air. The patient was then placed on supplemental oxygen and presently on 2 L of O2 by nasal cannula, he is saturating around 95 to 100%. Labs noted hemoglobin of 9.9, WBC 6.8, sodium 132, potassium 4.0, creatinine 1.38, proBNP 549, lactic acid 1.7. Chest x-ray noted a new moderate sized right pleural effusion. EKG noted sinus tachycardia with rate of 118. Based on his clinical presentation, the hospitalist service was contacted and the patient was admitted to the medical floor for shortness of breath and right pleural effusion. ROS: All systems reviewed and negative except as noted in HPI.     Past Medical History:   Diagnosis Date    Anxiety     Arthritis     Chronic systolic congestive heart failure ([address] 5/3/2017    CML (chronic myeloid leukemia) ([facility] 8/25/2012    Depression 11/2/2012    Erectile dysfunction     Hypertension     Leukemia, acute ([facility] 8/24/2012    Lung cancer ([facility] 2018    Pulmonary emboli ([facility] 1/6/2019      Past Surgical History:   Procedure Laterality Date    HX HERNIA REPAIR      left inguinal 1996    IR INSERT TUNL CVC W PORT OVER 5 YEARS  3/11/2020      No Known Allergies   Social History     Tobacco Use    Smoking status: Never Smoker    Smokeless tobacco: Never Used   Substance Use Topics    Alcohol use: No      Family History   Problem Relation Age of Onset    Lung Disease Father 79        Black lung\"    Cancer Mother 72        pancreatic cancer      Immunization History   Administered Date(s) Administered    Influenza Vaccine (Quad) PF 03/27/2018, 11/16/2018    Tdap 02/27/2016     PTA Medications:  Prior to Admission Medications   Prescriptions Last Dose Informant Patient Reported? Taking? ENTRESTO 24-26 mg tablet   No No   Sig: Take 1 Tab by mouth two (2) times a day. OXYGEN-AIR DELIVERY SYSTEMS   Yes No   Sig: by Does Not Apply route. 3LPM PRN   albuterol (PROVENTIL HFA, VENTOLIN HFA, PROAIR HFA) 90 mcg/actuation inhaler   No No   Sig: Take 2 Puffs by inhalation every four (4) hours as needed for Wheezing. benzonatate (TESSALON) 200 mg capsule   No No   Sig: Take 1 Cap by mouth three (3) times daily as needed for Cough. folic acid (FOLVITE) 1 mg tablet   No No   Sig: Take 1 Tab by mouth daily. furosemide (LASIX) 20 mg tablet   No No   Sig: Take 1 Tab by mouth daily. Indications: Fluid in the Lungs due to Chronic Heart Failure   Patient taking differently: Take 10 mg by mouth daily. Indications: fluid in the lungs due to chronic heart failure   imatinib (GLEEVEC) 400 mg tablet   No No   Sig: Take 1 Tab by mouth daily. lidocaine-prilocaine (EMLA) topical cream   No No   Sig: Apply  to affected area as needed for Pain. Apply to port site 45-60 minutes prior to port access.  Cover with plastic.   metoprolol succinate (TOPROL-XL) 25 mg XL tablet   No No   Sig: Take 0.5 Tabs by mouth daily. Indications: chronic heart failure   multivitamin, stress formula (STRESS TAB) tablet   Yes No   Sig: Take 1 Tab by mouth daily. ondansetron hcl (ZOFRAN) 8 mg tablet   No No   Sig: Take 1 Tab by mouth every eight (8) hours as needed for Nausea. osimertinib (TAGRISSO) 80 mg tablet   No No   Sig: Take 1 Tab by mouth daily. prochlorperazine (COMPAZINE) 10 mg tablet   No No   Sig: Take 1 Tab by mouth every six (6) hours as needed for Nausea. rivaroxaban (Xarelto) 20 mg tab tablet   No No   Sig: Take 1 Tab by mouth daily (with breakfast). Take after finishing 15mg bid course. spironolactone (ALDACTONE) 25 mg tablet   No No   Sig: Take 1 Tab by mouth daily. Indications: chronic heart failure   Patient taking differently: Take 25 mg by mouth daily. 1/2 tab  Indications: chronic heart failure   triamcinolone (NASACORT) 55 mcg nasal inhaler   No No   Si Sprays by Both Nostrils route two (2) times a day. Patient taking differently: 2 Sprays by Both Nostrils route two (2) times daily as needed.  Indications: inflammation of the nose due to an allergy      Facility-Administered Medications: None       Objective:     Patient Vitals for the past 24 hrs:   Temp Pulse Resp BP SpO2   20 1737 99.1 °F (37.3 °C) (!) 115 26 114/62 100 %   20 1735 -- (!) 120 26 114/62 100 %   20 1720 -- (!) 113 25 112/64 100 %   20 1640 -- (!) 109 (!) 31 106/59 90 %   20 1630 -- (!) 114 29 -- 99 %   20 1615 -- (!) 111 28 95/63 --   20 1600 -- (!) 124 (!) 39 120/76 (!) 87 %   20 1540 -- (!) 125 (!) 34 104/74 --   20 1520 -- (!) 118 (!) 54 100/60 95 %   20 1439 97.9 °F (36.6 °C) 94 24 128/74 96 %     Oxygen Therapy  O2 Sat (%): 100 % (20)  Pulse via Oximetry: 115 beats per minute (20)  O2 Device: Nasal cannula (20)  O2 Flow Rate (L/min): 2 l/min (20 1630)  No intake or output data in the 24 hours ending 03/28/20 1801    Physical Exam:  General:    Well nourished. Alert. Eyes:   Normal sclera. Extraocular movements intact. ENT:  Normocephalic, atraumatic. Moist mucous membranes  CV:   Normal S1-S2, tachycardic. No murmur, rub, or gallop. Lungs:  Diminished breath sounds in the right middle and right lower lung fields. No wheezing, rhonchi, or rales. Abdomen: Soft, nontender, nondistended. Bowel sounds normal.   Extremities: Warm and dry. No cyanosis or edema. Neurologic: CN II-XII grossly intact. Sensation intact. Skin:     No rashes or jaundice. Psych:  Normal mood and affect. I reviewed the labs, imaging, EKGs, telemetry, and other studies done this admission. Data Review:   Recent Results (from the past 24 hour(s))   CBC WITH AUTOMATED DIFF    Collection Time: 03/28/20  2:51 PM   Result Value Ref Range    WBC 6.8 4.3 - 11.1 K/uL    RBC 3.27 (L) 4.23 - 5.6 M/uL    HGB 9.9 (L) 13.6 - 17.2 g/dL    HCT 29.3 (L) 41.1 - 50.3 %    MCV 89.6 79.6 - 97.8 FL    MCH 30.3 26.1 - 32.9 PG    MCHC 33.8 31.4 - 35.0 g/dL    RDW 13.2 11.9 - 14.6 %    PLATELET 289 618 - 115 K/uL    MPV 10.4 9.4 - 12.3 FL    ABSOLUTE NRBC 0.00 0.0 - 0.2 K/uL    DF AUTOMATED      NEUTROPHILS 76 43 - 78 %    LYMPHOCYTES 11 (L) 13 - 44 %    MONOCYTES 10 4.0 - 12.0 %    EOSINOPHILS 2 0.5 - 7.8 %    BASOPHILS 0 0.0 - 2.0 %    IMMATURE GRANULOCYTES 0 0.0 - 5.0 %    ABS. NEUTROPHILS 5.2 1.7 - 8.2 K/UL    ABS. LYMPHOCYTES 0.8 0.5 - 4.6 K/UL    ABS. MONOCYTES 0.7 0.1 - 1.3 K/UL    ABS. EOSINOPHILS 0.1 0.0 - 0.8 K/UL    ABS. BASOPHILS 0.0 0.0 - 0.2 K/UL    ABS. IMM.  GRANS. 0.0 0.0 - 0.5 K/UL   METABOLIC PANEL, COMPREHENSIVE    Collection Time: 03/28/20  2:51 PM   Result Value Ref Range    Sodium 132 (L) 136 - 145 mmol/L    Potassium 4.0 3.5 - 5.1 mmol/L    Chloride 98 98 - 107 mmol/L    CO2 25 21 - 32 mmol/L    Anion gap 9 7 - 16 mmol/L    Glucose 190 (H) 65 - 100 mg/dL    BUN 15 8 - 23 MG/DL Creatinine 1.38 0.8 - 1.5 MG/DL    GFR est AA >60 >60 ml/min/1.73m2    GFR est non-AA 55 (L) >60 ml/min/1.73m2    Calcium 8.9 8.3 - 10.4 MG/DL    Bilirubin, total 0.6 0.2 - 1.1 MG/DL    ALT (SGPT) 23 12 - 65 U/L    AST (SGOT) 22 15 - 37 U/L    Alk. phosphatase 71 50 - 136 U/L    Protein, total 6.8 6.3 - 8.2 g/dL    Albumin 3.4 3.2 - 4.6 g/dL    Globulin 3.4 2.3 - 3.5 g/dL    A-G Ratio 1.0 (L) 1.2 - 3.5     MAGNESIUM    Collection Time: 03/28/20  2:51 PM   Result Value Ref Range    Magnesium 2.1 1.8 - 2.4 mg/dL   TROPONIN I    Collection Time: 03/28/20  2:51 PM   Result Value Ref Range    Troponin-I, Qt. <0.02 (L) 0.02 - 0.05 NG/ML   NT-PRO BNP    Collection Time: 03/28/20  2:51 PM   Result Value Ref Range    NT pro- (H) 5 - 125 PG/ML   PROCALCITONIN    Collection Time: 03/28/20  2:51 PM   Result Value Ref Range    Procalcitonin 0.08 ng/mL   EKG, 12 LEAD, INITIAL    Collection Time: 03/28/20  3:00 PM   Result Value Ref Range    Ventricular Rate 118 BPM    Atrial Rate 118 BPM    P-R Interval 170 ms    QRS Duration 94 ms    Q-T Interval 318 ms    QTC Calculation (Bezet) 445 ms    Calculated P Axis 71 degrees    Calculated R Axis 61 degrees    Calculated T Axis 33 degrees    Diagnosis       Sinus tachycardia  Minimal voltage criteria for LVH, may be normal variant  Nonspecific ST and T wave abnormality  Abnormal ECG  When compared with ECG of 17-SEP-2019 06:56,  ST now depressed in Lateral leads     LACTIC ACID    Collection Time: 03/28/20  3:44 PM   Result Value Ref Range    Lactic acid 1.7 0.4 - 2.0 MMOL/L       All Micro Results     None          Other Studies:  Xr Chest Port    Result Date: 3/28/2020  History: Dyspnea Exam: portable chest Comparison: 3/9/2020 Findings: There has been interval placement of a right-sided port. There is a moderate right pleural effusion. Increased bibasilar airspace consolidation also noted. No change in the appearance of the mediastinal contour or osseous structures. Impressions: New moderate size right pleural effusion with increasing bibasilar airspace consolidation and new port. Assessment and Plan:     Hospital Problems as of 3/28/2020 Date Reviewed: 3/2/2020          Codes Class Noted - Resolved POA    Shortness of breath ICD-10-CM: R06.02  ICD-9-CM: 786.05  3/28/2020 - Present Unknown        Pleural effusion on right ICD-10-CM: J90  ICD-9-CM: 511.9  3/28/2020 - Present Unknown            1 - Shortness of breath and hypoxia likely due to right pleural effusion  2 - Lung cancer  3 - Chronic systolic CHF with reduced EF of 25 to 30%  4 - HTN  5 - history of PE on Xarelto   6 - Anxiety/depression  7 - CML presently in remission    PLAN:  · Admit to the medical floor  · Start IV Lasix 40 mg every 12 hours  · Consult Pulmonary for thoracentesis in the morning  · Continue to provide oxygen supplementation and titrate as clinically appropriate with a goal oxygen saturation of 90 to 92%  · Resume home medications for chronic conditions  · We might give some Xanax as needed for anxiety  · Plan of care was discussed with the patient at the bedside and he is agreeable  · Expected length of stay of about 48 hours    DVT ppx: With SCDs. Hold Xarelto in anticipation of thoracentesis    Code status:  Full code  Estimated LOS:  Greater than 2 midnights  Risk:  high    Signed:   Charlaine Lanes, MD

## 2020-03-28 NOTE — PROGRESS NOTES
Spoke with Patient's wife to update her on patient's condition. Encouraged spouse to call patient's room when she wants to talk with patient. 6

## 2020-03-28 NOTE — ED NOTES
TRANSFER - OUT REPORT:    Verbal report given to The tom RN(name) on [address].  being transferred to [facility](unit) for routine progression of care       Report consisted of patients Situation, Background, Assessment and   Recommendations(SBAR). Information from the following report(s) SBAR, ED Summary, [facility] and Recent Results was reviewed with the receiving nurse. Lines:   Venous Access Device POWER PORT 03/11/20 Upper chest (subclavicular area, right (Active)       Peripheral IV 03/28/20 Left Forearm (Active)   Site Assessment Clean, dry, & intact 3/28/2020  2:53 PM   Phlebitis Assessment 0 3/28/2020  2:53 PM   Infiltration Assessment 0 3/28/2020  2:53 PM   Dressing Status Clean, dry, & intact 3/28/2020  2:53 PM   Dressing Type Transparent 3/28/2020  2:53 PM        Opportunity for questions and clarification was provided.       Patient transported with:   [employer]

## 2020-03-28 NOTE — PROGRESS NOTES
Patient received and oriented to room. Skin is dry, warm and intact. Patient is alert and oriented x4. No complaint of pain at this time. Patient short of breath upon exertion. Patient is up ad tamia to bathroom. Lung sounds diminished. Bowel sounds active x4. Currently sitting in a locked recliner with non-slip socks on.

## 2020-03-29 LAB
ANION GAP SERPL CALC-SCNC: 8 MMOL/L (ref 7–16)
APPEARANCE FLD: NORMAL
ATRIAL RATE: 118 BPM
BUN SERPL-MCNC: 18 MG/DL (ref 8–23)
CALCIUM SERPL-MCNC: 8.6 MG/DL (ref 8.3–10.4)
CALCULATED P AXIS, ECG09: 71 DEGREES
CALCULATED R AXIS, ECG10: 61 DEGREES
CALCULATED T AXIS, ECG11: 33 DEGREES
CHLORIDE SERPL-SCNC: 96 MMOL/L (ref 98–107)
CO2 SERPL-SCNC: 27 MMOL/L (ref 21–32)
COLOR FLD: NORMAL
CREAT SERPL-MCNC: 1.42 MG/DL (ref 0.8–1.5)
DIAGNOSIS, 93000: NORMAL
ERYTHROCYTE [DISTWIDTH] IN BLOOD BY AUTOMATED COUNT: 13.3 % (ref 11.9–14.6)
FERRITIN SERPL-MCNC: 312 NG/ML (ref 8–388)
GLUCOSE FLD-MCNC: 126 MG/DL
GLUCOSE SERPL-MCNC: 168 MG/DL (ref 65–100)
HCT VFR BLD AUTO: 25.3 % (ref 41.1–50.3)
HGB BLD-MCNC: 8.7 G/DL (ref 13.6–17.2)
IRON SATN MFR SERPL: 8 %
IRON SERPL-MCNC: 20 UG/DL (ref 35–150)
LDH FLD L TO P-CCNC: 271 U/L
LYMPHOCYTES NFR BRONCH MANUAL: 2 %
MACROPHAGES NFR BRONCH MANUAL: 2 %
MCH RBC QN AUTO: 30.2 PG (ref 26.1–32.9)
MCHC RBC AUTO-ENTMCNC: 34.4 G/DL (ref 31.4–35)
MCV RBC AUTO: 87.8 FL (ref 79.6–97.8)
NEUTROPHILS NFR BRONCH MANUAL: 96 %
NRBC # BLD: 0 K/UL (ref 0–0.2)
NUC CELL # FLD: 3231 /CU MM
P-R INTERVAL, ECG05: 170 MS
PLATELET # BLD AUTO: 179 K/UL (ref 150–450)
PMV BLD AUTO: 10.5 FL (ref 9.4–12.3)
POTASSIUM SERPL-SCNC: 4.1 MMOL/L (ref 3.5–5.1)
PROT FLD-MCNC: 5.5 G/DL
Q-T INTERVAL, ECG07: 318 MS
QRS DURATION, ECG06: 94 MS
QTC CALCULATION (BEZET), ECG08: 445 MS
RBC # BLD AUTO: 2.88 M/UL (ref 4.23–5.6)
RBC # FLD: NORMAL /CU MM
SODIUM SERPL-SCNC: 131 MMOL/L (ref 136–145)
SPECIMEN SOURCE FLD: NORMAL
TIBC SERPL-MCNC: 239 UG/DL (ref 250–450)
VENTRICULAR RATE, ECG03: 118 BPM
WBC # BLD AUTO: 9.3 K/UL (ref 4.3–11.1)

## 2020-03-29 PROCEDURE — 87102 FUNGUS ISOLATION CULTURE: CPT

## 2020-03-29 PROCEDURE — 88112 CYTOPATH CELL ENHANCE TECH: CPT

## 2020-03-29 PROCEDURE — 65270000029 HC RM PRIVATE

## 2020-03-29 PROCEDURE — 83540 ASSAY OF IRON: CPT

## 2020-03-29 PROCEDURE — 88185 FLOWCYTOMETRY/TC ADD-ON: CPT

## 2020-03-29 PROCEDURE — 99223 1ST HOSP IP/OBS HIGH 75: CPT | Performed by: INTERNAL MEDICINE

## 2020-03-29 PROCEDURE — 85027 COMPLETE CBC AUTOMATED: CPT

## 2020-03-29 PROCEDURE — 0W993ZX DRAINAGE OF RIGHT PLEURAL CAVITY, PERCUTANEOUS APPROACH, DIAGNOSTIC: ICD-10-PCS | Performed by: INTERNAL MEDICINE

## 2020-03-29 PROCEDURE — 77030014146 HC TY THORCENT/PARACENT BD -B

## 2020-03-29 PROCEDURE — 74011000250 HC RX REV CODE- 250: Performed by: INTERNAL MEDICINE

## 2020-03-29 PROCEDURE — 88305 TISSUE EXAM BY PATHOLOGIST: CPT

## 2020-03-29 PROCEDURE — 94760 N-INVAS EAR/PLS OXIMETRY 1: CPT

## 2020-03-29 PROCEDURE — 87205 SMEAR GRAM STAIN: CPT

## 2020-03-29 PROCEDURE — 74011250637 HC RX REV CODE- 250/637: Performed by: FAMILY MEDICINE

## 2020-03-29 PROCEDURE — 80048 BASIC METABOLIC PNL TOTAL CA: CPT

## 2020-03-29 PROCEDURE — 36415 COLL VENOUS BLD VENIPUNCTURE: CPT

## 2020-03-29 PROCEDURE — 32555 ASPIRATE PLEURA W/ IMAGING: CPT | Performed by: INTERNAL MEDICINE

## 2020-03-29 PROCEDURE — 83615 LACTATE (LD) (LDH) ENZYME: CPT

## 2020-03-29 PROCEDURE — 89050 BODY FLUID CELL COUNT: CPT

## 2020-03-29 PROCEDURE — 82945 GLUCOSE OTHER FLUID: CPT

## 2020-03-29 PROCEDURE — 94640 AIRWAY INHALATION TREATMENT: CPT

## 2020-03-29 PROCEDURE — 75810000165 HC THORACENTESIS

## 2020-03-29 PROCEDURE — 84157 ASSAY OF PROTEIN OTHER: CPT

## 2020-03-29 PROCEDURE — 77010033678 HC OXYGEN DAILY

## 2020-03-29 PROCEDURE — 88184 FLOWCYTOMETRY/ TC 1 MARKER: CPT

## 2020-03-29 PROCEDURE — 82728 ASSAY OF FERRITIN: CPT

## 2020-03-29 PROCEDURE — 93005 ELECTROCARDIOGRAM TRACING: CPT | Performed by: INTERNAL MEDICINE

## 2020-03-29 PROCEDURE — 74011250637 HC RX REV CODE- 250/637: Performed by: INTERNAL MEDICINE

## 2020-03-29 PROCEDURE — 87116 MYCOBACTERIA CULTURE: CPT

## 2020-03-29 RX ADMIN — FLUTICASONE PROPIONATE 1 SPRAY: 50 SPRAY, METERED NASAL at 10:20

## 2020-03-29 RX ADMIN — Medication 10 ML: at 17:46

## 2020-03-29 RX ADMIN — SACUBITRIL AND VALSARTAN 1 TABLET: 24; 26 TABLET, FILM COATED ORAL at 17:46

## 2020-03-29 RX ADMIN — SPIRONOLACTONE 25 MG: 25 TABLET ORAL at 10:21

## 2020-03-29 RX ADMIN — METOPROLOL SUCCINATE 12.5 MG: 25 TABLET, EXTENDED RELEASE ORAL at 10:21

## 2020-03-29 RX ADMIN — BENZONATATE 200 MG: 100 CAPSULE ORAL at 01:14

## 2020-03-29 RX ADMIN — HYDROCODONE BITARTRATE AND ACETAMINOPHEN 1 TABLET: 5; 325 TABLET ORAL at 21:30

## 2020-03-29 RX ADMIN — IPRATROPIUM BROMIDE AND ALBUTEROL SULFATE 3 ML: .5; 3 SOLUTION RESPIRATORY (INHALATION) at 02:17

## 2020-03-29 RX ADMIN — Medication 10 ML: at 06:06

## 2020-03-29 RX ADMIN — SACUBITRIL AND VALSARTAN 1 TABLET: 24; 26 TABLET, FILM COATED ORAL at 10:20

## 2020-03-29 RX ADMIN — B-COMPLEX W/ C & FOLIC ACID TAB 1 TABLET: TAB at 10:21

## 2020-03-29 RX ADMIN — Medication 10 ML: at 21:31

## 2020-03-29 RX ADMIN — FOLIC ACID 1 MG: 1 TABLET ORAL at 10:21

## 2020-03-29 RX ADMIN — ALPRAZOLAM 0.5 MG: 0.5 TABLET ORAL at 03:22

## 2020-03-29 NOTE — PROGRESS NOTES
Location of Assessment: [place]     Socioevironmental:  SW met with patient who states that he lives with his wife Anselmo Ocampo and has two 16year old twin daughters who also live in the home. Patient states that he's independent and drives. The patient is currently on oxygen and requires 3L at home. Ambulation/ADLs:  Patient states that he does not use assistive devices to ambulate and denies any falls. Patient also states that he does not require ADL assistance. Primary Care:  Patient sees Dr. Ariela Rush for primary care and is current. Needs:   No needs voiced by patient and none identified by SW at this time. Case management will continue to follow until discharge to help accommodate any that should arise.      Chayo Aguilera, [address] [facility]    [address] [facility]  [email or web address]

## 2020-03-29 NOTE — PROGRESS NOTES
Hospitalist Progress Note     Admit Date:  3/28/2020  2:44 PM   Name:  Leonard Sanches. Age:  62 y.o.  :  1955   MRN:  [id number]   PCP:  Michele Luke MD  Treatment Team: Attending Provider: Yamile Ruvalcaba MD; Physician: Bobo Schmid MD; : Ck Salcedo    Subjective: The patient is a 80-year-old -American gentleman with lung cancer, chronic systolic CHF with reduced EF of 25 to 30%, CML presently in remission, HTN, history of PE on Xarelto, anxiety/depression, was admitted for SOB and hypoxia due to right pleural effusion. He was seen by Pulmonary and had a thoracentesis with removal of 1 L of bloody fluid. His Hb actually dropped to 8.7 this morning. The patient remains on oxygen supplementation and was saturating around 92% on 3 L of O2 by NC. He still c/o SOB, which has slightly improved.      Objective:     Patient Vitals for the past 24 hrs:   Temp Pulse Resp BP SpO2   20 0951 -- (!) 109 -- 127/83 92 %   20 0910 -- (!) 125 20 97/63 90 %   20 0907 -- (!) 117 20 97/57 (!) 86 %   20 0905 -- (!) 109 20 102/59 90 %   20 0900 -- (!) 118 21 97/63 95 %   20 0850 -- 82 22 121/72 98 %   20 0800 97.7 °F (36.5 °C) 72 19 109/68 96 %   20 0313 98.4 °F (36.9 °C) (!) 109 20 111/65 99 %   20 0218 -- -- -- -- 100 %   20 2320 -- -- -- -- 100 %   20 2257 98.2 °F (36.8 °C) 99 20 110/61 --   20 2207 -- 100 -- 120/72 --   20 2045 -- -- -- -- 99 %   20 1936 98 °F (36.7 °C) (!) 114 20 103/58 95 %   20 1900 -- (!) 114 -- 103/58 --   20 1737 99.1 °F (37.3 °C) (!) 115 26 114/62 100 %   20 1735 -- (!) 120 26 114/62 100 %   20 1720 -- (!) 113 25 112/64 100 %   20 1640 -- (!) 109 (!) 31 106/59 90 %   20 1630 -- (!) 114 29 -- 99 %   20 1615 -- (!) 111 28 95/63 --   20 1600 -- (!) 124 (!) 39 120/76 (!) 87 %   20 1540 -- (!) 125 (!) 34 104/74 -- 03/28/20 1520 -- (!) 118 (!) 54 100/60 95 %   03/28/20 1439 97.9 °F (36.6 °C) 94 24 128/74 96 %     Oxygen Therapy  O2 Sat (%): 92 % (03/29/20 0951)  Pulse via Oximetry: 107 beats per minute (03/29/20 0951)  O2 Device: Nasal cannula (03/29/20 0951)  O2 Flow Rate (L/min): 3 l/min (03/29/20 0951)    Intake/Output Summary (Last 24 hours) at 3/29/2020 1201  Last data filed at 3/29/2020 0100  Gross per 24 hour   Intake --   Output 250 ml   Net -250 ml         General:    Well nourished. Alert. CV:   Normal S1S2, tachycardic with regular rhythm. No murmur, rub, or gallop. Lungs:   Left lung is clear to auscultation. Diminished breath sounds in the right middle and right lower lung fields. No wheezing, rhonchi, or rales. Abdomen:   Soft, nontender, nondistended. Extremities: Warm and dry. No cyanosis or edema. Skin:     No rashes or jaundice. Data Review:  I have reviewed all labs, meds, telemetry events, and studies from the last 24 hours. Recent Results (from the past 24 hour(s))   CBC WITH AUTOMATED DIFF    Collection Time: 03/28/20  2:51 PM   Result Value Ref Range    WBC 6.8 4.3 - 11.1 K/uL    RBC 3.27 (L) 4.23 - 5.6 M/uL    HGB 9.9 (L) 13.6 - 17.2 g/dL    HCT 29.3 (L) 41.1 - 50.3 %    MCV 89.6 79.6 - 97.8 FL    MCH 30.3 26.1 - 32.9 PG    MCHC 33.8 31.4 - 35.0 g/dL    RDW 13.2 11.9 - 14.6 %    PLATELET 227 581 - 898 K/uL    MPV 10.4 9.4 - 12.3 FL    ABSOLUTE NRBC 0.00 0.0 - 0.2 K/uL    DF AUTOMATED      NEUTROPHILS 76 43 - 78 %    LYMPHOCYTES 11 (L) 13 - 44 %    MONOCYTES 10 4.0 - 12.0 %    EOSINOPHILS 2 0.5 - 7.8 %    BASOPHILS 0 0.0 - 2.0 %    IMMATURE GRANULOCYTES 0 0.0 - 5.0 %    ABS. NEUTROPHILS 5.2 1.7 - 8.2 K/UL    ABS. LYMPHOCYTES 0.8 0.5 - 4.6 K/UL    ABS. MONOCYTES 0.7 0.1 - 1.3 K/UL    ABS. EOSINOPHILS 0.1 0.0 - 0.8 K/UL    ABS. BASOPHILS 0.0 0.0 - 0.2 K/UL    ABS. IMM.  GRANS. 0.0 0.0 - 0.5 K/UL   METABOLIC PANEL, COMPREHENSIVE    Collection Time: 03/28/20  2:51 PM   Result Value Ref Range    Sodium 132 (L) 136 - 145 mmol/L    Potassium 4.0 3.5 - 5.1 mmol/L    Chloride 98 98 - 107 mmol/L    CO2 25 21 - 32 mmol/L    Anion gap 9 7 - 16 mmol/L    Glucose 190 (H) 65 - 100 mg/dL    BUN 15 8 - 23 MG/DL    Creatinine 1.38 0.8 - 1.5 MG/DL    GFR est AA >60 >60 ml/min/1.73m2    GFR est non-AA 55 (L) >60 ml/min/1.73m2    Calcium 8.9 8.3 - 10.4 MG/DL    Bilirubin, total 0.6 0.2 - 1.1 MG/DL    ALT (SGPT) 23 12 - 65 U/L    AST (SGOT) 22 15 - 37 U/L    Alk.  phosphatase 71 50 - 136 U/L    Protein, total 6.8 6.3 - 8.2 g/dL    Albumin 3.4 3.2 - 4.6 g/dL    Globulin 3.4 2.3 - 3.5 g/dL    A-G Ratio 1.0 (L) 1.2 - 3.5     MAGNESIUM    Collection Time: 03/28/20  2:51 PM   Result Value Ref Range    Magnesium 2.1 1.8 - 2.4 mg/dL   TROPONIN I    Collection Time: 03/28/20  2:51 PM   Result Value Ref Range    Troponin-I, Qt. <0.02 (L) 0.02 - 0.05 NG/ML   NT-PRO BNP    Collection Time: 03/28/20  2:51 PM   Result Value Ref Range    NT pro- (H) 5 - 125 PG/ML   PROCALCITONIN    Collection Time: 03/28/20  2:51 PM   Result Value Ref Range    Procalcitonin 0.08 ng/mL   EKG, 12 LEAD, INITIAL    Collection Time: 03/28/20  3:00 PM   Result Value Ref Range    Ventricular Rate 118 BPM    Atrial Rate 118 BPM    P-R Interval 170 ms    QRS Duration 94 ms    Q-T Interval 318 ms    QTC Calculation (Bezet) 445 ms    Calculated P Axis 71 degrees    Calculated R Axis 61 degrees    Calculated T Axis 33 degrees    Diagnosis       Sinus tachycardia  Minimal voltage criteria for LVH, may be normal variant  Nonspecific ST and T wave abnormality  Abnormal ECG  When compared with ECG of 17-SEP-2019 06:56,  ST now depressed in Lateral leads  Confirmed by Reva Horne MD ()Yamel ([id number]) on 3/29/2020 8:03:31 AM     LACTIC ACID    Collection Time: 03/28/20  3:44 PM   Result Value Ref Range    Lactic acid 1.7 0.4 - 2.0 MMOL/L   METABOLIC PANEL, BASIC    Collection Time: 03/29/20  4:36 AM   Result Value Ref Range    Sodium 131 (L) 136 - 145 mmol/L    Potassium 4.1 3.5 - 5.1 mmol/L    Chloride 96 (L) 98 - 107 mmol/L    CO2 27 21 - 32 mmol/L    Anion gap 8 7 - 16 mmol/L    Glucose 168 (H) 65 - 100 mg/dL    BUN 18 8 - 23 MG/DL    Creatinine 1.42 0.8 - 1.5 MG/DL    GFR est AA >60 >60 ml/min/1.73m2    GFR est non-AA 54 (L) >60 ml/min/1.73m2    Calcium 8.6 8.3 - 10.4 MG/DL   CBC W/O DIFF    Collection Time: 03/29/20  4:36 AM   Result Value Ref Range    WBC 9.3 4.3 - 11.1 K/uL    RBC 2.88 (L) 4.23 - 5.6 M/uL    HGB 8.7 (L) 13.6 - 17.2 g/dL    HCT 25.3 (L) 41.1 - 50.3 %    MCV 87.8 79.6 - 97.8 FL    MCH 30.2 26.1 - 32.9 PG    MCHC 34.4 31.4 - 35.0 g/dL    RDW 13.3 11.9 - 14.6 %    PLATELET 111 593 - 853 K/uL    MPV 10.5 9.4 - 12.3 FL    ABSOLUTE NRBC 0.00 0.0 - 0.2 K/uL   FERRITIN    Collection Time: 03/29/20  4:36 AM   Result Value Ref Range    Ferritin 312 8 - 388 NG/ML   TRANSFERRIN SATURATION    Collection Time: 03/29/20  4:36 AM   Result Value Ref Range    Iron 20 (L) 35 - 150 ug/dL    TIBC 239 (L) 250 - 450 ug/dL    Transferrin Saturation 8 %        All Micro Results     Procedure Component Value Units Date/Time    FUNGUS CULTURE AND SMEAR [[id number]] Collected:  03/29/20 0940    Order Status:  Completed Specimen:  Other Updated:  03/29/20 1017    CULTURE, BODY FLUID Edwyna Asael STAIN [[id number]] Collected:  03/29/20 0940    Order Status:  Completed Specimen:  Pleural Fluid Updated:  03/29/20 1016    AFB CULTURE + SMEAR W/RFLX ID FROM CULTURE [[id number]] Collected:  03/29/20 0940    Order Status:  Completed Updated:  03/29/20 1015          Current Meds:  Current Facility-Administered Medications   Medication Dose Route Frequency    albuterol-ipratropium (DUO-NEB) 2.5 MG-0.5 MG/3 ML  3 mL Nebulization Q4H PRN    benzonatate (TESSALON) capsule 200 mg  200 mg Oral TID PRN    sacubitriL-valsartan (ENTRESTO) 24-26 mg tablet 1 Tab  1 Tab Oral BID    folic acid (FOLVITE) tablet 1 mg  1 mg Oral DAILY    imatinib (GLEEVEC) chemo tablet 400 mg  400 mg Oral DAILY  lidocaine-prilocaine (EMLA) 2.5-2.5 % cream   Topical PRN    metoprolol succinate (TOPROL-XL) XL tablet 12.5 mg  12.5 mg Oral DAILY    multivitamin, stress formula (STRESS TAB) tablet 1 Tab  1 Tab Oral DAILY    osimertinib (TAGRISSO) chemo tab 80 mg (Patient Supplied)  80 mg Oral DAILY    spironolactone (ALDACTONE) tablet 25 mg  25 mg Oral DAILY    fluticasone propionate (FLONASE) 50 mcg/actuation nasal spray 1 Spray  1 Spray Both Nostrils DAILY    sodium chloride (NS) flush 5-40 mL  5-40 mL IntraVENous Q8H    sodium chloride (NS) flush 5-40 mL  5-40 mL IntraVENous PRN    acetaminophen (TYLENOL) tablet 650 mg  650 mg Oral Q4H PRN    ondansetron (ZOFRAN) injection 4 mg  4 mg IntraVENous Q6H PRN    ALPRAZolam (XANAX) tablet 0.5 mg  0.5 mg Oral BID PRN    HYDROcodone-acetaminophen (NORCO) 5-325 mg per tablet 1 Tab  1 Tab Oral Q6H PRN       Other Studies (last 24 hours):  Xr Chest Port    Result Date: 3/28/2020  History: Dyspnea Exam: portable chest Comparison: 3/9/2020 Findings: There has been interval placement of a right-sided port. There is a moderate right pleural effusion. Increased bibasilar airspace consolidation also noted. No change in the appearance of the mediastinal contour or osseous structures. Impressions: New moderate size right pleural effusion with increasing bibasilar airspace consolidation and new port.        Assessment and Plan:     Hospital Problems as of 3/29/2020 Date Reviewed: 3/2/2020          Codes Class Noted - Resolved POA    Shortness of breath ICD-10-CM: R06.02  ICD-9-CM: 786.05  3/28/2020 - Present Yes        * (Principal) Pleural effusion on right ICD-10-CM: J90  ICD-9-CM: 511.9  3/28/2020 - Present Yes        SCLC (small cell lung carcinoma), right (HCC) ICD-10-CM: C34.91  ICD-9-CM: 162.9  3/13/2020 - Present Yes              1 - Shortness of breath and hypoxia likely due to right pleural effusion  2 - Acute hypoxemic respiratory failure  3 - Normocytic anemia, Hb 8.7  4 - Stage IV Lung cancer  5 - Chronic systolic CHF with reduced EF of 25 to 30%  6 - HTN  7 - history of PE on Xarelto   8 - Anxiety/depression  9 - CML presently in remission     PLAN:  · Had thoracentesis this morning, with removal of 1 L of bloody fluid. Pulmonary consultation appreciated. This is likely malignant pleural effusion. · D/C IV Lasix  · Continue to provide oxygen supplementation and titrate as clinically appropriate with a goal oxygen saturation of 90 to 92%  · Monitor H/H. Last Hb is 8.7. Transfuse as needed if Hb drops to less than 7. Source of blood loss is likely the bloody pleural effusion. · Continue home medications for chronic conditions  · Continue Xanax as needed for anxiety  · Hold Xarelto    DC planning/Dispo: Home in 24-48 hours pending clinical evolution    DVT ppx: with SCDs      Signed:   Nahum Manjarrez MD

## 2020-03-29 NOTE — PROGRESS NOTES
Problem: Falls - Risk of  Goal: *Absence of Falls  Description: Document Germaine Lewis Fall Risk and appropriate interventions in the flowsheet.   Outcome: Progressing Towards Goal  Note: Fall Risk Interventions:  Medication Interventions: Teach patient to arise slowly       Problem: Patient Education: Go to Patient Education Activity  Goal: Patient/Family Education  Outcome: Progressing Towards Goal     Problem: Pulmonary Embolism Care Plan (Adult)  Goal: *Improvement of existing pulmonary embolism  Outcome: Progressing Towards Goal  Goal: *Absence of bleeding  Outcome: Progressing Towards Goal  Goal: *Labs within defined limits  Outcome: Progressing Towards Goal     Problem: Patient Education: Go to Patient Education Activity  Goal: Patient/Family Education  Outcome: Progressing Towards Goal

## 2020-03-29 NOTE — PROGRESS NOTES
SW reviewed patient's chart and conducted a baseline assessment. Discharge plan at this time is as follows:     Care Management Interventions  PCP Verified by CM:  Yes  Mode of Transport at Discharge: Self  Transition of Care Consult (CM Consult): Discharge Planning  Current Support Network: Lives with Spouse, Own Home  Confirm Follow Up Transport: Family  Discharge Location  Discharge Placement: Home with family assistance    Lisa Berger, [address] Work   [address] [facility]  [email or web address]

## 2020-03-29 NOTE — PROGRESS NOTES
Patient has been much more comfortable today after the thoracentesis this am. He notes his breathing is much less labored and he is much less short of breath.  Patient looking forward to repeat thoracentesis in the am.

## 2020-03-29 NOTE — PROGRESS NOTES
Shift assessment done. Pt in bed, resting. Respirtations even and unlabored. O2 at L via NC. HR tachy. No acute signs of distress noted. Denies need and pain this time. Call light in reach. Safety measures provided. Will continue to monitor.

## 2020-03-29 NOTE — PROGRESS NOTES
Patient alert and oriented x 4. Lung sounds diminished and clear in right upper and lower lobes. Left upper and lower lobes clear. O2 at 3 liters on and functioning properly. No distress observed. Bandaid to right lower back clean, dry, and intact. Vitals signs within normal limits. Denies any pain at this time. Sitting up in recliner. Call light in reach. Instructed to call with any needs, patient verbalized understanding.

## 2020-03-29 NOTE — PROGRESS NOTES
Patient assessment completed. Patient is alert and oriented x 4. Heart is with irregular rate and rhythm. Lung sounds clear on left, diminished and coarse on the right.

## 2020-03-29 NOTE — CONSULTS
CONSULT NOTE    Claudetteefrain Rayo.    3/29/2020    Date of Admission:  3/28/2020    The patient's chart is reviewed and the patient is discussed with the staff. Subjective:     Patient is a 62 y.o.  male seen and evaluated at the request of Dr. Jeffrey Menendez. He has a history of CHF, CML and adenocarcinoma of the lung (stage IV diagnosed based on malignant effusion several years ago) followed by Dr Francine Wang, and history of PE on xarelto. In addition, he recently underwent a CT guided biopsy of a peripheral lung lesion which returned as small cell lung cancer. It appears they are considering this a limited stage small cell and he was planning for chemo/radiation for this which is to begin in about a week. He presented yesterday with increased SOB and orthopnea that gradually worsened over several days. He denies any associated symptoms including no increased LE edema, fever, chills, chest pain, falls, or bleeding. He is on xarelto and held this for his CT guided biopsy. CXR shows enlarging R pleural effusion. He was started on IV lasix and pulmonary was consulted for thoracentesis. Review of Systems  A comprehensive review of systems was negative except for that written in the HPI.     Patient Active Problem List   Diagnosis Code    Allergic rhinitis J30.9    Erectile dysfunction N52.9    CML (chronic myeloid leukemia) (HCC) C92.10    Depression F32.9    Chronic systolic congestive heart failure (HCC) I50.22    CAP (community acquired pneumonia) J18.9    Pleural effusion J90    Abnormal chest CT R93.89    Pulmonary infiltrate R91.8    Stage IV adenocarcinoma of lung (HCC) C34.90    Immunocompromised ([address] D89.9    Pneumonia J18.9    Pulmonary emboli (HCC) I26.99    Acute CHF (congestive heart failure) (HCC) I50.9    Acute pulmonary edema (HCC) J81.0    SCLC (small cell lung carcinoma), right (HCC) C34.91    Shortness of breath R06.02    Pleural effusion on right J90       Prior to Admission Medications   Prescriptions Last Dose Informant Patient Reported? Taking? ENTRESTO 24-26 mg tablet   No No   Sig: Take 1 Tab by mouth two (2) times a day. OXYGEN-AIR DELIVERY SYSTEMS   Yes No   Sig: by Does Not Apply route. 3LPM PRN   albuterol (PROVENTIL HFA, VENTOLIN HFA, PROAIR HFA) 90 mcg/actuation inhaler   No No   Sig: Take 2 Puffs by inhalation every four (4) hours as needed for Wheezing. benzonatate (TESSALON) 200 mg capsule   No No   Sig: Take 1 Cap by mouth three (3) times daily as needed for Cough. folic acid (FOLVITE) 1 mg tablet   No No   Sig: Take 1 Tab by mouth daily. furosemide (LASIX) 20 mg tablet   No No   Sig: Take 1 Tab by mouth daily. Indications: Fluid in the Lungs due to Chronic Heart Failure   Patient taking differently: Take 10 mg by mouth daily. Indications: fluid in the lungs due to chronic heart failure   imatinib (GLEEVEC) 400 mg tablet   No No   Sig: Take 1 Tab by mouth daily. lidocaine-prilocaine (EMLA) topical cream   No No   Sig: Apply  to affected area as needed for Pain. Apply to port site 45-60 minutes prior to port access. Cover with plastic.   metoprolol succinate (TOPROL-XL) 25 mg XL tablet   No No   Sig: Take 0.5 Tabs by mouth daily. Indications: chronic heart failure   multivitamin, stress formula (STRESS TAB) tablet   Yes No   Sig: Take 1 Tab by mouth daily. ondansetron hcl (ZOFRAN) 8 mg tablet   No No   Sig: Take 1 Tab by mouth every eight (8) hours as needed for Nausea. osimertinib (TAGRISSO) 80 mg tablet   No No   Sig: Take 1 Tab by mouth daily. prochlorperazine (COMPAZINE) 10 mg tablet   No No   Sig: Take 1 Tab by mouth every six (6) hours as needed for Nausea. rivaroxaban (Xarelto) 20 mg tab tablet   No No   Sig: Take 1 Tab by mouth daily (with breakfast). Take after finishing 15mg bid course. spironolactone (ALDACTONE) 25 mg tablet   No No   Sig: Take 1 Tab by mouth daily.  Indications: chronic heart failure Patient taking differently: Take 25 mg by mouth daily.  tab  Indications: chronic heart failure   triamcinolone (NASACORT) 55 mcg nasal inhaler   No No   Si Sprays by Both Nostrils route two (2) times a day. Patient taking differently: 2 Sprays by Both Nostrils route two (2) times daily as needed.  Indications: inflammation of the nose due to an allergy      Facility-Administered Medications: None       Past Medical History:   Diagnosis Date    Anxiety     Arthritis     [facility] systolic [facility] 5/3/2017    CML (chronic myeloid leukemia) ([facility] 2012    Depression 2012    Erectile dysfunction     Hypertension     Leukemia, acute ([facility] 2012    Lung cancer ([facility] 2018    Pulmonary emboli ([facility] 2019     Past Surgical History:   Procedure Laterality Date    HX HERNIA REPAIR      left inguinal     IR INSERT TUNL CVC W PORT OVER 5 YEARS  3/11/2020     Social History     Socioeconomic History    Marital status:      Spouse name: Not on file    Number of children: Not on file    Years of education: Not on file    Highest education level: Not on file   Occupational History     Comment: [employer]   Social Needs    Financial resource strain: Not on file    Food insecurity     Worry: Not on file     Inability: Not on file    Transportation needs     Medical: Not on file     Non-medical: Not on file   Tobacco Use    Smoking status: Never Smoker    Smokeless tobacco: Never Used   Substance and Sexual Activity    Alcohol use: No    Drug use: No    Sexual activity: Not on file   Lifestyle    Physical activity     Days per week: Not on file     Minutes per session: Not on file    Stress: Not on file   Relationships    Social connections     Talks on phone: Not on file     Gets together: Not on file     Attends Judaism service: Not on file     Active member of club or organization: Not on file     Attends meetings of clubs or organizations: Not on file     Relationship status: Not on file    Intimate partner violence     Fear of current or ex partner: Not on file     Emotionally abused: Not on file     Physically abused: Not on file     Forced sexual activity: Not on file   Other Topics Concern    Not on file   Social History Narrative    Pt  with one son     Family History   Problem Relation Age of Onset    Lung Disease Father 79        Black lung\"    Cancer Mother 72        pancreatic cancer     No Known Allergies    Current Facility-Administered Medications   Medication Dose Route Frequency    albuterol-ipratropium (DUO-NEB) 2.5 MG-0.5 MG/3 ML  3 mL Nebulization Q4H PRN    benzonatate (TESSALON) capsule 200 mg  200 mg Oral TID PRN    sacubitriL-valsartan (ENTRESTO) 24-26 mg tablet 1 Tab  1 Tab Oral BID    folic acid (FOLVITE) tablet 1 mg  1 mg Oral DAILY    imatinib (GLEEVEC) chemo tablet 400 mg  400 mg Oral DAILY    lidocaine-prilocaine (EMLA) 2.5-2.5 % cream   Topical PRN    metoprolol succinate (TOPROL-XL) XL tablet 12.5 mg  12.5 mg Oral DAILY    multivitamin, stress formula (STRESS TAB) tablet 1 Tab  1 Tab Oral DAILY    osimertinib (TAGRISSO) chemo tab 80 mg (Patient Supplied)  80 mg Oral DAILY    spironolactone (ALDACTONE) tablet 25 mg  25 mg Oral DAILY    fluticasone propionate (FLONASE) 50 mcg/actuation nasal spray 1 Spray  1 Spray Both Nostrils DAILY    sodium chloride (NS) flush 5-40 mL  5-40 mL IntraVENous Q8H    sodium chloride (NS) flush 5-40 mL  5-40 mL IntraVENous PRN    acetaminophen (TYLENOL) tablet 650 mg  650 mg Oral Q4H PRN    ondansetron (ZOFRAN) injection 4 mg  4 mg IntraVENous Q6H PRN    furosemide (LASIX) injection 40 mg  40 mg IntraVENous Q12H    ALPRAZolam (XANAX) tablet 0.5 mg  0.5 mg Oral BID PRN    HYDROcodone-acetaminophen (NORCO) 5-325 mg per tablet 1 Tab  1 Tab Oral Q6H PRN         Objective:     Vitals:    03/28/20 2257 03/28/20 2320 03/29/20 0218 03/29/20 0313   BP: 110/61   111/65   Pulse: 99   (!) 109   Resp: 20   20   Temp: 98.2 °F (36.8 °C)   98.4 °F (36.9 °C)   SpO2:  100% 100% 99%   Weight:       Height:           PHYSICAL EXAM     Constitutional:  the patient is well developed and in no acute distress  EENMT:  Sclera clear, pupils equal, oral mucosa moist  Respiratory: Decreased breath sounds on the right. Cardiovascular:  RRR without M,G,R  Gastrointestinal: soft and non-tender; with positive bowel sounds. Musculoskeletal: warm without cyanosis. There is no lower extremity edema. Skin:  no jaundice or rashes, no wounds   Neurologic: no gross neuro deficits     Psychiatric:  alert and oriented x ppt    CXR:    3/28/20  Unable to import CXR due to issues with this computer station. Recent Labs     03/29/20  0436 03/28/20  1451   WBC 9.3 6.8   HGB 8.7* 9.9*   HCT 25.3* 29.3*    188     Recent Labs     03/29/20 0436 03/28/20  1451   * 132*   K 4.1 4.0   CL 96* 98   * 190*   CO2 27 25   BUN 18 15   CREA 1.42 1.38   MG  --  2.1   CA 8.6 8.9   TROIQ  --  <0.02*   ALB  --  3.4   TBILI  --  0.6   ALT  --  23   SGOT  --  22     No results for input(s): PH, PCO2, PO2, HCO3, PHI, PCO2I, PO2I, HCO3I in the last 72 hours. Recent Labs     03/28/20  1544   LAC 1.7       Assessment:  (Medical Decision Making)     Hospital Problems  Date Reviewed: 3/2/2020          Codes Class Noted POA    Shortness of breath ICD-10-CM: R06.02  ICD-9-CM: 786.05  3/28/2020 Yes        * (Principal) Pleural effusion on right ICD-10-CM: J90  ICD-9-CM: 511.9  3/28/2020 Yes        SCLC (small cell lung carcinoma), right (HCC) ICD-10-CM: C34.91  ICD-9-CM: 162.9  3/13/2020 Yes            Pt with past history of stage IV adenocarcinoma, heart failure, and CML. Now with new diagnosis of limited stage small cell lung cancer based on CT guided of pleural based mass performed 3/9/20. Now with R pleural effusion and dyspnea.  Ddx of this effusion includes bleeding from his biopsy, malignancy related effusion, and effusion from CHF. CHF seems least likely as he has no other signs of volume overload and his pro BNP was normal.     Plan:  (Medical Decision Making)     --tells me his last dose of xarelto was 2 days ago. Will proceed with thoracentesis today sending fluid for usual analysis including flow given his history of CML. More than 50% of the time documented was spent in face-to-face contact with the patient and in the care of the patient on the floor/unit where the patient is located. Thank you very much for this referral.  We appreciate the opportunity to participate in this patient's care. Will follow along with above stated plan.     Quentin Amaral MD

## 2020-03-29 NOTE — PROCEDURES
PROCEDURE:  DIAGNOSTIC THORACENTESIS, THERAPEUTIC THORACENTESIS       PRE-OP DIAGNOSIS:  R PLEURAL EFFUSION    POST-OP DIAGNOSIS:  R PLEURAL EFFUSION    VOLUME REMOVED:    1000cc    ANESTHESIA:    LOCAL ANESTHESIA WITH 1% LIDOCAINE 10 CC TOTAL. CHEST ULTRASOUND FINDINGS:    A Turbo-M, Sonosite ultrasound with a 5-16 mHz probe was used to image the chest and localize the pleural effusion on the right chest.    A large anechoic space was seen on the right consistent with an uncomplicated pleural effusion. DESCRIPTION OF PROCEDURE:    After obtaining informed consent and localizing the safest location for thoracentesis, the  8th intercostal space was marked with a blunt, plastic needle cap in the mid scapular line. An [employer] AK-0100 Pleral-Seal thoracentesis kit was used to perform the procedure. The skin was cleansed with the supplied chlorhexidine swab and then draped in the usual fashion. Using the previously marked location as a guide, a 22 G 1.5 inch needle was used to inject 1% lidocaine into the skin and subcutaneous tissue, as well as onto the underlying rib and inter-costal muscles. Pleural fluid was aspirated to assure proper location and additional lidocaine was injected into the pleural space prior to removing the anesthesia needle. A 3mm incision was then made with the supplied scalpel in the usual fashion to facilitate the insertion of the thoracentesis needle. The needle with an 8 [place] thoracentesis catheter was then introduced into the chest through the previously made incision in the usual fashion, the rib localized with the needle, and the catheter then marched over the rib into the pleural space. After aspirating fluid, the thoracentesis catheter was then placed into the chest using the needle itself as a trocar. The needle was then removed and the catheter was attached to the supplied tubing without complication.     1000 cc of blood was aspirated and sent for analysis. Fluid was sent for the following tests:      LDH  Total Protein  Glucose  Cell count with differential  Routine culture and Gram stain  Cytology  AFB  Fungus  Flow Cytometry    Post procedure US confirmed incomplete drainage of the effusion and presence of lung sliding, ruling out pneumothorax. ([id number])    EBL:     <3FV    COMPLICATIONS:    The patient had a drop in BP and sudden rise in HR after only 700cc was removed. The HR felt irregular to RN and concern was for conversion to new a fib. The procedure was stopped after this failed to resolve after just a few minutes. Will get an EKG now. With bloody fluid will hold xarelto and use SCD's. Will need to repeat thoracentesis tomorrow or when pt's hemodynamics allow.        Dolores Shea MD

## 2020-03-29 NOTE — PROGRESS NOTES
After seeing the nature of the fluid will d/c lasix and would not restart anticoagulation for now. SCD's instead.      Garima Muñoz MD

## 2020-03-29 NOTE — PROGRESS NOTES
Problem: Falls - Risk of  Goal: *Absence of Falls  Description: Document She Lobe Fall Risk and appropriate interventions in the flowsheet.   Outcome: Progressing Towards Goal  Note: Fall Risk Interventions:            Medication Interventions: Teach patient to arise slowly                   Problem: Patient Education: Go to Patient Education Activity  Goal: Patient/Family Education  Outcome: Progressing Towards Goal

## 2020-03-30 ENCOUNTER — APPOINTMENT (OUTPATIENT)
Dept: GENERAL RADIOLOGY | Age: 65
DRG: 189 | End: 2020-03-30
Attending: INTERNAL MEDICINE
Payer: COMMERCIAL

## 2020-03-30 ENCOUNTER — HOSPITAL ENCOUNTER (OUTPATIENT)
Dept: RADIATION ONCOLOGY | Age: 65
Discharge: HOME | End: 2020-03-30
Payer: COMMERCIAL

## 2020-03-30 LAB
ANION GAP SERPL CALC-SCNC: 7 MMOL/L (ref 7–16)
ATRIAL RATE: 107 BPM
BUN SERPL-MCNC: 17 MG/DL (ref 8–23)
CALCIUM SERPL-MCNC: 8.4 MG/DL (ref 8.3–10.4)
CALCULATED P AXIS, ECG09: 73 DEGREES
CALCULATED R AXIS, ECG10: 46 DEGREES
CALCULATED T AXIS, ECG11: 53 DEGREES
CHLORIDE SERPL-SCNC: 93 MMOL/L (ref 98–107)
CO2 SERPL-SCNC: 28 MMOL/L (ref 21–32)
CREAT SERPL-MCNC: 1.06 MG/DL (ref 0.8–1.5)
DIAGNOSIS, 93000: NORMAL
ERYTHROCYTE [DISTWIDTH] IN BLOOD BY AUTOMATED COUNT: 13.2 % (ref 11.9–14.6)
GLUCOSE SERPL-MCNC: 98 MG/DL (ref 65–100)
HCT VFR BLD AUTO: 23.7 % (ref 41.1–50.3)
HGB BLD-MCNC: 8.4 G/DL (ref 13.6–17.2)
MCH RBC QN AUTO: 31 PG (ref 26.1–32.9)
MCHC RBC AUTO-ENTMCNC: 35.4 G/DL (ref 31.4–35)
MCV RBC AUTO: 87.5 FL (ref 79.6–97.8)
NRBC # BLD: 0 K/UL (ref 0–0.2)
P-R INTERVAL, ECG05: 174 MS
PLATELET # BLD AUTO: 177 K/UL (ref 150–450)
PMV BLD AUTO: 10.4 FL (ref 9.4–12.3)
POTASSIUM SERPL-SCNC: 4.1 MMOL/L (ref 3.5–5.1)
Q-T INTERVAL, ECG07: 344 MS
QRS DURATION, ECG06: 98 MS
QTC CALCULATION (BEZET), ECG08: 459 MS
RBC # BLD AUTO: 2.71 M/UL (ref 4.23–5.6)
SODIUM SERPL-SCNC: 128 MMOL/L (ref 136–145)
VENTRICULAR RATE, ECG03: 107 BPM
WBC # BLD AUTO: 7.5 K/UL (ref 4.3–11.1)

## 2020-03-30 PROCEDURE — 75810000165 HC THORACENTESIS

## 2020-03-30 PROCEDURE — 71046 X-RAY EXAM CHEST 2 VIEWS: CPT

## 2020-03-30 PROCEDURE — 77293 RESPIRATOR MOTION MGMT SIMUL: CPT

## 2020-03-30 PROCEDURE — 36415 COLL VENOUS BLD VENIPUNCTURE: CPT

## 2020-03-30 PROCEDURE — 85027 COMPLETE CBC AUTOMATED: CPT

## 2020-03-30 PROCEDURE — 65270000029 HC RM PRIVATE

## 2020-03-30 PROCEDURE — 99232 SBSQ HOSP IP/OBS MODERATE 35: CPT | Performed by: INTERNAL MEDICINE

## 2020-03-30 PROCEDURE — 77300 RADIATION THERAPY DOSE PLAN: CPT

## 2020-03-30 PROCEDURE — 77030014146 HC TY THORCENT/PARACENT BD -B

## 2020-03-30 PROCEDURE — 74011250636 HC RX REV CODE- 250/636: Performed by: INTERNAL MEDICINE

## 2020-03-30 PROCEDURE — 77010033678 HC OXYGEN DAILY

## 2020-03-30 PROCEDURE — 0W993ZZ DRAINAGE OF RIGHT PLEURAL CAVITY, PERCUTANEOUS APPROACH: ICD-10-PCS | Performed by: INTERNAL MEDICINE

## 2020-03-30 PROCEDURE — 80048 BASIC METABOLIC PNL TOTAL CA: CPT

## 2020-03-30 PROCEDURE — 74011250637 HC RX REV CODE- 250/637: Performed by: INTERNAL MEDICINE

## 2020-03-30 PROCEDURE — 94760 N-INVAS EAR/PLS OXIMETRY 1: CPT

## 2020-03-30 PROCEDURE — 77338 DESIGN MLC DEVICE FOR IMRT: CPT

## 2020-03-30 PROCEDURE — 32555 ASPIRATE PLEURA W/ IMAGING: CPT | Performed by: INTERNAL MEDICINE

## 2020-03-30 PROCEDURE — 77301 RADIOTHERAPY DOSE PLAN IMRT: CPT

## 2020-03-30 RX ORDER — IMATINIB MESYLATE 400 MG/1
400 TABLET, FILM COATED ORAL DAILY
Status: DISCONTINUED | OUTPATIENT
Start: 2020-03-31 | End: 2020-04-01 | Stop reason: HOSPADM

## 2020-03-30 RX ADMIN — IMATINIB MESYLATE 400 MG: 400 TABLET ORAL at 09:00

## 2020-03-30 RX ADMIN — FLUTICASONE PROPIONATE 1 SPRAY: 50 SPRAY, METERED NASAL at 10:09

## 2020-03-30 RX ADMIN — FOLIC ACID 1 MG: 1 TABLET ORAL at 10:08

## 2020-03-30 RX ADMIN — Medication 10 ML: at 06:25

## 2020-03-30 RX ADMIN — ACETAMINOPHEN 650 MG: 325 TABLET, FILM COATED ORAL at 21:35

## 2020-03-30 RX ADMIN — Medication 10 ML: at 14:00

## 2020-03-30 RX ADMIN — SACUBITRIL AND VALSARTAN 1 TABLET: 24; 26 TABLET, FILM COATED ORAL at 10:08

## 2020-03-30 RX ADMIN — Medication 5 ML: at 21:44

## 2020-03-30 RX ADMIN — B-COMPLEX W/ C & FOLIC ACID TAB 1 TABLET: TAB at 10:08

## 2020-03-30 NOTE — PROCEDURES
PROCEDURE:    DIAGNOSTIC/THERAPEUTIC THORACENTESIS/PLEURAL MANNOMETRY. PRE-OP DIAGNOSIS:    RPLEURAL EFFUSION    POST-OP DIAGNOSIS:    R PLEURAL EFFUSION    ASSISTANT:        ANESTHESIA:    LOCAL ANESTHESIA WITH 1% LIDOCAINE 10 CC TOTAL. CHEST ULTRASOUND FINDINGS:    A Turbo-M, Sonosite ultrasound with a 5-16 mHz probe was used to image the chest and localize the pleural effusion on the Left/and/Right chest.    A large/moderate/small anechoic space was seen on the Left/Right consistent with an uncomplicated pleural effusion. A large/moderate/small,  complex pleural space was identified on ultrasound, there were multiple loculations and septetions present, with the pleural fluid having a mixed echogenic character. DESCRIPTION OF PROCEDURE:    After obtaining informed consent and localizing the safest location for thoracentesis, the  9th intercostal space was marked with a blunt, plastic needle cap in the mid scapular line. An [employer] AK-0100 Pleral-Seal thoracentesis kit was used to perform the procedure. The skin was  cleansed with the supplied  chlorhexididne swab and then draped in the usual fasion. Using the previously marked location as a giude, a 22 G 1.5 inch needle was used to inject 10 cc of 1% lidocaine into the skin and subcutaneous tissue, as well as onto the underlying rib and inter-costal muscles, pleural fluid was aspirated to assure proper location, prior to removing the anesthesia needle. A 3mm  incision was then made, with the supplied scalpel in the usual fashion to facilitate the insertiopn of the thoracentesis needle. The needle with an 8French thoracentesis catheter was then introduced into the chest through the previously made incision in the usual fashion, the rib localized with the needle, and the catheter then marched over the rib into the pleural space.     After aspirating fluid, the thoracentesis catheter was then placed into the chest using the needle itself as a trocar. The needle was then removed and the catheter was attached to the supplied tubing without complication. 1600 cc of /Bloody fluid, was aspirated . Fluid was not sent for the following tests:    Cell count with differential  LDH  Glucose  Total protein  Cytology  Chylomicrones  Trigllicerides  Cholesterol  PH  Hematocrit  ADA  AFB  Fungus  Routine culture and Gram stain  B2 transfferrin  Billirubin   Amylase    Post procedure US confirmed complete/incomplete drainage of the effusion.     EBL:     minimal      COMPLICATIONS:    Drop in bp to map 55 with no symptoms    Doreen Alvarez MD

## 2020-03-30 NOTE — PROGRESS NOTES
Pt sat up on side of bed for thoracentesis. Consent obtained. Time out performed. Pts vitals monitored throughout procedure. Right ultrasound done and pic taken of pleural fluid and placed on pt's chart. ~1600 ml bloody pleural fluid from R.  Pt tolerated procedure well with no adverse rxn. Site dressed appropriately and report given to pts RN.   CXR ordered post procedure by MD.

## 2020-03-30 NOTE — PROGRESS NOTES
Problem: Falls - Risk of  Goal: *Absence of Falls  Description: Document Paola Galarza Fall Risk and appropriate interventions in the flowsheet.   Outcome: Progressing Towards Goal  Note: Fall Risk Interventions:            Medication Interventions: Bed/chair exit alarm                   Problem: Patient Education: Go to Patient Education Activity  Goal: Patient/Family Education  Outcome: Progressing Towards Goal     Problem: Pulmonary Embolism Care Plan (Adult)  Goal: *Improvement of existing pulmonary embolism  Outcome: Progressing Towards Goal  Goal: *Absence of bleeding  Outcome: Progressing Towards Goal  Goal: *Labs within defined limits  Outcome: Progressing Towards Goal     Problem: Patient Education: Go to Patient Education Activity  Goal: Patient/Family Education  Outcome: Progressing Towards Goal

## 2020-03-30 NOTE — PROGRESS NOTES
Problem: Falls - Risk of  Goal: *Absence of Falls  Description: Document Stacey Padilla Fall Risk and appropriate interventions in the flowsheet.   Outcome: Progressing Towards Goal  Note: Fall Risk Interventions:            Medication Interventions: Patient to call before getting OOB, Teach patient to arise slowly                   Problem: Patient Education: Go to Patient Education Activity  Goal: Patient/Family Education  Outcome: Progressing Towards Goal     Problem: Pulmonary Embolism Care Plan (Adult)  Goal: *Improvement of existing pulmonary embolism  Outcome: Progressing Towards Goal  Goal: *Absence of bleeding  Outcome: Progressing Towards Goal  Goal: *Labs within defined limits  Outcome: Progressing Towards Goal     Problem: Patient Education: Go to Patient Education Activity  Goal: Patient/Family Education  Outcome: Progressing Towards Goal

## 2020-03-30 NOTE — PROGRESS NOTES
Hospitalist Progress Note     Admit Date:  3/28/2020  2:44 PM   Name:  Som Nunn. Age:  62 y.o.  :  1955   MRN:  [id number]   PCP:  Paulette Sorto MD  Treatment Team: Attending Provider: Tegan Phan MD; Physician: Aron De La Cruz MD; : Roosevelt Roe    Subjective: The patient is a 70-year-old -American gentleman with lung cancer, chronic systolic CHF with reduced EF of 25 to 30%, CML presently in remission, HTN, history of PE on Xarelto, anxiety/depression, was admitted for SOB and hypoxia due to right pleural effusion. Seen by Pulmonary, s/p thoracentesis with removal of 1 L of bloody fluid. Pt reports feeling better today and at his baseline. Wanting to go home soon. Objective:     Patient Vitals for the past 24 hrs:   Temp Pulse Resp BP SpO2   20 0910 -- (!) 119 -- 90/54 100 %   20 0905 -- (!) 112 -- (!) 86/46 --   20 0900 -- (!) 115 -- (!) 83/48 --   20 0853 -- (!) 118 14 (!) 84/52 --   20 0752 97.5 °F (36.4 °C) (!) 107 16 99/63 100 %   20 0747 -- -- -- -- 97 %   20 0359 98.3 °F (36.8 °C) (!) 105 18 95/63 99 %   20 2315 98.6 °F (37 °C) (!) 104 18 114/62 98 %   20 2125 -- -- -- -- 100 %   20 1931 97.8 °F (36.6 °C) 81 18 104/52 93 %   20 1620 97.9 °F (36.6 °C) 99 18 101/78 92 %   20 1220 97.7 °F (36.5 °C) (!) 105 19 96/65 91 %   20 0951 -- (!) 109 -- 127/83 92 %     Oxygen Therapy  O2 Sat (%): 100 % (20 0910)  Pulse via Oximetry: 102 beats per minute (20 0747)  O2 Device: (P) Nasal cannula (20 0752)  O2 Flow Rate (L/min): 2 l/min (20 0747)  No intake or output data in the 24 hours ending 20      General:    Well nourished. Alert. CV:   Normal S1S2, tachycardic with regular rhythm. No murmur, rub, or gallop. Lungs:   Decreased BS on R  Abdomen:   Soft, nontender, nondistended. Extremities: Warm and dry. No cyanosis or edema. Skin:     No rashes or jaundice. Data Review:  I have reviewed all labs, meds, telemetry events, and studies from the last 24 hours. Recent Results (from the past 24 hour(s))   CELL COUNT, BODY FLUID    Collection Time: 03/29/20  9:40 AM   Result Value Ref Range    BODY FLUID TYPE PLEURAL FLUID      FLUID COLOR RED      FLUID APPEARANCE TURBID      FLUID RBC CT. 0,026,375 /cu mm    FLUID WBC COUNT 3,231 /cu mm    [facility] NEUTROPHIL 96 %    [facility] LYMPHS 2 %    [facility] MACROPHAGES 2 %   CULTURE, BODY FLUID W GRAM STAIN    Collection Time: 03/29/20  9:40 AM   Result Value Ref Range    Special Requests: RIGHT      GRAM STAIN PENDING     Culture result: NO GROWTH 1 DAY     GLUCOSE, FLUID    Collection Time: 03/29/20  9:40 AM   Result Value Ref Range    Fluid Type: PLEURAL FLUID      Glucose, body fld. 126 MG/DL   LDH, BODY FLUID    Collection Time: 03/29/20  9:40 AM   Result Value Ref Range    Fluid Type: PLEURAL FLUID      LD, body fld. 271 U/L   PROTEIN TOTAL, FLUID    Collection Time: 03/29/20  9:40 AM   Result Value Ref Range    Fluid Type: PLEURAL FLUID      Protein total, body fld.  5.5 g/dL   METABOLIC PANEL, BASIC    Collection Time: 03/30/20  5:08 AM   Result Value Ref Range    Sodium 128 (L) 136 - 145 mmol/L    Potassium 4.1 3.5 - 5.1 mmol/L    Chloride 93 (L) 98 - 107 mmol/L    CO2 28 21 - 32 mmol/L    Anion gap 7 7 - 16 mmol/L    Glucose 98 65 - 100 mg/dL    BUN 17 8 - 23 MG/DL    Creatinine 1.06 0.8 - 1.5 MG/DL    GFR est AA >60 >60 ml/min/1.73m2    GFR est non-AA >60 >60 ml/min/1.73m2    Calcium 8.4 8.3 - 10.4 MG/DL   CBC W/O DIFF    Collection Time: 03/30/20  5:08 AM   Result Value Ref Range    WBC 7.5 4.3 - 11.1 K/uL    RBC 2.71 (L) 4.23 - 5.6 M/uL    HGB 8.4 (L) 13.6 - 17.2 g/dL    HCT 23.7 (L) 41.1 - 50.3 %    MCV 87.5 79.6 - 97.8 FL    MCH 31.0 26.1 - 32.9 PG    MCHC 35.4 (H) 31.4 - 35.0 g/dL    RDW 13.2 11.9 - 14.6 %    PLATELET 088 559 - 346 K/uL    MPV 10.4 9.4 - 12.3 FL    ABSOLUTE NRBC 0.00 0.0 - 0.2 K/uL        All Micro Results     Procedure Component Value Units Date/Time    CULTURE, BODY FLUID Kristina Chyle STAIN [[id number]] Collected:  03/29/20 0940    Order Status:  Completed Specimen:  Pleural Fluid Updated:  03/30/20 0848     Special Requests: RIGHT        GRAM STAIN PENDING     Culture result: NO GROWTH 1 DAY       FUNGUS CULTURE AND SMEAR [[id number]] Collected:  03/29/20 0940    Order Status:  Completed Specimen:  Other Updated:  03/29/20 1017    AFB CULTURE + SMEAR W/RFLX ID FROM CULTURE [[id number]] Collected:  03/29/20 0940    Order Status:  Completed Updated:  03/29/20 1015          Current Meds:  Current Facility-Administered Medications   Medication Dose Route Frequency    albuterol-ipratropium (DUO-NEB) 2.5 MG-0.5 MG/3 ML  3 mL Nebulization Q4H PRN    benzonatate (TESSALON) capsule 200 mg  200 mg Oral TID PRN    sacubitriL-valsartan (ENTRESTO) 24-26 mg tablet 1 Tab  1 Tab Oral BID    folic acid (FOLVITE) tablet 1 mg  1 mg Oral DAILY    imatinib (GLEEVEC) chemo tablet 400 mg  400 mg Oral DAILY    lidocaine-prilocaine (EMLA) 2.5-2.5 % cream   Topical PRN    metoprolol succinate (TOPROL-XL) XL tablet 12.5 mg  12.5 mg Oral DAILY    multivitamin, stress formula (STRESS TAB) tablet 1 Tab  1 Tab Oral DAILY    osimertinib (TAGRISSO) chemo tab 80 mg (Patient Supplied)  80 mg Oral DAILY    spironolactone (ALDACTONE) tablet 25 mg  25 mg Oral DAILY    fluticasone propionate (FLONASE) 50 mcg/actuation nasal spray 1 Spray  1 Spray Both Nostrils DAILY    sodium chloride (NS) flush 5-40 mL  5-40 mL IntraVENous Q8H    sodium chloride (NS) flush 5-40 mL  5-40 mL IntraVENous PRN    acetaminophen (TYLENOL) tablet 650 mg  650 mg Oral Q4H PRN    ondansetron (ZOFRAN) injection 4 mg  4 mg IntraVENous Q6H PRN    ALPRAZolam (XANAX) tablet 0.5 mg  0.5 mg Oral BID PRN    HYDROcodone-acetaminophen (NORCO) 5-325 mg per tablet 1 Tab  1 Tab Oral Q6H PRN       Other Studies (last 24 hours):  No results found.    Assessment and Plan:     Hospital Problems as of 3/30/2020 Date Reviewed: 3/2/2020          Codes Class Noted - Resolved POA    Shortness of breath ICD-10-CM: R06.02  ICD-9-CM: 786.05  3/28/2020 - Present Yes        * (Principal) Pleural effusion on right ICD-10-CM: J90  ICD-9-CM: 511.9  3/28/2020 - Present Yes        SCLC (small cell lung carcinoma), right (HCC) ICD-10-CM: C34.91  ICD-9-CM: 162.9  3/13/2020 - Present Yes              Acute on chronichypoxemic respiratory failure  R pleural effusion  S/p thoracentesis 3/29 with 1L bloody fluid removed. Repeat tap today with 1.6L removed. - pulm following    Normocytic anemia  Suspected acute blood loss due to bloody pleural effusion. Hgb stable from yesterday. - follow hgb  - Xarelto d/c'd  - flow cytometry pending    Chronic systolic heart failure  EF 25-30%. IV diuresis stopped as not helping.  - cont Entresto, Toprol, Aldactone (with hold parameters)    Stage IV Lung cancer  - follows with Dr. Peggy Franklin    History of PE   Approx 1 year ago.    - Xarelto d/c'd by pulm    CML presently in remission  - flow cytometry pending     DC planning/Dispo: likely 1-2 days, pending pulm plan  DVT ppx: with SCDs      Signed:  Deneen Taylor MD

## 2020-03-30 NOTE — H&P
Date of Surgery Update:  Cullen Gomez. was seen and examined. History and physical has been reviewed. The patient has been examined.  There have been no significant clinical changes since the completion of the originally dated History and Physical.    Signed By: Tawanda Valladares MD     March 30, 2020 9:19 AM

## 2020-03-30 NOTE — PROGRESS NOTES
MEW score 3. Charge nurse notified. Patient alert and oriented x 4. Respirations even and unlabored. Denies pain. No distress observed.  02 @ 3L on via nasal cannula

## 2020-03-30 NOTE — PROGRESS NOTES
Ana Paula Brewers. Admission Date: 3/28/2020             Daily Progress Note: 3/30/2020    The patient's chart is reviewed and the patient is discussed with the staff.      62 y.o.  male seen and evaluated at the request of Dr. Yas Mcdermott.     He has a history of CHF, CML and adenocarcinoma of the lung (stage IV diagnosed based on malignant effusion several years ago) followed by Dr Neyda Pena, and history of PE on xarelto. In addition, he recently underwent a CT guided biopsy of a peripheral lung lesion which returned as small cell lung cancer. It appears they are considering this a limited stage small cell and he was planning for chemo/radiation for this which is to begin in about a week.      He presented yesterday with increased SOB and orthopnea that gradually worsened over several days. He denies any associated symptoms including no increased LE edema, fever, chills, chest pain, falls, or bleeding. He is on xarelto and held this for his CT guided biopsy. CXR shows enlarging R pleural effusion. He was started on IV lasix and pulmonary was consulted for thoracentesis.   Subjective:   Feels much better since thoracentesis yesterday    Current Facility-Administered Medications   Medication Dose Route Frequency    albuterol-ipratropium (DUO-NEB) 2.5 MG-0.5 MG/3 ML  3 mL Nebulization Q4H PRN    benzonatate (TESSALON) capsule 200 mg  200 mg Oral TID PRN    sacubitriL-valsartan (ENTRESTO) 24-26 mg tablet 1 Tab  1 Tab Oral BID    folic acid (FOLVITE) tablet 1 mg  1 mg Oral DAILY    imatinib (GLEEVEC) chemo tablet 400 mg  400 mg Oral DAILY    lidocaine-prilocaine (EMLA) 2.5-2.5 % cream   Topical PRN    metoprolol succinate (TOPROL-XL) XL tablet 12.5 mg  12.5 mg Oral DAILY    multivitamin, stress formula (STRESS TAB) tablet 1 Tab  1 Tab Oral DAILY    osimertinib (TAGRISSO) chemo tab 80 mg (Patient Supplied)  80 mg Oral DAILY    spironolactone (ALDACTONE) tablet 25 mg  25 mg Oral DAILY    fluticasone propionate (FLONASE) 50 mcg/actuation nasal spray 1 Spray  1 Spray Both Nostrils DAILY    sodium chloride (NS) flush 5-40 mL  5-40 mL IntraVENous Q8H    sodium chloride (NS) flush 5-40 mL  5-40 mL IntraVENous PRN    acetaminophen (TYLENOL) tablet 650 mg  650 mg Oral Q4H PRN    ondansetron (ZOFRAN) injection 4 mg  4 mg IntraVENous Q6H PRN    ALPRAZolam (XANAX) tablet 0.5 mg  0.5 mg Oral BID PRN    HYDROcodone-acetaminophen (NORCO) 5-325 mg per tablet 1 Tab  1 Tab Oral Q6H PRN       Review of Systems    Constitutional: negative for fever, chills, sweats  Cardiovascular: negative for chest pain, palpitations, syncope, edema  Gastrointestinal:  negative for dysphagia, reflux, vomiting, diarrhea, abdominal pain, or melena  Neurologic:  negative for focal weakness, numbness, headache    Objective:     Vitals:    03/29/20 2125 03/29/20 2315 03/30/20 0359 03/30/20 0747   BP:  114/62 95/63    Pulse:  (!) 104 (!) 105    Resp:  18 18    Temp:  98.6 °F (37 °C) 98.3 °F (36.8 °C)    SpO2: 100% 98% 99% 97%   Weight:       Height:           No intake or output data in the 24 hours ending 03/30/20 9135    Physical Exam:   Constitution:  the patient is well developed and in no acute distress  EENMT:  Sclera clear, pupils equal, oral mucosa moist  Respiratory: decreased breath sounds on the R  Cardiovascular:  RRR without M,G,R  Gastrointestinal: soft and non-tender; with positive bowel sounds. Musculoskeletal: warm without cyanosis. There is no lower extremity edema. Skin:  no jaundice or rashes, n wounds   Neurologic: no gross neuro deficits     Psychiatric:  alert and oriented x 3    CXR:       LAB  No results for input(s): GLUCPOC in the last 72 hours.     No lab exists for component: [address]     03/30/20  0508 03/29/20  0436 03/28/20  1451   WBC 7.5 9.3 6.8   HGB 8.4* 8.7* 9.9*   HCT 23.7* 25.3* 29.3*    179 188     Recent Labs     03/30/20  0508 03/29/20  0436 03/28/20  1451   NA 128* 131* 132*   K 4.1 4.1 4.0   CL 93* 96* 98   CO2 28 27 25   GLU 98 168* 190*   BUN 17 18 15   CREA 1.06 1.42 1.38   MG  --   --  2.1   CA 8.4 8.6 8.9   TROIQ  --   --  <0.02*   ALB  --   --  3.4   TBILI  --   --  0.6   ALT  --   --  23   SGOT  --   --  22     No results for input(s): PH, PCO2, PO2, HCO3, PHI, PCO2I, PO2I, HCO3I in the last 72 hours. Recent Labs     03/28/20  1544   LAC 1.7         Assessment:  (Medical Decision Making)     Hospital Problems  Date Reviewed: 3/2/2020          Codes Class Noted POA    Shortness of breath ICD-10-CM: R06.02  ICD-9-CM: 786.05  3/28/2020 Yes        * (Principal) Pleural effusion on right ICD-10-CM: J90  ICD-9-CM: 511.9  3/28/2020 Yes        SCLC (small cell lung carcinoma), right (HCC) ICD-10-CM: C34.91  ICD-9-CM: 162.9  3/13/2020 Yes              Plan:  (Medical Decision Making)   1    Repeat thoracentesis today  --    More than 50% of the time documented was spent in face-to-face contact with the patient and in the care of the patient on the floor/unit where the patient is located.     Dayana Rdz MD

## 2020-03-30 NOTE — PROGRESS NOTES
Interdisciplinary team rounds were held 3/30/2020 with the following team members:Care Management, Nursing, Physical Therapy and Physician and the patient. Plan of Care options were discussed with the team and the patient. The patient would benefit from a screening and/or visit from [address] [place] [address].

## 2020-03-30 NOTE — PROGRESS NOTES
Patient assessment completed. Patient is alert and oriented x 4. Patient's heart rate is irregular in rate and rhythm. He is tachycardic at 111 and a faint murmur is heard. Patient has adhesive bandage to right mid back from thoracentesis performed yesterday. Lung sounds are distant and coarse on the right side. Plan to repeat thoracentesis today.

## 2020-03-31 LAB
ANION GAP SERPL CALC-SCNC: 6 MMOL/L (ref 7–16)
BACTERIA SPEC CULT: NORMAL
BODY FLD TYPE: NORMAL
BUN SERPL-MCNC: 16 MG/DL (ref 8–23)
CALCIUM SERPL-MCNC: 8.3 MG/DL (ref 8.3–10.4)
CHLORIDE SERPL-SCNC: 94 MMOL/L (ref 98–107)
CO2 SERPL-SCNC: 28 MMOL/L (ref 21–32)
CREAT SERPL-MCNC: 1.25 MG/DL (ref 0.8–1.5)
ERYTHROCYTE [DISTWIDTH] IN BLOOD BY AUTOMATED COUNT: 13.3 % (ref 11.9–14.6)
GLUCOSE SERPL-MCNC: 90 MG/DL (ref 65–100)
GRAM STN SPEC: NORMAL
GRAM STN SPEC: NORMAL
HCT VFR BLD AUTO: 22.5 % (ref 41.1–50.3)
HCT VFR BLD AUTO: 26.3 % (ref 41.1–50.3)
HCT VFR FLD CALC: 15.9 %
HGB BLD-MCNC: 7.8 G/DL (ref 13.6–17.2)
HGB BLD-MCNC: 9 G/DL (ref 13.6–17.2)
MCH RBC QN AUTO: 30.2 PG (ref 26.1–32.9)
MCHC RBC AUTO-ENTMCNC: 34.7 G/DL (ref 31.4–35)
MCV RBC AUTO: 87.2 FL (ref 79.6–97.8)
NRBC # BLD: 0 K/UL (ref 0–0.2)
PLATELET # BLD AUTO: 197 K/UL (ref 150–450)
PMV BLD AUTO: 10.3 FL (ref 9.4–12.3)
POTASSIUM SERPL-SCNC: 3.8 MMOL/L (ref 3.5–5.1)
RBC # BLD AUTO: 2.58 M/UL (ref 4.23–5.6)
SERVICE CMNT-IMP: NORMAL
SODIUM SERPL-SCNC: 128 MMOL/L (ref 136–145)
WBC # BLD AUTO: 5.7 K/UL (ref 4.3–11.1)

## 2020-03-31 PROCEDURE — 74011250637 HC RX REV CODE- 250/637: Performed by: INTERNAL MEDICINE

## 2020-03-31 PROCEDURE — 75810000165 HC THORACENTESIS

## 2020-03-31 PROCEDURE — 65270000029 HC RM PRIVATE

## 2020-03-31 PROCEDURE — 76604 US EXAM CHEST: CPT | Performed by: INTERNAL MEDICINE

## 2020-03-31 PROCEDURE — 32555 ASPIRATE PLEURA W/ IMAGING: CPT | Performed by: INTERNAL MEDICINE

## 2020-03-31 PROCEDURE — 85027 COMPLETE CBC AUTOMATED: CPT

## 2020-03-31 PROCEDURE — 85018 HEMOGLOBIN: CPT

## 2020-03-31 PROCEDURE — 85013 SPUN MICROHEMATOCRIT: CPT

## 2020-03-31 PROCEDURE — 99232 SBSQ HOSP IP/OBS MODERATE 35: CPT | Performed by: INTERNAL MEDICINE

## 2020-03-31 PROCEDURE — 0W993ZZ DRAINAGE OF RIGHT PLEURAL CAVITY, PERCUTANEOUS APPROACH: ICD-10-PCS | Performed by: INTERNAL MEDICINE

## 2020-03-31 PROCEDURE — 94760 N-INVAS EAR/PLS OXIMETRY 1: CPT

## 2020-03-31 PROCEDURE — 77010033678 HC OXYGEN DAILY

## 2020-03-31 PROCEDURE — 36415 COLL VENOUS BLD VENIPUNCTURE: CPT

## 2020-03-31 PROCEDURE — 80048 BASIC METABOLIC PNL TOTAL CA: CPT

## 2020-03-31 PROCEDURE — 77030014146 HC TY THORCENT/PARACENT BD -B

## 2020-03-31 RX ADMIN — Medication 5 ML: at 21:24

## 2020-03-31 RX ADMIN — METOPROLOL SUCCINATE 12.5 MG: 25 TABLET, EXTENDED RELEASE ORAL at 07:36

## 2020-03-31 RX ADMIN — SACUBITRIL AND VALSARTAN 1 TABLET: 24; 26 TABLET, FILM COATED ORAL at 08:00

## 2020-03-31 RX ADMIN — FOLIC ACID 1 MG: 1 TABLET ORAL at 07:36

## 2020-03-31 RX ADMIN — Medication 10 ML: at 14:00

## 2020-03-31 RX ADMIN — Medication 5 ML: at 06:17

## 2020-03-31 RX ADMIN — SACUBITRIL AND VALSARTAN 1 TABLET: 24; 26 TABLET, FILM COATED ORAL at 17:07

## 2020-03-31 RX ADMIN — SPIRONOLACTONE 25 MG: 25 TABLET ORAL at 07:36

## 2020-03-31 RX ADMIN — IMATINIB MESYLATE 400 MG: 400 TABLET, FILM COATED ORAL at 12:00

## 2020-03-31 RX ADMIN — B-COMPLEX W/ C & FOLIC ACID TAB 1 TABLET: TAB at 07:35

## 2020-03-31 RX ADMIN — ACETAMINOPHEN 650 MG: 325 TABLET, FILM COATED ORAL at 23:39

## 2020-03-31 RX ADMIN — FLUTICASONE PROPIONATE 1 SPRAY: 50 SPRAY, METERED NASAL at 07:39

## 2020-03-31 NOTE — PROGRESS NOTES
Glendy Miranda. Admission Date: 3/28/2020             Daily Progress Note: 3/31/2020    The patient's chart is reviewed and the patient is discussed with the staff.    62 y.o.  male seen and evaluated at the request of Dr. Patricia Olguin.     He has a history of CHF, CML and adenocarcinoma of the lung (stage IV diagnosed based on malignant effusion several years ago) followed by Dr Star Arias, and history of PE on xarelto. In addition, he recently underwent a CT guided biopsy of a peripheral lung lesion which returned as small cell lung cancer. It appears they are considering this a limited stage small cell and he was planning for chemo/radiation for this which is to begin in about a week.      He presented yesterday with increased SOB and orthopnea that gradually worsened over several days. He denies any associated symptoms including no increased LE edema, fever, chills, chest pain, falls, or bleeding. He is on xarelto and held this for his CT guided biopsy. CXR shows enlarging R pleural effusion. He was started on IV lasix and pulmonary was consulted for thoracentesis. Subjective: Had thora 1000cc removed 3/29 and 1600 removed 3/31. Feels better today and wants to go home.      Current Facility-Administered Medications   Medication Dose Route Frequency    imatinib (GLEEVEC) chemo tablet 400 mg (Patient Supplied)  400 mg Oral DAILY    albuterol-ipratropium (DUO-NEB) 2.5 MG-0.5 MG/3 ML  3 mL Nebulization Q4H PRN    benzonatate (TESSALON) capsule 200 mg  200 mg Oral TID PRN    sacubitriL-valsartan (ENTRESTO) 24-26 mg tablet 1 Tab  1 Tab Oral BID    folic acid (FOLVITE) tablet 1 mg  1 mg Oral DAILY    lidocaine-prilocaine (EMLA) 2.5-2.5 % cream   Topical PRN    metoprolol succinate (TOPROL-XL) XL tablet 12.5 mg  12.5 mg Oral DAILY    multivitamin, stress formula (STRESS TAB) tablet 1 Tab  1 Tab Oral DAILY    osimertinib (TAGRISSO) chemo tab 80 mg (Patient Supplied)  80 mg Oral DAILY    spironolactone (ALDACTONE) tablet 25 mg  25 mg Oral DAILY    fluticasone propionate (FLONASE) 50 mcg/actuation nasal spray 1 Spray  1 Spray Both Nostrils DAILY    sodium chloride (NS) flush 5-40 mL  5-40 mL IntraVENous Q8H    sodium chloride (NS) flush 5-40 mL  5-40 mL IntraVENous PRN    acetaminophen (TYLENOL) tablet 650 mg  650 mg Oral Q4H PRN    ondansetron (ZOFRAN) injection 4 mg  4 mg IntraVENous Q6H PRN    ALPRAZolam (XANAX) tablet 0.5 mg  0.5 mg Oral BID PRN    HYDROcodone-acetaminophen (NORCO) 5-325 mg per tablet 1 Tab  1 Tab Oral Q6H PRN     Review of Systems  Constitutional: negative for fever, chills, sweats  Cardiovascular: negative for chest pain, palpitations, syncope, edema  Gastrointestinal:  negative for dysphagia, reflux, vomiting, diarrhea, abdominal pain, or melena  Neurologic:  negative for focal weakness, numbness, headache    Objective:     Vitals:    03/30/20 2049 03/31/20 0000 03/31/20 0410 03/31/20 0730   BP:  90/49 95/55 106/55   Pulse:  94 87 (!) 107   Resp:  16 16 18   Temp:  98.5 °F (36.9 °C) 97.8 °F (36.6 °C) 98.1 °F (36.7 °C)   SpO2: 100% 93% 95% 99%   Weight:       Height:             Intake/Output Summary (Last 24 hours) at 3/31/2020 7150  Last data filed at 3/31/2020 0422  Gross per 24 hour   Intake 200 ml   Output --   Net 200 ml     Physical Exam:   Constitution:  the patient is well developed and in no acute distress  EENMT:  Sclera clear, pupils equal, oral mucosa moist  Respiratory: decreased breath sounds on the R  Cardiovascular:  RRR without M,G,R  Gastrointestinal: soft and non-tender; with positive bowel sounds. Musculoskeletal: warm without cyanosis. There is no lower extremity edema. Skin:  no jaundice or rashes, n wounds   Neurologic: no gross neuro deficits     Psychiatric:  alert and oriented x 3    CXR: 3/30 post 2nd thora: much improved R effusion         LAB  No results for input(s): GLUCPOC in the last 72 hours.     No lab exists for component: GLPOC   Recent Labs     03/31/20  0530 03/30/20  0508 03/29/20  0436 03/28/20  1451   WBC 5.7 7.5 9.3 6.8   HGB 7.8* 8.4* 8.7* 9.9*   HCT 22.5* 23.7* 25.3* 29.3*    177 179 188     Recent Labs     03/31/20  0530 03/30/20  0508 03/29/20  0436 03/28/20  1451   * 128* 131* 132*   K 3.8 4.1 4.1 4.0   CL 94* 93* 96* 98   CO2 28 28 27 25   GLU 90 98 168* 190*   BUN 16 17 18 15   CREA 1.25 1.06 1.42 1.38   MG  --   --   --  2.1   CA 8.3 8.4 8.6 8.9   TROIQ  --   --   --  <0.02*   ALB  --   --   --  3.4   TBILI  --   --   --  0.6   ALT  --   --   --  23   SGOT  --   --   --  22     No results for input(s): PH, PCO2, PO2, HCO3, PHI, PCO2I, PO2I, HCO3I in the last 72 hours. Recent Labs     03/28/20  1544   LAC 1.7     Assessment:  (Medical Decision Making)     Hospital Problems  Date Reviewed: 3/2/2020          Codes Class Noted POA    Shortness of breath ICD-10-CM: R06.02  ICD-9-CM: 786.05  3/28/2020 Yes        * (Principal) Pleural effusion on right ICD-10-CM: J90  ICD-9-CM: 511.9  3/28/2020 Yes        SCLC (small cell lung carcinoma), right (HCC) ICD-10-CM: C34.91  ICD-9-CM: 162.9  3/13/2020 Yes            61 y/o male with stage 4 non-small cell lung cancer and small cell lung cancer more recently diagnosed, presumably has bloody effusion s/p CT guided biopsy on Xarelto. Xarelto has been held. Has a small to moderate effusion still on U/S which I don't have a direct post thora comparison picture from yesterday. May not be bigger, but will plan on repeat thora, look at bloodiness of fluid and try to get post procedure U/S picture. Suspect this is a slowly bleeding hemothorax.   Plan:  (Medical Decision Making)   1    Repeat thoracentesis today for complete drainage  2    Recheck Hg at noon  3    Based on repeat thora I think he is still bleeding  4    Continue to hold Xarelto, check H/H at noon  5    Recommend repeat U/S tomorrow AM, if has more fluid I would recommend pigtail with IR or us for continuous drainage until stops bleeding. --    More than 50% of the time documented was spent in face-to-face contact with the patient and in the care of the patient on the floor/unit where the patient is located.     Margarita Rutherford MD

## 2020-03-31 NOTE — PROGRESS NOTES
Shift assessment:  Pt alert, oriented X4. Calm, cooperative. Respirations even, unlabored. On 3L O2 NC. Diminished lung sounds in right lung, crackles at the base on ascultation. Clear sounds in the left lung. No distress noted. HR regular. Abdomen soft, non tender. Bowel sounds active. C/o pain 3/10 in lower back. Denies any other needs. Call light within reach. Bed in low, locked position.

## 2020-03-31 NOTE — PROGRESS NOTES
Hospitalist Progress Note     Admit Date:  3/28/2020  2:44 PM   Name:  Jaya Macdonald. Age:  62 y.o.  :  1955   MRN:  [id number]   PCP:  Ariane Avila MD  Treatment Team: Attending Provider: Jessica Child MD; Physician: Asia Tariq MD; Consulting Provider: Shweta Davis MD; : Evans Childress; Utilization Review: Benji Castellon RN; Care Manager: José Peter RN    Subjective: The patient is a 71-year-old -American gentleman with lung cancer, chronic systolic CHF with reduced EF of 25 to 30%, CML presently in remission, HTN, history of PE on Xarelto, anxiety/depression, was admitted for SOB and hypoxia due to right pleural effusion. Seen by Pulmonary, s/p thoracenteses x 2 with bloody fluid removed. Third thoracentesis performed this morning, still with bloody fluid. Pt reports feeling around baseline. Objective:     Patient Vitals for the past 24 hrs:   Temp Pulse Resp BP SpO2   20 1138 97.6 °F (36.4 °C) 99 18 105/44 100 %   20 1123 97.6 °F (36.4 °C) 100 18 (!) 77/49 100 %   20 0730 98.1 °F (36.7 °C) (!) 107 18 106/55 99 %   20 0410 97.8 °F (36.6 °C) 87 16 95/55 95 %   20 0000 98.5 °F (36.9 °C) 94 16 90/49 93 %   20 -- -- -- -- 100 %   20 1930 99.3 °F (37.4 °C) 98 17 92/50 97 %   20 1714 -- (!) 105 16 (!) 89/54 --   20 1530 98.2 °F (36.8 °C) 100 16 97/55 100 %     Oxygen Therapy  O2 Sat (%): 100 % (20 1138)  Pulse via Oximetry: 79 beats per minute (20)  O2 Device: Nasal cannula (20 0735)  O2 Flow Rate (L/min): 3 l/min (20 0735)  FIO2 (%): 100 % (20 0853)    Intake/Output Summary (Last 24 hours) at 3/31/2020 1355  Last data filed at 3/31/2020 0422  Gross per 24 hour   Intake 200 ml   Output --   Net 200 ml         General:    Well nourished. Alert. CV:   Normal S1S2, tachycardic with regular rhythm. No murmur, rub, or gallop.   Lungs:   Decreased BS on R  Abdomen:   Soft, nontender, nondistended. Extremities: Warm and dry. No cyanosis or edema. Skin:     No rashes or jaundice. Data Review:  I have reviewed all labs, meds, telemetry events, and studies from the last 24 hours.     Recent Results (from the past 24 hour(s))   METABOLIC PANEL, BASIC    Collection Time: 03/31/20  5:30 AM   Result Value Ref Range    Sodium 128 (L) 136 - 145 mmol/L    Potassium 3.8 3.5 - 5.1 mmol/L    Chloride 94 (L) 98 - 107 mmol/L    CO2 28 21 - 32 mmol/L    Anion gap 6 (L) 7 - 16 mmol/L    Glucose 90 65 - 100 mg/dL    BUN 16 8 - 23 MG/DL    Creatinine 1.25 0.8 - 1.5 MG/DL    GFR est AA >60 >60 ml/min/1.73m2    GFR est non-AA >60 >60 ml/min/1.73m2    Calcium 8.3 8.3 - 10.4 MG/DL   CBC W/O DIFF    Collection Time: 03/31/20  5:30 AM   Result Value Ref Range    WBC 5.7 4.3 - 11.1 K/uL    RBC 2.58 (L) 4.23 - 5.6 M/uL    HGB 7.8 (L) 13.6 - 17.2 g/dL    HCT 22.5 (L) 41.1 - 50.3 %    MCV 87.2 79.6 - 97.8 FL    MCH 30.2 26.1 - 32.9 PG    MCHC 34.7 31.4 - 35.0 g/dL    RDW 13.3 11.9 - 14.6 %    PLATELET 244 014 - 045 K/uL    MPV 10.3 9.4 - 12.3 FL    ABSOLUTE NRBC 0.00 0.0 - 0.2 K/uL   HGB & HCT    Collection Time: 03/31/20 12:26 PM   Result Value Ref Range    HGB 9.0 (L) 13.6 - 17.2 g/dL    HCT 26.3 (L) 41.1 - 50.3 %        All Micro Results     Procedure Component Value Units Date/Time    FUNGUS CULTURE AND SMEAR [[id number]] Collected:  03/29/20 0938    Order Status:  Completed Specimen:  Other from Miscellaneous sample Updated:  03/31/20 1336     Source PLEURAL FLUID        Comment: RIGHT        Fungus stain Direct Inoculation     Fungus (Mycology) Culture Other source received     Comment: (NOTE)  Performed At: [address] [id number]  Aaron Manning MD YN:[id number]         CULTURE, BODY FLUID Ross Ludwig [[id number]] Collected:  03/29/20 0940    Order Status:  Completed Specimen:  Pleural Fluid Updated:  03/31/20 0755     Special Requests: RIGHT        GRAM STAIN 3 TO 5 WBCS SEEN PER OIF      NO DEFINITE ORGANISM SEEN        Culture result: NO GROWTH 2 DAYS       AFB CULTURE + SMEAR W/RFLX ID FROM CULTURE [[id number]] Collected:  03/29/20 0940    Order Status:  Completed Updated:  03/29/20 1015          Current Meds:  Current Facility-Administered Medications   Medication Dose Route Frequency    imatinib (GLEEVEC) chemo tablet 400 mg (Patient Supplied)  400 mg Oral DAILY    albuterol-ipratropium (DUO-NEB) 2.5 MG-0.5 MG/3 ML  3 mL Nebulization Q4H PRN    benzonatate (TESSALON) capsule 200 mg  200 mg Oral TID PRN    sacubitriL-valsartan (ENTRESTO) 24-26 mg tablet 1 Tab  1 Tab Oral BID    folic acid (FOLVITE) tablet 1 mg  1 mg Oral DAILY    lidocaine-prilocaine (EMLA) 2.5-2.5 % cream   Topical PRN    metoprolol succinate (TOPROL-XL) XL tablet 12.5 mg  12.5 mg Oral DAILY    multivitamin, stress formula (STRESS TAB) tablet 1 Tab  1 Tab Oral DAILY    osimertinib (TAGRISSO) chemo tab 80 mg (Patient Supplied)  80 mg Oral DAILY    spironolactone (ALDACTONE) tablet 25 mg  25 mg Oral DAILY    fluticasone propionate (FLONASE) 50 mcg/actuation nasal spray 1 Spray  1 Spray Both Nostrils DAILY    sodium chloride (NS) flush 5-40 mL  5-40 mL IntraVENous Q8H    sodium chloride (NS) flush 5-40 mL  5-40 mL IntraVENous PRN    acetaminophen (TYLENOL) tablet 650 mg  650 mg Oral Q4H PRN    ondansetron (ZOFRAN) injection 4 mg  4 mg IntraVENous Q6H PRN    ALPRAZolam (XANAX) tablet 0.5 mg  0.5 mg Oral BID PRN    HYDROcodone-acetaminophen (NORCO) 5-325 mg per tablet 1 Tab  1 Tab Oral Q6H PRN       Other Studies (last 24 hours):  No results found.     Assessment and Plan:     Hospital Problems as of 3/31/2020 Date Reviewed: 3/2/2020          Codes Class Noted - Resolved POA    Shortness of breath ICD-10-CM: R06.02  ICD-9-CM: 786.05  3/28/2020 - Present Yes        * (Principal) Pleural effusion on right ICD-10-CM: J90  ICD-9-CM: 511.9  3/28/2020 - Present Yes SCLC (small cell lung carcinoma), right [facility]) ICD-10-CM: C34.91  ICD-9-CM: 162.9  3/13/2020 - Present Yes              Acute on chronichypoxemic respiratory failure  R pleural effusion  S/p thoracentesis x3, most recently this AM with bloody fluid suggesting he is still bleeding, along with drop in hgb  - pulm following - plan to reassess tomorrow - may need pigtail if ongoing concerns    Normocytic anemia  Suspected acute blood loss due to bloody pleural effusion. Hgb stable from yesterday. - follow hgb  - Xarelto d/c'd  - flow cytometry pending    Chronic systolic heart failure  EF 25-30%. IV diuresis stopped as not helping.  - cont Entresto, Toprol, Aldactone (with hold parameters)    Stage IV Lung cancer  - follows with Dr. Arroyo Anchors    History of PE   Approx 1 year ago.    - Xarelto d/c'd by pulm    CML presently in remission  - flow cytometry pending     DC planning/Dispo: likely 1-2 days, pending pulm plan  DVT ppx: with SCDs      Signed:  Liya Whiting MD

## 2020-03-31 NOTE — PROGRESS NOTES
Interdisciplinary team rounds were held 3/31/2020 with the following team members:Care Management, Nursing, Physical Therapy and Physician     Plan of care discussed. See clinical pathway and/or care plan for interventions and desired outcomes.

## 2020-03-31 NOTE — PROGRESS NOTES
Assessment done via  Doc flow sheet. Pt sitting in chair at bedside, A&O x4, short of breath on exertion noted, lung sounds diminished, O2 @3L per nasal cannula, sat 95%. Denies pain at this time. Band aid to right lower back dry & intact. Call light within reach, pt instructed to call for assistance as needed.

## 2020-03-31 NOTE — PROGRESS NOTES
Problem: Falls - Risk of  Goal: *Absence of Falls  Description: Document Katherine Bautista Fall Risk and appropriate interventions in the flowsheet.   Outcome: Progressing Towards Goal  Note: Fall Risk Interventions:            Medication Interventions: Teach patient to arise slowly                   Problem: Patient Education: Go to Patient Education Activity  Goal: Patient/Family Education  Outcome: Progressing Towards Goal     Problem: Pulmonary Embolism Care Plan (Adult)  Goal: *Improvement of existing pulmonary embolism  Outcome: Progressing Towards Goal  Goal: *Absence of bleeding  Outcome: Progressing Towards Goal  Goal: *Labs within defined limits  Outcome: Progressing Towards Goal     Problem: Patient Education: Go to Patient Education Activity  Goal: Patient/Family Education  Outcome: Progressing Towards Goal

## 2020-03-31 NOTE — PROCEDURES
PROCEDURE:  DIAGNOSTIC THORACENTESIS, THERAPEUTIC THORACENTESIS, PLEURAL MANOMETRY     PRE-OP DIAGNOSIS:  Right PLEURAL EFFUSION    POST-OP DIAGNOSIS:  Right PLEURAL EFFUSION    VOLUME REMOVED:    600cc    ANESTHESIA:    LOCAL ANESTHESIA WITH 1% LIDOCAINE 10 CC TOTAL. CHEST ULTRASOUND FINDINGS:    A Turbo-M, Sonosite ultrasound with a 5-16 mHz probe was used to image the chest and localize the pleural effusion on the right chest.    A medium anechoic space was seen on the right consistent with an uncomplicated pleural effusion. DESCRIPTION OF PROCEDURE:    After obtaining informed consent and localizing the safest location for thoracentesis, the  8th intercostal space was marked with a blunt, plastic needle cap in the mid scapular line. An [employer] AK-0100 Pleral-Seal thoracentesis kit was used to perform the procedure. The skin was cleansed with the supplied chlorhexidine swab and then draped in the usual fashion. Using the previously marked location as a guide, a 22 G 1.5 inch needle was used to inject 1% lidocaine into the skin and subcutaneous tissue, as well as onto the underlying rib and inter-costal muscles. Pleural fluid was aspirated to assure proper location and additional lidocaine was injected into the pleural space prior to removing the anesthesia needle. A 3mm incision was then made with the supplied scalpel in the usual fashion to facilitate the insertion of the thoracentesis needle. The needle with an 8 Upper sorbian thoracentesis catheter was then introduced into the chest through the previously made incision in the usual fashion, the rib localized with the needle, and the catheter then marched over the rib into the pleural space. After aspirating fluid, the thoracentesis catheter was then placed into the chest using the needle itself as a trocar. The needle was then removed and the catheter was attached to the supplied tubing without complication.     600 cc of bloody fluid was aspirated and sent for analysis. Fluid was sent for the following tests:    Spun HCT    Post procedure US confirmed complete drainage of the effusion and presence of lung sliding, ruling out pneumothorax.  ([id number])    EBL:     1 drop    COMPLICATIONS:    None      Gadiel Oliver MD

## 2020-03-31 NOTE — INTERVAL H&P NOTE
Update History & Physical    The Patient's History and Physical of March 31,   Thoracentesis was reviewed with the patient and I examined the patient. There was no change. The surgical site was confirmed by the patient and me. Plan:  The risk, benefits, expected outcome, and alternative to the recommended procedure have been discussed with the patient. Patient understands and wants to proceed with the procedure.     Electronically signed by Tomas Hammans, MD on 3/31/2020 at 9:55 AM

## 2020-03-31 NOTE — PROGRESS NOTES
Care Management Interventions  PCP Verified by CM: Yes  Palliative Care Criteria Met (RRAT>21 & CHF Dx)?: No(Risk 14%Dx Pleural effusion)  Mode of Transport at Discharge: Self  Transition of Care Consult (CM Consult): Discharge Planning  Discharge Durable Medical Equipment: No(has O2 at home)  Physical Therapy Consult: No  Occupational Therapy Consult: No  Speech Therapy Consult: No  Current Support Network: Lives with Spouse, Own Home  Confirm Follow Up Transport: Family  Discharge Location  Discharge Placement: Home with family assistance  Patient S/P thoracentesis. He lives with his wife Kristen Alvarez [phone] in one story home and is independent of ADL's. DME includes O2, walker and straight cane. He has needed transportation. He has [employer] and obtains medications at [facility] [phone]. He voices no concerns or needs for d/c. Current d/c plan is home with wife when stable.

## 2020-04-01 ENCOUNTER — APPOINTMENT (OUTPATIENT)
Dept: GENERAL RADIOLOGY | Age: 65
DRG: 189 | End: 2020-04-01
Attending: INTERNAL MEDICINE
Payer: COMMERCIAL

## 2020-04-01 VITALS
TEMPERATURE: 98 F | SYSTOLIC BLOOD PRESSURE: 89 MMHG | OXYGEN SATURATION: 98 % | BODY MASS INDEX: 23.88 KG/M2 | HEIGHT: 73 IN | WEIGHT: 180.2 LBS | DIASTOLIC BLOOD PRESSURE: 55 MMHG | RESPIRATION RATE: 16 BRPM | HEART RATE: 99 BPM

## 2020-04-01 LAB
ERYTHROCYTE [DISTWIDTH] IN BLOOD BY AUTOMATED COUNT: 13.2 % (ref 11.9–14.6)
HCT VFR BLD AUTO: 25.5 % (ref 41.1–50.3)
HGB BLD-MCNC: 8.8 G/DL (ref 13.6–17.2)
MCH RBC QN AUTO: 30.4 PG (ref 26.1–32.9)
MCHC RBC AUTO-ENTMCNC: 34.5 G/DL (ref 31.4–35)
MCV RBC AUTO: 88.2 FL (ref 79.6–97.8)
NRBC # BLD: 0 K/UL (ref 0–0.2)
PLATELET # BLD AUTO: 231 K/UL (ref 150–450)
PMV BLD AUTO: 9.9 FL (ref 9.4–12.3)
RBC # BLD AUTO: 2.89 M/UL (ref 4.23–5.6)
WBC # BLD AUTO: 5.8 K/UL (ref 4.3–11.1)

## 2020-04-01 PROCEDURE — 71046 X-RAY EXAM CHEST 2 VIEWS: CPT

## 2020-04-01 PROCEDURE — 94760 N-INVAS EAR/PLS OXIMETRY 1: CPT

## 2020-04-01 PROCEDURE — 74011250637 HC RX REV CODE- 250/637: Performed by: INTERNAL MEDICINE

## 2020-04-01 PROCEDURE — 36415 COLL VENOUS BLD VENIPUNCTURE: CPT

## 2020-04-01 PROCEDURE — 85027 COMPLETE CBC AUTOMATED: CPT

## 2020-04-01 PROCEDURE — 77010033678 HC OXYGEN DAILY

## 2020-04-01 PROCEDURE — 99232 SBSQ HOSP IP/OBS MODERATE 35: CPT | Performed by: INTERNAL MEDICINE

## 2020-04-01 RX ADMIN — SACUBITRIL AND VALSARTAN 1 TABLET: 24; 26 TABLET, FILM COATED ORAL at 08:39

## 2020-04-01 RX ADMIN — METOPROLOL SUCCINATE 12.5 MG: 25 TABLET, EXTENDED RELEASE ORAL at 08:39

## 2020-04-01 RX ADMIN — SPIRONOLACTONE 25 MG: 25 TABLET ORAL at 08:38

## 2020-04-01 RX ADMIN — Medication 5 ML: at 05:20

## 2020-04-01 RX ADMIN — FOLIC ACID 1 MG: 1 TABLET ORAL at 08:39

## 2020-04-01 RX ADMIN — IMATINIB MESYLATE 400 MG: 400 TABLET, FILM COATED ORAL at 08:35

## 2020-04-01 RX ADMIN — B-COMPLEX W/ C & FOLIC ACID TAB 1 TABLET: TAB at 08:37

## 2020-04-01 NOTE — PROGRESS NOTES
Nutrition Assessment for:   Length of Stay     Assessment: The patient is noted to have a h/o lung cancer. He is currently admitted due to pleural effusion. He is s/p multiple thoracentesis. He is awaiting chest X-ray today for further plan regarding another thoracentesis versus pigtail placement. He states that his appetite is doing well. He denies any po difficulties at this time. He reports consumption of around 100% of his meals. He reports following a low sodium diet at home. Most meals are prepared by wife at home with minimal going out to eat. He denies any questions or concerns at this time. DIET CARDIAC Regular    Per patient, consuming ~100% most meals. Anthropometrics:  Height: 6' 1\" (185.4 cm), Weight: 81.7 kg (180 lb 3.2 oz), Weight Source: Standing scale (comment), Body mass index is 23.77 kg/m². BMI class of Normal weight. Macronutrient needs: (using Standing scale 81.7 kg)  EER: 9010-5381 kcal/day (20-25 kcal/kg)  EPR: 82-98 g/day (1-1.2 g/kg)    Nutrition Diagnosis:  No nutrition diagnosis at this time. Nutrition Intervention:  Meals and snacks: Continue current diet   Coordination of nutrition care by a Nutrition Professional: IDT rounds  Discharge Nutrition Plan: No discharge needs at this time. Londa Sauers Lennox Moros, [address], [address],    [phone]

## 2020-04-01 NOTE — DISCHARGE INSTRUCTIONS
DISCHARGE SUMMARY from Nurse    PATIENT INSTRUCTIONS:    After general anesthesia or intravenous sedation, for 24 hours or while taking prescription Narcotics:  · Limit your activities  · Do not drive and operate hazardous machinery  · Do not make important personal or business decisions  · Do  not drink alcoholic beverages  · If you have not urinated within 8 hours after discharge, please contact your surgeon on call. Report the following to your surgeon:  · Excessive pain, swelling, redness or odor of or around the surgical area  · Temperature over 100.5  · Nausea and vomiting lasting longer than 4 hours or if unable to take medications  · Any signs of decreased circulation or nerve impairment to extremity: change in color, persistent  numbness, tingling, coldness or increase pain  · Any questions    What to do at Home:  Recommended activity: Activity as tolerated. If you experience any of the following symptoms nausea, vomiting, chest pain, shortness of breath;  please follow up with MD.    *  Please give a list of your current medications to your Primary Care Provider. *  Please update this list whenever your medications are discontinued, doses are      changed, or new medications (including over-the-counter products) are added. *  Please carry medication information at all times in case of emergency situations. These are general instructions for a healthy lifestyle:    No smoking/ No tobacco products/ Avoid exposure to second hand smoke  Surgeon General's Warning:  Quitting smoking now greatly reduces serious risk to your health.     Obesity, smoking, and sedentary lifestyle greatly increases your risk for illness    A healthy diet, regular physical exercise & weight monitoring are important for maintaining a healthy lifestyle    You may be retaining fluid if you have a history of heart failure or if you experience any of the following symptoms:  Weight gain of 3 pounds or more overnight or 5 pounds in a week, increased swelling in our hands or feet or shortness of breath while lying flat in bed. Please call your doctor as soon as you notice any of these symptoms; do not wait until your next office visit. The discharge information has been reviewed with the patient. The patient verbalized understanding. Discharge medications reviewed with the patient and appropriate educational materials and side effects teaching were provided.   ___________________________________________________________________________________________________________________________________

## 2020-04-01 NOTE — PROGRESS NOTES
MD Dr. Janee Niño aware of pts low BP 89/55. Pt in the recliner at bedside; no acute signs of distress noted.

## 2020-04-01 NOTE — PROGRESS NOTES
Problem: Falls - Risk of  Goal: *Absence of Falls  Description: Document Boo Baldwin Fall Risk and appropriate interventions in the flowsheet.   Outcome: Progressing Towards Goal  Note: Fall Risk Interventions:            Medication Interventions: Teach patient to arise slowly                   Problem: Patient Education: Go to Patient Education Activity  Goal: Patient/Family Education  Outcome: Progressing Towards Goal     Problem: Pulmonary Embolism Care Plan (Adult)  Goal: *Improvement of existing pulmonary embolism  Outcome: Progressing Towards Goal  Goal: *Labs within defined limits  Outcome: Progressing Towards Goal     Problem: Patient Education: Go to Patient Education Activity  Goal: Patient/Family Education  Outcome: Progressing Towards Goal     Problem: Breathing Pattern - Ineffective  Goal: *Absence of hypoxia  Outcome: Progressing Towards Goal  Goal: *Use of effective breathing techniques  Outcome: Progressing Towards Goal  Goal: *PALLIATIVE CARE:  Alleviation of Dyspnea  Outcome: Progressing Towards Goal     Problem: Patient Education: Go to Patient Education Activity  Goal: Patient/Family Education  Outcome: Progressing Towards Goal

## 2020-04-01 NOTE — PROGRESS NOTES
Discharge instructions & prescription given to pt. Verbalized understanding. IV removed. Tip intact. Opportunity for questions provided. Pt taken in stable condition in a wheelchair with belongings to waiting car by me.

## 2020-04-01 NOTE — DISCHARGE SUMMARY
Hospitalist Discharge Summary     Patient ID:  Velvet Bailey  [id number]  62 y.o.  1955  Admit date: 3/28/2020  2:44 PM  Discharge date and time: 4/1/2020  Attending: Binta Hernandez MD  PCP:  Angela Knutson MD  Treatment Team: Attending Provider: Binta Hernandez MD; Physician: Meg Hummel MD; Consulting Provider: Johny Tony MD; : Benja Chinchilla; Utilization Review: Tyra Ramirez RN; Care Manager: Xochitl Ferreira RN; Utilization Review: Tyrell Shepherd RN    Principal Diagnosis Pleural effusion on right   Principal Problem:    Pleural effusion on right (3/28/2020)    Active Problems:    SCLC (small cell lung carcinoma), right ([address] (3/13/2020)      Shortness of breath (3/28/2020)             Hospital Course:  Please refer to the admission H&P for details of presentation. In summary, 36-year-old -American gentleman with lung cancer, chronic systolic CHF with reduced EF of 25 to 30%, CML presently in remission, HTN, history of PE on Xarelto, anxiety/depression, was admitted for SOB and hypoxia due to right pleural effusion. Seen by Pulmonary, s/p thoracenteses x 3 with bloody fluid removed. Acute on chronichypoxemic respiratory failure  R pleural effusion  S/p thoracentesis x3 (last on 3/31/20). - CXR today showing little fluid - ok to d/c per pulm  - will f/u closely with pulm as outpt     Normocytic anemia  Suspected acute blood loss due to bloody pleural effusion. Hgb stable. - Xarelto d/c'd  - flow cytometry pending     Chronic systolic heart failure  EF 25-30%. IV diuresis stopped as not helping.  - cont Entresto, Toprol, Aldactone (with hold parameters)     Stage IV Lung cancer  - follows with Dr. Naty Mccoy     History of PE   Approx 1 year ago.    - Xarelto d/c'd by pulm     CML presently in remission  - flow cytometry pending     Significant Diagnostic Studies:   XR CHEST PA LAT   Final Result   IMPRESSION:  INTERVAL IMPROVEMENT IN INHOMOGENEOUS BILATERAL INFILTRATES WITH   STABLE SMALL RIGHT PLEURAL EFFUSION. XR CHEST PA LAT   Final Result   IMPRESSION: Small right-sided pleural effusion with persistent patchy airspace   opacity in the right mid lung. XR CHEST PORT   Final Result            Labs: Results:       Chemistry Recent Labs     03/31/20  0530 03/30/20  0508   GLU 90 98   * 128*   K 3.8 4.1   CL 94* 93*   CO2 28 28   BUN 16 17   CREA 1.25 1.06   CA 8.3 8.4   AGAP 6* 7      CBC w/Diff Recent Labs     04/01/20  0503 03/31/20  1226 03/31/20  0530 03/30/20  0508   WBC 5.8  --  5.7 7.5   RBC 2.89*  --  2.58* 2.71*   HGB 8.8* 9.0* 7.8* 8.4*   HCT 25.5* 26.3* 22.5* 23.7*     --  197 177      Cardiac Enzymes No results for input(s): CPK, CKND1, RADHA in the last 72 hours. No lab exists for component: CKRMB, TROIP   Coagulation No results for input(s): PTP, INR, APTT, INREXT in the last 72 hours. Lipid Panel No results found for: CHOL, CHOLPOCT, CHOLX, CHLST, CHOLV, [id number], HDL, HDLP, LDL, LDLC, DLDLP, [id number], VLDLC, VLDL, TGLX, TRIGL, TRIGP, TGLPOCT, CHHD, CHHDX   BNP No results for input(s): BNPP in the last 72 hours. Liver Enzymes No results for input(s): TP, ALB, TBIL, AP, SGOT, GPT in the last 72 hours. No lab exists for component: DBIL   Thyroid Studies Lab Results   Component Value Date/Time    TSH 1.820 09/17/2019 03:18 AM            Discharge Exam:  Visit Vitals  BP (!) 89/55 (BP 1 Location: Left arm, BP Patient Position: At rest)   Pulse 99   Temp 98 °F (36.7 °C)   Resp 16   Ht 6' 1\" (1.854 m)   Wt 81.7 kg (180 lb 3.2 oz)   SpO2 98%   BMI 23.77 kg/m²     General:          Well nourished. Alert. CV:                  Normal S1S2, tachycardic with regular rhythm. No murmur, rub, or gallop. Lungs:             Decreased BS on R  Abdomen:        Soft, nontender, nondistended. Extremities:     Warm and dry. No cyanosis or edema. Skin:                No rashes or jaundice.      Disposition: home  Discharge Condition: stable  Patient Instructions:   Current Discharge Medication List      CONTINUE these medications which have NOT CHANGED    Details   folic acid (FOLVITE) 1 mg tablet Take 1 Tab by mouth daily. Qty: 90 Tab, Refills: 0      prochlorperazine (COMPAZINE) 10 mg tablet Take 1 Tab by mouth every six (6) hours as needed for Nausea. Qty: 90 Tab, Refills: 2      ondansetron hcl (ZOFRAN) 8 mg tablet Take 1 Tab by mouth every eight (8) hours as needed for Nausea. Qty: 90 Tab, Refills: 2      lidocaine-prilocaine (EMLA) topical cream Apply  to affected area as needed for Pain. Apply to port site 45-60 minutes prior to port access. Cover with plastic. Qty: 30 g, Refills: 0      osimertinib (TAGRISSO) 80 mg tablet Take 1 Tab by mouth daily. Qty: 30 Tab, Refills: 3    Associated Diagnoses: CML (chronic myeloid leukemia) ([facility] [address] Malignant neoplasm of lung, unspecified laterality, unspecified part of lung (HCC)      imatinib (GLEEVEC) 400 mg tablet Take 1 Tab by mouth daily. Qty: 30 Tab, Refills: 5      ENTRESTO 24-26 mg tablet Take 1 Tab by mouth two (2) times a day. Qty: 180 Tab, Refills: 4      spironolactone (ALDACTONE) 25 mg tablet Take 1 Tab by mouth daily. Indications: chronic heart failure  Qty: 30 Tab, Refills: 3      metoprolol succinate (TOPROL-XL) 25 mg XL tablet Take 0.5 Tabs by mouth daily. Indications: chronic heart failure  Qty: 90 Tab, Refills: 0      furosemide (LASIX) 20 mg tablet Take 1 Tab by mouth daily. Indications: Fluid in the Lungs due to Chronic Heart Failure  Qty: 90 Tab, Refills: 0      albuterol (PROVENTIL HFA, VENTOLIN HFA, PROAIR HFA) 90 mcg/actuation inhaler Take 2 Puffs by inhalation every four (4) hours as needed for Wheezing. Qty: 1 Inhaler, Refills: 11      benzonatate (TESSALON) 200 mg capsule Take 1 Cap by mouth three (3) times daily as needed for Cough.   Qty: 90 Cap, Refills: 5      triamcinolone (NASACORT) 55 mcg nasal inhaler 2 Sprays by Both Nostrils route two (2) times a day. Qty: 1 Bottle, Refills: 3      OXYGEN-AIR DELIVERY SYSTEMS by Does Not Apply route. 3LPM PRN      multivitamin, stress formula (STRESS TAB) tablet Take 1 Tab by mouth daily.            STOP taking these medications       rivaroxaban (Xarelto) 20 mg tab tablet Comments:   Reason for Stopping:               Activity: Activity as tolerated  Diet: Regular Diet  Wound Care: None needed    Follow-up with pulm in 1-2 weeks, PCP in 1 week  ·     Time spent to discharge patient 31 minutes  Signed:  Scar Roach MD  4/1/2020  1:18 PM

## 2020-04-01 NOTE — PROGRESS NOTES
Shift assessment:  Pt alert, oriented X4. Respirations even, unlabored. On 2 L O2 NC. Lung sounds of left lung clear. Lung sounds of right lung diminished, crackles at the base. Dyspnea on exertion. HR irregular. Abdomen soft, non tender. Denies pain or other needs. Bed in low, locked position. Call light within reach.

## 2020-04-01 NOTE — PROGRESS NOTES
Carley Stinson. Admission Date: 3/28/2020             Daily Progress Note: 4/1/2020    The patient's chart is reviewed and the patient is discussed with the staff.      62 y.o.  male seen and evaluated at the request of Dr. Jamison Rodney.     He has a history of CHF, CML and adenocarcinoma of the lung (stage IV diagnosed based on malignant effusion several years ago) followed by Dr João Dent, and history of PE on xarelto. In addition, he recently underwent a CT guided biopsy of a peripheral lung lesion which returned as small cell lung cancer. It appears they are considering this a limited stage small cell and he was planning for chemo/radiation for this which is to begin in about a week.      He presented yesterday with increased SOB and orthopnea that gradually worsened over several days. He denies any associated symptoms including no increased LE edema, fever, chills, chest pain, falls, or bleeding. He is on xarelto and held this for his CT guided biopsy. CXR shows enlarging R pleural effusion.  He was started on IV lasix and pulmonary was consulted for thoracentesis  Subjective:   So far multiple thoracentesis include: 600 cc 3/31,  1600 cc 3/30,  1000 cc 3/29  Feels better today  Current Facility-Administered Medications   Medication Dose Route Frequency    imatinib (GLEEVEC) chemo tablet 400 mg (Patient Supplied)  400 mg Oral DAILY    albuterol-ipratropium (DUO-NEB) 2.5 MG-0.5 MG/3 ML  3 mL Nebulization Q4H PRN    benzonatate (TESSALON) capsule 200 mg  200 mg Oral TID PRN    sacubitriL-valsartan (ENTRESTO) 24-26 mg tablet 1 Tab  1 Tab Oral BID    folic acid (FOLVITE) tablet 1 mg  1 mg Oral DAILY    lidocaine-prilocaine (EMLA) 2.5-2.5 % cream   Topical PRN    metoprolol succinate (TOPROL-XL) XL tablet 12.5 mg  12.5 mg Oral DAILY    multivitamin, stress formula (STRESS TAB) tablet 1 Tab  1 Tab Oral DAILY    osimertinib (TAGRISSO) chemo tab 80 mg (Patient Supplied)  80 mg Oral DAILY    spironolactone (ALDACTONE) tablet 25 mg  25 mg Oral DAILY    fluticasone propionate (FLONASE) 50 mcg/actuation nasal spray 1 Spray  1 Spray Both Nostrils DAILY    sodium chloride (NS) flush 5-40 mL  5-40 mL IntraVENous Q8H    sodium chloride (NS) flush 5-40 mL  5-40 mL IntraVENous PRN    acetaminophen (TYLENOL) tablet 650 mg  650 mg Oral Q4H PRN    ondansetron (ZOFRAN) injection 4 mg  4 mg IntraVENous Q6H PRN    ALPRAZolam (XANAX) tablet 0.5 mg  0.5 mg Oral BID PRN    HYDROcodone-acetaminophen (NORCO) 5-325 mg per tablet 1 Tab  1 Tab Oral Q6H PRN       Review of Systems    Constitutional: negative for fever, chills, sweats  Cardiovascular: negative for chest pain, palpitations, syncope, edema  Gastrointestinal:  negative for dysphagia, reflux, vomiting, diarrhea, abdominal pain, or melena  Neurologic:  negative for focal weakness, numbness, headache    Objective:     Vitals:    04/01/20 0356 04/01/20 0705 04/01/20 0815 04/01/20 0837   BP: 100/67  95/58 95/58   Pulse: 76  78 78   Resp: 19  18    Temp: 97.6 °F (36.4 °C)  97.8 °F (36.6 °C)    SpO2: 100% 100% 96%    Weight:       Height:             Intake/Output Summary (Last 24 hours) at 4/1/2020 0841  Last data filed at 4/1/2020 0356  Gross per 24 hour   Intake 400 ml   Output --   Net 400 ml       Physical Exam:   Constitution:  the patient is well developed and in no acute distress  EENMT:  Sclera clear, pupils equal, oral mucosa moist  Respiratory:  Few crackles R>L but breath sounds present on R  Cardiovascular:  RRR without M,G,R  Gastrointestinal: soft and non-tender; with positive bowel sounds. Musculoskeletal: warm without cyanosis. There is no lower extremity edema. Skin:  no jaundice or rashes, no wounds   Neurologic: no gross neuro deficits     Psychiatric:  alert and oriented x 3    CXR:       LAB  No results for input(s): GLUCPOC in the last 72 hours.     No lab exists for component: [place]   Recent Labs     04/01/20  6883 03/31/20  1226 03/31/20  0530 03/30/20  0508   WBC 5.8  --  5.7 7.5   HGB 8.8* 9.0* 7.8* 8.4*   HCT 25.5* 26.3* 22.5* 23.7*     --  197 177     Recent Labs     03/31/20  0530 03/30/20  0508   * 128*   K 3.8 4.1   CL 94* 93*   CO2 28 28   GLU 90 98   BUN 16 17   CREA 1.25 1.06   CA 8.3 8.4     No results for input(s): PH, PCO2, PO2, HCO3, PHI, PCO2I, PO2I, HCO3I in the last 72 hours. No results for input(s): LCAD, LAC in the last 72 hours. Assessment:  (Medical Decision Making)     Hospital Problems  Date Reviewed: 3/2/2020          Codes Class Noted POA    Shortness of breath ICD-10-CM: R06.02  ICD-9-CM: 786.05  3/28/2020 Yes        * (Principal) Pleural effusion on right ICD-10-CM: J90  ICD-9-CM: 511.9  3/28/2020 Yes    Concerned about ongoing bleeding due to xarelto     SCLC (small cell lung carcinoma), right ([facility] [address] ICD-10-CM: C34.91  ICD-9-CM: 162.9  3/13/2020 Yes              Plan:  (Medical Decision Making)   1    cxr today- if fluid thoracentesis vs chest tube  --    More than 50% of the time documented was spent in face-to-face contact with the patient and in the care of the patient on the floor/unit where the patient is located.     Hoda Dill MD

## 2020-04-01 NOTE — PROGRESS NOTES
Called & spoke with pt's wife Juan Ford to give her the daily update. Opportunity for questions provided. Pt's wife stated she appreciated the call with information.

## 2020-04-01 NOTE — PROGRESS NOTES
[facility] April 1, 2020       RE: Patricia Luna. To Whom It May Concern,    This is to certify that Patricia Luna. may return to work on Monday April 6, 2020. Please feel free to contact my office if you have any questions or concerns. Thank you for your assistance in this matter.       Sincerely,  Dr. Christine Sheets MD

## 2020-04-01 NOTE — PROGRESS NOTES
Problem: Falls - Risk of  Goal: *Absence of Falls  Description: Document Moon Blood Fall Risk and appropriate interventions in the flowsheet.   Outcome: Progressing Towards Goal  Note: Fall Risk Interventions:            Medication Interventions: Teach patient to arise slowly                   Problem: Patient Education: Go to Patient Education Activity  Goal: Patient/Family Education  Outcome: Progressing Towards Goal     Problem: Pulmonary Embolism Care Plan (Adult)  Goal: *Labs within defined limits  Outcome: Progressing Towards Goal     Problem: Breathing Pattern - Ineffective  Goal: *Absence of hypoxia  Outcome: Progressing Towards Goal  Goal: *Use of effective breathing techniques  Outcome: Progressing Towards Goal  Goal: *PALLIATIVE CARE:  Alleviation of Dyspnea  Outcome: Progressing Towards Goal     Problem: Patient Education: Go to Patient Education Activity  Goal: Patient/Family Education  Outcome: Progressing Towards Goal

## 2020-04-02 LAB
FLOW CYTOMETRY, FBTC1: NORMAL
SPECIMEN SOURCE: NORMAL
TEST ORDERED:: NORMAL

## 2020-04-06 ENCOUNTER — APPOINTMENT (OUTPATIENT)
Dept: RADIATION ONCOLOGY | Age: 65
End: 2020-04-06

## 2020-04-06 ENCOUNTER — HOSPITAL ENCOUNTER (OUTPATIENT)
Dept: LAB | Age: 65
Discharge: HOME | End: 2020-04-06
Payer: COMMERCIAL

## 2020-04-06 ENCOUNTER — HOSPITAL ENCOUNTER (OUTPATIENT)
Dept: INFUSION THERAPY | Age: 65
Discharge: HOME | End: 2020-04-06
Payer: COMMERCIAL

## 2020-04-06 DIAGNOSIS — C34.91 SCLC (SMALL CELL LUNG CARCINOMA), RIGHT (HCC): ICD-10-CM

## 2020-04-06 DIAGNOSIS — C34.91 SCLC (SMALL CELL LUNG CARCINOMA), RIGHT (HCC): Primary | ICD-10-CM

## 2020-04-06 LAB
ALBUMIN SERPL-MCNC: 3.2 G/DL (ref 3.2–4.6)
ALBUMIN/GLOB SERPL: 1 {RATIO} (ref 1.2–3.5)
ALP SERPL-CCNC: 69 U/L (ref 50–136)
ALT SERPL-CCNC: 51 U/L (ref 12–65)
ANION GAP SERPL CALC-SCNC: 5 MMOL/L (ref 7–16)
AST SERPL-CCNC: 34 U/L (ref 15–37)
BASOPHILS # BLD: 0 K/UL (ref 0–0.2)
BASOPHILS NFR BLD: 0 % (ref 0–2)
BILIRUB SERPL-MCNC: 0.2 MG/DL (ref 0.2–1.1)
BUN SERPL-MCNC: 13 MG/DL (ref 8–23)
CALCIUM SERPL-MCNC: 8.7 MG/DL (ref 8.3–10.4)
CHLORIDE SERPL-SCNC: 102 MMOL/L (ref 98–107)
CO2 SERPL-SCNC: 26 MMOL/L (ref 21–32)
CREAT SERPL-MCNC: 1.52 MG/DL (ref 0.8–1.5)
DIFFERENTIAL METHOD BLD: ABNORMAL
EOSINOPHIL # BLD: 0.1 K/UL (ref 0–0.8)
EOSINOPHIL NFR BLD: 3 % (ref 0.5–7.8)
ERYTHROCYTE [DISTWIDTH] IN BLOOD BY AUTOMATED COUNT: 14.5 % (ref 11.9–14.6)
GLOBULIN SER CALC-MCNC: 3.3 G/DL (ref 2.3–3.5)
GLUCOSE SERPL-MCNC: 116 MG/DL (ref 65–100)
HCT VFR BLD AUTO: 28 % (ref 41.1–50.3)
HGB BLD-MCNC: 9.3 G/DL (ref 13.6–17.2)
IMM GRANULOCYTES # BLD AUTO: 0 K/UL (ref 0–0.5)
IMM GRANULOCYTES NFR BLD AUTO: 0 % (ref 0–5)
LYMPHOCYTES # BLD: 0.6 K/UL (ref 0.5–4.6)
LYMPHOCYTES NFR BLD: 12 % (ref 13–44)
MAGNESIUM SERPL-MCNC: 2.4 MG/DL (ref 1.8–2.4)
MCH RBC QN AUTO: 30.3 PG (ref 26.1–32.9)
MCHC RBC AUTO-ENTMCNC: 33.2 G/DL (ref 31.4–35)
MCV RBC AUTO: 91.2 FL (ref 79.6–97.8)
MONOCYTES # BLD: 0.3 K/UL (ref 0.1–1.3)
MONOCYTES NFR BLD: 6 % (ref 4–12)
NEUTS SEG # BLD: 3.8 K/UL (ref 1.7–8.2)
NEUTS SEG NFR BLD: 78 % (ref 43–78)
NRBC # BLD: 0 K/UL (ref 0–0.2)
PHOSPHATE SERPL-MCNC: 2.3 MG/DL (ref 2.3–3.7)
PLATELET # BLD AUTO: 226 K/UL (ref 150–450)
PMV BLD AUTO: 8.6 FL (ref 9.4–12.3)
POTASSIUM SERPL-SCNC: 4.3 MMOL/L (ref 3.5–5.1)
PROT SERPL-MCNC: 6.5 G/DL (ref 6.3–8.2)
RBC # BLD AUTO: 3.07 M/UL (ref 4.23–5.67)
SODIUM SERPL-SCNC: 133 MMOL/L (ref 136–145)
WBC # BLD AUTO: 4.9 K/UL (ref 4.3–11.1)

## 2020-04-06 PROCEDURE — 74011000258 HC RX REV CODE- 258: Performed by: INTERNAL MEDICINE

## 2020-04-06 PROCEDURE — 74011250636 HC RX REV CODE- 250/636: Performed by: INTERNAL MEDICINE

## 2020-04-06 PROCEDURE — 96415 CHEMO IV INFUSION ADDL HR: CPT

## 2020-04-06 PROCEDURE — 96413 CHEMO IV INFUSION 1 HR: CPT

## 2020-04-06 PROCEDURE — 83735 ASSAY OF MAGNESIUM: CPT

## 2020-04-06 PROCEDURE — 96367 TX/PROPH/DG ADDL SEQ IV INF: CPT

## 2020-04-06 PROCEDURE — 80053 COMPREHEN METABOLIC PANEL: CPT

## 2020-04-06 PROCEDURE — 96375 TX/PRO/DX INJ NEW DRUG ADDON: CPT

## 2020-04-06 PROCEDURE — 85025 COMPLETE CBC W/AUTO DIFF WBC: CPT

## 2020-04-06 PROCEDURE — 84100 ASSAY OF PHOSPHORUS: CPT

## 2020-04-06 PROCEDURE — 96417 CHEMO IV INFUS EACH ADDL SEQ: CPT

## 2020-04-06 RX ORDER — SODIUM CHLORIDE 9 MG/ML
25 INJECTION, SOLUTION INTRAVENOUS CONTINUOUS
Status: ACTIVE | OUTPATIENT
Start: 2020-04-06 | End: 2020-04-06

## 2020-04-06 RX ORDER — DIPHENHYDRAMINE HYDROCHLORIDE 50 MG/ML
50 INJECTION, SOLUTION INTRAMUSCULAR; INTRAVENOUS AS NEEDED
Status: DISPENSED | OUTPATIENT
Start: 2020-04-06 | End: 2020-04-06

## 2020-04-06 RX ORDER — HYDROCORTISONE SODIUM SUCCINATE 100 MG/2ML
100 INJECTION, POWDER, FOR SOLUTION INTRAMUSCULAR; INTRAVENOUS AS NEEDED
Status: DISPENSED | OUTPATIENT
Start: 2020-04-06 | End: 2020-04-06

## 2020-04-06 RX ORDER — LORAZEPAM 2 MG/ML
0.5 INJECTION INTRAMUSCULAR
Status: COMPLETED | OUTPATIENT
Start: 2020-04-06 | End: 2020-04-06

## 2020-04-06 RX ORDER — SODIUM CHLORIDE 0.9 % (FLUSH) 0.9 %
10 SYRINGE (ML) INJECTION AS NEEDED
Status: ACTIVE | OUTPATIENT
Start: 2020-04-06 | End: 2020-04-06

## 2020-04-06 RX ORDER — ALBUTEROL SULFATE 0.83 MG/ML
2.5 SOLUTION RESPIRATORY (INHALATION) AS NEEDED
Status: DISPENSED | OUTPATIENT
Start: 2020-04-06 | End: 2020-04-06

## 2020-04-06 RX ORDER — ONDANSETRON 2 MG/ML
8 INJECTION INTRAMUSCULAR; INTRAVENOUS ONCE
Status: COMPLETED | OUTPATIENT
Start: 2020-04-06 | End: 2020-04-06

## 2020-04-06 RX ADMIN — LORAZEPAM 0.5 MG: 2 INJECTION INTRAMUSCULAR; INTRAVENOUS at 11:40

## 2020-04-06 RX ADMIN — ONDANSETRON 8 MG: 2 INJECTION, SOLUTION INTRAMUSCULAR; INTRAVENOUS at 11:05

## 2020-04-06 RX ADMIN — ETOPOSIDE 249.6 MG: 20 INJECTION INTRAVENOUS at 13:45

## 2020-04-06 RX ADMIN — POTASSIUM CHLORIDE: 2 INJECTION, SOLUTION, CONCENTRATE INTRAVENOUS at 12:30

## 2020-04-06 RX ADMIN — Medication 10 ML: at 17:10

## 2020-04-06 RX ADMIN — SODIUM CHLORIDE 150 MG: 900 INJECTION, SOLUTION INTRAVENOUS at 11:55

## 2020-04-06 RX ADMIN — Medication 10 ML: at 10:40

## 2020-04-06 RX ADMIN — CISPLATIN 124.8 MG: 1 INJECTION, SOLUTION INTRAVENOUS at 15:00

## 2020-04-06 RX ADMIN — SODIUM CHLORIDE 25 ML/HR: 900 INJECTION, SOLUTION INTRAVENOUS at 10:40

## 2020-04-06 RX ADMIN — DEXAMETHASONE SODIUM PHOSPHATE 12 MG: 4 INJECTION, SOLUTION INTRAMUSCULAR; INTRAVENOUS at 11:05

## 2020-04-06 NOTE — PROGRESS NOTES
Arrived to the [facility]. Assessment completed and labs reviewed. Pre meds, pre/post hydration, etoposide, and cisplatin infusion completed. Patient tolerated well. Any issues or concerns during appointment: none. Patient aware of next infusion appointment on 04/07/2020 at 1400. Discharged ambulatory.

## 2020-04-06 NOTE — PROGRESS NOTES
Pt questioned whether he should continue taking oral chemo agents, Gleevec and Jacklynn Dopp. Message was sent to Dr. Renetta Mckeon. Pt is instructed to stop Tagrisso but to continue taking Gleevec. Pt verbalized understanding of instructions.

## 2020-04-06 NOTE — PROGRESS NOTES
Charla Guardado. is a 62 y.o.male with Small Cell Lung Cancer who presents for chemotherapy education for the following medications:  Cisplatin and Etopoide. Schedule, frequency, and duration of chemotherapy was discussed with patient. The patient was given handouts published by the [facility] entitled, \"Chemotherapy and You\" and \"Eating Hints Before, During, and After Cancer Treatment\" for their reference. Medication specific information was printed from [employer] and distributed to the patient. Self care guidelines were distributed and discussed with the patient and included the following. ... 1) Potential long term and short term side effects of therapy including fertility risks for appropriate patients    2)  Symptoms and side effects that require the patient to contact the [address] [place] [address] or require immediate attention    3)  Symptoms or events that require immediate discontinuation of oral or self administered treatments    4)  Procedures for handling medications at home, including storage, safe handling, and management of unused medications    5)  Procedures for handling bodily fluids and waste in the home. 6)  The [address] [place] [address]'s contact information with availability and instructions on who and when to call    7)  The [address] [place] [address] appointment policy and expectations for rescheduling or canceling. Time was then allowed to accept patient questions. Patient and/or caregiver verbalized understanding of their treatment plan.

## 2020-04-07 ENCOUNTER — APPOINTMENT (OUTPATIENT)
Dept: RADIATION ONCOLOGY | Age: 65
End: 2020-04-07

## 2020-04-07 ENCOUNTER — HOSPITAL ENCOUNTER (OUTPATIENT)
Dept: INFUSION THERAPY | Age: 65
Discharge: HOME | End: 2020-04-07
Payer: COMMERCIAL

## 2020-04-07 VITALS
BODY MASS INDEX: 25.34 KG/M2 | TEMPERATURE: 97.7 F | HEART RATE: 92 BPM | DIASTOLIC BLOOD PRESSURE: 61 MMHG | SYSTOLIC BLOOD PRESSURE: 95 MMHG | RESPIRATION RATE: 18 BRPM | WEIGHT: 191.2 LBS

## 2020-04-07 DIAGNOSIS — C34.91 SCLC (SMALL CELL LUNG CARCINOMA), RIGHT (HCC): Primary | ICD-10-CM

## 2020-04-07 PROCEDURE — 74011250636 HC RX REV CODE- 250/636: Performed by: INTERNAL MEDICINE

## 2020-04-07 PROCEDURE — 96413 CHEMO IV INFUSION 1 HR: CPT

## 2020-04-07 PROCEDURE — 96375 TX/PRO/DX INJ NEW DRUG ADDON: CPT

## 2020-04-07 RX ORDER — SODIUM CHLORIDE 9 MG/ML
25 INJECTION, SOLUTION INTRAVENOUS CONTINUOUS
Status: DISCONTINUED | OUTPATIENT
Start: 2020-04-07 | End: 2020-04-08 | Stop reason: HOSPADM

## 2020-04-07 RX ORDER — DEXAMETHASONE SODIUM PHOSPHATE 4 MG/ML
8 INJECTION, SOLUTION INTRA-ARTICULAR; INTRALESIONAL; INTRAMUSCULAR; INTRAVENOUS; SOFT TISSUE ONCE
Status: COMPLETED | OUTPATIENT
Start: 2020-04-07 | End: 2020-04-07

## 2020-04-07 RX ORDER — ONDANSETRON 2 MG/ML
8 INJECTION INTRAMUSCULAR; INTRAVENOUS AS NEEDED
Status: DISCONTINUED | OUTPATIENT
Start: 2020-04-07 | End: 2020-04-08 | Stop reason: HOSPADM

## 2020-04-07 RX ORDER — SODIUM CHLORIDE 0.9 % (FLUSH) 0.9 %
10 SYRINGE (ML) INJECTION AS NEEDED
Status: DISCONTINUED | OUTPATIENT
Start: 2020-04-07 | End: 2020-04-08 | Stop reason: HOSPADM

## 2020-04-07 RX ADMIN — ETOPOSIDE 253.2 MG: 20 INJECTION, SOLUTION, CONCENTRATE INTRAVENOUS at 15:00

## 2020-04-07 RX ADMIN — SODIUM CHLORIDE 100 ML/HR: 900 INJECTION, SOLUTION INTRAVENOUS at 14:10

## 2020-04-07 RX ADMIN — DEXAMETHASONE SODIUM PHOSPHATE 8 MG: 4 INJECTION, SOLUTION INTRAMUSCULAR; INTRAVENOUS at 14:41

## 2020-04-07 RX ADMIN — ONDANSETRON 8 MG: 2 INJECTION, SOLUTION INTRAMUSCULAR; INTRAVENOUS at 14:43

## 2020-04-07 RX ADMIN — Medication 10 ML: at 16:25

## 2020-04-07 RX ADMIN — Medication 10 ML: at 14:10

## 2020-04-07 NOTE — PROGRESS NOTES
Pt arrived ambulatory today at 1400, to receive IV chemotherapy. Pt tolerated without difficulty. Pt inquired about xarelto. Jessica Rios NP aware, drug has been on hold for almost a week. Question defered to Dr. Celine Stroud RN. Patient discharged via ambulatory accompanied by   self. Instructed to notify physician of any problems, questions or concerns. Allowed opportunity for patient/family to ask questions. Verbalized understanding. Next appointment is April 8 at 1400 with Brenda Mendes.

## 2020-04-08 ENCOUNTER — APPOINTMENT (OUTPATIENT)
Dept: RADIATION ONCOLOGY | Age: 65
End: 2020-04-08

## 2020-04-08 ENCOUNTER — HOSPITAL ENCOUNTER (OUTPATIENT)
Dept: INFUSION THERAPY | Age: 65
Discharge: HOME | End: 2020-04-08
Payer: COMMERCIAL

## 2020-04-08 VITALS
HEART RATE: 72 BPM | DIASTOLIC BLOOD PRESSURE: 61 MMHG | OXYGEN SATURATION: 97 % | RESPIRATION RATE: 18 BRPM | SYSTOLIC BLOOD PRESSURE: 88 MMHG | WEIGHT: 192.8 LBS | TEMPERATURE: 97.8 F | BODY MASS INDEX: 25.55 KG/M2

## 2020-04-08 DIAGNOSIS — C34.91 SCLC (SMALL CELL LUNG CARCINOMA), RIGHT (HCC): Primary | ICD-10-CM

## 2020-04-08 PROCEDURE — 96413 CHEMO IV INFUSION 1 HR: CPT

## 2020-04-08 PROCEDURE — 74011250636 HC RX REV CODE- 250/636: Performed by: INTERNAL MEDICINE

## 2020-04-08 PROCEDURE — 96375 TX/PRO/DX INJ NEW DRUG ADDON: CPT

## 2020-04-08 RX ORDER — ONDANSETRON 2 MG/ML
8 INJECTION INTRAMUSCULAR; INTRAVENOUS ONCE
Status: COMPLETED | OUTPATIENT
Start: 2020-04-08 | End: 2020-04-08

## 2020-04-08 RX ORDER — SODIUM CHLORIDE 0.9 % (FLUSH) 0.9 %
10 SYRINGE (ML) INJECTION AS NEEDED
Status: DISCONTINUED | OUTPATIENT
Start: 2020-04-08 | End: 2020-04-09 | Stop reason: HOSPADM

## 2020-04-08 RX ORDER — SODIUM CHLORIDE 9 MG/ML
25 INJECTION, SOLUTION INTRAVENOUS CONTINUOUS
Status: DISCONTINUED | OUTPATIENT
Start: 2020-04-08 | End: 2020-04-09 | Stop reason: HOSPADM

## 2020-04-08 RX ORDER — DEXAMETHASONE SODIUM PHOSPHATE 4 MG/ML
8 INJECTION, SOLUTION INTRA-ARTICULAR; INTRALESIONAL; INTRAMUSCULAR; INTRAVENOUS; SOFT TISSUE ONCE
Status: COMPLETED | OUTPATIENT
Start: 2020-04-08 | End: 2020-04-08

## 2020-04-08 RX ADMIN — Medication 10 ML: at 14:20

## 2020-04-08 RX ADMIN — ETOPOSIDE 254.4 MG: 20 INJECTION, SOLUTION, CONCENTRATE INTRAVENOUS at 14:49

## 2020-04-08 RX ADMIN — DEXAMETHASONE SODIUM PHOSPHATE 8 MG: 4 INJECTION, SOLUTION INTRAMUSCULAR; INTRAVENOUS at 14:29

## 2020-04-08 RX ADMIN — SODIUM CHLORIDE 25 ML/HR: 900 INJECTION, SOLUTION INTRAVENOUS at 14:22

## 2020-04-08 RX ADMIN — ONDANSETRON 8 MG: 2 INJECTION, SOLUTION INTRAMUSCULAR; INTRAVENOUS at 14:27

## 2020-04-08 RX ADMIN — Medication 10 ML: at 16:04

## 2020-04-08 NOTE — PROGRESS NOTES
Arrived ambulatory to [facility]. Port accessed with good blood return. NS infusing. Premeds given as ordered. Etoposide infusing. Per Dondra Schirmer pt to continue to NEXAGE. Pt verbalized understanding. Port flushed and de accessed Pt aware of next appt on 4/27/2020 at 1030. Discharged ambulatory.

## 2020-04-09 ENCOUNTER — APPOINTMENT (OUTPATIENT)
Dept: RADIATION ONCOLOGY | Age: 65
End: 2020-04-09

## 2020-04-10 ENCOUNTER — APPOINTMENT (OUTPATIENT)
Dept: RADIATION ONCOLOGY | Age: 65
End: 2020-04-10

## 2020-04-13 ENCOUNTER — APPOINTMENT (OUTPATIENT)
Dept: RADIATION ONCOLOGY | Age: 65
End: 2020-04-13

## 2020-04-14 ENCOUNTER — APPOINTMENT (OUTPATIENT)
Dept: RADIATION ONCOLOGY | Age: 65
End: 2020-04-14

## 2020-04-15 ENCOUNTER — APPOINTMENT (OUTPATIENT)
Dept: RADIATION ONCOLOGY | Age: 65
End: 2020-04-15

## 2020-04-16 ENCOUNTER — APPOINTMENT (OUTPATIENT)
Dept: RADIATION ONCOLOGY | Age: 65
End: 2020-04-16

## 2020-04-17 ENCOUNTER — APPOINTMENT (OUTPATIENT)
Dept: RADIATION ONCOLOGY | Age: 65
End: 2020-04-17

## 2020-04-20 ENCOUNTER — APPOINTMENT (OUTPATIENT)
Dept: RADIATION ONCOLOGY | Age: 65
End: 2020-04-20

## 2020-04-21 ENCOUNTER — APPOINTMENT (OUTPATIENT)
Dept: RADIATION ONCOLOGY | Age: 65
End: 2020-04-21

## 2020-04-22 ENCOUNTER — APPOINTMENT (OUTPATIENT)
Dept: RADIATION ONCOLOGY | Age: 65
End: 2020-04-22

## 2020-04-23 ENCOUNTER — APPOINTMENT (OUTPATIENT)
Dept: RADIATION ONCOLOGY | Age: 65
End: 2020-04-23

## 2020-04-24 ENCOUNTER — APPOINTMENT (OUTPATIENT)
Dept: RADIATION ONCOLOGY | Age: 65
End: 2020-04-24

## 2020-04-27 ENCOUNTER — APPOINTMENT (OUTPATIENT)
Dept: RADIATION ONCOLOGY | Age: 65
End: 2020-04-27

## 2020-04-27 ENCOUNTER — HOSPITAL ENCOUNTER (OUTPATIENT)
Dept: INFUSION THERAPY | Age: 65
Discharge: HOME | End: 2020-04-27
Payer: COMMERCIAL

## 2020-04-27 VITALS — OXYGEN SATURATION: 97 %

## 2020-04-27 DIAGNOSIS — C34.91 SCLC (SMALL CELL LUNG CARCINOMA), RIGHT (HCC): Primary | ICD-10-CM

## 2020-04-27 LAB
ALBUMIN SERPL-MCNC: 3.3 G/DL (ref 3.2–4.6)
ALBUMIN/GLOB SERPL: 1 {RATIO} (ref 1.2–3.5)
ALP SERPL-CCNC: 81 U/L (ref 50–136)
ALT SERPL-CCNC: 18 U/L (ref 12–65)
ANION GAP SERPL CALC-SCNC: 4 MMOL/L (ref 7–16)
AST SERPL-CCNC: 15 U/L (ref 15–37)
BASOPHILS # BLD: 0 K/UL (ref 0–0.2)
BASOPHILS NFR BLD: 1 % (ref 0–2)
BILIRUB SERPL-MCNC: 0.3 MG/DL (ref 0.2–1.1)
BUN SERPL-MCNC: 17 MG/DL (ref 8–23)
CALCIUM SERPL-MCNC: 8.4 MG/DL (ref 8.3–10.4)
CEA SERPL-MCNC: 30.6 NG/ML (ref 0–3)
CHLORIDE SERPL-SCNC: 108 MMOL/L (ref 98–107)
CO2 SERPL-SCNC: 27 MMOL/L (ref 21–32)
CREAT SERPL-MCNC: 1.62 MG/DL (ref 0.8–1.5)
DIFFERENTIAL METHOD BLD: ABNORMAL
EOSINOPHIL # BLD: 0 K/UL (ref 0–0.8)
EOSINOPHIL NFR BLD: 0 % (ref 0.5–7.8)
ERYTHROCYTE [DISTWIDTH] IN BLOOD BY AUTOMATED COUNT: 15.8 % (ref 11.9–14.6)
GLOBULIN SER CALC-MCNC: 3.4 G/DL (ref 2.3–3.5)
GLUCOSE SERPL-MCNC: 128 MG/DL (ref 65–100)
HCT VFR BLD AUTO: 30.5 % (ref 41.1–50.3)
HGB BLD-MCNC: 10 G/DL (ref 13.6–17.2)
IMM GRANULOCYTES # BLD AUTO: 0.1 K/UL (ref 0–0.5)
IMM GRANULOCYTES NFR BLD AUTO: 3 % (ref 0–5)
LYMPHOCYTES # BLD: 0.8 K/UL (ref 0.5–4.6)
LYMPHOCYTES NFR BLD: 23 % (ref 13–44)
MAGNESIUM SERPL-MCNC: 2.1 MG/DL (ref 1.8–2.4)
MCH RBC QN AUTO: 30.1 PG (ref 26.1–32.9)
MCHC RBC AUTO-ENTMCNC: 32.8 G/DL (ref 31.4–35)
MCV RBC AUTO: 91.9 FL (ref 79.6–97.8)
MONOCYTES # BLD: 0.4 K/UL (ref 0.1–1.3)
MONOCYTES NFR BLD: 12 % (ref 4–12)
NEUTS SEG # BLD: 2.2 K/UL (ref 1.7–8.2)
NEUTS SEG NFR BLD: 61 % (ref 43–78)
NRBC # BLD: 0 K/UL (ref 0–0.2)
PLATELET # BLD AUTO: 444 K/UL (ref 150–450)
PMV BLD AUTO: 8.5 FL (ref 9.4–12.3)
POTASSIUM SERPL-SCNC: 4 MMOL/L (ref 3.5–5.1)
PROT SERPL-MCNC: 6.7 G/DL (ref 6.3–8.2)
RBC # BLD AUTO: 3.32 M/UL (ref 4.23–5.67)
SODIUM SERPL-SCNC: 139 MMOL/L (ref 136–145)
WBC # BLD AUTO: 3.5 K/UL (ref 4.3–11.1)

## 2020-04-27 PROCEDURE — 96417 CHEMO IV INFUS EACH ADDL SEQ: CPT

## 2020-04-27 PROCEDURE — 80053 COMPREHEN METABOLIC PANEL: CPT

## 2020-04-27 PROCEDURE — 74011000258 HC RX REV CODE- 258: Performed by: INTERNAL MEDICINE

## 2020-04-27 PROCEDURE — 83735 ASSAY OF MAGNESIUM: CPT

## 2020-04-27 PROCEDURE — 74011250636 HC RX REV CODE- 250/636: Performed by: INTERNAL MEDICINE

## 2020-04-27 PROCEDURE — 85025 COMPLETE CBC W/AUTO DIFF WBC: CPT

## 2020-04-27 PROCEDURE — 96375 TX/PRO/DX INJ NEW DRUG ADDON: CPT

## 2020-04-27 PROCEDURE — 82378 CARCINOEMBRYONIC ANTIGEN: CPT

## 2020-04-27 PROCEDURE — 96415 CHEMO IV INFUSION ADDL HR: CPT

## 2020-04-27 PROCEDURE — 96367 TX/PROPH/DG ADDL SEQ IV INF: CPT

## 2020-04-27 PROCEDURE — 96413 CHEMO IV INFUSION 1 HR: CPT

## 2020-04-27 RX ORDER — HYDROCORTISONE SODIUM SUCCINATE 100 MG/2ML
100 INJECTION, POWDER, FOR SOLUTION INTRAMUSCULAR; INTRAVENOUS AS NEEDED
Status: DISPENSED | OUTPATIENT
Start: 2020-04-27 | End: 2020-04-27

## 2020-04-27 RX ORDER — SODIUM CHLORIDE 0.9 % (FLUSH) 0.9 %
10 SYRINGE (ML) INJECTION AS NEEDED
Status: ACTIVE | OUTPATIENT
Start: 2020-04-27 | End: 2020-04-27

## 2020-04-27 RX ORDER — DIPHENHYDRAMINE HYDROCHLORIDE 50 MG/ML
25 INJECTION, SOLUTION INTRAMUSCULAR; INTRAVENOUS AS NEEDED
Status: DISPENSED | OUTPATIENT
Start: 2020-04-27 | End: 2020-04-27

## 2020-04-27 RX ORDER — SODIUM CHLORIDE 9 MG/ML
25 INJECTION, SOLUTION INTRAVENOUS CONTINUOUS
Status: ACTIVE | OUTPATIENT
Start: 2020-04-27 | End: 2020-04-27

## 2020-04-27 RX ORDER — ONDANSETRON 2 MG/ML
8 INJECTION INTRAMUSCULAR; INTRAVENOUS ONCE
Status: COMPLETED | OUTPATIENT
Start: 2020-04-27 | End: 2020-04-27

## 2020-04-27 RX ADMIN — Medication 10 ML: at 18:40

## 2020-04-27 RX ADMIN — Medication 10 ML: at 10:45

## 2020-04-27 RX ADMIN — SODIUM CHLORIDE 25 ML/HR: 900 INJECTION, SOLUTION INTRAVENOUS at 10:50

## 2020-04-27 RX ADMIN — ONDANSETRON 8 MG: 2 INJECTION INTRAMUSCULAR; INTRAVENOUS at 14:04

## 2020-04-27 RX ADMIN — ETOPOSIDE 250.8 MG: 20 INJECTION, SOLUTION, CONCENTRATE INTRAVENOUS at 15:00

## 2020-04-27 RX ADMIN — DEXAMETHASONE SODIUM PHOSPHATE 12 MG: 4 INJECTION, SOLUTION INTRAMUSCULAR; INTRAVENOUS at 14:07

## 2020-04-27 RX ADMIN — CISPLATIN 125.4 MG: 1 INJECTION, SOLUTION INTRAVENOUS at 16:24

## 2020-04-27 RX ADMIN — SODIUM CHLORIDE 150 MG: 900 INJECTION, SOLUTION INTRAVENOUS at 14:29

## 2020-04-27 RX ADMIN — ATEZOLIZUMAB 1200 MG: 1200 INJECTION, SOLUTION INTRAVENOUS at 11:50

## 2020-04-27 RX ADMIN — POTASSIUM CHLORIDE: 2 INJECTION, SOLUTION, CONCENTRATE INTRAVENOUS at 13:00

## 2020-04-27 NOTE — PROGRESS NOTES
Tolerated chemotherapy today without difficulty. Patient aware of how to use antiemetics and to alternate Compazine and Zofran regularly. Patient to push oral hydration and to empty bladder frequently. Patient discharged via ambulation accompanied by self. Instructed to notify physician of any problems, questions or concerns after discharge. Next appointment is 04/28/2020 at 21 [phone] with Infusion for Day # 2 Etoposide.

## 2020-04-28 ENCOUNTER — APPOINTMENT (OUTPATIENT)
Dept: RADIATION ONCOLOGY | Age: 65
End: 2020-04-28

## 2020-04-28 ENCOUNTER — HOSPITAL ENCOUNTER (OUTPATIENT)
Dept: INFUSION THERAPY | Age: 65
Discharge: HOME | End: 2020-04-28
Payer: COMMERCIAL

## 2020-04-28 VITALS
OXYGEN SATURATION: 96 % | WEIGHT: 195.6 LBS | RESPIRATION RATE: 18 BRPM | HEART RATE: 93 BPM | SYSTOLIC BLOOD PRESSURE: 110 MMHG | TEMPERATURE: 97.5 F | BODY MASS INDEX: 25.92 KG/M2 | DIASTOLIC BLOOD PRESSURE: 73 MMHG

## 2020-04-28 DIAGNOSIS — C34.91 SCLC (SMALL CELL LUNG CARCINOMA), RIGHT (HCC): Primary | ICD-10-CM

## 2020-04-28 LAB
FUNGUS CULTURE, RFCO2T: NEGATIVE
FUNGUS SMEAR, RFCO1T: NORMAL
FUNGUS SPEC CULT: NORMAL
FUNGUS STAIN, 188244: NORMAL
REFLEX TO ID, RFCO3T: NORMAL
SPECIMEN SOURCE: NORMAL

## 2020-04-28 PROCEDURE — 96375 TX/PRO/DX INJ NEW DRUG ADDON: CPT

## 2020-04-28 PROCEDURE — 74011000250 HC RX REV CODE- 250: Performed by: INTERNAL MEDICINE

## 2020-04-28 PROCEDURE — 96413 CHEMO IV INFUSION 1 HR: CPT

## 2020-04-28 PROCEDURE — 74011250636 HC RX REV CODE- 250/636: Performed by: INTERNAL MEDICINE

## 2020-04-28 RX ORDER — ACETAMINOPHEN 325 MG/1
650 TABLET ORAL AS NEEDED
Status: ACTIVE | OUTPATIENT
Start: 2020-04-28 | End: 2020-04-28

## 2020-04-28 RX ORDER — SODIUM CHLORIDE 9 MG/ML
25 INJECTION, SOLUTION INTRAVENOUS CONTINUOUS
Status: ACTIVE | OUTPATIENT
Start: 2020-04-28 | End: 2020-04-28

## 2020-04-28 RX ORDER — HYDROCORTISONE SODIUM SUCCINATE 100 MG/2ML
100 INJECTION, POWDER, FOR SOLUTION INTRAMUSCULAR; INTRAVENOUS AS NEEDED
Status: ACTIVE | OUTPATIENT
Start: 2020-04-28 | End: 2020-04-28

## 2020-04-28 RX ORDER — SODIUM CHLORIDE 0.9 % (FLUSH) 0.9 %
10 SYRINGE (ML) INJECTION AS NEEDED
Status: ACTIVE | OUTPATIENT
Start: 2020-04-28 | End: 2020-04-28

## 2020-04-28 RX ORDER — LORAZEPAM 2 MG/ML
0.5 INJECTION INTRAMUSCULAR
Status: COMPLETED | OUTPATIENT
Start: 2020-04-28 | End: 2020-04-28

## 2020-04-28 RX ORDER — DIPHENHYDRAMINE HYDROCHLORIDE 50 MG/ML
50 INJECTION, SOLUTION INTRAMUSCULAR; INTRAVENOUS AS NEEDED
Status: ACTIVE | OUTPATIENT
Start: 2020-04-28 | End: 2020-04-28

## 2020-04-28 RX ORDER — DIPHENHYDRAMINE HYDROCHLORIDE 50 MG/ML
25 INJECTION, SOLUTION INTRAMUSCULAR; INTRAVENOUS AS NEEDED
Status: ACTIVE | OUTPATIENT
Start: 2020-04-28 | End: 2020-04-28

## 2020-04-28 RX ORDER — DEXAMETHASONE SODIUM PHOSPHATE 4 MG/ML
8 INJECTION, SOLUTION INTRA-ARTICULAR; INTRALESIONAL; INTRAMUSCULAR; INTRAVENOUS; SOFT TISSUE ONCE
Status: COMPLETED | OUTPATIENT
Start: 2020-04-28 | End: 2020-04-28

## 2020-04-28 RX ORDER — ONDANSETRON 2 MG/ML
8 INJECTION INTRAMUSCULAR; INTRAVENOUS AS NEEDED
Status: ACTIVE | OUTPATIENT
Start: 2020-04-28 | End: 2020-04-28

## 2020-04-28 RX ADMIN — Medication 10 ML: at 10:50

## 2020-04-28 RX ADMIN — DEXAMETHASONE SODIUM PHOSPHATE 8 MG: 4 INJECTION, SOLUTION INTRAMUSCULAR; INTRAVENOUS at 11:15

## 2020-04-28 RX ADMIN — PROCHLORPERAZINE EDISYLATE 10 MG: 5 INJECTION INTRAMUSCULAR; INTRAVENOUS at 11:12

## 2020-04-28 RX ADMIN — Medication 10 ML: at 12:42

## 2020-04-28 RX ADMIN — SODIUM CHLORIDE 25 ML/HR: 900 INJECTION, SOLUTION INTRAVENOUS at 10:48

## 2020-04-28 RX ADMIN — ETOPOSIDE 255.6 MG: 20 INJECTION, SOLUTION, CONCENTRATE INTRAVENOUS at 11:30

## 2020-04-28 RX ADMIN — LORAZEPAM 0.5 MG: 2 INJECTION INTRAMUSCULAR; INTRAVENOUS at 11:08

## 2020-04-28 NOTE — PROGRESS NOTES
Arrived to the [facility]. VP16 completed. Patient tolerated well with premeds including Compazine and Ativan. Any issues or concerns during appointment: none  Patient aware of next infusion appointment on 4/29/2020. Discharged ambulatory to home.

## 2020-04-29 ENCOUNTER — HOSPITAL ENCOUNTER (OUTPATIENT)
Dept: INFUSION THERAPY | Age: 65
Discharge: HOME | End: 2020-04-29
Payer: COMMERCIAL

## 2020-04-29 ENCOUNTER — APPOINTMENT (OUTPATIENT)
Dept: RADIATION ONCOLOGY | Age: 65
End: 2020-04-29

## 2020-04-29 VITALS
HEART RATE: 92 BPM | OXYGEN SATURATION: 99 % | RESPIRATION RATE: 18 BRPM | TEMPERATURE: 98.3 F | SYSTOLIC BLOOD PRESSURE: 118 MMHG | DIASTOLIC BLOOD PRESSURE: 62 MMHG

## 2020-04-29 DIAGNOSIS — C34.91 SCLC (SMALL CELL LUNG CARCINOMA), RIGHT (HCC): Primary | ICD-10-CM

## 2020-04-29 PROCEDURE — 96375 TX/PRO/DX INJ NEW DRUG ADDON: CPT

## 2020-04-29 PROCEDURE — 96413 CHEMO IV INFUSION 1 HR: CPT

## 2020-04-29 PROCEDURE — 74011250636 HC RX REV CODE- 250/636: Performed by: INTERNAL MEDICINE

## 2020-04-29 PROCEDURE — 74011000250 HC RX REV CODE- 250: Performed by: INTERNAL MEDICINE

## 2020-04-29 RX ORDER — LORAZEPAM 2 MG/ML
0.5 INJECTION INTRAMUSCULAR
Status: COMPLETED | OUTPATIENT
Start: 2020-04-29 | End: 2020-04-29

## 2020-04-29 RX ORDER — SODIUM CHLORIDE 0.9 % (FLUSH) 0.9 %
10 SYRINGE (ML) INJECTION AS NEEDED
Status: DISCONTINUED | OUTPATIENT
Start: 2020-04-29 | End: 2020-04-30 | Stop reason: HOSPADM

## 2020-04-29 RX ORDER — SODIUM CHLORIDE 0.9 % (FLUSH) 0.9 %
10-30 SYRINGE (ML) INJECTION AS NEEDED
Status: DISCONTINUED | OUTPATIENT
Start: 2020-04-29 | End: 2020-05-03 | Stop reason: HOSPADM

## 2020-04-29 RX ORDER — DEXAMETHASONE SODIUM PHOSPHATE 4 MG/ML
8 INJECTION, SOLUTION INTRA-ARTICULAR; INTRALESIONAL; INTRAMUSCULAR; INTRAVENOUS; SOFT TISSUE ONCE
Status: COMPLETED | OUTPATIENT
Start: 2020-04-29 | End: 2020-04-29

## 2020-04-29 RX ORDER — SODIUM CHLORIDE 9 MG/ML
25 INJECTION, SOLUTION INTRAVENOUS CONTINUOUS
Status: DISCONTINUED | OUTPATIENT
Start: 2020-04-29 | End: 2020-04-30 | Stop reason: HOSPADM

## 2020-04-29 RX ADMIN — LORAZEPAM 0.5 MG: 2 INJECTION, SOLUTION INTRAMUSCULAR; INTRAVENOUS at 14:05

## 2020-04-29 RX ADMIN — DEXAMETHASONE SODIUM PHOSPHATE 8 MG: 4 INJECTION, SOLUTION INTRAMUSCULAR; INTRAVENOUS at 13:58

## 2020-04-29 RX ADMIN — PROCHLORPERAZINE EDISYLATE 10 MG: 5 INJECTION INTRAMUSCULAR; INTRAVENOUS at 14:02

## 2020-04-29 RX ADMIN — SODIUM CHLORIDE 25 ML/HR: 900 INJECTION, SOLUTION INTRAVENOUS at 13:46

## 2020-04-29 RX ADMIN — Medication 10 ML: at 15:23

## 2020-04-29 RX ADMIN — Medication 10 ML: at 13:30

## 2020-04-29 RX ADMIN — ETOPOSIDE 255.6 MG: 20 INJECTION, SOLUTION, CONCENTRATE INTRAVENOUS at 14:15

## 2020-04-30 ENCOUNTER — APPOINTMENT (OUTPATIENT)
Dept: RADIATION ONCOLOGY | Age: 65
End: 2020-04-30

## 2020-05-01 ENCOUNTER — APPOINTMENT (OUTPATIENT)
Dept: RADIATION ONCOLOGY | Age: 65
End: 2020-05-01

## 2020-05-04 ENCOUNTER — APPOINTMENT (OUTPATIENT)
Dept: RADIATION ONCOLOGY | Age: 65
End: 2020-05-04

## 2020-05-05 ENCOUNTER — APPOINTMENT (OUTPATIENT)
Dept: RADIATION ONCOLOGY | Age: 65
End: 2020-05-05

## 2020-05-06 ENCOUNTER — APPOINTMENT (OUTPATIENT)
Dept: RADIATION ONCOLOGY | Age: 65
End: 2020-05-06

## 2020-05-07 ENCOUNTER — APPOINTMENT (OUTPATIENT)
Dept: RADIATION ONCOLOGY | Age: 65
End: 2020-05-07

## 2020-05-08 ENCOUNTER — APPOINTMENT (OUTPATIENT)
Dept: RADIATION ONCOLOGY | Age: 65
End: 2020-05-08

## 2020-05-11 ENCOUNTER — APPOINTMENT (OUTPATIENT)
Dept: RADIATION ONCOLOGY | Age: 65
End: 2020-05-11

## 2020-05-12 ENCOUNTER — APPOINTMENT (OUTPATIENT)
Dept: RADIATION ONCOLOGY | Age: 65
End: 2020-05-12

## 2020-05-13 ENCOUNTER — APPOINTMENT (OUTPATIENT)
Dept: RADIATION ONCOLOGY | Age: 65
End: 2020-05-13

## 2020-05-14 ENCOUNTER — APPOINTMENT (OUTPATIENT)
Dept: RADIATION ONCOLOGY | Age: 65
End: 2020-05-14

## 2020-05-15 ENCOUNTER — APPOINTMENT (OUTPATIENT)
Dept: RADIATION ONCOLOGY | Age: 65
End: 2020-05-15

## 2020-05-18 ENCOUNTER — HOSPITAL ENCOUNTER (OUTPATIENT)
Dept: INFUSION THERAPY | Age: 65
Discharge: HOME | End: 2020-05-18
Payer: COMMERCIAL

## 2020-05-18 VITALS — BODY MASS INDEX: 25.23 KG/M2 | HEIGHT: 72 IN

## 2020-05-18 DIAGNOSIS — C34.91 SCLC (SMALL CELL LUNG CARCINOMA), RIGHT (HCC): Primary | ICD-10-CM

## 2020-05-18 LAB
ALBUMIN SERPL-MCNC: 3.2 G/DL (ref 3.2–4.6)
ALBUMIN/GLOB SERPL: 0.9 {RATIO} (ref 1.2–3.5)
ALP SERPL-CCNC: 69 U/L (ref 50–136)
ALT SERPL-CCNC: 16 U/L (ref 12–65)
ANION GAP SERPL CALC-SCNC: 6 MMOL/L (ref 7–16)
AST SERPL-CCNC: 16 U/L (ref 15–37)
BASOPHILS # BLD: 0 K/UL (ref 0–0.2)
BASOPHILS NFR BLD: 1 % (ref 0–2)
BILIRUB SERPL-MCNC: 0.3 MG/DL (ref 0.2–1.1)
BUN SERPL-MCNC: 27 MG/DL (ref 8–23)
CALCIUM SERPL-MCNC: 8.8 MG/DL (ref 8.3–10.4)
CEA SERPL-MCNC: 26.3 NG/ML (ref 0–3)
CHLORIDE SERPL-SCNC: 106 MMOL/L (ref 98–107)
CO2 SERPL-SCNC: 28 MMOL/L (ref 21–32)
CREAT SERPL-MCNC: 1.6 MG/DL (ref 0.8–1.5)
DIFFERENTIAL METHOD BLD: ABNORMAL
EOSINOPHIL # BLD: 0 K/UL (ref 0–0.8)
EOSINOPHIL NFR BLD: 0 % (ref 0.5–7.8)
ERYTHROCYTE [DISTWIDTH] IN BLOOD BY AUTOMATED COUNT: 14.3 % (ref 11.9–14.6)
GLOBULIN SER CALC-MCNC: 3.4 G/DL (ref 2.3–3.5)
GLUCOSE SERPL-MCNC: 100 MG/DL (ref 65–100)
HCT VFR BLD AUTO: 28.1 % (ref 41.1–50.3)
HGB BLD-MCNC: 9.3 G/DL (ref 13.6–17.2)
IMM GRANULOCYTES # BLD AUTO: 0.4 K/UL (ref 0–0.5)
IMM GRANULOCYTES NFR BLD AUTO: 8 % (ref 0–5)
LYMPHOCYTES # BLD: 1.1 K/UL (ref 0.5–4.6)
LYMPHOCYTES NFR BLD: 23 % (ref 13–44)
MAGNESIUM SERPL-MCNC: 2 MG/DL (ref 1.8–2.4)
MCH RBC QN AUTO: 29.7 PG (ref 26.1–32.9)
MCHC RBC AUTO-ENTMCNC: 33.1 G/DL (ref 31.4–35)
MCV RBC AUTO: 89.8 FL (ref 79.6–97.8)
MONOCYTES # BLD: 0.6 K/UL (ref 0.1–1.3)
MONOCYTES NFR BLD: 12 % (ref 4–12)
NEUTS SEG # BLD: 2.5 K/UL (ref 1.7–8.2)
NEUTS SEG NFR BLD: 56 % (ref 43–78)
NRBC # BLD: 0 K/UL (ref 0–0.2)
PLATELET # BLD AUTO: 378 K/UL (ref 150–450)
PLATELET COMMENTS,PCOM: ADEQUATE
PMV BLD AUTO: 8.4 FL (ref 9.4–12.3)
POTASSIUM SERPL-SCNC: 4.3 MMOL/L (ref 3.5–5.1)
PROT SERPL-MCNC: 6.6 G/DL (ref 6.3–8.2)
RBC # BLD AUTO: 3.13 M/UL (ref 4.23–5.67)
RBC MORPH BLD: ABNORMAL
SODIUM SERPL-SCNC: 140 MMOL/L (ref 136–145)
WBC # BLD AUTO: 4.6 K/UL (ref 4.3–11.1)
WBC MORPH BLD: ABNORMAL

## 2020-05-18 PROCEDURE — 74011000258 HC RX REV CODE- 258: Performed by: INTERNAL MEDICINE

## 2020-05-18 PROCEDURE — 82378 CARCINOEMBRYONIC ANTIGEN: CPT

## 2020-05-18 PROCEDURE — 96367 TX/PROPH/DG ADDL SEQ IV INF: CPT

## 2020-05-18 PROCEDURE — 83735 ASSAY OF MAGNESIUM: CPT

## 2020-05-18 PROCEDURE — 96413 CHEMO IV INFUSION 1 HR: CPT

## 2020-05-18 PROCEDURE — 96415 CHEMO IV INFUSION ADDL HR: CPT

## 2020-05-18 PROCEDURE — 85025 COMPLETE CBC W/AUTO DIFF WBC: CPT

## 2020-05-18 PROCEDURE — 96417 CHEMO IV INFUS EACH ADDL SEQ: CPT

## 2020-05-18 PROCEDURE — 80053 COMPREHEN METABOLIC PANEL: CPT

## 2020-05-18 PROCEDURE — 36591 DRAW BLOOD OFF VENOUS DEVICE: CPT

## 2020-05-18 PROCEDURE — 96375 TX/PRO/DX INJ NEW DRUG ADDON: CPT

## 2020-05-18 PROCEDURE — 74011250636 HC RX REV CODE- 250/636: Performed by: INTERNAL MEDICINE

## 2020-05-18 RX ORDER — SODIUM CHLORIDE 9 MG/ML
25 INJECTION, SOLUTION INTRAVENOUS CONTINUOUS
Status: ACTIVE | OUTPATIENT
Start: 2020-05-18 | End: 2020-05-18

## 2020-05-18 RX ORDER — SODIUM CHLORIDE 0.9 % (FLUSH) 0.9 %
10 SYRINGE (ML) INJECTION AS NEEDED
Status: ACTIVE | OUTPATIENT
Start: 2020-05-18 | End: 2020-05-18

## 2020-05-18 RX ORDER — ONDANSETRON 2 MG/ML
8 INJECTION INTRAMUSCULAR; INTRAVENOUS ONCE
Status: COMPLETED | OUTPATIENT
Start: 2020-05-18 | End: 2020-05-18

## 2020-05-18 RX ADMIN — ATEZOLIZUMAB 1200 MG: 1200 INJECTION, SOLUTION INTRAVENOUS at 10:59

## 2020-05-18 RX ADMIN — POTASSIUM CHLORIDE: 2 INJECTION, SOLUTION, CONCENTRATE INTRAVENOUS at 11:55

## 2020-05-18 RX ADMIN — SODIUM CHLORIDE 25 ML/HR: 900 INJECTION, SOLUTION INTRAVENOUS at 10:33

## 2020-05-18 RX ADMIN — ONDANSETRON 8 MG: 2 INJECTION INTRAMUSCULAR; INTRAVENOUS at 11:52

## 2020-05-18 RX ADMIN — SODIUM CHLORIDE 150 MG: 900 INJECTION, SOLUTION INTRAVENOUS at 10:34

## 2020-05-18 RX ADMIN — CISPLATIN 124.2 MG: 1 INJECTION, SOLUTION INTRAVENOUS at 14:15

## 2020-05-18 RX ADMIN — DEXAMETHASONE SODIUM PHOSPHATE 12 MG: 4 INJECTION, SOLUTION INTRAMUSCULAR; INTRAVENOUS at 11:35

## 2020-05-18 RX ADMIN — ETOPOSIDE 248.4 MG: 20 INJECTION INTRAVENOUS at 13:05

## 2020-05-18 NOTE — PROGRESS NOTES
Arrived to infusion. Tecentriq/VP16/Cisplatin given and tolerated well. Denies new issues or concerns. Next infusion appointment is 5/19/20 at 3pm. Pt declined to leave port accessed for tomorrow. Discharged ambulatory with self.

## 2020-05-19 ENCOUNTER — HOSPITAL ENCOUNTER (OUTPATIENT)
Dept: INFUSION THERAPY | Age: 65
Discharge: HOME | End: 2020-05-19
Payer: COMMERCIAL

## 2020-05-19 VITALS
RESPIRATION RATE: 18 BRPM | BODY MASS INDEX: 26.15 KG/M2 | WEIGHT: 192.8 LBS | DIASTOLIC BLOOD PRESSURE: 61 MMHG | HEART RATE: 96 BPM | OXYGEN SATURATION: 95 % | TEMPERATURE: 98.2 F | SYSTOLIC BLOOD PRESSURE: 107 MMHG

## 2020-05-19 DIAGNOSIS — C34.91 SCLC (SMALL CELL LUNG CARCINOMA), RIGHT (HCC): Primary | ICD-10-CM

## 2020-05-19 PROCEDURE — 74011250636 HC RX REV CODE- 250/636: Performed by: INTERNAL MEDICINE

## 2020-05-19 PROCEDURE — 74011000250 HC RX REV CODE- 250: Performed by: INTERNAL MEDICINE

## 2020-05-19 PROCEDURE — 96375 TX/PRO/DX INJ NEW DRUG ADDON: CPT

## 2020-05-19 PROCEDURE — 96413 CHEMO IV INFUSION 1 HR: CPT

## 2020-05-19 RX ORDER — LORAZEPAM 2 MG/ML
0.5 INJECTION INTRAMUSCULAR
Status: DISCONTINUED | OUTPATIENT
Start: 2020-05-19 | End: 2020-05-20 | Stop reason: HOSPADM

## 2020-05-19 RX ORDER — SODIUM CHLORIDE 0.9 % (FLUSH) 0.9 %
10 SYRINGE (ML) INJECTION AS NEEDED
Status: DISCONTINUED | OUTPATIENT
Start: 2020-05-19 | End: 2020-05-20 | Stop reason: HOSPADM

## 2020-05-19 RX ORDER — DEXAMETHASONE SODIUM PHOSPHATE 4 MG/ML
8 INJECTION, SOLUTION INTRA-ARTICULAR; INTRALESIONAL; INTRAMUSCULAR; INTRAVENOUS; SOFT TISSUE ONCE
Status: COMPLETED | OUTPATIENT
Start: 2020-05-19 | End: 2020-05-19

## 2020-05-19 RX ORDER — SODIUM CHLORIDE 9 MG/ML
25 INJECTION, SOLUTION INTRAVENOUS CONTINUOUS
Status: DISCONTINUED | OUTPATIENT
Start: 2020-05-19 | End: 2020-05-20 | Stop reason: HOSPADM

## 2020-05-19 RX ADMIN — ETOPOSIDE 253.2 MG: 20 INJECTION INTRAVENOUS at 15:54

## 2020-05-19 RX ADMIN — DEXAMETHASONE SODIUM PHOSPHATE 8 MG: 4 INJECTION, SOLUTION INTRAMUSCULAR; INTRAVENOUS at 15:15

## 2020-05-19 RX ADMIN — Medication 10 ML: at 17:07

## 2020-05-19 RX ADMIN — SODIUM CHLORIDE 25 ML/HR: 900 INJECTION, SOLUTION INTRAVENOUS at 15:28

## 2020-05-19 RX ADMIN — Medication 10 ML: at 15:05

## 2020-05-19 RX ADMIN — PROCHLORPERAZINE EDISYLATE 10 MG: 5 INJECTION INTRAMUSCULAR; INTRAVENOUS at 15:25

## 2020-05-19 NOTE — PROGRESS NOTES
Pt. Discharged ambulatory. Tolerated chemotherapy well. No distress noted. To call physician with any problems or concerns. Understanding voiced. To return to Infusions on 5/20/20.

## 2020-05-20 ENCOUNTER — HOSPITAL ENCOUNTER (OUTPATIENT)
Dept: INFUSION THERAPY | Age: 65
Discharge: HOME | End: 2020-05-20
Payer: COMMERCIAL

## 2020-05-20 VITALS
BODY MASS INDEX: 25.9 KG/M2 | RESPIRATION RATE: 18 BRPM | DIASTOLIC BLOOD PRESSURE: 65 MMHG | HEART RATE: 98 BPM | SYSTOLIC BLOOD PRESSURE: 121 MMHG | TEMPERATURE: 98.2 F | OXYGEN SATURATION: 95 % | WEIGHT: 191 LBS

## 2020-05-20 DIAGNOSIS — C34.91 SCLC (SMALL CELL LUNG CARCINOMA), RIGHT (HCC): Primary | ICD-10-CM

## 2020-05-20 PROCEDURE — 96413 CHEMO IV INFUSION 1 HR: CPT

## 2020-05-20 PROCEDURE — 96375 TX/PRO/DX INJ NEW DRUG ADDON: CPT

## 2020-05-20 PROCEDURE — 74011250636 HC RX REV CODE- 250/636: Performed by: INTERNAL MEDICINE

## 2020-05-20 RX ORDER — SODIUM CHLORIDE 0.9 % (FLUSH) 0.9 %
10 SYRINGE (ML) INJECTION AS NEEDED
Status: DISCONTINUED | OUTPATIENT
Start: 2020-05-20 | End: 2020-05-21 | Stop reason: HOSPADM

## 2020-05-20 RX ORDER — SODIUM CHLORIDE 9 MG/ML
25 INJECTION, SOLUTION INTRAVENOUS CONTINUOUS
Status: DISCONTINUED | OUTPATIENT
Start: 2020-05-20 | End: 2020-05-21 | Stop reason: HOSPADM

## 2020-05-20 RX ORDER — DEXAMETHASONE SODIUM PHOSPHATE 4 MG/ML
8 INJECTION, SOLUTION INTRA-ARTICULAR; INTRALESIONAL; INTRAMUSCULAR; INTRAVENOUS; SOFT TISSUE ONCE
Status: COMPLETED | OUTPATIENT
Start: 2020-05-20 | End: 2020-05-20

## 2020-05-20 RX ADMIN — Medication 10 ML: at 16:55

## 2020-05-20 RX ADMIN — ETOPOSIDE 252 MG: 20 INJECTION INTRAVENOUS at 15:45

## 2020-05-20 RX ADMIN — DEXAMETHASONE SODIUM PHOSPHATE 8 MG: 4 INJECTION, SOLUTION INTRAMUSCULAR; INTRAVENOUS at 15:30

## 2020-05-20 RX ADMIN — Medication 10 ML: at 15:23

## 2020-05-20 RX ADMIN — SODIUM CHLORIDE 25 ML/HR: 900 INJECTION, SOLUTION INTRAVENOUS at 15:33

## 2020-05-20 NOTE — PROGRESS NOTES
Arrived to the [facility]. VP16 completed. Patient tolerated well. Any issues or concerns during appointment: none. Patient aware of next infusion appointment on 6/8/2020 at 11am.  Discharged ambulatory to home.

## 2020-06-08 ENCOUNTER — HOSPITAL ENCOUNTER (OUTPATIENT)
Dept: INFUSION THERAPY | Age: 65
Discharge: HOME | End: 2020-06-08
Payer: COMMERCIAL

## 2020-06-08 DIAGNOSIS — C34.91 SCLC (SMALL CELL LUNG CARCINOMA), RIGHT (HCC): Primary | ICD-10-CM

## 2020-06-08 LAB
ALBUMIN SERPL-MCNC: 3.3 G/DL (ref 3.2–4.6)
ALBUMIN/GLOB SERPL: 1 {RATIO} (ref 1.2–3.5)
ALP SERPL-CCNC: 82 U/L (ref 50–136)
ALT SERPL-CCNC: 16 U/L (ref 12–65)
ANION GAP SERPL CALC-SCNC: 5 MMOL/L (ref 7–16)
AST SERPL-CCNC: 10 U/L (ref 15–37)
BASOPHILS # BLD: 0 K/UL (ref 0–0.2)
BASOPHILS NFR BLD: 1 % (ref 0–2)
BILIRUB SERPL-MCNC: 0.3 MG/DL (ref 0.2–1.1)
BUN SERPL-MCNC: 19 MG/DL (ref 8–23)
CALCIUM SERPL-MCNC: 8.6 MG/DL (ref 8.3–10.4)
CHLORIDE SERPL-SCNC: 108 MMOL/L (ref 98–107)
CO2 SERPL-SCNC: 26 MMOL/L (ref 21–32)
CREAT SERPL-MCNC: 1.7 MG/DL (ref 0.8–1.5)
DIFFERENTIAL METHOD BLD: ABNORMAL
EOSINOPHIL # BLD: 0 K/UL (ref 0–0.8)
EOSINOPHIL NFR BLD: 1 % (ref 0.5–7.8)
ERYTHROCYTE [DISTWIDTH] IN BLOOD BY AUTOMATED COUNT: 15.3 % (ref 11.9–14.6)
GLOBULIN SER CALC-MCNC: 3.4 G/DL (ref 2.3–3.5)
GLUCOSE SERPL-MCNC: 104 MG/DL (ref 65–100)
HCT VFR BLD AUTO: 28.5 % (ref 41.1–50.3)
HGB BLD-MCNC: 9.5 G/DL (ref 13.6–17.2)
IMM GRANULOCYTES # BLD AUTO: 0.1 K/UL (ref 0–0.5)
IMM GRANULOCYTES NFR BLD AUTO: 3 % (ref 0–5)
LYMPHOCYTES # BLD: 0.8 K/UL (ref 0.5–4.6)
LYMPHOCYTES NFR BLD: 30 % (ref 13–44)
MAGNESIUM SERPL-MCNC: 2 MG/DL (ref 1.8–2.4)
MCH RBC QN AUTO: 29.2 PG (ref 26.1–32.9)
MCHC RBC AUTO-ENTMCNC: 33.3 G/DL (ref 31.4–35)
MCV RBC AUTO: 87.7 FL (ref 79.6–97.8)
MONOCYTES # BLD: 0.6 K/UL (ref 0.1–1.3)
MONOCYTES NFR BLD: 21 % (ref 4–12)
NEUTS SEG # BLD: 1.2 K/UL (ref 1.7–8.2)
NEUTS SEG NFR BLD: 44 % (ref 43–78)
NRBC # BLD: 0 K/UL (ref 0–0.2)
PHOSPHATE SERPL-MCNC: 3.6 MG/DL (ref 2.3–3.7)
PLATELET # BLD AUTO: 320 K/UL (ref 150–450)
PMV BLD AUTO: 8.6 FL (ref 9.4–12.3)
POTASSIUM SERPL-SCNC: 4.4 MMOL/L (ref 3.5–5.1)
PROT SERPL-MCNC: 6.7 G/DL (ref 6.3–8.2)
RBC # BLD AUTO: 3.25 M/UL (ref 4.23–5.67)
SODIUM SERPL-SCNC: 139 MMOL/L (ref 136–145)
WBC # BLD AUTO: 2.6 K/UL (ref 4.3–11.1)

## 2020-06-08 PROCEDURE — 96415 CHEMO IV INFUSION ADDL HR: CPT

## 2020-06-08 PROCEDURE — 36591 DRAW BLOOD OFF VENOUS DEVICE: CPT

## 2020-06-08 PROCEDURE — 96375 TX/PRO/DX INJ NEW DRUG ADDON: CPT

## 2020-06-08 PROCEDURE — 80053 COMPREHEN METABOLIC PANEL: CPT

## 2020-06-08 PROCEDURE — 74011000258 HC RX REV CODE- 258: Performed by: INTERNAL MEDICINE

## 2020-06-08 PROCEDURE — 96417 CHEMO IV INFUS EACH ADDL SEQ: CPT

## 2020-06-08 PROCEDURE — 84100 ASSAY OF PHOSPHORUS: CPT

## 2020-06-08 PROCEDURE — 83735 ASSAY OF MAGNESIUM: CPT

## 2020-06-08 PROCEDURE — 96367 TX/PROPH/DG ADDL SEQ IV INF: CPT

## 2020-06-08 PROCEDURE — 96413 CHEMO IV INFUSION 1 HR: CPT

## 2020-06-08 PROCEDURE — 74011250636 HC RX REV CODE- 250/636: Performed by: INTERNAL MEDICINE

## 2020-06-08 PROCEDURE — 85025 COMPLETE CBC W/AUTO DIFF WBC: CPT

## 2020-06-08 RX ORDER — SODIUM CHLORIDE 9 MG/ML
25 INJECTION, SOLUTION INTRAVENOUS CONTINUOUS
Status: ACTIVE | OUTPATIENT
Start: 2020-06-08 | End: 2020-06-08

## 2020-06-08 RX ORDER — SODIUM CHLORIDE 0.9 % (FLUSH) 0.9 %
10 SYRINGE (ML) INJECTION AS NEEDED
Status: ACTIVE | OUTPATIENT
Start: 2020-06-08 | End: 2020-06-08

## 2020-06-08 RX ORDER — ONDANSETRON 2 MG/ML
8 INJECTION INTRAMUSCULAR; INTRAVENOUS ONCE
Status: COMPLETED | OUTPATIENT
Start: 2020-06-08 | End: 2020-06-08

## 2020-06-08 RX ADMIN — ONDANSETRON 8 MG: 2 INJECTION INTRAMUSCULAR; INTRAVENOUS at 13:24

## 2020-06-08 RX ADMIN — Medication 10 ML: at 17:22

## 2020-06-08 RX ADMIN — ATEZOLIZUMAB 1200 MG: 1200 INJECTION, SOLUTION INTRAVENOUS at 12:53

## 2020-06-08 RX ADMIN — DEXAMETHASONE SODIUM PHOSPHATE 12 MG: 4 INJECTION, SOLUTION INTRAMUSCULAR; INTRAVENOUS at 13:27

## 2020-06-08 RX ADMIN — POTASSIUM CHLORIDE: 2 INJECTION, SOLUTION, CONCENTRATE INTRAVENOUS at 11:48

## 2020-06-08 RX ADMIN — Medication 10 ML: at 11:19

## 2020-06-08 RX ADMIN — SODIUM CHLORIDE 150 MG: 900 INJECTION, SOLUTION INTRAVENOUS at 13:42

## 2020-06-08 RX ADMIN — CISPLATIN 123.6 MG: 1 INJECTION, SOLUTION INTRAVENOUS at 15:20

## 2020-06-08 RX ADMIN — ETOPOSIDE 247.2 MG: 20 INJECTION INTRAVENOUS at 14:10

## 2020-06-08 RX ADMIN — SODIUM CHLORIDE 25 ML/HR: 9 INJECTION, SOLUTION INTRAVENOUS at 11:19

## 2020-06-08 NOTE — PROGRESS NOTES
Arrived to the [facility]. Chemotherapy completed. Patient tolerated well. Any issues or concerns during appointment: none. Patient aware of next infusion appointment on 6/9 (date) at 2:00 PM (time). Discharged ambulatory.

## 2020-06-09 ENCOUNTER — HOSPITAL ENCOUNTER (OUTPATIENT)
Dept: INFUSION THERAPY | Age: 65
Discharge: HOME | End: 2020-06-09
Payer: COMMERCIAL

## 2020-06-09 VITALS
TEMPERATURE: 98.4 F | BODY MASS INDEX: 25.77 KG/M2 | WEIGHT: 190 LBS | DIASTOLIC BLOOD PRESSURE: 64 MMHG | SYSTOLIC BLOOD PRESSURE: 109 MMHG | RESPIRATION RATE: 18 BRPM | OXYGEN SATURATION: 98 % | HEART RATE: 89 BPM

## 2020-06-09 DIAGNOSIS — C34.91 SCLC (SMALL CELL LUNG CARCINOMA), RIGHT (HCC): Primary | ICD-10-CM

## 2020-06-09 PROCEDURE — 96413 CHEMO IV INFUSION 1 HR: CPT

## 2020-06-09 PROCEDURE — 96375 TX/PRO/DX INJ NEW DRUG ADDON: CPT

## 2020-06-09 PROCEDURE — 74011250636 HC RX REV CODE- 250/636: Performed by: NURSE PRACTITIONER

## 2020-06-09 PROCEDURE — 74011250636 HC RX REV CODE- 250/636: Performed by: INTERNAL MEDICINE

## 2020-06-09 RX ORDER — DEXAMETHASONE SODIUM PHOSPHATE 4 MG/ML
8 INJECTION, SOLUTION INTRA-ARTICULAR; INTRALESIONAL; INTRAMUSCULAR; INTRAVENOUS; SOFT TISSUE ONCE
Status: COMPLETED | OUTPATIENT
Start: 2020-06-09 | End: 2020-06-09

## 2020-06-09 RX ORDER — SODIUM CHLORIDE 9 MG/ML
25 INJECTION, SOLUTION INTRAVENOUS CONTINUOUS
Status: DISCONTINUED | OUTPATIENT
Start: 2020-06-09 | End: 2020-06-10 | Stop reason: HOSPADM

## 2020-06-09 RX ORDER — ONDANSETRON 2 MG/ML
8 INJECTION INTRAMUSCULAR; INTRAVENOUS ONCE
Status: COMPLETED | OUTPATIENT
Start: 2020-06-09 | End: 2020-06-09

## 2020-06-09 RX ORDER — SODIUM CHLORIDE 0.9 % (FLUSH) 0.9 %
10 SYRINGE (ML) INJECTION AS NEEDED
Status: DISCONTINUED | OUTPATIENT
Start: 2020-06-09 | End: 2020-06-10 | Stop reason: HOSPADM

## 2020-06-09 RX ADMIN — SODIUM CHLORIDE 25 ML/HR: 9 INJECTION, SOLUTION INTRAVENOUS at 14:30

## 2020-06-09 RX ADMIN — DEXAMETHASONE SODIUM PHOSPHATE 8 MG: 4 INJECTION, SOLUTION INTRAMUSCULAR; INTRAVENOUS at 14:32

## 2020-06-09 RX ADMIN — ETOPOSIDE 250.8 MG: 20 INJECTION INTRAVENOUS at 15:05

## 2020-06-09 RX ADMIN — Medication 10 ML: at 16:11

## 2020-06-09 RX ADMIN — ONDANSETRON 8 MG: 2 INJECTION INTRAMUSCULAR; INTRAVENOUS at 14:46

## 2020-06-09 NOTE — PROGRESS NOTES
Arrived to the [facility]. Etoposide completed. Patient tolerated well. Any issues or concerns during appointment: none. Patient aware of next infusion appointment on 6/10/20 at 0830. Discharged ambulatory.     Marisol Escudero RN

## 2020-06-10 ENCOUNTER — HOSPITAL ENCOUNTER (OUTPATIENT)
Dept: INFUSION THERAPY | Age: 65
Discharge: HOME | End: 2020-06-10
Payer: COMMERCIAL

## 2020-06-10 VITALS
OXYGEN SATURATION: 98 % | WEIGHT: 188.9 LBS | RESPIRATION RATE: 18 BRPM | TEMPERATURE: 97.7 F | DIASTOLIC BLOOD PRESSURE: 60 MMHG | HEART RATE: 91 BPM | SYSTOLIC BLOOD PRESSURE: 102 MMHG | BODY MASS INDEX: 25.62 KG/M2

## 2020-06-10 DIAGNOSIS — C34.91 SCLC (SMALL CELL LUNG CARCINOMA), RIGHT (HCC): Primary | ICD-10-CM

## 2020-06-10 PROCEDURE — 96413 CHEMO IV INFUSION 1 HR: CPT

## 2020-06-10 PROCEDURE — 96375 TX/PRO/DX INJ NEW DRUG ADDON: CPT

## 2020-06-10 PROCEDURE — 74011250636 HC RX REV CODE- 250/636: Performed by: INTERNAL MEDICINE

## 2020-06-10 RX ORDER — LORAZEPAM 2 MG/ML
0.5 INJECTION INTRAMUSCULAR
Status: DISCONTINUED | OUTPATIENT
Start: 2020-06-10 | End: 2020-06-11 | Stop reason: HOSPADM

## 2020-06-10 RX ORDER — SODIUM CHLORIDE 0.9 % (FLUSH) 0.9 %
10 SYRINGE (ML) INJECTION AS NEEDED
Status: ACTIVE | OUTPATIENT
Start: 2020-06-10 | End: 2020-06-10

## 2020-06-10 RX ORDER — SODIUM CHLORIDE 9 MG/ML
25 INJECTION, SOLUTION INTRAVENOUS CONTINUOUS
Status: ACTIVE | OUTPATIENT
Start: 2020-06-10 | End: 2020-06-10

## 2020-06-10 RX ORDER — DEXAMETHASONE SODIUM PHOSPHATE 4 MG/ML
8 INJECTION, SOLUTION INTRA-ARTICULAR; INTRALESIONAL; INTRAMUSCULAR; INTRAVENOUS; SOFT TISSUE ONCE
Status: COMPLETED | OUTPATIENT
Start: 2020-06-10 | End: 2020-06-10

## 2020-06-10 RX ADMIN — DEXAMETHASONE SODIUM PHOSPHATE 8 MG: 4 INJECTION, SOLUTION INTRAMUSCULAR; INTRAVENOUS at 09:21

## 2020-06-10 RX ADMIN — Medication 10 ML: at 10:55

## 2020-06-10 RX ADMIN — SODIUM CHLORIDE 25 ML/HR: 900 INJECTION, SOLUTION INTRAVENOUS at 09:15

## 2020-06-10 RX ADMIN — ETOPOSIDE 250.8 MG: 20 INJECTION, SOLUTION INTRAVENOUS at 09:50

## 2020-06-10 NOTE — PROGRESS NOTES
Arrived to the [facility]. Etoposide completed.  Patient tolerated well  Any issues or concerns during appointment: no  Patient aware of next infusion appointment on 6/29 at 10  Discharged ambulatory

## 2020-07-06 NOTE — ADDENDUM NOTE
Encounter addended by: MAYLIN Randhawa [facility] [employer] on: 7/6/2020 12:35 PM 
 Actions taken: i-Vent created or edited

## 2020-07-06 NOTE — PROGRESS NOTES
Tolerated chemotherapy without difficulty. Directed to push oral fluids and empty bladder at regular intervals. Verbalizes understanding. Patient has antiemetics for home use and is aware on how to administer them. Patient discharged via ambulation accompanied by self. Instructed to notify physician of any problems, questions or concerns after discharge. Next appointment is 07/07/2020 at 0800 with Infusion.

## 2020-07-06 NOTE — PROGRESS NOTES
Arrived to the [facility]. Patient tolerating chemo well. Report given to Stewart Reis RN  Any issues or concerns during appointment: None. Patient aware of next infusion appointment on 7/7 (date) at 0800 (time).

## 2020-07-07 NOTE — PROGRESS NOTES
I saw patient on 7-6-20 with Dr Isabel Garnett. He is receiving cycle 5 Cis//Tecentriq today and will now plan 6 cycles Cis//tecentriq. Pt denies complaints today or acute new symptoms.  Nurse navigation is following

## 2020-07-07 NOTE — PROGRESS NOTES
Arrived to the [facility]. Assessment complete, labs reviewed. Blood return verified  Etoposide completed. Patient tolerated without problems. Patient to be discharged home with needle in place  Any issues or concerns during appointment: None  Instructed to call provider with any side effects or concerns. Reminded to drink fluids and continue nausea medicine at home   Patient aware of next infusion appointment on 7/8/20(date) at 9 30 AM (time).   Discharged ambulatory

## 2020-07-08 NOTE — PROGRESS NOTES
Arrived to infusion [facility] for Etoposide day #3. Tolerated the infusion without signs of adverse reaction. Aware of next appointment on 7/27 at 1100. Discharged to home in satisfactory condition.

## 2020-07-27 PROBLEM — E83.42 HYPOMAGNESEMIA: Status: ACTIVE | Noted: 2020-01-01

## 2020-07-27 NOTE — PROGRESS NOTES
7/27/20 saw pt today with Dr. Darnell Collet for pre chemo cycle 6 tecentriq/etoposide. He is tolerating chemo well. PO intake is good. Denies any issues. He continues to work. Follow up in 3 weeks. Encouraged to call with any concerns. Navigation will continue to follow.

## 2020-07-27 NOTE — PROGRESS NOTES
Arrived to the [facility]. Chemo completed. Patient tolerated well. Any issues or concerns during appointment: none. Patient aware of next infusion appointment on 7/28/20 at 1400. Discharged amb.

## 2020-07-28 NOTE — PROGRESS NOTES
Arrived to the [facility]. Etoposide completed. Patient tolerated well. Any issues or concerns during appointment: none. Patient aware of next infusion appointment on 7/29/20 at 1400. Discharged ambulatory.     Suzy Julien RN

## 2020-07-29 NOTE — PROGRESS NOTES
Arrived to the [facility]. Assessment complete. Etoposide completed. Patient tolerated without problems. Any issues or concerns during appointment: None. Patient aware of next infusion appointment on 8/17/2020 (date) at 56 (time). Discharged ambulatory.

## 2020-08-17 NOTE — ADDENDUM NOTE
Encounter addended by: Comfort Upton [facility] on: 8/17/2020 11:44 AM 
 Actions taken: i-Vent created or edited

## 2020-08-17 NOTE — PROGRESS NOTES
8/17/2020  Patient seen with Dr Yolis Corona for pre-chemo office visit. Patient to receive IV fluids and TENCENTRIQ today. Questions and discussion completed with patient. Patient denying pain. Adequate PO intake noted and patient wishes to continue with treatment. Patient will continue being navigated.

## 2020-09-08 NOTE — ADDENDUM NOTE
Encounter addended by: Fatuma Wahl RP on: 9/8/2020 10:41 AM 
 Actions taken: i-Vent created or edited

## 2020-09-08 NOTE — PROGRESS NOTES
Problem: Chemotherapy Treatment  Goal: *Chemotherapy regimen followed  Outcome: Progressing Towards Goal  Goal: *Hemodynamically stable  Outcome: Progressing Towards Goal  Goal: *Tolerating diet  Outcome: Progressing Towards Goal     Problem: Knowledge Deficit  Goal: *Participate in the learning process  Outcome: Progressing Towards Goal  Goal: *Verbalize description of procedure  Outcome: Progressing Towards Goal  Goal: *Verbalizes understanding and describes medication purposes and frequencies  Outcome: Progressing Towards Goal  Goal: *Knowledge of discharge instructions  Outcome: Progressing Towards Goal  Goal: Interventions  Outcome: Progressing Towards Goal

## 2020-09-09 NOTE — PROGRESS NOTES
Arrived to the [facility]. Chemotherapy completed. Patient tolerated without problems. Any issues or concerns during appointment: no.  Patient aware of next infusion appointment on 9/28/20 (date) at 56 (time). Discharged ambulatory.

## 2020-09-28 NOTE — PROGRESS NOTES
9/28/2020  Patient seen with Dr Liudmila Amaral for pre-chemo Tecentriq visit. Treatment held for today. Rescheduled for 10/5. Referral to Dr Nazia Cyr in Oncology Radiation placed. Medrol dose pack ordered and sent to patient's pharmacy of choice. Patient wishes to continue on current treatment plan.  Patient will continue being navigated

## 2020-10-05 PROBLEM — R79.89 ELEVATED SERUM CREATININE: Status: ACTIVE | Noted: 2020-01-01

## 2020-10-05 NOTE — PROGRESS NOTES
Arrived to the [facility] to [facility]. Tecentriq completed. Patient tolerated without problems. Any issues or concerns during appointment: Urine collected for labs. Patient aware of next infusion appointment on 10/26/20 (date) at 1400 (time). Discharged ambulatory.

## 2020-10-05 NOTE — PROGRESS NOTES
Saw patient with Dr Felicita Estrada for prechemo visit for lung cancer and CML. Pt c/o gout like symptoms in his hands that came on suddenly. Pt recently treated for gout. Will continue to monitor. Creatinine continues to be elevated. Will proceed with treatment. Add on Urine Sodium and Urine Creatinine. Referral to Nephrology. RTC in 3 weeks. Encouraged to call with questions. Navigation will continue to follow.

## 2020-10-05 NOTE — PROGRESS NOTES
Problem: Chemotherapy Treatment  Goal: *Chemotherapy regimen followed  Outcome: Progressing Towards Goal  Goal: *Hemodynamically stable  Outcome: Progressing Towards Goal  Goal: *Tolerating diet  Outcome: Progressing Towards Goal

## 2020-10-09 NOTE — PROGRESS NOTES
CT CAP (09/25/2020): Progressive Right Upper Lung, Left adrenal node    On Cisplat / Etop / Martha Torres for Lung Cancer    Gleevac on hold BCR/ABL, CML    Nurse Navigator: Anuel Rivers    Referred to Dr Ly Powell, then they decided no radiation due to extensive [place], [address] [place] [address]

## 2020-10-09 NOTE — PROGRESS NOTES
Pt here for initial RT consult with Dr. Severiano Conger for RUL SCLC progression. An MRI Brain has been ordered. Pt is aware of the CT/Sim appt on 10/16/20. RT consents signed.

## 2020-10-09 NOTE — CONSULTS
Patient: Connie Piedra MRN: [id number]  SSN: [id number]    YOB: 1955  Age: 60 y.o. Sex: male      Other Providers:  Dr. Enamorado Ask: growing lung lesion    DIAGNOSIS: SCLC, stage IV    PREVIOUS TREATMENT:  1. 09/12/2012: initiated [address] for CML; currently off  2. 06/14/2018: started osimertinib for EGFR mutated NSCLC (dz in RML and R pleural effusion)  3. 04/06/2020: started cis/etop (began approval process for atezo; added on 04/27/2020) and completed 4 cycles of cis/etop with DC  4. Currently on atezo    HISTORY OF PRESENT ILLNESS:  Connie Piedra is a 60 y.o. male who I am seeing at the request of Dr. Rudy Valdovinos. His oncologic history is outlined above. His most recent scan showed progression of the RUL mass as well as in a L adrenal mass. He has no other sites of disease. Symptomatically, he has a slight cough. He denies hemoptysis. No chest pain. No abdominal pain or N/V. PAST MEDICAL HISTORY:    Past Medical History:   Diagnosis Date    Anxiety     Arthritis     Chronic systolic congestive heart failure ([address] 5/3/2017    CML (chronic myeloid leukemia) ([address] 8/25/2012    Depression 11/2/2012    Erectile dysfunction     Hypertension     Leukemia, acute ([address] 8/24/2012    Lung [facility] 2018    Pulmonary emboli ([address] 1/6/2019       PAST SURGICAL HISTORY:   Past Surgical History:   Procedure Laterality Date    HX HERNIA REPAIR      left inguinal 1996    HX VASCULAR ACCESS      IR INSERT TUNL CVC W PORT OVER 5 YEARS  3/11/2020       MEDICATIONS:     Current Outpatient Medications:     folic acid (FOLVITE) 1 mg tablet, TAKE 1 TABLET BY MOUTH EVERY DAY, Disp: 90 Tab, Rfl: 0    lidocaine-prilocaine (EMLA) topical cream, Apply  to affected area as needed for Pain. Apply to port site 45-60 minutes prior to port access.  Cover with plastic., Disp: 30 g, Rfl: 0    ENTRESTO 24-26 mg tablet, Take 1 Tab by mouth two (2) times a day., Disp: 180 Tab, Rfl: 4    spironolactone (ALDACTONE) 25 mg tablet, Take 1 Tab by mouth daily. Indications: chronic heart failure (Patient taking differently: Take 25 mg by mouth daily. 1/2 tab  Indications: chronic heart failure), Disp: 30 Tab, Rfl: 3    metoprolol succinate (TOPROL-XL) 25 mg XL tablet, Take 0.5 Tabs by mouth daily. Indications: chronic heart failure, Disp: 90 Tab, Rfl: 0    albuterol (PROVENTIL HFA, VENTOLIN HFA, PROAIR HFA) 90 mcg/actuation inhaler, Take 2 Puffs by inhalation every four (4) hours as needed for Wheezing., Disp: 1 Inhaler, Rfl: 11    triamcinolone (NASACORT) 55 mcg nasal inhaler, 2 Sprays by Both Nostrils route two (2) times a day. (Patient taking differently: 2 Sprays by Both Nostrils route two (2) times daily as needed. Indications: inflammation of the nose due to an allergy), Disp: 1 Bottle, Rfl: 3    OXYGEN-AIR DELIVERY SYSTEMS, by Does Not Apply route. 3LPM PRN, Disp: , Rfl:     furosemide (LASIX) 20 mg tablet, Take 1 Tab by mouth daily. Indications: fluid in the lungs due to chronic heart failure, Disp: 30 Tab, Rfl: 11    prochlorperazine (COMPAZINE) 10 mg tablet, Take 1 Tab by mouth every six (6) hours as needed for Nausea., Disp: 90 Tab, Rfl: 2    ondansetron hcl (ZOFRAN) 8 mg tablet, Take 1 Tab by mouth every eight (8) hours as needed for Nausea., Disp: 90 Tab, Rfl: 2    benzonatate (TESSALON) 200 mg capsule, Take 1 Cap by mouth three (3) times daily as needed for Cough. , Disp: 90 Cap, Rfl: 5    ALLERGIES:   No Known Allergies    SOCIAL HISTORY:   Social History     Socioeconomic History    Marital status:      Spouse name: Not on file    Number of children: Not on file    Years of education: Not on file    Highest education level: Not on file   Occupational History     Comment: [employer]   Social Needs    Financial resource strain: Not on file    Food insecurity     Worry: Not on file     Inability: Not on file    Transportation needs     Medical: Not on file Non-medical: Not on file   Tobacco Use    Smoking status: Never Smoker    Smokeless tobacco: Never Used   Substance and Sexual Activity    Alcohol use: No    Drug use: No    Sexual activity: Not on file   Lifestyle    Physical activity     Days per week: Not on file     Minutes per session: Not on file    Stress: Not on file   Relationships    Social connections     Talks on phone: Not on file     Gets together: Not on file     Attends Congregation service: Not on file     Active member of club or organization: Not on file     Attends meetings of clubs or organizations: Not on file     Relationship status: Not on file    Intimate partner violence     Fear of current or ex partner: Not on file     Emotionally abused: Not on file     Physically abused: Not on file     Forced sexual activity: Not on file   Other Topics Concern     Service Not Asked    Blood Transfusions Not Asked    Caffeine Concern Not Asked    Occupational Exposure Not Asked   [employer] Hazards Not Asked    Sleep Concern Not Asked    Stress Concern Not Asked    Weight Concern Not Asked    Special Diet Not Asked    Back Care Not Asked    Exercise Not Asked    Bike Helmet Not Asked    Seat Belt Not Asked    Self-Exams Not Asked   Social History Narrative    Pt  with one son       FAMILY HISTORY:   Family History   Problem Relation Age of Onset    Lung Disease Father 79        Black lung\"    Cancer Mother 72        pancreatic cancer       REVIEW OF SYSTEMS: Please see the completed review of systems sheet in the chart that I have reviewed today.       PHYSICAL EXAMINATION:   ECOG Performance status 0  VITAL SIGNS:   Visit Vitals  /66 (BP 1 Location: Left arm, BP Patient Position: Sitting)   Pulse 72   Temp 98.2 °F (36.8 °C)   Resp 16   Wt 83.6 kg (184 lb 3.2 oz)   SpO2 96%   BMI 24.30 kg/m²      General: well developed/nourished adult Male in no acute distress; appears stated age  [de-identified]: normocephalic, atraumatic; EOMI  Neck: supple with full ROM; no cervical lymphadenopathy  Cardiovascular: normal S1 and S2, RRR, no murmurs  Respiratory: normal inspiratory effort, no audible wheezes  Extremities: no cyanosis, clubbing, or edema  Musculoskeletal: mobility intact x4; normal ROM in all joints  Skin: no skin lesions identified  Neuro: AOx3; sensation intact x 4; CNII-XII grossly intact  Psych: appropriate affect, insight, and judgement  GI: abdomen soft, non-distended    PATHOLOGY:    Pathology report reviewed - see HPI    LABORATORY:   Lab Results   Component Value Date/Time    Sodium 139 10/05/2020 12:56 PM    Potassium 4.0 10/05/2020 12:56 PM    Chloride 110 (H) 10/05/2020 12:56 PM    CO2 24 10/05/2020 12:56 PM    Anion gap 5 (L) 10/05/2020 12:56 PM    Glucose 137 (H) 10/05/2020 12:56 PM    BUN 32 (H) 10/05/2020 12:56 PM    Creatinine 2.50 (H) 10/05/2020 12:56 PM    GFR est AA 34 (L) 10/05/2020 12:56 PM    GFR est non-AA 28 (L) 10/05/2020 12:56 PM    Calcium 8.2 (L) 10/05/2020 12:56 PM    Magnesium 1.9 10/05/2020 12:56 PM    Phosphorus 3.6 06/08/2020 09:58 AM    Albumin 3.1 (L) 10/05/2020 12:56 PM    Protein, total 6.3 10/05/2020 12:56 PM    Globulin 3.2 10/05/2020 12:56 PM    A-G Ratio 1.0 (L) 10/05/2020 12:56 PM    ALT (SGPT) 20 10/05/2020 12:56 PM     Lab Results   Component Value Date/Time    WBC 7.8 10/05/2020 12:56 PM    HGB 9.4 (L) 10/05/2020 12:56 PM    HCT 28.9 (L) 10/05/2020 12:56 PM    PLATELET 767 (L) 74/42/1511 12:56 PM       RADIOLOGY:    Ct Chest Abd Pelv W Cont    Result Date: 9/25/2020  CT of the chest, abdomen, and pelvis with contrast 9/25/2020 Comparison: 6/18/2020 and prior. Indication: Small cell lung cancer, right; adenocarcinoma of lung, stage IV status post chemotherapy. Follow-up. Technique:  CT imaging was performed of the chest, abdomen, and pelvis following the uncomplicated administration of intravenous contrast (Isovue 370, 100 mL).  Intravenous contrast was used for better evaluation of solid organs and vascular structures. Oral contrast was used for bowel opacification. Radiation dose reduction techniques were used for this study:  Our CT scanners use one or all of the following: Automated exposure control, adjustment of the mA and/or kVp according to patient's size, iterative reconstruction. Findings: CHEST CT: Cardiac enlargement, gynecomastia, right IJ catheter stable. No progressive adenopathy. Similar appearance small right pleural effusion. Groundglass opacification bilateral lower lobes with underlying minimal bronchiectasis increased from previous study. Right upper lobe confluent soft tissue mass adjacent to the fissure measures 5.1 x 4.5 cm, previously 3.5 x 2.3 cm. ABDOMEN CT: The liver is normal in appearance, with no focal abnormalities. Left adrenal nodule measures up to 2.1 cm, previously 1.8 cm. The gallbladder, right adrenal gland, kidneys, spleen, and pancreas within normal limits. Upper abdominal vasculature patent. There is no intra or extrahepatic biliary ductal dilatation. PELVIS CT: The bowel is normal in caliber, and there is no focal or diffuse bowel wall thickening. Extensive sigmoid diverticulosis without CT evidence of acute diverticulitis. There is no free air or free fluid in the abdomen or pelvis. There is no significant retroperitoneal, pelvic, mesenteric, or inguinal lymphadenopathy. The bladder is unremarkable. There are no aggressive appearing osseous lesions. IMPRESSION: 1. Progressive enlargement of right upper lobe confluent mass. 2. Progressive groundglass infiltration throughout the lungs most pronounced at the lower lobes. This is nonspecific and etiologies to include infection, drug toxicity, atypical appearance of edema. 3. Slight interval enlargement left adrenal nodule. IMPRESSION:  Marnie Link is a 60 y.o. male with oligoprogression of SCLC on atezo. PLAN:    I have ordered a brain MRI since his last one was over 3 mos ago.  For patients with extensive stage SCLC who don't get PCI, brain MRI q3 month is recommended. Regarding his chest, I have offered him palliative RT to 30 Gy in 10 fractions. Will discuss with Dr. Lon Bui treating the growing L adrenal met as well if the patient doesn't have other systemic options since he has progressed on immunotherapy. Simulation set up for 10/16 with RT to start soon thereafter.     Hilda Reyna MD   October 9, 2020

## 2020-11-03 NOTE — PROGRESS NOTES
Arrived to the [facility]. VP16 completed. Patient tolerated well. Any issues or concerns during appointment: none. Patient aware of next infusion appointment on 5/18 at 0905. Discharged ambulatory to home. Left message to call back

## 2020-11-03 NOTE — PROGRESS NOTES
Patient: Yury Romero MRN: [id number]  SSN: [id number]    YOB: 1955  Age: 60 y.o. Sex: male      DIAGNOSIS:  Stage IV SCLC    TREATMENT SITE:  RUL & L adrenal with concurrent atezo    DOSE and FRACTIONATION:  2 of 10 fractions completed; 600 cGy of 3000 cGy received    INTERVAL HISTORY:  Yury Romero is a 60 y.o. male being treated for SCLC    Week 1: No complaints      OBJECTIVE:  NAD  Visit Vitals  /68 (BP 1 Location: Left arm)   Pulse 69   Temp 98.3 °F (36.8 °C)   Wt 83 kg (183 lb)   SpO2 98%   BMI 24.14 kg/m²       Lab Results   Component Value Date/Time    Sodium 140 10/26/2020 11:25 AM    Potassium 4.5 10/26/2020 11:25 AM    Chloride 110 (H) 10/26/2020 11:25 AM    CO2 25 10/26/2020 11:25 AM    Anion gap 5 (L) 10/26/2020 11:25 AM    Glucose 150 (H) 10/26/2020 11:25 AM    BUN 31 (H) 10/26/2020 11:25 AM    Creatinine 2.60 (H) 10/26/2020 11:25 AM    GFR est AA 32 (L) 10/26/2020 11:25 AM    GFR est non-AA 27 (L) 10/26/2020 11:25 AM    Calcium 8.4 10/26/2020 11:25 AM    Magnesium 2.1 10/26/2020 11:25 AM    Phosphorus 3.6 06/08/2020 09:58 AM    Albumin 3.0 (L) 10/26/2020 11:25 AM    Protein, total 6.6 10/26/2020 11:25 AM    Globulin 3.6 (H) 10/26/2020 11:25 AM    A-G Ratio 0.8 (L) 10/26/2020 11:25 AM    ALT (SGPT) 19 10/26/2020 11:25 AM     Lab Results   Component Value Date/Time    WBC 4.6 10/26/2020 11:25 AM    HGB 9.1 (L) 10/26/2020 11:25 AM    HCT 27.8 (L) 10/26/2020 11:25 AM    PLATELET 771 70/96/9913 11:25 AM       ASSESSMENT and PLAN:  Yury Romero is tolerating radiation as anticipated for the current dose and fraction. We will continue on as planned with another treatment visit anticipated next week.         Jm Bartholomew MD   November 3, 2020

## 2020-11-13 NOTE — DISCHARGE SUMMARY
Patient: Rafael Jimenez MRN: [id number]  SSN: [id number]    YOB: 1955  Age: 60 y.o. Sex: male      Rafael Jimenez is a 60 y.o. male of Dr. Laura Soto with lung cancer and CML. Please see the details of his treatment below as well as my plans for future care and surveillance. Please do not hesitate to call with questions or concerns at any time. DIAGNOSIS:  SCLC, stage IV/ CML   HISTORY OF PRESENT ILLNESS:  Rafael Jimenez is a 60 y.o. male who I am seeing at the request of Dr. Laura Soto. His oncologic history is outlined above. His most recent scan showed progression of the RUL mass as well as in a L adrenal mass. He has no other sites of disease. Symptomatically, he has a slight cough. He denies hemoptysis. No chest pain. No abdominal pain or N/V.    IMPRESSION:  Rafael Jimenez is a 60 y.o. male with oligoprogression of SCLC on atezo.       PLAN:    I have ordered a brain MRI since his last one was over 3 mos ago. For patients with extensive stage SCLC who don't get PCI, brain MRI q3 month is recommended. Regarding his chest, I have offered him palliative RT to 30 Gy in 10 fractions. Will discuss with Dr. Laura Soto treating the growing L adrenal met as well if the patient doesn't have other systemic options since he has progressed on immunotherapy. Simulation set up for 10/16 with RT to start soon thereafter. PREVIOUS TREATMENT:  1. 09/12/2012: initiated [facility] for CML; currently off  2. 06/14/2018: started osimertinib for EGFR mutated NSCLC (dz in RML and R pleural effusion)  3. 04/06/2020: started cis/etop (began approval process for atezo; added on 04/27/2020) and completed 4 cycles of cis/etop with MS  4.  Currently on atezo    TREATMENT DATES:  11/2-11/13    ANATOMIC SITE:  Lung and adrenal gland     DOSE: 3000 cGy     BEAM ARRANGEMENT:  IMRT    CHEMOTHERAPY:    HORMONE THERAPY:  NA    TREATMENT COURSE:        Week 1: No complaints    Week 2: No complaints    PLAN:  He will follow up with radiation oncology as needed and continue follow up with medical oncology.             Deisy Garcia, [facility]  [facility], [address] [place] [address]  Office : [phone]  Fax : [phone]

## 2020-11-16 NOTE — PROGRESS NOTES
Pt arrived ambulatory to [facility] with port previously accessed with good blood return. NS infusing. Pre meds given as ordered. Tecentriq infused. Port flushed and de accessed. Pt aware of next appt on 12/7/20 at 1400. Discharged ambulatory.

## 2020-12-07 NOTE — PROGRESS NOTES
Reviewed POC for Tecentriq infusion pending ok for creatinine clearance of 30. Pt verbalized understanding.

## 2020-12-07 NOTE — PROGRESS NOTES
Arrived to the [facility]. Tecentriq infusion completed. Patient tolerated well. Any issues or concerns during appointment: NO.  Patient aware of next infusion appointment on 12/28/2020  at 1100 . Discharged ambulatory.

## 2020-12-28 NOTE — PROGRESS NOTES
12/28/2020 Saw the patient with Dr Mando Aviles. He is due for Gemfire today. The patient reports some new shortness of breath that he describes more as \"getting tired more quickly\"  and has follow up scheduled with pulmonary in a couple of days. 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 Navigation will continue to follow.

## 2020-12-28 NOTE — PROGRESS NOTES
Arrived to the [facility]. Tecentriq completed. Patient tolerated well. Any issues or concerns during appointment: No.  Discharged home in stable condition.

## 2021-01-01 ENCOUNTER — HOSPITAL ENCOUNTER (OUTPATIENT)
Age: 66
Setting detail: OUTPATIENT SURGERY
Discharge: HOME | End: 2021-02-09
Attending: INTERNAL MEDICINE | Admitting: INTERNAL MEDICINE
Payer: COMMERCIAL

## 2021-01-01 ENCOUNTER — APPOINTMENT (OUTPATIENT)
Dept: GENERAL RADIOLOGY | Age: 66
DRG: 870 | End: 2021-01-01
Attending: INTERNAL MEDICINE
Payer: COMMERCIAL

## 2021-01-01 ENCOUNTER — HOSPITAL ENCOUNTER (OUTPATIENT)
Dept: ULTRASOUND IMAGING | Age: 66
Discharge: HOME | End: 2021-05-20
Attending: INTERNAL MEDICINE
Payer: COMMERCIAL

## 2021-01-01 ENCOUNTER — HOSPITAL ENCOUNTER (OUTPATIENT)
Dept: GENERAL RADIOLOGY | Age: 66
Discharge: HOME | End: 2021-02-22
Attending: INTERNAL MEDICINE
Payer: COMMERCIAL

## 2021-01-01 ENCOUNTER — HOSPITAL ENCOUNTER (OUTPATIENT)
Dept: LAB | Age: 66
Discharge: HOME | End: 2021-04-13
Payer: COMMERCIAL

## 2021-01-01 ENCOUNTER — HOSPITAL ENCOUNTER (OUTPATIENT)
Age: 66
Setting detail: OUTPATIENT SURGERY
Discharge: HOME | End: 2021-03-04
Attending: INTERNAL MEDICINE | Admitting: INTERNAL MEDICINE
Payer: COMMERCIAL

## 2021-01-01 ENCOUNTER — PATIENT OUTREACH (OUTPATIENT)
Dept: CASE MANAGEMENT | Age: 66
End: 2021-01-01

## 2021-01-01 ENCOUNTER — HOSPITAL ENCOUNTER (OUTPATIENT)
Dept: CT IMAGING | Age: 66
Discharge: HOME | End: 2021-05-12
Attending: INTERNAL MEDICINE

## 2021-01-01 ENCOUNTER — APPOINTMENT (OUTPATIENT)
Dept: ULTRASOUND IMAGING | Age: 66
DRG: 870 | End: 2021-01-01
Attending: INTERNAL MEDICINE
Payer: COMMERCIAL

## 2021-01-01 ENCOUNTER — HOSPITAL ENCOUNTER (OUTPATIENT)
Dept: INFUSION THERAPY | Age: 66
Discharge: HOME | End: 2021-06-05
Payer: COMMERCIAL

## 2021-01-01 ENCOUNTER — HOSPITAL ENCOUNTER (OUTPATIENT)
Dept: INFUSION THERAPY | Age: 66
Discharge: HOME | End: 2021-06-03
Payer: COMMERCIAL

## 2021-01-01 ENCOUNTER — HOSPITAL ENCOUNTER (OUTPATIENT)
Dept: LAB | Age: 66
Discharge: HOME | End: 2021-05-26
Payer: COMMERCIAL

## 2021-01-01 ENCOUNTER — HOSPITAL ENCOUNTER (OUTPATIENT)
Dept: INFUSION THERAPY | Age: 66
Discharge: HOME | End: 2021-02-08
Payer: COMMERCIAL

## 2021-01-01 ENCOUNTER — APPOINTMENT (OUTPATIENT)
Dept: NON INVASIVE DIAGNOSTICS | Age: 66
DRG: 870 | End: 2021-01-01
Attending: INTERNAL MEDICINE
Payer: COMMERCIAL

## 2021-01-01 ENCOUNTER — HOSPITAL ENCOUNTER (OUTPATIENT)
Dept: INFUSION THERAPY | Age: 66
Discharge: HOME | End: 2021-03-01
Payer: COMMERCIAL

## 2021-01-01 ENCOUNTER — HOSPITAL ENCOUNTER (EMERGENCY)
Age: 66
Discharge: ANOTHER HOSPITAL | DRG: 870 | End: 2021-06-08
Payer: COMMERCIAL

## 2021-01-01 ENCOUNTER — HOSPITAL ENCOUNTER (OUTPATIENT)
Dept: LAB | Age: 66
Discharge: HOME | End: 2021-05-04
Payer: COMMERCIAL

## 2021-01-01 ENCOUNTER — HOSPITAL ENCOUNTER (OUTPATIENT)
Dept: INFUSION THERAPY | Age: 66
Discharge: HOME | End: 2021-01-18
Payer: COMMERCIAL

## 2021-01-01 ENCOUNTER — APPOINTMENT (OUTPATIENT)
Dept: CT IMAGING | Age: 66
DRG: 870 | End: 2021-01-01
Attending: INTERNAL MEDICINE
Payer: COMMERCIAL

## 2021-01-01 ENCOUNTER — HOSPITAL ENCOUNTER (OUTPATIENT)
Dept: CT IMAGING | Age: 66
Discharge: HOME | End: 2021-02-04
Attending: NURSE PRACTITIONER

## 2021-01-01 ENCOUNTER — HOSPITAL ENCOUNTER (OUTPATIENT)
Dept: GENERAL RADIOLOGY | Age: 66
Discharge: HOME | End: 2021-04-23
Attending: INTERNAL MEDICINE | Admitting: INTERNAL MEDICINE
Payer: COMMERCIAL

## 2021-01-01 ENCOUNTER — HOSPITAL ENCOUNTER (INPATIENT)
Age: 66
LOS: 10 days | DRG: 870 | End: 2021-06-18
Attending: INTERNAL MEDICINE | Admitting: HOSPITALIST
Payer: COMMERCIAL

## 2021-01-01 ENCOUNTER — HOSPITAL ENCOUNTER (OUTPATIENT)
Dept: INFUSION THERAPY | Age: 66
Discharge: HOME | End: 2021-06-04
Payer: COMMERCIAL

## 2021-01-01 ENCOUNTER — APPOINTMENT (OUTPATIENT)
Dept: GENERAL RADIOLOGY | Age: 66
DRG: 870 | End: 2021-01-01
Payer: COMMERCIAL

## 2021-01-01 VITALS
TEMPERATURE: 97.8 F | HEIGHT: 73 IN | DIASTOLIC BLOOD PRESSURE: 56 MMHG | SYSTOLIC BLOOD PRESSURE: 98 MMHG | HEART RATE: 97 BPM | RESPIRATION RATE: 16 BRPM | OXYGEN SATURATION: 96 % | WEIGHT: 185.2 LBS | BODY MASS INDEX: 24.55 KG/M2

## 2021-01-01 VITALS
TEMPERATURE: 97.9 F | BODY MASS INDEX: 20.61 KG/M2 | WEIGHT: 156.2 LBS | DIASTOLIC BLOOD PRESSURE: 65 MMHG | SYSTOLIC BLOOD PRESSURE: 112 MMHG | OXYGEN SATURATION: 98 % | HEART RATE: 116 BPM | RESPIRATION RATE: 18 BRPM

## 2021-01-01 VITALS
DIASTOLIC BLOOD PRESSURE: 65 MMHG | SYSTOLIC BLOOD PRESSURE: 108 MMHG | BODY MASS INDEX: 21.4 KG/M2 | WEIGHT: 162.2 LBS | HEART RATE: 112 BPM | TEMPERATURE: 97.7 F | OXYGEN SATURATION: 91 % | RESPIRATION RATE: 18 BRPM

## 2021-01-01 VITALS
OXYGEN SATURATION: 100 % | DIASTOLIC BLOOD PRESSURE: 69 MMHG | RESPIRATION RATE: 16 BRPM | TEMPERATURE: 97.3 F | SYSTOLIC BLOOD PRESSURE: 102 MMHG | HEART RATE: 104 BPM

## 2021-01-01 VITALS
BODY MASS INDEX: 21.2 KG/M2 | HEART RATE: 117 BPM | RESPIRATION RATE: 31 BRPM | SYSTOLIC BLOOD PRESSURE: 105 MMHG | WEIGHT: 160 LBS | DIASTOLIC BLOOD PRESSURE: 78 MMHG | TEMPERATURE: 98.7 F | HEIGHT: 73 IN | OXYGEN SATURATION: 95 %

## 2021-01-01 VITALS
WEIGHT: 168 LBS | TEMPERATURE: 98.2 F | RESPIRATION RATE: 22 BRPM | SYSTOLIC BLOOD PRESSURE: 124 MMHG | DIASTOLIC BLOOD PRESSURE: 68 MMHG | BODY MASS INDEX: 22.26 KG/M2 | HEIGHT: 73 IN | OXYGEN SATURATION: 100 % | HEART RATE: 100 BPM

## 2021-01-01 VITALS
RESPIRATION RATE: 18 BRPM | WEIGHT: 171 LBS | SYSTOLIC BLOOD PRESSURE: 113 MMHG | TEMPERATURE: 97.6 F | DIASTOLIC BLOOD PRESSURE: 68 MMHG | HEART RATE: 106 BPM | OXYGEN SATURATION: 100 % | HEIGHT: 73 IN | BODY MASS INDEX: 22.66 KG/M2

## 2021-01-01 VITALS
TEMPERATURE: 97.6 F | HEART RATE: 108 BPM | RESPIRATION RATE: 18 BRPM | OXYGEN SATURATION: 98 % | WEIGHT: 163.6 LBS | BODY MASS INDEX: 21.58 KG/M2 | SYSTOLIC BLOOD PRESSURE: 98 MMHG | DIASTOLIC BLOOD PRESSURE: 68 MMHG

## 2021-01-01 VITALS
DIASTOLIC BLOOD PRESSURE: 67 MMHG | HEART RATE: 100 BPM | OXYGEN SATURATION: 100 % | RESPIRATION RATE: 16 BRPM | TEMPERATURE: 98.3 F | SYSTOLIC BLOOD PRESSURE: 113 MMHG

## 2021-01-01 VITALS
HEIGHT: 73 IN | BODY MASS INDEX: 22.67 KG/M2 | SYSTOLIC BLOOD PRESSURE: 74 MMHG | OXYGEN SATURATION: 98 % | DIASTOLIC BLOOD PRESSURE: 44 MMHG | WEIGHT: 171.08 LBS | TEMPERATURE: 101.9 F

## 2021-01-01 DIAGNOSIS — I50.22 CHRONIC SYSTOLIC CONGESTIVE HEART FAILURE (HCC): ICD-10-CM

## 2021-01-01 DIAGNOSIS — C92.10 CML (CHRONIC MYELOID LEUKEMIA) (HCC): Primary | ICD-10-CM

## 2021-01-01 DIAGNOSIS — R79.89 ELEVATED SERUM CREATININE: ICD-10-CM

## 2021-01-01 DIAGNOSIS — J90 PLEURAL EFFUSION: ICD-10-CM

## 2021-01-01 DIAGNOSIS — D84.9 IMMUNOCOMPROMISED (HCC): ICD-10-CM

## 2021-01-01 DIAGNOSIS — R79.89 ELEVATED SERUM CREATININE: Primary | ICD-10-CM

## 2021-01-01 DIAGNOSIS — C34.90 ADENOCARCINOMA OF LUNG, STAGE 4, UNSPECIFIED LATERALITY (HCC): ICD-10-CM

## 2021-01-01 DIAGNOSIS — C34.91 SCLC (SMALL CELL LUNG CARCINOMA), RIGHT (HCC): ICD-10-CM

## 2021-01-01 DIAGNOSIS — E83.42 HYPOMAGNESEMIA: ICD-10-CM

## 2021-01-01 DIAGNOSIS — C92.10 CML (CHRONIC MYELOID LEUKEMIA) (HCC): ICD-10-CM

## 2021-01-01 DIAGNOSIS — K72.00 ACUTE LIVER FAILURE WITHOUT HEPATIC COMA: ICD-10-CM

## 2021-01-01 DIAGNOSIS — I48.0 PAROXYSMAL ATRIAL FIBRILLATION (HCC): ICD-10-CM

## 2021-01-01 DIAGNOSIS — F41.9 ANXIETY: ICD-10-CM

## 2021-01-01 DIAGNOSIS — I26.99 OTHER ACUTE PULMONARY EMBOLISM WITHOUT ACUTE COR PULMONALE (HCC): ICD-10-CM

## 2021-01-01 DIAGNOSIS — N17.9 AKI (ACUTE KIDNEY INJURY) (HCC): ICD-10-CM

## 2021-01-01 DIAGNOSIS — R93.89 ABNORMAL CHEST CT: ICD-10-CM

## 2021-01-01 DIAGNOSIS — R06.00 DYSPNEA, UNSPECIFIED TYPE: ICD-10-CM

## 2021-01-01 DIAGNOSIS — J90 PLEURAL EFFUSION ON RIGHT: ICD-10-CM

## 2021-01-01 DIAGNOSIS — D61.818 PANCYTOPENIA (HCC): ICD-10-CM

## 2021-01-01 DIAGNOSIS — Z71.89 ADVANCED CARE PLANNING/COUNSELING DISCUSSION: ICD-10-CM

## 2021-01-01 DIAGNOSIS — R65.20 SEVERE SEPSIS (HCC): ICD-10-CM

## 2021-01-01 DIAGNOSIS — A41.9 SEVERE SEPSIS (HCC): ICD-10-CM

## 2021-01-01 DIAGNOSIS — C34.92 ADENOCARCINOMA OF LUNG, LEFT (HCC): ICD-10-CM

## 2021-01-01 DIAGNOSIS — R06.02 SOB (SHORTNESS OF BREATH): ICD-10-CM

## 2021-01-01 DIAGNOSIS — I46.9 CARDIAC ARREST (HCC): ICD-10-CM

## 2021-01-01 DIAGNOSIS — J81.0 ACUTE PULMONARY EDEMA (HCC): ICD-10-CM

## 2021-01-01 DIAGNOSIS — C34.91 ADENOCARCINOMA OF RIGHT LUNG, STAGE 4 (HCC): ICD-10-CM

## 2021-01-01 DIAGNOSIS — I47.29 NSVT (NONSUSTAINED VENTRICULAR TACHYCARDIA): ICD-10-CM

## 2021-01-01 DIAGNOSIS — J15.9 HOSPITAL-ACQUIRED BACTERIAL PNEUMONIA: ICD-10-CM

## 2021-01-01 DIAGNOSIS — E87.20 LACTIC ACIDOSIS: ICD-10-CM

## 2021-01-01 DIAGNOSIS — R54 FRAILTY: ICD-10-CM

## 2021-01-01 DIAGNOSIS — R00.0 TACHYCARDIA: ICD-10-CM

## 2021-01-01 DIAGNOSIS — E44.0 MODERATE PROTEIN-CALORIE MALNUTRITION (HCC): Chronic | ICD-10-CM

## 2021-01-01 DIAGNOSIS — R06.02 SHORTNESS OF BREATH: ICD-10-CM

## 2021-01-01 DIAGNOSIS — C34.90 ADENOCARCINOMA OF LUNG, UNSPECIFIED LATERALITY (HCC): ICD-10-CM

## 2021-01-01 DIAGNOSIS — R07.1 CHEST PAIN ON BREATHING: ICD-10-CM

## 2021-01-01 DIAGNOSIS — J18.9 COMMUNITY ACQUIRED PNEUMONIA, UNSPECIFIED LATERALITY: ICD-10-CM

## 2021-01-01 DIAGNOSIS — R06.03 RESPIRATORY DISTRESS: Primary | ICD-10-CM

## 2021-01-01 DIAGNOSIS — J90 BILATERAL PLEURAL EFFUSION: ICD-10-CM

## 2021-01-01 DIAGNOSIS — C34.91 MALIGNANT NEOPLASM OF RIGHT LUNG, UNSPECIFIED PART OF LUNG (HCC): ICD-10-CM

## 2021-01-01 DIAGNOSIS — J96.01 ACUTE RESPIRATORY FAILURE WITH HYPOXIA (HCC): ICD-10-CM

## 2021-01-01 DIAGNOSIS — R91.8 PULMONARY INFILTRATE: ICD-10-CM

## 2021-01-01 DIAGNOSIS — I50.21 ACUTE SYSTOLIC CONGESTIVE HEART FAILURE (HCC): ICD-10-CM

## 2021-01-01 DIAGNOSIS — Z51.5 ENCOUNTER FOR PALLIATIVE CARE: ICD-10-CM

## 2021-01-01 DIAGNOSIS — I48.91 ATRIAL FIBRILLATION WITH RAPID VENTRICULAR RESPONSE (HCC): ICD-10-CM

## 2021-01-01 LAB
ABO + RH BLD: NORMAL
ACID FAST STN SPEC: NEGATIVE
ALBUMIN SERPL-MCNC: 1.5 G/DL (ref 3.2–4.6)
ALBUMIN SERPL-MCNC: 1.7 G/DL (ref 3.2–4.6)
ALBUMIN SERPL-MCNC: 2 G/DL (ref 3.2–4.6)
ALBUMIN SERPL-MCNC: 2.1 G/DL (ref 3.2–4.6)
ALBUMIN SERPL-MCNC: 2.2 G/DL (ref 3.2–4.6)
ALBUMIN SERPL-MCNC: 2.3 G/DL (ref 3.2–4.6)
ALBUMIN SERPL-MCNC: 2.4 G/DL (ref 3.2–4.6)
ALBUMIN SERPL-MCNC: 2.5 G/DL (ref 3.2–4.6)
ALBUMIN SERPL-MCNC: 2.5 G/DL (ref 3.2–4.6)
ALBUMIN SERPL-MCNC: 2.6 G/DL (ref 3.2–4.6)
ALBUMIN SERPL-MCNC: 2.7 G/DL (ref 3.2–4.6)
ALBUMIN SERPL-MCNC: 2.8 G/DL (ref 3.2–4.6)
ALBUMIN SERPL-MCNC: 3.1 G/DL (ref 3.2–4.6)
ALBUMIN SERPL-MCNC: 3.1 G/DL (ref 3.2–4.6)
ALBUMIN SERPL-MCNC: 3.2 G/DL (ref 3.2–4.6)
ALBUMIN SERPL-MCNC: 3.3 G/DL (ref 3.2–4.6)
ALBUMIN SERPL-MCNC: 3.4 G/DL (ref 3.2–4.6)
ALBUMIN SERPL-MCNC: 3.5 G/DL (ref 3.2–4.6)
ALBUMIN SERPL-MCNC: 3.6 G/DL (ref 3.2–4.6)
ALBUMIN/GLOB SERPL: 0.5 {RATIO} (ref 1.2–3.5)
ALBUMIN/GLOB SERPL: 0.5 {RATIO} (ref 1.2–3.5)
ALBUMIN/GLOB SERPL: 0.6 {RATIO} (ref 1.2–3.5)
ALBUMIN/GLOB SERPL: 0.7 {RATIO} (ref 1.2–3.5)
ALBUMIN/GLOB SERPL: 0.8 {RATIO} (ref 1.2–3.5)
ALBUMIN/GLOB SERPL: 0.8 {RATIO} (ref 1.2–3.5)
ALBUMIN/GLOB SERPL: 0.9 {RATIO} (ref 1.2–3.5)
ALP SERPL-CCNC: 104 U/L (ref 50–136)
ALP SERPL-CCNC: 55 U/L (ref 50–136)
ALP SERPL-CCNC: 56 U/L (ref 50–136)
ALP SERPL-CCNC: 61 U/L (ref 50–136)
ALP SERPL-CCNC: 76 U/L (ref 50–136)
ALP SERPL-CCNC: 77 U/L (ref 50–136)
ALP SERPL-CCNC: 78 U/L (ref 50–136)
ALP SERPL-CCNC: 79 U/L (ref 50–136)
ALP SERPL-CCNC: 79 U/L (ref 50–136)
ALP SERPL-CCNC: 81 U/L (ref 50–136)
ALP SERPL-CCNC: 81 U/L (ref 50–136)
ALP SERPL-CCNC: 84 U/L (ref 50–136)
ALP SERPL-CCNC: 84 U/L (ref 50–136)
ALP SERPL-CCNC: 86 U/L (ref 50–136)
ALP SERPL-CCNC: 87 U/L (ref 50–136)
ALP SERPL-CCNC: 88 U/L (ref 50–136)
ALP SERPL-CCNC: 90 U/L (ref 50–136)
ALP SERPL-CCNC: 90 U/L (ref 50–136)
ALP SERPL-CCNC: 94 U/L (ref 50–136)
ALT SERPL-CCNC: 1035 U/L (ref 12–65)
ALT SERPL-CCNC: 129 U/L (ref 12–65)
ALT SERPL-CCNC: 1471 U/L (ref 12–65)
ALT SERPL-CCNC: 154 U/L (ref 12–65)
ALT SERPL-CCNC: 16 U/L (ref 12–65)
ALT SERPL-CCNC: 16 U/L (ref 12–65)
ALT SERPL-CCNC: 18 U/L (ref 12–65)
ALT SERPL-CCNC: 2067 U/L (ref 12–65)
ALT SERPL-CCNC: 21 U/L (ref 12–65)
ALT SERPL-CCNC: 225 U/L (ref 12–65)
ALT SERPL-CCNC: 24 U/L (ref 12–65)
ALT SERPL-CCNC: 250 U/L (ref 12–65)
ALT SERPL-CCNC: 2580 U/L (ref 12–65)
ALT SERPL-CCNC: 26 U/L (ref 12–65)
ALT SERPL-CCNC: 26 U/L (ref 12–65)
ALT SERPL-CCNC: 32 U/L (ref 12–65)
ALT SERPL-CCNC: 379 U/L (ref 12–65)
ALT SERPL-CCNC: 625 U/L (ref 12–65)
ALT SERPL-CCNC: 638 U/L (ref 12–65)
AMMONIA PLAS-SCNC: 37 UMOL/L (ref 11–32)
AMORPH CRY URNS QL MICRO: ABNORMAL
ANION GAP SERPL CALC-SCNC: 10 MMOL/L (ref 7–16)
ANION GAP SERPL CALC-SCNC: 11 MMOL/L (ref 7–16)
ANION GAP SERPL CALC-SCNC: 13 MMOL/L (ref 7–16)
ANION GAP SERPL CALC-SCNC: 13 MMOL/L (ref 7–16)
ANION GAP SERPL CALC-SCNC: 14 MMOL/L (ref 7–16)
ANION GAP SERPL CALC-SCNC: 5 MMOL/L (ref 7–16)
ANION GAP SERPL CALC-SCNC: 6 MMOL/L (ref 7–16)
ANION GAP SERPL CALC-SCNC: 7 MMOL/L (ref 7–16)
ANION GAP SERPL CALC-SCNC: 8 MMOL/L (ref 7–16)
ANION GAP SERPL CALC-SCNC: 8 MMOL/L (ref 7–16)
ANION GAP SERPL CALC-SCNC: 9 MMOL/L (ref 7–16)
APPEARANCE FLD: NORMAL
APPEARANCE UR: ABNORMAL
APTT PPP: 35.8 SEC (ref 24.1–35.1)
APTT PPP: 39 SEC (ref 24.1–35.1)
APTT PPP: 41.1 SEC (ref 24.1–35.1)
APTT PPP: 46.2 SEC (ref 24.1–35.1)
APTT PPP: 48.9 SEC (ref 24.1–35.1)
APTT PPP: 52 SEC (ref 24.1–35.1)
APTT PPP: 66.6 SEC (ref 24.1–35.1)
APTT PPP: >200 SEC (ref 24.1–35.1)
ARTERIAL PATENCY WRIST A: ABNORMAL
ARTERIAL PATENCY WRIST A: POSITIVE
AST SERPL-CCNC: 10 U/L (ref 15–37)
AST SERPL-CCNC: 101 U/L (ref 15–37)
AST SERPL-CCNC: 11 U/L (ref 15–37)
AST SERPL-CCNC: 1230 U/L (ref 15–37)
AST SERPL-CCNC: 14 U/L (ref 15–37)
AST SERPL-CCNC: 173 U/L (ref 15–37)
AST SERPL-CCNC: 20 U/L (ref 15–37)
AST SERPL-CCNC: 214 U/L (ref 15–37)
AST SERPL-CCNC: 22 U/L (ref 15–37)
AST SERPL-CCNC: 23 U/L (ref 15–37)
AST SERPL-CCNC: 260 U/L (ref 15–37)
AST SERPL-CCNC: 27 U/L (ref 15–37)
AST SERPL-CCNC: 27 U/L (ref 15–37)
AST SERPL-CCNC: 31 U/L (ref 15–37)
AST SERPL-CCNC: 3158 U/L (ref 15–37)
AST SERPL-CCNC: 32 U/L (ref 15–37)
AST SERPL-CCNC: 33 U/L (ref 15–37)
AST SERPL-CCNC: 406 U/L (ref 15–37)
AST SERPL-CCNC: 910 U/L (ref 15–37)
ATRIAL RATE: 120 BPM
ATRIAL RATE: 121 BPM
ATRIAL RATE: 125 BPM
B PERT DNA SPEC QL NAA+PROBE: NOT DETECTED
BACTERIA SPEC CULT: NORMAL
BACTERIA URNS QL MICRO: ABNORMAL /HPF
BASE DEFICIT BLD-SCNC: 1.1 MMOL/L
BASE DEFICIT BLD-SCNC: 10.2 MMOL/L
BASE DEFICIT BLD-SCNC: 2.1 MMOL/L
BASE DEFICIT BLD-SCNC: 3.3 MMOL/L
BASE DEFICIT BLD-SCNC: 6.2 MMOL/L
BASE DEFICIT BLD-SCNC: 6.3 MMOL/L
BASE DEFICIT BLD-SCNC: 9 MMOL/L
BASE DEFICIT BLD-SCNC: 9.8 MMOL/L
BASE EXCESS BLD CALC-SCNC: 1.3 MMOL/L
BASE EXCESS BLD CALC-SCNC: 1.3 MMOL/L
BASE EXCESS BLD CALC-SCNC: 5.4 MMOL/L
BASE EXCESS BLD CALC-SCNC: 5.9 MMOL/L
BASE EXCESS BLD CALC-SCNC: 6 MMOL/L
BASE EXCESS BLD CALC-SCNC: 6.5 MMOL/L
BASE EXCESS BLDV CALC-SCNC: 3.6 MMOL/L
BASOPHILS # BLD: 0 K/UL (ref 0–0.2)
BASOPHILS # BLD: 0.1 K/UL (ref 0–0.2)
BASOPHILS NFR BLD: 0 % (ref 0–2)
BASOPHILS NFR BLD: 1 % (ref 0–2)
BDY SITE: ABNORMAL
BILIRUB SERPL-MCNC: 0.3 MG/DL (ref 0.2–1.1)
BILIRUB SERPL-MCNC: 0.3 MG/DL (ref 0.2–1.1)
BILIRUB SERPL-MCNC: 0.4 MG/DL (ref 0.2–1.1)
BILIRUB SERPL-MCNC: 0.5 MG/DL (ref 0.2–1.1)
BILIRUB SERPL-MCNC: 0.6 MG/DL (ref 0.2–1.1)
BILIRUB SERPL-MCNC: 0.7 MG/DL (ref 0.2–1.1)
BILIRUB SERPL-MCNC: 0.9 MG/DL (ref 0.2–1.1)
BILIRUB SERPL-MCNC: 1 MG/DL (ref 0.2–1.1)
BILIRUB SERPL-MCNC: 1.1 MG/DL (ref 0.2–1.1)
BILIRUB SERPL-MCNC: 1.2 MG/DL (ref 0.2–1.1)
BILIRUB SERPL-MCNC: 1.3 MG/DL (ref 0.2–1.1)
BILIRUB SERPL-MCNC: 1.5 MG/DL (ref 0.2–1.1)
BILIRUB SERPL-MCNC: 2 MG/DL (ref 0.2–1.1)
BILIRUB SERPL-MCNC: 2.4 MG/DL (ref 0.2–1.1)
BILIRUB SERPL-MCNC: 3.2 MG/DL (ref 0.2–1.1)
BILIRUB UR QL: NEGATIVE
BLD PROD TYP BPU: NORMAL
BLOOD BANK CMNT PATIENT-IMP: NORMAL
BLOOD GROUP ANTIBODIES SERPL: NORMAL
BNP SERPL-MCNC: ABNORMAL PG/ML (ref 5–125)
BORDETELLA PARAPERTUSSIS PCR, BORPAR: NOT DETECTED
BPU ID: NORMAL
BUN SERPL-MCNC: 104 MG/DL (ref 8–23)
BUN SERPL-MCNC: 106 MG/DL (ref 8–23)
BUN SERPL-MCNC: 31 MG/DL (ref 8–23)
BUN SERPL-MCNC: 31 MG/DL (ref 8–23)
BUN SERPL-MCNC: 32 MG/DL (ref 8–23)
BUN SERPL-MCNC: 33 MG/DL (ref 8–23)
BUN SERPL-MCNC: 37 MG/DL (ref 8–23)
BUN SERPL-MCNC: 39 MG/DL (ref 8–23)
BUN SERPL-MCNC: 41 MG/DL (ref 8–23)
BUN SERPL-MCNC: 51 MG/DL (ref 8–23)
BUN SERPL-MCNC: 60 MG/DL (ref 8–23)
BUN SERPL-MCNC: 66 MG/DL (ref 8–23)
BUN SERPL-MCNC: 74 MG/DL (ref 8–23)
BUN SERPL-MCNC: 78 MG/DL (ref 8–23)
BUN SERPL-MCNC: 82 MG/DL (ref 8–23)
BUN SERPL-MCNC: 85 MG/DL (ref 8–23)
BUN SERPL-MCNC: 86 MG/DL (ref 8–23)
BUN SERPL-MCNC: 93 MG/DL (ref 8–23)
BUN SERPL-MCNC: 97 MG/DL (ref 8–23)
BUN SERPL-MCNC: 98 MG/DL (ref 8–23)
C PNEUM DNA SPEC QL NAA+PROBE: NOT DETECTED
CA-I BLD-MCNC: 1.11 MMOL/L (ref 1.12–1.32)
CALCIUM SERPL-MCNC: 6.9 MG/DL (ref 8.3–10.4)
CALCIUM SERPL-MCNC: 7.3 MG/DL (ref 8.3–10.4)
CALCIUM SERPL-MCNC: 7.4 MG/DL (ref 8.3–10.4)
CALCIUM SERPL-MCNC: 7.6 MG/DL (ref 8.3–10.4)
CALCIUM SERPL-MCNC: 7.7 MG/DL (ref 8.3–10.4)
CALCIUM SERPL-MCNC: 7.8 MG/DL (ref 8.3–10.4)
CALCIUM SERPL-MCNC: 8 MG/DL (ref 8.3–10.4)
CALCIUM SERPL-MCNC: 8.1 MG/DL (ref 8.3–10.4)
CALCIUM SERPL-MCNC: 8.1 MG/DL (ref 8.3–10.4)
CALCIUM SERPL-MCNC: 8.2 MG/DL (ref 8.3–10.4)
CALCIUM SERPL-MCNC: 8.2 MG/DL (ref 8.3–10.4)
CALCIUM SERPL-MCNC: 8.3 MG/DL (ref 8.3–10.4)
CALCIUM SERPL-MCNC: 8.5 MG/DL (ref 8.3–10.4)
CALCIUM SERPL-MCNC: 8.6 MG/DL (ref 8.3–10.4)
CALCIUM SERPL-MCNC: 8.8 MG/DL (ref 8.3–10.4)
CALCIUM SERPL-MCNC: 8.8 MG/DL (ref 8.3–10.4)
CALCIUM SERPL-MCNC: 8.9 MG/DL (ref 8.3–10.4)
CALCIUM SERPL-MCNC: 8.9 MG/DL (ref 8.3–10.4)
CALCULATED P AXIS, ECG09: 59 DEGREES
CALCULATED P AXIS, ECG09: 66 DEGREES
CALCULATED P AXIS, ECG09: 75 DEGREES
CALCULATED R AXIS, ECG10: -2 DEGREES
CALCULATED R AXIS, ECG10: -47 DEGREES
CALCULATED R AXIS, ECG10: 64 DEGREES
CALCULATED T AXIS, ECG11: -74 DEGREES
CALCULATED T AXIS, ECG11: 32 DEGREES
CALCULATED T AXIS, ECG11: 41 DEGREES
CEA SERPL-MCNC: 100.8 NG/ML (ref 0–3)
CEA SERPL-MCNC: 176.6 NG/ML (ref 0–3)
CEA SERPL-MCNC: 223.6 NG/ML (ref 0–3)
CEA SERPL-MCNC: 229.7 NG/ML (ref 0–3)
CEA SERPL-MCNC: 78.7 NG/ML (ref 0–3)
CEA SERPL-MCNC: 91.2 NG/ML (ref 0–3)
CHLORIDE SERPL-SCNC: 101 MMOL/L (ref 98–107)
CHLORIDE SERPL-SCNC: 101 MMOL/L (ref 98–107)
CHLORIDE SERPL-SCNC: 102 MMOL/L (ref 98–107)
CHLORIDE SERPL-SCNC: 103 MMOL/L (ref 98–107)
CHLORIDE SERPL-SCNC: 103 MMOL/L (ref 98–107)
CHLORIDE SERPL-SCNC: 104 MMOL/L (ref 98–107)
CHLORIDE SERPL-SCNC: 105 MMOL/L (ref 98–107)
CHLORIDE SERPL-SCNC: 105 MMOL/L (ref 98–107)
CHLORIDE SERPL-SCNC: 106 MMOL/L (ref 98–107)
CHLORIDE SERPL-SCNC: 106 MMOL/L (ref 98–107)
CHLORIDE SERPL-SCNC: 107 MMOL/L (ref 98–107)
CHLORIDE SERPL-SCNC: 108 MMOL/L (ref 98–107)
CHLORIDE SERPL-SCNC: 110 MMOL/L (ref 98–107)
CHLORIDE SERPL-SCNC: 115 MMOL/L (ref 98–107)
CHLORIDE SERPL-SCNC: 115 MMOL/L (ref 98–107)
CHLORIDE SERPL-SCNC: 116 MMOL/L (ref 98–107)
CHLORIDE SERPL-SCNC: 116 MMOL/L (ref 98–107)
CHLORIDE SERPL-SCNC: 98 MMOL/L (ref 98–107)
CMV DNA SERPL NAA+PROBE-ACNC: NEGATIVE IU/ML
CMV DNA SERPL NAA+PROBE-LOG IU: NORMAL LOG10 IU/ML
CO2 BLD-SCNC: 22 MMOL/L (ref 13–23)
CO2 BLD-SCNC: 25 MMOL/L (ref 13–23)
CO2 SERPL-SCNC: 18 MMOL/L (ref 21–32)
CO2 SERPL-SCNC: 20 MMOL/L (ref 21–32)
CO2 SERPL-SCNC: 20 MMOL/L (ref 21–32)
CO2 SERPL-SCNC: 21 MMOL/L (ref 21–32)
CO2 SERPL-SCNC: 23 MMOL/L (ref 21–32)
CO2 SERPL-SCNC: 24 MMOL/L (ref 21–32)
CO2 SERPL-SCNC: 24 MMOL/L (ref 21–32)
CO2 SERPL-SCNC: 25 MMOL/L (ref 21–32)
CO2 SERPL-SCNC: 26 MMOL/L (ref 21–32)
CO2 SERPL-SCNC: 27 MMOL/L (ref 21–32)
CO2 SERPL-SCNC: 28 MMOL/L (ref 21–32)
CO2 SERPL-SCNC: 31 MMOL/L (ref 21–32)
CO2 SERPL-SCNC: 31 MMOL/L (ref 21–32)
CO2 SERPL-SCNC: 32 MMOL/L (ref 21–32)
CO2 SERPL-SCNC: 32 MMOL/L (ref 21–32)
CO2 SERPL-SCNC: 33 MMOL/L (ref 21–32)
COLLECTION COMMENT, COLCM: NORMAL
COLOR FLD: NORMAL
COLOR UR: YELLOW
CREAT SERPL-MCNC: 2.13 MG/DL (ref 0.8–1.5)
CREAT SERPL-MCNC: 2.2 MG/DL (ref 0.8–1.5)
CREAT SERPL-MCNC: 2.3 MG/DL (ref 0.8–1.5)
CREAT SERPL-MCNC: 2.3 MG/DL (ref 0.8–1.5)
CREAT SERPL-MCNC: 2.39 MG/DL (ref 0.8–1.5)
CREAT SERPL-MCNC: 2.5 MG/DL (ref 0.8–1.5)
CREAT SERPL-MCNC: 2.5 MG/DL (ref 0.8–1.5)
CREAT SERPL-MCNC: 2.6 MG/DL (ref 0.8–1.5)
CREAT SERPL-MCNC: 2.7 MG/DL (ref 0.8–1.5)
CREAT SERPL-MCNC: 2.7 MG/DL (ref 0.8–1.5)
CREAT SERPL-MCNC: 2.76 MG/DL (ref 0.8–1.5)
CREAT SERPL-MCNC: 2.79 MG/DL (ref 0.8–1.5)
CREAT SERPL-MCNC: 3.03 MG/DL (ref 0.8–1.5)
CREAT SERPL-MCNC: 3.29 MG/DL (ref 0.8–1.5)
CREAT SERPL-MCNC: 3.32 MG/DL (ref 0.8–1.5)
CREAT SERPL-MCNC: 3.46 MG/DL (ref 0.8–1.5)
CREAT SERPL-MCNC: 3.95 MG/DL (ref 0.8–1.5)
CREAT SERPL-MCNC: 3.98 MG/DL (ref 0.8–1.5)
CREAT SERPL-MCNC: 4.45 MG/DL (ref 0.8–1.5)
CREAT SERPL-MCNC: 4.51 MG/DL (ref 0.8–1.5)
CREAT UR-MCNC: 95.8 MG/DL
CROSSMATCH RESULT,%XM: NORMAL
CROSSMATCH RESULT,%XM: NORMAL
DIAGNOSIS, 93000: NORMAL
DIFFERENTIAL METHOD BLD: ABNORMAL
ECHO AO ROOT DIAM: 3.9 CM
ECHO AR MAX VEL PISA: 413 CM/S
ECHO AV AREA PEAK VELOCITY: 2 CM2
ECHO AV AREA VTI: 2.31 CM2
ECHO AV AREA/BSA PEAK VELOCITY: 1 CM2/M2
ECHO AV AREA/BSA VTI: 1.1 CM2/M2
ECHO AV CUSP MM: 2.4 CM
ECHO AV MEAN GRADIENT: 3 MMHG
ECHO AV PEAK GRADIENT: 9 MMHG
ECHO AV PEAK VELOCITY: 146 CM/S
ECHO AV REGURGITANT PHT: 577 MS
ECHO AV VTI: 20.4 CM
ECHO LA AREA 2C: 24.7 CM2
ECHO LA AREA 4C: 23.7 CM2
ECHO LA MAJOR AXIS: 6.65 CM
ECHO LA MINOR AXIS: 3.29 CM
ECHO LV E' LATERAL VELOCITY: 11.1 CM/S
ECHO LV E' SEPTAL VELOCITY: 5.03 CM/S
ECHO LV INTERNAL DIMENSION DIASTOLIC: 7.23 CM (ref 4.2–5.9)
ECHO LV INTERNAL DIMENSION SYSTOLIC: 6.82 CM
ECHO LV IVSD: 1.07 CM (ref 0.6–1)
ECHO LV MASS 2D: 353.2 G (ref 88–224)
ECHO LV MASS INDEX 2D: 174.9 G/M2 (ref 49–115)
ECHO LV POSTERIOR WALL DIASTOLIC: 0.99 CM (ref 0.6–1)
ECHO LVOT DIAM: 2.7 CM
ECHO LVOT PEAK GRADIENT: 1 MMHG
ECHO LVOT VTI: 8.24 CM
ECHO MV A VELOCITY: 97 CM/S
ECHO MV E DECELERATION TIME (DT): 180 MS
ECHO MV E VELOCITY: 69.4 CM/S
ECHO MV E/A RATIO: 0.72
ECHO MV E/E' LATERAL: 6.25
ECHO MV E/E' RATIO (AVERAGED): 10.02
ECHO MV E/E' SEPTAL: 13.8
ECHO MV MAX VELOCITY: 94.7 CM/S
ECHO MV MEAN GRADIENT: 1 MMHG
ECHO MV PEAK GRADIENT: 4 MMHG
ECHO MV REGURGITANT RADIUS PISA: 0.7 CM
ECHO MV REGURGITANT VTIA: 180 CM
ECHO MV VTI: 22.4 CM
ECHO PV REGURGITANT MAX VELOCITY: 289 CM/S
ECHO PV REGURGITANT MAX VELOCITY: 314 CM/S
ECHO PV REGURGITANT MAX VELOCITY: 51 CM/S
ECHO PV REGURGITANT MAX VELOCITY: 524 CM/S
ECHO RA AREA 4C: 37.5 CM2
ECHO RV INTERNAL DIMENSION: 3.29 CM
ECHO RV TAPSE: 1.41 CM (ref 1.5–2)
ECHO TV PEAK GRADIENT: 33 MMHG
ECHO TV REGURGITANT PEAK GRADIENT: 39 MMHG
EOSINOPHIL # BLD: 0 K/UL (ref 0–0.8)
EOSINOPHIL # BLD: 0.1 K/UL (ref 0–0.8)
EOSINOPHIL # BLD: 0.2 K/UL (ref 0–0.8)
EOSINOPHIL NFR BLD: 0 % (ref 0.5–7.8)
EOSINOPHIL NFR BLD: 1 % (ref 0.5–7.8)
EOSINOPHIL NFR BLD: 1 % (ref 0.5–7.8)
EOSINOPHIL NFR BLD: 2 % (ref 0.5–7.8)
EOSINOPHIL NFR BLD: 2 % (ref 0.5–7.8)
EOSINOPHIL NFR BLD: 3 % (ref 0.5–7.8)
EOSINOPHIL NFR BLD: 4 % (ref 0.5–7.8)
EOSINOPHIL NFR BLD: 4 % (ref 0.5–7.8)
EOSINOPHIL NFR BLD: 5 % (ref 0.5–7.8)
EPI CELLS #/AREA URNS HPF: ABNORMAL /HPF
EPSTEIN-BARR DNA QUANT, PCR, 139208: NEGATIVE COPIES/ML
ERYTHROCYTE [DISTWIDTH] IN BLOOD BY AUTOMATED COUNT: 13.9 % (ref 11.9–14.6)
ERYTHROCYTE [DISTWIDTH] IN BLOOD BY AUTOMATED COUNT: 14 % (ref 11.9–14.6)
ERYTHROCYTE [DISTWIDTH] IN BLOOD BY AUTOMATED COUNT: 14.1 % (ref 11.9–14.6)
ERYTHROCYTE [DISTWIDTH] IN BLOOD BY AUTOMATED COUNT: 14.2 % (ref 11.9–14.6)
ERYTHROCYTE [DISTWIDTH] IN BLOOD BY AUTOMATED COUNT: 14.4 % (ref 11.9–14.6)
ERYTHROCYTE [DISTWIDTH] IN BLOOD BY AUTOMATED COUNT: 14.5 % (ref 11.9–14.6)
ERYTHROCYTE [DISTWIDTH] IN BLOOD BY AUTOMATED COUNT: 14.6 % (ref 11.9–14.6)
ERYTHROCYTE [DISTWIDTH] IN BLOOD BY AUTOMATED COUNT: 14.7 % (ref 11.9–14.6)
ERYTHROCYTE [DISTWIDTH] IN BLOOD BY AUTOMATED COUNT: 14.9 % (ref 11.9–14.6)
ERYTHROCYTE [DISTWIDTH] IN BLOOD BY AUTOMATED COUNT: 15 % (ref 11.9–14.6)
ERYTHROCYTE [DISTWIDTH] IN BLOOD BY AUTOMATED COUNT: 15.1 % (ref 11.9–14.6)
ERYTHROCYTE [DISTWIDTH] IN BLOOD BY AUTOMATED COUNT: 15.3 % (ref 11.9–14.6)
ERYTHROCYTE [DISTWIDTH] IN BLOOD BY AUTOMATED COUNT: 15.3 % (ref 11.9–14.6)
ERYTHROCYTE [DISTWIDTH] IN BLOOD BY AUTOMATED COUNT: 15.4 % (ref 11.9–14.6)
ERYTHROCYTE [DISTWIDTH] IN BLOOD BY AUTOMATED COUNT: 15.6 % (ref 11.9–14.6)
ERYTHROCYTE [DISTWIDTH] IN BLOOD BY AUTOMATED COUNT: 16.2 % (ref 11.9–14.6)
ERYTHROCYTE [DISTWIDTH] IN BLOOD BY AUTOMATED COUNT: 16.2 % (ref 11.9–14.6)
EST. AVERAGE GLUCOSE BLD GHB EST-MCNC: 123 MG/DL
FERRITIN SERPL-MCNC: 2270 NG/ML (ref 8–388)
FIO2 ON VENT: 100 %
FLUAV SUBTYP SPEC NAA+PROBE: NOT DETECTED
FLUBV RNA SPEC QL NAA+PROBE: NOT DETECTED
FLUID COMMENTS, FCOM: NORMAL
FLUID CULTURE, SPNG2: NORMAL
FUNGUS CULTURE, RFCO2T: NORMAL
FUNGUS SMEAR, RFCO1T: NORMAL
FUNGUS SPEC CULT: NORMAL
FUNGUS STAIN, 188244: NORMAL
GAS FLOW.O2 O2 DELIVERY SYS: ABNORMAL L/MIN
GLOBULIN SER CALC-MCNC: 3.1 G/DL (ref 2.3–3.5)
GLOBULIN SER CALC-MCNC: 3.2 G/DL (ref 2.3–3.5)
GLOBULIN SER CALC-MCNC: 3.4 G/DL (ref 2.3–3.5)
GLOBULIN SER CALC-MCNC: 3.5 G/DL (ref 2.3–3.5)
GLOBULIN SER CALC-MCNC: 3.7 G/DL (ref 2.3–3.5)
GLOBULIN SER CALC-MCNC: 3.8 G/DL (ref 2.3–3.5)
GLOBULIN SER CALC-MCNC: 3.8 G/DL (ref 2.3–3.5)
GLOBULIN SER CALC-MCNC: 3.9 G/DL (ref 2.3–3.5)
GLOBULIN SER CALC-MCNC: 4 G/DL (ref 2.3–3.5)
GLOBULIN SER CALC-MCNC: 4 G/DL (ref 2.3–3.5)
GLOBULIN SER CALC-MCNC: 4.1 G/DL (ref 2.3–3.5)
GLOBULIN SER CALC-MCNC: 4.4 G/DL (ref 2.3–3.5)
GLOBULIN SER CALC-MCNC: 4.5 G/DL (ref 2.3–3.5)
GLUCOSE BLD STRIP.AUTO-MCNC: 11 MG/DL (ref 65–100)
GLUCOSE BLD STRIP.AUTO-MCNC: 122 MG/DL (ref 65–100)
GLUCOSE BLD STRIP.AUTO-MCNC: 129 MG/DL (ref 65–100)
GLUCOSE BLD STRIP.AUTO-MCNC: 134 MG/DL (ref 65–100)
GLUCOSE BLD STRIP.AUTO-MCNC: 142 MG/DL (ref 65–100)
GLUCOSE BLD STRIP.AUTO-MCNC: 146 MG/DL (ref 65–100)
GLUCOSE BLD STRIP.AUTO-MCNC: 151 MG/DL (ref 65–100)
GLUCOSE BLD STRIP.AUTO-MCNC: 153 MG/DL (ref 65–100)
GLUCOSE BLD STRIP.AUTO-MCNC: 157 MG/DL (ref 65–100)
GLUCOSE BLD STRIP.AUTO-MCNC: 166 MG/DL (ref 65–100)
GLUCOSE BLD STRIP.AUTO-MCNC: 168 MG/DL (ref 65–100)
GLUCOSE BLD STRIP.AUTO-MCNC: 171 MG/DL (ref 65–100)
GLUCOSE BLD STRIP.AUTO-MCNC: 174 MG/DL (ref 65–100)
GLUCOSE BLD STRIP.AUTO-MCNC: 188 MG/DL (ref 65–100)
GLUCOSE BLD STRIP.AUTO-MCNC: 192 MG/DL (ref 65–100)
GLUCOSE BLD STRIP.AUTO-MCNC: 195 MG/DL (ref 65–100)
GLUCOSE BLD STRIP.AUTO-MCNC: 197 MG/DL (ref 65–100)
GLUCOSE BLD STRIP.AUTO-MCNC: 198 MG/DL (ref 65–100)
GLUCOSE BLD STRIP.AUTO-MCNC: 200 MG/DL (ref 65–100)
GLUCOSE BLD STRIP.AUTO-MCNC: 201 MG/DL (ref 65–100)
GLUCOSE BLD STRIP.AUTO-MCNC: 203 MG/DL (ref 65–100)
GLUCOSE BLD STRIP.AUTO-MCNC: 206 MG/DL (ref 65–100)
GLUCOSE BLD STRIP.AUTO-MCNC: 212 MG/DL (ref 65–100)
GLUCOSE BLD STRIP.AUTO-MCNC: 216 MG/DL (ref 65–100)
GLUCOSE BLD STRIP.AUTO-MCNC: 216 MG/DL (ref 65–100)
GLUCOSE BLD STRIP.AUTO-MCNC: 222 MG/DL (ref 65–100)
GLUCOSE BLD STRIP.AUTO-MCNC: 225 MG/DL (ref 65–100)
GLUCOSE BLD STRIP.AUTO-MCNC: 225 MG/DL (ref 65–100)
GLUCOSE BLD STRIP.AUTO-MCNC: 226 MG/DL (ref 65–100)
GLUCOSE BLD STRIP.AUTO-MCNC: 237 MG/DL (ref 65–100)
GLUCOSE BLD STRIP.AUTO-MCNC: 240 MG/DL (ref 65–100)
GLUCOSE BLD STRIP.AUTO-MCNC: 244 MG/DL (ref 65–100)
GLUCOSE BLD STRIP.AUTO-MCNC: 245 MG/DL (ref 65–100)
GLUCOSE BLD STRIP.AUTO-MCNC: 245 MG/DL (ref 65–100)
GLUCOSE BLD STRIP.AUTO-MCNC: 252 MG/DL (ref 65–100)
GLUCOSE BLD STRIP.AUTO-MCNC: 26 MG/DL (ref 65–100)
GLUCOSE BLD STRIP.AUTO-MCNC: 261 MG/DL (ref 65–100)
GLUCOSE BLD STRIP.AUTO-MCNC: 290 MG/DL (ref 65–100)
GLUCOSE BLD STRIP.AUTO-MCNC: 291 MG/DL (ref 65–100)
GLUCOSE BLD STRIP.AUTO-MCNC: 308 MG/DL (ref 65–100)
GLUCOSE BLD STRIP.AUTO-MCNC: 33 MG/DL (ref 65–100)
GLUCOSE BLD STRIP.AUTO-MCNC: 39 MG/DL (ref 65–100)
GLUCOSE BLD STRIP.AUTO-MCNC: 74 MG/DL (ref 65–100)
GLUCOSE FLD-MCNC: 113 MG/DL
GLUCOSE SERPL-MCNC: 104 MG/DL (ref 65–100)
GLUCOSE SERPL-MCNC: 108 MG/DL (ref 65–100)
GLUCOSE SERPL-MCNC: 109 MG/DL (ref 65–100)
GLUCOSE SERPL-MCNC: 122 MG/DL (ref 65–100)
GLUCOSE SERPL-MCNC: 143 MG/DL (ref 65–100)
GLUCOSE SERPL-MCNC: 148 MG/DL (ref 65–100)
GLUCOSE SERPL-MCNC: 154 MG/DL (ref 65–100)
GLUCOSE SERPL-MCNC: 174 MG/DL (ref 65–100)
GLUCOSE SERPL-MCNC: 188 MG/DL (ref 65–100)
GLUCOSE SERPL-MCNC: 189 MG/DL (ref 65–100)
GLUCOSE SERPL-MCNC: 190 MG/DL (ref 65–100)
GLUCOSE SERPL-MCNC: 197 MG/DL (ref 65–100)
GLUCOSE SERPL-MCNC: 199 MG/DL (ref 65–100)
GLUCOSE SERPL-MCNC: 206 MG/DL (ref 65–100)
GLUCOSE SERPL-MCNC: 208 MG/DL (ref 65–100)
GLUCOSE SERPL-MCNC: 220 MG/DL (ref 65–100)
GLUCOSE SERPL-MCNC: 239 MG/DL (ref 65–100)
GLUCOSE SERPL-MCNC: 267 MG/DL (ref 65–100)
GLUCOSE SERPL-MCNC: 95 MG/DL (ref 65–100)
GLUCOSE SERPL-MCNC: 95 MG/DL (ref 65–100)
GLUCOSE UR STRIP.AUTO-MCNC: NEGATIVE MG/DL
GRAM STN SPEC: NORMAL
HADV DNA SPEC QL NAA+PROBE: NOT DETECTED
HAV IGM SER QL: NONREACTIVE
HAV IGM SER QL: NONREACTIVE
HBA1C MFR BLD: 5.9 % (ref 4.2–6.3)
HBV CORE IGM SER QL: NONREACTIVE
HBV CORE IGM SER QL: NONREACTIVE
HBV SURFACE AG SER QL: NONREACTIVE
HBV SURFACE AG SER QL: NONREACTIVE
HCO3 BLD-SCNC: 15.9 MMOL/L (ref 22–26)
HCO3 BLD-SCNC: 17.4 MMOL/L (ref 22–26)
HCO3 BLD-SCNC: 17.6 MMOL/L (ref 22–26)
HCO3 BLD-SCNC: 18.7 MMOL/L (ref 22–26)
HCO3 BLD-SCNC: 18.7 MMOL/L (ref 22–26)
HCO3 BLD-SCNC: 21 MMOL/L (ref 22–26)
HCO3 BLD-SCNC: 21.1 MMOL/L (ref 22–26)
HCO3 BLD-SCNC: 24 MMOL/L (ref 22–26)
HCO3 BLD-SCNC: 28.7 MMOL/L (ref 22–26)
HCO3 BLD-SCNC: 29.1 MMOL/L (ref 22–26)
HCO3 BLD-SCNC: 29.7 MMOL/L (ref 22–26)
HCO3 BLD-SCNC: 30.4 MMOL/L (ref 22–26)
HCO3 BLD-SCNC: 30.4 MMOL/L (ref 22–26)
HCO3 BLD-SCNC: 30.5 MMOL/L (ref 22–26)
HCO3 BLDV-SCNC: 28.3 MMOL/L (ref 23–28)
HCOV 229E RNA SPEC QL NAA+PROBE: NOT DETECTED
HCOV HKU1 RNA SPEC QL NAA+PROBE: NOT DETECTED
HCOV NL63 RNA SPEC QL NAA+PROBE: NOT DETECTED
HCOV OC43 RNA SPEC QL NAA+PROBE: NOT DETECTED
HCT VFR BLD AUTO: 18.4 % (ref 41.1–50.3)
HCT VFR BLD AUTO: 21.6 % (ref 41.1–50.3)
HCT VFR BLD AUTO: 22.6 % (ref 41.1–50.3)
HCT VFR BLD AUTO: 23.2 % (ref 41.1–50.3)
HCT VFR BLD AUTO: 23.7 % (ref 41.1–50.3)
HCT VFR BLD AUTO: 23.9 % (ref 41.1–50.3)
HCT VFR BLD AUTO: 24 % (ref 41.1–50.3)
HCT VFR BLD AUTO: 24.4 % (ref 41.1–50.3)
HCT VFR BLD AUTO: 24.5 % (ref 41.1–50.3)
HCT VFR BLD AUTO: 25.1 % (ref 41.1–50.3)
HCT VFR BLD AUTO: 25.5 % (ref 41.1–50.3)
HCT VFR BLD AUTO: 27 % (ref 41.1–50.3)
HCT VFR BLD AUTO: 27.1 % (ref 41.1–50.3)
HCT VFR BLD AUTO: 28 % (ref 41.1–50.3)
HCT VFR BLD AUTO: 28.8 % (ref 41.1–50.3)
HCT VFR BLD AUTO: 28.9 % (ref 41.1–50.3)
HCT VFR BLD AUTO: 29 %
HCT VFR BLD AUTO: 31.1 %
HCT VFR BLD AUTO: 31.8 % (ref 41.1–50.3)
HCT VFR BLD AUTO: 32.1 % (ref 41.1–50.3)
HCT VFR BLD AUTO: 32.9 % (ref 41.1–50.3)
HCT VFR BLD AUTO: 34.3 %
HCT VFR BLD AUTO: 34.6 %
HCV AB SER QL: NONREACTIVE
HCV AB SER QL: NONREACTIVE
HEPARIN INDUCED PLT,XHIPA: POSITIVE
HGB BLD-MCNC: 10 G/DL (ref 13.6–17.2)
HGB BLD-MCNC: 10.3 G/DL (ref 13.6–17.2)
HGB BLD-MCNC: 10.4 G/DL (ref 13.6–17.2)
HGB BLD-MCNC: 10.7 G/DL (ref 13.6–17.2)
HGB BLD-MCNC: 10.9 G/DL (ref 13.6–17.2)
HGB BLD-MCNC: 11.3 G/DL (ref 13.6–17.2)
HGB BLD-MCNC: 6.2 G/DL (ref 13.6–17.2)
HGB BLD-MCNC: 7.1 G/DL (ref 13.6–17.2)
HGB BLD-MCNC: 7.1 G/DL (ref 13.6–17.2)
HGB BLD-MCNC: 7.3 G/DL (ref 13.6–17.2)
HGB BLD-MCNC: 7.4 G/DL (ref 13.6–17.2)
HGB BLD-MCNC: 7.6 G/DL (ref 13.6–17.2)
HGB BLD-MCNC: 7.7 G/DL (ref 13.6–17.2)
HGB BLD-MCNC: 7.8 G/DL (ref 13.6–17.2)
HGB BLD-MCNC: 7.8 G/DL (ref 13.6–17.2)
HGB BLD-MCNC: 8 G/DL (ref 13.6–17.2)
HGB BLD-MCNC: 8.2 G/DL (ref 13.6–17.2)
HGB BLD-MCNC: 8.6 G/DL (ref 13.6–17.2)
HGB BLD-MCNC: 8.7 G/DL (ref 13.6–17.2)
HGB BLD-MCNC: 8.7 G/DL (ref 13.6–17.2)
HGB BLD-MCNC: 9.2 G/DL (ref 13.6–17.2)
HGB BLD-MCNC: 9.4 G/DL (ref 13.6–17.2)
HGB BLD-MCNC: 9.5 G/DL (ref 13.6–17.2)
HGB UR QL STRIP: ABNORMAL
HISTORY CHECKED?,CKHIST: NORMAL
HISTORY CHECKED?,CKHIST: NORMAL
HIT INTERPRETATION,XINTPR: POSITIVE
HIT PROFILE,XHITT: ABNORMAL
HIV1 RNA # SERPL NAA+PROBE: <20 COPIES/ML
HIV1 RNA SERPL NAA+PROBE-LOG#: NORMAL LOG10COPY/ML
HMPV RNA SPEC QL NAA+PROBE: NOT DETECTED
HPIV1 RNA SPEC QL NAA+PROBE: NOT DETECTED
HPIV2 RNA SPEC QL NAA+PROBE: NOT DETECTED
HPIV3 RNA SPEC QL NAA+PROBE: NOT DETECTED
HPIV4 RNA SPEC QL NAA+PROBE: NOT DETECTED
IMM GRANULOCYTES # BLD AUTO: 0 K/UL (ref 0–0.5)
IMM GRANULOCYTES # BLD AUTO: 0.2 K/UL (ref 0–0.5)
IMM GRANULOCYTES # BLD AUTO: 0.4 K/UL (ref 0–0.5)
IMM GRANULOCYTES # BLD AUTO: 0.8 K/UL (ref 0–0.5)
IMM GRANULOCYTES NFR BLD AUTO: 0 % (ref 0–5)
IMM GRANULOCYTES NFR BLD AUTO: 1 % (ref 0–5)
IMM GRANULOCYTES NFR BLD AUTO: 11 % (ref 0–5)
IMM GRANULOCYTES NFR BLD AUTO: 13 % (ref 0–5)
IMM GRANULOCYTES NFR BLD AUTO: 17 % (ref 0–5)
IMM GRANULOCYTES NFR BLD AUTO: 2 % (ref 0–5)
IMM GRANULOCYTES NFR BLD AUTO: 21 % (ref 0–5)
IMM GRANULOCYTES NFR BLD AUTO: 4 % (ref 0–5)
IMM GRANULOCYTES NFR BLD AUTO: 7 % (ref 0–5)
IMM GRANULOCYTES NFR BLD AUTO: 7 % (ref 0–5)
INR PPP: 1.8
INR PPP: 1.9
INR PPP: 1.9
INR PPP: 2.9
INSPIRATION.DURATION SETTING TIME VENT: 0.75 SEC
INSPIRATION.DURATION SETTING TIME VENT: 0.8 SEC
INSPIRATION.DURATION SETTING TIME VENT: 0.85 SEC
INSPIRATION.DURATION SETTING TIME VENT: 0.85 SEC
INSPIRATION.DURATION SETTING TIME VENT: 1.05 SEC
IPAP/PIP, IPAPIP: 10
KETONES UR QL STRIP.AUTO: ABNORMAL MG/DL
L PNEUMO1 AG UR QL IA: NEGATIVE
LACTATE SERPL-SCNC: 1.2 MMOL/L (ref 0.4–2)
LACTATE SERPL-SCNC: 1.4 MMOL/L (ref 0.4–2)
LACTATE SERPL-SCNC: 1.4 MMOL/L (ref 0.4–2)
LACTATE SERPL-SCNC: 1.8 MMOL/L (ref 0.4–2)
LACTATE SERPL-SCNC: 1.9 MMOL/L (ref 0.4–2)
LACTATE SERPL-SCNC: 2 MMOL/L (ref 0.4–2)
LACTATE SERPL-SCNC: 2.1 MMOL/L (ref 0.4–2)
LACTATE SERPL-SCNC: 2.1 MMOL/L (ref 0.4–2)
LACTATE SERPL-SCNC: 2.4 MMOL/L (ref 0.4–2)
LACTATE SERPL-SCNC: 2.6 MMOL/L (ref 0.4–2)
LACTATE SERPL-SCNC: 2.7 MMOL/L (ref 0.4–2)
LACTATE SERPL-SCNC: 2.8 MMOL/L (ref 0.4–2)
LACTATE SERPL-SCNC: 3 MMOL/L (ref 0.4–2)
LACTATE SERPL-SCNC: 3.1 MMOL/L (ref 0.4–2)
LACTATE SERPL-SCNC: 3.2 MMOL/L (ref 0.4–2)
LACTATE SERPL-SCNC: 3.3 MMOL/L (ref 0.4–2)
LACTATE SERPL-SCNC: 3.9 MMOL/L (ref 0.4–2)
LACTATE SERPL-SCNC: 4.4 MMOL/L (ref 0.4–2)
LACTATE SERPL-SCNC: 4.4 MMOL/L (ref 0.4–2)
LACTATE SERPL-SCNC: 5.2 MMOL/L (ref 0.4–2)
LDH FLD L TO P-CCNC: 267 U/L
LEUKOCYTE ESTERASE UR QL STRIP.AUTO: NEGATIVE
LOG10 EBV DNA QN PCR, 139238: NORMAL LOG10 COPY/ML
LYMPHOCYTES # BLD: 0 K/UL (ref 0.5–4.6)
LYMPHOCYTES # BLD: 0 K/UL (ref 0.5–4.6)
LYMPHOCYTES # BLD: 0.1 K/UL (ref 0.5–4.6)
LYMPHOCYTES # BLD: 0.3 K/UL (ref 0.5–4.6)
LYMPHOCYTES # BLD: 0.4 K/UL (ref 0.5–4.6)
LYMPHOCYTES # BLD: 0.4 K/UL (ref 0.5–4.6)
LYMPHOCYTES # BLD: 0.5 K/UL (ref 0.5–4.6)
LYMPHOCYTES # BLD: 0.6 K/UL (ref 0.5–4.6)
LYMPHOCYTES NFR BLD: 1 % (ref 13–44)
LYMPHOCYTES NFR BLD: 10 % (ref 13–44)
LYMPHOCYTES NFR BLD: 17 % (ref 13–44)
LYMPHOCYTES NFR BLD: 2 % (ref 13–44)
LYMPHOCYTES NFR BLD: 2 % (ref 13–44)
LYMPHOCYTES NFR BLD: 33 % (ref 13–44)
LYMPHOCYTES NFR BLD: 33 % (ref 13–44)
LYMPHOCYTES NFR BLD: 36 % (ref 13–44)
LYMPHOCYTES NFR BLD: 4 % (ref 13–44)
LYMPHOCYTES NFR BLD: 43 % (ref 13–44)
LYMPHOCYTES NFR BLD: 6 % (ref 13–44)
LYMPHOCYTES NFR BLD: 6 % (ref 13–44)
LYMPHOCYTES NFR BLD: 7 % (ref 13–44)
LYMPHOCYTES NFR BLD: 8 % (ref 13–44)
LYMPHOCYTES NFR BLD: 8 % (ref 13–44)
LYMPHOCYTES NFR BRONCH MANUAL: 13 %
Lab: NORMAL
Lab: NORMAL
M PNEUMO DNA SPEC QL NAA+PROBE: NOT DETECTED
MACROPHAGES NFR BRONCH MANUAL: 65 %
MAGNESIUM SERPL-MCNC: 2.1 MG/DL (ref 1.8–2.4)
MAGNESIUM SERPL-MCNC: 2.2 MG/DL (ref 1.8–2.4)
MAGNESIUM SERPL-MCNC: 2.3 MG/DL (ref 1.8–2.4)
MAGNESIUM SERPL-MCNC: 2.3 MG/DL (ref 1.8–2.4)
MAGNESIUM SERPL-MCNC: 2.4 MG/DL (ref 1.8–2.4)
MAGNESIUM SERPL-MCNC: 2.5 MG/DL (ref 1.8–2.4)
MAGNESIUM SERPL-MCNC: 2.6 MG/DL (ref 1.8–2.4)
MAGNESIUM SERPL-MCNC: 2.6 MG/DL (ref 1.8–2.4)
MAGNESIUM SERPL-MCNC: 2.7 MG/DL (ref 1.8–2.4)
MAGNESIUM SERPL-MCNC: 2.8 MG/DL (ref 1.8–2.4)
MAGNESIUM SERPL-MCNC: 2.8 MG/DL (ref 1.8–2.4)
MAGNESIUM SERPL-MCNC: 2.9 MG/DL (ref 1.8–2.4)
MAGNESIUM SERPL-MCNC: 3.1 MG/DL (ref 1.8–2.4)
MCH RBC QN AUTO: 25.7 PG (ref 26.1–32.9)
MCH RBC QN AUTO: 25.7 PG (ref 26.1–32.9)
MCH RBC QN AUTO: 25.9 PG (ref 26.1–32.9)
MCH RBC QN AUTO: 26 PG (ref 26.1–32.9)
MCH RBC QN AUTO: 26.2 PG (ref 26.1–32.9)
MCH RBC QN AUTO: 26.2 PG (ref 26.1–32.9)
MCH RBC QN AUTO: 26.3 PG (ref 26.1–32.9)
MCH RBC QN AUTO: 26.4 PG (ref 26.1–32.9)
MCH RBC QN AUTO: 26.5 PG (ref 26.1–32.9)
MCH RBC QN AUTO: 26.5 PG (ref 26.1–32.9)
MCH RBC QN AUTO: 26.6 PG (ref 26.1–32.9)
MCH RBC QN AUTO: 27.6 PG (ref 26.1–32.9)
MCH RBC QN AUTO: 27.6 PG (ref 26.1–32.9)
MCH RBC QN AUTO: 28 PG (ref 26.1–32.9)
MCH RBC QN AUTO: 28 PG (ref 26.1–32.9)
MCH RBC QN AUTO: 28.1 PG (ref 26.1–32.9)
MCHC RBC AUTO-ENTMCNC: 29.8 G/DL (ref 31.4–35)
MCHC RBC AUTO-ENTMCNC: 30.6 G/DL (ref 31.4–35)
MCHC RBC AUTO-ENTMCNC: 30.8 G/DL (ref 31.4–35)
MCHC RBC AUTO-ENTMCNC: 31.1 G/DL (ref 31.4–35)
MCHC RBC AUTO-ENTMCNC: 31.4 G/DL (ref 31.4–35)
MCHC RBC AUTO-ENTMCNC: 31.4 G/DL (ref 31.4–35)
MCHC RBC AUTO-ENTMCNC: 31.8 G/DL (ref 31.4–35)
MCHC RBC AUTO-ENTMCNC: 31.8 G/DL (ref 31.4–35)
MCHC RBC AUTO-ENTMCNC: 32 G/DL (ref 31.4–35)
MCHC RBC AUTO-ENTMCNC: 32.1 G/DL (ref 31.4–35)
MCHC RBC AUTO-ENTMCNC: 32.2 G/DL (ref 31.4–35)
MCHC RBC AUTO-ENTMCNC: 32.4 G/DL (ref 31.4–35)
MCHC RBC AUTO-ENTMCNC: 32.4 G/DL (ref 31.4–35)
MCHC RBC AUTO-ENTMCNC: 32.5 G/DL (ref 31.4–35)
MCHC RBC AUTO-ENTMCNC: 32.6 G/DL (ref 31.4–35)
MCHC RBC AUTO-ENTMCNC: 32.6 G/DL (ref 31.4–35)
MCHC RBC AUTO-ENTMCNC: 32.7 G/DL (ref 31.4–35)
MCHC RBC AUTO-ENTMCNC: 32.7 G/DL (ref 31.4–35)
MCHC RBC AUTO-ENTMCNC: 32.8 G/DL (ref 31.4–35)
MCHC RBC AUTO-ENTMCNC: 33.7 G/DL (ref 31.4–35)
MCHC RBC AUTO-ENTMCNC: 33.8 G/DL (ref 31.4–35)
MCV RBC AUTO: 77.6 FL (ref 79.6–97.8)
MCV RBC AUTO: 78.8 FL (ref 79.6–97.8)
MCV RBC AUTO: 80.8 FL (ref 79.6–97.8)
MCV RBC AUTO: 81 FL (ref 79.6–97.8)
MCV RBC AUTO: 81.3 FL (ref 79.6–97.8)
MCV RBC AUTO: 81.4 FL (ref 79.6–97.8)
MCV RBC AUTO: 81.5 FL (ref 79.6–97.8)
MCV RBC AUTO: 81.6 FL (ref 79.6–97.8)
MCV RBC AUTO: 81.9 FL (ref 79.6–97.8)
MCV RBC AUTO: 82.3 FL (ref 79.6–97.8)
MCV RBC AUTO: 82.5 FL (ref 79.6–97.8)
MCV RBC AUTO: 82.6 FL (ref 79.6–97.8)
MCV RBC AUTO: 83 FL (ref 79.6–97.8)
MCV RBC AUTO: 85 FL (ref 79.6–97.8)
MCV RBC AUTO: 85.7 FL (ref 79.6–97.8)
MCV RBC AUTO: 85.9 FL (ref 79.6–97.8)
MCV RBC AUTO: 86.5 FL (ref 79.6–97.8)
MCV RBC AUTO: 86.6 FL (ref 79.6–97.8)
MCV RBC AUTO: 86.8 FL (ref 79.6–97.8)
MCV RBC AUTO: 86.9 FL (ref 79.6–97.8)
MCV RBC AUTO: 88.2 FL (ref 79.6–97.8)
MONOCYTES # BLD: 0 K/UL (ref 0.1–1.3)
MONOCYTES # BLD: 0.1 K/UL (ref 0.1–1.3)
MONOCYTES # BLD: 0.4 K/UL (ref 0.1–1.3)
MONOCYTES # BLD: 0.5 K/UL (ref 0.1–1.3)
MONOCYTES # BLD: 0.5 K/UL (ref 0.1–1.3)
MONOCYTES # BLD: 0.6 K/UL (ref 0.1–1.3)
MONOCYTES # BLD: 0.6 K/UL (ref 0.1–1.3)
MONOCYTES NFR BLD: 0 % (ref 4–12)
MONOCYTES NFR BLD: 1 % (ref 4–12)
MONOCYTES NFR BLD: 1 % (ref 4–12)
MONOCYTES NFR BLD: 10 % (ref 4–12)
MONOCYTES NFR BLD: 10 % (ref 4–12)
MONOCYTES NFR BLD: 7 % (ref 4–12)
MONOCYTES NFR BLD: 8 % (ref 4–12)
MONOCYTES NFR BLD: 9 % (ref 4–12)
MYCOBACTERIUM SPEC QL CULT: NEGATIVE
NEUTROPHILS NFR BRONCH MANUAL: 22 %
NEUTS SEG # BLD: 0 K/UL (ref 1.7–8.2)
NEUTS SEG # BLD: 0.1 K/UL (ref 1.7–8.2)
NEUTS SEG # BLD: 0.3 K/UL (ref 1.7–8.2)
NEUTS SEG # BLD: 1.6 K/UL (ref 1.7–8.2)
NEUTS SEG # BLD: 3.3 K/UL (ref 1.7–8.2)
NEUTS SEG # BLD: 3.8 K/UL (ref 1.7–8.2)
NEUTS SEG # BLD: 3.9 K/UL (ref 1.7–8.2)
NEUTS SEG # BLD: 4 K/UL (ref 1.7–8.2)
NEUTS SEG # BLD: 4.7 K/UL (ref 1.7–8.2)
NEUTS SEG # BLD: 4.8 K/UL (ref 1.7–8.2)
NEUTS SEG # BLD: 5.6 K/UL (ref 1.7–8.2)
NEUTS SEG # BLD: 6.3 K/UL (ref 1.7–8.2)
NEUTS SEG # BLD: 7.4 K/UL (ref 1.7–8.2)
NEUTS SEG # BLD: 9.4 K/UL (ref 1.7–8.2)
NEUTS SEG # BLD: 9.9 K/UL (ref 1.7–8.2)
NEUTS SEG NFR BLD: 42 % (ref 43–78)
NEUTS SEG NFR BLD: 46 % (ref 43–78)
NEUTS SEG NFR BLD: 50 % (ref 43–78)
NEUTS SEG NFR BLD: 50 % (ref 43–78)
NEUTS SEG NFR BLD: 71 % (ref 43–78)
NEUTS SEG NFR BLD: 73 % (ref 43–78)
NEUTS SEG NFR BLD: 75 % (ref 43–78)
NEUTS SEG NFR BLD: 75 % (ref 43–78)
NEUTS SEG NFR BLD: 78 % (ref 43–78)
NEUTS SEG NFR BLD: 79 % (ref 43–78)
NEUTS SEG NFR BLD: 80 % (ref 43–78)
NEUTS SEG NFR BLD: 81 % (ref 43–78)
NEUTS SEG NFR BLD: 86 % (ref 43–78)
NEUTS SEG NFR BLD: 86 % (ref 43–78)
NEUTS SEG NFR BLD: 90 % (ref 43–78)
NEUTS SEG NFR BLD: 92 % (ref 43–78)
NEUTS SEG NFR BLD: 93 % (ref 43–78)
NITRITE UR QL STRIP.AUTO: NEGATIVE
NRBC # BLD: 0 K/UL (ref 0–0.2)
NUC CELL # FLD: 592 /CU MM
O2/TOTAL GAS SETTING VFR VENT: 100 %
O2/TOTAL GAS SETTING VFR VENT: 100 %
O2/TOTAL GAS SETTING VFR VENT: 30 %
O2/TOTAL GAS SETTING VFR VENT: 40 %
O2/TOTAL GAS SETTING VFR VENT: 50 %
OPTICAL DENSITY READ,XHITAO: 0.65 ABS
ORGANISM ID, SPNG3: NORMAL
OTHER OBSERVATIONS,UCOM: ABNORMAL
P-R INTERVAL, ECG05: 144 MS
P-R INTERVAL, ECG05: 150 MS
P-R INTERVAL, ECG05: 164 MS
PAW @ MEAN EXP FLOW ON VENT: 15 CMH2O
PAW @ MEAN EXP FLOW ON VENT: 18 CMH2O
PCO2 BLD: 28.7 MMHG (ref 35–45)
PCO2 BLD: 29.3 MMHG (ref 35–45)
PCO2 BLD: 33.4 MMHG (ref 35–45)
PCO2 BLD: 33.6 MMHG (ref 35–45)
PCO2 BLD: 34.8 MMHG (ref 35–45)
PCO2 BLD: 36.1 MMHG (ref 35–45)
PCO2 BLD: 41.2 MMHG (ref 35–45)
PCO2 BLD: 42.4 MMHG (ref 35–45)
PCO2 BLD: 43.4 MMHG (ref 35–45)
PCO2 BLD: 43.8 MMHG (ref 35–45)
PCO2 BLD: 46.4 MMHG (ref 35–45)
PCO2 BLD: 48.7 MMHG (ref 35–45)
PCO2 BLD: 66.2 MMHG (ref 35–45)
PCO2 BLD: 66.9 MMHG (ref 35–45)
PCO2 BLDV: 42.9 MMHG (ref 41–51)
PEEP RESPIRATORY: 8 CMH2O
PEEP RESPIRATORY: 8 CM[H2O]
PH BLD: 7.19 [PH] (ref 7.35–7.45)
PH BLD: 7.21 [PH] (ref 7.35–7.45)
PH BLD: 7.25 [PH] (ref 7.35–7.45)
PH BLD: 7.25 [PH] (ref 7.35–7.45)
PH BLD: 7.27 [PH] (ref 7.35–7.45)
PH BLD: 7.35 [PH] (ref 7.35–7.45)
PH BLD: 7.37 [PH] (ref 7.35–7.45)
PH BLD: 7.39 [PH] (ref 7.35–7.45)
PH BLD: 7.41 [PH] (ref 7.35–7.45)
PH BLD: 7.43 [PH] (ref 7.35–7.45)
PH BLD: 7.46 [PH] (ref 7.35–7.45)
PH BLD: 7.46 [PH] (ref 7.35–7.45)
PH BLD: 7.48 [PH] (ref 7.35–7.45)
PH BLD: 7.52 [PH] (ref 7.35–7.45)
PH BLDV: 7.43 [PH] (ref 7.32–7.42)
PH UR STRIP: 5 [PH] (ref 5–9)
PHOSPHATE SERPL-MCNC: 3 MG/DL (ref 2.3–3.7)
PHOSPHATE SERPL-MCNC: 3.1 MG/DL (ref 2.3–3.7)
PHOSPHATE SERPL-MCNC: 3.8 MG/DL (ref 2.3–3.7)
PHOSPHATE SERPL-MCNC: 4.3 MG/DL (ref 2.3–3.7)
PHOSPHATE SERPL-MCNC: 5.7 MG/DL (ref 2.3–3.7)
PIP ISTAT,IPIP: 25
PIP ISTAT,IPIP: 32
PIP ISTAT,IPIP: 35
PIP ISTAT,IPIP: 36
PLATELET # BLD AUTO: 10 K/UL (ref 150–450)
PLATELET # BLD AUTO: 10 K/UL (ref 150–450)
PLATELET # BLD AUTO: 130 K/UL (ref 150–450)
PLATELET # BLD AUTO: 147 K/UL (ref 150–450)
PLATELET # BLD AUTO: 15 K/UL (ref 150–450)
PLATELET # BLD AUTO: 163 K/UL (ref 150–450)
PLATELET # BLD AUTO: 202 K/UL (ref 150–450)
PLATELET # BLD AUTO: 206 K/UL (ref 150–450)
PLATELET # BLD AUTO: 216 K/UL (ref 150–450)
PLATELET # BLD AUTO: 216 K/UL (ref 150–450)
PLATELET # BLD AUTO: 218 K/UL (ref 150–450)
PLATELET # BLD AUTO: 224 K/UL (ref 150–450)
PLATELET # BLD AUTO: 23 K/UL (ref 150–450)
PLATELET # BLD AUTO: 24 K/UL (ref 150–450)
PLATELET # BLD AUTO: 24 K/UL (ref 150–450)
PLATELET # BLD AUTO: 28 K/UL (ref 150–450)
PLATELET # BLD AUTO: 36 K/UL (ref 150–450)
PLATELET # BLD AUTO: 38 K/UL (ref 150–450)
PLATELET # BLD AUTO: 40 K/UL (ref 150–450)
PLATELET # BLD AUTO: 7 K/UL (ref 150–450)
PLATELET # BLD AUTO: 94 K/UL (ref 150–450)
PLATELET # BLD AUTO: 96 K/UL (ref 150–450)
PLATELET COMMENTS,PCOM: ABNORMAL
PLATELET COMMENTS,PCOM: ADEQUATE
PLATELET COMMENTS,PCOM: SLIGHT
PLEASE NOTE, SPNG4: NORMAL
PMV BLD AUTO: 10.1 FL (ref 9.4–12.3)
PMV BLD AUTO: 10.3 FL (ref 9.4–12.3)
PMV BLD AUTO: 10.5 FL (ref 9.4–12.3)
PMV BLD AUTO: 10.8 FL (ref 9.4–12.3)
PMV BLD AUTO: 11.2 FL (ref 9.4–12.3)
PMV BLD AUTO: 8.9 FL (ref 9.4–12.3)
PMV BLD AUTO: 9.1 FL (ref 9.4–12.3)
PMV BLD AUTO: 9.2 FL (ref 9.4–12.3)
PMV BLD AUTO: 9.3 FL (ref 9.4–12.3)
PMV BLD AUTO: 9.3 FL (ref 9.4–12.3)
PMV BLD AUTO: 9.4 FL (ref 9.4–12.3)
PMV BLD AUTO: 9.5 FL (ref 9.4–12.3)
PMV BLD AUTO: 9.7 FL (ref 9.4–12.3)
PMV BLD AUTO: ABNORMAL FL (ref 9.4–12.3)
PO2 BLD: 116 MMHG (ref 75–100)
PO2 BLD: 120 MMHG (ref 75–100)
PO2 BLD: 127 MMHG (ref 75–100)
PO2 BLD: 128 MMHG (ref 75–100)
PO2 BLD: 130 MMHG (ref 75–100)
PO2 BLD: 226 MMHG (ref 75–100)
PO2 BLD: 304 MMHG (ref 75–100)
PO2 BLD: 39 MMHG (ref 75–100)
PO2 BLD: 520 MMHG (ref 75–100)
PO2 BLD: 618 MMHG (ref 75–100)
PO2 BLD: 77 MMHG (ref 75–100)
PO2 BLD: 85 MMHG (ref 75–100)
PO2 BLD: 91 MMHG (ref 75–100)
PO2 BLD: 94 MMHG (ref 75–100)
PO2 BLDV: 49 MMHG
POTASSIUM BLD-SCNC: 4.8 MMOL/L (ref 3.5–5.1)
POTASSIUM SERPL-SCNC: 2.8 MMOL/L (ref 3.5–5.1)
POTASSIUM SERPL-SCNC: 2.9 MMOL/L (ref 3.5–5.1)
POTASSIUM SERPL-SCNC: 2.9 MMOL/L (ref 3.5–5.1)
POTASSIUM SERPL-SCNC: 3 MMOL/L (ref 3.5–5.1)
POTASSIUM SERPL-SCNC: 3 MMOL/L (ref 3.5–5.1)
POTASSIUM SERPL-SCNC: 3.1 MMOL/L (ref 3.5–5.1)
POTASSIUM SERPL-SCNC: 3.4 MMOL/L (ref 3.5–5.1)
POTASSIUM SERPL-SCNC: 3.4 MMOL/L (ref 3.5–5.1)
POTASSIUM SERPL-SCNC: 3.5 MMOL/L (ref 3.5–5.1)
POTASSIUM SERPL-SCNC: 3.5 MMOL/L (ref 3.5–5.1)
POTASSIUM SERPL-SCNC: 3.7 MMOL/L (ref 3.5–5.1)
POTASSIUM SERPL-SCNC: 3.8 MMOL/L (ref 3.5–5.1)
POTASSIUM SERPL-SCNC: 4.1 MMOL/L (ref 3.5–5.1)
POTASSIUM SERPL-SCNC: 4.2 MMOL/L (ref 3.5–5.1)
POTASSIUM SERPL-SCNC: 4.3 MMOL/L (ref 3.5–5.1)
POTASSIUM SERPL-SCNC: 4.6 MMOL/L (ref 3.5–5.1)
POTASSIUM SERPL-SCNC: 4.7 MMOL/L (ref 3.5–5.1)
POTASSIUM SERPL-SCNC: 4.8 MMOL/L (ref 3.5–5.1)
POTASSIUM SERPL-SCNC: 4.8 MMOL/L (ref 3.5–5.1)
POTASSIUM SERPL-SCNC: 5 MMOL/L (ref 3.5–5.1)
POTASSIUM SERPL-SCNC: 5.3 MMOL/L (ref 3.5–5.1)
POTASSIUM SERPL-SCNC: 5.5 MMOL/L (ref 3.5–5.1)
POTASSIUM SERPL-SCNC: 6.1 MMOL/L (ref 3.5–5.1)
PRESSURE SUPPORT SETTING VENT: 10 CMH2O
PRESSURE SUPPORT SETTING VENT: 12 CMH2O
PRESSURE SUPPORT SETTING VENT: 16 CMH2O
PROCALCITONIN SERPL-MCNC: 12.49 NG/ML
PROT FLD-MCNC: 5.1 G/DL
PROT SERPL-MCNC: 4.7 G/DL (ref 6.3–8.2)
PROT SERPL-MCNC: 4.8 G/DL (ref 6.3–8.2)
PROT SERPL-MCNC: 5.1 G/DL (ref 6.3–8.2)
PROT SERPL-MCNC: 5.4 G/DL (ref 6.3–8.2)
PROT SERPL-MCNC: 5.4 G/DL (ref 6.3–8.2)
PROT SERPL-MCNC: 5.5 G/DL (ref 6.3–8.2)
PROT SERPL-MCNC: 5.6 G/DL (ref 6.3–8.2)
PROT SERPL-MCNC: 6 G/DL (ref 6.3–8.2)
PROT SERPL-MCNC: 6.4 G/DL (ref 6.3–8.2)
PROT SERPL-MCNC: 6.6 G/DL (ref 6.3–8.2)
PROT SERPL-MCNC: 6.8 G/DL (ref 6.3–8.2)
PROT SERPL-MCNC: 6.8 G/DL (ref 6.3–8.2)
PROT SERPL-MCNC: 6.9 G/DL (ref 6.3–8.2)
PROT SERPL-MCNC: 7.2 G/DL (ref 6.3–8.2)
PROT SERPL-MCNC: 7.2 G/DL (ref 6.3–8.2)
PROT SERPL-MCNC: 7.4 G/DL (ref 6.3–8.2)
PROT SERPL-MCNC: 7.4 G/DL (ref 6.3–8.2)
PROT SERPL-MCNC: 7.5 G/DL (ref 6.3–8.2)
PROT SERPL-MCNC: 7.6 G/DL (ref 6.3–8.2)
PROT UR STRIP-MCNC: 30 MG/DL
PROT UR-MCNC: 139 MG/DL
PROT/CREAT UR-RTO: 1.5
PROTHROMBIN TIME: 20.8 SEC (ref 12.5–14.7)
PROTHROMBIN TIME: 21.1 SEC (ref 12.5–14.7)
PROTHROMBIN TIME: 21.1 SEC (ref 12.5–14.7)
PROTHROMBIN TIME: 21.2 SEC (ref 12.5–14.7)
PROTHROMBIN TIME: 21.7 SEC (ref 12.5–14.7)
PROTHROMBIN TIME: 22.3 SEC (ref 12.5–14.7)
PROTHROMBIN TIME: 30.5 SEC (ref 12.5–14.7)
Q-T INTERVAL, ECG07: 304 MS
Q-T INTERVAL, ECG07: 308 MS
Q-T INTERVAL, ECG07: 308 MS
QRS DURATION, ECG06: 100 MS
QRS DURATION, ECG06: 104 MS
QRS DURATION, ECG06: 94 MS
QTC CALCULATION (BEZET), ECG08: 431 MS
QTC CALCULATION (BEZET), ECG08: 435 MS
QTC CALCULATION (BEZET), ECG08: 444 MS
RBC # BLD AUTO: 2.37 M/UL (ref 4.23–5.6)
RBC # BLD AUTO: 2.67 M/UL (ref 4.23–5.6)
RBC # BLD AUTO: 2.74 M/UL (ref 4.23–5.6)
RBC # BLD AUTO: 2.76 M/UL (ref 4.23–5.6)
RBC # BLD AUTO: 2.8 M/UL (ref 4.23–5.6)
RBC # BLD AUTO: 2.89 M/UL (ref 4.23–5.6)
RBC # BLD AUTO: 2.95 M/UL (ref 4.23–5.6)
RBC # BLD AUTO: 2.97 M/UL (ref 4.23–5.6)
RBC # BLD AUTO: 3 M/UL (ref 4.23–5.6)
RBC # BLD AUTO: 3.08 M/UL (ref 4.23–5.6)
RBC # BLD AUTO: 3.32 M/UL (ref 4.23–5.6)
RBC # BLD AUTO: 3.39 M/UL (ref 4.23–5.6)
RBC # BLD AUTO: 3.48 M/UL (ref 4.23–5.6)
RBC # BLD AUTO: 3.54 M/UL (ref 4.23–5.6)
RBC # BLD AUTO: 3.59 M/UL (ref 4.23–5.6)
RBC # BLD AUTO: 3.67 M/UL (ref 4.23–5.67)
RBC # BLD AUTO: 3.71 M/UL (ref 4.23–5.67)
RBC # BLD AUTO: 3.78 M/UL (ref 4.23–5.6)
RBC # BLD AUTO: 3.87 M/UL (ref 4.23–5.67)
RBC # BLD AUTO: 3.95 M/UL (ref 4.23–5.6)
RBC # BLD AUTO: 4.03 M/UL (ref 4.23–5.6)
RBC # FLD: NORMAL /CU MM
RBC #/AREA URNS HPF: ABNORMAL /HPF
RBC MORPH BLD: ABNORMAL
REFERENCE LAB,REFLB: NORMAL
REFERENCE LAB,REFLB: NORMAL
REFLEX TO ID, RFCO3T: NORMAL
RESPIRATORY RATE: 12 (ref 5–40)
RSV RNA SPEC QL NAA+PROBE: NOT DETECTED
RV+EV RNA SPEC QL NAA+PROBE: NOT DETECTED
S PNEUM AG SPEC QL LA: NEGATIVE
SAO2 % BLD: 100 %
SAO2 % BLD: 100 % (ref 95–98)
SAO2 % BLD: 73.9 % (ref 95–98)
SAO2 % BLD: 94 % (ref 95–98)
SAO2 % BLD: 95.9 % (ref 95–98)
SAO2 % BLD: 97.4 % (ref 95–98)
SAO2 % BLD: 97.9 % (ref 95–98)
SAO2 % BLD: 98.4 % (ref 95–98)
SAO2 % BLD: 98.6 % (ref 95–98)
SAO2 % BLD: 98.8 % (ref 95–98)
SAO2 % BLD: 98.9 % (ref 95–98)
SAO2 % BLD: 99 % (ref 95–98)
SAO2 % BLD: 99.6 % (ref 95–98)
SAO2 % BLD: 99.9 % (ref 95–98)
SAO2 % BLDV: 84.7 % (ref 65–88)
SARS-COV-2 PCR, COVPCR: NOT DETECTED
SERVICE CMNT-IMP: ABNORMAL
SERVICE CMNT-IMP: NORMAL
SODIUM BLD-SCNC: 134 MMOL/L (ref 136–145)
SODIUM SERPL-SCNC: 134 MMOL/L (ref 136–145)
SODIUM SERPL-SCNC: 134 MMOL/L (ref 136–145)
SODIUM SERPL-SCNC: 134 MMOL/L (ref 138–145)
SODIUM SERPL-SCNC: 136 MMOL/L (ref 136–145)
SODIUM SERPL-SCNC: 136 MMOL/L (ref 138–145)
SODIUM SERPL-SCNC: 137 MMOL/L (ref 136–145)
SODIUM SERPL-SCNC: 138 MMOL/L (ref 136–145)
SODIUM SERPL-SCNC: 139 MMOL/L (ref 136–145)
SODIUM SERPL-SCNC: 139 MMOL/L (ref 136–145)
SODIUM SERPL-SCNC: 140 MMOL/L (ref 136–145)
SODIUM SERPL-SCNC: 144 MMOL/L (ref 138–145)
SODIUM SERPL-SCNC: 148 MMOL/L (ref 136–145)
SODIUM SERPL-SCNC: 149 MMOL/L (ref 136–145)
SODIUM SERPL-SCNC: 151 MMOL/L (ref 138–145)
SODIUM SERPL-SCNC: 153 MMOL/L (ref 136–145)
SODIUM SERPL-SCNC: 153 MMOL/L (ref 138–145)
SODIUM SERPL-SCNC: 154 MMOL/L (ref 138–145)
SP GR UR REFRACTOMETRY: 1.02 (ref 1–1.02)
SPECIMEN EXP DATE BLD: NORMAL
SPECIMEN PREPARATION: NORMAL
SPECIMEN SITE: ABNORMAL
SPECIMEN SOURCE FLD: NORMAL
SPECIMEN SOURCE: NORMAL
SPECIMEN TYPE: ABNORMAL
SPECIMEN, SPNG1: NORMAL
STATUS OF UNIT,%ST: NORMAL
TEST DESCRIPTION:,ATST: NORMAL
TEST DESCRIPTION:,ATST: NORMAL
TOTAL RESP. RATE, ITRR: 18
TOTAL RESP. RATE, ITRR: 21
TOTAL RESP. RATE, ITRR: 22
TOTAL RESP. RATE, ITRR: 25
TOTAL RESP. RATE, ITRR: 26
TOTAL RESP. RATE, ITRR: 30
TOTAL RESP. RATE, ITRR: 35
TSH SERPL DL<=0.005 MIU/L-ACNC: 1.94 UIU/ML (ref 0.36–3.74)
UFH PPP CHRO-ACNC: >1.1 IU/ML (ref 0.3–0.7)
UNIT DIVISION, %UDIV: 0
URATE SERPL-MCNC: 8.9 MG/DL (ref 2.6–6)
UROBILINOGEN UR QL STRIP.AUTO: 2 EU/DL (ref 0.2–1)
VANCOMYCIN SERPL-MCNC: 13 UG/ML
VANCOMYCIN SERPL-MCNC: 15.2 UG/ML
VENTILATION MODE VENT: ABNORMAL
VENTRICULAR RATE, ECG03: 120 BPM
VENTRICULAR RATE, ECG03: 121 BPM
VENTRICULAR RATE, ECG03: 125 BPM
VT SETTING VENT: 460 ML
VT SETTING VENT: 500 ML
VT SETTING VENT: 550 ML
WBC # BLD AUTO: 0.1 K/UL (ref 4.3–11.1)
WBC # BLD AUTO: 0.2 K/UL (ref 4.3–11.1)
WBC # BLD AUTO: 0.3 K/UL (ref 4.3–11.1)
WBC # BLD AUTO: 0.4 K/UL (ref 4.3–11.1)
WBC # BLD AUTO: 0.4 K/UL (ref 4.3–11.1)
WBC # BLD AUTO: 0.9 K/UL (ref 4.3–11.1)
WBC # BLD AUTO: 10.1 K/UL (ref 4.3–11.1)
WBC # BLD AUTO: 10.7 K/UL (ref 4.3–11.1)
WBC # BLD AUTO: 2.1 K/UL (ref 4.3–11.1)
WBC # BLD AUTO: 4.3 K/UL (ref 4.3–11.1)
WBC # BLD AUTO: 4.7 K/UL (ref 4.3–11.1)
WBC # BLD AUTO: 5 K/UL (ref 4.3–11.1)
WBC # BLD AUTO: 5.3 K/UL (ref 4.3–11.1)
WBC # BLD AUTO: 5.8 K/UL (ref 4.3–11.1)
WBC # BLD AUTO: 6 K/UL (ref 4.3–11.1)
WBC # BLD AUTO: 6.2 K/UL (ref 4.3–11.1)
WBC # BLD AUTO: 7.3 K/UL (ref 4.3–11.1)
WBC # BLD AUTO: 8.6 K/UL (ref 4.3–11.1)
WBC MORPH BLD: ABNORMAL
WBC URNS QL MICRO: ABNORMAL /HPF
YEAST URNS QL MICRO: ABNORMAL

## 2021-01-01 PROCEDURE — 99232 SBSQ HOSP IP/OBS MODERATE 35: CPT | Performed by: INTERNAL MEDICINE

## 2021-01-01 PROCEDURE — 36592 COLLECT BLOOD FROM PICC: CPT

## 2021-01-01 PROCEDURE — 74011250636 HC RX REV CODE- 250/636: Performed by: NURSE PRACTITIONER

## 2021-01-01 PROCEDURE — 74011250636 HC RX REV CODE- 250/636: Performed by: INTERNAL MEDICINE

## 2021-01-01 PROCEDURE — 74011250637 HC RX REV CODE- 250/637: Performed by: HOSPITALIST

## 2021-01-01 PROCEDURE — 84443 ASSAY THYROID STIM HORMONE: CPT

## 2021-01-01 PROCEDURE — 86901 BLOOD TYPING SEROLOGIC RH(D): CPT

## 2021-01-01 PROCEDURE — 74011000250 HC RX REV CODE- 250: Performed by: INTERNAL MEDICINE

## 2021-01-01 PROCEDURE — 0BH17EZ INSERTION OF ENDOTRACHEAL AIRWAY INTO TRACHEA, VIA NATURAL OR ARTIFICIAL OPENING: ICD-10-PCS | Performed by: INTERNAL MEDICINE

## 2021-01-01 PROCEDURE — 80053 COMPREHEN METABOLIC PANEL: CPT

## 2021-01-01 PROCEDURE — 36600 WITHDRAWAL OF ARTERIAL BLOOD: CPT

## 2021-01-01 PROCEDURE — 74011250637 HC RX REV CODE- 250/637: Performed by: INTERNAL MEDICINE

## 2021-01-01 PROCEDURE — 65610000001 HC ROOM ICU GENERAL

## 2021-01-01 PROCEDURE — 94640 AIRWAY INHALATION TREATMENT: CPT

## 2021-01-01 PROCEDURE — 71045 X-RAY EXAM CHEST 1 VIEW: CPT

## 2021-01-01 PROCEDURE — 96367 TX/PROPH/DG ADDL SEQ IV INF: CPT

## 2021-01-01 PROCEDURE — 76775 US EXAM ABDO BACK WALL LIM: CPT

## 2021-01-01 PROCEDURE — 85027 COMPLETE CBC AUTOMATED: CPT

## 2021-01-01 PROCEDURE — 77030018798 HC PMP KT ENTRL FED COVD -A

## 2021-01-01 PROCEDURE — 96366 THER/PROPH/DIAG IV INF ADDON: CPT

## 2021-01-01 PROCEDURE — 94003 VENT MGMT INPAT SUBQ DAY: CPT

## 2021-01-01 PROCEDURE — 74011636637 HC RX REV CODE- 636/637: Performed by: INTERNAL MEDICINE

## 2021-01-01 PROCEDURE — 87077 CULTURE AEROBIC IDENTIFY: CPT

## 2021-01-01 PROCEDURE — 85520 HEPARIN ASSAY: CPT

## 2021-01-01 PROCEDURE — 83735 ASSAY OF MAGNESIUM: CPT

## 2021-01-01 PROCEDURE — 87449 NOS EACH ORGANISM AG IA: CPT

## 2021-01-01 PROCEDURE — 96368 THER/DIAG CONCURRENT INF: CPT

## 2021-01-01 PROCEDURE — 99231 SBSQ HOSP IP/OBS SF/LOW 25: CPT | Performed by: INTERNAL MEDICINE

## 2021-01-01 PROCEDURE — 94660 CPAP INITIATION&MGMT: CPT

## 2021-01-01 PROCEDURE — 93005 ELECTROCARDIOGRAM TRACING: CPT

## 2021-01-01 PROCEDURE — 85025 COMPLETE CBC W/AUTO DIFF WBC: CPT

## 2021-01-01 PROCEDURE — 99233 SBSQ HOSP IP/OBS HIGH 50: CPT | Performed by: INTERNAL MEDICINE

## 2021-01-01 PROCEDURE — 77030014147 HC TY THORCENT PARA TELE -B: Performed by: INTERNAL MEDICINE

## 2021-01-01 PROCEDURE — 82378 CARCINOEMBRYONIC ANTIGEN: CPT

## 2021-01-01 PROCEDURE — 82803 BLOOD GASES ANY COMBINATION: CPT

## 2021-01-01 PROCEDURE — 74011000258 HC RX REV CODE- 258: Performed by: INTERNAL MEDICINE

## 2021-01-01 PROCEDURE — 74011250636 HC RX REV CODE- 250/636: Performed by: HOSPITALIST

## 2021-01-01 PROCEDURE — 74011000250 HC RX REV CODE- 250: Performed by: HOSPITALIST

## 2021-01-01 PROCEDURE — 97161 PT EVAL LOW COMPLEX 20 MIN: CPT | Performed by: PHYSICAL THERAPIST

## 2021-01-01 PROCEDURE — 65620000000 HC RM CCU GENERAL

## 2021-01-01 PROCEDURE — 82962 GLUCOSE BLOOD TEST: CPT

## 2021-01-01 PROCEDURE — 77030019905 HC CATH URETH INTMIT MDII -A

## 2021-01-01 PROCEDURE — 93005 ELECTROCARDIOGRAM TRACING: CPT | Performed by: INTERNAL MEDICINE

## 2021-01-01 PROCEDURE — 87536 HIV-1 QUANT&REVRSE TRNSCRPJ: CPT

## 2021-01-01 PROCEDURE — 85610 PROTHROMBIN TIME: CPT

## 2021-01-01 PROCEDURE — 36415 COLL VENOUS BLD VENIPUNCTURE: CPT

## 2021-01-01 PROCEDURE — 74011250636 HC RX REV CODE- 250/636

## 2021-01-01 PROCEDURE — 82945 GLUCOSE OTHER FLUID: CPT

## 2021-01-01 PROCEDURE — 80202 ASSAY OF VANCOMYCIN: CPT

## 2021-01-01 PROCEDURE — 77030041175 HC BAG DIGNSHLD BARD -A

## 2021-01-01 PROCEDURE — 77030008771 HC TU NG SALEM SUMP -A

## 2021-01-01 PROCEDURE — 31500 INSERT EMERGENCY AIRWAY: CPT | Performed by: INTERNAL MEDICINE

## 2021-01-01 PROCEDURE — APPSS45 APP SPLIT SHARED TIME 31-45 MINUTES: Performed by: NURSE PRACTITIONER

## 2021-01-01 PROCEDURE — 2709999900 HC NON-CHARGEABLE SUPPLY

## 2021-01-01 PROCEDURE — 77030034850

## 2021-01-01 PROCEDURE — 92950 HEART/LUNG RESUSCITATION CPR: CPT | Performed by: INTERNAL MEDICINE

## 2021-01-01 PROCEDURE — 77030040393 HC DRSG OPTIFOAM GENT MDII -B

## 2021-01-01 PROCEDURE — 99291 CRITICAL CARE FIRST HOUR: CPT | Performed by: INTERNAL MEDICINE

## 2021-01-01 PROCEDURE — 96375 TX/PRO/DX INJ NEW DRUG ADDON: CPT

## 2021-01-01 PROCEDURE — 0202U NFCT DS 22 TRGT SARS-COV-2: CPT

## 2021-01-01 PROCEDURE — 74011636637 HC RX REV CODE- 636/637: Performed by: FAMILY MEDICINE

## 2021-01-01 PROCEDURE — 87899 AGENT NOS ASSAY W/OPTIC: CPT

## 2021-01-01 PROCEDURE — 84132 ASSAY OF SERUM POTASSIUM: CPT

## 2021-01-01 PROCEDURE — 99497 ADVNCD CARE PLAN 30 MIN: CPT | Performed by: INTERNAL MEDICINE

## 2021-01-01 PROCEDURE — 89050 BODY FLUID CELL COUNT: CPT

## 2021-01-01 PROCEDURE — 96413 CHEMO IV INFUSION 1 HR: CPT

## 2021-01-01 PROCEDURE — 2709999900 HC NON-CHARGEABLE SUPPLY: Performed by: INTERNAL MEDICINE

## 2021-01-01 PROCEDURE — 84550 ASSAY OF BLOOD/URIC ACID: CPT

## 2021-01-01 PROCEDURE — 99152 MOD SED SAME PHYS/QHP 5/>YRS: CPT

## 2021-01-01 PROCEDURE — 84100 ASSAY OF PHOSPHORUS: CPT

## 2021-01-01 PROCEDURE — 83036 HEMOGLOBIN GLYCOSYLATED A1C: CPT

## 2021-01-01 PROCEDURE — 74011000250 HC RX REV CODE- 250

## 2021-01-01 PROCEDURE — 71046 X-RAY EXAM CHEST 2 VIEWS: CPT

## 2021-01-01 PROCEDURE — 97535 SELF CARE MNGMENT TRAINING: CPT

## 2021-01-01 PROCEDURE — 99223 1ST HOSP IP/OBS HIGH 75: CPT | Performed by: INTERNAL MEDICINE

## 2021-01-01 PROCEDURE — 86644 CMV ANTIBODY: CPT

## 2021-01-01 PROCEDURE — 71250 CT THORAX DX C-: CPT

## 2021-01-01 PROCEDURE — APPSS60 APP SPLIT SHARED TIME 46-60 MINUTES: Performed by: NURSE PRACTITIONER

## 2021-01-01 PROCEDURE — 74011000250 HC RX REV CODE- 250: Performed by: RADIOLOGY

## 2021-01-01 PROCEDURE — 5A1955Z RESPIRATORY VENTILATION, GREATER THAN 96 CONSECUTIVE HOURS: ICD-10-PCS | Performed by: INTERNAL MEDICINE

## 2021-01-01 PROCEDURE — 74011250637 HC RX REV CODE- 250/637: Performed by: FAMILY MEDICINE

## 2021-01-01 PROCEDURE — 80074 ACUTE HEPATITIS PANEL: CPT

## 2021-01-01 PROCEDURE — P9037 PLATE PHERES LEUKOREDU IRRAD: HCPCS

## 2021-01-01 PROCEDURE — 87186 SC STD MICRODIL/AGAR DIL: CPT

## 2021-01-01 PROCEDURE — 83605 ASSAY OF LACTIC ACID: CPT

## 2021-01-01 PROCEDURE — 87086 URINE CULTURE/COLONY COUNT: CPT

## 2021-01-01 PROCEDURE — 32555 ASPIRATE PLEURA W/ IMAGING: CPT | Performed by: INTERNAL MEDICINE

## 2021-01-01 PROCEDURE — 97165 OT EVAL LOW COMPLEX 30 MIN: CPT

## 2021-01-01 PROCEDURE — 77030021668 HC NEB PREFIL KT VYRM -A

## 2021-01-01 PROCEDURE — 87641 MR-STAPH DNA AMP PROBE: CPT

## 2021-01-01 PROCEDURE — 76604 US EXAM CHEST: CPT | Performed by: INTERNAL MEDICINE

## 2021-01-01 PROCEDURE — 83880 ASSAY OF NATRIURETIC PEPTIDE: CPT

## 2021-01-01 PROCEDURE — 74011000258 HC RX REV CODE- 258: Performed by: NURSE PRACTITIONER

## 2021-01-01 PROCEDURE — 97112 NEUROMUSCULAR REEDUCATION: CPT

## 2021-01-01 PROCEDURE — 77030041247 HC PROTECTOR HEEL HEELMEDIX MDII -B

## 2021-01-01 PROCEDURE — 74011000258 HC RX REV CODE- 258: Performed by: HOSPITALIST

## 2021-01-01 PROCEDURE — 87799 DETECT AGENT NOS DNA QUANT: CPT

## 2021-01-01 PROCEDURE — APPSS30 APP SPLIT SHARED TIME 16-30 MINUTES: Performed by: NURSE PRACTITIONER

## 2021-01-01 PROCEDURE — P9040 RBC LEUKOREDUCED IRRADIATED: HCPCS

## 2021-01-01 PROCEDURE — 77030040829 HC CATH EXT URINE MDII -B

## 2021-01-01 PROCEDURE — 99213 OFFICE O/P EST LOW 20 MIN: CPT | Performed by: INTERNAL MEDICINE

## 2021-01-01 PROCEDURE — 77030040831 HC BAG URINE DRNG MDII -A

## 2021-01-01 PROCEDURE — 88112 CYTOPATH CELL ENHANCE TECH: CPT

## 2021-01-01 PROCEDURE — 76040000019: Performed by: INTERNAL MEDICINE

## 2021-01-01 PROCEDURE — 77030013032 HC MSK BPAP/CPAP FISP -B

## 2021-01-01 PROCEDURE — C1729 CATH, DRAINAGE: HCPCS

## 2021-01-01 PROCEDURE — 36430 TRANSFUSION BLD/BLD COMPNT: CPT

## 2021-01-01 PROCEDURE — 87106 FUNGI IDENTIFICATION YEAST: CPT

## 2021-01-01 PROCEDURE — 85018 HEMOGLOBIN: CPT

## 2021-01-01 PROCEDURE — 76705 ECHO EXAM OF ABDOMEN: CPT

## 2021-01-01 PROCEDURE — 84157 ASSAY OF PROTEIN OTHER: CPT

## 2021-01-01 PROCEDURE — 47000 NEEDLE BIOPSY OF LIVER PERQ: CPT

## 2021-01-01 PROCEDURE — 77030029488 HC ELECTRD PAD DEFIB ZOLL -B

## 2021-01-01 PROCEDURE — 85730 THROMBOPLASTIN TIME PARTIAL: CPT

## 2021-01-01 PROCEDURE — 87798 DETECT AGENT NOS DNA AMP: CPT

## 2021-01-01 PROCEDURE — 88305 TISSUE EXAM BY PATHOLOGIST: CPT

## 2021-01-01 PROCEDURE — 87070 CULTURE OTHR SPECIMN AEROBIC: CPT

## 2021-01-01 PROCEDURE — 87205 SMEAR GRAM STAIN: CPT

## 2021-01-01 PROCEDURE — 96365 THER/PROPH/DIAG IV INF INIT: CPT

## 2021-01-01 PROCEDURE — 84295 ASSAY OF SERUM SODIUM: CPT

## 2021-01-01 PROCEDURE — 74018 RADEX ABDOMEN 1 VIEW: CPT

## 2021-01-01 PROCEDURE — 99239 HOSP IP/OBS DSCHRG MGMT >30: CPT | Performed by: INTERNAL MEDICINE

## 2021-01-01 PROCEDURE — 85049 AUTOMATED PLATELET COUNT: CPT

## 2021-01-01 PROCEDURE — C8929 TTE W OR WO FOL WCON,DOPPLER: HCPCS

## 2021-01-01 PROCEDURE — 86022 PLATELET ANTIBODIES: CPT

## 2021-01-01 PROCEDURE — 82140 ASSAY OF AMMONIA: CPT

## 2021-01-01 PROCEDURE — APPSS180 APP SPLIT SHARED TIME > 60 MINUTES: Performed by: NURSE PRACTITIONER

## 2021-01-01 PROCEDURE — 87107 FUNGI IDENTIFICATION MOLD: CPT

## 2021-01-01 PROCEDURE — 87116 MYCOBACTERIA CULTURE: CPT

## 2021-01-01 PROCEDURE — 74011250636 HC RX REV CODE- 250/636: Performed by: RADIOLOGY

## 2021-01-01 PROCEDURE — 87040 BLOOD CULTURE FOR BACTERIA: CPT

## 2021-01-01 PROCEDURE — 81001 URINALYSIS AUTO W/SCOPE: CPT

## 2021-01-01 PROCEDURE — 74011000258 HC RX REV CODE- 258

## 2021-01-01 PROCEDURE — 77030003560 HC NDL HUBR BARD -A

## 2021-01-01 PROCEDURE — 96417 CHEMO IV INFUS EACH ADDL SEQ: CPT

## 2021-01-01 PROCEDURE — 86923 COMPATIBILITY TEST ELECTRIC: CPT

## 2021-01-01 PROCEDURE — 99222 1ST HOSP IP/OBS MODERATE 55: CPT | Performed by: INTERNAL MEDICINE

## 2021-01-01 PROCEDURE — 87102 FUNGUS ISOLATION CULTURE: CPT

## 2021-01-01 PROCEDURE — 77010033678 HC OXYGEN DAILY

## 2021-01-01 PROCEDURE — P9016 RBC LEUKOCYTES REDUCED: HCPCS

## 2021-01-01 PROCEDURE — 92950 HEART/LUNG RESUSCITATION CPR: CPT

## 2021-01-01 PROCEDURE — 76450000000

## 2021-01-01 PROCEDURE — 84145 PROCALCITONIN (PCT): CPT

## 2021-01-01 PROCEDURE — 84156 ASSAY OF PROTEIN URINE: CPT

## 2021-01-01 PROCEDURE — 77030040361 HC SLV COMPR DVT MDII -B

## 2021-01-01 PROCEDURE — 82330 ASSAY OF CALCIUM: CPT

## 2021-01-01 PROCEDURE — 83615 LACTATE (LD) (LDH) ENZYME: CPT

## 2021-01-01 PROCEDURE — 99285 EMERGENCY DEPT VISIT HI MDM: CPT

## 2021-01-01 PROCEDURE — 81003 URINALYSIS AUTO W/O SCOPE: CPT

## 2021-01-01 PROCEDURE — 5A09457 ASSISTANCE WITH RESPIRATORY VENTILATION, 24-96 CONSECUTIVE HOURS, CONTINUOUS POSITIVE AIRWAY PRESSURE: ICD-10-PCS | Performed by: NURSE PRACTITIONER

## 2021-01-01 PROCEDURE — 36591 DRAW BLOOD OFF VENOUS DEVICE: CPT

## 2021-01-01 PROCEDURE — 77030012390 HC DRN CHST BTL GTNG -B

## 2021-01-01 PROCEDURE — 97530 THERAPEUTIC ACTIVITIES: CPT | Performed by: PHYSICAL THERAPIST

## 2021-01-01 PROCEDURE — 82728 ASSAY OF FERRITIN: CPT

## 2021-01-01 PROCEDURE — 88307 TISSUE EXAM BY PATHOLOGIST: CPT

## 2021-01-01 PROCEDURE — 94002 VENT MGMT INPAT INIT DAY: CPT

## 2021-01-01 PROCEDURE — 5A12012 PERFORMANCE OF CARDIAC OUTPUT, SINGLE, MANUAL: ICD-10-PCS | Performed by: INTERNAL MEDICINE

## 2021-01-01 PROCEDURE — 94664 DEMO&/EVAL PT USE INHALER: CPT

## 2021-01-01 RX ORDER — SODIUM CHLORIDE 9 MG/ML
250 INJECTION, SOLUTION INTRAVENOUS AS NEEDED
Status: DISCONTINUED | OUTPATIENT
Start: 2021-01-01 | End: 2021-01-01

## 2021-01-01 RX ORDER — METOPROLOL SUCCINATE 25 MG/1
12.5 TABLET, EXTENDED RELEASE ORAL DAILY
Status: DISCONTINUED | OUTPATIENT
Start: 2021-01-01 | End: 2021-01-01 | Stop reason: HOSPADM

## 2021-01-01 RX ORDER — MORPHINE SULFATE 2 MG/ML
2 INJECTION, SOLUTION INTRAMUSCULAR; INTRAVENOUS ONCE
Status: COMPLETED | OUTPATIENT
Start: 2021-01-01 | End: 2021-01-01

## 2021-01-01 RX ORDER — LORAZEPAM 2 MG/ML
1 INJECTION INTRAMUSCULAR
Status: DISCONTINUED | OUTPATIENT
Start: 2021-01-01 | End: 2021-01-01

## 2021-01-01 RX ORDER — SODIUM CHLORIDE 0.9 % (FLUSH) 0.9 %
5-10 SYRINGE (ML) INJECTION EVERY 8 HOURS
Status: DISCONTINUED | OUTPATIENT
Start: 2021-01-01 | End: 2021-01-01 | Stop reason: HOSPADM

## 2021-01-01 RX ORDER — SODIUM CHLORIDE 0.9 % (FLUSH) 0.9 %
10 SYRINGE (ML) INJECTION AS NEEDED
Status: ACTIVE | OUTPATIENT
Start: 2021-01-01 | End: 2021-01-01

## 2021-01-01 RX ORDER — ONDANSETRON 2 MG/ML
4 INJECTION INTRAMUSCULAR; INTRAVENOUS
Status: DISCONTINUED | OUTPATIENT
Start: 2021-01-01 | End: 2021-01-01 | Stop reason: HOSPADM

## 2021-01-01 RX ORDER — SODIUM CHLORIDE 0.9 % (FLUSH) 0.9 %
5-40 SYRINGE (ML) INJECTION AS NEEDED
Status: DISCONTINUED | OUTPATIENT
Start: 2021-01-01 | End: 2021-01-01 | Stop reason: HOSPADM

## 2021-01-01 RX ORDER — MIDODRINE HYDROCHLORIDE 5 MG/1
10 TABLET ORAL
Status: DISCONTINUED | OUTPATIENT
Start: 2021-01-01 | End: 2021-01-01

## 2021-01-01 RX ORDER — POTASSIUM CHLORIDE 14.9 MG/ML
20 INJECTION INTRAVENOUS
Status: COMPLETED | OUTPATIENT
Start: 2021-01-01 | End: 2021-01-01

## 2021-01-01 RX ORDER — HEPARIN SODIUM 5000 [USP'U]/ML
35 INJECTION, SOLUTION INTRAVENOUS; SUBCUTANEOUS ONCE
Status: COMPLETED | OUTPATIENT
Start: 2021-01-01 | End: 2021-01-01

## 2021-01-01 RX ORDER — SODIUM CHLORIDE 9 MG/ML
25 INJECTION, SOLUTION INTRAVENOUS CONTINUOUS
Status: DISCONTINUED | OUTPATIENT
Start: 2021-01-01 | End: 2021-01-01 | Stop reason: HOSPADM

## 2021-01-01 RX ORDER — SODIUM CHLORIDE 0.9 % (FLUSH) 0.9 %
5-10 SYRINGE (ML) INJECTION AS NEEDED
Status: DISCONTINUED | OUTPATIENT
Start: 2021-01-01 | End: 2021-01-01 | Stop reason: HOSPADM

## 2021-01-01 RX ORDER — METOPROLOL SUCCINATE 25 MG/1
12.5 TABLET, EXTENDED RELEASE ORAL DAILY
Status: DISCONTINUED | OUTPATIENT
Start: 2021-01-01 | End: 2021-01-01

## 2021-01-01 RX ORDER — EPINEPHRINE 0.1 MG/ML
INJECTION INTRACARDIAC; INTRAVENOUS
Status: COMPLETED | OUTPATIENT
Start: 2021-01-01 | End: 2021-01-01

## 2021-01-01 RX ORDER — GLYCOPYRROLATE 0.2 MG/ML
0.1 INJECTION INTRAMUSCULAR; INTRAVENOUS
Status: DISCONTINUED | OUTPATIENT
Start: 2021-01-01 | End: 2021-01-01 | Stop reason: HOSPADM

## 2021-01-01 RX ORDER — ETOMIDATE 2 MG/ML
INJECTION INTRAVENOUS
Status: COMPLETED
Start: 2021-01-01 | End: 2021-01-01

## 2021-01-01 RX ORDER — NOREPINEPHRINE BITARTRATE/D5W 4MG/250ML
.5-3 PLASTIC BAG, INJECTION (ML) INTRAVENOUS
Status: DISCONTINUED | OUTPATIENT
Start: 2021-01-01 | End: 2021-01-01 | Stop reason: HOSPADM

## 2021-01-01 RX ORDER — SODIUM CHLORIDE 9 MG/ML
250 INJECTION, SOLUTION INTRAVENOUS AS NEEDED
Status: DISCONTINUED | OUTPATIENT
Start: 2021-01-01 | End: 2021-01-01 | Stop reason: SDUPTHER

## 2021-01-01 RX ORDER — DEXTROSE 50 % IN WATER (D50W) INTRAVENOUS SYRINGE
25
Status: COMPLETED | OUTPATIENT
Start: 2021-01-01 | End: 2021-01-01

## 2021-01-01 RX ORDER — SODIUM POLYSTYRENE SULFONATE 15 G/60ML
30 SUSPENSION ORAL; RECTAL
Status: COMPLETED | OUTPATIENT
Start: 2021-01-01 | End: 2021-01-01

## 2021-01-01 RX ORDER — LORAZEPAM 2 MG/ML
0.5 INJECTION INTRAMUSCULAR
Status: COMPLETED | OUTPATIENT
Start: 2021-01-01 | End: 2021-01-01

## 2021-01-01 RX ORDER — AMIODARONE HYDROCHLORIDE 200 MG/1
200 TABLET ORAL 2 TIMES DAILY
Status: DISCONTINUED | OUTPATIENT
Start: 2021-01-01 | End: 2021-01-01

## 2021-01-01 RX ORDER — ETOMIDATE 2 MG/ML
20 INJECTION INTRAVENOUS ONCE
Status: COMPLETED | OUTPATIENT
Start: 2021-01-01 | End: 2021-01-01

## 2021-01-01 RX ORDER — FENTANYL CITRATE 50 UG/ML
50 INJECTION, SOLUTION INTRAMUSCULAR; INTRAVENOUS
Status: DISCONTINUED | OUTPATIENT
Start: 2021-01-01 | End: 2021-01-01 | Stop reason: HOSPADM

## 2021-01-01 RX ORDER — ACYCLOVIR 200 MG/5ML
400 SUSPENSION ORAL EVERY 24 HOURS
Status: DISCONTINUED | OUTPATIENT
Start: 2021-01-01 | End: 2021-01-01 | Stop reason: HOSPADM

## 2021-01-01 RX ORDER — MORPHINE SULFATE 2 MG/ML
2 INJECTION, SOLUTION INTRAMUSCULAR; INTRAVENOUS
Status: DISCONTINUED | OUTPATIENT
Start: 2021-01-01 | End: 2021-01-01 | Stop reason: HOSPADM

## 2021-01-01 RX ORDER — HEPARIN SODIUM 5000 [USP'U]/ML
80 INJECTION, SOLUTION INTRAVENOUS; SUBCUTANEOUS ONCE
Status: COMPLETED | OUTPATIENT
Start: 2021-01-01 | End: 2021-01-01

## 2021-01-01 RX ORDER — HEPARIN SODIUM 5000 [USP'U]/ML
5000 INJECTION, SOLUTION INTRAVENOUS; SUBCUTANEOUS ONCE
Status: COMPLETED | OUTPATIENT
Start: 2021-01-01 | End: 2021-01-01

## 2021-01-01 RX ORDER — ROCURONIUM BROMIDE 10 MG/ML
80 INJECTION, SOLUTION INTRAVENOUS
Status: COMPLETED | OUTPATIENT
Start: 2021-01-01 | End: 2021-01-01

## 2021-01-01 RX ORDER — DEXAMETHASONE SODIUM PHOSPHATE 4 MG/ML
8 INJECTION, SOLUTION INTRA-ARTICULAR; INTRALESIONAL; INTRAMUSCULAR; INTRAVENOUS; SOFT TISSUE ONCE
Status: COMPLETED | OUTPATIENT
Start: 2021-01-01 | End: 2021-01-01

## 2021-01-01 RX ORDER — DIPHENHYDRAMINE HYDROCHLORIDE 50 MG/ML
25 INJECTION, SOLUTION INTRAMUSCULAR; INTRAVENOUS ONCE
Status: COMPLETED | OUTPATIENT
Start: 2021-01-01 | End: 2021-01-01

## 2021-01-01 RX ORDER — HEPARIN SODIUM 5000 [USP'U]/ML
40 INJECTION, SOLUTION INTRAVENOUS; SUBCUTANEOUS ONCE
Status: COMPLETED | OUTPATIENT
Start: 2021-01-01 | End: 2021-01-01

## 2021-01-01 RX ORDER — SODIUM CHLORIDE 9 MG/ML
500 INJECTION, SOLUTION INTRAVENOUS CONTINUOUS
Status: DISCONTINUED | OUTPATIENT
Start: 2021-01-01 | End: 2021-01-01 | Stop reason: HOSPADM

## 2021-01-01 RX ORDER — ACETAMINOPHEN 325 MG/1
650 TABLET ORAL
Status: DISCONTINUED | OUTPATIENT
Start: 2021-01-01 | End: 2021-01-01

## 2021-01-01 RX ORDER — NOREPINEPHRINE BITARTRATE/D5W 4MG/250ML
.5-3 PLASTIC BAG, INJECTION (ML) INTRAVENOUS
Status: DISCONTINUED | OUTPATIENT
Start: 2021-01-01 | End: 2021-01-01

## 2021-01-01 RX ORDER — LEVALBUTEROL INHALATION SOLUTION 1.25 MG/3ML
1.25 SOLUTION RESPIRATORY (INHALATION)
Status: DISCONTINUED | OUTPATIENT
Start: 2021-01-01 | End: 2021-01-01 | Stop reason: HOSPADM

## 2021-01-01 RX ORDER — ONDANSETRON 2 MG/ML
8 INJECTION INTRAMUSCULAR; INTRAVENOUS ONCE
Status: COMPLETED | OUTPATIENT
Start: 2021-01-01 | End: 2021-01-01

## 2021-01-01 RX ORDER — PROPOFOL 10 MG/ML
0-50 VIAL (ML) INTRAVENOUS
Status: DISCONTINUED | OUTPATIENT
Start: 2021-01-01 | End: 2021-01-01

## 2021-01-01 RX ORDER — INSULIN GLARGINE 100 [IU]/ML
10 INJECTION, SOLUTION SUBCUTANEOUS DAILY
Status: DISCONTINUED | OUTPATIENT
Start: 2021-01-01 | End: 2021-01-01

## 2021-01-01 RX ORDER — FENTANYL CITRATE-0.9 % NACL/PF 25 MCG/ML
0-200 PLASTIC BAG, INJECTION (ML) INJECTION
Status: DISCONTINUED | OUTPATIENT
Start: 2021-01-01 | End: 2021-01-01 | Stop reason: HOSPADM

## 2021-01-01 RX ORDER — ROCURONIUM BROMIDE 10 MG/ML
INJECTION, SOLUTION INTRAVENOUS
Status: COMPLETED
Start: 2021-01-01 | End: 2021-01-01

## 2021-01-01 RX ORDER — LIDOCAINE HYDROCHLORIDE 20 MG/ML
20-300 INJECTION, SOLUTION INFILTRATION; PERINEURAL
Status: DISCONTINUED | OUTPATIENT
Start: 2021-01-01 | End: 2021-01-01 | Stop reason: HOSPADM

## 2021-01-01 RX ORDER — FENTANYL CITRATE 50 UG/ML
INJECTION, SOLUTION INTRAMUSCULAR; INTRAVENOUS
Status: COMPLETED
Start: 2021-01-01 | End: 2021-01-01

## 2021-01-01 RX ORDER — INSULIN LISPRO 100 [IU]/ML
0-10 INJECTION, SOLUTION INTRAVENOUS; SUBCUTANEOUS
Status: DISCONTINUED | OUTPATIENT
Start: 2021-01-01 | End: 2021-01-01

## 2021-01-01 RX ORDER — ACYCLOVIR 200 MG/5ML
400 SUSPENSION ORAL EVERY 12 HOURS
Status: DISCONTINUED | OUTPATIENT
Start: 2021-01-01 | End: 2021-01-01

## 2021-01-01 RX ORDER — HEPARIN SODIUM 5000 [USP'U]/100ML
18-36 INJECTION, SOLUTION INTRAVENOUS
Status: DISCONTINUED | OUTPATIENT
Start: 2021-01-01 | End: 2021-01-01

## 2021-01-01 RX ORDER — HEPARIN SODIUM (PORCINE) LOCK FLUSH IV SOLN 100 UNIT/ML 100 UNIT/ML
500 SOLUTION INTRAVENOUS AS NEEDED
Status: DISCONTINUED | OUTPATIENT
Start: 2021-01-01 | End: 2021-01-01 | Stop reason: HOSPADM

## 2021-01-01 RX ORDER — DEXTROSE MONOHYDRATE 50 MG/ML
75 INJECTION, SOLUTION INTRAVENOUS CONTINUOUS
Status: DISCONTINUED | OUTPATIENT
Start: 2021-01-01 | End: 2021-01-01 | Stop reason: HOSPADM

## 2021-01-01 RX ORDER — SODIUM CHLORIDE 9 MG/ML
75 INJECTION, SOLUTION INTRAVENOUS CONTINUOUS
Status: DISCONTINUED | OUTPATIENT
Start: 2021-01-01 | End: 2021-01-01

## 2021-01-01 RX ORDER — SODIUM CHLORIDE 0.9 % (FLUSH) 0.9 %
10 SYRINGE (ML) INJECTION
Status: COMPLETED | OUTPATIENT
Start: 2021-01-01 | End: 2021-01-01

## 2021-01-01 RX ORDER — LEVALBUTEROL INHALATION SOLUTION 1.25 MG/3ML
1.25 SOLUTION RESPIRATORY (INHALATION)
Status: DISCONTINUED | OUTPATIENT
Start: 2021-01-01 | End: 2021-01-01

## 2021-01-01 RX ORDER — MIDAZOLAM HYDROCHLORIDE 1 MG/ML
.5-2 INJECTION, SOLUTION INTRAMUSCULAR; INTRAVENOUS
Status: DISCONTINUED | OUTPATIENT
Start: 2021-01-01 | End: 2021-01-01 | Stop reason: HOSPADM

## 2021-01-01 RX ORDER — SODIUM CHLORIDE 0.9 % (FLUSH) 0.9 %
10 SYRINGE (ML) INJECTION AS NEEDED
Status: DISCONTINUED | OUTPATIENT
Start: 2021-01-01 | End: 2021-01-01 | Stop reason: HOSPADM

## 2021-01-01 RX ORDER — AMIODARONE HYDROCHLORIDE 200 MG/1
400 TABLET ORAL 2 TIMES DAILY
Status: DISCONTINUED | OUTPATIENT
Start: 2021-01-01 | End: 2021-01-01 | Stop reason: HOSPADM

## 2021-01-01 RX ORDER — SODIUM BICARBONATE 84 MG/ML
50 INJECTION, SOLUTION INTRAVENOUS ONCE
Status: COMPLETED | OUTPATIENT
Start: 2021-01-01 | End: 2021-01-01

## 2021-01-01 RX ORDER — LORAZEPAM 2 MG/ML
1 INJECTION INTRAMUSCULAR
Status: DISCONTINUED | OUTPATIENT
Start: 2021-01-01 | End: 2021-01-01 | Stop reason: HOSPADM

## 2021-01-01 RX ORDER — MIDODRINE HYDROCHLORIDE 5 MG/1
10 TABLET ORAL
Status: CANCELLED | OUTPATIENT
Start: 2021-01-01

## 2021-01-01 RX ORDER — PANTOPRAZOLE SODIUM 40 MG/1
40 TABLET, DELAYED RELEASE ORAL EVERY 24 HOURS
Status: DISCONTINUED | OUTPATIENT
Start: 2021-01-01 | End: 2021-01-01

## 2021-01-01 RX ORDER — SODIUM CHLORIDE 9 MG/ML
25 INJECTION, SOLUTION INTRAVENOUS CONTINUOUS
Status: ACTIVE | OUTPATIENT
Start: 2021-01-01 | End: 2021-01-01

## 2021-01-01 RX ORDER — INSULIN LISPRO 100 [IU]/ML
0-10 INJECTION, SOLUTION INTRAVENOUS; SUBCUTANEOUS EVERY 6 HOURS
Status: DISCONTINUED | OUTPATIENT
Start: 2021-01-01 | End: 2021-01-01 | Stop reason: HOSPADM

## 2021-01-01 RX ORDER — MORPHINE SULFATE 2 MG/ML
2 INJECTION, SOLUTION INTRAMUSCULAR; INTRAVENOUS
Status: DISCONTINUED | OUTPATIENT
Start: 2021-01-01 | End: 2021-01-01

## 2021-01-01 RX ORDER — SAME BUTANEDISULFONATE/BETAINE 400-600 MG
250 POWDER IN PACKET (EA) ORAL 2 TIMES DAILY
Status: DISCONTINUED | OUTPATIENT
Start: 2021-01-01 | End: 2021-01-01 | Stop reason: HOSPADM

## 2021-01-01 RX ORDER — DEXTROSE 50 % IN WATER (D50W) INTRAVENOUS SYRINGE
25-50 AS NEEDED
Status: DISCONTINUED | OUTPATIENT
Start: 2021-01-01 | End: 2021-01-01 | Stop reason: HOSPADM

## 2021-01-01 RX ORDER — IPRATROPIUM BROMIDE AND ALBUTEROL SULFATE 2.5; .5 MG/3ML; MG/3ML
3 SOLUTION RESPIRATORY (INHALATION)
Status: COMPLETED | OUTPATIENT
Start: 2021-01-01 | End: 2021-01-01

## 2021-01-01 RX ORDER — SODIUM BICARBONATE 1 MEQ/ML
SYRINGE (ML) INTRAVENOUS
Status: COMPLETED | OUTPATIENT
Start: 2021-01-01 | End: 2021-01-01

## 2021-01-01 RX ORDER — QUETIAPINE FUMARATE 25 MG/1
25 TABLET, FILM COATED ORAL ONCE
Status: COMPLETED | OUTPATIENT
Start: 2021-01-01 | End: 2021-01-01

## 2021-01-01 RX ORDER — FLUCONAZOLE 100 MG/1
100 TABLET ORAL DAILY
Status: DISCONTINUED | OUTPATIENT
Start: 2021-01-01 | End: 2021-01-01 | Stop reason: HOSPADM

## 2021-01-01 RX ORDER — HEPARIN SODIUM 1000 [USP'U]/ML
5000 INJECTION, SOLUTION INTRAVENOUS; SUBCUTANEOUS ONCE
Status: COMPLETED | OUTPATIENT
Start: 2021-01-01 | End: 2021-01-01

## 2021-01-01 RX ORDER — FUROSEMIDE 10 MG/ML
40 INJECTION INTRAMUSCULAR; INTRAVENOUS
Status: COMPLETED | OUTPATIENT
Start: 2021-01-01 | End: 2021-01-01

## 2021-01-01 RX ORDER — SODIUM CHLORIDE 9 MG/ML
250 INJECTION, SOLUTION INTRAVENOUS AS NEEDED
Status: DISCONTINUED | OUTPATIENT
Start: 2021-01-01 | End: 2021-01-01 | Stop reason: HOSPADM

## 2021-01-01 RX ORDER — FENTANYL CITRATE 50 UG/ML
25-100 INJECTION, SOLUTION INTRAMUSCULAR; INTRAVENOUS
Status: DISCONTINUED | OUTPATIENT
Start: 2021-01-01 | End: 2021-01-01 | Stop reason: HOSPADM

## 2021-01-01 RX ORDER — ACETAMINOPHEN 650 MG/1
650 SUPPOSITORY RECTAL
Status: DISCONTINUED | OUTPATIENT
Start: 2021-01-01 | End: 2021-01-01

## 2021-01-01 RX ORDER — INSULIN GLARGINE 100 [IU]/ML
20 INJECTION, SOLUTION SUBCUTANEOUS DAILY
Status: DISCONTINUED | OUTPATIENT
Start: 2021-01-01 | End: 2021-01-01 | Stop reason: HOSPADM

## 2021-01-01 RX ORDER — DEXTROSE 40 %
15 GEL (GRAM) ORAL AS NEEDED
Status: DISCONTINUED | OUTPATIENT
Start: 2021-01-01 | End: 2021-01-01 | Stop reason: HOSPADM

## 2021-01-01 RX ORDER — DILTIAZEM HYDROCHLORIDE 5 MG/ML
10 INJECTION INTRAVENOUS ONCE
Status: DISCONTINUED | OUTPATIENT
Start: 2021-01-01 | End: 2021-01-01 | Stop reason: HOSPADM

## 2021-01-01 RX ORDER — MIDODRINE HYDROCHLORIDE 5 MG/1
10 TABLET ORAL
Status: DISCONTINUED | OUTPATIENT
Start: 2021-01-01 | End: 2021-01-01 | Stop reason: HOSPADM

## 2021-01-01 RX ORDER — FENTANYL CITRATE 50 UG/ML
100 INJECTION, SOLUTION INTRAMUSCULAR; INTRAVENOUS ONCE
Status: COMPLETED | OUTPATIENT
Start: 2021-01-01 | End: 2021-01-01

## 2021-01-01 RX ORDER — SODIUM CHLORIDE 0.9 % (FLUSH) 0.9 %
5-40 SYRINGE (ML) INJECTION EVERY 8 HOURS
Status: DISCONTINUED | OUTPATIENT
Start: 2021-01-01 | End: 2021-01-01 | Stop reason: HOSPADM

## 2021-01-01 RX ORDER — FUROSEMIDE 10 MG/ML
40 INJECTION INTRAMUSCULAR; INTRAVENOUS
Status: DISCONTINUED | OUTPATIENT
Start: 2021-01-01 | End: 2021-01-01

## 2021-01-01 RX ORDER — MYCOPHENOLATE MOFETIL 200 MG/ML
1000 POWDER, FOR SUSPENSION ORAL
Status: DISCONTINUED | OUTPATIENT
Start: 2021-01-01 | End: 2021-01-01 | Stop reason: HOSPADM

## 2021-01-01 RX ORDER — ROCURONIUM BROMIDE 10 MG/ML
INJECTION, SOLUTION INTRAVENOUS
Status: ACTIVE
Start: 2021-01-01 | End: 2021-01-01

## 2021-01-01 RX ORDER — VANCOMYCIN 1.75 GRAM/500 ML IN 0.9 % SODIUM CHLORIDE INTRAVENOUS
1750
Status: COMPLETED | OUTPATIENT
Start: 2021-01-01 | End: 2021-01-01

## 2021-01-01 RX ORDER — LORAZEPAM 2 MG/ML
INJECTION INTRAMUSCULAR
Status: COMPLETED
Start: 2021-01-01 | End: 2021-01-01

## 2021-01-01 RX ADMIN — Medication 10 ML: at 14:54

## 2021-01-01 RX ADMIN — PROPOFOL 10 MCG/KG/MIN: 10 INJECTION, EMULSION INTRAVENOUS at 23:15

## 2021-01-01 RX ADMIN — PIPERACILLIN AND TAZOBACTAM 4.5 G: 4; .5 INJECTION, POWDER, FOR SOLUTION INTRAVENOUS at 19:11

## 2021-01-01 RX ADMIN — LEVALBUTEROL HYDROCHLORIDE 1.25 MG: 1.25 SOLUTION RESPIRATORY (INHALATION) at 23:07

## 2021-01-01 RX ADMIN — MORPHINE SULFATE 2 MG: 2 INJECTION, SOLUTION INTRAMUSCULAR; INTRAVENOUS at 09:00

## 2021-01-01 RX ADMIN — AMIODARONE HYDROCHLORIDE 200 MG: 200 TABLET ORAL at 20:37

## 2021-01-01 RX ADMIN — PHENYLEPHRINE HYDROCHLORIDE 30 MCG/MIN: 10 INJECTION INTRAVENOUS at 13:04

## 2021-01-01 RX ADMIN — Medication 30 ML: at 05:06

## 2021-01-01 RX ADMIN — PHENYLEPHRINE HYDROCHLORIDE 30 MCG/MIN: 10 INJECTION INTRAVENOUS at 13:19

## 2021-01-01 RX ADMIN — MYCOPHENOLATE MOFETIL 1000 MG: 200 POWDER, FOR SUSPENSION ORAL at 08:49

## 2021-01-01 RX ADMIN — DEXTROSE MONOHYDRATE 75 ML/HR: 5 INJECTION, SOLUTION INTRAVENOUS at 09:34

## 2021-01-01 RX ADMIN — METHYLPREDNISOLONE SODIUM SUCCINATE 40 MG: 40 INJECTION, POWDER, FOR SOLUTION INTRAMUSCULAR; INTRAVENOUS at 14:46

## 2021-01-01 RX ADMIN — AMIODARONE HYDROCHLORIDE 200 MG: 200 TABLET ORAL at 08:27

## 2021-01-01 RX ADMIN — MYCOPHENOLATE MOFETIL 1000 MG: 200 POWDER, FOR SUSPENSION ORAL at 09:07

## 2021-01-01 RX ADMIN — AMIODARONE HYDROCHLORIDE 1 MG/MIN: 50 INJECTION, SOLUTION INTRAVENOUS at 11:38

## 2021-01-01 RX ADMIN — RDII 250 MG CAPSULE 250 MG: at 17:17

## 2021-01-01 RX ADMIN — MIDODRINE HYDROCHLORIDE 10 MG: 5 TABLET ORAL at 08:34

## 2021-01-01 RX ADMIN — INSULIN LISPRO 4 UNITS: 100 INJECTION, SOLUTION INTRAVENOUS; SUBCUTANEOUS at 05:39

## 2021-01-01 RX ADMIN — METHYLPREDNISOLONE SODIUM SUCCINATE 125 MG: 125 INJECTION, POWDER, FOR SOLUTION INTRAMUSCULAR; INTRAVENOUS at 06:27

## 2021-01-01 RX ADMIN — MIDODRINE HYDROCHLORIDE 10 MG: 5 TABLET ORAL at 11:42

## 2021-01-01 RX ADMIN — PANTOPRAZOLE SODIUM 40 MG: 40 TABLET, DELAYED RELEASE ORAL at 11:53

## 2021-01-01 RX ADMIN — SODIUM CHLORIDE 25 ML/HR: 900 INJECTION, SOLUTION INTRAVENOUS at 11:00

## 2021-01-01 RX ADMIN — SODIUM BICARBONATE: 84 INJECTION, SOLUTION INTRAVENOUS at 08:44

## 2021-01-01 RX ADMIN — MIDODRINE HYDROCHLORIDE 10 MG: 5 TABLET ORAL at 16:12

## 2021-01-01 RX ADMIN — POTASSIUM BICARBONATE 20 MEQ: 782 TABLET, EFFERVESCENT ORAL at 16:03

## 2021-01-01 RX ADMIN — HEPARIN SODIUM 3200 UNITS: 5000 INJECTION INTRAVENOUS; SUBCUTANEOUS at 01:04

## 2021-01-01 RX ADMIN — LANSOPRAZOLE 30 MG: KIT at 12:42

## 2021-01-01 RX ADMIN — LANSOPRAZOLE 30 MG: KIT at 08:23

## 2021-01-01 RX ADMIN — INSULIN LISPRO 4 UNITS: 100 INJECTION, SOLUTION INTRAVENOUS; SUBCUTANEOUS at 11:42

## 2021-01-01 RX ADMIN — Medication 10 ML: at 21:12

## 2021-01-01 RX ADMIN — MYCOPHENOLATE MOFETIL 1000 MG: 200 POWDER, FOR SUSPENSION ORAL at 07:44

## 2021-01-01 RX ADMIN — MORPHINE SULFATE 2 MG: 2 INJECTION, SOLUTION INTRAMUSCULAR; INTRAVENOUS at 16:37

## 2021-01-01 RX ADMIN — FENTANYL CITRATE 100 MCG: 50 INJECTION, SOLUTION INTRAMUSCULAR; INTRAVENOUS at 21:45

## 2021-01-01 RX ADMIN — LEVALBUTEROL HYDROCHLORIDE 1.25 MG: 1.25 SOLUTION RESPIRATORY (INHALATION) at 14:00

## 2021-01-01 RX ADMIN — SODIUM BICARBONATE: 84 INJECTION, SOLUTION INTRAVENOUS at 08:26

## 2021-01-01 RX ADMIN — CEFEPIME HYDROCHLORIDE 2 G: 2 INJECTION, POWDER, FOR SOLUTION INTRAVENOUS at 08:50

## 2021-01-01 RX ADMIN — MYCOPHENOLATE MOFETIL 1000 MG: 200 POWDER, FOR SUSPENSION ORAL at 16:32

## 2021-01-01 RX ADMIN — SODIUM BICARBONATE: 84 INJECTION, SOLUTION INTRAVENOUS at 08:57

## 2021-01-01 RX ADMIN — Medication 10 ML: at 16:03

## 2021-01-01 RX ADMIN — LORAZEPAM 1 MG: 2 INJECTION INTRAMUSCULAR; INTRAVENOUS at 00:09

## 2021-01-01 RX ADMIN — INSULIN LISPRO 2 UNITS: 100 INJECTION, SOLUTION INTRAVENOUS; SUBCUTANEOUS at 17:20

## 2021-01-01 RX ADMIN — Medication 10 ML: at 21:21

## 2021-01-01 RX ADMIN — AMIODARONE HYDROCHLORIDE 200 MG: 200 TABLET ORAL at 21:10

## 2021-01-01 RX ADMIN — PIPERACILLIN SODIUM AND TAZOBACTAM SODIUM 4.5 G: 4; .5 INJECTION, POWDER, LYOPHILIZED, FOR SOLUTION INTRAVENOUS at 03:30

## 2021-01-01 RX ADMIN — FENTANYL CITRATE 50 MCG: 50 INJECTION, SOLUTION INTRAMUSCULAR; INTRAVENOUS at 01:16

## 2021-01-01 RX ADMIN — ACYCLOVIR 400 MG: 200 SUSPENSION ORAL at 20:49

## 2021-01-01 RX ADMIN — LEVALBUTEROL HYDROCHLORIDE 1.25 MG: 1.25 SOLUTION RESPIRATORY (INHALATION) at 15:22

## 2021-01-01 RX ADMIN — AMIODARONE HYDROCHLORIDE 400 MG: 200 TABLET ORAL at 08:49

## 2021-01-01 RX ADMIN — MIDODRINE HYDROCHLORIDE 10 MG: 5 TABLET ORAL at 16:30

## 2021-01-01 RX ADMIN — AMIODARONE HYDROCHLORIDE 1 MG/MIN: 50 INJECTION, SOLUTION INTRAVENOUS at 02:18

## 2021-01-01 RX ADMIN — LEVALBUTEROL HYDROCHLORIDE 1.25 MG: 1.25 SOLUTION RESPIRATORY (INHALATION) at 15:59

## 2021-01-01 RX ADMIN — LEVALBUTEROL HYDROCHLORIDE 1.25 MG: 1.25 SOLUTION RESPIRATORY (INHALATION) at 07:07

## 2021-01-01 RX ADMIN — DEXAMETHASONE SODIUM PHOSPHATE 12 MG: 4 INJECTION, SOLUTION INTRAMUSCULAR; INTRAVENOUS at 11:47

## 2021-01-01 RX ADMIN — PIPERACILLIN SODIUM AND TAZOBACTAM SODIUM 4.5 G: 4; .5 INJECTION, POWDER, LYOPHILIZED, FOR SOLUTION INTRAVENOUS at 06:56

## 2021-01-01 RX ADMIN — MYCOPHENOLATE MOFETIL 1000 MG: 200 POWDER, FOR SUSPENSION ORAL at 08:34

## 2021-01-01 RX ADMIN — LEVALBUTEROL HYDROCHLORIDE 1.25 MG: 1.25 SOLUTION RESPIRATORY (INHALATION) at 01:06

## 2021-01-01 RX ADMIN — Medication 10 ML: at 05:39

## 2021-01-01 RX ADMIN — ATEZOLIZUMAB 1200 MG: 1200 INJECTION, SOLUTION INTRAVENOUS at 15:25

## 2021-01-01 RX ADMIN — Medication 10 ML: at 16:45

## 2021-01-01 RX ADMIN — AMIODARONE HYDROCHLORIDE 0.5 MG/MIN: 50 INJECTION, SOLUTION INTRAVENOUS at 01:03

## 2021-01-01 RX ADMIN — Medication 10 ML: at 14:20

## 2021-01-01 RX ADMIN — LEVALBUTEROL HYDROCHLORIDE 1.25 MG: 1.25 SOLUTION RESPIRATORY (INHALATION) at 20:41

## 2021-01-01 RX ADMIN — VANCOMYCIN HYDROCHLORIDE 500 MG: 500 INJECTION, POWDER, LYOPHILIZED, FOR SOLUTION INTRAVENOUS at 04:27

## 2021-01-01 RX ADMIN — FENTANYL CITRATE 50 MCG/HR: 0.05 INJECTION, SOLUTION INTRAMUSCULAR; INTRAVENOUS at 02:26

## 2021-01-01 RX ADMIN — AMIODARONE HYDROCHLORIDE 200 MG: 200 TABLET ORAL at 20:45

## 2021-01-01 RX ADMIN — Medication 10 ML: at 22:16

## 2021-01-01 RX ADMIN — PHENOL 1 SPRAY: 1.5 LIQUID ORAL at 06:13

## 2021-01-01 RX ADMIN — METHYLPREDNISOLONE SODIUM SUCCINATE 40 MG: 40 INJECTION, POWDER, FOR SOLUTION INTRAMUSCULAR; INTRAVENOUS at 21:05

## 2021-01-01 RX ADMIN — LEVALBUTEROL HYDROCHLORIDE 1.25 MG: 1.25 SOLUTION RESPIRATORY (INHALATION) at 19:57

## 2021-01-01 RX ADMIN — LEVALBUTEROL HYDROCHLORIDE 1.25 MG: 1.25 SOLUTION RESPIRATORY (INHALATION) at 20:07

## 2021-01-01 RX ADMIN — LEVALBUTEROL HYDROCHLORIDE 1.25 MG: 1.25 SOLUTION RESPIRATORY (INHALATION) at 08:00

## 2021-01-01 RX ADMIN — DEXTROSE MONOHYDRATE 25 G: 25 INJECTION, SOLUTION INTRAVENOUS at 04:54

## 2021-01-01 RX ADMIN — Medication 10 ML: at 10:45

## 2021-01-01 RX ADMIN — HEPARIN SODIUM AND DEXTROSE 18 UNITS/KG/HR: 5000; 5 INJECTION INTRAVENOUS at 15:18

## 2021-01-01 RX ADMIN — Medication 10 ML: at 21:35

## 2021-01-01 RX ADMIN — DEXTROSE MONOHYDRATE 150 MG: 50 INJECTION, SOLUTION INTRAVENOUS at 08:21

## 2021-01-01 RX ADMIN — SODIUM CHLORIDE 1000 ML: 900 INJECTION, SOLUTION INTRAVENOUS at 15:00

## 2021-01-01 RX ADMIN — POTASSIUM CHLORIDE 20 MEQ: 200 INJECTION, SOLUTION INTRAVENOUS at 08:32

## 2021-01-01 RX ADMIN — MIDODRINE HYDROCHLORIDE 10 MG: 5 TABLET ORAL at 12:03

## 2021-01-01 RX ADMIN — METOPROLOL SUCCINATE 12.5 MG: 25 TABLET, EXTENDED RELEASE ORAL at 08:49

## 2021-01-01 RX ADMIN — PHENYLEPHRINE HYDROCHLORIDE 40 MCG/MIN: 10 INJECTION INTRAVENOUS at 23:50

## 2021-01-01 RX ADMIN — FENTANYL CITRATE 100 MCG/HR: 0.05 INJECTION, SOLUTION INTRAMUSCULAR; INTRAVENOUS at 22:14

## 2021-01-01 RX ADMIN — MIDODRINE HYDROCHLORIDE 10 MG: 5 TABLET ORAL at 12:07

## 2021-01-01 RX ADMIN — MORPHINE SULFATE 2 MG: 2 INJECTION, SOLUTION INTRAMUSCULAR; INTRAVENOUS at 17:22

## 2021-01-01 RX ADMIN — PIPERACILLIN SODIUM AND TAZOBACTAM SODIUM 4.5 G: 4; .5 INJECTION, POWDER, LYOPHILIZED, FOR SOLUTION INTRAVENOUS at 15:54

## 2021-01-01 RX ADMIN — AMIODARONE HYDROCHLORIDE 0.5 MG/MIN: 50 INJECTION, SOLUTION INTRAVENOUS at 17:24

## 2021-01-01 RX ADMIN — SODIUM CHLORIDE 500 ML: 900 INJECTION, SOLUTION INTRAVENOUS at 08:16

## 2021-01-01 RX ADMIN — POTASSIUM CHLORIDE 20 MEQ: 200 INJECTION, SOLUTION INTRAVENOUS at 14:19

## 2021-01-01 RX ADMIN — MIDAZOLAM 1 MG: 1 INJECTION INTRAMUSCULAR; INTRAVENOUS at 08:16

## 2021-01-01 RX ADMIN — PHENYLEPHRINE HYDROCHLORIDE 100 MCG/MIN: 10 INJECTION INTRAVENOUS at 15:00

## 2021-01-01 RX ADMIN — LEVALBUTEROL HYDROCHLORIDE 1.25 MG: 1.25 SOLUTION RESPIRATORY (INHALATION) at 02:55

## 2021-01-01 RX ADMIN — LEVALBUTEROL HYDROCHLORIDE 1.25 MG: 1.25 SOLUTION RESPIRATORY (INHALATION) at 07:34

## 2021-01-01 RX ADMIN — MIDODRINE HYDROCHLORIDE 10 MG: 5 TABLET ORAL at 08:17

## 2021-01-01 RX ADMIN — METHYLPREDNISOLONE SODIUM SUCCINATE 40 MG: 40 INJECTION, POWDER, FOR SOLUTION INTRAMUSCULAR; INTRAVENOUS at 21:09

## 2021-01-01 RX ADMIN — POTASSIUM CHLORIDE 20 MEQ: 200 INJECTION, SOLUTION INTRAVENOUS at 10:43

## 2021-01-01 RX ADMIN — LEVALBUTEROL HYDROCHLORIDE 1.25 MG: 1.25 SOLUTION RESPIRATORY (INHALATION) at 12:11

## 2021-01-01 RX ADMIN — Medication 10 ML: at 14:07

## 2021-01-01 RX ADMIN — Medication 10 ML: at 13:02

## 2021-01-01 RX ADMIN — DEXAMETHASONE SODIUM PHOSPHATE 8 MG: 4 INJECTION, SOLUTION INTRAMUSCULAR; INTRAVENOUS at 15:09

## 2021-01-01 RX ADMIN — AMIODARONE HYDROCHLORIDE 400 MG: 200 TABLET ORAL at 21:04

## 2021-01-01 RX ADMIN — LEVALBUTEROL HYDROCHLORIDE 1.25 MG: 1.25 SOLUTION RESPIRATORY (INHALATION) at 00:05

## 2021-01-01 RX ADMIN — LORAZEPAM 1 MG: 2 INJECTION INTRAMUSCULAR; INTRAVENOUS at 15:05

## 2021-01-01 RX ADMIN — METHYLPREDNISOLONE SODIUM SUCCINATE 40 MG: 40 INJECTION, POWDER, FOR SOLUTION INTRAMUSCULAR; INTRAVENOUS at 05:15

## 2021-01-01 RX ADMIN — NOREPINEPHRINE-DEXTROSE IV SOLUTION 4 MG/250ML-5% 10 MCG/MIN: 4-5/250 SOLUTION at 14:30

## 2021-01-01 RX ADMIN — AMIODARONE HYDROCHLORIDE 0.5 MG/MIN: 50 INJECTION, SOLUTION INTRAVENOUS at 00:52

## 2021-01-01 RX ADMIN — MIDODRINE HYDROCHLORIDE 10 MG: 5 TABLET ORAL at 08:26

## 2021-01-01 RX ADMIN — MIDODRINE HYDROCHLORIDE 10 MG: 5 TABLET ORAL at 17:18

## 2021-01-01 RX ADMIN — LORAZEPAM 1 MG: 2 INJECTION INTRAMUSCULAR; INTRAVENOUS at 04:49

## 2021-01-01 RX ADMIN — METHYLPREDNISOLONE SODIUM SUCCINATE 40 MG: 40 INJECTION, POWDER, FOR SOLUTION INTRAMUSCULAR; INTRAVENOUS at 06:00

## 2021-01-01 RX ADMIN — LEVALBUTEROL HYDROCHLORIDE 1.25 MG: 1.25 SOLUTION RESPIRATORY (INHALATION) at 15:41

## 2021-01-01 RX ADMIN — FENTANYL CITRATE 25 MCG/HR: 0.05 INJECTION, SOLUTION INTRAMUSCULAR; INTRAVENOUS at 12:58

## 2021-01-01 RX ADMIN — LEVALBUTEROL HYDROCHLORIDE 1.25 MG: 1.25 SOLUTION RESPIRATORY (INHALATION) at 20:03

## 2021-01-01 RX ADMIN — Medication 10 ML: at 13:52

## 2021-01-01 RX ADMIN — PIPERACILLIN AND TAZOBACTAM 4.5 G: 4; .5 INJECTION, POWDER, FOR SOLUTION INTRAVENOUS at 03:15

## 2021-01-01 RX ADMIN — INSULIN LISPRO 8 UNITS: 100 INJECTION, SOLUTION INTRAVENOUS; SUBCUTANEOUS at 17:39

## 2021-01-01 RX ADMIN — METHYLPREDNISOLONE SODIUM SUCCINATE 40 MG: 40 INJECTION, POWDER, FOR SOLUTION INTRAMUSCULAR; INTRAVENOUS at 05:48

## 2021-01-01 RX ADMIN — POTASSIUM CHLORIDE 20 MEQ: 200 INJECTION, SOLUTION INTRAVENOUS at 03:08

## 2021-01-01 RX ADMIN — Medication 10 ML: at 21:14

## 2021-01-01 RX ADMIN — LANSOPRAZOLE 30 MG: KIT at 09:01

## 2021-01-01 RX ADMIN — FENTANYL CITRATE 50 MCG: 50 INJECTION, SOLUTION INTRAMUSCULAR; INTRAVENOUS at 17:14

## 2021-01-01 RX ADMIN — FENTANYL CITRATE 50 MCG: 50 INJECTION, SOLUTION INTRAMUSCULAR; INTRAVENOUS at 18:00

## 2021-01-01 RX ADMIN — MORPHINE SULFATE 2 MG: 2 INJECTION, SOLUTION INTRAMUSCULAR; INTRAVENOUS at 04:03

## 2021-01-01 RX ADMIN — METHYLPREDNISOLONE SODIUM SUCCINATE 40 MG: 40 INJECTION, POWDER, FOR SOLUTION INTRAMUSCULAR; INTRAVENOUS at 21:12

## 2021-01-01 RX ADMIN — INSULIN LISPRO 4 UNITS: 100 INJECTION, SOLUTION INTRAVENOUS; SUBCUTANEOUS at 17:25

## 2021-01-01 RX ADMIN — INSULIN LISPRO 2 UNITS: 100 INJECTION, SOLUTION INTRAVENOUS; SUBCUTANEOUS at 17:40

## 2021-01-01 RX ADMIN — MIDODRINE HYDROCHLORIDE 10 MG: 5 TABLET ORAL at 08:27

## 2021-01-01 RX ADMIN — INSULIN GLARGINE 10 UNITS: 100 INJECTION, SOLUTION SUBCUTANEOUS at 08:27

## 2021-01-01 RX ADMIN — SODIUM POLYSTYRENE SULFONATE 30 G: 15 SUSPENSION ORAL; RECTAL at 05:05

## 2021-01-01 RX ADMIN — METHYLPREDNISOLONE SODIUM SUCCINATE 40 MG: 40 INJECTION, POWDER, FOR SOLUTION INTRAMUSCULAR; INTRAVENOUS at 22:49

## 2021-01-01 RX ADMIN — INSULIN LISPRO 4 UNITS: 100 INJECTION, SOLUTION INTRAVENOUS; SUBCUTANEOUS at 12:13

## 2021-01-01 RX ADMIN — INSULIN LISPRO 4 UNITS: 100 INJECTION, SOLUTION INTRAVENOUS; SUBCUTANEOUS at 17:57

## 2021-01-01 RX ADMIN — LEVALBUTEROL HYDROCHLORIDE 1.25 MG: 1.25 SOLUTION RESPIRATORY (INHALATION) at 23:38

## 2021-01-01 RX ADMIN — MIDODRINE HYDROCHLORIDE 10 MG: 5 TABLET ORAL at 08:22

## 2021-01-01 RX ADMIN — POTASSIUM CHLORIDE 20 MEQ: 200 INJECTION, SOLUTION INTRAVENOUS at 19:22

## 2021-01-01 RX ADMIN — INSULIN LISPRO 2 UNITS: 100 INJECTION, SOLUTION INTRAVENOUS; SUBCUTANEOUS at 05:25

## 2021-01-01 RX ADMIN — ACYCLOVIR 400 MG: 200 SUSPENSION ORAL at 09:01

## 2021-01-01 RX ADMIN — METHYLPREDNISOLONE SODIUM SUCCINATE 40 MG: 40 INJECTION, POWDER, FOR SOLUTION INTRAMUSCULAR; INTRAVENOUS at 05:18

## 2021-01-01 RX ADMIN — AMIODARONE HYDROCHLORIDE 0.5 MG/MIN: 50 INJECTION, SOLUTION INTRAVENOUS at 10:30

## 2021-01-01 RX ADMIN — Medication 10 ML: at 05:15

## 2021-01-01 RX ADMIN — Medication 10 ML: at 21:05

## 2021-01-01 RX ADMIN — LEVALBUTEROL HYDROCHLORIDE 1.25 MG: 1.25 SOLUTION RESPIRATORY (INHALATION) at 15:20

## 2021-01-01 RX ADMIN — MIDODRINE HYDROCHLORIDE 10 MG: 5 TABLET ORAL at 11:19

## 2021-01-01 RX ADMIN — PIPERACILLIN SODIUM AND TAZOBACTAM SODIUM 4.5 G: 4; .5 INJECTION, POWDER, LYOPHILIZED, FOR SOLUTION INTRAVENOUS at 16:03

## 2021-01-01 RX ADMIN — POTASSIUM BICARBONATE 40 MEQ: 782 TABLET, EFFERVESCENT ORAL at 08:50

## 2021-01-01 RX ADMIN — METHYLPREDNISOLONE SODIUM SUCCINATE 40 MG: 40 INJECTION, POWDER, FOR SOLUTION INTRAMUSCULAR; INTRAVENOUS at 13:20

## 2021-01-01 RX ADMIN — LEVALBUTEROL HYDROCHLORIDE 1.25 MG: 1.25 SOLUTION RESPIRATORY (INHALATION) at 03:49

## 2021-01-01 RX ADMIN — LEVALBUTEROL HYDROCHLORIDE 1.25 MG: 1.25 SOLUTION RESPIRATORY (INHALATION) at 20:05

## 2021-01-01 RX ADMIN — PROPOFOL 25 MCG/KG/MIN: 10 INJECTION, EMULSION INTRAVENOUS at 21:51

## 2021-01-01 RX ADMIN — METHYLPREDNISOLONE SODIUM SUCCINATE 40 MG: 40 INJECTION, POWDER, FOR SOLUTION INTRAMUSCULAR; INTRAVENOUS at 13:50

## 2021-01-01 RX ADMIN — ACYCLOVIR 400 MG: 200 SUSPENSION ORAL at 21:09

## 2021-01-01 RX ADMIN — LEVALBUTEROL HYDROCHLORIDE 1.25 MG: 1.25 SOLUTION RESPIRATORY (INHALATION) at 11:53

## 2021-01-01 RX ADMIN — LEVALBUTEROL HYDROCHLORIDE 1.25 MG: 1.25 SOLUTION RESPIRATORY (INHALATION) at 03:08

## 2021-01-01 RX ADMIN — Medication 10 ML: at 21:09

## 2021-01-01 RX ADMIN — METHYLPREDNISOLONE SODIUM SUCCINATE 40 MG: 40 INJECTION, POWDER, FOR SOLUTION INTRAMUSCULAR; INTRAVENOUS at 21:03

## 2021-01-01 RX ADMIN — LANSOPRAZOLE 30 MG: KIT at 09:25

## 2021-01-01 RX ADMIN — INSULIN LISPRO 4 UNITS: 100 INJECTION, SOLUTION INTRAVENOUS; SUBCUTANEOUS at 00:03

## 2021-01-01 RX ADMIN — HEPARIN SODIUM AND DEXTROSE 9 UNITS/KG/HR: 5000; 5 INJECTION INTRAVENOUS at 16:08

## 2021-01-01 RX ADMIN — LORAZEPAM 1 MG: 2 INJECTION INTRAMUSCULAR; INTRAVENOUS at 08:48

## 2021-01-01 RX ADMIN — AMIODARONE HYDROCHLORIDE 0.5 MG/MIN: 50 INJECTION, SOLUTION INTRAVENOUS at 13:00

## 2021-01-01 RX ADMIN — AMIODARONE HYDROCHLORIDE 150 MG: 50 INJECTION, SOLUTION INTRAVENOUS at 11:30

## 2021-01-01 RX ADMIN — LEVALBUTEROL HYDROCHLORIDE 1.25 MG: 1.25 SOLUTION RESPIRATORY (INHALATION) at 14:48

## 2021-01-01 RX ADMIN — INSULIN LISPRO 6 UNITS: 100 INJECTION, SOLUTION INTRAVENOUS; SUBCUTANEOUS at 11:59

## 2021-01-01 RX ADMIN — LEVALBUTEROL HYDROCHLORIDE 1.25 MG: 1.25 SOLUTION RESPIRATORY (INHALATION) at 08:09

## 2021-01-01 RX ADMIN — PROPOFOL 5 MCG/KG/MIN: 10 INJECTION, EMULSION INTRAVENOUS at 21:21

## 2021-01-01 RX ADMIN — Medication 10 ML: at 05:25

## 2021-01-01 RX ADMIN — INSULIN HUMAN 10 UNITS: 100 INJECTION, SOLUTION PARENTERAL at 04:53

## 2021-01-01 RX ADMIN — HEPARIN SODIUM AND DEXTROSE 19 UNITS/KG/HR: 5000; 5 INJECTION INTRAVENOUS at 10:24

## 2021-01-01 RX ADMIN — SODIUM BICARBONATE 50 MEQ: 84 INJECTION, SOLUTION INTRAVENOUS at 04:54

## 2021-01-01 RX ADMIN — IPRATROPIUM BROMIDE AND ALBUTEROL SULFATE 3 ML: .5; 3 SOLUTION RESPIRATORY (INHALATION) at 06:03

## 2021-01-01 RX ADMIN — LIDOCAINE HYDROCHLORIDE 100 MG: 20 INJECTION, SOLUTION INFILTRATION; PERINEURAL at 08:24

## 2021-01-01 RX ADMIN — MIDODRINE HYDROCHLORIDE 10 MG: 5 TABLET ORAL at 18:00

## 2021-01-01 RX ADMIN — LEVALBUTEROL HYDROCHLORIDE 1.25 MG: 1.25 SOLUTION RESPIRATORY (INHALATION) at 03:20

## 2021-01-01 RX ADMIN — INSULIN LISPRO 6 UNITS: 100 INJECTION, SOLUTION INTRAVENOUS; SUBCUTANEOUS at 11:35

## 2021-01-01 RX ADMIN — LANSOPRAZOLE 30 MG: KIT at 08:49

## 2021-01-01 RX ADMIN — INSULIN GLARGINE 20 UNITS: 100 INJECTION, SOLUTION SUBCUTANEOUS at 08:48

## 2021-01-01 RX ADMIN — Medication 10 ML: at 22:49

## 2021-01-01 RX ADMIN — LEVALBUTEROL HYDROCHLORIDE 1.25 MG: 1.25 SOLUTION RESPIRATORY (INHALATION) at 15:19

## 2021-01-01 RX ADMIN — METOPROLOL SUCCINATE 12.5 MG: 25 TABLET, EXTENDED RELEASE ORAL at 09:13

## 2021-01-01 RX ADMIN — INSULIN GLARGINE 20 UNITS: 100 INJECTION, SOLUTION SUBCUTANEOUS at 09:59

## 2021-01-01 RX ADMIN — PHENYLEPHRINE HYDROCHLORIDE 300 MCG/MIN: 10 INJECTION INTRAVENOUS at 07:31

## 2021-01-01 RX ADMIN — LEVALBUTEROL HYDROCHLORIDE 1.25 MG: 1.25 SOLUTION RESPIRATORY (INHALATION) at 11:24

## 2021-01-01 RX ADMIN — SODIUM CHLORIDE 25 ML/HR: 900 INJECTION, SOLUTION INTRAVENOUS at 10:45

## 2021-01-01 RX ADMIN — HEPARIN SODIUM (PORCINE) LOCK FLUSH IV SOLN 100 UNIT/ML 500 UNITS: 100 SOLUTION at 12:00

## 2021-01-01 RX ADMIN — RDII 250 MG CAPSULE 250 MG: at 09:00

## 2021-01-01 RX ADMIN — SODIUM CHLORIDE, SODIUM LACTATE, POTASSIUM CHLORIDE, AND CALCIUM CHLORIDE 1000 ML: 600; 310; 30; 20 INJECTION, SOLUTION INTRAVENOUS at 21:30

## 2021-01-01 RX ADMIN — INSULIN GLARGINE 20 UNITS: 100 INJECTION, SOLUTION SUBCUTANEOUS at 09:03

## 2021-01-01 RX ADMIN — INSULIN LISPRO 6 UNITS: 100 INJECTION, SOLUTION INTRAVENOUS; SUBCUTANEOUS at 05:42

## 2021-01-01 RX ADMIN — AMIODARONE HYDROCHLORIDE 1 MG/MIN: 50 INJECTION, SOLUTION INTRAVENOUS at 18:42

## 2021-01-01 RX ADMIN — MORPHINE SULFATE 2 MG: 2 INJECTION, SOLUTION INTRAMUSCULAR; INTRAVENOUS at 13:04

## 2021-01-01 RX ADMIN — ACYCLOVIR 400 MG: 200 SUSPENSION ORAL at 20:37

## 2021-01-01 RX ADMIN — PIPERACILLIN AND TAZOBACTAM 4.5 G: 4; .5 INJECTION, POWDER, FOR SOLUTION INTRAVENOUS at 16:03

## 2021-01-01 RX ADMIN — LEVALBUTEROL HYDROCHLORIDE 1.25 MG: 1.25 SOLUTION RESPIRATORY (INHALATION) at 03:29

## 2021-01-01 RX ADMIN — POTASSIUM CHLORIDE 20 MEQ: 200 INJECTION, SOLUTION INTRAVENOUS at 17:04

## 2021-01-01 RX ADMIN — ACETAMINOPHEN 650 MG: 325 TABLET ORAL at 08:41

## 2021-01-01 RX ADMIN — LEVALBUTEROL HYDROCHLORIDE 1.25 MG: 1.25 SOLUTION RESPIRATORY (INHALATION) at 02:01

## 2021-01-01 RX ADMIN — INSULIN LISPRO 2 UNITS: 100 INJECTION, SOLUTION INTRAVENOUS; SUBCUTANEOUS at 12:06

## 2021-01-01 RX ADMIN — TBO-FILGRASTIM 300 MCG: 300 INJECTION, SOLUTION SUBCUTANEOUS at 11:36

## 2021-01-01 RX ADMIN — SODIUM BICARBONATE: 84 INJECTION, SOLUTION INTRAVENOUS at 21:14

## 2021-01-01 RX ADMIN — MIDODRINE HYDROCHLORIDE 10 MG: 5 TABLET ORAL at 11:59

## 2021-01-01 RX ADMIN — MYCOPHENOLATE MOFETIL 1000 MG: 200 POWDER, FOR SUSPENSION ORAL at 17:11

## 2021-01-01 RX ADMIN — LEVALBUTEROL HYDROCHLORIDE 1.25 MG: 1.25 SOLUTION RESPIRATORY (INHALATION) at 03:52

## 2021-01-01 RX ADMIN — LEVALBUTEROL HYDROCHLORIDE 1.25 MG: 1.25 SOLUTION RESPIRATORY (INHALATION) at 00:38

## 2021-01-01 RX ADMIN — Medication 10 ML: at 05:27

## 2021-01-01 RX ADMIN — RDII 250 MG CAPSULE 250 MG: at 17:45

## 2021-01-01 RX ADMIN — SODIUM CHLORIDE, SODIUM LACTATE, POTASSIUM CHLORIDE, AND CALCIUM CHLORIDE 1000 ML: 600; 310; 30; 20 INJECTION, SOLUTION INTRAVENOUS at 05:42

## 2021-01-01 RX ADMIN — MYCOPHENOLATE MOFETIL 1000 MG: 200 POWDER, FOR SUSPENSION ORAL at 16:55

## 2021-01-01 RX ADMIN — FLUCONAZOLE 100 MG: 100 TABLET ORAL at 08:49

## 2021-01-01 RX ADMIN — LEVALBUTEROL HYDROCHLORIDE 1.25 MG: 1.25 SOLUTION RESPIRATORY (INHALATION) at 19:38

## 2021-01-01 RX ADMIN — Medication 10 ML: at 15:00

## 2021-01-01 RX ADMIN — HEPARIN SODIUM 5000 UNITS: 5000 INJECTION INTRAVENOUS; SUBCUTANEOUS at 17:21

## 2021-01-01 RX ADMIN — MYCOPHENOLATE MOFETIL 1000 MG: 200 POWDER, FOR SUSPENSION ORAL at 16:12

## 2021-01-01 RX ADMIN — SODIUM CHLORIDE 25 ML/HR: 9 INJECTION, SOLUTION INTRAVENOUS at 15:15

## 2021-01-01 RX ADMIN — INSULIN LISPRO 4 UNITS: 100 INJECTION, SOLUTION INTRAVENOUS; SUBCUTANEOUS at 17:32

## 2021-01-01 RX ADMIN — ATEZOLIZUMAB 1200 MG: 1200 INJECTION, SOLUTION INTRAVENOUS at 11:34

## 2021-01-01 RX ADMIN — PROPOFOL 25 MCG/KG/MIN: 10 INJECTION, EMULSION INTRAVENOUS at 21:50

## 2021-01-01 RX ADMIN — LANSOPRAZOLE 30 MG: KIT at 08:17

## 2021-01-01 RX ADMIN — ROCURONIUM BROMIDE 80 MG: 10 INJECTION, SOLUTION INTRAVENOUS at 21:46

## 2021-01-01 RX ADMIN — SODIUM ZIRCONIUM CYCLOSILICATE 10 G: 10 POWDER, FOR SUSPENSION ORAL at 08:58

## 2021-01-01 RX ADMIN — AMIODARONE HYDROCHLORIDE 1 MG/MIN: 50 INJECTION, SOLUTION INTRAVENOUS at 11:45

## 2021-01-01 RX ADMIN — Medication 10 ML: at 13:20

## 2021-01-01 RX ADMIN — LEVALBUTEROL HYDROCHLORIDE 1.25 MG: 1.25 SOLUTION RESPIRATORY (INHALATION) at 23:37

## 2021-01-01 RX ADMIN — Medication 10 ML: at 13:17

## 2021-01-01 RX ADMIN — FLUCONAZOLE 100 MG: 100 TABLET ORAL at 10:03

## 2021-01-01 RX ADMIN — MIDODRINE HYDROCHLORIDE 10 MG: 5 TABLET ORAL at 17:22

## 2021-01-01 RX ADMIN — CARBOPLATIN 275 MG: 10 INJECTION, SOLUTION INTRAVENOUS at 12:42

## 2021-01-01 RX ADMIN — METHYLPREDNISOLONE SODIUM SUCCINATE 40 MG: 40 INJECTION, POWDER, FOR SOLUTION INTRAMUSCULAR; INTRAVENOUS at 13:02

## 2021-01-01 RX ADMIN — METHYLPREDNISOLONE SODIUM SUCCINATE 40 MG: 40 INJECTION, POWDER, FOR SOLUTION INTRAMUSCULAR; INTRAVENOUS at 05:42

## 2021-01-01 RX ADMIN — TBO-FILGRASTIM 300 MCG: 300 INJECTION, SOLUTION SUBCUTANEOUS at 10:57

## 2021-01-01 RX ADMIN — Medication 10 ML: at 14:46

## 2021-01-01 RX ADMIN — LEVALBUTEROL HYDROCHLORIDE 1.25 MG: 1.25 SOLUTION RESPIRATORY (INHALATION) at 12:00

## 2021-01-01 RX ADMIN — FENTANYL CITRATE 50 MCG/HR: 0.05 INJECTION, SOLUTION INTRAMUSCULAR; INTRAVENOUS at 22:04

## 2021-01-01 RX ADMIN — INSULIN LISPRO 6 UNITS: 100 INJECTION, SOLUTION INTRAVENOUS; SUBCUTANEOUS at 23:42

## 2021-01-01 RX ADMIN — FENTANYL CITRATE 50 MCG/HR: 0.05 INJECTION, SOLUTION INTRAMUSCULAR; INTRAVENOUS at 19:13

## 2021-01-01 RX ADMIN — MIDODRINE HYDROCHLORIDE 10 MG: 5 TABLET ORAL at 08:47

## 2021-01-01 RX ADMIN — LEVALBUTEROL HYDROCHLORIDE 1.25 MG: 1.25 SOLUTION RESPIRATORY (INHALATION) at 20:00

## 2021-01-01 RX ADMIN — INSULIN LISPRO 2 UNITS: 100 INJECTION, SOLUTION INTRAVENOUS; SUBCUTANEOUS at 23:17

## 2021-01-01 RX ADMIN — SODIUM CHLORIDE 25 ML/HR: 900 INJECTION, SOLUTION INTRAVENOUS at 15:00

## 2021-01-01 RX ADMIN — HEPARIN SODIUM 5800 UNITS: 5000 INJECTION INTRAVENOUS; SUBCUTANEOUS at 15:18

## 2021-01-01 RX ADMIN — INSULIN LISPRO 6 UNITS: 100 INJECTION, SOLUTION INTRAVENOUS; SUBCUTANEOUS at 11:11

## 2021-01-01 RX ADMIN — METHYLPREDNISOLONE SODIUM SUCCINATE 40 MG: 40 INJECTION, POWDER, FOR SOLUTION INTRAMUSCULAR; INTRAVENOUS at 20:18

## 2021-01-01 RX ADMIN — PIPERACILLIN AND TAZOBACTAM 4.5 G: 4; .5 INJECTION, POWDER, FOR SOLUTION INTRAVENOUS at 11:19

## 2021-01-01 RX ADMIN — LORAZEPAM 1 MG: 2 INJECTION INTRAMUSCULAR; INTRAVENOUS at 05:16

## 2021-01-01 RX ADMIN — LEVALBUTEROL HYDROCHLORIDE 1.25 MG: 1.25 SOLUTION RESPIRATORY (INHALATION) at 23:34

## 2021-01-01 RX ADMIN — EPINEPHRINE 1 MG: 0.1 INJECTION, SOLUTION ENDOTRACHEAL; INTRACARDIAC; INTRAVENOUS at 06:06

## 2021-01-01 RX ADMIN — Medication 10 ML: at 06:00

## 2021-01-01 RX ADMIN — SODIUM BICARBONATE 50 MEQ: 84 INJECTION, SOLUTION INTRAVENOUS at 06:05

## 2021-01-01 RX ADMIN — LEVALBUTEROL HYDROCHLORIDE 1.25 MG: 1.25 SOLUTION RESPIRATORY (INHALATION) at 19:39

## 2021-01-01 RX ADMIN — CEFEPIME HYDROCHLORIDE 2 G: 2 INJECTION, POWDER, FOR SOLUTION INTRAVENOUS at 09:04

## 2021-01-01 RX ADMIN — QUETIAPINE FUMARATE 25 MG: 25 TABLET ORAL at 23:41

## 2021-01-01 RX ADMIN — LORAZEPAM 0.5 MG: 2 INJECTION INTRAMUSCULAR; INTRAVENOUS at 09:42

## 2021-01-01 RX ADMIN — LEVALBUTEROL HYDROCHLORIDE 1.25 MG: 1.25 SOLUTION RESPIRATORY (INHALATION) at 07:45

## 2021-01-01 RX ADMIN — INSULIN LISPRO 4 UNITS: 100 INJECTION, SOLUTION INTRAVENOUS; SUBCUTANEOUS at 11:07

## 2021-01-01 RX ADMIN — AMIODARONE HYDROCHLORIDE 200 MG: 200 TABLET ORAL at 08:17

## 2021-01-01 RX ADMIN — INSULIN LISPRO 4 UNITS: 100 INJECTION, SOLUTION INTRAVENOUS; SUBCUTANEOUS at 05:18

## 2021-01-01 RX ADMIN — FOSAPREPITANT 150 MG: 150 INJECTION, POWDER, LYOPHILIZED, FOR SOLUTION INTRAVENOUS at 12:05

## 2021-01-01 RX ADMIN — EPINEPHRINE 1 MG: 0.1 INJECTION, SOLUTION ENDOTRACHEAL; INTRACARDIAC; INTRAVENOUS at 14:03

## 2021-01-01 RX ADMIN — PIPERACILLIN AND TAZOBACTAM 4.5 G: 4; .5 INJECTION, POWDER, FOR SOLUTION INTRAVENOUS at 10:57

## 2021-01-01 RX ADMIN — POTASSIUM CHLORIDE 20 MEQ: 200 INJECTION, SOLUTION INTRAVENOUS at 00:40

## 2021-01-01 RX ADMIN — PIPERACILLIN AND TAZOBACTAM 4.5 G: 4; .5 INJECTION, POWDER, FOR SOLUTION INTRAVENOUS at 11:52

## 2021-01-01 RX ADMIN — Medication 10 ML: at 13:15

## 2021-01-01 RX ADMIN — POTASSIUM CHLORIDE 20 MEQ: 200 INJECTION, SOLUTION INTRAVENOUS at 21:38

## 2021-01-01 RX ADMIN — ONDANSETRON 8 MG: 2 INJECTION INTRAMUSCULAR; INTRAVENOUS at 11:46

## 2021-01-01 RX ADMIN — LORAZEPAM 0.5 MG: 2 INJECTION INTRAMUSCULAR; INTRAVENOUS at 06:27

## 2021-01-01 RX ADMIN — FLUCONAZOLE 100 MG: 100 TABLET ORAL at 09:00

## 2021-01-01 RX ADMIN — LORAZEPAM 1 MG: 2 INJECTION INTRAMUSCULAR; INTRAVENOUS at 00:35

## 2021-01-01 RX ADMIN — Medication 10 ML: at 15:05

## 2021-01-01 RX ADMIN — ACYCLOVIR 400 MG: 200 SUSPENSION ORAL at 08:48

## 2021-01-01 RX ADMIN — FENTANYL CITRATE 50 MCG: 50 INJECTION, SOLUTION INTRAMUSCULAR; INTRAVENOUS at 08:16

## 2021-01-01 RX ADMIN — FUROSEMIDE 40 MG: 10 INJECTION, SOLUTION INTRAMUSCULAR; INTRAVENOUS at 06:56

## 2021-01-01 RX ADMIN — LEVALBUTEROL HYDROCHLORIDE 1.25 MG: 1.25 SOLUTION RESPIRATORY (INHALATION) at 11:00

## 2021-01-01 RX ADMIN — FENTANYL CITRATE 75 MCG/HR: 0.05 INJECTION, SOLUTION INTRAMUSCULAR; INTRAVENOUS at 05:06

## 2021-01-01 RX ADMIN — LORAZEPAM 1 MG: 2 INJECTION INTRAMUSCULAR; INTRAVENOUS at 21:03

## 2021-01-01 RX ADMIN — Medication 10 ML: at 05:17

## 2021-01-01 RX ADMIN — INSULIN GLARGINE 10 UNITS: 100 INJECTION, SOLUTION SUBCUTANEOUS at 08:37

## 2021-01-01 RX ADMIN — TBO-FILGRASTIM 480 MCG: 480 INJECTION, SOLUTION SUBCUTANEOUS at 09:25

## 2021-01-01 RX ADMIN — INSULIN LISPRO 4 UNITS: 100 INJECTION, SOLUTION INTRAVENOUS; SUBCUTANEOUS at 23:34

## 2021-01-01 RX ADMIN — LANSOPRAZOLE 30 MG: KIT at 08:34

## 2021-01-01 RX ADMIN — AMIODARONE HYDROCHLORIDE 0.5 MG/MIN: 50 INJECTION, SOLUTION INTRAVENOUS at 07:31

## 2021-01-01 RX ADMIN — HEPARIN SODIUM 5000 UNITS: 5000 INJECTION INTRAVENOUS; SUBCUTANEOUS at 09:23

## 2021-01-01 RX ADMIN — INSULIN LISPRO 2 UNITS: 100 INJECTION, SOLUTION INTRAVENOUS; SUBCUTANEOUS at 09:05

## 2021-01-01 RX ADMIN — LEVALBUTEROL HYDROCHLORIDE 1.25 MG: 1.25 SOLUTION RESPIRATORY (INHALATION) at 14:15

## 2021-01-01 RX ADMIN — SODIUM CHLORIDE 25 ML/HR: 900 INJECTION, SOLUTION INTRAVENOUS at 14:30

## 2021-01-01 RX ADMIN — NOREPINEPHRINE-DEXTROSE IV SOLUTION 4 MG/250ML-5% 10 MCG/MIN: 4-5/250 SOLUTION at 23:00

## 2021-01-01 RX ADMIN — DIPHENHYDRAMINE HYDROCHLORIDE 25 MG: 50 INJECTION, SOLUTION INTRAMUSCULAR; INTRAVENOUS at 12:36

## 2021-01-01 RX ADMIN — LEVALBUTEROL HYDROCHLORIDE 1.25 MG: 1.25 SOLUTION RESPIRATORY (INHALATION) at 07:12

## 2021-01-01 RX ADMIN — MIDODRINE HYDROCHLORIDE 10 MG: 5 TABLET ORAL at 16:13

## 2021-01-01 RX ADMIN — ACYCLOVIR 400 MG: 200 SUSPENSION ORAL at 10:35

## 2021-01-01 RX ADMIN — METHYLPREDNISOLONE SODIUM SUCCINATE 40 MG: 40 INJECTION, POWDER, FOR SOLUTION INTRAMUSCULAR; INTRAVENOUS at 14:07

## 2021-01-01 RX ADMIN — METHYLPREDNISOLONE SODIUM SUCCINATE 60 MG: 40 INJECTION, POWDER, FOR SOLUTION INTRAMUSCULAR; INTRAVENOUS at 22:16

## 2021-01-01 RX ADMIN — PIPERACILLIN AND TAZOBACTAM 4.5 G: 4; .5 INJECTION, POWDER, FOR SOLUTION INTRAVENOUS at 02:23

## 2021-01-01 RX ADMIN — LORAZEPAM 0.5 MG: 2 INJECTION INTRAMUSCULAR at 09:42

## 2021-01-01 RX ADMIN — PIPERACILLIN SODIUM AND TAZOBACTAM SODIUM 4.5 G: 4; .5 INJECTION, POWDER, LYOPHILIZED, FOR SOLUTION INTRAVENOUS at 04:05

## 2021-01-01 RX ADMIN — LORAZEPAM 1 MG: 2 INJECTION INTRAMUSCULAR; INTRAVENOUS at 11:37

## 2021-01-01 RX ADMIN — ETOMIDATE 20 MG: 2 INJECTION, SOLUTION INTRAVENOUS at 21:46

## 2021-01-01 RX ADMIN — DEXAMETHASONE SODIUM PHOSPHATE 8 MG: 4 INJECTION, SOLUTION INTRAMUSCULAR; INTRAVENOUS at 14:45

## 2021-01-01 RX ADMIN — POTASSIUM BICARBONATE 40 MEQ: 782 TABLET, EFFERVESCENT ORAL at 04:24

## 2021-01-01 RX ADMIN — ACYCLOVIR 400 MG: 200 SUSPENSION ORAL at 08:18

## 2021-01-01 RX ADMIN — METHYLPREDNISOLONE SODIUM SUCCINATE 60 MG: 40 INJECTION, POWDER, FOR SOLUTION INTRAMUSCULAR; INTRAVENOUS at 14:05

## 2021-01-01 RX ADMIN — INSULIN LISPRO 4 UNITS: 100 INJECTION, SOLUTION INTRAVENOUS; SUBCUTANEOUS at 17:45

## 2021-01-01 RX ADMIN — Medication 10 ML: at 16:20

## 2021-01-01 RX ADMIN — INSULIN LISPRO 4 UNITS: 100 INJECTION, SOLUTION INTRAVENOUS; SUBCUTANEOUS at 05:10

## 2021-01-01 RX ADMIN — LEVALBUTEROL HYDROCHLORIDE 1.25 MG: 1.25 SOLUTION RESPIRATORY (INHALATION) at 03:46

## 2021-01-01 RX ADMIN — MYCOPHENOLATE MOFETIL 1000 MG: 200 POWDER, FOR SUSPENSION ORAL at 08:27

## 2021-01-01 RX ADMIN — PIPERACILLIN SODIUM AND TAZOBACTAM SODIUM 4.5 G: 4; .5 INJECTION, POWDER, LYOPHILIZED, FOR SOLUTION INTRAVENOUS at 16:12

## 2021-01-01 RX ADMIN — NOREPINEPHRINE-DEXTROSE IV SOLUTION 4 MG/250ML-5% 30 MCG/MIN: 4-5/250 SOLUTION at 07:31

## 2021-01-01 RX ADMIN — METOPROLOL SUCCINATE 12.5 MG: 25 TABLET, EXTENDED RELEASE ORAL at 10:04

## 2021-01-01 RX ADMIN — Medication 10 ML: at 13:34

## 2021-01-01 RX ADMIN — SODIUM BICARBONATE: 84 INJECTION, SOLUTION INTRAVENOUS at 20:20

## 2021-01-01 RX ADMIN — LEVALBUTEROL HYDROCHLORIDE 1.25 MG: 1.25 SOLUTION RESPIRATORY (INHALATION) at 11:32

## 2021-01-01 RX ADMIN — AMIODARONE HYDROCHLORIDE 1 MG/MIN: 50 INJECTION, SOLUTION INTRAVENOUS at 08:00

## 2021-01-01 RX ADMIN — LEVALBUTEROL HYDROCHLORIDE 1.25 MG: 1.25 SOLUTION RESPIRATORY (INHALATION) at 07:27

## 2021-01-01 RX ADMIN — MIDODRINE HYDROCHLORIDE 10 MG: 5 TABLET ORAL at 11:35

## 2021-01-01 RX ADMIN — PANTOPRAZOLE SODIUM 40 MG: 40 TABLET, DELAYED RELEASE ORAL at 10:57

## 2021-01-01 RX ADMIN — MORPHINE SULFATE 2 MG: 2 INJECTION, SOLUTION INTRAMUSCULAR; INTRAVENOUS at 19:32

## 2021-01-01 RX ADMIN — TBO-FILGRASTIM 480 MCG: 480 INJECTION, SOLUTION SUBCUTANEOUS at 11:13

## 2021-01-01 RX ADMIN — LEVALBUTEROL HYDROCHLORIDE 1.25 MG: 1.25 SOLUTION RESPIRATORY (INHALATION) at 07:39

## 2021-01-01 RX ADMIN — MORPHINE SULFATE 2 MG: 2 INJECTION, SOLUTION INTRAMUSCULAR; INTRAVENOUS at 09:45

## 2021-01-01 RX ADMIN — LEVALBUTEROL HYDROCHLORIDE 1.25 MG: 1.25 SOLUTION RESPIRATORY (INHALATION) at 16:57

## 2021-01-01 RX ADMIN — ROCURONIUM BROMIDE 80 MG: 50 INJECTION, SOLUTION INTRAVENOUS at 21:46

## 2021-01-01 RX ADMIN — Medication 10 ML: at 05:10

## 2021-01-01 RX ADMIN — MYCOPHENOLATE MOFETIL 1000 MG: 200 POWDER, FOR SUSPENSION ORAL at 08:31

## 2021-01-01 RX ADMIN — PIPERACILLIN SODIUM AND TAZOBACTAM SODIUM 4.5 G: 4; .5 INJECTION, POWDER, LYOPHILIZED, FOR SOLUTION INTRAVENOUS at 03:39

## 2021-01-01 RX ADMIN — LEVALBUTEROL HYDROCHLORIDE 1.25 MG: 1.25 SOLUTION RESPIRATORY (INHALATION) at 15:45

## 2021-01-01 RX ADMIN — MIDODRINE HYDROCHLORIDE 10 MG: 5 TABLET ORAL at 07:39

## 2021-01-01 RX ADMIN — INSULIN LISPRO 2 UNITS: 100 INJECTION, SOLUTION INTRAVENOUS; SUBCUTANEOUS at 23:43

## 2021-01-01 RX ADMIN — LORAZEPAM 1 MG: 2 INJECTION INTRAMUSCULAR; INTRAVENOUS at 09:40

## 2021-01-01 RX ADMIN — VANCOMYCIN HYDROCHLORIDE 1750 MG: 10 INJECTION, POWDER, LYOPHILIZED, FOR SOLUTION INTRAVENOUS at 07:26

## 2021-01-01 RX ADMIN — INSULIN LISPRO 2 UNITS: 100 INJECTION, SOLUTION INTRAVENOUS; SUBCUTANEOUS at 18:04

## 2021-01-01 RX ADMIN — ETOPOSIDE 191 MG: 20 INJECTION INTRAVENOUS at 15:40

## 2021-01-01 RX ADMIN — Medication 10 ML: at 05:48

## 2021-01-01 RX ADMIN — AMIODARONE HYDROCHLORIDE 150 MG: 50 INJECTION, SOLUTION INTRAVENOUS at 07:38

## 2021-01-01 RX ADMIN — PIPERACILLIN SODIUM AND TAZOBACTAM SODIUM 4.5 G: 4; .5 INJECTION, POWDER, LYOPHILIZED, FOR SOLUTION INTRAVENOUS at 03:15

## 2021-01-01 RX ADMIN — METHYLPREDNISOLONE SODIUM SUCCINATE 40 MG: 40 INJECTION, POWDER, FOR SOLUTION INTRAMUSCULAR; INTRAVENOUS at 13:34

## 2021-01-01 RX ADMIN — PHENOL 1 SPRAY: 1.5 LIQUID ORAL at 03:16

## 2021-01-01 RX ADMIN — LEVALBUTEROL HYDROCHLORIDE 1.25 MG: 1.25 SOLUTION RESPIRATORY (INHALATION) at 20:10

## 2021-01-01 RX ADMIN — LORAZEPAM 1 MG: 2 INJECTION INTRAMUSCULAR; INTRAVENOUS at 20:12

## 2021-01-01 RX ADMIN — METHYLPREDNISOLONE SODIUM SUCCINATE 40 MG: 40 INJECTION, POWDER, FOR SOLUTION INTRAMUSCULAR; INTRAVENOUS at 21:14

## 2021-01-01 RX ADMIN — ETOPOSIDE 191 MG: 20 INJECTION INTRAVENOUS at 15:10

## 2021-01-01 RX ADMIN — SODIUM CHLORIDE 75 ML/HR: 900 INJECTION, SOLUTION INTRAVENOUS at 21:23

## 2021-01-01 RX ADMIN — ETOPOSIDE 191 MG: 20 INJECTION INTRAVENOUS at 13:17

## 2021-01-01 RX ADMIN — METHYLPREDNISOLONE SODIUM SUCCINATE 40 MG: 40 INJECTION, POWDER, FOR SOLUTION INTRAMUSCULAR; INTRAVENOUS at 05:17

## 2021-01-01 RX ADMIN — TBO-FILGRASTIM 480 MCG: 480 INJECTION, SOLUTION SUBCUTANEOUS at 10:18

## 2021-01-01 RX ADMIN — INSULIN LISPRO 4 UNITS: 100 INJECTION, SOLUTION INTRAVENOUS; SUBCUTANEOUS at 06:11

## 2021-01-01 RX ADMIN — DEXTROSE 1 MG/MIN: 5 SOLUTION INTRAVENOUS at 09:10

## 2021-01-01 RX ADMIN — HEPARIN SODIUM 5000 UNITS: 1000 INJECTION INTRAVENOUS; SUBCUTANEOUS at 10:15

## 2021-01-01 RX ADMIN — METHYLPREDNISOLONE SODIUM SUCCINATE 60 MG: 40 INJECTION, POWDER, FOR SOLUTION INTRAMUSCULAR; INTRAVENOUS at 06:00

## 2021-01-01 RX ADMIN — PIPERACILLIN AND TAZOBACTAM 4.5 G: 4; .5 INJECTION, POWDER, FOR SOLUTION INTRAVENOUS at 18:31

## 2021-01-01 RX ADMIN — METHYLPREDNISOLONE SODIUM SUCCINATE 40 MG: 40 INJECTION, POWDER, FOR SOLUTION INTRAMUSCULAR; INTRAVENOUS at 09:00

## 2021-01-01 RX ADMIN — Medication 10 ML: at 21:04

## 2021-01-01 RX ADMIN — Medication 10 ML: at 15:40

## 2021-01-01 RX ADMIN — METHYLPREDNISOLONE SODIUM SUCCINATE 40 MG: 40 INJECTION, POWDER, FOR SOLUTION INTRAMUSCULAR; INTRAVENOUS at 21:38

## 2021-01-01 RX ADMIN — HEPARIN SODIUM 2750 UNITS: 5000 INJECTION INTRAVENOUS; SUBCUTANEOUS at 02:46

## 2021-01-01 RX ADMIN — SODIUM CHLORIDE 1000 ML: 900 INJECTION, SOLUTION INTRAVENOUS at 08:45

## 2021-01-01 RX ADMIN — INSULIN GLARGINE 20 UNITS: 100 INJECTION, SOLUTION SUBCUTANEOUS at 09:20

## 2021-01-01 RX ADMIN — LEVALBUTEROL HYDROCHLORIDE 1.25 MG: 1.25 SOLUTION RESPIRATORY (INHALATION) at 02:43

## 2021-01-01 RX ADMIN — PIPERACILLIN AND TAZOBACTAM 4.5 G: 4; .5 INJECTION, POWDER, FOR SOLUTION INTRAVENOUS at 03:11

## 2021-01-01 RX ADMIN — Medication 10 ML: at 13:50

## 2021-01-01 RX ADMIN — ATEZOLIZUMAB 1200 MG: 1200 INJECTION, SOLUTION INTRAVENOUS at 11:11

## 2021-01-01 RX ADMIN — CEFEPIME HYDROCHLORIDE 2 G: 2 INJECTION, POWDER, FOR SOLUTION INTRAVENOUS at 09:00

## 2021-01-01 RX ADMIN — FENTANYL CITRATE 100 MCG/HR: 0.05 INJECTION, SOLUTION INTRAMUSCULAR; INTRAVENOUS at 04:25

## 2021-01-01 RX ADMIN — INSULIN LISPRO 4 UNITS: 100 INJECTION, SOLUTION INTRAVENOUS; SUBCUTANEOUS at 21:20

## 2021-01-01 RX ADMIN — Medication 10 ML: at 14:30

## 2021-01-01 RX ADMIN — METHYLPREDNISOLONE SODIUM SUCCINATE 40 MG: 40 INJECTION, POWDER, FOR SOLUTION INTRAMUSCULAR; INTRAVENOUS at 21:20

## 2021-01-01 RX ADMIN — METHYLPREDNISOLONE SODIUM SUCCINATE 40 MG: 40 INJECTION, POWDER, FOR SOLUTION INTRAMUSCULAR; INTRAVENOUS at 13:17

## 2021-01-01 RX ADMIN — LEVALBUTEROL HYDROCHLORIDE 1.25 MG: 1.25 SOLUTION RESPIRATORY (INHALATION) at 20:19

## 2021-01-01 RX ADMIN — METHYLPREDNISOLONE SODIUM SUCCINATE 40 MG: 40 INJECTION, POWDER, FOR SOLUTION INTRAMUSCULAR; INTRAVENOUS at 05:39

## 2021-01-01 RX ADMIN — MYCOPHENOLATE MOFETIL 1000 MG: 200 POWDER, FOR SUSPENSION ORAL at 16:31

## 2021-01-01 RX ADMIN — EPINEPHRINE 1 MG: 0.1 INJECTION, SOLUTION ENDOTRACHEAL; INTRACARDIAC; INTRAVENOUS at 06:03

## 2021-01-01 RX ADMIN — INSULIN LISPRO 2 UNITS: 100 INJECTION, SOLUTION INTRAVENOUS; SUBCUTANEOUS at 12:13

## 2021-01-01 RX ADMIN — INSULIN LISPRO 4 UNITS: 100 INJECTION, SOLUTION INTRAVENOUS; SUBCUTANEOUS at 18:10

## 2021-01-01 RX ADMIN — SODIUM CHLORIDE 75 ML/HR: 900 INJECTION, SOLUTION INTRAVENOUS at 09:12

## 2021-01-01 RX ADMIN — Medication 10 ML: at 21:38

## 2021-01-01 RX ADMIN — MYCOPHENOLATE MOFETIL 1000 MG: 200 POWDER, FOR SUSPENSION ORAL at 17:17

## 2021-01-01 RX ADMIN — MIDODRINE HYDROCHLORIDE 10 MG: 5 TABLET ORAL at 17:57

## 2021-01-01 RX ADMIN — ETOMIDATE 20 MG: 2 INJECTION INTRAVENOUS at 21:46

## 2021-01-01 RX ADMIN — INSULIN LISPRO 2 UNITS: 100 INJECTION, SOLUTION INTRAVENOUS; SUBCUTANEOUS at 23:50

## 2021-01-01 RX ADMIN — AMIODARONE HYDROCHLORIDE 200 MG: 200 TABLET ORAL at 11:35

## 2021-01-01 RX ADMIN — MIDODRINE HYDROCHLORIDE 10 MG: 5 TABLET ORAL at 11:53

## 2021-01-01 RX ADMIN — LANSOPRAZOLE 30 MG: KIT at 08:27

## 2021-01-18 NOTE — PROGRESS NOTES
Arrived to the [facility]. tecentriq completed. Patient tolerated well. Any issues or concerns during appointment: no.  Patient aware of next infusion appointment on  2/8 at 1100. Discharged to home ambulatory.

## 2021-01-18 NOTE — ADDENDUM NOTE
Encounter addended by: Josué Shields RPH on: 1/18/2021 3:13 PM 
 Actions taken: iDeirdre created or edited

## 2021-02-04 NOTE — PROGRESS NOTES
Port accessed with #20 gauge 3/4 inch Bowens needle. Blood drawn from port after 10 cc wasted, for stat creatine. Stat creatine is 2.6 and GFR 32. Pt instructed on oral hydration protocol. Site without redness or swelling, has good blood return. Pt tolerated procedure well, offering no complaints.

## 2021-02-08 NOTE — ADDENDUM NOTE
Encounter addended by: Will Patel [facility] on: 2/8/2021 11:11 AM 
 Actions taken: Flowsheet accepted, i-Vent created or edited

## 2021-02-08 NOTE — PROGRESS NOTES
2/8/2021  Patient seen today with Dr Neda Davis for Ct scan review and pre-treatment office visit for C15 Tecentriq. Proceed to treatment. Questions and discussion completed to the satisfaction of the patient. Patient encouraged to eat well and drink plenty of fluids. Patient  Informed that the CT results showed stable lung mass but does indicate some pleural effusion fluid collection. Patient advised and referral sent for a thoracentesis for a cytology for histology of any additional cancer before Dr Neda Davis can treat. Referral marked urgent in hopes of result before next office visit on 3/1. Emailed Lai Durham to assistance in a timely schedule. Patient wishes to continue on treatment plan and navigation will follow.

## 2021-02-08 NOTE — PROGRESS NOTES
Arrived to infusion center for Tecentruq. Tolerated the infusion without signs of reaction. Has some fluid in his right lung so will be scheduling a thoracentesis. Aware of next infusion scheduled for 3/1 at 1330.  Discharged to home in satisfactory condition,

## 2021-02-09 NOTE — PROCEDURES
PROCEDURE:  DIAGNOSTIC THORACENTESIS, THERAPEUTIC THORACENTESIS       PRE-OP DIAGNOSIS:  R PLEURAL EFFUSION    POST-OP DIAGNOSIS:  R PLEURAL EFFUSION    VOLUME REMOVED:    1800cc    ANESTHESIA:    LOCAL ANESTHESIA WITH 1% LIDOCAINE 10 CC TOTAL. CHEST ULTRASOUND FINDINGS:    A Turbo-M, Sonosite ultrasound with a 5-16 mHz probe was used to image the chest and localize the pleural effusion on the right chest.    A large anechoic space was seen on the right consistent with an uncomplicated pleural effusion. DESCRIPTION OF PROCEDURE:    After obtaining informed consent and localizing the safest location for thoracentesis, the  8th intercostal space was marked with a blunt, plastic needle cap in the mid scapular line. An Symphony Concierge AK-0100 Pleral-Seal thoracentesis kit was used to perform the procedure. The skin was cleansed with the supplied chlorhexidine swab and then draped in the usual fashion. Using the previously marked location as a guide, a 22 G 1.5 inch needle was used to inject 1% lidocaine into the skin and subcutaneous tissue, as well as onto the underlying rib and inter-costal muscles. Pleural fluid was aspirated to assure proper location and additional lidocaine was injected into the pleural space prior to removing the anesthesia needle. A 3mm incision was then made with the supplied scalpel in the usual fashion to facilitate the insertion of the thoracentesis needle. The needle with an 8 Pakistani thoracentesis catheter was then introduced into the chest through the previously made incision in the usual fashion, the rib localized with the needle, and the catheter then marched over the rib into the pleural space. After aspirating fluid, the thoracentesis catheter was then placed into the chest using the needle itself as a trocar. The needle was then removed and the catheter was attached to the supplied tubing without complication.     1800 cc of serosanguinous fluid was aspirated and sent for analysis. Fluid was sent for the following tests:      LDH  Total Protein  Glucose  Cell count with differential  Routine culture and Gram stain  Cytology  AFB  Fungus    Post procedure US confirmed complete drainage of the effusion and presence of lung sliding, ruling out pneumothorax. (38071)    EBL:     <4QG    COMPLICATIONS:    None. Will have the patient set up for repeat CXR in 2 weeks. He will call us if he feels more short of breath before then. We will contact him with the results of his pleural fluid analysis.        Ewelina Alcaraz MD

## 2021-02-09 NOTE — ROUTINE PROCESS
Procedure explained to patient. Consent obtained for thoracentesis. Patient sat up on side of the bed. Vitals documented in flow sheet. Dr. Juan Wagner at bedside. Time out for procedure at 1440. Ultrasound of bilateral chest done with picture taken. Approximately 1800cc of serosanguinous  fluid was taken from right side with no complications noted. Lung slide afterward was done. Picture of obtained and placed on chart. Specimens x 3 were taken to lab. Discharge instructions given to patient. Patient drove today. Vital signs stable. Office reached to make appt for follow up in two weeks with chest xray.

## 2021-02-09 NOTE — DISCHARGE INSTRUCTIONS
Patient Education        Thoracentesis: What to Expect at Home  Your Recovery  Thoracentesis (say \"qrku-hd-xwo-JESUS-sis\") is a procedure to remove fluid from the space between the lungs and the chest wall (pleural space). This procedure may also be called a \"chest tap. \" It's normal to have a small amount of fluid in the pleural space. But too much fluid can build up because of problems such as infection, heart failure, or lung cancer. The procedure may have been done to help with shortness of breath and pain caused by the fluid buildup. Or you may have had this procedure so the doctor could test the fluid to find the cause of the buildup. Your chest may be sore where the doctor put the needle or catheter into your skin (the puncture site). This usually gets better after a day or two. You can go back to work or your normal activities as soon as you feel up to it. If a large amount of pleural fluid was removed during the procedure, you will probably be able to breathe more easily. If more pleural fluid collects and needs to be removed, another thoracentesis may be done later. If the doctor sent the fluid to a lab for testing, it may take several days to get the results. The doctor or nurse will discuss the results with you. This care sheet gives you a general idea about how long it will take for you to recover. But each person recovers at a different pace. Follow the steps below to feel better as quickly as possible. How can you care for yourself at home? Activity    · Rest when you feel tired. Getting enough sleep will help you recover.     · Avoid strenuous activities, such as bicycle riding, jogging, weight lifting, or aerobic exercise, until your doctor says it is okay.     · You may shower. Do not take a bath until the puncture site has healed, or until your doctor tells you it is okay.     · Ask your doctor when you can drive again.     · You may need to take 1 or 2 days off from work.  It depends on the type of work you do and how you feel. Diet    · You can eat your normal diet.     · Drink plenty of fluids (unless your doctor tells you not to). Medicines    · Your doctor will tell you if and when you can restart your medicines. He or she will also give you instructions about taking any new medicines.     · If you take aspirin or some other blood thinner, ask your doctor if and when to start taking it again. Make sure that you understand exactly what your doctor wants you to do.     · Be safe with medicines. Take pain medicines exactly as directed. ? If the doctor gave you a prescription medicine for pain, take it as prescribed. ? If you are not taking a prescription pain medicine, ask your doctor if you can take an over-the-counter medicine. ? Do not take two or more pain medicines at the same time unless the doctor told you to. Many pain medicines have acetaminophen, which is Tylenol. Too much acetaminophen (Tylenol) can be harmful.     · If you think your pain medicine is making you sick to your stomach:  ? Take your medicine after meals (unless your doctor has told you not to). ? Ask your doctor for a different pain medicine.     · If your doctor prescribed antibiotics, take them as directed. Do not stop taking them just because you feel better. You need to take the full course of antibiotics. Care of the puncture site    · Wash the area daily with warm, soapy water, and pat it dry. Don't use hydrogen peroxide or alcohol, which may delay healing. You may cover the area with a gauze bandage if it weeps or rubs against clothing. Change the bandage every day.     · Keep the area clean and dry. Follow-up care is a key part of your treatment and safety. Be sure to make and go to all appointments, and call your doctor if you are having problems. It's also a good idea to know your test results and keep a list of the medicines you take. When should you call for help?    Call 911 anytime you think you may need emergency care. For example, call if:    · You passed out (lost consciousness).     · You have severe trouble breathing.     · You have sudden chest pain and shortness of breath, or you cough up blood. Call your doctor now or seek immediate medical care if:    · You have shortness of breath that is new or getting worse.     · You have new or worse pain in your chest, especially when you take a deep breath.     · You are sick to your stomach or cannot keep fluids down.     · You have a fever over 100°F.     · Bright red blood has soaked through the bandage over your puncture site.     · You have signs of infection, such as:  ? Increased pain, swelling, warmth, or redness. ? Red streaks leading from the puncture site. ? Pus draining from the puncture site. ? Swollen lymph nodes in your neck, armpits, or groin. ? A fever.     · You cough up a lot more mucus than normal, or your mucus changes color. Watch closely for changes in your health, and be sure to contact your doctor if you have any problems. Where can you learn more? Go to http://www.gray.com/  Enter Q755 in the search box to learn more about \"Thoracentesis: What to Expect at Home. \"  Current as of: February 24, 2020               Content Version: 12.6  © 2006-2020 [employer]. Care instructions adapted under license by [employer] (which disclaims liability or warranty for this information). If you have questions about a medical condition or this instruction, always ask your healthcare professional. [place] [address] any warranty or liability for your use of this information. Keep bandage on for 24 hrs dry and intact. Call [facility] if you have any questions or concerns.

## 2021-02-09 NOTE — H&P
HISTORY AND PHYSICAL      Nir Naqvi.    2/9/2021    Date of Admission:  2/9/2021    The patient's chart is reviewed and the patient is discussed with the staff. Subjective:     Patient is a 60 y.o.  male presents with a new R pleural effusion. He has a history of stage IV adenocarcinoma diagnosed in 2018. Treated, then found to also have small cell lung cancer in R pleural fluid in 2020. He has been treated with cis/etoposide. He reports some increased shortness of breath for the past month and had a repeat CT 2/4/21 which showed new R effusion with some pleural nodularity. We have been asked to perform thoracentesis sending pleural fluid analysis. He was previously on anticoagulation but states he has been off this for about a year. Review of Systems  A comprehensive review of systems was negative except for that written in the HPI. Patient Active Problem List   Diagnosis Code    Allergic rhinitis J30.9    Erectile dysfunction N52.9    CML (chronic myeloid leukemia) ([facility]) C92.10    Depression F32.9    Chronic systolic congestive heart failure ([facility]) I50.22    CAP (community acquired pneumonia) J18.9    Pleural effusion J90    Abnormal chest CT R93.89    Pulmonary infiltrate R91.8    Stage IV adenocarcinoma of lung (HCC) C34.90    Immunocompromised ([facility] D84.9    Pneumonia J18.9    Pulmonary emboli ([facility]) I26.99    Acute CHF (congestive heart failure) ([facility]) I50.9    Acute pulmonary edema (HCC) J81.0    SCLC (small cell lung carcinoma), right ([facility]) C34.91    Shortness of breath R06.02    Pleural effusion on right J90    Hypomagnesemia E83.42    Elevated serum creatinine R79.89       Prior to Admission Medications   Prescriptions Last Dose Informant Patient Reported? Taking? OXYGEN-AIR DELIVERY SYSTEMS   Yes No   Sig: by Does Not Apply route.  3LPM PRN   albuterol (PROVENTIL HFA, VENTOLIN HFA, PROAIR HFA) 90 mcg/actuation inhaler 2021 at Unknown time  No Yes   Sig: Take 2 Puffs by inhalation every four (4) hours as needed for Wheezing. benzonatate (TESSALON) 200 mg capsule 2021 at Unknown time  No Yes   Sig: Take 1 Cap by mouth three (3) times daily as needed for Cough. folic acid (FOLVITE) 1 mg tablet 2021 at Unknown time  No Yes   Sig: TAKE 1 TABLET BY MOUTH EVERY DAY   furosemide (LASIX) 40 mg tablet 2021 at Unknown time  No Yes   Sig: Take 1 Tab by mouth daily as needed for Other (weight gain > 5 lbs in 48 hours. ).   lidocaine-prilocaine (EMLA) topical cream   No No   Sig: Apply  to affected area as needed for Pain. Apply to port site 45-60 minutes prior to port access. Cover with plastic.   metoprolol succinate (TOPROL-XL) 25 mg XL tablet 2021 at Unknown time  No Yes   Sig: Take 0.5 Tabs by mouth daily. Indications: chronic heart failure   olmesartan (BENICAR) 5 mg tablet 2021 at Unknown time  No Yes   Sig: Take 1 Tab by mouth daily. ondansetron hcl (ZOFRAN) 8 mg tablet 2021 at Unknown time  No Yes   Sig: TAKE 1 TABLET BY MOUTH EVERY 8 HOURS AS NEEDED FOR NAUSEA   prochlorperazine (COMPAZINE) 10 mg tablet 2021 at Unknown time  No Yes   Sig: Take 1 Tab by mouth every six (6) hours as needed for Nausea. spironolactone (ALDACTONE) 25 mg tablet 2021 at Unknown time  No Yes   Sig: Take 1 Tab by mouth daily. triamcinolone (NASACORT) 55 mcg nasal inhaler 2021 at Unknown time  No Yes   Si Sprays by Both Nostrils route two (2) times a day. Patient taking differently: 2 Sprays by Both Nostrils route two (2) times daily as needed.  Indications: inflammation of the nose due to an allergy      Facility-Administered Medications: None       Past Medical History:   Diagnosis Date    Anxiety     Arthritis     [facility] 5/3/2017    CML [facility]ch[facility]) [facility] 2012    Depression 2012    Erectile dysfunction     Hypertension     [facility] 8/24/2012    Lung cancer [facility] 2018    Pulmonary emboli ([facility] 1/6/2019     Past Surgical History:   Procedure Laterality Date    HX HERNIA REPAIR      left inguinal 1996    HX VASCULAR ACCESS      IR INSERT TUNL CVC W PORT OVER 5 YEARS  3/11/2020     Social History     Socioeconomic History    Marital status:      Spouse name: Not on file    Number of children: Not on file    Years of education: Not on file    Highest education level: Not on file   Occupational History     Comment: [employer]   Social Needs    Financial resource strain: Not on file    Food insecurity     Worry: Not on file     Inability: Not on file    Transportation needs     Medical: Not on file     Non-medical: Not on file   Tobacco Use    Smoking status: Never Smoker    Smokeless tobacco: Never Used   Substance and Sexual Activity    Alcohol use: No    Drug use: No    Sexual activity: Not on file   Lifestyle    Physical activity     Days per week: Not on file     Minutes per session: Not on file    Stress: Not on file   Relationships    Social connections     Talks on phone: Not on file     Gets together: Not on file     Attends Hinduism service: Not on file     Active member of club or organization: Not on file     Attends meetings of clubs or organizations: Not on file     Relationship status: Not on file    Intimate partner violence     Fear of current or ex partner: Not on file     Emotionally abused: Not on file     Physically abused: Not on file     Forced sexual activity: Not on file   Other Topics Concern     Service Not Asked    Blood Transfusions Not Asked    Caffeine Concern Not Asked    Occupational Exposure Not Asked   Onalee Colon Hazards Not Asked    Sleep Concern Not Asked    Stress Concern Not Asked    Weight Concern Not Asked    Special Diet Not Asked    Back Care Not Asked    Exercise Not Asked    Bike Helmet Not Asked   [address] Not Asked    Self-Exams Not Asked   Social History Narrative    Pt  with one son     Family History   Problem Relation Age of Onset    Lung Disease Father 79        Black lung\"    Cancer Mother 72        pancreatic cancer     No Known Allergies    No current facility-administered medications for this encounter. Objective:     Vitals:    02/09/21 1349 02/09/21 1411   BP: 111/69 120/65   Pulse: (!) 109 100   Resp: 18 16   Temp: 98.3 °F (36.8 °C)    SpO2: 98% 100%       PHYSICAL EXAM     Constitutional:  the patient is well developed and in no acute distress  EENMT:  Sclera clear, pupils equal, oral mucosa moist  Respiratory: Decreased on right  Cardiovascular:  RRR without M,G,R  Gastrointestinal: soft and non-tender; with positive bowel sounds. Musculoskeletal: warm without cyanosis. There is no lower extremity edema. Skin:  no jaundice or rashes, no wounds   Neurologic: no gross neuro deficits     Psychiatric:  alert and oriented x ppt    CXR:  CXR Results  (Last 48 hours)    None      CT Chest 2/4/21      Recent Labs     02/08/21  0934   WBC 8.6   HGB 10.3*   HCT 31.8*        Recent Labs     02/08/21  0934      K 4.6   *   GLU 95   CO2 25   BUN 41*   CREA 2.60*   MG 2.4   PHOS 4.3*   CA 8.9   ALB 3.6   TBILI 0.3   ALT 18     No results for input(s): PH, PCO2, PO2, HCO3, PHI, PCO2I, PO2I, HCO3I in the last 72 hours. No results for input(s): LCAD, LAC in the last 72 hours. Assessment:  (Medical Decision Making)     Hospital Problems  Date Reviewed: 2/8/2021          Codes Class Noted POA    SCLC (small cell lung carcinoma), right (HCC) ICD-10-CM: C34.91  ICD-9-CM: 162.9  3/13/2020 Yes        Stage IV adenocarcinoma of lung (HCC) ICD-10-CM: C34.90  ICD-9-CM: 162.9  7/11/2018 Yes        Pleural effusion ICD-10-CM: J90  ICD-9-CM: 511.9  4/27/2018 Yes            Pt with unusual history of both stage IV adeno lung cancer and small cell lung cancer. On treatment initially with response.  Now presenting with recurrence of R effusion and increased pleural nodularity concerning for possible recurrence. Plan:  (Medical Decision Making)     --Will proceed with thoracentesis today with pleural fluid analysis. More than 50% of the time documented was spent in face-to-face contact with the patient and in the care of the patient on the floor/unit where the patient is located.     You Larry MD

## 2021-03-04 NOTE — H&P
Date of Surgery Update:  Laurie Worthington. was seen and examined. History and physical has been reviewed. The patient has been examined.  There have been no significant clinical changes since the completion of the originally dated History and Physical.    Signed By: Kelley Castro MD     March 4, 2021 12:33 PM

## 2021-03-04 NOTE — DISCHARGE INSTRUCTIONS
Patient Education        Thoracentesis: What to Expect at Home  Your Recovery  Thoracentesis (say \"qiov-to-ocj-JESUS-sis\") is a procedure to remove fluid from the space between the lungs and the chest wall (pleural space). This procedure may also be called a \"chest tap. \" It's normal to have a small amount of fluid in the pleural space. But too much fluid can build up because of problems such as infection, heart failure, or lung cancer. The procedure may have been done to help with shortness of breath and pain caused by the fluid buildup. Or you may have had this procedure so the doctor could test the fluid to find the cause of the buildup. Your chest may be sore where the doctor put the needle or catheter into your skin (the puncture site). This usually gets better after a day or two. You can go back to work or your normal activities as soon as you feel up to it. If a large amount of pleural fluid was removed during the procedure, you will probably be able to breathe more easily. If more pleural fluid collects and needs to be removed, another thoracentesis may be done later. If the doctor sent the fluid to a lab for testing, it may take several days to get the results. The doctor or nurse will discuss the results with you. This care sheet gives you a general idea about how long it will take for you to recover. But each person recovers at a different pace. Follow the steps below to feel better as quickly as possible. How can you care for yourself at home? Activity    · Rest when you feel tired. Getting enough sleep will help you recover.     · Avoid strenuous activities, such as bicycle riding, jogging, weight lifting, or aerobic exercise, until your doctor says it is okay.     · You may shower. Do not take a bath until the puncture site has healed, or until your doctor tells you it is okay.     · Ask your doctor when you can drive again.     · You may need to take 1 or 2 days off from work.  It depends on the type of work you do and how you feel. Diet    · You can eat your normal diet.     · Drink plenty of fluids (unless your doctor tells you not to). Medicines    · Your doctor will tell you if and when you can restart your medicines. He or she will also give you instructions about taking any new medicines.     · If you take aspirin or some other blood thinner, ask your doctor if and when to start taking it again. Make sure that you understand exactly what your doctor wants you to do.     · Be safe with medicines. Take pain medicines exactly as directed. ? If the doctor gave you a prescription medicine for pain, take it as prescribed. ? If you are not taking a prescription pain medicine, ask your doctor if you can take an over-the-counter medicine. ? Do not take two or more pain medicines at the same time unless the doctor told you to. Many pain medicines have acetaminophen, which is Tylenol. Too much acetaminophen (Tylenol) can be harmful.     · If you think your pain medicine is making you sick to your stomach:  ? Take your medicine after meals (unless your doctor has told you not to). ? Ask your doctor for a different pain medicine.     · If your doctor prescribed antibiotics, take them as directed. Do not stop taking them just because you feel better. You need to take the full course of antibiotics. Care of the puncture site    · Wash the area daily with warm, soapy water, and pat it dry. Don't use hydrogen peroxide or alcohol, which may delay healing. You may cover the area with a gauze bandage if it weeps or rubs against clothing. Change the bandage every day.     · Keep the area clean and dry. Follow-up care is a key part of your treatment and safety. Be sure to make and go to all appointments, and call your doctor if you are having problems. It's also a good idea to know your test results and keep a list of the medicines you take. When should you call for help?    Call 911 anytime you think you may need emergency care. For example, call if:    · You passed out (lost consciousness).     · You have severe trouble breathing.     · You have sudden chest pain and shortness of breath, or you cough up blood. Call your doctor now or seek immediate medical care if:    · You have shortness of breath that is new or getting worse.     · You have new or worse pain in your chest, especially when you take a deep breath.     · You are sick to your stomach or cannot keep fluids down.     · You have a fever over 100°F.     · Bright red blood has soaked through the bandage over your puncture site.     · You have signs of infection, such as:  ? Increased pain, swelling, warmth, or redness. ? Red streaks leading from the puncture site. ? Pus draining from the puncture site. ? Swollen lymph nodes in your neck, armpits, or groin. ? A fever.     · You cough up a lot more mucus than normal, or your mucus changes color. Watch closely for changes in your health, and be sure to contact your doctor if you have any problems. Where can you learn more? Go to http://www.gray.com/  Enter Q755 in the search box to learn more about \"Thoracentesis: What to Expect at Home. \"  Current as of: February 24, 2020               Content Version: 12.6  © 2006-2020 [employer]. Care instructions adapted under license by [email or web address] (which disclaims liability or warranty for this information). If you have questions about a medical condition or this instruction, always ask your healthcare professional. [place] [address] any warranty or liability for your use of this information. Keep bandage dry and intact for 24 hrs. Call [facility] for any concerns or questions.

## 2021-03-04 NOTE — PROCEDURES
PROCEDURE:    DIAGNOSTIC/THERAPEUTIC THORACENTESIS/PLEURAL MANNOMETRY. PRE-OP DIAGNOSIS:    R PLEURAL EFFUSION    POST-OP DIAGNOSIS:    R PLEURAL EFFUSION    ASSISTANT:    Deborah Garcia    ANESTHESIA:    LOCAL ANESTHESIA WITH 1% LIDOCAINE 10 CC TOTAL. CHEST ULTRASOUND FINDINGS:    A Turbo-M, Sonosite ultrasound with a 5-16 mHz probe was used to image the chest and localize the pleural effusion on the Left/and/Right chest.    A large/ anechoic space was seen on the /Right consistent with an uncomplicated pleural effusion. DESCRIPTION OF PROCEDURE:    After obtaining informed consent and localizing the safest location for thoracentesis, the  9th intercostal space was marked with a blunt, plastic needle cap in the mid scapular line. An Snappli AK-0100 Pleral-Seal thoracentesis kit was used to perform the procedure. The skin was  cleansed with the supplied  chlorhexididne swab and then draped in the usual fasion. Using the previously marked location as a giude, a 22 G 1.5 inch needle was used to inject 10 cc of 1% lidocaine into the skin and subcutaneous tissue, as well as onto the underlying rib and inter-costal muscles, pleural fluid was aspirated to assure proper location, prior to removing the anesthesia needle. A 3mm  incision was then made, with the supplied scalpel in the usual fashion to facilitate the insertiopn of the thoracentesis needle. The needle with an 8French thoracentesis catheter was then introduced into the chest through the previously made incision in the usual fashion, the rib localized with the needle, and the catheter then marched over the rib into the pleural space. After aspirating fluid, the thoracentesis catheter was then placed into the chest using the needle itself as a trocar. The needle was then removed and the catheter was attached to the supplied tubing without complication.     2000 cc of /serosanguinous fluid, was aspirated and  notsent for analysis. Fluid was not sent for the following tests:    Cell count with differential  LDH  Glucose  Total protein  Cytology  Chylomicrones  Trigllicerides  Cholesterol  PH  Hematocrit  ADA  AFB  Fungus  Routine culture and Gram stain  B2 transfferrin  Billirubin   Amylase    Post procedure US confirmed /incomplete drainage of the effusion.  + lung slide    EBL:     minimal      COMPLICATIONS:    none    Janie Valle MD

## 2021-03-04 NOTE — ROUTINE PROCESS
Procedure explained to patient. Consent obtained for thoracentesis. Patient sat up on side of the bed. Vitals documented in flow sheet. Dr. Patrice Mohamud at bedside. Time out for procedure at 1235. Ultrasound of bilateral chest done with picture taken. Approximately 2000cc of serosanguinous  fluid was taken from Right side with no complications noted. Lung slide afterward was obtained. Picture of obtained and placed on chart. Discharge instructions given to patient with no questions or concerns.

## 2021-04-23 NOTE — PROCEDURES
THORACENTESIS   04/23/21    Indication/Preprocedure diagnosis: RIGHT Pleural effusion  Volume of fluid withdrawn: 1350ml  Postprocedural Diagnosis:  Pleural effusion    After obtaining informed consent the patient was placed sitting up on the side of the bed and using ultrasound guidance the pleural fluid was located on the right side  and marked. The diaphragm and atelectatic lung were clearly visualized. The patient's skin was cleaned with ChloraPrep. Using sterile technique the skin was anesthetized with 10mL of 1% Xylocaine and a stab wound made and a catheter inserted into the pleural space with 1350 cc of bloody-appearing fluid was removed. The patient tolerated the procedure well. T  Ultrasound revealed no evidence of pneumothorax. There where no complications and negligible blood loss.      Asia Martinez MD

## 2021-04-23 NOTE — H&P (VIEW-ONLY)
[facility]: FOLLOW-UP Patient Office Visit Note  [address] [place]. [address], [address] [facility]  [phone]    Patient Name:  Prakash Roman. YOB: 1955            Date of Service:  4/23/2021    Chief Complaint   Patient presents with    Breathing Problem       History of Present Illness:  32-year-old male last seen in jan 2020  with a history of CML and adenocarcinoma of the lung previously treated by Dr. Jt Guevara with [address] and Ludwin Loyd also has been on anticoagulants for previous pulmonary emboli.  .      He was seen by Dr. Viry Manley  and diagnosed with mitral regurgitation.  He iwas sent to [facility] for a procedure to fix this but after they evaluated him they decided not to proceed and instead placed him on Entresto.  Since then he says he feels good bit better.    Since the patient was last here, he was noted to have enlarging nodules in the right upper lobe and also left adrenal.  It was determined that he had some small cell cancer and he was treated with chemotherapy and then radiation. The patient  had a chest CT scan which showed a large right pleural effusion. A thoracentesis was done by Dr. Edis Walker on February 9 and at that time 1800 cc of fluid was removed. Cytology on the fluid was positive for adenocarcinoma. .  . Seen in the office on February 22 and at that time was doing okay. He subsequently developed increasing shortness of breath and repeat thoracentesis was done on March 4. At that time 2000 cc was removed. He has been placed on [address] [place] [address] for his lung cancer. He called in to be seen as a work in appointment as he is developed increasing shortness of breath.     Past Medical History:   Diagnosis Date    Anxiety     Arthritis     Chronic systolic congestive heart failure ([address] 5/3/2017    CML (chronic myeloid leukemia) ([address] 8/25/2012    Depression 11/2/2012    Erectile dysfunction     Hypertension     Leukemia, acute ([address] 8/24/2012    Lung cancer ([place] [address] 2018    Pulmonary emboli ([place] [address] 1/6/2019       Problem List  Date Reviewed: 4/23/2021          Codes Class Noted    Elevated serum creatinine ICD-10-CM: R79.89  ICD-9-CM: 790.99  10/5/2020        Hypomagnesemia ICD-10-CM: E83.42  ICD-9-CM: 275.2  7/27/2020        Shortness of breath ICD-10-CM: R06.02  ICD-9-CM: 786.05  3/28/2020        Pleural effusion on right ICD-10-CM: J90  ICD-9-CM: 511.9  3/28/2020        SCLC (small cell lung carcinoma), right ([place] [address] ICD-10-CM: C34.91  ICD-9-CM: 162.9  3/13/2020        Acute pulmonary edema ([place] [address] ICD-10-CM: J81.0  ICD-9-CM: 518.4  9/18/2019        Acute CHF (congestive heart failure) ([place] [address] ICD-10-CM: I50.9  ICD-9-CM: 428.0  9/16/2019        Pneumonia ICD-10-CM: J18.9  ICD-9-CM: 486  1/6/2019        Pulmonary emboli ([place] [address] ICD-10-CM: I26.99  ICD-9-CM: 415.19  1/6/2019        Immunocompromised ([place] [address] ICD-10-CM: D84.9  ICD-9-CM: 279.9  11/16/2018        Stage IV adenocarcinoma of lung ([place] [address] ICD-10-CM: C34.90  ICD-9-CM: 162.9  7/11/2018        Pleural effusion ICD-10-CM: J90  ICD-9-CM: 511.9  4/27/2018        Abnormal chest CT ICD-10-CM: R93.89  ICD-9-CM: 793.2  4/27/2018        Pulmonary infiltrate ICD-10-CM: R91.8  ICD-9-CM: 793.19  4/27/2018        CAP (community acquired pneumonia) ICD-10-CM: J18.9  ICD-9-CM: 460  3/27/2018        Chronic systolic congestive heart failure ([facility] ICD-10-CM: I50.22  ICD-9-CM: 428.22, 428.0  5/3/2017        Depression ICD-10-CM: F32.9  ICD-9-CM: 097  11/2/2012        CML (chronic myeloid leukemia) ([facility] ICD-10-CM: C92.10  ICD-9-CM: 205.10  8/25/2012    Overview Addendum 8/26/2012  7:23 AM by Ancelmo Ferguson MD     8/25/12. Probable diagnosis. Will do bone marrow exam today  8/26/12. Bone marrow aspiration and biopsy done. Marrow aspirate and peripheral blood sent for flow, cytogenetics and molecular testing.  No complications with the procedure             Allergic rhinitis ICD-10-CM: J30.9  ICD-9-CM: 477.9 8/24/2012        Erectile dysfunction ICD-10-CM: N52.9  ICD-9-CM: 607.84  8/24/2012              Past Surgical History:   Procedure Laterality Date    HX HERNIA REPAIR      left inguinal 1996    HX VASCULAR ACCESS      IR INSERT TUNL CVC W PORT OVER 5 YEARS  3/11/2020       Social History     Socioeconomic History    Marital status:      Spouse name: Not on file    Number of children: Not on file    Years of education: Not on file    Highest education level: Not on file   Occupational History     Comment: [employer]   Social Needs    Financial resource strain: Not on file    Food insecurity     Worry: Not on file     Inability: Not on file    Transportation needs     Medical: Not on file     Non-medical: Not on file   Tobacco Use    Smoking status: Never Smoker    Smokeless tobacco: Never Used   Substance and Sexual Activity    Alcohol use: No    Drug use: No    Sexual activity: Not on file   Lifestyle    Physical activity     Days per week: Not on file     Minutes per session: Not on file    Stress: Not on file   Relationships    Social connections     Talks on phone: Not on file     Gets together: Not on file     Attends Spiritism service: Not on file     Active member of club or organization: Not on file     Attends meetings of clubs or organizations: Not on file     Relationship status: Not on file    Intimate partner violence     Fear of current or ex partner: Not on file     Emotionally abused: Not on file     Physically abused: Not on file     Forced sexual activity: Not on file   Other Topics Concern     Service Not Asked    Blood Transfusions Not Asked    Caffeine Concern Not Asked    Occupational Exposure Not Asked   [employer] Hazards Not Asked    Sleep Concern Not Asked    Stress Concern Not Asked    Weight Concern Not Asked    Special Diet Not Asked    Back Care Not Asked    Exercise Not Asked    Bike Helmet Not Asked   [address] Not Asked    Self-Exams Not Asked   Social History Narrative    Pt  with one son       Family History   Problem Relation Age of Onset    Lung Disease Father 79        Black lung\"    Cancer Mother 72        pancreatic cancer       No Known Allergies        REVIEW OF SYSTEMS:    Review of Systems   Constitutional: Negative for chills, diaphoresis, fever, malaise/fatigue and weight loss. Respiratory: Positive for shortness of breath. Cardiovascular: Negative for chest pain, palpitations, claudication, leg swelling and PND. Gastrointestinal: Negative for abdominal pain, constipation, diarrhea, heartburn, nausea and vomiting. Neurological: Negative for dizziness, tremors, seizures, weakness and headaches. OBJECTIVE:  Physical Exam:  Visit Vitals  /69 (BP 1 Location: Right arm, BP Patient Position: Sitting, BP Cuff Size: Large adult)   Pulse (!) 101   Temp 97.1 °F (36.2 °C) (Skin)   Resp 14   Ht 6' 1\" (1.854 m)   Wt 168 lb (76.2 kg)   SpO2 96%   BMI 22.16 kg/m²        GENERAL APPEARANCE:   The patient is normal weight and in no respiratory distress. HEENT:   PERRL. Conjunctivae unremarkable. Nasal mucosa is without epistaxis, exudate, or polyps. Gums and dentition are unremarkable. There is no oropharyngeal narrowing. TMs are clear. NECK/LYMPHATIC:   Symmetrical with no elevation of jugular venous pulsation. Trachea midline. No thyroid enlargement. No cervical adenopathy. LUNGS:   Normal respiratory effort with symmetrical lung expansion. Breath sounds bibasilar crackles and decreased breath sounds on the right. HEART:   There is a regular rate and rhythm. No murmur, rub, or gallop. There is no edema in the lower extremities. ABDOMEN:   Soft and non-tender. No hepatosplenomegaly. Bowel sounds are normal.       NEURO:   The patient is alert and oriented to person, place, and time. Memory appears intact and mood is normal.  No gross sensorimotor deficits are present.       DIAGNOSTIC TESTS:   CT of chest:    CXR:  4/23/2021         Ultrasound right hemithorax reveals large anechoic space    PCXR: No results found for this or any previous visit. CXR PA and lateral:  No results found for this or any previous visit. PET/CT: No results found for this or any previous visit. CT of chest w contrast:  No results found for this or any previous visit. Screening chest CT: No results found for this or any previous visit. CT of chest w/out contrast:   No results found for this or any previous visit. ASSESSMENT:    ICD-10-CM ICD-9-CM    1. Adenocarcinoma of lung, left (HCC)  C34.92 162.9    2. Pleural effusion  J90 511.9 Due to adenocarcinoma-recurring   3. Dyspnea on exertion  R06.00 786.09 Due to effusion       PLAN:  · Set up for a thoracentesis today  · Schedule him to come in for Pleurx catheter placement in 3 to 4 weeks  · Follow-up in office in 3 months    No orders of the defined types were placed in this encounter.       Electronically signed by  Kemi Elder MD

## 2021-04-23 NOTE — PROGRESS NOTES
Pt sat up on side of bed for thoracentesis. Consent obtained. Time out performed. Pts vitals monitored throughout procedure. Right ultrasound done and pic taken of pleural fluid. ~1350 ml blood tinged pleural fluid from R.  Pt tolerated procedure well with no adverse rxn. No specimens sent to the lab per md order. Site dressed appropriately and discharge instructions reviewed with pt. R Lung sliding done and ultrasound findings reviewed by MD. Pts IV d/c'ed and dressed appropriately. Pt given written discharge instructions including follow-up appointment,  potential side effects and physical changes to be aware of, and physician contact number. Pt d/c'ed via ambulatory with no complications. Pt verbalized understanding of discharge instructions. MD spoke with pt.

## 2021-04-23 NOTE — INTERVAL H&P NOTE
Update History & Physical    The Patient's History and Physical of 4/23/21 was reviewed with the patient and I examined the patient. There was no change. The surgical site was confirmed by the patient and me. Plan:  The risk, benefits, expected outcome, and alternative to the recommended procedure have been discussed with the patient. Patient understands and wants to proceed with the procedure.     Electronically signed by Lorne Ordonez MD on 4/23/2021 at 4:17 PM

## 2021-04-23 NOTE — DISCHARGE INSTRUCTIONS
Thoracentesis: What to Expect at Home  Your Recovery  Thoracentesis (say \"ttsg-ys-rsa-JESUS-sis\") is a procedure to remove fluid from the space between the lungs and the chest wall (pleural space). This procedure may also be called a \"chest tap. \" It's normal to have a small amount of fluid in the pleural space. But too much fluid can build up because of problems such as infection, heart failure, or lung cancer. The procedure may have been done to help with shortness of breath and pain caused by the fluid buildup. Or you may have had this procedure so the doctor could test the fluid to find the cause of the buildup. Your chest may be sore where the doctor put the needle or catheter into your skin (the puncture site). This usually gets better after a day or two. You can go back to work or your normal activities as soon as you feel up to it. If a large amount of pleural fluid was removed during the procedure, you will probably be able to breathe more easily. If more pleural fluid collects and needs to be removed, another thoracentesis may be done later. If the doctor sent the fluid to a lab for testing, it may take several days to get the results. The doctor or nurse will discuss the results with you. This care sheet gives you a general idea about how long it will take for you to recover. But each person recovers at a different pace. Follow the steps below to feel better as quickly as possible. How can you care for yourself at home? Activity    · Rest when you feel tired. Getting enough sleep will help you recover.     · Avoid strenuous activities, such as bicycle riding, jogging, weight lifting, or aerobic exercise, until your doctor says it is okay.     · You may shower. Do not take a bath until the puncture site has healed, or until your doctor tells you it is okay.     · Ask your doctor when you can drive again.     · You may need to take 1 or 2 days off from work.  It depends on the type of work you do and how you feel. Diet    · You can eat your normal diet.     · Drink plenty of fluids (unless your doctor tells you not to). Medicines    · Your doctor will tell you if and when you can restart your medicines. He or she will also give you instructions about taking any new medicines.     · If you take aspirin or some other blood thinner, ask your doctor if and when to start taking it again. Make sure that you understand exactly what your doctor wants you to do.     · Be safe with medicines. Take pain medicines exactly as directed. ? If the doctor gave you a prescription medicine for pain, take it as prescribed. ? If you are not taking a prescription pain medicine, ask your doctor if you can take an over-the-counter medicine. ? Do not take two or more pain medicines at the same time unless the doctor told you to. Many pain medicines have acetaminophen, which is Tylenol. Too much acetaminophen (Tylenol) can be harmful.     · If you think your pain medicine is making you sick to your stomach:  ? Take your medicine after meals (unless your doctor has told you not to). ? Ask your doctor for a different pain medicine.     · If your doctor prescribed antibiotics, take them as directed. Do not stop taking them just because you feel better. You need to take the full course of antibiotics. Care of the puncture site    · Wash the area daily with warm, soapy water, and pat it dry. Don't use hydrogen peroxide or alcohol, which may delay healing. You may cover the area with a gauze bandage if it weeps or rubs against clothing. Change the bandage every day.     · Keep the area clean and dry. Follow-up care is a key part of your treatment and safety. Be sure to make and go to all appointments, and call your doctor if you are having problems. It's also a good idea to know your test results and keep a list of the medicines you take. When should you call for help? Call 911 anytime you think you may need emergency care.  For example, call if:    · You passed out (lost consciousness).     · You have severe trouble breathing.     · You have sudden chest pain and shortness of breath, or you cough up blood. Call your doctor now or seek immediate medical care if:    · You have shortness of breath that is new or getting worse.     · You have new or worse pain in your chest, especially when you take a deep breath.     · You are sick to your stomach or cannot keep fluids down.     · You have a fever over 100°F.     · Bright red blood has soaked through the bandage over your puncture site.     · You have signs of infection, such as:  ? Increased pain, swelling, warmth, or redness. ? Red streaks leading from the puncture site. ? Pus draining from the puncture site. ? Swollen lymph nodes in your neck, armpits, or groin. ? A fever.     · You cough up a lot more mucus than normal, or your mucus changes color. Watch closely for changes in your health, and be sure to contact your doctor if you have any problems. Where can you learn more? Go to http://www.gray.com/  Enter Q755 in the search box to learn more about \"Thoracentesis: What to Expect at Home. \"  Current as of: October 26, 2020               Content Version: 12.8  © 2006-2021 [employer], [employer]. Care instructions adapted under license by [employer] (which disclaims liability or warranty for this information). If you have questions about a medical condition or this instruction, always ask your healthcare professional. [place] [address] any warranty or liability for your use of this information. Call [facility] at [phone] for any questions or problems that may occur. Resume all medication as before procedure. Keep your previously scheduled follow up appointment.

## 2021-05-12 NOTE — PROGRESS NOTES
CT positive for bilateral PE's. Xarelto 20 mg daily prescribed due to h/o bleeding. Patient notified. Discussed possible liver metastases. Pt opted to schedule appt to discuss with Dr Henrry Garner. RTC Monday. Encouraged to call with questions. Navigation will continue to follow.

## 2021-05-17 NOTE — PROGRESS NOTES
5/17/2021  Patient seen with Dr Maite Bradley for an office follow-up to review scan results. Questions and discussion completed to the satisfaction of the patient. Patient informed that Dr Dawit Medellin could not tell him how long he would be taking the blood thinner. Patient informed of new spots on his liver. A liver biopsy was ordered. Also, a thoracentesis with PPA was ordered. Since patient was just started on Xeralto last week and attempt to coordinate the two procedures so patient would minimize the time off of Xeralto. After liver biopsy results we will discuss the plan for chemotherapy.  Patient wishes to continue on treatment plan and navigation will follow

## 2021-05-20 NOTE — PROGRESS NOTES
TRANSFER - OUT REPORT:    Verbal report given to Braulio Rollins RN on [employer].  being transferred to IR Recovery for routine post - op       Report consisted of patients Situation, Background, Assessment and   Recommendations(SBAR). Information from the following report(s) SBAR, Procedure Summary and MAR was reviewed with the receiving nurse. Opportunity for questions and clarification was provided. Conscious Sedation:   50 Mcg of Fentanyl administered  1 Mg of Versed administered    Pt tolerated procedure well.      Visit Vitals  /69   Pulse (!) 107   Temp 97.3 °F (36.3 °C)   Resp 16   SpO2 97%     Past Medical History:   Diagnosis Date    Anxiety     Arthritis     Chronic systolic congestive heart failure ([facility] 5/3/2017    CML (chronic myeloid leukemia) ([facility] 8/25/2012    Depression 11/2/2012    Erectile dysfunction     Hypertension     Leukemia, acute ([address] 8/24/2012    Lung cancer ([facility] 2018    Pulmonary emboli ([facility] 1/6/2019     Peripheral IV 05/20/21 Left;Posterior Forearm (Active)              Venous Access Device POWER PORT 03/11/20 Upper chest (subclavicular area, right (Active)

## 2021-05-20 NOTE — H&P
Department of Interventional Radiology  [phone]    History and Physical    Patient:  Rhea Rosales. MRN:  [id number]  SSN:  [id number]    YOB: 1955  Age:  60 y.o. Sex:  male      Primary Care Provider:  Hector Monson MD  Referring Physician:  Harlan Mejia MD    Subjective:     Chief Complaint: biopsy    History of the Present Illness: The patient is a 60 y.o. male with small cell lung cancer and hx of adenocarcinoma who presents for biopsy of metastatic liver lesions. PE, Xarelto held. NPO. Past Medical History:   Diagnosis Date    Anxiety     Arthritis     Chronic systolic congestive heart failure ([address] 5/3/2017    CML (chronic myeloid leukemia) ([facility] 8/25/2012    Depression 11/2/2012    Erectile dysfunction     Hypertension     Leukemia, acute ([facility] 8/24/2012    Lung cancer ([facility] 2018    Pulmonary emboli ([facility] 1/6/2019     Past Surgical History:   Procedure Laterality Date    HX HERNIA REPAIR      left inguinal 1996    HX VASCULAR ACCESS      IR INSERT TUNL CVC W PORT OVER 5 YEARS  3/11/2020        Review of Systems:    Pertinent items are noted in the History of Present Illness. Prior to Admission medications    Medication Sig Start Date End Date Taking? Authorizing Provider   traMADoL (ULTRAM) 50 mg tablet Take 1-2 Tabs by mouth every six (6) hours as needed for Pain for up to 30 days. Max Daily Amount: 400 mg. 5/11/21 6/10/21 Yes Harlan Mejia MD   osimertinib (Tagrisso) 80 mg tablet Take 1 Tab by mouth daily. 4/15/21  Yes Harlan Mejia MD   olmesartan (BENICAR) 5 mg tablet Take 1 Tab by mouth daily. 1/28/21  Yes Isrrael Roldan MD   spironolactone (ALDACTONE) 25 mg tablet Take 1 Tab by mouth daily. 1/28/21  Yes Isrrael Roldan MD   metoprolol succinate (TOPROL-XL) 25 mg XL tablet Take 0.5 Tabs by mouth daily. Indications: chronic heart failure 11/5/20  Yes Isrrael Roldan MD   OXYGEN-AIR DELIVERY SYSTEMS by Does Not Apply route. 3LPM PRN   Yes Provider, Historical   rivaroxaban (Xarelto) 20 mg tab tablet Take 1 Tab by mouth daily. 5/12/21   Anjelica Fu MD   furosemide (LASIX) 40 mg tablet Take 1 Tab by mouth daily as needed for Other (weight gain > 5 lbs in 48 hours. ). Patient not taking: Reported on 5/20/2021 1/28/21   Reggie Chamberlain MD   ondansetron hcl (ZOFRAN) 8 mg tablet TAKE 1 TABLET BY MOUTH EVERY 8 HOURS AS NEEDED FOR NAUSEA  Patient not taking: Reported on 5/20/2021 12/28/20   Anjelica Fu MD   folic acid (FOLVITE) 1 mg tablet TAKE 1 TABLET BY MOUTH EVERY DAY  Patient not taking: Reported on 5/20/2021 6/5/20   Anjelica Fu MD   prochlorperazine (COMPAZINE) 10 mg tablet Take 1 Tab by mouth every six (6) hours as needed for Nausea. Patient not taking: Reported on 5/20/2021 3/2/20   Anjelica Fu MD   lidocaine-prilocaine (EMLA) topical cream Apply  to affected area as needed for Pain. Apply to port site 45-60 minutes prior to port access. Cover with plastic. Patient not taking: Reported on 5/20/2021 3/2/20   Anjelica Fu MD   albuterol (PROVENTIL HFA, VENTOLIN HFA, PROAIR HFA) 90 mcg/actuation inhaler Take 2 Puffs by inhalation every four (4) hours as needed for Wheezing. 9/3/19   Maylin Crump MD   benzonatate (TESSALON) 200 mg capsule Take 1 Cap by mouth three (3) times daily as needed for Cough. Patient not taking: Reported on 5/20/2021 9/3/19   Maylin Crump MD   triamcinolone (NASACORT) 55 mcg nasal inhaler 2 Sprays by Both Nostrils route two (2) times a day.   Patient not taking: Reported on 5/20/2021 1/28/19   Anjelica Fu MD        No Known Allergies    Family History   Problem Relation Age of Onset    Lung Disease Father 79        Black lung\"   [facility] Cancer Mother 72        pancreatic cancer     Social History     Tobacco Use    Smoking status: Never Smoker    Smokeless tobacco: Never Used   Substance Use Topics    Alcohol use: No        Objective:       Physical Examination:    Vitals: 05/20/21 0727   BP: 113/83   Pulse: 80   Resp: 16   Temp: 97.3 °F (36.3 °C)   SpO2: 100%       Pain Assessment  Pain Intensity 1: 0 (05/20/21 0727)        Patient Stated Pain Goal: 0      HEART: regular rate and rhythm  LUNG: clear to auscultation bilaterally  ABDOMEN: normal findings: soft, non-tender  EXTREMITIES: warm, no edema    Laboratory:     Lab Results   Component Value Date/Time    Sodium 138 05/04/2021 10:08 AM    Sodium 138 04/13/2021 09:14 AM    Potassium 4.8 05/04/2021 10:08 AM    Potassium 4.2 04/13/2021 09:14 AM    Chloride 105 05/04/2021 10:08 AM    Chloride 106 04/13/2021 09:14 AM    CO2 24 05/04/2021 10:08 AM    CO2 26 04/13/2021 09:14 AM    Anion gap 9 05/04/2021 10:08 AM    Anion gap 6 (L) 04/13/2021 09:14 AM    Glucose 95 05/04/2021 10:08 AM    Glucose 104 (H) 04/13/2021 09:14 AM    BUN 33 (H) 05/04/2021 10:08 AM    BUN 31 (H) 04/13/2021 09:14 AM    Creatinine 2.50 (H) 05/04/2021 10:08 AM    Creatinine 2.70 (H) 04/13/2021 09:14 AM    GFR est AA 34 (L) 05/04/2021 10:08 AM    GFR est AA 31 (L) 04/13/2021 09:14 AM    GFR est non-AA 28 (L) 05/04/2021 10:08 AM    GFR est non-AA 25 (L) 04/13/2021 09:14 AM    Calcium 8.6 05/04/2021 10:08 AM    Calcium 8.8 04/13/2021 09:14 AM    Magnesium 2.3 04/13/2021 09:14 AM    Magnesium 2.1 03/01/2021 01:21 PM    Phosphorus 3.0 03/01/2021 01:21 PM    Phosphorus 4.3 (H) 02/08/2021 09:34 AM    Albumin 3.4 05/04/2021 10:08 AM    Albumin 3.5 04/13/2021 09:14 AM    Protein, total 7.2 05/04/2021 10:08 AM    Protein, total 7.4 04/13/2021 09:14 AM    Globulin 3.8 (H) 05/04/2021 10:08 AM    Globulin 3.9 (H) 04/13/2021 09:14 AM    A-G Ratio 0.9 (L) 05/04/2021 10:08 AM    A-G Ratio 0.9 (L) 04/13/2021 09:14 AM    ALT (SGPT) 21 05/04/2021 10:08 AM    ALT (SGPT) 26 04/13/2021 09:14 AM     Lab Results   Component Value Date/Time    WBC 5.0 05/04/2021 10:08 AM    WBC 5.3 04/13/2021 09:14 AM    HGB 10.9 (L) 05/04/2021 10:08 AM    HGB 11.3 (L) 04/13/2021 09:14 AM    HCT 34.3 05/04/2021 10:08 AM    HCT 34.6 04/13/2021 09:14 AM    PLATELET 864 (L) 88/92/3077 10:08 AM    PLATELET 795 (L) 37/16/0104 09:14 AM     Lab Results   Component Value Date/Time    aPTT 49.0 (H) 01/08/2019 07:16 PM    aPTT 100.2 (H) 01/07/2019 07:01 PM    Prothrombin time 17.7 (H) 09/17/2019 03:18 AM    Prothrombin time 14.3 01/06/2019 10:33 PM    INR 1.4 09/17/2019 03:18 AM    INR 1.1 01/06/2019 10:33 PM       Assessment:     Lung cancer    Hospital Problems  Date Reviewed: 5/17/2021    None          Plan:     Planned Procedure:  Liver mass biopsy    Risks, benefits, and alternatives reviewed with patient and he agrees to proceed with the procedure.       Signed By: Craig Lea PA-C     May 20, 2021

## 2021-05-20 NOTE — DISCHARGE INSTRUCTIONS
[address]  Department of Interventional Radiology  [facility]  [phone] Office  [phone] Fax    BIOPSY DISCHARGE INSTRUCTIONS    General Instructions:     A biopsy is the removal of a small piece of tissue for microscopic examination or testing. Healthy tissue can be obtained for the purpose of tissue-type matching for transplants. Unhealthy tissues are more commonly biopsied to diagnose disease. Lung Biopsy:     A needle lung biopsy is performed when there is a mass discovered in the lung area. The most serious risk is infection, bleeding, and pneumothorax (a collapsed lung). Signs of pneumothorax include shortness of breath, rapid heart rate, and blueness of the skin. If any of these occur, call 911. Liver Biopsy: This test helps detect cancer, infections, and the cause of an enlargement of the liver or elevated liver enzymes. It also helps to diagnose a number of liver diseases. The pain associated with the procedure may be felt in the shoulder. The risks include internal bleeding, pneumothorax, and injury to the surrounding organs. Renal Biopsy: This procedure is sometimes done to evaluate a transplanted kidney. It is also used to evaluate unexplained decrease in kidney function, blood, or protein in the urine. You may see bright red blood in the urine the first 24 hours after the test. If the bleeding lasts longer, you need to call your doctor. There is a risk of infection and bleeding into the muscle, which may cause soreness. Spinal Biopsy: This test is sometimes done in conjunction with other procedures. Your back will be sore, as we are taking a small sample of bone, which is slightly more difficult to sample than tissue. General Biopsy:     A mass can grow in any area of the body, and we are taking a specimen as ordered by your doctor. The risks are the same.  They include bleeding, pain, and infection. Home Care Instructions: You may resume your regular diet and medication regimen. Do not drink alcohol, drive, or make any important legal decisions in the next 24 hours. Do not lift anything heavier than a gallon of milk until the soreness goes away. You may use over the counter acetaminophen or ibuprofen for the soreness. You may apply an ice pack to the affected area for 20-30 minutes at time for the first 24 hours. After that, you may apply a heat pack. Call If: You should call your Physician and/or the Radiology Nurse if you have any questions or concerns about the biopsy site. Call if you should have increased pain, fever, redness, drainage, or bleeding more than a small spot on the bandage. Follow-Up Instructions: Please see your ordering doctor as he/she has requested. To Reach Us: If you have any questions about your procedure, please call the Interventional Radiology department at [phone]. After business hours (5pm) and weekends, call the answering service at [phone] and ask for the Radiologist on call to be paged. Si tiene Preguntas acerca del procedimiento, por favor llame al departamento de Radiología Intervencional al [phone]. Después de horas de oficina (5 pm) y los fines de [place], llamar al Miles Vitaly Hoang al [phone] y pregunte por el Radiologo de [facility]. Interventional Radiology General Nurse Discharge    After general anesthesia or intravenous sedation, for 24 hours or while taking prescription Narcotics:  · Limit your activities  · Do not drive and operate hazardous machinery  · Do not make important personal or business decisions  · Do  not drink alcoholic beverages  · If you have not urinated within 8 hours after discharge, please contact your surgeon on call. * Please give a list of your current medications to your Primary Care Provider.   * Please update this list whenever your medications are discontinued, doses are changed, or new medications (including over-the-counter products) are added. * Please carry medication information at all times in case of emergency situations. These are general instructions for a healthy lifestyle:    No smoking/ No tobacco products/ Avoid exposure to second hand smoke  Surgeon General's Warning:  Quitting smoking now greatly reduces serious risk to your health. Obesity, smoking, and sedentary lifestyle greatly increases your risk for illness  A healthy diet, regular physical exercise & weight monitoring are important for maintaining a healthy lifestyle    You may be retaining fluid if you have a history of heart failure or if you experience any of the following symptoms:  Weight gain of 3 pounds or more overnight or 5 pounds in a week, increased swelling in our hands or feet or shortness of breath while lying flat in bed. Please call your doctor as soon as you notice any of these symptoms; do not wait until your next office visit. Recognize signs and symptoms of STROKE:  F-face looks uneven    A-arms unable to move or move unevenly    S-speech slurred or non-existent    T-time-call 911 as soon as signs and symptoms begin-DO NOT go       Back to bed or wait to see if you get better-TIME IS BRAIN. Your feedback is greatly appreciated. Should you receive a survey in mail, please complete your survey so we can know how to better serve you.   Your RN care team today was:

## 2021-05-20 NOTE — PROCEDURES
Department of Interventional Radiology  [phone]        Interventional Radiology Brief Procedure Note    Patient: Jannie Lou MRN: [id number]  SSN: [id number]    YOB: 1955  Age: 60 y.o.   Sex: male      Date of Procedure: 5/20/2021    Pre-Procedure Diagnosis: liver lesions    Post-Procedure Diagnosis: SAME    Procedure(s): Image Guided Biopsy    Brief Description of Procedure: as above    Performed By: Mirna Del Cid MD     Assistants: None    Anesthesia:Moderate Sedation    Estimated Blood Loss: Less than 10ml    Specimens:  Pathology    Implants:  None    Findings: right lower lobe lesion    Complications: None    Recommendations: routine post care      Follow Up: as needed    Signed By: Mirna Del Cid MD     May 20, 2021

## 2021-05-26 NOTE — PROGRESS NOTES
Saw patient and spouse with Dr Maite Bradley for [facility] and [facility].. Pt also has CML but is currently holding [address] and we continue to monitor BCR-ABL. The small cell and non-small cell lung cancer are actively symptomatic and need active control, he still desires to try and rechallenging with triple therapy (Cis,etop, tecentriq) for the small cell lung cancer. He will continue tagrisso. Potential reactions with the combination are not well know but he is willing to accept the uncertain risks and wife is supportive of his decision. Arranged to resume Nellie etoposide atezolizumab while continuing osimertinib and will monitor pneumonitis symptoms closely. Pt wishes to wait until after his daughters graduation on 06/02 to resume infusions. [place] paperwork provided for patient at today's visit. Encouraged to call with questions. Navigation will continue to follow.

## 2021-06-03 NOTE — ADDENDUM NOTE
Encounter addended by: MAYLIN Sarmiento [facility] [place] on: 6/3/2021 10:51 AM 
 Actions taken: i-Vent created or edited

## 2021-06-03 NOTE — PROGRESS NOTES
Arrived to the [facility]. Tecentriq, carboplatin, and etoposide completed. Patient tolerated well. Any issues or concerns during appointment: creatinine resulted as 2.5 today. Nursing misc order okay to proceed. Patient aware of next infusion appointment on 6/4/2021 2:30pm.  Discharged ambulatory.

## 2021-06-04 NOTE — ADDENDUM NOTE
Encounter addended by: Vel Pierce [facility] on: 6/4/2021 3:14 PM 
 Actions taken: i-Vent created or edited

## 2021-06-04 NOTE — PROGRESS NOTES
Patient here for chemotherapy. Reviewed the procedure and process with him and he verbalizes understanding.

## 2021-06-04 NOTE — PROGRESS NOTES
Arrived to the [facility]. Assessment completed. Patient tolerated chemotherapy well. Any issues or concerns during appointment: none. Patient aware of next infusion appointment on 6/4/21 (date) at 1400 (time) with IV infusion center. Report given to Magi Mc RN, at this time.

## 2021-06-05 NOTE — PROGRESS NOTES
Arrived to the [facility]. D3C1 Etoposide infusion completed. Patient tolerated well. Any issues or concerns during appointment: NO.  Patient aware of next infusion appointment on 06/24/21 (date) at 0900 (time). Discharged ambulatory.

## 2021-06-08 PROBLEM — R00.0 TACHYCARDIA: Status: ACTIVE | Noted: 2021-01-01

## 2021-06-08 PROBLEM — J90 BILATERAL PLEURAL EFFUSION: Status: ACTIVE | Noted: 2021-01-01

## 2021-06-08 PROBLEM — A41.9 SEVERE SEPSIS (HCC): Status: ACTIVE | Noted: 2021-01-01

## 2021-06-08 PROBLEM — J96.01 ACUTE RESPIRATORY FAILURE WITH HYPOXIA (HCC): Status: ACTIVE | Noted: 2021-01-01

## 2021-06-08 PROBLEM — I48.0 PAROXYSMAL ATRIAL FIBRILLATION (HCC): Status: RESOLVED | Noted: 2021-01-01 | Resolved: 2021-01-01

## 2021-06-08 PROBLEM — E87.20 LACTIC ACIDOSIS: Status: ACTIVE | Noted: 2021-01-01

## 2021-06-08 PROBLEM — I48.91 ATRIAL FIBRILLATION WITH RAPID VENTRICULAR RESPONSE (HCC): Status: RESOLVED | Noted: 2021-01-01 | Resolved: 2021-01-01

## 2021-06-08 PROBLEM — I48.91 ATRIAL FIBRILLATION WITH RVR (HCC): Status: ACTIVE | Noted: 2021-01-01

## 2021-06-08 PROBLEM — I48.0 PAROXYSMAL ATRIAL FIBRILLATION (HCC): Status: ACTIVE | Noted: 2021-01-01

## 2021-06-08 PROBLEM — R65.20 SEVERE SEPSIS (HCC): Status: ACTIVE | Noted: 2021-01-01

## 2021-06-08 PROBLEM — J15.9 HOSPITAL-ACQUIRED BACTERIAL PNEUMONIA: Status: ACTIVE | Noted: 2021-01-01

## 2021-06-08 PROBLEM — I48.91 ATRIAL FIBRILLATION WITH RAPID VENTRICULAR RESPONSE (HCC): Status: ACTIVE | Noted: 2021-01-01

## 2021-06-08 NOTE — MANAGEMENT PLAN
[facility]        Inpatient Hematology / Oncology Plan of Care    Reason for Consult:  Hospital-acquired bacterial pneumonia [J15.9]  Pleural effusion [J90]  Atrial fibrillation with rapid ventricular response ([address] [I48.91]  Lactic acidosis [E87.2]  Acute respiratory failure with hypoxia ([address] [J96.01]  Severe sepsis ([address] [A41.9, R65.20]  Referring Physician:  Jem Lester MD    History of Present Illness:  Mr. John Paul Corona is a 60 y.o. male admitted on 6/8/2021 with a primary diagnosis of Diagnoses of Acute respiratory failure with hypoxia ([address] Bilateral pleural effusion, CML (chronic myeloid leukemia) ([address] Hospital-acquired bacterial pneumonia, Immunocompromised ([address] Pleural effusion, and Severe sepsis ([address] were pertinent to this visit. Lily Engle is a 60 y.o. male with PMH including but not limited to CML (Michele Lou on hold monitoring BCR-ABL), NSCLC in 2018 and SCLC with mets to liver discovered 3/2020, CKD, PE on Xarelto, HF with EF 20%. He presented to ED with complaint of SOB with dry cough over the last 3-4 days. He also reported heart palpitations, fatigue, and weakness. CXR showed bilateral infiltrate suggestive of PNA along with pleural effusion. Blood work remarkable for LA 4.4, Creatinine 2.13 (~BL), HR 120s-130s irregular, respiratory rate for 30s-40s. Started on fluids, vanc and zosyn. Cardiology consulted and managing Afib with RVR. Pulmonary seeing patient and per their note he has had issues in the past with pneumonitis. But with evidence recently of both active small and non-small cell cancer he elected for rechallenge. Carbo etoposide atezolizumab given 6/3-6/5 while continue osimertinib and monitor pneumonitis symptoms. He was supposed to undergo PleurX placement 2 weeks ago, but it was delayed due to starting Xarelto. 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  IV solumedrol 60 mg q 8 hours started. Bedside US bedside with no effusion on left, moderate loculated effusion on right. Xarelto placed on hold switched to heparin gtt for potential attempt of thora or pleurX later this week. We are asked to see patient for recommendations.          No Known Allergies  Past Medical History:   Diagnosis Date    Anxiety     Arthritis     Atrial fibrillation with RVR ([address] 6/8/2021    Chronic systolic congestive heart failure ([address] 5/3/2017    CML (chronic myeloid leukemia) [facility] 8/25/2012    Depression 11/2/2012    Erectile dysfunction     Hypertension     Leukemia, acute ([address] 8/24/2012    Lung cancer ([facility] 2018    Pulmonary emboli ([facility] 1/6/2019     Past Surgical History:   Procedure Laterality Date    HX HERNIA REPAIR      left inguinal 1996    HX VASCULAR ACCESS      IR INSERT TUNL CVC W PORT OVER 5 YEARS  3/11/2020     Family History   Problem Relation Age of Onset    Lung Disease Father 79        Black lung\"    Cancer Mother 72        pancreatic cancer     Social History     Socioeconomic History    Marital status:      Spouse name: Not on file    Number of children: Not on file    Years of education: Not on file    Highest education level: Not on file   Occupational History     Comment: [employer]   Tobacco Use    Smoking status: Never Smoker    Smokeless tobacco: Never Used   Substance and Sexual Activity    Alcohol use: No    Drug use: No    Sexual activity: Not on file   Other Topics Concern     Service Not Asked    Blood Transfusions Not Asked    Caffeine Concern Not Asked    Occupational Exposure Not Asked    Hobby Hazards Not Asked    Sleep Concern Not Asked    Stress Concern Not Asked    Weight Concern Not Asked    Special Diet Not Asked    Back Care Not Asked    Exercise Not Asked    Bike Helmet Not Asked   [address] Not Asked    Self-Exams Not Asked   Social History Narrative    Pt  with one son     Social Determinants of Health Financial Resource Strain:     Difficulty of Paying Living Expenses:    Food Insecurity:     Worried About Running Out of Food in the Last Year:     [address] in the Last Year:    Transportation Needs:     Lack of Transportation (Medical):      Lack of Transportation (Non-Medical):    Physical Activity:     Days of Exercise per Week:     Minutes of Exercise per Session:    Stress:     Feeling of Stress :    Social Connections:     Frequency of Communication with Friends and Family:     Frequency of Social Gatherings with Friends and Family:     Attends Congregational Services:     Active Member of Clubs or Organizations:     Attends Club or Organization Meetings:     Marital Status:    Intimate Partner Violence:     Fear of Current or Ex-Partner:     Emotionally Abused:     Physically Abused:     Sexually Abused:      Current Facility-Administered Medications   Medication Dose Route Frequency Provider Last Rate Last Admin    sodium chloride (NS) flush 5-40 mL  5-40 mL IntraVENous Q8H Annmarie Mathew MD   10 mL at 06/08/21 1315    sodium chloride (NS) flush 5-40 mL  5-40 mL IntraVENous PRN Annmarie Mathew MD        acetaminophen (TYLENOL) tablet 650 mg  650 mg Oral Q4H PRN Annmarie Mathew MD        acetaminophen (TYLENOL) suppository 650 mg  650 mg Rectal Q4H PRN Annmarie Mathew MD        pantoprazole (PROTONIX) tablet 40 mg  40 mg Oral Q24H Annmarie Mathew MD   40 mg at 06/08/21 1153    levalbuterol (XOPENEX) nebulizer soln 1.25 mg/3 mL  1.25 mg Nebulization Q4H RT Annmarie Mathew MD   1.25 mg at 06/08/21 1132    0.9% sodium chloride infusion  75 mL/hr IntraVENous CONTINUOUS Annmarie Mathew MD        [facility]) injection 4 mg  4 mg IntraVENous Q4H PRN Annmarie Mathew MD        amiodarone (CORDARONE) 450 mg in dextrose 5% 250 mL infusion  0.5-1 mg/min IntraVENous CONTINUOUS Annmarie Mathew MD        midodrine (PROAMATINE) tablet 10 mg  10 mg Oral TID WITH MEALS Armida Mondragon MD   10 mg at 21 1153    Vancomycin Intermittent Dosing   Other Rx Dosing/Monitoring Armida Mondragon MD        piperacillin-tazobactam (ZOSYN) 4.5 g in 0.9% sodium chloride (MBP/ADV) 100 mL MBP  4.5 g IntraVENous Q8H Armida Mondragon MD 25 mL/hr at 21 1152 4.5 g at 21 1152    methylPREDNISolone (PF) (SOLU-MEDROL) injection 60 mg  60 mg IntraVENous Q8H Merlinda Samples, MD        heparin 25,000 units in dextrose 500 mL infusion  18-36 Units/kg/hr IntraVENous TITRATE Merlinda Samples, MD        heparin (porcine) injection 5,800 Units  80 Units/kg IntraVENous Olga Bates MD           OBJECTIVE:  Patient Vitals for the past 8 hrs:   BP Pulse Resp SpO2 Height Weight   21 1159 113/77 (!) 111 (!) 37 100 % -- --   21 1153 111/73 (!) 109 -- -- -- --   21 1057 -- -- -- -- 6' 1\" (1.854 m) 160 lb 0.9 oz (72.6 kg)   21 1039 105/67 (!) 117 25 -- -- --     Temp (24hrs), Av.7 °F (37.1 °C), Min:98.7 °F (37.1 °C), Max:98.7 °F (37.1 °C)    No intake/output data recorded. Physical Exam:  Constitutional: Ill appearing  male in ICU on Bipap   HEENT: Normocephalic and atraumatic. Oropharynx is clear, mucous membranes are moist.  Pupils are equal, round, and reactive to light. Extraocular muscles are intact. Sclerae anicteric. Skin Warm and dry. No bruising and no rash noted. No erythema. No pallor. Respiratory Lungs are coarse, tachypnic, on bipap   CVS Normal rate, regular rhythm and normal S1 and S2. No murmurs, gallops, or rubs. Abdomen Soft, nontender and nondistended, normoactive bowel sounds. Neuro Grossly nonfocal with no obvious sensory or motor deficits. MSK Normal range of motion in general.  No edema and no tenderness. Psych Appropriate mood and affect.  Anxious       Labs:    Recent Results (from the past 24 hour(s))   MAGNESIUM    Collection Time: 21  5:45 AM   Result Value Ref Range    Magnesium 2.3 1.8 - 2.4 mg/dL LACTIC ACID    Collection Time: 06/08/21  5:46 AM   Result Value Ref Range    Lactic acid 4.4 (HH) 0.4 - 2.0 MMOL/L   CBC WITH AUTOMATED DIFF    Collection Time: 06/08/21  5:46 AM   Result Value Ref Range    WBC 10.7 4.3 - 11.1 K/uL    RBC 3.32 (L) 4.23 - 5.6 M/uL    HGB 8.7 (L) 13.6 - 17.2 g/dL    HCT 27.1 (L) 41.1 - 50.3 %    MCV 81.6 79.6 - 97.8 FL    MCH 26.2 26.1 - 32.9 PG    MCHC 32.1 31.4 - 35.0 g/dL    RDW 14.5 11.9 - 14.6 %    PLATELET 690 371 - 014 K/uL    MPV 9.4 9.4 - 12.3 FL    ABSOLUTE NRBC 0.00 0.0 - 0.2 K/uL    DF AUTOMATED      NEUTROPHILS 92 (H) 43 - 78 %    LYMPHOCYTES 4 (L) 13 - 44 %    MONOCYTES 1 (L) 4.0 - 12.0 %    EOSINOPHILS 1 0.5 - 7.8 %    BASOPHILS 0 0.0 - 2.0 %    IMMATURE GRANULOCYTES 2 0.0 - 5.0 %    ABS. NEUTROPHILS 9.9 (H) 1.7 - 8.2 K/UL    ABS. LYMPHOCYTES 0.4 (L) 0.5 - 4.6 K/UL    ABS. MONOCYTES 0.1 0.1 - 1.3 K/UL    ABS. EOSINOPHILS 0.1 0.0 - 0.8 K/UL    ABS. BASOPHILS 0.0 0.0 - 0.2 K/UL    ABS. IMM. GRANS. 0.2 0.0 - 0.5 K/UL   METABOLIC PANEL, COMPREHENSIVE    Collection Time: 06/08/21  5:46 AM   Result Value Ref Range    Sodium 134 (L) 136 - 145 mmol/L    Potassium 4.1 3.5 - 5.1 mmol/L    Chloride 102 98 - 107 mmol/L    CO2 18 (L) 21 - 32 mmol/L    Anion gap 14 7 - 16 mmol/L    Glucose 148 (H) 65 - 100 mg/dL    BUN 51 (H) 8 - 23 MG/DL    Creatinine 2.13 (H) 0.8 - 1.5 MG/DL    GFR est AA 40 (L) >60 ml/min/1.73m2    GFR est non-AA 33 (L) >60 ml/min/1.73m2    Calcium 8.0 (L) 8.3 - 10.4 MG/DL    Bilirubin, total 0.9 0.2 - 1.1 MG/DL    ALT (SGPT) 24 12 - 65 U/L    AST (SGOT) 32 15 - 37 U/L    Alk.  phosphatase 86 50 - 136 U/L    Protein, total 6.8 6.3 - 8.2 g/dL    Albumin 2.8 (L) 3.2 - 4.6 g/dL    Globulin 4.0 (H) 2.3 - 3.5 g/dL    A-G Ratio 0.7 (L) 1.2 - 3.5     BLOOD GAS, ARTERIAL POC    Collection Time: 06/08/21  6:10 AM   Result Value Ref Range    Device: NASAL CANNULA      FIO2 (POC) 50 %    pH (POC) 7.39 7.35 - 7.45      pCO2 (POC) 29.3 (L) 35 - 45 MMHG    pO2 (POC) 39 (LL) 75 - 100 MMHG    HCO3 (POC) 17.6 (L) 22 - 26 MMOL/L    sO2 (POC) 73.9 (L) 95 - 98 %    Base deficit (POC) 6.3 mmol/L    Allens test (POC) NOT APPLICABLE      Site RIGHT BRACHIAL      Specimen type (POC) ARTERIAL      Performed by Jonatan     Critical value read back LamontHARRY    LACTIC ACID    Collection Time: 06/08/21  8:01 AM   Result Value Ref Range    Lactic acid 1.2 0.4 - 2.0 MMOL/L   BLOOD GAS & LYTES, ARTERIAL    Collection Time: 06/08/21  8:20 AM   Result Value Ref Range    pH (POC) 7.41 7.35 - 7.45      pCO2 (POC) 33.4 (L) 35 - 45 MMHG    pO2 (POC) 520 (H) 75 - 100 MMHG    Sodium,  (L) 136 - 145 MMOL/L    Potassium, POC 4.8 3.5 - 5.1 MMOL/L    Calcium, ionized (POC) 1.11 (L) 1.12 - 1.32 mmol/L    Glucose,  (H) 65 - 100 MG/DL    Base deficit (POC) 3.3 mmol/L    HCO3 (POC) 21.0 (L) 22 - 26 MMOL/L    O2  %    Sample source ARTERIAL      KIRA'S TEST Positive      Device: BIPAP MASK      MODE Non Invasive      FIO2 100 %    PEEP/CPAP 8      Inspiratory Time 1.05 sec    IPAP/PIP 10      Respiratory Rate 12      Performed by Charline     GFRAA, POC Cannot be calculated >60 ml/min/1.73m2    GFRNA, POC Cannot be calculated >60 ml/min/1.73m2    CO2, POC 22 13 - 23 MMOL/L   BLOOD GAS, ARTERIAL POC    Collection Time: 06/08/21 10:53 AM   Result Value Ref Range    Device: BIPAP MASK      FIO2 (POC) 40 %    pH (POC) 7.35 7.35 - 7.45      pCO2 (POC) 33.6 (L) 35 - 45 MMHG    pO2 (POC) 130 (H) 75 - 100 MMHG    HCO3 (POC) 18.7 (L) 22 - 26 MMOL/L    sO2 (POC) 98.8 (H) 95 - 98 %    Base deficit (POC) 6.2 mmol/L    Mode BILEVEL      PEEP/CPAP (POC) 8 cmH2O    Pressure support 16 cmH2O    Allens test (POC) Positive      Site LEFT RADIAL      Specimen type (POC) ARTERIAL      Performed by Ludy Rossi    LACTIC ACID    Collection Time: 06/08/21 11:02 AM   Result Value Ref Range    Lactic acid 3.0 (H) 0.4 - 2.0 MMOL/L   NT-PRO BNP    Collection Time: 06/08/21 11:02 AM   Result Value Ref Range NT pro-BNP 39,067 (H) 5 - 125 PG/ML       Imaging:  [unfilled]    ASSESSMENT:  Problem List  Date Reviewed: 5/26/2021        Codes Class Noted    Acute respiratory failure with hypoxia [facility]) ICD-10-CM: J96.01  ICD-9-CM: 518.81  6/8/2021        Hospital-acquired bacterial pneumonia ICD-10-CM: J15.9  ICD-9-CM: 482.9  6/8/2021        Bilateral pleural effusion ICD-10-CM: J90  ICD-9-CM: 511.9  6/8/2021        Paroxysmal atrial fibrillation (HCC) ICD-10-CM: I48.0  ICD-9-CM: 427.31  6/8/2021        Lactic acidosis ICD-10-CM: E87.2  ICD-9-CM: 276.2  6/8/2021        * (Principal) Severe sepsis ([facility] ICD-10-CM: A41.9, R65.20  ICD-9-CM: 038.9, 995.92  6/8/2021        Atrial fibrillation with rapid ventricular response ([facility] ICD-10-CM: I48.91  ICD-9-CM: 427.31  6/8/2021        Elevated serum creatinine ICD-10-CM: R79.89  ICD-9-CM: 790.99  10/5/2020        Hypomagnesemia ICD-10-CM: E83.42  ICD-9-CM: 275.2  7/27/2020        Shortness of breath ICD-10-CM: R06.02  ICD-9-CM: 786.05  3/28/2020        Pleural effusion on right ICD-10-CM: J90  ICD-9-CM: 511.9  3/28/2020        SCLC (small cell lung carcinoma), right ([facility] ICD-10-CM: C34.91  ICD-9-CM: 162.9  3/13/2020        Acute pulmonary edema ([facility] ICD-10-CM: J81.0  ICD-9-CM: 518.4  9/18/2019        Acute CHF (congestive heart failure) ([facility] ICD-10-CM: I50.9  ICD-9-CM: 428.0  9/16/2019        Pneumonia ICD-10-CM: J18.9  ICD-9-CM: 486  1/6/2019        Pulmonary emboli ([place] [address] ICD-10-CM: I26.99  ICD-9-CM: 415.19  1/6/2019        Immunocompromised ([facility] ICD-10-CM: D84.9  ICD-9-CM: 279.9  11/16/2018        Stage IV adenocarcinoma of lung ([facility] ICD-10-CM: C34.90  ICD-9-CM: 162.9  7/11/2018        Pleural effusion ICD-10-CM: J90  ICD-9-CM: 511.9  4/27/2018        Abnormal chest CT ICD-10-CM: R93.89  ICD-9-CM: 793.2  4/27/2018        Pulmonary infiltrate ICD-10-CM: R91.8  ICD-9-CM: 793.19  4/27/2018        CAP (community acquired pneumonia) ICD-10-CM: J18.9  ICD-9-CM: 028  3/27/2018 Chronic systolic congestive heart failure (HCC) ICD-10-CM: I50.22  ICD-9-CM: 428.22, 428.0  5/3/2017        Depression ICD-10-CM: F32.9  ICD-9-CM: 172  11/2/2012        CML (chronic myeloid leukemia) ([facility] ICD-10-CM: C92.10  ICD-9-CM: 205.10  8/25/2012    Overview Addendum 8/26/2012  7:23 AM by Yin Ramos MD     8/25/12. Probable diagnosis. Will do bone marrow exam today  8/26/12. Bone marrow aspiration and biopsy done. Marrow aspirate and peripheral blood sent for flow, cytogenetics and molecular testing. No complications with the procedure             Allergic rhinitis ICD-10-CM: J30.9  ICD-9-CM: 477.9  8/24/2012        Erectile dysfunction ICD-10-CM: N52.9  ICD-9-CM: 607.84  8/24/2012                RECOMMENDATIONS:  Interval increase in bilateral lung infiltrates  6/8 zosyn and vanc    Concern for pneumonitis  6/8 on solumedrol    PE 5/2012 6/8 Xarelto on hold, switched to heparin for pending procedures    Afib/RVR   6/8 per cards, on amio gtt     SCLC sp Carbo etoposide atezolizumab given 6/3-6/5. Expect to see counts to start dropping. Monitor closely. NSCLC  osimertinib on hold. Lab studies and imaging studies were personally reviewed. Pertinent old records were reviewed. Thank you for allowing us to participate in the care of Mr. Kelley Mari. Formal consult note by Dr. Donis Collier to follow.          Demetrius Flores NP   [facility]  [id number] [id number] [facility]  Office : [phone]  Fax : [phone]

## 2021-06-08 NOTE — H&P
[facility] Cardiology Initial Cardiac Evaluation                 Date of  Admission: 6/8/2021 10:31 AM     Primary Care Physician: Dr. Severo Maurice  Primary Cardiologist: Dr. Rachel Villar   Referring Physician: Hospitalist   Supervising Cardiologist: Dr. Rome Ybarra     Reason for cardiac evaluation: A. Fib RVR      Marcella Shearer is a 60 y.o. male with prior h/o Class III-IV HFrEF (not a candidate for ICD or transplant d/t life expectancy), small cell lung cancer metastasized to liver currently on palliative chemotherapy, CKD, DCM, CML, and PE. Patient presented to ED at [facility] with c/o SOB with dry cough over last 3-4 days. He also reports heart palpitations, fatigue, weakness. In ED, labs showed LA 4.4, Hgb 8.7, Cr 2.13 which is his baseline, chest likely PNA. He was given IVF bolus in ED and IV abx. Hospitalist admitted patient with severe sepsis 2/2 hospital acquired PNA, acute resp failure, A. Fib RVR. He was started on IV amiodarone. Cardiology was consulted for A. Fib RVR. Reviewed EKG in ED and poor tracing but likely ST with PACs. Bedside monitor showing ST with PACs.  Currently on BIPAP and SOB at rest.           Patient Active Problem List   Diagnosis Code    Allergic rhinitis J30.9    Erectile dysfunction N52.9    CML (chronic myeloid leukemia) (HCC) C92.10    Depression F32.9    Chronic systolic congestive heart failure (HCC) I50.22    CAP (community acquired pneumonia) J18.9    Pleural effusion J90    Abnormal chest CT R93.89    Pulmonary infiltrate R91.8    Stage IV adenocarcinoma of lung (HCC) C34.90    Immunocompromised ([facility] D84.9    Pneumonia J18.9    Pulmonary emboli (HCC) I26.99    Acute CHF (congestive heart failure) (HCC) I50.9    Acute pulmonary edema (HCC) J81.0    SCLC (small cell lung carcinoma), right (HCC) C34.91    Shortness of breath R06.02    Pleural effusion on right J90    Hypomagnesemia E83.42    Elevated serum creatinine R79.89    [facility] with [facility] J96.01    Hospital-acquired bacterial pneumonia J15.9    Bilateral pleural effusion J90    Paroxysmal atrial fibrillation (HCC) I48.0    Lactic acidosis E87.2    Severe sepsis (HCC) A41.9, R65.20    Atrial fibrillation with rapid ventricular response (HCC) I48.91       Past Medical History:   Diagnosis Date    Anxiety     Arthritis     [facility] fibrillation with [facility] ([facility]) 6/8/2021    Chronic systolic congestive heart failure ([facility] 5/3/2017    CML (chronic myeloid leukemia) ([facility] 8/25/2012    Depression 11/2/2012    Erectile dysfunction     Hypertension     Leukemia, acute ([facility] 8/24/2012    Lung cancer ([facility] 2018    Pulmonary emboli ([facility] 1/6/2019      Past Surgical History:   Procedure Laterality Date    HX HERNIA REPAIR      left inguinal 1996    HX VASCULAR ACCESS      IR INSERT TUNL CVC W PORT OVER 5 YEARS  3/11/2020     No Known Allergies   Family History   Problem Relation Age of Onset    Lung Disease Father 79        Black lung\"    Cancer Mother 72        pancreatic cancer        Current Facility-Administered Medications   Medication Dose Route Frequency    sodium chloride (NS) flush 5-40 mL  5-40 mL IntraVENous Q8H    sodium chloride (NS) flush 5-40 mL  5-40 mL IntraVENous PRN    acetaminophen (TYLENOL) tablet 650 mg  650 mg Oral Q4H PRN    acetaminophen (TYLENOL) suppository 650 mg  650 mg Rectal Q4H PRN    pantoprazole (PROTONIX) tablet 40 mg  40 mg Oral Q24H    levalbuterol (XOPENEX) nebulizer soln 1.25 mg/3 mL  1.25 mg Nebulization Q4H RT    0.9% sodium chloride infusion  75 mL/hr IntraVENous CONTINUOUS    ondansetron (ZOFRAN) injection 4 mg  4 mg IntraVENous Q4H PRN    amiodarone (CORDARONE) 450 mg in dextrose 5% 250 mL infusion  0.5-1 mg/min IntraVENous CONTINUOUS    midodrine (PROAMATINE) tablet 10 mg  10 mg Oral TID WITH MEALS    Vancomycin Intermittent Dosing   Other Rx Dosing/Monitoring    piperacillin-tazobactam (ZOSYN) 4.5 g in 0.9% sodium chloride (MBP/ADV) 100 mL MBP  4.5 g IntraVENous Q8H    methylPREDNISolone (PF) (SOLU-MEDROL) injection 60 mg  60 mg IntraVENous Q8H    heparin 25,000 units in dextrose 500 mL infusion  18-36 Units/kg/hr IntraVENous TITRATE    heparin (porcine) injection 5,800 Units  80 Units/kg IntraVENous ONCE       Review of Systems   Constitutional: Positive for malaise/fatigue. Negative for diaphoresis. HENT: Negative for congestion. Cardiovascular: Positive for palpitations. Negative for chest pain, claudication, cyanosis, dyspnea on exertion, irregular heartbeat, leg swelling, near-syncope, orthopnea, paroxysmal nocturnal dyspnea and syncope. Respiratory: Positive for cough and shortness of breath. Negative for wheezing. Endocrine: Negative for cold intolerance and heat intolerance. Hematologic/Lymphatic: Does not bruise/bleed easily. Skin: Negative for nail changes. Neurological: Negative for dizziness, headaches and weakness. Physical Exam  Vitals:    06/08/21 1039 06/08/21 1057 06/08/21 1153 06/08/21 1159   BP: 105/67  111/73 113/77   Pulse: (!) 117  (!) 109 (!) 111   Resp: 25   (!) 37   SpO2:    100%   Weight:  72.6 kg (160 lb 0.9 oz)     Height:  6' 1\" (1.854 m)         Physical Exam:  General: Well Developed, Well Nourished, No Acute Distress  HEENT: pupils equal and round, no abnormalities noted  Neck: supple, no JVD, no carotid bruits  Heart: S1S2 irregular irregular   Lungs: Coarse breath sounds throughout   Abd: soft, nontender, nondistended, with good bowel sounds  Ext: warm, trace edema, calves supple/nontender, pulses 2+ bilaterally  Skin: warm and dry  Psychiatric: Normal mood and affect  Neurologic: Alert and oriented X 3    Cardiographics    Telemetry: ST with PACs  ECG:  Poor tracing but ?  ST with PACs  Echocardiogram: Jan 2021 showed EF 22%, LA severely dilated, mod MR and RVSP 37.     Labs:   Recent Labs     06/08/21  0546 06/08/21  0545   *  --    K 4.1  --    MG  --  2.3   BUN 51*  --    CREA 2.13*  --    *  --    WBC 10.7  --    HGB 8.7*  --    HCT 27.1*  --      --         Assessment/Plan:     Assessment:      Principal Problem:    Severe sepsis ([address] (6/8/2021)- per primary team; on IV abx     Active Problems:    CML (chronic myeloid leukemia) ([address] (8/25/2012)      Overview: 8/25/12. Probable diagnosis. Will do bone marrow exam today      8/26/12. Bone marrow aspiration and biopsy done. Marrow aspirate and       peripheral blood sent for flow, cytogenetics and molecular testing. No       complications with the procedure      Depression (11/2/2012)      Pleural effusion (4/27/2018)      Immunocompromised ([address] (11/16/2018)      Acute respiratory failure with hypoxia ([address] (6/8/2021)      Hospital-acquired bacterial pneumonia (6/8/2021)      Bilateral pleural effusion (6/8/2021)      Paroxysmal atrial fibrillation ([address] (6/8/2021)      Lactic acidosis (6/8/2021)      Atrial fibrillation with rapid ventricular response ([address] (6/8/2021)- reviewed EKG from ED and ST with PAC's. No A. Fib noted. Will stop amiodarone after this infusion. Last echo Jan 2021 showed severe LA size so would be high risk for A. Fib. Chronic HFrEF- chest xray more bilateral PNA. Will follow. We appreciate the opportunity to participate in this patient's care.      Otis Mcnally, NP  Supervising MD: Roger Lees

## 2021-06-08 NOTE — PROGRESS NOTES
Placed patient on BIPAP 10/8 and 100% due to decreased oxygen saturations. Prior ABG drawn on 4L NC 7.37 / 29.3 / 39.0 / HCO3 17.6 / sO2 73.9. Patient is tolerating the full face mask well and acknowledges an understanding of the need for the BIPAP.

## 2021-06-08 NOTE — ACP (ADVANCE CARE PLANNING)
Advance Care Planning     General Advance Care Planning (ACP) Conversation      Date of Conversation: 6/8/2021      Healthcare Decision Maker:     Primary Decision Maker: Abi Code - Spouse - [phone]  Click here to complete [address] including selection of the Healthcare Decision Maker Relationship (ie \"Primary\")  Today we documented Decision Maker(s) consistent with Legal Next of Kin hierarchy. Content/Action Overview:   No LW/HCPOA on file currently. Spouse, legal NOK, if pt can not make decisions for himself unless document presented stating otherwise. Full code currently.      Length of Voluntary ACP Conversation in minutes:  <16 minutes (Non-Billable)    Vidya Jorge RN

## 2021-06-08 NOTE — PROGRESS NOTES
TRANSFER - IN REPORT:    Verbal report received from [facility] RN(name) on [address].  being received from [facility] ED(unit) for routine progression of care      Report consisted of patients Situation, Background, Assessment and   Recommendations(SBAR). Information from the following report(s) SBAR, Kardex, ED Summary, [facility] and Cardiac Rhythm A Fib  was reviewed with the receiving nurse. Opportunity for questions and clarification was provided. Assessment completed upon patients arrival to unit and care assumed. Pt arrives via EMS with no belongings. Pt denies pain. A&O x4. Amio gtt infusing at 1 mg/min and NS gtt infusing at 75 mL/hr.      Skin is C/D/I.  Dual skin assessment completed with Susi Hodgkins

## 2021-06-08 NOTE — CONSULTS
CONSULT NOTE    Rajnisalvador Manuelmata.    6/8/2021    Date of Admission:  6/8/2021    The patient's chart is reviewed and the patient is discussed with the staff. Subjective:     Patient is a 60 y.o.  male seen and evaluated at the request of Dr. Aric Isidro. Patient has history of CML, non-small cell lung cancer in 2018 and small cell lung cancer metastasized to liver discovered in 3/2020 currently on palliative chemotherapy, pulmonary emboli on Xarelto, advanced CHF, CKD stage III, debility who presented with 2 to 3 days history of difficulty in breathing and laying flat. Patient reported being in his usual health until 3 to 4 days ago when he noticed sudden onset of worsening shortness of breath which is progressively getting worse. He does report of cough which is mostly dry. He denies having any fever, chills. He does report having palpitations, excessive fatigue and weakness, labored breathing. He denies having any chest pain, nausea vomiting abdominal pain or headache. ER work-up remarkable for chest x-ray showing bilateral infiltrate suggestive of pneumonia along with pleural effusion, blood work remarkable for lactic acid 4.4, creatinine 2.13 (baseline), heart rate ranging from 120-135 irregular, respiratory rate ranging from 30s to 40s. ABG 7.39/29/39 on FiO2 0.1. Had issues in the past with pneumonitis, but with evidence recently of both active small and non-small cell cancer he elected for rechallenge with carbo etoposide atezolizumab while continue osimertinib and monitor pneumonitis symptoms when last seen by oncology 5/26. Treated 6/5 most recently. He was supposed to undergo PleurX placement 2 weeks ago but it was delayed due to starting Xerelto. He has not had a thoracentesis in over a month. He was transferred to [facility] ICU for ongoing care and support. He arrives hemodynamically stable on BIPAP FiO2 35%, but RR remains in 45s.  He is able to provide history and answer questions appropriately. Review of Systems  A comprehensive review of systems was negative except for that written in the HPI. Patient Active Problem List   Diagnosis Code    Allergic rhinitis J30.9    Erectile dysfunction N52.9    CML (chronic myeloid leukemia) ([facility]) C92.10    Depression F32.9    Chronic systolic congestive heart failure (HCC) I50.22    CAP (community acquired pneumonia) J18.9    Pleural effusion J90    Abnormal chest CT R93.89    Pulmonary infiltrate R91.8    Stage IV adenocarcinoma of lung ([facility]) C34.90    Immunocompromised ([address] D84.9    Pneumonia J18.9    Pulmonary emboli ([facility]) I26.99    Acute CHF (congestive heart failure) ([facility]) I50.9    Acute pulmonary edema ([facility]) J81.0    SCLC (small cell lung carcinoma), right ([facility]) C34.91    Shortness of breath R06.02    Pleural effusion on right J90    Hypomagnesemia E83.42    Elevated serum creatinine R79.89    Acute respiratory failure with hypoxia ([facility]) J96.01    Hospital-acquired bacterial pneumonia J15.9    Bilateral pleural effusion J90    Paroxysmal atrial fibrillation ([facility]) I48.0    Lactic acidosis E87.2    Severe sepsis ([facility]) A41.9, R65.20    Atrial fibrillation with rapid ventricular response ([facility]) I48.91     Prior to Admission Medications   Prescriptions Last Dose Informant Patient Reported? Taking? OXYGEN-AIR DELIVERY SYSTEMS   Yes No   Sig: by Does Not Apply route. 3LPM PRN   albuterol (PROVENTIL HFA, VENTOLIN HFA, PROAIR HFA) 90 mcg/actuation inhaler   No No   Sig: Take 2 Puffs by inhalation every four (4) hours as needed for Wheezing. benzonatate (TESSALON) 200 mg capsule   No No   Sig: Take 1 Cap by mouth three (3) times daily as needed for Cough.    folic acid (FOLVITE) 1 mg tablet   No No   Sig: TAKE 1 TABLET BY MOUTH EVERY DAY   Patient not taking: Reported on 5/26/2021   furosemide (LASIX) 40 mg tablet   No No   Sig: Take 1 Tab by mouth daily as needed for Other (weight gain > 5 lbs in 48 hours. ).   lidocaine-prilocaine (EMLA) topical cream   No No   Sig: Apply  to affected area as needed for Pain. Apply to port site 45-60 minutes prior to port access. Cover with plastic.   metoprolol succinate (TOPROL-XL) 25 mg XL tablet   No No   Sig: Take 0.5 Tabs by mouth daily. Indications: chronic heart failure   ondansetron hcl (ZOFRAN) 8 mg tablet   No No   Sig: TAKE 1 TABLET BY MOUTH EVERY 8 HOURS AS NEEDED FOR NAUSEA   osimertinib (Tagrisso) 80 mg tablet   No No   Sig: Take 1 Tab by mouth daily. prochlorperazine (Compazine) 10 mg tablet   No No   Sig: Take 1 Tablet by mouth every six (6) hours as needed for Nausea. rivaroxaban (Xarelto) 20 mg tab tablet   No No   Sig: Take 1 Tablet by mouth daily. traMADoL (ULTRAM) 50 mg tablet   No No   Sig: Take 1-2 Tabs by mouth every six (6) hours as needed for Pain for up to 30 days. Max Daily Amount: 400 mg.   triamcinolone (NASACORT) 55 mcg nasal inhaler   No No   Si Sprays by Both Nostrils route two (2) times a day.    Patient not taking: Reported on 2021      Facility-Administered Medications: None     Past Medical History:   Diagnosis Date    Anxiety     Arthritis     Atrial fibrillation with RVR ([address] 2021    Chronic systolic congestive heart failure ([address] 5/3/2017    CML (chronic myeloid leukemia) ([address] 2012    Depression 2012    Erectile dysfunction     Hypertension     Leukemia, acute ([address] 2012    Lung cancer ([address] 2018    Pulmonary emboli ([address] 2019     Past Surgical History:   Procedure Laterality Date    HX HERNIA REPAIR      left inguinal     HX VASCULAR ACCESS      IR INSERT TUNL CVC W PORT OVER 5 YEARS  3/11/2020     Social History     Socioeconomic History    Marital status:      Spouse name: Not on file    Number of children: Not on file    Years of education: Not on file    Highest education level: Not on file   Occupational History     Comment: [employer]   Tobacco Use    Smoking status: Never Smoker    Smokeless tobacco: Never Used   Substance and Sexual Activity    Alcohol use: No    Drug use: No    Sexual activity: Not on file   Other Topics Concern     Service Not Asked    Blood Transfusions Not Asked    Caffeine Concern Not Asked    Occupational Exposure Not Asked    Hobby Hazards Not Asked    Sleep Concern Not Asked    Stress Concern Not Asked    Weight Concern Not Asked    Special Diet Not Asked    Back Care Not Asked    Exercise Not Asked    Bike Helmet Not Asked   [address] Not Asked    Self-Exams Not Asked   Social History Narrative    Pt  with one son     Social Determinants of Health     Financial Resource Strain:     Difficulty of Paying Living Expenses:    Food Insecurity:     Worried About Running Out of Food in the Last Year:     [address] in the Last Year:    Transportation Needs:     Lack of Transportation (Medical):      Lack of Transportation (Non-Medical):    Physical Activity:     Days of Exercise per Week:     Minutes of Exercise per Session:    Stress:     Feeling of Stress :    Social Connections:     Frequency of Communication with Friends and Family:     Frequency of Social Gatherings with Friends and Family:     Attends [employer] Services:     Active Member of Clubs or Organizations:     Attends Club or Organization Meetings:     Marital Status:    Intimate Partner Violence:     Fear of Current or Ex-Partner:     Emotionally Abused:     Physically Abused:     Sexually Abused:      Family History   Problem Relation Age of Onset    Lung Disease Father 79        Black lung\"    Cancer Mother 72        pancreatic cancer     No Known Allergies    Current Facility-Administered Medications   Medication Dose Route Frequency    sodium chloride (NS) flush 5-40 mL  5-40 mL IntraVENous Q8H    sodium chloride (NS) flush 5-40 mL  5-40 mL IntraVENous PRN    acetaminophen (TYLENOL) tablet 650 mg  650 mg Oral Q4H PRN    acetaminophen (TYLENOL) suppository 650 mg  650 mg Rectal Q4H PRN    pantoprazole (PROTONIX) tablet 40 mg  40 mg Oral Q24H    levalbuterol (XOPENEX) nebulizer soln 1.25 mg/3 mL  1.25 mg Nebulization Q4H RT    0.9% sodium chloride infusion  75 mL/hr IntraVENous CONTINUOUS    ondansetron (ZOFRAN) injection 4 mg  4 mg IntraVENous Q4H PRN    amiodarone (CORDARONE) 450 mg in dextrose 5% 250 mL infusion  0.5-1 mg/min IntraVENous CONTINUOUS    midodrine (PROAMATINE) tablet 10 mg  10 mg Oral TID WITH MEALS    Vancomycin Intermittent Dosing   Other Rx Dosing/Monitoring    piperacillin-tazobactam (ZOSYN) 4.5 g in 0.9% sodium chloride (MBP/ADV) 100 mL MBP  4.5 g IntraVENous Q8H     Objective:     Vitals:    06/08/21 1039 06/08/21 1057 06/08/21 1153 06/08/21 1159   BP: 105/67  111/73 113/77   Pulse: (!) 117  (!) 109 (!) 111   Resp: 25   (!) 37   SpO2:    100%   Weight:  160 lb 0.9 oz (72.6 kg)     Height:  6' 1\" (1.854 m)       PHYSICAL EXAM     Constitutional:  the patient is well developed and in no acute distress  EENMT:  Sclera clear, pupils equal, oral mucosa moist  Respiratory: mildly coarse breath sounds, tachypneic, BIPAP 35%  Cardiovascular:  RRR without M,G,R  Gastrointestinal: soft and non-tender; with positive bowel sounds. Musculoskeletal: warm without cyanosis. There is no lower extremity edema. Skin:  no jaundice or rashes, no wounds   Neurologic: no gross neuro deficits     Psychiatric:  alert and oriented x 4    CXR:  Increased bilateral infiltrates, particularly on left      CT Chest: pneumonitis in the bases, complex right effusion, masses and bilateral PEs, new liver mets.       TTE: 1/2021      Recent Labs     06/08/21  0546   WBC 10.7   HGB 8.7*   HCT 27.1*        Recent Labs     06/08/21  0546 06/08/21  0545   *  --    K 4.1  --      --    *  --    CO2 18*  --    BUN 51*  --    CREA 2.13*  --    MG  --  2.3   CA 8.0*  --    ALB 2.8*  --    TBILI 0.9  -- ALT 24  --      Recent Labs     06/08/21  1053 06/08/21  0820 06/08/21  0610   PHI 7.35 7.41 7.39   PCO2I 33.6* 33.4* 29.3*   PO2I 130* 520* 39*   HCO3I 18.7* 21.0* 17.6*     Recent Labs     06/08/21  0801 06/08/21  0546   LAC 1.2 4.4*     Cultures:  Blood x2    Inpat Anti-Infectives (From admission, onward)     Start     Ordered Stop    06/08/21 1100  piperacillin-tazobactam (ZOSYN) 4.5 g in 0.9% sodium chloride (MBP/ADV) 100 mL MBP  4.5 g,   IntraVENous,   EVERY 8 HOURS      06/08/21 1059 06/15/21 1059    06/08/21 1056  Vancomycin Intermittent Dosing  Other,   RX DOSING/MONITORING      06/08/21 1056 --                Assessment:  (Medical Decision Making)     Hospital Problems  Date Reviewed: 5/26/2021        Codes Class Noted POA    Acute respiratory failure with hypoxia [facility]) ICD-10-CM: J96.01  ICD-9-CM: 518.81  6/8/2021 Unknown        Hospital-acquired bacterial pneumonia ICD-10-CM: J15.9  ICD-9-CM: 482.9  6/8/2021 Unknown        Bilateral pleural effusion ICD-10-CM: J90  ICD-9-CM: 511.9  6/8/2021 Unknown        Paroxysmal atrial fibrillation (HCC) ICD-10-CM: I48.0  ICD-9-CM: 427.31  6/8/2021         Lactic acidosis ICD-10-CM: E87.2  ICD-9-CM: 276.2  6/8/2021 Unknown        * (Principal) Severe sepsis ([facility] ICD-10-CM: A41.9, R65.20  ICD-9-CM: 038.9, 995.92  6/8/2021 Unknown        Atrial fibrillation with rapid ventricular response ([facility] ICD-10-CM: I48.91  ICD-9-CM: 427.31  6/8/2021 Unknown        Immunocompromised ([facility] ICD-10-CM: D84.9  ICD-9-CM: 279.9  11/16/2018 Yes        Pleural effusion ICD-10-CM: J90  ICD-9-CM: 511.9  4/27/2018 Unknown        Depression ICD-10-CM: F32.9  ICD-9-CM: 344  11/2/2012 Yes        CML (chronic myeloid leukemia) ([facility] ICD-10-CM: C92.10  ICD-9-CM: 205.10  8/25/2012 Yes    Overview Addendum 8/26/2012  7:23 AM by Marleni Starr MD     8/25/12. Probable diagnosis. Will do bone marrow exam today  8/26/12. Bone marrow aspiration and biopsy done.  Marrow aspirate and peripheral blood sent for flow, cytogenetics and molecular testing. No complications with the procedure                 61 y/o male with CML, active SCLC and NSCLC on palliative therapy, severe systolic HR with EF 61% prior pneumonitis on immunotherapy, recently rechallenged, malignant effusions, recent PEs on Xerelto and recently with evidence of progression of both lung cancers. 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 It's possible that he could improve if this represents pneumonitis and he responds to steroids. It's possible that this is progressive cancer or decompensate heart failure and he can't improve so I asked him to make sure his family knew how to make decisions for him if he was unable to which he says they do. Full Code, agree with palliative to see as his cancers are progressing and he is not tolerating aggressive therapy either from pneumonitis, infection or volume.      The patient is critically ill with respiratory failure, circulatory failure and requires high complexity decision making for assessment and support including frequent ventilator adjustment , frequent evaluation and titration of therapies , application of advanced monitoring technologies and extensive interpretation of multiple databases  Time devoted to patient care services described in this note- 20 min face to face/ 30 min total evaluation time.      Cumulative time devoted to patient care services by me for day of service -48 min     Kelley Marcelo MD

## 2021-06-08 NOTE — ACP (ADVANCE CARE PLANNING)
Advance Care Planning   Advance Care Planning Inpatient Note  [facility] Care Department    Today's Date: 6/8/2021  Unit: [facility] CORONARY CARE    Received request from IDT member. Upon review of chart and communication with care team, patient's decision making abilities are not in question. Patient and sister was/were present in the room during visit. Goals of ACP Conversation:  Discuss Advance Care planning documents  Facilitate a discussion related to patient's goals of care as they align with the patient's values and beliefs    Health Care Decision Makers:      Primary Decision Maker: Allyn Cuadra - Spouse - [phone]  Click here to complete [address] including selection of the Healthcare Decision Maker Relationship (ie \"Primary\")     Today we:  Documented Next of Kin, per patient report    Advance Care Planning Documents (Patient Wishes) on file:  None     Assessment:      met with patient and sister at bedside, went over [facility] power of  paperwork, answered questions. Patient does not wish to complete a HCPOA at this time.     Interventions:  Provided education on documents for clarity and greater understanding  Discussed and provided education on state decision maker hierarchy  Encouraged ongoing ACP conversation with future decision makers and loved ones  Reviewed but did not complete ACP document     Outcomes/Plan:  ACP Discussion Completed     Electronically signed by Chaplain Kelby on 6/8/2021 at 4:49 PM

## 2021-06-08 NOTE — PROGRESS NOTES
Pharmacokinetic Consult to Pharmacist    Cristopher Low. is a 60 y.o. male being treated with Vancomycin. Lab Results   Component Value Date/Time    BUN 51 (H) 06/08/2021 05:46 AM    Creatinine 2.13 (H) 06/08/2021 05:46 AM    WBC 10.7 06/08/2021 05:46 AM    Procalcitonin 0.08 03/28/2020 02:51 PM    Lactic acid 1.2 06/08/2021 08:01 AM    Lactic Acid (POC) 1.32 01/06/2019 08:35 PM      CrCl cannot be calculated (Unknown ideal weight.). No results found for: Cecilia Gowers        Day 1 of Vancomycin. Goal Trough 15-20. Patient loaded with 1750mg IV x1 of vancomycin. Will initiate intermittent dosing for maintenance starting tomorrow and make adjustments as necessary. Will continue to follow patient and order levels when clinically indicated.       Thanks,   Lynda Ace, PharmD  PGY1 Pharmacy Resident  [phone]

## 2021-06-08 NOTE — H&P
[facility] History and Physical       Name:  Georgette Lui. Age:65 y.o. Sex:male   :  1955    MRN:  [id number]   PCP:  Vidhi العلي MD      Admit Date:  No admission date for patient encounter. Chief Complaint: Shortness of breath for past 2 to 3 days and difficulty laying flat    Reason for Admission:   Afib with RVR     Assessment & Plan:     #Severe sepsis with lactic acidosis secondary to hospital-acquired pneumonia and immunocompromised status  Chest x-ray with evidence of bilateral infiltrates with bilateral pleural effusion suspected to be malignant versus parapneumonic  Starting empiric IV antibiotic vancomycin and Zosyn  Follow-up with blood and sputum culture  Lactic acid is 4.4. Received 1 L bolus in ER, second is running. Maintenance fluid with IV normal saline at 75 cc/h. Patient has underlying advanced CHF, high risk of fluid overload  Check PCT  Monitor lactic acid every 2 hours until normalized    #Acute hypoxic respiratory failure secondary to hospital-acquired pneumonia and pleural effusion  Patient is currently tachypneic with increased work of breathing, respiratory rate ranging from 30s to 40s  Patient initial sats on room air 68 to 70%  Currently on BiPAP with FiO2 100%, 10/8  Patient is at high risk of decompensating, have discussed case with intensivist [facility], Dr. Candi Marcelo. Patient will be transferred to [facility] ICU for further critical care management. Patient is a full code and would want to be resuscitated and like to be on a ventilator support if needed, discussed with patient and his wife. #Paroxysmal A.fib currently sinus tachycardic  Heart rate ranging from 130s to 140s  Since blood pressure is borderline, will start on amiodarone bolus and infusion. Latest echo showed severely depressed LVEF and LV dilation. If blood pressure stabilizes, can start on diltiazem.   He takes Toprol-XL at home  Cardiology consulted  Restart Xarelto 20 mg p.o. 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 Argenis Mejia, is currently on palliative chemotherapy. We will consult oncology to discuss goals of care from an oncology standpoint. Patient clearly has extremely poor prognosis given underlying advanced CHF, with ongoing severe sepsis and respiratory failure and deconditioning. #CKD stage III:  Creatinine 2.13, is at baseline. Continue IV fluids in view of flying severe sepsis. High risk of organ dysfunction in view of lactic acidosis. Monitor daily BMP and avoid nephrotoxic medications. #History of advanced CHF with EF less than 20%  Check BNP, has peripheral edema on physical examination. Optimize guidelines directed medical therapy. Cardiology is consulted    #Hypoalbuminemia secondary to nutritional deficiency  Patient currently will be n.p.o. until weaned off of BiPAP  Should consider getting nutritional eval for protein supplementation and diet. # hx of PE: on xarelto      Disposition/Expected LOS: 3 to 4 days pending clinical recovery  Diet: DIET NPO  VTE ppx: Xarelto  GI ppx: Protonix  Code status: Full code  Surrogate decision-maker: Patient's wife, Anibal Edwards [phone]    Patient at risk for further deterioration from severe sepsis with acute respiratory failure with hypoxia requiring BiPAP, with borderline blood pressure    Patient required critical care interventions including respiratory support with noninvasive ventilation (BiPAP), starting on IV amiodarone for atrial fibrillation RVR    Total critical care time spent: 70 minutes    Critical care time includes time spent at bedside performing history and exam, performing chart review, discussing findings and treatment plan with patient and/or family, discussing patient with consultants and colleagues, ordering and reviewing pertinent laboratory and radiographic evaluations, and discussing patient with nursing staff. Time excludes procedures. History of Presenting Illness:     Jovany Marcos is a 60 y.o. male with history of CML, small cell lung cancer metastasized to liver currently on palliative chemotherapy, pulmonary emboli on Xarelto, advanced CHF, CKD stage III, debility who presented with 2 to 3 days history of difficulty in breathing and laying flat. Patient reported being in his usual health until 3 to 4 days ago when he noticed sudden onset of worsening shortness of breath which is progressively getting worse. He does report of cough which is mostly dry. He denies having any fever, chills. He does report having palpitations, excessive fatigue and weakness, labored breathing. He denies having any chest pain, nausea vomiting abdominal pain or headache. ER work-up remarkable for chest x-ray showing bilateral infiltrate suggestive of pneumonia along with pleural effusion, blood work remarkable for lactic acid 4.4, creatinine 2.13 (baseline), heart rate ranging from 120-135 irregular, respiratory rate ranging from 30s to 40s. ABG 7.39/29/39 on FiO2 0.1. Review of Systems:  A 14 point review of systems was taken and pertinent positive as per HPI.         Past Medical History:   Diagnosis Date    Anxiety     Arthritis     Chronic systolic congestive heart failure ([address] 5/3/2017    CML (chronic myeloid leukemia) ([address] 8/25/2012    Depression 11/2/2012    Erectile dysfunction     Hypertension     Leukemia, acute ([address] 8/24/2012    Lung cancer ([address] 2018    Pulmonary emboli ([address] 1/6/2019       Past Surgical History:   Procedure Laterality Date    HX HERNIA REPAIR      left inguinal 1996    HX VASCULAR ACCESS      IR INSERT TUNL CVC W PORT OVER 5 YEARS  3/11/2020       Family History : reviewed  Family History   Problem Relation Age of Onset    Lung Disease Father 79        Black lung\"    Cancer Mother 72        pancreatic cancer        Social History     Tobacco Use    Smoking status: Never Smoker    Smokeless tobacco: Never Used   Substance Use Topics    Alcohol use: No       No Known Allergies    Immunization History   Administered Date(s) Administered    Influenza Vaccine 10/01/2020    Influenza Vaccine (Quad) PF (>6 Mo Flulaval, Fluarix, and >3 Yrs [employer] [place] [id number]) 03/27/2018, 11/16/2018    Tdap 02/27/2016         PTA Medications:  Current Outpatient Medications   Medication Instructions    albuterol (PROVENTIL HFA, VENTOLIN HFA, PROAIR HFA) 90 mcg/actuation inhaler 2 Puffs, Inhalation, EVERY 4 HOURS AS NEEDED    benzonatate (TESSALON) 200 mg, Oral, 3 TIMES DAILY AS NEEDED    folic acid (FOLVITE) 1 mg tablet TAKE 1 TABLET BY MOUTH EVERY DAY    furosemide (LASIX) 40 mg, Oral, DAILY AS NEEDED    lidocaine-prilocaine (EMLA) topical cream Topical, AS NEEDED, Apply to port site 45-60 minutes prior to port access. Cover with plastic.     metoprolol succinate (TOPROL-XL) 12.5 mg, Oral, DAILY    ondansetron hcl (ZOFRAN) 8 mg tablet TAKE 1 TABLET BY MOUTH EVERY 8 HOURS AS NEEDED FOR NAUSEA    osimertinib (TAGRISSO) 80 mg, Oral, DAILY    OXYGEN-AIR DELIVERY SYSTEMS Does Not Apply, 3LPM PRN    prochlorperazine (COMPAZINE) 10 mg, Oral, EVERY 6 HOURS AS NEEDED    rivaroxaban (XARELTO) 20 mg, Oral, DAILY    traMADoL (ULTRAM)  mg, Oral, EVERY 6 HOURS AS NEEDED    triamcinolone (NASACORT) 55 mcg nasal inhaler 2 Sprays, Both Nostrils, 2 TIMES DAILY       Objective:   Oxygen Therapy  O2 Sat (%): 100 % (06/08/21 0830)  Pulse via Oximetry: 127 beats per minute (06/08/21 0830)  O2 Device: CPAP mask (06/08/21 0830)  Skin Assessment: Clean, dry, & intact (06/08/21 0800)  Skin Protection for O2 Device: No (06/08/21 0605)    FIO2 (%): 100 % (06/08/21 0830)  Physical Exam:    General:  Elderly male, in moderate distress due to labored breathing, tachypneic, diaphoretic, on BiPAP, alert awake  HEENT:  Head NCAT, PERRLA  Neck:   Supple, no lymphadenopathy, no JVD   Lungs:  Coarse breath sounds with poor effort, tachypneic, labored breathing with use of accessory muscle  CV:   Tachycardic, regular rhythm with normal S1 and S2   Abdomen:  Soft, nontender, nondistended, normoactive bowel sounds   Extremities:  No cyanosis clubbing, trace to 1+ edema in bilateral lower extremity  Neuro:  GCS 15, cranial nerves II to XII intact, following commands and moving all four extremities spontaneously  Psych:  AO x3, mood and affect flat      Data Reviewed: I have reviewed all labs, meds, and studies. Recent Results (from the past 24 hour(s))   MAGNESIUM    Collection Time: 06/08/21  5:45 AM   Result Value Ref Range    Magnesium 2.3 1.8 - 2.4 mg/dL   LACTIC ACID    Collection Time: 06/08/21  5:46 AM   Result Value Ref Range    Lactic acid 4.4 (HH) 0.4 - 2.0 MMOL/L   CBC WITH AUTOMATED DIFF    Collection Time: 06/08/21  5:46 AM   Result Value Ref Range    WBC 10.7 4.3 - 11.1 K/uL    RBC 3.32 (L) 4.23 - 5.6 M/uL    HGB 8.7 (L) 13.6 - 17.2 g/dL    HCT 27.1 (L) 41.1 - 50.3 %    MCV 81.6 79.6 - 97.8 FL    MCH 26.2 26.1 - 32.9 PG    MCHC 32.1 31.4 - 35.0 g/dL    RDW 14.5 11.9 - 14.6 %    PLATELET 689 213 - 077 K/uL    MPV 9.4 9.4 - 12.3 FL    ABSOLUTE NRBC 0.00 0.0 - 0.2 K/uL    DF AUTOMATED      NEUTROPHILS 92 (H) 43 - 78 %    LYMPHOCYTES 4 (L) 13 - 44 %    MONOCYTES 1 (L) 4.0 - 12.0 %    EOSINOPHILS 1 0.5 - 7.8 %    BASOPHILS 0 0.0 - 2.0 %    IMMATURE GRANULOCYTES 2 0.0 - 5.0 %    ABS. NEUTROPHILS 9.9 (H) 1.7 - 8.2 K/UL    ABS. LYMPHOCYTES 0.4 (L) 0.5 - 4.6 K/UL    ABS. MONOCYTES 0.1 0.1 - 1.3 K/UL    ABS. EOSINOPHILS 0.1 0.0 - 0.8 K/UL    ABS. BASOPHILS 0.0 0.0 - 0.2 K/UL    ABS. IMM.  GRANS. 0.2 0.0 - 0.5 K/UL   METABOLIC PANEL, COMPREHENSIVE    Collection Time: 06/08/21  5:46 AM   Result Value Ref Range    Sodium 134 (L) 136 - 145 mmol/L    Potassium 4.1 3.5 - 5.1 mmol/L    Chloride 102 98 - 107 mmol/L    CO2 18 (L) 21 - 32 mmol/L    Anion gap 14 7 - 16 mmol/L    Glucose 148 (H) 65 - 100 mg/dL    BUN 51 (H) 8 - 23 MG/DL    Creatinine 2.13 (H) 0.8 - 1.5 MG/DL    GFR est AA 40 (L) >60 ml/min/1.73m2    GFR est non-AA 33 (L) >60 ml/min/1.73m2    Calcium 8.0 (L) 8.3 - 10.4 MG/DL    Bilirubin, total 0.9 0.2 - 1.1 MG/DL    ALT (SGPT) 24 12 - 65 U/L    AST (SGOT) 32 15 - 37 U/L    Alk. phosphatase 86 50 - 136 U/L    Protein, total 6.8 6.3 - 8.2 g/dL    Albumin 2.8 (L) 3.2 - 4.6 g/dL    Globulin 4.0 (H) 2.3 - 3.5 g/dL    A-G Ratio 0.7 (L) 1.2 - 3.5     BLOOD GAS, ARTERIAL POC    Collection Time: 06/08/21  6:10 AM   Result Value Ref Range    Device: NASAL CANNULA      FIO2 (POC) 50 %    pH (POC) 7.39 7.35 - 7.45      pCO2 (POC) 29.3 (L) 35 - 45 MMHG    pO2 (POC) 39 (LL) 75 - 100 MMHG    HCO3 (POC) 17.6 (L) 22 - 26 MMOL/L    sO2 (POC) 73.9 (L) 95 - 98 %    Base deficit (POC) 6.3 mmol/L    Allens test (POC) NOT APPLICABLE      Site RIGHT BRACHIAL      Specimen type (POC) ARTERIAL      Performed by [email or web address]     Critical value read back [email or web address]    LACTIC ACID    Collection Time: 06/08/21  8:01 AM   Result Value Ref Range    Lactic acid 1.2 0.4 - 2.0 MMOL/L       EKG Results     None          All Micro Results     None          Other Studies:  XR CHEST PORT    Result Date: 6/8/2021  EXAM: XR CHEST PORT HISTORY: meets SIRS criteria. TECHNIQUE: A single frontal view of the chest was submitted. COMPARISON: 4/23/2021 FINDINGS: The cardiac silhouette is not fully visualized. The mediastinum, and pulmonary vasculature are within normal limits. Interval increase in the right lower lobe infiltrate. Interval development of diffuse infiltrate throughout the left lung. Stable small right pleural effusion. Small left pleural effusion. A nodule is again seen in the right midlung. There is no significant pneumothorax. Stable right MediPort. No significant osseous abnormalities are observed. Interval increase in bilateral lung infiltrates. Stable small right pleural effusion. Small left pleural effusion.  Stable soft tissue nodule in the right midlung. Other findings as above.          Medications:  Medications Administered       acetaminophen (TYLENOL) tablet 650 mg       Admin Date  03/13/2021 Action  Given Dose  650 mg Route  Oral Administered By  Rand William RN              cefTRIAXone (ROCEPHIN) 1 g in 0.9% sodium chloride (MBP/ADV) 50 mL MBP       Admin Date  03/13/2021 Action  New Bag Dose  1 g Rate  100 mL/hr Route  IntraVENous Administered By  Michael Esposito RN              cefTRIAXone (ROCEPHIN) 2 g in 0.9% sodium chloride (MBP/ADV) 50 mL MBP       Admin Date  03/13/2021 Action  New Bag Dose  2 g Rate  100 mL/hr Route  IntraVENous Administered By  Dk Howell RN               Admin Date  03/14/2021 Action  New Bag Dose  2 g Rate  100 mL/hr Route  IntraVENous Administered By  Anna Marie Soria RN               Admin Date  03/15/2021 Action  New Bag Dose  2 g Rate  100 mL/hr Route  IntraVENous Administered By  Anna Marie Soria RN               Admin Date  03/16/2021 Action  New Bag Dose  2 g Rate  100 mL/hr Route  IntraVENous Administered By  Ernesto Severs              diphenhydrAMINE (BENADRYL) injection 25 mg       Admin Date  03/14/2021 Action  Given Dose  25 mg Route  IntraVENous Administered By  Meagan Pitt RN              doxycycline (VIBRAMYCIN) 100 mg in 0.9% sodium chloride (MBP/ADV) 100 mL MBP       Admin Date  03/13/2021 Action  New Bag Dose  100 mg Rate  100 mL/hr Route  IntraVENous Administered By  Dk Howell RN               Admin Date  03/13/2021 Action  New Bag Dose  100 mg Rate  100 mL/hr Route  IntraVENous Administered By  Meagan Pitt RN               Admin Date  03/14/2021 Action  New Bag Dose  100 mg Rate  100 mL/hr Route  IntraVENous Administered By  Anna Marie Soria RN               Admin Date  03/14/2021 Action  New Bag Dose  100 mg Rate  100 mL/hr Route  IntraVENous Administered By  Meagan Pitt RN               Admin Date  03/15/2021 Action  New Bag Dose  100 mg Rate  100 mL/hr Route  IntraVENous Administered By  Noreen Guevara RN              enoxaparin (LOVENOX) injection 40 mg       Admin Date  03/13/2021 Action  Given Dose  40 mg Route  SubCUTAneous Administered By  All Ho RN               Admin Date  03/13/2021 Action  Given Dose  40 mg Route  SubCUTAneous Administered By  Erwin Nguyen RN               Admin Date  03/14/2021 Action  Given Dose  40 mg Route  SubCUTAneous Administered By  Noreen Guevara RN               Admin Date  03/14/2021 Action  Given Dose  40 mg Route  SubCUTAneous Administered By  Erwin Nguyen RN               Admin Date  03/15/2021 Action  Given Dose  40 mg Route  SubCUTAneous Administered By  Noreen Guevara RN               Admin Date  03/15/2021 Action  Given Dose  40 mg Route  SubCUTAneous Administered By  Sukhjinder Turcios RN               Admin Date  03/16/2021 Action  Given Dose  40 mg Route  SubCUTAneous Administered By  Jc Martinez              fentaNYL citrate (PF) injection 50 mcg       Admin Date  03/13/2021 Action  Given Dose  50 mcg Route  IntraVENous Administered By  All Ho RN               Admin Date  03/14/2021 Action  Given Dose  50 mcg Route  IntraVENous Administered By  Erwin Nguyen RN              ferrous sulfate tablet 325 mg       Admin Date  03/14/2021 Action  Given Dose  325 mg Route  Oral Administered By  Noreen Guevara RN               Admin Date  03/14/2021 Action  Given Dose  325 mg Route  Oral Administered By  Noreen Guevara RN               Admin Date  03/15/2021 Action  Given Dose  325 mg Route  Oral Administered By  Noreen Guevara RN              folic acid (FOLVITE) tablet 1 mg       Admin Date  03/14/2021 Action  Given Dose  1 mg Route  Oral Administered By  Noreen Guevara RN               Admin Date  03/15/2021 Action  Given Dose  1 mg Route  Oral Administered By  Noreen Guevara RN               Admin Date  03/16/2021 Action  Given Dose  1 mg Route  Oral Administered By  Jc Martinez              furosemide (LASIX) injection 20 mg       Admin Date  03/15/2021 Action  Given Dose  20 mg Route  IntraVENous Administered By  Latricia Donaldson RN              furosemide (LASIX) injection 40 mg       Admin Date  03/13/2021 Action  Given Dose  40 mg Route  IntraVENous Administered By  Shayan Galarza RN               Admin Date  03/13/2021 Action  Given Dose  40 mg Route  IntraVENous Administered By  Zen Thompson RN               Admin Date  03/14/2021 Action  Given Dose  40 mg Route  IntraVENous Administered By  Latricia Donaldson RN               Admin Date  03/14/2021 Action  Given Dose  40 mg Route  IntraVENous Administered By  Zen Thompson RN               Admin Date  03/15/2021 Action  Given Dose  40 mg Route  IntraVENous Administered By  Andres Hanna RN               Admin Date  03/16/2021 Action  Given Dose  40 mg Route  IntraVENous Administered By  Moe Benjamin              HYDROcodone-acetaminophen [facility]) 5-325 mg per tablet 1 Tab       Admin Date  03/14/2021 Action  Given Dose  1 Tab Route  Oral Administered By  Latricia Donaldson RN               Admin Date  03/15/2021 Action  Given Dose  1 Tab Route  Oral Administered By  Zen Thompson RN              insulin lispro (HUMALOG) injection       Admin Date  03/13/2021 Action  Given Dose  2 Units Route  SubCUTAneous Administered By  Zen Thompson RN               Admin Date  03/14/2021 Action  Given Dose  2 Units Route  SubCUTAneous Administered By  Latricia Donaldson RN               Admin Date  03/14/2021 Action  Given Dose  2 Units Route  SubCUTAneous Administered By  Latricia Donaldson RN               Admin Date  03/14/2021 Action  Given Dose  4 Units Route  SubCUTAneous Administered By  Zen Thompson RN               Admin Date  03/15/2021 Action  Given Dose  2 Units Route  SubCUTAneous Administered By  Latricia Donaldson RN               Admin Date  03/15/2021 Action  Given Dose  4 Units Route  SubCUTAneous Administered By  Latricia Donaldson RN               Admin Date  03/15/2021 Action  Given Dose  2 Units Route  SubCUTAneous Administered By  Isi Pierce RN               Admin Date  03/16/2021 Action  Given Dose  4 Units Route  SubCUTAneous Administered By  Jigar Torres              levothyroxine (SYNTHROID) tablet 137 mcg       Admin Date  03/14/2021 Action  Given Dose  137 mcg Route  Oral Administered By  Olivia Miller RN               Admin Date  03/15/2021 Action  Given Dose  137 mcg Route  Oral Administered By  Olivia Miller RN               Admin Date  03/16/2021 Action  Given Dose  137 mcg Route  Oral Administered By  Isi Pierce RN              midodrine (PROAMATINE) tablet 10 mg       Admin Date  03/14/2021 Action  Given Dose  10 mg Route  Oral Administered By  Olivia Miller RN               Admin Date  03/14/2021 Action  Given Dose  10 mg Route  Oral Administered By  Olivia Miller RN               Admin Date  03/15/2021 Action  Given Dose  10 mg Route  Oral Administered By  Olivia Miller RN               Admin Date  03/15/2021 Action  Given Dose  10 mg Route  Oral Administered By  Olivia Miller RN               Admin Date  03/15/2021 Action  Given Dose  10 mg Route  Oral Administered By  Olivia Miller RN               Admin Date  03/16/2021 Action  Given Dose  10 mg Route  Oral Administered By  Rudy Gilbert Date  03/16/2021 Action  Given Dose  10 mg Route  Oral Administered By  Rudy Gilbert Date  03/16/2021 Action  Given Dose  10 mg Route  Oral Administered By  Jigar Torres              naloxone [facility]) injection 2 mg       Admin Date  03/13/2021 Action  Given Dose  2 mg Route  IntraVENous Administered By  Juan Merritt RN              NOREPINephrine (LEVOPHED) 4 mg in 5% dextrose 250 mL infusion       Admin Date  03/14/2021 Action  New Bag Dose  4 mcg/min Rate  15 mL/hr Route  IntraVENous Administered By  Olivia Miller RN               Admin Date  03/14/2021 Action  Rate Change Dose  6 mcg/min Rate  22.5 mL/hr Route  IntraVENous Administered By  Joyceann Klinefelter, RN               Admin Date  03/14/2021 Action  Rate Change Dose  4 mcg/min Rate  15 mL/hr Route  IntraVENous Administered By  Joyceann Klinefelter, RN               Admin Date  03/14/2021 Action  Rate Change Dose  2 mcg/min Rate  7.5 mL/hr Route  IntraVENous Administered By  Joyceann Klinefelter, RN               Admin Date  03/14/2021 Action  Rate Change Dose  1 mcg/min Rate  3.8 mL/hr Route  IntraVENous Administered By  Joyceann Klinefelter, RN               Admin Date  03/14/2021 Action  Restarted Dose  2 mcg/min Rate  7.5 mL/hr Route  IntraVENous Administered By  Joyceann Klinefelter, RN               Admin Date  03/14/2021 Action  Rate Change Dose  4 mcg/min Rate  15 mL/hr Route  IntraVENous Administered By  Joyceann Klinefelter, RN               Admin Date  03/15/2021 Action  Rate Change Dose  2 mcg/min Rate  7.5 mL/hr Route  IntraVENous Administered By  Ranulfo Singer RN              ondansetron [facility]) injection 4 mg       Admin Date  03/13/2021 Action  Given Dose  4 mg Route  IntraVENous Administered By  José Montenegro RN              pantoprazole (PROTONIX) tablet 40 mg       Admin Date  03/14/2021 Action  Given Dose  40 mg Route  Oral Administered By  Joyceann Klinefelter, RN               Admin Date  03/15/2021 Action  Given Dose  40 mg Route  Oral Administered By  Joyceann Klinefelter, RN               Admin Date  03/16/2021 Action  Given Dose  40 mg Route  Oral Administered By  Andrea Wilkins              perflutren lipid microspheres (DEFINITY) in NS bolus IV       Admin Date  03/13/2021 Action  Given Dose  1 mL Route  IntraVENous Administered By  ELDER Bose              potassium chloride (K-DUR, KLOR-CON) SR tablet 40 mEq       Admin Date  03/15/2021 Action  Given Dose  40 mEq Route  Oral Administered By  Joyceann Klinefelter, RN               Admin Date  03/16/2021 Action  Given Dose  40 mEq Route  Oral Administered By  Cynthia Beckman, Justine              potassium chloride 40 mEq IVPB       Admin Date  03/15/2021 Action  New Bag Dose  40 mEq Rate  25 mL/hr Route  IntraVENous Administered By  Zen Thompson RN              pregabalin (LYRICA) capsule 300 mg       Admin Date  03/14/2021 Action  Given Dose  300 mg Route  Oral Administered By  Latricia Donaldson, RN               Admin Date  03/14/2021 Action  Given Dose  300 mg Route  Oral Administered By  Latricia Donaldson, RN               Admin Date  03/15/2021 Action  Given Dose  300 mg Route  Oral Administered By  Latricia Donaldson, RN               Admin Date  03/15/2021 Action  Given Dose  300 mg Route  Oral Administered By  Jewel Lemons, RN               Admin Date  03/16/2021 Action  Given Dose  300 mg Route  Oral Administered By  Moe Benjamin              sertraline (ZOLOFT) tablet 300 mg       Admin Date  03/14/2021 Action  Given Dose  300 mg Route  Oral Administered By  Latricia Donaldson, RN               Admin Date  03/15/2021 Action  Given Dose  300 mg Route  Oral Administered By  Latricia Donaldson, RN               Admin Date  03/16/2021 Action  Given Dose  300 mg Route  Oral Administered By  Moe Benjamin              sodium chloride (NS) flush 5-40 mL       Admin Date  03/13/2021 Action  Given Dose  10 mL Route  IntraVENous Administered By  Jie Mejía, RN               Admin Date  03/13/2021 Action  Given Dose  30 mL Route  IntraVENous Administered By  Jie Mejía RN               Admin Date  03/13/2021 Action  Given Dose  40 mL Route  IntraVENous Administered By  Zen Thompson RN               Admin Date  03/14/2021 Action  Given Dose  40 mL Route  IntraVENous Administered By  Zen Thompson RN               Admin Date  03/14/2021 Action  Given Dose  10 mL Route  IntraVENous Administered By  Latricia Donaldson, JUSTIN               Admin Date  03/14/2021 Action  Given Dose  40 mL Route  IntraVENous Administered By  Zen Thompson RN               Admin Date  03/15/2021 Action  Given Dose  40 mL Route  IntraVENous Administered By  Zen Thompson RN               Admin Date  03/15/2021 Action  Given Dose  10 mL Route  IntraVENous Administered By  Latricia Donaldson RN               Admin Date  03/15/2021 Action  Given Dose  10 mL Route  IntraVENous Administered By  Andres Hanna RN               Admin Date  03/16/2021 Action  Given Dose  10 mL Route  IntraVENous Administered By  Moe Benjamin              sodium chloride 0.9 % bolus infusion 250 mL       Admin Date  03/14/2021 Action  New Bag Dose  250 mL Rate  250 mL/hr Route  IntraVENous Administered By  Latricia Donaldson RN              tiZANidine (ZANAFLEX) tablet 4 mg       Admin Date  03/14/2021 Action  Given Dose  4 mg Route  Oral Administered By  Zen Thompson RN               Admin Date  03/14/2021 Action  Given Dose  4 mg Route  Oral Administered By  Latricia Donaldson RN               Admin Date  03/14/2021 Action  Given Dose  4 mg Route  Oral Administered By  Latricia Donaldson RN               Admin Date  03/14/2021 Action  Given Dose  4 mg Route  Oral Administered By  Zen Thompson RN               Admin Date  03/15/2021 Action  Given Dose  4 mg Route  Oral Administered By  Latricia Donaldson RN               Admin Date  03/15/2021 Action  Given Dose  4 mg Route  Oral Administered By  Latricia Donaldson RN               Admin Date  03/15/2021 Action  Given Dose  4 mg Route  Oral Administered By  Andres Hanna RN               Admin Date  03/16/2021 Action  Given Dose  4 mg Route  Oral Administered By  Christiano Harm Date  03/16/2021 Action  Given Dose  4 mg Route  Oral Administered By  Moe Benjamin              traZODone (DESYREL) tablet 150 mg       Admin Date  03/14/2021 Action  Given Dose  150 mg Route  Oral Administered By  Zen Thompson RN               Admin Date  03/15/2021 Action  Given Dose  150 mg Route  Oral Administered By  Andres Hanna RN              tuberculin injection 5 Units       Admin Date  03/15/2021 Action  Give PPD Dose  5 Units Route  IntraDERMal Administered By  Karen Parmar RN              vancomycin (VANCOCIN) 2500 mg in  mL infusion       Admin Date  03/13/2021 Action  Given Dose  2,500 mg Rate  200 mL/hr Route  IntraVENous Administered By  Ap Donnelly RN              zonisamide (ZONEGRAN) capsule 300 mg       Admin Date  03/14/2021 Action  Given Dose  300 mg Route  Oral Administered By  Sandra Billingsley RN               Admin Date  03/15/2021 Action  Given Dose  300 mg Route  Oral Administered By  Blane Cabezas RN                        Problem List:     Hospital Problems as of 6/8/2021 Date Reviewed: 5/26/2021        Codes Class Noted - Resolved POA    Acute respiratory failure with hypoxia ([facility] ICD-10-CM: J96.01  ICD-9-CM: 518.81  6/8/2021 - Present Unknown        Hospital-acquired bacterial pneumonia ICD-10-CM: J15.9  ICD-9-CM: 482.9  6/8/2021 - Present Unknown        Bilateral pleural effusion ICD-10-CM: J90  ICD-9-CM: 511.9  6/8/2021 - Present Unknown        Paroxysmal atrial fibrillation ([facility] ICD-10-CM: I48.0  ICD-9-CM: 427.31  6/8/2021 - Present         Immunocompromised ([facility] ICD-10-CM: D84.9  ICD-9-CM: 279.9  11/16/2018 - Present Yes        Depression ICD-10-CM: F32.9  ICD-9-CM: 353  11/2/2012 - Present Yes        CML (chronic myeloid leukemia) ([facility] ICD-10-CM: C92.10  ICD-9-CM: 205.10  8/25/2012 - Present Yes    Overview Addendum 8/26/2012  7:23 AM by Kiara Braxton MD     8/25/12. Probable diagnosis. Will do bone marrow exam today  8/26/12. Bone marrow aspiration and biopsy done. Marrow aspirate and peripheral blood sent for flow, cytogenetics and molecular testing.  No complications with the procedure                      Signed By: Josefa Decker MD   [facility] SPECIALTY [facility] Hospitalist Service    June 8, 2021  4:22 PM

## 2021-06-08 NOTE — ED NOTES
Pt presents to the ED for c/o shortness of breath tonight. States that he has increasing difficulty due to lung CA. Appears to be hyperventilating.  O2 at 2L/NC applied

## 2021-06-08 NOTE — PROGRESS NOTES
Chart reviewed s/p admission to CCU for afib RVR/pneumonia/sepsis. Pt followed by hospitalist with pulmonary, cardiology and oncology. 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 Currently on BIPAP, amio and heparin gtt ordered. Palliative care to see, as possible progression of cancer. CM will follow for any assist and d/c POC when stable for d/c. Care Management Interventions  PCP Verified by CM:  Ivon Roberts)  Mode of Transport at Discharge:  Other (see comment)  Transition of Care Consult (CM Consult): Discharge Planning  Current Support Network: Lives with Spouse  Confirm Follow Up Transport: Family  Freedom of Choice List was Provided with Basic Dialogue that Supports the Patient's Individualized Plan of Care/Goals, Treatment Preferences and Shares the Quality Data Associated with the Providers?: Yes   Resource Information Provided?:  [facility] Choice)  Discharge Location  Discharge Placement: Unable to determine at this time

## 2021-06-08 NOTE — PROGRESS NOTES
met with patient and sister at bedside, went over [address] of  paperwork, answered questions. Patient does not wish to complete a HCPOA at this time.     Earnestine WHARTON

## 2021-06-08 NOTE — PROGRESS NOTES
Came to check on patient. Pt tripoding, very sweaty, breathing was very labored. Pt stated the pressure from BIPAP helped him breath easier. Dr. Trivedi Overcast ok with placing patient on CPAP mode+8 30%.    Pt states he is comfortable, feels better on CPAP

## 2021-06-08 NOTE — ED NOTES
TRANSFER - OUT REPORT:    Verbal report given to Vik Parker RN(name) on Catina Garcia.  being transferred to [facility] CCU rm 3304(unit) for routine progression of care       Report consisted of patients Situation, Background, Assessment and   Recommendations(SBAR). Information from the following report(s) ED Summary was reviewed with the receiving nurse. Lines:   Venous Access Device POWER PORT 03/11/20 Upper chest (subclavicular area, right (Active)       Venous Access Device 06/08/21 Upper chest (subclavicular area, right (Active)        Opportunity for questions and clarification was provided.       Patient transported with:  [facility]

## 2021-06-08 NOTE — ED NOTES
Unable to obtain oxygen saturation. RT at bedside. Pt in visible distress, MD notified and 0.5mg ativan IV administered.   [facility] at bedside for emergent transport ANDREW

## 2021-06-08 NOTE — ED PROVIDER NOTES
78-year-old man small cell lung cancer adenocarcinoma of the right lung and CML who presents with severe shortness of breath onset yesterday got worse through the night. No reported fever    The history is provided by the patient. Shortness of Breath  This is a new problem. The problem occurs continuously. The current episode started 2 days ago. The problem has been rapidly worsening. Associated symptoms include cough, wheezing and orthopnea. Pertinent negatives include no fever, no headaches, no sputum production and no hemoptysis. It is unknown what precipitated the problem.         Past Medical History:   Diagnosis Date    Anxiety     Arthritis     Chronic systolic congestive heart failure ([address] 5/3/2017    CML (chronic myeloid leukemia) ([address] 8/25/2012    Depression 11/2/2012    Erectile dysfunction     Hypertension     Leukemia, acute ([address] 8/24/2012    Lung cancer ([facility] 2018    Pulmonary emboli ([address] 1/6/2019       Past Surgical History:   Procedure Laterality Date    HX HERNIA REPAIR      left inguinal 1996    HX VASCULAR ACCESS      IR INSERT TUNL CVC W PORT OVER 5 YEARS  3/11/2020         Family History:   Problem Relation Age of Onset    Lung Disease Father 79        Black lung\"   [facility] Cancer Mother 72        pancreatic cancer       Social History     Socioeconomic History    Marital status:      Spouse name: Not on file    Number of children: Not on file    Years of education: Not on file    Highest education level: Not on file   Occupational History     Comment: [employer]   Tobacco Use    Smoking status: Never Smoker    Smokeless tobacco: Never Used   Substance and Sexual Activity    Alcohol use: No    Drug use: No    Sexual activity: Not on file   Other Topics Concern     Service Not Asked    Blood Transfusions Not Asked    Caffeine Concern Not Asked    Occupational Exposure Not Asked    Hobby Hazards Not Asked    Sleep Concern Not Asked    Stress Concern Not Asked    Weight Concern Not Asked    Special Diet Not Asked    Back Care Not Asked    Exercise Not Asked    Bike Helmet Not Asked   [address] Not Asked    Self-Exams Not Asked   Social History Narrative    Pt  with one son     Social Determinants of Health     Financial Resource Strain:     Difficulty of Paying Living Expenses:    Food Insecurity:     Worried About Running Out of Food in the Last Year:     [address] in the Last Year:    Transportation Needs:     Lack of Transportation (Medical):  Lack of Transportation (Non-Medical):    Physical Activity:     Days of Exercise per Week:     Minutes of Exercise per Session:    Stress:     Feeling of Stress :    Social Connections:     Frequency of Communication with Friends and Family:     Frequency of Social Gatherings with Friends and Family:     Attends Buddhist Services:     Active Member of Clubs or Organizations:     Attends Club or Organization Meetings:     Marital Status:    Intimate Partner Violence:     Fear of Current or Ex-Partner:     Emotionally Abused:     Physically Abused:     Sexually Abused: ALLERGIES: Patient has no known allergies. Review of Systems   Constitutional: Negative. Negative for activity change and fever. HENT: Negative. Eyes: Negative. Respiratory: Positive for cough, shortness of breath and wheezing. Negative for hemoptysis and sputum production. Cardiovascular: Positive for orthopnea. Gastrointestinal: Negative. Genitourinary: Negative. Musculoskeletal: Negative. Skin: Negative. Neurological: Negative. Negative for headaches. Psychiatric/Behavioral: Negative. All other systems reviewed and are negative.       Vitals:    06/08/21 0610 06/08/21 0630 06/08/21 0700 06/08/21 0730   BP:  (!) 103/59 110/79 107/70   Pulse:  (!) 134 (!) 128 (!) 132   Resp:  (!) 34 (!) 33 (!) 37   Temp:       SpO2: (!) 68% (!) 75%     Weight:       Height: Physical Exam  Vitals and nursing note reviewed. Constitutional:       General: He is in acute distress. Appearance: He is well-developed and underweight. He is ill-appearing, toxic-appearing and diaphoretic. HENT:      Head: Normocephalic and atraumatic. No right periorbital erythema or left periorbital erythema. Jaw: There is normal jaw occlusion. Salivary Glands: Right salivary gland is not diffusely enlarged. Left salivary gland is not diffusely enlarged. Right Ear: External ear normal.      Left Ear: External ear normal.      Nose: Nose normal. No congestion or rhinorrhea. Mouth/Throat:      Mouth: Mucous membranes are moist.      Pharynx: No oropharyngeal exudate or posterior oropharyngeal erythema. Eyes:      General: Lids are normal. No scleral icterus. Right eye: No discharge. Left eye: No discharge. Extraocular Movements: Extraocular movements intact. Conjunctiva/sclera: Conjunctivae normal.      Right eye: Right conjunctiva is not injected. Left eye: Left conjunctiva is not injected. Pupils: Pupils are equal, round, and reactive to light. Neck:      Thyroid: No thyroid mass. Vascular: No JVD. Trachea: Trachea and phonation normal.   Cardiovascular:      Rate and Rhythm: Normal rate and regular rhythm. Pulses: Normal pulses. Heart sounds: Normal heart sounds. Heart sounds not distant. No murmur heard. No friction rub. No gallop. Pulmonary:      Effort: Tachypnea, accessory muscle usage, prolonged expiration, respiratory distress and retractions present. Breath sounds: Decreased air movement present. No stridor. Examination of the right-upper field reveals rales. Examination of the left-upper field reveals rales. Examination of the right-middle field reveals wheezing. Examination of the left-middle field reveals wheezing. Examination of the right-lower field reveals decreased breath sounds.  Decreased breath sounds, wheezing and rales present. No rhonchi. Chest:      Chest wall: No tenderness. Abdominal:      General: Abdomen is flat. Bowel sounds are normal. There is no distension. There are no signs of injury. Palpations: Abdomen is soft. There is no fluid wave, mass or pulsatile mass. Tenderness: There is no abdominal tenderness. There is no guarding or rebound. Musculoskeletal:         General: No swelling, tenderness, deformity or signs of injury. Normal range of motion. Cervical back: Full passive range of motion without pain, normal range of motion and neck supple. No erythema, signs of trauma or rigidity. No pain with movement or muscular tenderness. Normal range of motion. Right lower leg: No edema. Left lower leg: No edema. Lymphadenopathy:      Cervical: No cervical adenopathy. Skin:     General: Skin is warm. Capillary Refill: Capillary refill takes less than 2 seconds. Coloration: Skin is not jaundiced or pale. Findings: No bruising, erythema or rash. Neurological:      General: No focal deficit present. Mental Status: He is alert and oriented to person, place, and time. Mental status is at baseline. GCS: GCS eye subscore is 4. GCS verbal subscore is 5. GCS motor subscore is 6. Cranial Nerves: No dysarthria or facial asymmetry. Sensory: No sensory deficit. Motor: No weakness or tremor. Coordination: Coordination normal.   Psychiatric:         Mood and Affect: Mood normal.         Behavior: Behavior normal. Behavior is cooperative. Thought Content: Thought content normal.         Judgment: Judgment normal.          MDM  Number of Diagnoses or Management Options  Diagnosis management comments: Differential diagnosis: Pneumonia, pneumothorax, pleural effusion, hemothorax, lung cancer, CHF,    Patient arrived very tachypneic and tachycardic working very hard to breathe his SaO2 was 75%.   Placed on oxygen and given a breathing treatment and showed marked improvement he also had crackles so was given Lasix while waiting on labs to return. X-ray shows increased infiltrates bilaterally. Patient was then placed on BiPAP due to to a ABG that showed severe hypoxia. Patient improved even more the point where he was able to stand up to urinate without much difficulty. Patient was put in sepsis protocol get her antibiotics and oncologist was sent a message but never responded to the hospitalist asked to admit the patient. He will be transferred downtown to CCU.        Amount and/or Complexity of Data Reviewed  Clinical lab tests: ordered and reviewed  Tests in the radiology section of CPT®: ordered and reviewed  Tests in the medicine section of CPT®: ordered and reviewed  Decide to obtain previous medical records or to obtain history from someone other than the patient: yes  Review and summarize past medical records: yes  Discuss the patient with other providers: yes  Independent visualization of images, tracings, or specimens: yes    Risk of Complications, Morbidity, and/or Mortality  Presenting problems: high  Diagnostic procedures: high  Management options: high    Patient Progress  Patient progress: stable         EKG    Date/Time: 6/8/2021 7:53 AM  Performed by: Leesa Madden MD  Authorized by: Leesa Madden MD     ECG reviewed by ED Physician in the absence of a cardiologist: yes    Previous ECG:     Previous ECG:  Compared to current    Similarity:  Changes noted  Interpretation:     Interpretation: abnormal    Rate:     ECG rate assessment: tachycardic    Rhythm:     Rhythm: atrial fibrillation    Conduction:     Conduction: abnormal    Critical Care  Performed by: Leesa Madden MD  Authorized by: Leesa Madden MD     Critical care provider statement:     Critical care time (minutes):  45    Critical care was necessary to treat or prevent imminent or life-threatening deterioration of the following conditions: Cardiac failure, circulatory failure and respiratory failure    Critical care was time spent personally by me on the following activities:  Blood draw for specimens, development of treatment plan with patient or surrogate, discussions with consultants, evaluation of patient's response to treatment, examination of patient, ordering and performing treatments and interventions, ordering and review of laboratory studies, ordering and review of radiographic studies, pulse oximetry, re-evaluation of patient's condition and review of old charts    I assumed direction of critical care for this patient from another provider in my specialty: no

## 2021-06-09 NOTE — PROGRESS NOTES
Comprehensive Nutrition Assessment    Type and Reason for Visit: Initial, Positive nutrition screen  Best Practice Alert for Malnutrition Screening Tool: Recently Lost Weight Without Trying: Yes, If Yes, How Much Weight Loss: 14 - 23 lbs, Eating Poorly Due to Decreased Appetite: Yes    Nutrition Recommendations/Plan:    Diet was changed to consistent CHO diet earlier today.  Start vanilla Glucerna Shake (220 calories and 10 gm protein per carton) with all meals. Malnutrition Assessment:  Malnutrition Status: Moderate malnutrition  Context: Chronic illness  Findings of clinical characteristics of malnutrition:   Energy Intake:  Unable to assess  Weight Loss:  7.00 - Greater than 10% over 6 months     Body Fat Loss:  1 - Mild body fat loss, Triceps   Muscle Mass Loss:  1 - Mild muscle mass loss, Clavicles (pectoralis &deltoids)  Fluid Accumulation:  Unable to assess,     Strength:  Not performed     Nutrition Assessment:   Nutrition History: The patient reports that he feels like he lost about 15 pounds in the last 45 days. He reports decreased stamina. He also reports drinking vanilla Boost at home. Highest weight was 190 pounds. Cultural/Anabaptism/Ethnic Food Preference(s): None   Nutrition Background: Marcella Shearer is a 60 y.o. male with history of CML, small cell lung cancer metastasized to liver currently on palliative chemotherapy, pulmonary emboli on Xarelto, advanced CHF, CKD stage III, debility who presented with 2 to 3 days history of difficulty in breathing and laying flat. ED work-up reveals bilateral PNA. Daily Update:  Able to eat some of breakfast and lunch today. Abdominal Status (last documented): Distended abdomen with Active  bowel sounds. Last BM 06/08/21.   Pertinent Medications: Humalog SSI coverage, Solumedrol, Proamatine, Protonix, Zosyn, Vancomycin  Drips: Cordarone, Heparin  Pertinent Labs:   Lab Results   Component Value Date/Time    Sodium 137 06/09/2021 02:55 AM Potassium 5.0 06/09/2021 02:55 AM    Chloride 104 06/09/2021 02:55 AM    CO2 23 06/09/2021 02:55 AM    Anion gap 10 06/09/2021 02:55 AM    Glucose 190 (H) 06/09/2021 02:55 AM    BUN 60 (H) 06/09/2021 02:55 AM    Creatinine 2.39 (H) 06/09/2021 02:55 AM    Calcium 7.7 (L) 06/09/2021 02:55 AM    Albumin 2.5 (L) 06/09/2021 02:55 AM    Magnesium 2.5 (H) 06/09/2021 02:55 AM    Phosphorus 5.7 (H) 06/09/2021 02:55 AM     Nutrition Related Findings:   12% weight loss in the last 6 months. Current Nutrition Therapies:  ADULT DIET Regular; 5 carb choices (75 gm/meal); Low Sodium (2 gm)  ADULT ORAL NUTRITION SUPPLEMENT Breakfast, Lunch, Dinner; Diabetic Supplement    Current Intake:   Average Meal Intake: Unable to assess Average Supplement Intake: Unable to assess      Anthropometric Measures:  Height: 6' 1\" (185.4 cm)  Current Body Wt: 72.7 kg (160 lb 4.4 oz) (6/9/21 CCU), Weight source: Bed scale  BMI: 21.2, Normal weight (BMI 18.5-24. 9)     Ideal Body Weight (lbs) (Calculated): 184 lbs (84 kg), 87.1 %  Usual Body Wt: 81.6 kg (180 lb), Percent weight change: -11          Edema: No data recorded     Estimated Daily Nutrient Needs:  Energy (kcal/day): 8011-4840  (Kcal/kg (30-35 mihai/kg/day using 73 kg), Weight Used: Current)  Protein (g/day): 73-91  (1-1.25 gm protein/kg/day) Weight Used: (Current)  Fluid (ml/day):   (1 ml/kcal)    Nutrition Diagnosis:   · Unintended weight loss related to catabolic illness, inadequate protein-energy intake as evidenced by weight loss greater than or equal to 10% in 6 months, poor intake prior to admission    Nutrition Interventions:   Food and/or Nutrient Delivery: Continue current diet, Start oral nutrition supplement     Coordination of Nutrition Care: Continue to monitor while inpatient, Interdisciplinary rounds  Plan of Care discussed with JUSTIN Fitzgerald    Goals: Active Goal: Meet >75% of daily nutrition needs with oral diet & supplement within 3 days.     Nutrition Monitoring and Evaluation:      Food/Nutrient Intake Outcomes: Food and nutrient intake, Supplement intake  Physical Signs/Symptoms Outcomes: Biochemical data, GI status, Weight    Discharge Planning:    Continue oral nutrition supplement    Luiza Paulino.  Tyson Hansen RD, TYLER on 6/9/2021 at 3:18 PM  Contact: [phone]

## 2021-06-09 NOTE — PROGRESS NOTES
Pharmacokinetic Consult to Pharmacist    Cindy Pablo. is a 60 y.o. male being treated with Vancomycin. Height: 6' 1\" (185.4 cm)  Weight: 72.7 kg (160 lb 4.8 oz)  Lab Results   Component Value Date/Time    BUN 60 (H) 06/09/2021 02:55 AM    Creatinine 2.39 (H) 06/09/2021 02:55 AM    WBC 6.2 06/09/2021 02:55 AM    Procalcitonin 0.08 03/28/2020 02:51 PM    Lactic acid 1.4 06/09/2021 02:54 AM    Lactic Acid (POC) 1.32 01/06/2019 08:35 PM      Estimated Creatinine Clearance: 32.1 mL/min (A) (based on SCr of 2.39 mg/dL (H)). Lab Results   Component Value Date/Time    Vancomycin, random 15.2 06/09/2021 02:55 AM           Day 2 of Vancomycin. Goal Trough 15-20. Patient with therapeutic random. Re-dosed with 500mg IV x1, will continue with intermittent dosing. Will continue to follow patient and order levels when clinically indicated.     Thanks,   Manuel Harkins, PharmD  PGY1 Pharmacy Resident  [phone]

## 2021-06-09 NOTE — PROGRESS NOTES
[facility] CARDIOLOGY PROGRESS NOTE    6/9/2021 10:45 AM    Admit Date: 6/8/2021        Subjective:   Stable overnight without angina, CHF, or palpitations but having recurrent runs of nonsustained ventricular tachycardia, 26 beat run this morning. Vitals stable and controlled. No other complaints overnight. Tolerating meds well. Objective:      Vitals:    06/09/21 0814 06/09/21 0830 06/09/21 0845 06/09/21 0859   BP: 112/65 113/75 96/69 103/71   Pulse: (!) 115 (!) 108 (!) 111 (!) 113   Resp: (!) 35 (!) 42 (!) 57 (!) 37   Temp:       SpO2: (!) 87% 100% 98% 96%   Weight:       Height:           Physical Exam:  Neck- supple, no JVD  CV- tachy but regular rate and rhythm no MRG  Lung- clear bilaterally anteriorly, decreased/coarse bibasilar  Abd- soft, nontender, nondistended  Ext- no edema  Skin- warm and dry    Data Review:   Recent Labs     06/09/21  0255 06/08/21  1405 06/08/21  0546 06/08/21  0545     --  134*  --    K 5.0  --  4.1  --    MG 2.5*  --   --  2.3   BUN 60*  --  51*  --    CREA 2.39*  --  2.13*  --    *  --  148*  --    WBC 6.2 10.1 10.7  --    HGB 7.1* 8.2* 8.7*  --    HCT 22.6* 25.1* 27.1*  --    PLT 96* 163 202  --        Assessment and Plan:     1. Tachycardia: Sinus tachycardia with PACs overnight, no pAF. No atrial fibrillation is seen based on review of telemetry and EKG. He had another 7-10 beat run and 26 beat run NSVT this AM.    Certainly, elevated risk for development of cardiac arrhythmias given his critical illness. Resume amiodarone 1mg/min x 24 hours, then decrease tomorrow AM if rhythm stabilizes. Would continue telemetry. Will follow with primary team. Recheck labs in AM.  Replete K and Mg as needed    2. CHF:  Echo in January showed severe LV dysfunction with EF 22%. High risk for decompensation in the setting of acute illness. We'll monitor closely with primary team.  Unable to tolerate CHF regimen with active infection and hypotension.   Resume once condition stabilizes. IV amio again today, see above. 3.  Pneumonia/sepsis:  Continue antibiotic therapy. 4.  Acute respiratory failure: Continue oxygen support.        LIZETH Ritter MD  [facility] Cardiology  Pager [phone]

## 2021-06-09 NOTE — PROGRESS NOTES
[facility] Hematology & Oncology        Inpatient Hematology / Oncology Progress Note      Admission Date: 6/8/2021 10:31 AM  Reason for Admission/Hospital Course: Hospital-acquired bacterial pneumonia [J15.9]  Pleural effusion [J90]  Atrial fibrillation with rapid ventricular response (HCC) [I48.91]  Lactic acidosis [E87.2]  Acute respiratory failure with hypoxia (HCC) [J96.01]  Severe sepsis (HCC) [A41.9, R65.20]      24 Hour Events:  Counts starting to drop as expected   Transfuse prbc  Using bipap at night currently on 4L sat 96%  Strict I&Os  Daily weights    ROS:  Constitutional: negative for fever, chills. + weakness, malaise, fatigue. CV: negative for chest pain, palpitations, edema. Respiratory: + dyspnea, cough  GI: negative for nausea, abdominal pain, diarrhea. 10 point review of systems is otherwise negative with the exception of the elements mentioned above in the HPI.      No Known Allergies    OBJECTIVE:  Patient Vitals for the past 8 hrs:   BP Temp Pulse Resp SpO2 Weight   06/09/21 0759 105/65 -- (!) 112 (!) 40 91 % --   06/09/21 0747 105/66 -- (!) 104 (!) 48 97 % --   06/09/21 0742 -- -- -- -- -- 160 lb 4.8 oz (72.7 kg)   06/09/21 0736 -- -- -- -- 100 % --   06/09/21 0732 108/65 -- (!) 106 28 -- --   06/09/21 0714 90/70 -- 98 30 100 % --   06/09/21 0659 104/69 97.5 °F (36.4 °C) (!) 113 24 100 % --   06/09/21 0430 100/65 -- 96 19 100 % --   06/09/21 0415 101/65 -- 96 (!) 41 100 % --   06/09/21 0400 97/62 (!) 96.3 °F (35.7 °C) 95 (!) 35 100 % --   06/09/21 0345 96/63 -- 96 24 100 % --   06/09/21 0330 99/68 -- 96 18 100 % --   06/09/21 0320 -- -- -- -- 100 % --   06/09/21 0315 91/65 -- 97 11 100 % --   06/09/21 0300 98/66 -- 94 18 100 % --   06/09/21 0245 98/65 -- 96 21 100 % --   06/09/21 0230 99/66 -- 96 18 100 % --   06/09/21 0215 114/77 -- 96 21 100 % --   06/09/21 0200 117/76 -- (!) 217 (!) 54 (!) 83 % --   06/09/21 0145 98/69 -- 93 19 99 % --   06/09/21 0130 98/65 -- 94 20 100 % -- 21 0115 102/70 -- 96 (!) 31 -- --   21 0100 104/68 -- 96 23 100 % --     Temp (24hrs), Av °F (36.1 °C), Min:96.3 °F (35.7 °C), Max:97.5 °F (36.4 °C)    No intake/output data recorded. Physical Exam:  Constitutional: Ill appearing  male sitting up in bed in ICU   HEENT: Normocephalic and atraumatic. Oropharynx is clear, mucous membranes are moist.  Pupils are equal, round, and reactive to light. Skin Warm and dry. No bruising and no rash noted. No erythema. No pallor. Respiratory Decreased BS scattered rhonchi on  via NC   CVS Tachy rate, regular rhythm and normal S1 and S2. No murmurs, gallops, or rubs. Abdomen Soft, nontender and nondistended, normoactive bowel sounds. Neuro Grossly nonfocal with no obvious sensory or motor deficits. MSK Normal range of motion in general.  No edema and no tenderness. Psych Appropriate mood and affect.         Labs:      Recent Labs     21  0255 21  1405 21  0546   WBC 6.2 10.1 10.7   RBC 2.76* 3.08* 3.32*   HGB 7.1* 8.2* 8.7*   HCT 22.6* 25.1* 27.1*   MCV 81.9 81.5 81.6   MCH 25.7* 26.6 26.2   MCHC 31.4 32.7 32.1   RDW 14.6 14.7* 14.5   PLT 96* 163 202   GRANS 90* 93* 92*   LYMPH 2* 1* 4*   MONOS 0* 0* 1*   EOS 0* 1 1   BASOS 1 1 0   IG 7* 4 2   DF AUTOMATED AUTOMATED AUTOMATED   ANEU 5.6 9.4* 9.9*   ABL 0.1* 0.1* 0.4*   ABM 0.0* 0.0* 0.1   [place] 0.0 0.1 0.1   ABB 0.1 0.1 0.0   AIG 0.4 0.4 0.2        Recent Labs     21  0255 21  0546 21  0545    134*  --    K 5.0 4.1  --     102  --    CO2 23 18*  --    AGAP 10 14  --    * 148*  --    BUN 60* 51*  --    CREA 2.39* 2.13*  --    GFRAA 35* 40*  --    GFRNA 29* 33*  --    CA 7.7* 8.0*  --    AP 81 86  --    TP 6.4 6.8  --    ALB 2.5* 2.8*  --    GLOB 3.9* 4.0*  --    AGRAT 0.6* 0.7*  --    MG 2.5*  --  2.3         Imaging:    Medications:  Current Facility-Administered Medications   Medication Dose Route Frequency    methylPREDNISolone (PF) (SOLU-MEDROL) injection 40 mg  40 mg IntraVENous Q8H    sodium chloride (NS) flush 5-40 mL  5-40 mL IntraVENous Q8H    sodium chloride (NS) flush 5-40 mL  5-40 mL IntraVENous PRN    acetaminophen (TYLENOL) tablet 650 mg  650 mg Oral Q4H PRN    acetaminophen (TYLENOL) suppository 650 mg  650 mg Rectal Q4H PRN    pantoprazole (PROTONIX) tablet 40 mg  40 mg Oral Q24H    levalbuterol (XOPENEX) nebulizer soln 1.25 mg/3 mL  1.25 mg Nebulization Q4H RT    ondansetron (ZOFRAN) injection 4 mg  4 mg IntraVENous Q4H PRN    midodrine (PROAMATINE) tablet 10 mg  10 mg Oral TID WITH MEALS    Vancomycin Intermittent Dosing   Other Rx Dosing/Monitoring    piperacillin-tazobactam (ZOSYN) 4.5 g in 0.9% sodium chloride (MBP/ADV) 100 mL MBP  4.5 g IntraVENous Q8H    heparin 25,000 units in dextrose 500 mL infusion  18-36 Units/kg/hr IntraVENous TITRATE    phenol throat spray (CHLORASEPTIC) 1 Spray  1 Spray Oral PRN    LORazepam (ATIVAN) injection 1 mg  1 mg IntraVENous Q4H PRN         ASSESSMENT:    Problem List  Date Reviewed: 5/26/2021        Codes Class Noted    Acute respiratory failure with hypoxia [facility]) ICD-10-CM: J96.01  ICD-9-CM: 518.81  6/8/2021        Hospital-acquired bacterial pneumonia ICD-10-CM: J15.9  ICD-9-CM: 482.9  6/8/2021        Bilateral pleural effusion ICD-10-CM: J90  ICD-9-CM: 511.9  6/8/2021        Lactic acidosis ICD-10-CM: E87.2  ICD-9-CM: 276.2  6/8/2021        * (Principal) Severe sepsis (HCC) ICD-10-CM: A41.9, R65.20  ICD-9-CM: 038.9, 995.92  6/8/2021        Tachycardia ICD-10-CM: R00.0  ICD-9-CM: 785.0  6/8/2021        Elevated serum creatinine ICD-10-CM: R79.89  ICD-9-CM: 790.99  10/5/2020        Hypomagnesemia ICD-10-CM: E83.42  ICD-9-CM: 275.2  7/27/2020        Shortness of breath ICD-10-CM: R06.02  ICD-9-CM: 786.05  3/28/2020        Pleural effusion on right ICD-10-CM: J90  ICD-9-CM: 511.9  3/28/2020        SCLC (small cell lung carcinoma), right (HCC) ICD-10-CM: C34.91  ICD-9-CM: 162.9 3/13/2020        Acute pulmonary edema (HCC) ICD-10-CM: J81.0  ICD-9-CM: 518.4  9/18/2019        Acute CHF (congestive heart failure) ([place] [address].) ICD-10-CM: I50.9  ICD-9-CM: 428.0  9/16/2019        Pneumonia ICD-10-CM: J18.9  ICD-9-CM: 486  1/6/2019        Pulmonary emboli ([place] [address].) ICD-10-CM: I26.99  ICD-9-CM: 415.19  1/6/2019        Immunocompromised ([place] [address].) ICD-10-CM: D84.9  ICD-9-CM: 279.9  11/16/2018        Stage IV adenocarcinoma of lung ([place] [address].) ICD-10-CM: C34.90  ICD-9-CM: 162.9  7/11/2018        Pleural effusion ICD-10-CM: J90  ICD-9-CM: 511.9  4/27/2018        Abnormal chest CT ICD-10-CM: R93.89  ICD-9-CM: 793.2  4/27/2018        Pulmonary infiltrate ICD-10-CM: R91.8  ICD-9-CM: 793.19  4/27/2018        CAP (community acquired pneumonia) ICD-10-CM: J18.9  ICD-9-CM: 749  3/27/2018        Chronic systolic congestive heart failure ([place] [address].) ICD-10-CM: I50.22  ICD-9-CM: 428.22, 428.0  5/3/2017        Depression ICD-10-CM: F32.9  ICD-9-CM: 296  11/2/2012        CML (chronic myeloid leukemia) ([place] [address] ICD-10-CM: C92.10  ICD-9-CM: 205.10  8/25/2012    Overview Addendum 8/26/2012  7:23 AM by Tanvi Mcmillan MD     8/25/12. Probable diagnosis. Will do bone marrow exam today  8/26/12. Bone marrow aspiration and biopsy done. Marrow aspirate and peripheral blood sent for flow, cytogenetics and molecular testing. No complications with the procedure             Allergic rhinitis ICD-10-CM: J30.9  ICD-9-CM: 477.9  8/24/2012        Erectile dysfunction ICD-10-CM: N52.9  ICD-9-CM: 607.84  8/24/2012            Mr. Jadon Ware is a 50-year-old man with history of CML (currently on observation, previously on [address]) and non-small cell lung carcinoma 2018 on osimertinib with recurrent disease, and small cell lung cancer with metastasis to liver 3/2020 (started on cis/etoposide and atezo). Most recently on osimertinib for NSCLC and s/p C1 carbo/etop/atezolizumab on 6/3-6/5/21.   He met with Dr Albaro Freedman (primary oncologist) end of May to review CT results which showed hepatic POD. He reviewed the complexity of pt's case and tx options. Pt desired to purse rechallenge with triple therapy along with osimertinib. Dr Ga Tomlin reviewed lack of data regarding this combination and potential rxns including pneumonitis. Mr Kiesha Wills was admitted today (6/8) with acute resp failure and worsening s/s in last few days. His med hx is further complicated by CKD, PE on DOAC and HF with EF ~20%. His HR was in 120-130s irreg and he was started on tx for afib with RVR. On IVF/vanc/Zosyn. Moved to intensive care setting and on BiPAP. Pulm evaluated pt and he is currently on IV steroids. DOAC on hold and pt on heparin gtt. We are consulted for onc recommendations.       He was seen in CCU. He is sitting in bed with BiPAP in place. We spent some time talking about his medical condition, how critically ill he is and next steps. He expressed earlier during this admission that he'd like to revoke his DNR/DNI status and is now a FULL code.         PLAN:  Interval increase in bilateral lung infiltrates  6/8 zosyn and vanc     Concern for pneumonitis  6/8 on solumedrol     PE 5/2012 6/8 Xarelto on hold, switched to heparin for pending procedures     Afib/RVR   6/8 per cards, on amio gtt   6/9 remains on drip considering moving out of ICU once controlled on oral     SCLC sp Carbo etoposide atezolizumab given 6/3-6/5. Expect to see counts to start dropping. Monitor closely. 6/9 counts beginning to drop. Hgb 7.1 transfuse given cardiac history.      NSCLC  osimertinib on hold. Heart failure EF 20%  -Daily weights, strict I&Os, transfuse blood products at slow rate discussed with RN    Goals and plan of care reviewed with the patient. All questions answered to the best of our ability.             You Ann NP   [employer] [facility] On[facility]  [id number] [employer] [address]  Office : [phone]  Fax : [phone]

## 2021-06-09 NOTE — PROGRESS NOTES
ACUTE OT GOALS:  (Developed with and agreed upon by patient and/or caregiver.)  1. Patient will complete lower body bathing and dressing with MOD I and adaptive equipment as needed. 2. Patient will complete toileting with MOD I.   3. Patient will tolerate 25 minutes of OT treatment with 1-2 rest breaks to increase activity tolerance for ADLs. 4. Patient will complete functional transfers with MOD I and adaptive equipment as needed. 5. Patient will complete functional mobility for household distances with SBA and adaptive equipment as needed. 6. Patient will complete self-grooming while standing edge of sink with SBA and adaptive equipment as needed.     Timeframe: 7 visits         OCCUPATIONAL THERAPY ASSESSMENT: Initial Assessment and Daily Note OT Treatment Day # 1    Sidney Saul is a 60 y.o. male   PRIMARY DIAGNOSIS: Severe sepsis ([facility] [address]  Hospital-acquired bacterial pneumonia [J15.9]  Pleural effusion [J90]  Atrial fibrillation with rapid ventricular response (HCC) [I48.91]  Lactic acidosis [E87.2]  Acute respiratory failure with hypoxia (HCC) [J96.01]  Severe sepsis ([facility] [address] [A41.9, R65.20]       Reason for Referral:   ICD-10: Treatment Diagnosis: Generalized Muscle Weakness (M62.81)  INPATIENT: Payor: BLUE CHOICE / Plan: [place] BLUE CHOICE / Product Type: HMO /   ASSESSMENT:     REHAB RECOMMENDATIONS:   Recommendation to date pending progress:  [address] [place] [address] vs. Short-term Rehab  Equipment:    To Be Determined     PRIOR LEVEL OF FUNCTION:  (Prior to Hospitalization)  INITIAL/CURRENT LEVEL OF FUNCTION:  (Based on today's evaluation)   Bathing:   Independent  Dressing:   Independent  Feeding/Grooming:   Independent  Toileting:   Independent  Functional Mobility:   Independent Bathing:   Minimal Assistance  Dressing:   Minimal Assistance  Feeding/Grooming:   Set Up  Toileting:   Minimal Assistance  Functional Mobility:   Minimal Assistance x 2     ASSESSMENT:   Kenia Lazcano presents with deficits in overall strength, activity tolerance, ADL performance and functional mobility. Currently in the ICU, resting on 3L 02 via n.c. BUE AROM and strength (4/5) are generally decreased but WFL. Min A x 2 for functional bed mobility/transfers; intact sitting balance EOB. Mod A to cecil socks. Washed face and brushed teeth while seated EOB with set-up. Stood and ambulated with min A x 2 x RW. At this time, Arlene Azul is functioning below baseline for ADLs and functional mobility. Pt would benefit from skilled OT services to address OT goals and and plan of care. .     SUBJECTIVE:   Mr. Kenia Lazcano states, \"I would love to sit up. \"    SOCIAL HISTORY/LIVING ENVIRONMENT: Lives with wife and daughters in a 1-story home; 0 steps to enter; tub-shower with SC.        OBJECTIVE:     PAIN: VITAL SIGNS: LINES/DRAINS:   Pre Treatment: Pain Screen  Pain Scale 1: Numeric (0 - 10)  Pain Intensity 1: 0  Post Treatment: 0   Aranda Catheter  O2 Device: Nasal cannula     GROSS EVALUATION:  BUEs Within Functional Limits Abnormal/ Functional Abnormal/ Non-Functional (see comments) Not Tested Comments:   AROM [] [x] [] []    PROM [] [x] [] []    Strength [] [x] [] [] 4/5   Balance [] [x] [] [] Intact sitting; fair (+) standing with use of RW   Posture [] [x] [] []    Sensation [x] [] [] []    Coordination [x] [] [] []    Tone [x] [] [] []    Edema [x] [] [] []    Activity Tolerance [] [x] [] [] 3L 02     [] [] [] []      COGNITION/  PERCEPTION: Intact Impaired   (see comments) Comments:   Orientation [x] []    Vision [x] []    Hearing [x] []    Judgment/ Insight [x] []    Attention [x] []    Memory [] []    Command Following [x] []    Emotional Regulation [x] []     [] []      ACTIVITIES OF DAILY LIVING: I Mod I S SBA CGA Min Mod Max Total NT Comments   BASIC ADLs:              Bathing/ Showering [] [] [] [] [] [] [] [] [] [x]    Toileting [] [] [] [] [] [] [] [] [] [x]    Dressing [] [] [] [] [] [x] [] [] [] [] Donning socks   Feeding [] [] [] [] [] [] [] [] [] [x]    Grooming [] [] [x] [] [] [] [] [] [] [] While seated EOB   Personal Device Care [] [] [] [] [] [] [] [] [] [x]    Functional Mobility [] [] [] [] [] [x] [] [] [] [] X 2 x RW   I=Independent, Mod I=Modified Independent, S=Supervision, SBA=Standby Assistance, CGA=Contact Guard Assistance,   Min=Minimal Assistance, Mod=Moderate Assistance, Max=Maximal Assistance, Total=Total Assistance, NT=Not Tested    MOBILITY: I Mod I S SBA CGA Min Mod Max Total  NT x2 Comments:   Supine to sit [] [] [] [] [] [x] [] [] [] [] [x]    Sit to supine [] [] [] [] [] [x] [] [] [] [] [x]    Sit to stand [] [] [] [] [] [x] [] [] [] [] [x]    Bed to chair [] [] [] [] [] [x] [] [] [] [] [x] X RW   I=Independent, Mod I=Modified Independent, S=Supervision, SBA=Standby Assistance, CGA=Contact Guard Assistance,   Min=Minimal Assistance, Mod=Moderate Assistance, Max=Maximal Assistance, Total=Total Assistance, NT=Not [facility] 6 Clicks   Daily Activity Inpatient Short Form        How much help from another person does the patient currently need. .. Total A Lot A Little None   1. Putting on and taking off regular lower body clothing? [] 1   [] 2   [x] 3   [] 4   2. Bathing (including washing, rinsing, drying)? [] 1   [] 2   [x] 3   [] 4   3. Toileting, which includes using toilet, bedpan or urinal?   [] 1   [] 2   [x] 3   [] 4   4. Putting on and taking off regular upper body clothing? [] 1   [] 2   [x] 3   [] 4   5. Taking care of personal grooming such as brushing teeth? [] 1   [] 2   [x] 3   [] 4   6. Eating meals? [] 1   [] 2   [x] 3   [] 4   © 2007, Trustees of [facility] [employer], under license to [employer]. All rights reserved     Score:  Initial: 18 Most Recent: X (Date: -- )   Interpretation of Tool:  Represents activities that are increasingly more difficult (i.e. Bed mobility, Transfers, Gait).     PLAN:   FREQUENCY/DURATION: OT Plan of Care: 3 times/week for duration of hospital stay or until stated goals are met, whichever comes first.    PROBLEM LIST:   (Skilled intervention is medically necessary to address:)  1. Decreased ADL/Functional Activities  2. Decreased Activity Tolerance  3. Decreased Balance  4. Decreased Coordination  5. Decreased Gait Ability  6. Decreased Strength  7. Decreased Transfer Abilities   INTERVENTIONS PLANNED:   (Benefits and precautions of occupational therapy have been discussed with the patient.)  1. Self Care Training  2. Therapeutic Activity  3. Therapeutic Exercise/HEP  4. Neuromuscular Re-education  5. Education     TREATMENT:     EVALUATION: Low Complexity : (Untimed Charge)    TREATMENT:   ($$ Self Care/Home Management: 8-22 mins$$ Neuromuscular Re-Education: 8-22 mins   )  Self Care (10 Minutes): Self care including Lower Body Dressing and Grooming to increase independence and decrease level of assistance required. Neuromuscular Re-education (13 Minutes): Neuromuscular Re-education included Balance Training, Coordination training, Postural training, Sitting balance training and Standing balance training to improve Balance, Coordination and Postural Control.     TREATMENT GRID:  N/A    AFTER TREATMENT POSITION/PRECAUTIONS:  Alarm Activated, Chair, Needs within reach and RN notified    INTERDISCIPLINARY COLLABORATION:  RN/PCT, PT/PTA and OT/PEREZ    TOTAL TREATMENT DURATION:  OT Patient Time In/Time Out  Time In: 5194  Time Out: [address] [facility] RICHY Covarrubias

## 2021-06-09 NOTE — PROGRESS NOTES
Bedside shift change report given to JUSTIN Childress  (oncoming nurse) by Marquita Baez RN (offgoing nurse). Report included the following information SBAR, Kardex, Intake/Output, MAR, Recent Results, Med Rec Status, Cardiac Rhythm Sinus Tach and Alarm Parameters .

## 2021-06-09 NOTE — PROGRESS NOTES
Notified Dr Joellen Aleman that patient is having respiratory driven anxiety r/t air hunger. O2 sat stable at 98% on 3l NC. Received order to given one time dose of morphine 2mg IV.

## 2021-06-09 NOTE — PROGRESS NOTES
[facility]ist Progress Note     Name:  Eileen Jewell. Age:65 y.o. Sex:male   :  1955    MRN:  [id number]     Admit Date:  2021    Reason for Admission:  Hospital-acquired bacterial pneumonia [J15.9]  Pleural effusion [J90]  Atrial fibrillation with rapid ventricular response (HCC) [I48.91]  Lactic acidosis [E87.2]  Acute respiratory failure with hypoxia ([address] [J96.01]  Severe sepsis ([address] [A41.9, R65.20]    Hospital Course/Interval history:     Pt is a 60 y.o. male with history of CML, small cell lung cancer metastasized to liver currently on palliative chemotherapy, pulmonary emboli on Xarelto, advanced CHF, CKD stage III, debility who presented with 2 to 3 days history of difficulty in breathing and laying flat. ER work-up remarkable for chest x-ray showing bilateral infiltrate suggestive of pneumonia along with pleural effusion, blood work remarkable for lactic acid 4.4, creatinine 2.13 (baseline), heart rate ranging from 120-135 irregular, respiratory rate ranging from 30s to 40s. ABG 7.39/29/39 on FiO2 0.1.     Pt is being admitted for Severe Sepsis, HAP and hypoxia secondary to HAP vs pleural effusion. Also was noted to be tachycardic on admission with concerns for afib with RVR so cardiology was consulted. Pulmonology on board as well. Subjective (21):    Pt stated he is doing better this morning compared to yesterday. He normally wears 3LO2NC at home as needed and at night time. On 3LNC now. Denies chest pain, SOB, abdominal pain, fever, chills. Review of Systems: 14 point review of systems is otherwise negative with the exception of the elements mentioned above. Assessment & Plan     #Severe sepsis   Hospital-acquired pneumonia   Lactic acidosis, resolved   Immunocompromised status. Chest x-ray with evidence of bilateral infiltrates with bilateral pleural effusion suspected to be malignant versus parapneumonic.  Procal 0.08  Abx: empiric IV antibiotic vancomycin/Zosyn (6/8-. ..)  BCx 6/8: NGTD  Sputum Cx: pending  mIVF d/c  BP soft on admission so was started on midodrine, wean as tolerated  F/u BCx, Sputum Cx, MRSA Cx     #Acute hypoxic respiratory failure secondary to hospital-acquired pneumonia and pleural effusion  Pt was started on BiPAP on admission and transferred to [facility] after case discussed with intensivist  Pulmonology on board, appreciate recs. May consider thora or PleurX this week  Abx per above  On 3-4LNC now, can transfer to medical floor  CT chest this AM pending     #Paroxysmal A.fib   Initially on admission thought to be in afib w/ RVR and was started on Amio. However, cardiology rec'd that rhythm was SR with PAC's so amio was d/c. Latest echo showed severely depressed LVEF and LV dilation. 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 Sharon Aldridge, is currently on palliative chemotherapy. Oncology consulted to discuss goals of care from an oncology standpoint. Patient clearly has extremely poor prognosis given underlying advanced CHF, with ongoing severe sepsis and respiratory failure and deconditioning.     #CKD stage III:  Creatinine 2.13 on admission at baseline. Monitor daily BMP and avoid nephrotoxic medications     #History of advanced CHF with EF less than 20%  Optimize guidelines directed medical therapy. Cardiology is consulted     #Hypoalbuminemia secondary to nutritional deficiency  Diet restarted  SSI qAC/HS as pt on steroids     # hx of PE: Change home Xarelto to heparin gtt in case needing thora or Pleurx      Diet:  DIET ADULT  DVT PPx: heparin gtt  Code status: Full Code  Disposition/Expected LOS: 2-3 midnights pending respiratory status. PT/OT.  Transfer to medical floor on telemetry              Objective:     Patient Vitals for the past 24 hrs:   Temp Pulse Resp BP SpO2   06/09/21 0759 -- (!) 112 (!) 40 105/65 91 %   06/09/21 0747 -- (!) 104 (!) 48 105/66 97 %   06/09/21 0736 -- -- -- -- 100 %   06/09/21 0732 -- (!) 106 28 108/65 --   06/09/21 0714 -- 98 30 90/70 100 %   06/09/21 0659 97.5 °F (36.4 °C) (!) 113 24 104/69 100 %   06/09/21 0430 -- 96 19 100/65 100 %   06/09/21 0415 -- 96 (!) 41 101/65 100 %   06/09/21 0400 (!) 96.3 °F (35.7 °C) 95 (!) 35 97/62 100 %   06/09/21 0345 -- 96 24 96/63 100 %   06/09/21 0330 -- 96 18 99/68 100 %   06/09/21 0320 -- -- -- -- 100 %   06/09/21 0315 -- 97 11 91/65 100 %   06/09/21 0300 -- 94 18 98/66 100 %   06/09/21 0245 -- 96 21 98/65 100 %   06/09/21 0230 -- 96 18 99/66 100 %   06/09/21 0215 -- 96 21 114/77 100 %   06/09/21 0200 -- (!) 217 (!) 54 117/76 (!) 83 %   06/09/21 0145 -- 93 19 98/69 99 %   06/09/21 0130 -- 94 20 98/65 100 %   06/09/21 0115 -- 96 (!) 31 102/70 --   06/09/21 0100 -- 96 23 104/68 100 %   06/09/21 0045 -- 95 25 104/68 100 %   06/09/21 0030 -- 93 (!) 47 110/72 100 %   06/09/21 0015 -- 93 19 103/68 100 %   06/09/21 0000 97.3 °F (36.3 °C) 97 (!) 37 117/61 96 %   06/08/21 2345 -- 99 (!) 31 97/65 100 %   06/08/21 2334 -- -- -- -- 94 %   06/08/21 2330 -- 94 30 96/65 97 %   06/08/21 2315 -- 100 28 99/70 99 %   06/08/21 2300 -- 99 14 108/78 100 %   06/08/21 2245 -- 95 23 102/71 100 %   06/08/21 2230 -- 93 22 102/67 100 %   06/08/21 2215 -- 94 24 109/68 100 %   06/08/21 2200 -- 95 19 111/64 100 %   06/08/21 2145 -- 96 21 112/68 100 %   06/08/21 2130 -- 100 (!) 32 107/75 100 %   06/08/21 2115 -- 100 24 109/69 100 %   06/08/21 2100 -- (!) 102 (!) 35 108/70 100 %   06/08/21 2045 -- (!) 103 (!) 44 104/70 100 %   06/08/21 2030 -- (!) 103 (!) 33 100/66 100 %   06/08/21 2015 -- (!) 102 27 103/65 100 %   06/08/21 2000 -- (!) 102 (!) 33 109/69 100 %   06/08/21 1957 -- -- -- -- 100 %   06/08/21 1945 -- (!) 107 (!) 33 107/71 100 %   06/08/21 1938 -- -- -- -- 100 %   06/08/21 1930 -- (!) 104 (!) 34 103/72 100 % 06/08/21 1915 (!) 96.4 °F (35.8 °C) (!) 104 (!) 32 103/74 100 %   06/08/21 1900 -- (!) 103 (!) 43 -- 100 %   06/08/21 1859 -- (!) 104 (!) 31 98/70 100 %   06/08/21 1845 -- (!) 105 (!) 31 -- 100 %   06/08/21 1844 -- (!) 106 (!) 38 106/70 100 %   06/08/21 1829 -- (!) 102 (!) 33 106/68 100 %   06/08/21 1815 -- -- (!) 34 -- --   06/08/21 1814 -- 100 (!) 32 95/69 100 %   06/08/21 1800 -- (!) 104 (!) 36 -- 100 %   06/08/21 1759 -- (!) 104 (!) 32 101/74 100 %   06/08/21 1757 -- (!) 102 -- 107/74 --   06/08/21 1744 -- (!) 103 (!) 32 107/74 100 %   06/08/21 1729 -- (!) 105 28 110/81 97 %   06/08/21 1714 -- 100 (!) 32 (!) 100/55 98 %   06/08/21 1659 -- (!) 101 (!) 42 110/73 100 %   06/08/21 1644 -- (!) 101 (!) 32 106/71 94 %   06/08/21 1629 -- (!) 104 (!) 35 104/70 100 %   06/08/21 1614 -- 96 (!) 45 101/69 100 %   06/08/21 1559 -- 94 25 102/68 100 %   06/08/21 1529 97.3 °F (36.3 °C) 98 30 100/63 100 %   06/08/21 1459 -- 97 (!) 35 98/69 100 %   06/08/21 1429 -- 96 25 99/66 100 %   06/08/21 1359 -- (!) 102 24 98/72 100 %   06/08/21 1329 -- (!) 101 24 96/70 100 %   06/08/21 1259 -- (!) 106 (!) 35 98/74 100 %   06/08/21 1229 -- (!) 104 (!) 31 103/67 100 %   06/08/21 1159 -- (!) 111 (!) 37 113/77 100 %   06/08/21 1153 -- (!) 109 -- 111/73 --   06/08/21 1147 -- (!) 121 (!) 53 111/73 100 %   06/08/21 1039 97.2 °F (36.2 °C) (!) 117 25 105/67 100 %     Oxygen Therapy  O2 Sat (%): 91 % (06/09/21 0759)  Pulse via Oximetry: 113 beats per minute (06/09/21 0759)  O2 Device: Nasal cannula (06/09/21 0736)  Skin Assessment: Clean, dry, & intact (06/09/21 0700)  Skin Protection for O2 Device: No (06/09/21 0700)  Orientation: Middle;Bilateral (06/08/21 1039)  Location: Cheek; Other (Comment) (Nose) (06/08/21 1039)  Interventions: Reposition Device;Mouth Care (06/08/21 1039)  O2 Flow Rate (L/min): 4 l/min (06/09/21 0816)  FIO2 (%): 35 % (06/08/21 1957)    Body mass index is 21.15 kg/m².     Physical Exam:   General:  No acute distress, speaking in full sentences, no use of accessory muscles. Chronically ill appearing   Lungs:  Coarse lung sounds bilaterally at bases.   CV:  Regular rate and rhythm with normal S1 and S2. 2/6 systolic murmurs on apex  Abdomen:  Soft, nontender, nondistended, normoactive bowel sounds   Extremities:  No cyanosis clubbing or edema   Neuro:  Nonfocal, A&O x3   Psych:  Normal affect     Data Review:  I have reviewed all labs, meds, and studies from the last 24 hours:    Labs:    Recent Results (from the past 24 hour(s))   BLOOD GAS, ARTERIAL POC    Collection Time: 06/08/21 10:53 AM   Result Value Ref Range    Device: BIPAP MASK      FIO2 (POC) 40 %    pH (POC) 7.35 7.35 - 7.45      pCO2 (POC) 33.6 (L) 35 - 45 MMHG    pO2 (POC) 130 (H) 75 - 100 MMHG    HCO3 (POC) 18.7 (L) 22 - 26 MMOL/L    sO2 (POC) 98.8 (H) 95 - 98 %    Base deficit (POC) 6.2 mmol/L    Mode BILEVEL      PEEP/CPAP (POC) 8 cmH2O    Pressure support 16 cmH2O    Allens test (POC) Positive      Site LEFT RADIAL      Specimen type (POC) ARTERIAL      Performed by Jame Wakita    LACTIC ACID    Collection Time: 06/08/21 11:02 AM   Result Value Ref Range    Lactic acid 3.0 (H) 0.4 - 2.0 MMOL/L   NT-PRO BNP    Collection Time: 06/08/21 11:02 AM   Result Value Ref Range    NT pro-BNP 39,067 (H) 5 - 125 PG/ML   PTT    Collection Time: 06/08/21  2:05 PM   Result Value Ref Range    aPTT 52.0 (H) 24.1 - 35.1 SEC   CBC WITH AUTOMATED DIFF    Collection Time: 06/08/21  2:05 PM   Result Value Ref Range    WBC 10.1 4.3 - 11.1 K/uL    RBC 3.08 (L) 4.23 - 5.6 M/uL    HGB 8.2 (L) 13.6 - 17.2 g/dL    HCT 25.1 (L) 41.1 - 50.3 %    MCV 81.5 79.6 - 97.8 FL    MCH 26.6 26.1 - 32.9 PG    MCHC 32.7 31.4 - 35.0 g/dL    RDW 14.7 (H) 11.9 - 14.6 %    PLATELET 007 004 - 724 K/uL    MPV 10.5 9.4 - 12.3 FL    ABSOLUTE NRBC 0.00 0.0 - 0.2 K/uL    NEUTROPHILS 93 (H) 43 - 78 %    LYMPHOCYTES 1 (L) 13 - 44 %    MONOCYTES 0 (L) 4.0 - 12.0 %    EOSINOPHILS 1 0.5 - 7.8 %    BASOPHILS 1 0.0 - 2.0 % IMMATURE GRANULOCYTES 4 0.0 - 5.0 %    ABS. NEUTROPHILS 9.4 (H) 1.7 - 8.2 K/UL    ABS. LYMPHOCYTES 0.1 (L) 0.5 - 4.6 K/UL    ABS. MONOCYTES 0.0 (L) 0.1 - 1.3 K/UL    ABS. EOSINOPHILS 0.1 0.0 - 0.8 K/UL    ABS. BASOPHILS 0.1 0.0 - 0.2 K/UL    ABS. IMM. GRANS. 0.4 0.0 - 0.5 K/UL    RBC COMMENTS SLIGHT  ANISOCYTOSIS + POIKILOCYTOSIS        WBC COMMENTS Result Confirmed By Smear      PLATELET COMMENTS ADEQUATE      DF AUTOMATED     LACTIC ACID    Collection Time: 06/08/21  2:05 PM   Result Value Ref Range    Lactic acid 1.8 0.4 - 2.0 MMOL/L   HEPARIN XA UFH    Collection Time: 06/08/21  2:05 PM   Result Value Ref Range    Heparin Xa UFH >1.1 (H) 0.3 - 0.7 IU/mL   LACTIC ACID    Collection Time: 06/08/21  6:03 PM   Result Value Ref Range    Lactic acid 2.1 (H) 0.4 - 2.0 MMOL/L   HEPARIN XA UFH    Collection Time: 06/08/21  9:13 PM   Result Value Ref Range    Heparin Xa UFH >1.1 (H) 0.3 - 0.7 IU/mL   LACTIC ACID    Collection Time: 06/08/21 10:45 PM   Result Value Ref Range    Lactic acid 1.9 0.4 - 2.0 MMOL/L   LACTIC ACID    Collection Time: 06/09/21  2:54 AM   Result Value Ref Range    Lactic acid 1.4 0.4 - 2.0 MMOL/L   METABOLIC PANEL, COMPREHENSIVE    Collection Time: 06/09/21  2:55 AM   Result Value Ref Range    Sodium 137 136 - 145 mmol/L    Potassium 5.0 3.5 - 5.1 mmol/L    Chloride 104 98 - 107 mmol/L    CO2 23 21 - 32 mmol/L    Anion gap 10 7 - 16 mmol/L    Glucose 190 (H) 65 - 100 mg/dL    BUN 60 (H) 8 - 23 MG/DL    Creatinine 2.39 (H) 0.8 - 1.5 MG/DL    GFR est AA 35 (L) >60 ml/min/1.73m2    GFR est non-AA 29 (L) >60 ml/min/1.73m2    Calcium 7.7 (L) 8.3 - 10.4 MG/DL    Bilirubin, total 0.7 0.2 - 1.1 MG/DL    ALT (SGPT) 129 (H) 12 - 65 U/L    AST (SGOT) 173 (H) 15 - 37 U/L    Alk.  phosphatase 81 50 - 136 U/L    Protein, total 6.4 6.3 - 8.2 g/dL    Albumin 2.5 (L) 3.2 - 4.6 g/dL    Globulin 3.9 (H) 2.3 - 3.5 g/dL    A-G Ratio 0.6 (L) 1.2 - 3.5     MAGNESIUM    Collection Time: 06/09/21  2:55 AM   Result Value Ref Range    Magnesium 2.5 (H) 1.8 - 2.4 mg/dL   CBC WITH AUTOMATED DIFF    Collection Time: 06/09/21  2:55 AM   Result Value Ref Range    WBC 6.2 4.3 - 11.1 K/uL    RBC 2.76 (L) 4.23 - 5.6 M/uL    HGB 7.1 (L) 13.6 - 17.2 g/dL    HCT 22.6 (L) 41.1 - 50.3 %    MCV 81.9 79.6 - 97.8 FL    MCH 25.7 (L) 26.1 - 32.9 PG    MCHC 31.4 31.4 - 35.0 g/dL    RDW 14.6 11.9 - 14.6 %    PLATELET 96 (L) 442 - 450 K/uL    MPV 9.3 (L) 9.4 - 12.3 FL    ABSOLUTE NRBC 0.00 0.0 - 0.2 K/uL    NEUTROPHILS 90 (H) 43 - 78 %    LYMPHOCYTES 2 (L) 13 - 44 %    MONOCYTES 0 (L) 4.0 - 12.0 %    EOSINOPHILS 0 (L) 0.5 - 7.8 %    BASOPHILS 1 0.0 - 2.0 %    IMMATURE GRANULOCYTES 7 (H) 0.0 - 5.0 %    ABS. NEUTROPHILS 5.6 1.7 - 8.2 K/UL    ABS. LYMPHOCYTES 0.1 (L) 0.5 - 4.6 K/UL    ABS. MONOCYTES 0.0 (L) 0.1 - 1.3 K/UL    ABS. EOSINOPHILS 0.0 0.0 - 0.8 K/UL    ABS. BASOPHILS 0.1 0.0 - 0.2 K/UL    ABS. IMM. GRANS. 0.4 0.0 - 0.5 K/UL    RBC COMMENTS NORMOCYTIC/NORMOCHROMIC      WBC COMMENTS RARE      PLATELET COMMENTS SLIGHT      DF AUTOMATED     VANCOMYCIN, RANDOM    Collection Time: 06/09/21  2:55 AM   Result Value Ref Range    Vancomycin, random 15.2 UG/ML   HEPARIN XA UFH    Collection Time: 06/09/21  4:21 AM   Result Value Ref Range    Heparin Xa UFH >1.1 (H) 0.3 - 0.7 IU/mL       All Micro Results     Procedure Component Value Units Date/Time    CULTURE, RESPIRATORY/SPUTUM/BRONCH Og Counts [[id number]] Collected: 06/08/21 0134    Order Status: Completed Specimen: Sputum Updated: 06/08/21 2221    MARSHALL [place], [place] [[id number]] Collected: 06/08/21 1803    Order Status: Completed Specimen: Urine Updated: 06/08/21 1817    DIVINA Carreon UR/CSF [[id number]] Collected: 06/08/21 1804    Order Status: Completed Updated: 06/08/21 1816          EKG Results     None          Other Studies:  No results found.     Current Meds:   Current Facility-Administered Medications   Medication Dose Route Frequency    sodium chloride (NS) flush 5-40 mL  5-40 mL IntraVENous Q8H    sodium chloride (NS) flush 5-40 mL  5-40 mL IntraVENous PRN    acetaminophen (TYLENOL) tablet 650 mg  650 mg Oral Q4H PRN    acetaminophen (TYLENOL) suppository 650 mg  650 mg Rectal Q4H PRN    pantoprazole (PROTONIX) tablet 40 mg  40 mg Oral Q24H    levalbuterol (XOPENEX) nebulizer soln 1.25 mg/3 mL  1.25 mg Nebulization Q4H RT    ondansetron (ZOFRAN) injection 4 mg  4 mg IntraVENous Q4H PRN    midodrine (PROAMATINE) tablet 10 mg  10 mg Oral TID WITH MEALS    Vancomycin Intermittent Dosing   Other Rx Dosing/Monitoring    piperacillin-tazobactam (ZOSYN) 4.5 g in 0.9% sodium chloride (MBP/ADV) 100 mL MBP  4.5 g IntraVENous Q8H    methylPREDNISolone (PF) (SOLU-MEDROL) injection 60 mg  60 mg IntraVENous Q8H    heparin 25,000 units in dextrose 500 mL infusion  18-36 Units/kg/hr IntraVENous TITRATE    phenol throat spray (CHLORASEPTIC) 1 Spray  1 Spray Oral PRN    LORazepam (ATIVAN) injection 1 mg  1 mg IntraVENous Q4H PRN       Problem List:  Hospital Problems as of 6/9/2021 Date Reviewed: 5/26/2021        Codes Class Noted - Resolved POA    Acute respiratory failure with hypoxia (HCC) ICD-10-CM: J96.01  ICD-9-CM: 518.81  6/8/2021 - Present Unknown        Hospital-acquired bacterial pneumonia ICD-10-CM: J15.9  ICD-9-CM: 482.9  6/8/2021 - Present Unknown        Bilateral pleural effusion ICD-10-CM: J90  ICD-9-CM: 511.9  6/8/2021 - Present Unknown        Lactic acidosis ICD-10-CM: E87.2  ICD-9-CM: 276.2  6/8/2021 - Present Unknown        * (Principal) Severe sepsis (HCC) ICD-10-CM: A41.9, R65.20  ICD-9-CM: 038.9, 995.92  6/8/2021 - Present Unknown        Tachycardia ICD-10-CM: R00.0  ICD-9-CM: 785.0  6/8/2021 - Present Unknown        Immunocompromised (HCC) ICD-10-CM: D84.9  ICD-9-CM: 279.9  11/16/2018 - Present Yes        Pleural effusion ICD-10-CM: J90  ICD-9-CM: 511.9  4/27/2018 - Present Unknown        Depression ICD-10-CM: F32.9  ICD-9-CM: 311  11/2/2012 - Present Yes        CML (chronic myeloid leukemia) ([facility] [place] [facility] ICD-10-CM: C92.10  ICD-9-CM: 205.10  8/25/2012 - Present Yes    Overview Addendum 8/26/2012  7:23 AM by Elsa Sherwood MD     8/25/12. Probable diagnosis. Will do bone marrow exam today  8/26/12. Bone marrow aspiration and biopsy done. Marrow aspirate and peripheral blood sent for flow, cytogenetics and molecular testing. No complications with the procedure             RESOLVED: Paroxysmal atrial fibrillation ([facility] [place] [facility] ICD-10-CM: I48.0  ICD-9-CM: 427.31  6/8/2021 - 6/8/2021         RESOLVED: Atrial fibrillation with rapid ventricular response [facility]) ICD-10-CM: I48.91  ICD-9-CM: 427.31  6/8/2021 - 6/8/2021 Unknown               Part of this note was written by using a voice dictation software and the note has been proof read but may still contain some grammatical/other typographical errors.     Signed By: Aida Singleton DO   [facility]    June 9, 2021  5:15 PM

## 2021-06-09 NOTE — CONSULTS
[facility] Hematology and Oncology: Inpatient Hematology / Oncology Consult Note    Reason for Consult:    Referring Physician:  Yossi Williamson MD    History of Present Illness:  Mr. Mikey Oliver is a 60 y.o. male admitted on 6/8/2021 with a primary diagnosis of   Encounter Diagnoses   Name Primary?  Acute respiratory failure with hypoxia (HCC)     Bilateral pleural effusion     CML (chronic myeloid leukemia) (HCC)     Hospital-acquired bacterial pneumonia     Immunocompromised (HCC)     Pleural effusion     Severe sepsis (HCC)     Tachycardia      Mr. Mikey Oliver is a 81-year-old man with history of CML (currently on observation, previously on [address]) and non-small cell lung carcinoma 2018 on osimertinib with recurrent disease, and small cell lung cancer with metastasis to liver 3/2020 (started on cis/etoposide and atezo). Most recently on osimertinib for NSCLC and s/p C1 carbo/etop/atezolizumab on 6/3-6/5/21. He met with Dr Ab Trevizo (primary oncologist) end of May to review CT results which showed hepatic POD. He reviewed the complexity of pt's case and tx options. Pt desired to purse rechallenge with triple therapy along with osimertinib. Dr Ab Trevizo reviewed lack of data regarding this combination and potential rxns including pneumonitis. Mr Mikey Oliver was admitted today (6/8) with acute resp failure and worsening s/s in last few days. His med hx is further complicated by CKD, PE on DOAC and HF with EF ~20%. His HR was in 120-130s irreg and he was started on tx for afib with RVR. On IVF/vanc/Zosyn. Moved to intensive care setting and on BiPAP. Pulm evaluated pt and he is currently on IV steroids. DOAC on hold and pt on heparin gtt. We are consulted for onc recommendations. He was seen in CCU. He is sitting in bed with BiPAP in place. We spent some time talking about his medical condition, how critically ill he is and next steps.   He expressed earlier during this admission that he'd like to revoke his DNR/DNI status and is now a FULL code. Review of Systems:  Constitutional Denies fever or chills. +weight loss +appetite changes. +fatigue. Denies night sweats. HEENT Denies trauma, blurry vision, hearing loss, ear pain, nosebleeds, sore throat, neck pain and ear discharge. Skin Denies lesions or rashes. Lungs +dyspnea, cough, no sputum production or hemoptysis. Cardiovascular Denies chest pain, or lower extremity edema. + palpitations when admitted    Gastrointestinal Denies nausea or vomiting. Denies changes in bowel habits. Denies bloody or black stools. Denies abdominal pain.  Denies dysuria, frequency or hesitancy of urination. Neuro Denies headaches, visual changes or ataxia. Denies dizziness, tingling, tremors, sensory change, speech change, focal weakness. Hematology Denies easy bruising or bleeding, denies gingival bleeding or epistaxis. Endo Denies heat/cold intolerance   MSK Denies back pain, arthralgias, myalgias or frequent falls. Psychiatric/Behavioral Denies depression and substance abuse. The patient is not nervous/anxious.          No Known Allergies  Past Medical History:   Diagnosis Date    Anxiety     Arthritis     Atrial fibrillation with RVR ([address] 6/8/2021    Chronic systolic congestive heart failure ([address] 5/3/2017    CML (chronic myeloid leukemia) ([address] 8/25/2012    Depression 11/2/2012    Erectile dysfunction     Hypertension     Leukemia, acute ([address] 8/24/2012    Lung cancer ([address] 2018    Pulmonary emboli ([address] 1/6/2019     Past Surgical History:   Procedure Laterality Date    HX HERNIA REPAIR      left inguinal 1996    HX VASCULAR ACCESS      IR INSERT TUNL CVC W PORT OVER 5 YEARS  3/11/2020     Family History   Problem Relation Age of Onset    Lung Disease Father 79        Black lung\"    Cancer Mother 72        pancreatic cancer     Social History     Socioeconomic History    Marital status:      Spouse name: Not on file    Number of children: Not on file    Years of education: Not on file    Highest education level: Not on file   Occupational History     Comment: [employer]   Tobacco Use    Smoking status: Never Smoker    Smokeless tobacco: Never Used   Substance and Sexual Activity    Alcohol use: No    Drug use: No    Sexual activity: Not on file   Other Topics Concern     Service Not Asked    Blood Transfusions Not Asked    Caffeine Concern Not Asked    Occupational Exposure Not Asked    Hobby Hazards Not Asked    Sleep Concern Not Asked    Stress Concern Not Asked    Weight Concern Not Asked    Special Diet Not Asked    Back Care Not Asked    Exercise Not Asked    Bike Helmet Not Asked   [address] Not Asked    Self-Exams Not Asked   Social History Narrative    Pt  with one son     Social Determinants of Health     Financial Resource Strain:     Difficulty of Paying Living Expenses:    Food Insecurity:     Worried About Running Out of Food in the Last Year:     [address] in the Last Year:    Transportation Needs:     Lack of Transportation (Medical):      Lack of Transportation (Non-Medical):    Physical Activity:     Days of Exercise per Week:     Minutes of Exercise per Session:    Stress:     Feeling of Stress :    Social Connections:     Frequency of Communication with Friends and Family:     Frequency of Social Gatherings with Friends and Family:     Attends Church Services:     Active Member of Clubs or Organizations:     Attends Club or Organization Meetings:     Marital Status:    Intimate Partner Violence:     Fear of Current or Ex-Partner:     Emotionally Abused:     Physically Abused:     Sexually Abused:      Current Facility-Administered Medications   Medication Dose Route Frequency Provider Last Rate Last Admin    sodium chloride (NS) flush 5-40 mL  5-40 mL IntraVENous Q8H Dewey Vela MD   10 mL at 06/08/21 2130    sodium chloride (NS) flush 5-40 mL  5-40 mL IntraVENous PRN Lluvia Goodman MD        acetaminophen (TYLENOL) tablet 650 mg  650 mg Oral Q4H PRN Lluvia Goodman MD        acetaminophen (TYLENOL) suppository 650 mg  650 mg Rectal Q4H PRN Lluvia Goodman MD        pantoprazole (PROTONIX) tablet 40 mg  40 mg Oral Q24H Lluvia Goodman MD   40 mg at 06/08/21 1153    levalbuterol (XOPENEX) nebulizer soln 1.25 mg/3 mL  1.25 mg Nebulization Q4H RT Lluvia Goodman MD   1.25 mg at 06/08/21 1938    ondansetron (ZOFRAN) injection 4 mg  4 mg IntraVENous Q4H PRN Lluvia Goodman MD        midodrine (PROAMATINE) tablet 10 mg  10 mg Oral TID WITH MEALS Lluvia Goodman MD   10 mg at 06/08/21 1757    Vancomycin Intermittent Dosing   Other Rx Dosing/Monitoring Lluvia Goodman MD        piperacillin-tazobactam (ZOSYN) 4.5 g in 0.9% sodium chloride (MBP/ADV) 100 mL MBP  4.5 g IntraVENous Q8H Lluvia Goodman MD 25 mL/hr at 06/08/21 1911 4.5 g at 06/08/21 1911    methylPREDNISolone (PF) (SOLU-MEDROL) injection 60 mg  60 mg IntraVENous Q8H Ernesto Abbott MD   60 mg at 06/08/21 2216    heparin 25,000 units in dextrose 500 mL infusion  18-36 Units/kg/hr IntraVENous TITRATE Ernesto Abbott MD   Held at 06/08/21 2159    phenol throat spray (CHLORASEPTIC) 1 Spray  1 [place] Oral PRN Arvin Canchola MD           OBJECTIVE:  Patient Vitals for the past 8 hrs:   BP Temp Pulse Resp SpO2   06/08/21 2130 107/75 -- 100 (!) 32 100 %   06/08/21 2115 109/69 -- 100 24 100 %   06/08/21 2100 108/70 -- (!) 102 (!) 35 100 %   06/08/21 2045 104/70 -- (!) 103 (!) 44 100 %   06/08/21 2030 100/66 -- (!) 103 (!) 33 100 %   06/08/21 2015 103/65 -- (!) 102 27 100 %   06/08/21 2000 109/69 -- (!) 102 (!) 33 100 %   06/08/21 1957 -- -- -- -- 100 %   06/08/21 1945 107/71 -- (!) 107 (!) 33 100 %   06/08/21 1938 -- -- -- -- 100 %   06/08/21 1930 103/72 -- (!) 104 (!) 34 100 %   06/08/21 1915 103/74 (!) 96.4 °F (35.8 °C) (!) 104 (!) 32 100 %   06/08/21 1900 -- -- (!) 103 (!) 43 100 %   21 1859 98/70 -- (!) 104 (!) 31 100 %   21 1845 -- -- (!) 105 (!) 31 100 %   21 1844 106/70 -- (!) 106 (!) 38 100 %   21 1829 106/68 -- (!) 102 (!) 33 100 %   21 1815 -- -- -- (!) 34 --   21 1814 95/69 -- 100 (!) 32 100 %   21 1800 -- -- (!) 104 (!) 36 100 %   21 1759 101/74 -- (!) 104 (!) 32 100 %   21 1757 107/74 -- (!) 102 -- --   21 174 107/74 -- (!) 103 (!) 32 100 %   21 1729 110/81 -- (!) 105 28 97 %   21 1714 (!) 100/55 -- 100 (!) 32 98 %   21 1659 110/73 -- (!) 101 (!) 42 100 %   21 1644 106/71 -- (!) 101 (!) 32 94 %   21 1629 104/70 -- (!) 104 (!) 35 100 %   21 1614 101/69 -- 96 (!) 45 100 %   21 1559 102/68 -- 94 25 100 %   21 1529 100/63 97.3 °F (36.3 °C) 98 30 100 %   21 1459 98/69 -- 97 (!) 35 100 %     Temp (24hrs), Av.4 °F (36.3 °C), Min:96.4 °F (35.8 °C), Max:98.7 °F (37.1 °C)     190 -  0700  In: -   Out: 250 [Urine:250]    Physical Exam:  Constitutional: ECOG - 2, distress 1-2   Well developed, thin male sitting in the hospital bed. HEENT: Normocephalic and atraumatic. BiPAP in place. Pt able to converse and answers approp. Pupils are equal, round, and reactive to light. Extraocular muscles are intact. Sclerae anicteric. Lymph node   No palpable submandibular, cervical, supraclavicular lymph nodes. Skin Warm and dry. No bruising and no rash noted. Respiratory Lungs with decreased BS; scattered rhonchi    CVS Normal rate, regular rhythm and normal S1 and S2. Abdomen Soft, nontender and nondistended, normoactive bowel sounds. Neuro Grossly nonfocal with no obvious sensory or motor deficits. MSK Normal range of motion in general.  No edema and no tenderness. Psych Appropriate mood and affect.       Labs:    Recent Results (from the past 24 hour(s))   MAGNESIUM    Collection Time: 21  5:45 AM   Result Value Ref Range    Magnesium 2. 3 1.8 - 2.4 mg/dL   LACTIC ACID    Collection Time: 06/08/21  5:46 AM   Result Value Ref Range    Lactic acid 4.4 (HH) 0.4 - 2.0 MMOL/L   CBC WITH AUTOMATED DIFF    Collection Time: 06/08/21  5:46 AM   Result Value Ref Range    WBC 10.7 4.3 - 11.1 K/uL    RBC 3.32 (L) 4.23 - 5.6 M/uL    HGB 8.7 (L) 13.6 - 17.2 g/dL    HCT 27.1 (L) 41.1 - 50.3 %    MCV 81.6 79.6 - 97.8 FL    MCH 26.2 26.1 - 32.9 PG    MCHC 32.1 31.4 - 35.0 g/dL    RDW 14.5 11.9 - 14.6 %    PLATELET 413 459 - 005 K/uL    MPV 9.4 9.4 - 12.3 FL    ABSOLUTE NRBC 0.00 0.0 - 0.2 K/uL    DF AUTOMATED      NEUTROPHILS 92 (H) 43 - 78 %    LYMPHOCYTES 4 (L) 13 - 44 %    MONOCYTES 1 (L) 4.0 - 12.0 %    EOSINOPHILS 1 0.5 - 7.8 %    BASOPHILS 0 0.0 - 2.0 %    IMMATURE GRANULOCYTES 2 0.0 - 5.0 %    ABS. NEUTROPHILS 9.9 (H) 1.7 - 8.2 K/UL    ABS. LYMPHOCYTES 0.4 (L) 0.5 - 4.6 K/UL    ABS. MONOCYTES 0.1 0.1 - 1.3 K/UL    ABS. EOSINOPHILS 0.1 0.0 - 0.8 K/UL    ABS. BASOPHILS 0.0 0.0 - 0.2 K/UL    ABS. IMM. GRANS. 0.2 0.0 - 0.5 K/UL   METABOLIC PANEL, COMPREHENSIVE    Collection Time: 06/08/21  5:46 AM   Result Value Ref Range    Sodium 134 (L) 136 - 145 mmol/L    Potassium 4.1 3.5 - 5.1 mmol/L    Chloride 102 98 - 107 mmol/L    CO2 18 (L) 21 - 32 mmol/L    Anion gap 14 7 - 16 mmol/L    Glucose 148 (H) 65 - 100 mg/dL    BUN 51 (H) 8 - 23 MG/DL    Creatinine 2.13 (H) 0.8 - 1.5 MG/DL    GFR est AA 40 (L) >60 ml/min/1.73m2    GFR est non-AA 33 (L) >60 ml/min/1.73m2    Calcium 8.0 (L) 8.3 - 10.4 MG/DL    Bilirubin, total 0.9 0.2 - 1.1 MG/DL    ALT (SGPT) 24 12 - 65 U/L    AST (SGOT) 32 15 - 37 U/L    Alk.  phosphatase 86 50 - 136 U/L    Protein, total 6.8 6.3 - 8.2 g/dL    Albumin 2.8 (L) 3.2 - 4.6 g/dL    Globulin 4.0 (H) 2.3 - 3.5 g/dL    A-G Ratio 0.7 (L) 1.2 - 3.5     BLOOD GAS, ARTERIAL POC    Collection Time: 06/08/21  6:10 AM   Result Value Ref Range    Device: NASAL CANNULA      FIO2 (POC) 50 %    pH (POC) 7.39 7.35 - 7.45      pCO2 (POC) 29.3 (L) 35 - 45 MMHG pO2 (POC) 39 (LL) 75 - 100 MMHG    HCO3 (POC) 17.6 (L) 22 - 26 MMOL/L    sO2 (POC) 73.9 (L) 95 - 98 %    Base deficit (POC) 6.3 mmol/L    Allens test (POC) NOT APPLICABLE      Site RIGHT BRACHIAL      Specimen type (POC) ARTERIAL      Performed by Jonatan     Critical value read back HARTZOG    LACTIC ACID    Collection Time: 06/08/21  8:01 AM   Result Value Ref Range    Lactic acid 1.2 0.4 - 2.0 MMOL/L   BLOOD GAS & LYTES, ARTERIAL    Collection Time: 06/08/21  8:20 AM   Result Value Ref Range    pH (POC) 7.41 7.35 - 7.45      pCO2 (POC) 33.4 (L) 35 - 45 MMHG    pO2 (POC) 520 (H) 75 - 100 MMHG    Sodium,  (L) 136 - 145 MMOL/L    Potassium, POC 4.8 3.5 - 5.1 MMOL/L    Calcium, ionized (POC) 1.11 (L) 1.12 - 1.32 mmol/L    Glucose,  (H) 65 - 100 MG/DL    Base deficit (POC) 3.3 mmol/L    HCO3 (POC) 21.0 (L) 22 - 26 MMOL/L    O2  %    Sample source ARTERIAL      KIRA'S TEST Positive      Device: BIPAP MASK      MODE Non Invasive      FIO2 100 %    PEEP/CPAP 8      Inspiratory Time 1.05 sec    IPAP/PIP 10      Respiratory Rate 12      Performed by Charline     GFRAA, POC Cannot be calculated >60 ml/min/1.73m2    GFRNA, POC Cannot be calculated >60 ml/min/1.73m2    CO2, POC 22 13 - 23 MMOL/L   BLOOD GAS, ARTERIAL POC    Collection Time: 06/08/21 10:53 AM   Result Value Ref Range    Device: BIPAP MASK      FIO2 (POC) 40 %    pH (POC) 7.35 7.35 - 7.45      pCO2 (POC) 33.6 (L) 35 - 45 MMHG    pO2 (POC) 130 (H) 75 - 100 MMHG    HCO3 (POC) 18.7 (L) 22 - 26 MMOL/L    sO2 (POC) 98.8 (H) 95 - 98 %    Base deficit (POC) 6.2 mmol/L    Mode BILEVEL      PEEP/CPAP (POC) 8 cmH2O    Pressure support 16 cmH2O    Allens test (POC) Positive      Site LEFT RADIAL      Specimen type (POC) ARTERIAL      Performed by Jamia Sterling    LACTIC ACID    Collection Time: 06/08/21 11:02 AM   Result Value Ref Range    Lactic acid 3.0 (H) 0.4 - 2.0 MMOL/L   NT-PRO BNP    Collection Time: 06/08/21 11:02 AM   Result Value Ref Range    NT pro-BNP 39,067 (H) 5 - 125 PG/ML   PTT    Collection Time: 21  2:05 PM   Result Value Ref Range    aPTT 52.0 (H) 24.1 - 35.1 SEC   CBC WITH AUTOMATED DIFF    Collection Time: 21  2:05 PM   Result Value Ref Range    WBC 10.1 4.3 - 11.1 K/uL    RBC 3.08 (L) 4.23 - 5.6 M/uL    HGB 8.2 (L) 13.6 - 17.2 g/dL    HCT 25.1 (L) 41.1 - 50.3 %    MCV 81.5 79.6 - 97.8 FL    MCH 26.6 26.1 - 32.9 PG    MCHC 32.7 31.4 - 35.0 g/dL    RDW 14.7 (H) 11.9 - 14.6 %    PLATELET 277 896 - 781 K/uL    MPV 10.5 9.4 - 12.3 FL    ABSOLUTE NRBC 0.00 0.0 - 0.2 K/uL    NEUTROPHILS 93 (H) 43 - 78 %    LYMPHOCYTES 1 (L) 13 - 44 %    MONOCYTES 0 (L) 4.0 - 12.0 %    EOSINOPHILS 1 0.5 - 7.8 %    BASOPHILS 1 0.0 - 2.0 %    IMMATURE GRANULOCYTES 4 0.0 - 5.0 %    ABS. NEUTROPHILS 9.4 (H) 1.7 - 8.2 K/UL    ABS. LYMPHOCYTES 0.1 (L) 0.5 - 4.6 K/UL    ABS. MONOCYTES 0.0 (L) 0.1 - 1.3 K/UL    ABS. EOSINOPHILS 0.1 0.0 - 0.8 K/UL    ABS. BASOPHILS 0.1 0.0 - 0.2 K/UL    ABS. IMM. GRANS.  0.4 0.0 - 0.5 K/UL    RBC COMMENTS SLIGHT  ANISOCYTOSIS + POIKILOCYTOSIS        WBC COMMENTS Result Confirmed By Smear      PLATELET COMMENTS ADEQUATE      DF AUTOMATED     LACTIC ACID    Collection Time: 21  2:05 PM   Result Value Ref Range    Lactic acid 1.8 0.4 - 2.0 MMOL/L   HEPARIN XA UFH    Collection Time: 21  2:05 PM   Result Value Ref Range    Heparin Xa UFH >1.1 (H) 0.3 - 0.7 IU/mL   LACTIC ACID    Collection Time: 21  6:03 PM   Result Value Ref Range    Lactic acid 2.1 (H) 0.4 - 2.0 MMOL/L   HEPARIN XA UFH    Collection Time: 21  9:13 PM   Result Value Ref Range    Heparin Xa UFH >1.1 (H) 0.3 - 0.7 IU/mL       Imagin2021 06:05 2021  6:11 AM [id number]  6:11 AM   Study Result    Narrative & Impression   EXAM: XR CHEST PORT     HISTORY: meets SIRS criteria.       TECHNIQUE: A single frontal view of the chest was submitted.     COMPARISON: 2021      FINDINGS:   The cardiac silhouette is not fully visualized. The mediastinum, and pulmonary  vasculature are within normal limits.     Interval increase in the right lower lobe infiltrate. Interval development of  diffuse infiltrate throughout the left lung.     Stable small right pleural effusion. Small left pleural effusion.     A nodule is again seen in the right midlung.     There is no significant pneumothorax.     Stable right MediPort.     No significant osseous abnormalities are observed.     IMPRESSION  Interval increase in bilateral lung infiltrates.     Stable small right pleural effusion. Small left pleural effusion.     Stable soft tissue nodule in the right midlung.     Other findings as above. ASSESSMENT:    Principal Problem:    Severe sepsis ([address] (6/8/2021)    Active Problems:    CML (chronic myeloid leukemia) ([address] (8/25/2012)      Overview: 8/25/12. Probable diagnosis. Will do bone marrow exam today      8/26/12. Bone marrow aspiration and biopsy done. Marrow aspirate and       peripheral blood sent for flow, cytogenetics and molecular testing. No       complications with the procedure      Depression (11/2/2012)      Pleural effusion (4/27/2018)      Immunocompromised ([address] (11/16/2018)      Acute respiratory failure with hypoxia ([address] (6/8/2021)      Hospital-acquired bacterial pneumonia (6/8/2021)      Bilateral pleural effusion (6/8/2021)      Lactic acidosis (6/8/2021)      Tachycardia (6/8/2021)        PLAN / RECOMMENDATIONS:  Lab studies and imaging studies were personally reviewed. Pertinent old records were reviewed. 1. SCLC - progressive disease per most recent CT 5/2021   - after review of tx options pt elected to re-start platinum/etoposide/mab s/p C1 6/3-6/5   2.  NSCLC   - recently restarted osimertinib - on hold upon admission     - currently admitted with acute resp failure/sepsis - on Zosyn/vanc along with IV steroids for pneumonitis/ high risk for intubation - currently on BiPAP   - severity of illness and goals of care reviewed with pt in detail today at bedside and he wishes to be full code. 3. CLL   - imatinib has been on hold     On amio gtt - cardiology to see pt; EF ~20%    Critically ill     Thank you for allowing me to participate in the care of Mr. Chip Matias.         Antoinette Howard MD  [facility]  [address] [place] [address]  Office : [phone]  Fax : [phone]

## 2021-06-09 NOTE — PROGRESS NOTES
Zoe Fontanez. Admission Date: 6/8/2021             Daily Progress Note: 6/9/2021    The patient's chart is reviewed and the patient is discussed with the staff. Patient is a 60 y.o.  male seen and evaluated at the request of Dr. Tiffanie Paz. Patient has history of CML, non-small cell lung cancer in 2018 and small cell lung cancer metastasized to liver discovered in 3/2020 currently on palliative chemotherapy, pulmonary emboli on Xarelto, advanced CHF, CKD stage III, debility who presented with 2 to 3 days history of difficulty in breathing and laying flat. Patient reported being in his usual health until 3 to 4 days ago when he noticed sudden onset of worsening shortness of breath which is progressively getting worse.  He does report of cough which is mostly dry. He denies having any fever, chills.  He does report having palpitations, excessive fatigue and weakness, labored breathing.  He denies having any chest pain, nausea vomiting abdominal pain or headache. ER work-up remarkable for chest x-ray showing bilateral infiltrate suggestive of pneumonia along with pleural effusion, blood work remarkable for lactic acid 4.4, creatinine 2.13 (baseline), heart rate ranging from 120-135 irregular, respiratory rate ranging from 30s to 40s.  ABG 7.39/29/39 on FiO2 0.1.     Had issues in the past with pneumonitis, but with evidence recently of both active small and non-small cell cancer he elected for rechallenge with carbo etoposide atezolizumab while continue osimertinib and monitor pneumonitis symptoms when last seen by oncology 5/26. Treated 6/5 most recently.      He was supposed to undergo PleurX placement 2 weeks ago but it was delayed due to starting Xerelto. He has not had a thoracentesis in over a month.      He was transferred to [facility] ICU for ongoing care and support. He arrives hemodynamically stable on BIPAP FiO2 35%, but RR remains in 45s.  He is able to provide history and answer questions appropriately.      Subjective:     Did wear BIPAP overnight, but back on 4L NC this morning and eating breakfast. States he feels better. Coughing, but not productive, no fevers.      Current Facility-Administered Medications   Medication Dose Route Frequency    methylPREDNISolone (PF) (SOLU-MEDROL) injection 40 mg  40 mg IntraVENous Q8H    sodium chloride (NS) flush 5-40 mL  5-40 mL IntraVENous Q8H    sodium chloride (NS) flush 5-40 mL  5-40 mL IntraVENous PRN    acetaminophen (TYLENOL) tablet 650 mg  650 mg Oral Q4H PRN    acetaminophen (TYLENOL) suppository 650 mg  650 mg Rectal Q4H PRN    pantoprazole (PROTONIX) tablet 40 mg  40 mg Oral Q24H    levalbuterol (XOPENEX) nebulizer soln 1.25 mg/3 mL  1.25 mg Nebulization Q4H RT    ondansetron (ZOFRAN) injection 4 mg  4 mg IntraVENous Q4H PRN    midodrine (PROAMATINE) tablet 10 mg  10 mg Oral TID WITH MEALS    Vancomycin Intermittent Dosing   Other Rx Dosing/Monitoring    piperacillin-tazobactam (ZOSYN) 4.5 g in 0.9% sodium chloride (MBP/ADV) 100 mL MBP  4.5 g IntraVENous Q8H    heparin 25,000 units in dextrose 500 mL infusion  18-36 Units/kg/hr IntraVENous TITRATE    phenol throat spray (CHLORASEPTIC) 1 Spray  1 Spray Oral PRN    LORazepam (ATIVAN) injection 1 mg  1 mg IntraVENous Q4H PRN     Review of Systems  Constitutional: negative for fever, chills, sweats  Cardiovascular: negative for chest pain, palpitations, syncope, edema  Gastrointestinal:  negative for dysphagia, reflux, vomiting, diarrhea, abdominal pain, or melena  Neurologic:  negative for focal weakness, numbness, headache    Objective:     Vitals:    06/09/21 0736 06/09/21 0742 06/09/21 0747 06/09/21 0759   BP:   105/66 105/65   Pulse:   (!) 104 (!) 112   Resp:   (!) 48 (!) 40   Temp:       SpO2: 100%  97% 91%   Weight:  160 lb 4.8 oz (72.7 kg)     Height:           Intake/Output Summary (Last 24 hours) at 6/9/2021 3376  Last data filed at 6/9/2021 2753  Gross per 24 hour   Intake 708.87 ml   Output 1100 ml   Net -391.13 ml     Physical Exam:   Constitution:  the patient is well developed and in no acute distress  EENMT:  Sclera clear, pupils equal, oral mucosa moist  Respiratory: diminished on R, clear on left, mildly tachpneic  Cardiovascular:  RRR without M,G,R  Gastrointestinal: soft and non-tender; with positive bowel sounds. Musculoskeletal: warm without cyanosis. There is  lower extremity edema.   Skin:  no jaundice or rashes, no wounds   Neurologic: no gross neuro deficits     Psychiatric:  alert and oriented x 4    CXR:  Increased bilateral infiltrates, particularly on left       CT Chest: pneumonitis in the bases, complex right effusion, masses and bilateral PEs, new liver mets.       TTE: 1/2021       LAB  Recent Labs     06/08/21  0820   GLUCPOC 122*      Recent Labs     06/09/21  0255 06/08/21  1405 06/08/21  0546   WBC 6.2 10.1 10.7   HGB 7.1* 8.2* 8.7*   HCT 22.6* 25.1* 27.1*   PLT 96* 163 202     Recent Labs     06/09/21  0255 06/08/21  0546 06/08/21  0545    134*  --    K 5.0 4.1  --     102  --    CO2 23 18*  --    * 148*  --    BUN 60* 51*  --    CREA 2.39* 2.13*  --    MG 2.5*  --  2.3   CA 7.7* 8.0*  --    ALB 2.5* 2.8*  --    TBILI 0.7 0.9  --    * 24  --      Recent Labs     06/08/21  1053 06/08/21  0820 06/08/21  0610   PHI 7.35 7.41 7.39   PCO2I 33.6* 33.4* 29.3*   PO2I 130* 520* 39*   HCO3I 18.7* 21.0* 17.6*     Recent Labs     06/09/21  0254 06/08/21  2245 06/08/21  1803   LAC 1.4 1.9 2.1*     Assessment:  (Medical Decision Making)     Hospital Problems  Date Reviewed: 5/26/2021        Codes Class Noted POA    Acute respiratory failure with hypoxia (HCC) ICD-10-CM: J96.01  ICD-9-CM: 518.81  6/8/2021 Unknown        Hospital-acquired bacterial pneumonia ICD-10-CM: J15.9  ICD-9-CM: 482.9  6/8/2021 Unknown        Bilateral pleural effusion ICD-10-CM: J90  ICD-9-CM: 511.9  6/8/2021 Unknown        Lactic acidosis ICD-10-CM: E87.2  ICD-9-CM: 276.2  6/8/2021 Unknown        * (Principal) Severe sepsis ([facility] ICD-10-CM: A41.9, R65.20  ICD-9-CM: 038.9, 995.92  6/8/2021 Unknown        Tachycardia ICD-10-CM: R00.0  ICD-9-CM: 785.0  6/8/2021 Unknown        Immunocompromised ([facility] ICD-10-CM: D84.9  ICD-9-CM: 279.9  11/16/2018 Yes        Pleural effusion ICD-10-CM: J90  ICD-9-CM: 511.9  4/27/2018 Unknown        Depression ICD-10-CM: F32.9  ICD-9-CM: 112  11/2/2012 Yes        CML (chronic myeloid leukemia) ([facility] ICD-10-CM: C92.10  ICD-9-CM: 205.10  8/25/2012 Yes    Overview Addendum 8/26/2012  7:23 AM by Byron Gill MD     8/25/12. Probable diagnosis. Will do bone marrow exam today  8/26/12. Bone marrow aspiration and biopsy done. Marrow aspirate and peripheral blood sent for flow, cytogenetics and molecular testing. No complications with the procedure                 59 y/o male with CML, active SCLC and NSCLC on palliative therapy, severe systolic HR with EF 94% prior pneumonitis on immunotherapy, recently rechallenged, malignant effusions, recent PEs on Xerelto and recently with evidence of progression of both lung cancers. 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 It's possible that he could improve if this represents pneumonitis and he responds to steroids.  It's possible that this is progressive cancer or decompensate heart failure and he can't improve so I asked him to make sure his family knew how to make decisions for him if he was unable to which he says they do.     Full Code    I think he can move to floor today out of ICU. More than 50% of the time documented was spent in face-to-face contact with the patient and in the care of the patient on the floor/unit where the patient is located.     Anu Maier MD

## 2021-06-09 NOTE — PROGRESS NOTES
Patient had 26 beat run of V tach. Sitting in chair, no S/S of distress. Assisted back to bed and defib pads applied.

## 2021-06-09 NOTE — PROGRESS NOTES
Notified Toney Levi that patient is flipping in and out of V tach, bigeminy, and Sinus tach. No new orders received.

## 2021-06-09 NOTE — PROGRESS NOTES
Bedside shift change report given to Liliana Nolan (oncoming nurse) by Emily Nielson RN (offgoing nurse). Report included the following information SBAR, Kardex, ED Summary, Intake/Output, MAR, Recent Results, Med Rec Status and Cardiac Rhythm Bigeminy.

## 2021-06-09 NOTE — CONSULTS
Palliative Care    Patient: Cristopher Cross MRN: [id number]  SSN: [id number]    YOB: 1955  Age: 60 y.o. Sex: male       Date of Request: 6/9/2021  Date of Consult:  6/9/2021  Reason for Consult:  goals of care and medical decision making  Requesting Physician: Dr. Yane Hidalgo     Assessment/Plan:     Principal Diagnosis:    Dyspnea  R06.00    Additional Diagnoses:   · Debility, Unspecified  R53.81  · Frailty  R54  · Respiratory Failure, Acute on Chronic  J96.20  · Counseling, Encounter for Medical Advice  Z71.9  · Encounter for Palliative Care  Z51.5    Palliative Performance Scale (PPS):       Medical Decision Making:   Reviewed and summarized labs and imaging. Pt respiratory status improved close to baseline. Denies any current dyspnea, nausea, pain. Notes fatigue. Pt lives at home with his wife and 2 teenage daughters. We discussed cancer treatments. Pt understands these are palliative in hopes of slowing cancer progress. Treatments are not curative. Pt plans to follow with Dr. Roberto Marie after discharge to discuss options. I discussed code status with pt who states he would not wish to be maintained on a vent but if required temporarily he would agree. His wife would be decision maker for pt. Encouraged pt to discuss his wishes with his wife. We discussed hospice philosophy and levels of care as well. Will call wife and update her on pt condition currently. Spent greater than 20 mins on advanced care planning    Will discuss findings with members of the interdisciplinary team.      Thank you for this referral.         Subjective:     History obtained from:  Patient and Chart    Chief Complaint: dyspnea  History of Present Illness:  60 y. o. male with history of CML, small cell lung cancer metastasized to liver currently on palliative chemotherapy, pulmonary emboli on Xarelto, advanced CHF, CKD stage III, debility who presented with 2 to 3 days history of difficulty in breathing and laying flat. ER work-up remarkable for chest x-ray showing bilateral infiltrate suggestive of pneumonia along with pleural effusion, blood work remarkable for lactic acid 4.4, creatinine 2.13 (baseline), heart rate ranging from 120-135 irregular, respiratory rate ranging from 30s to 40s.  ABG 7.39/29/39 on FiO2 0.1. Advance Directive: No       Code Status:  Full Code            Health Care Power of : pt spouse would be decision maker. Past Medical History:   Diagnosis Date    Anxiety     Arthritis     Atrial fibrillation with RVR ([address] 6/8/2021    Chronic systolic congestive heart failure ([address] 5/3/2017    CML (chronic myeloid leukemia) ([address] 8/25/2012    Depression 11/2/2012    Erectile dysfunction     Hypertension     Leukemia, acute ([address] 8/24/2012    Lung cancer ([address] 2018    Pulmonary emboli ([address] 1/6/2019      Past Surgical History:   Procedure Laterality Date    HX HERNIA REPAIR      left inguinal 1996    HX VASCULAR ACCESS      IR INSERT TUNL CVC W PORT OVER 5 YEARS  3/11/2020     Family History   Problem Relation Age of Onset    Lung Disease Father 79        Black lung\"   [facility] Cancer Mother 72        pancreatic cancer      Social History     Tobacco Use    Smoking status: Never Smoker    Smokeless tobacco: Never Used   Substance Use Topics    Alcohol use: No     Prior to Admission medications    Medication Sig Start Date End Date Taking? Authorizing Provider   rivaroxaban (Xarelto) 20 mg tab tablet Take 1 Tablet by mouth daily. 6/3/21   Catherine Adams MD   prochlorperazine (Compazine) 10 mg tablet Take 1 Tablet by mouth every six (6) hours as needed for Nausea. 5/26/21   Catherine Adams MD   ondansetron hcl (ZOFRAN) 8 mg tablet TAKE 1 TABLET BY MOUTH EVERY 8 HOURS AS NEEDED FOR NAUSEA 5/26/21   Catherine Adams MD   traMADoL Kevin Gayer) 50 mg tablet Take 1-2 Tabs by mouth every six (6) hours as needed for Pain for up to 30 days.  Max Daily Amount: 400 mg. 5/11/21 6/10/21 Catherine Adams MD   osimertinib (Tagrisso) 80 mg tablet Take 1 Tab by mouth daily. 4/15/21   Catherine Adams MD   furosemide (LASIX) 40 mg tablet Take 1 Tab by mouth daily as needed for Other (weight gain > 5 lbs in 48 hours. ). 1/28/21   Deb Roy MD   metoprolol succinate (TOPROL-XL) 25 mg XL tablet Take 0.5 Tabs by mouth daily. Indications: chronic heart failure 11/5/20   Dbe Roy MD   folic acid (FOLVITE) 1 mg tablet TAKE 1 TABLET BY MOUTH EVERY DAY  Patient not taking: Reported on 5/26/2021 6/5/20   Catherine Adams MD   lidocaine-prilocaine (EMLA) topical cream Apply  to affected area as needed for Pain. Apply to port site 45-60 minutes prior to port access. Cover with plastic. 3/2/20   Catherine Adams MD   albuterol (PROVENTIL HFA, VENTOLIN HFA, PROAIR HFA) 90 mcg/actuation inhaler Take 2 Puffs by inhalation every four (4) hours as needed for Wheezing. 9/3/19   Jean Carlos Benson MD   benzonatate (TESSALON) 200 mg capsule Take 1 Cap by mouth three (3) times daily as needed for Cough. 9/3/19   Jean Carlos Benson MD   triamcinolone (NASACORT) 55 mcg nasal inhaler 2 Sprays by Both Nostrils route two (2) times a day. Patient not taking: Reported on 5/26/2021 1/28/19   Catherine Adams MD   OXYGEN-AIR DELIVERY SYSTEMS by Does Not Apply route. 3LPM PRN    Provider, Historical       No Known Allergies     Review of systems negative with exception of noted above     Objective:     Visit Vitals  /71   Pulse (!) 113   Temp 97.5 °F (36.4 °C)   Resp (!) 37   Ht 6' 1\" (1.854 m)   Wt 160 lb 4.8 oz (72.7 kg)   SpO2 96%   BMI 21.15 kg/m²        Physical Exam:    General:  Cooperative. No acute distress. Eyes:  Conjunctivae/corneas clear    Nose: Nares normal. Septum midline. Neck: Supple, symmetrical, trachea midline, no JVD   Lungs:   Coarse bilateral bs   Heart:  Regular rate and rhythm, no murmur    Abdomen:   Soft, non-tender, non-distended.  Positive bowel sounds   Extremities: Normal, atraumatic, no cyanosis or edema   Skin: Skin color, texture, turgor normal. No rash or lesions. Neurologic: Nonfocal   Psych: Alert and oriented      Assessment:     Hospital Problems  Date Reviewed: 5/26/2021        Codes Class Noted POA    Acute respiratory failure with hypoxia [facility]) ICD-10-CM: J96.01  ICD-9-CM: 518.81  6/8/2021 Unknown        Hospital-acquired bacterial pneumonia ICD-10-CM: J15.9  ICD-9-CM: 482.9  6/8/2021 Unknown        Bilateral pleural effusion ICD-10-CM: J90  ICD-9-CM: 511.9  6/8/2021 Unknown        Lactic acidosis ICD-10-CM: E87.2  ICD-9-CM: 276.2  6/8/2021 Unknown        * (Principal) Severe sepsis ([facility] ICD-10-CM: A41.9, R65.20  ICD-9-CM: 038.9, 995.92  6/8/2021 Unknown        Tachycardia ICD-10-CM: R00.0  ICD-9-CM: 785.0  6/8/2021 Unknown        Immunocompromised ([facility] ICD-10-CM: D84.9  ICD-9-CM: 279.9  11/16/2018 Yes        Pleural effusion ICD-10-CM: J90  ICD-9-CM: 511.9  4/27/2018 Unknown        Depression ICD-10-CM: F32.9  ICD-9-CM: 021  11/2/2012 Yes        CML (chronic myeloid leukemia) ([facility] ICD-10-CM: C92.10  ICD-9-CM: 205.10  8/25/2012 Yes    Overview Addendum 8/26/2012  7:23 AM by Jefferson Hernandez MD     8/25/12. Probable diagnosis. Will do bone marrow exam today  8/26/12. Bone marrow aspiration and biopsy done. Marrow aspirate and peripheral blood sent for flow, cytogenetics and molecular testing.  No complications with the procedure                   Signed By: Chelsey Benjamin MD     June 9, 2021

## 2021-06-09 NOTE — PROGRESS NOTES
ACUTE PHYSICAL THERAPY GOALS:  (Developed with and agreed upon by patient and/or caregiver. )    LTG:  (1.)Mr. Salena Cano will move from supine to sit and sit to supine , scoot up and down and roll side to side in bed with INDEPENDENT within 7 treatment day(s). (2.)Mr. Salena Cano will transfer from bed to chair and chair to bed with INDEPENDENT using the least restrictive device within 7 treatment day(s). (3.)Mr. Salena Cano will ambulate with INDEPENDENT for 300 feet with the least restrictive device within 7 treatment day(s). (4.)Mr. Salena Cano will tolerate at least 23 min of dynamic standing activity to assist standing ADLs with the least restrictive device within 7 treatment days.     ________________________________________________________________________________________________        PHYSICAL THERAPY ASSESSMENT: Initial Assessment, Daily Note and AM PT Treatment Day # 1      Sidney Qureshi is a 60 y.o. male   PRIMARY DIAGNOSIS: Severe sepsis ([facility] [address] [facility]  Hospital-acquired bacterial pneumonia [J15.9]  Pleural effusion [J90]  Atrial fibrillation with rapid ventricular response (HCC) [I48.91]  Lactic acidosis [E87.2]  Acute respiratory failure with hypoxia (HCC) [J96.01]  Severe sepsis ([facility] [address] [A41.9, R65.20]       Reason for Referral:    ICD-10: Treatment Diagnosis: Generalized Muscle Weakness (M62.81)  Other lack of cordination (R27.8)  Difficulty in walking, Not elsewhere classified (R26.2)  Other abnormalities of gait and mobility (R26.89)  INPATIENT: Payor: BLUE CHOICE / Plan: [place] CHOICE / Product Type: HMO /     ASSESSMENT:     REHAB RECOMMENDATIONS:   Recommendation to date pending progress:  [address] [place] [address]  Equipment:    To Be Determined     PRIOR LEVEL OF FUNCTION:  (Prior to Hospitalization) INITIAL/CURRENT LEVEL OF FUNCTION:  (Most Recently Demonstrated)   Bed Mobility:   Independent  Sit to Stand:   Independent  Transfers:   Independent  Gait/Mobility:   Independent Bed Mobility:   Contact Guard Assistance  Sit to Stand:   Minimal Assistance x 2  Transfers:   Minimal Assistance x 2  Gait/Mobility:   Minimal Assistance x 2     ASSESSMENT:  Mr. Curtis Bashir presents in supine, A&Ox4. Upon entering, Pnt is agreeable to PT treatment. he reports no pain at rest. Pnt performed supine > sit with CGA, sitting EOB with good static sitting balance control. Sit > stand with min Ax2 using RW. Gait x 10 ft with min Ax2, cues for step length and posture. Gait is noted to be slow and pnt de-saturates to 83%, prompting increase from 2 to 3L. . Stand > sit with min Ax2, followed by positioning for comfort. At end of session pt up in chair with all needs within reach, alarm activated for safety, RN notified. Overall, pnt presents as functioning below his baseline, with deficits in mobility including transfers, gait, balance, and activity tolerance. Pt will continue to benefit from skilled therapy services to address stated deficits to promote return to highest level of function, independence, and safety. Will continue to follow.          SUBJECTIVE:   Mr. Curtis Bashir states, \"My girls are identical twins\"    SOCIAL HISTORY/LIVING ENVIRONMENT: independent, no CHERYLE, has RW but doesn't use, uses 3L as needed     OBJECTIVE:     PAIN: VITAL SIGNS: LINES/DRAINS:   Pre Treatment: Pain Screen  Pain Scale 1: Numeric (0 - 10)  Pain Intensity 1: 0  Post Treatment: 0   IV  O2 Device: Nasal cannula     GROSS EVALUATION:   Within Functional Limits Abnormal/ Functional Abnormal/ Non-Functional (see comments) Not Tested Comments:   AROM [] [x] [] [] Decreased step length   PROM [] [x] [] []    Strength [] [x] [] []    Balance [] [x] [] [] Trunk sway increased   Posture [x] [] [] []    Sensation [x] [] [] []    Coordination [] [x] [] [] difficulty w/ fine motor skills observed w/ sock donning    Tone [x] [] [] []    Edema [x] [] [] []    Activity Tolerance [] [x] [] [] De-saturated w/ bed to chair transfer    [] [] [] []      COGNITION/  PERCEPTION: Intact Impaired   (see comments) Comments:   Orientation [x] []    Vision [x] []    Hearing [x] []    Command Following [x] []    Safety Awareness [x] []     [] []      MOBILITY: I Mod I S SBA CGA Min Mod Max Total  NT x2 Comments:   Bed Mobility    Rolling [] [] [] [] [x] [] [] [] [] [] []    Supine to Sit [] [] [] [] [x] [] [] [] [] [] []    Scooting [] [] [] [] [x] [] [] [] [] [] []    Sit to Supine [] [] [] [] [] [] [] [] [] [x] []    Transfers    Sit to Stand [] [] [] [] [] [x] [] [] [] [] [x]    Bed to Chair [] [] [] [] [] [x] [] [] [] [] [x]    Stand to Sit [] [] [] [] [] [x] [] [] [] [] [x]    I=Independent, Mod I=Modified Independent, S=Supervision, SBA=Standby Assistance, CGA=Contact Guard Assistance,   Min=Minimal Assistance, Mod=Moderate Assistance, Max=Maximal Assistance, Total=Total Assistance, NT=Not Tested  GAIT: I Mod I S SBA CGA Min Mod Max Total  NT x2 Comments:   Level of Assistance [] [] [] [] [] [x] [] [] [] [] [x]    Distance 8'    DME Rolling Walker and Gait Belt    Gait Quality Slow, decreased step length and clearance     Weightbearing Status N/A     I=Independent, Mod I=Modified Independent, S=Supervision, SBA=Standby Assistance, CGA=Contact Guard Assistance,   Min=Minimal Assistance, Mod=Moderate Assistance, Max=Maximal Assistance, Total=Total Assistance, NT=Not Tested    [facility] [id number] [facility] Short [facility]       How much difficulty does the patient currently have. .. Unable A Lot A Little None   1. Turning over in bed (including adjusting bedclothes, sheets and blankets)? [] 1   [] 2   [x] 3   [] 4   2. Sitting down on and standing up from a chair with arms ( e.g., wheelchair, bedside commode, etc.)   [] 1   [] 2   [x] 3   [] 4   3. Moving from lying on back to sitting on the side of the bed? [] 1   [] 2   [x] 3   [] 4   How much help from another person does the patient currently need. ..  Total A Lot A Little None   4. Moving to and from a bed to a chair (including a wheelchair)? [] 1   [] 2   [x] 3   [] 4   5. Need to walk in hospital room? [] 1   [x] 2   [] 3   [] 4   6. Climbing 3-5 steps with a railing? [] 1   [x] 2   [] 3   [] 4   © 2007, Trustees of [address] [place] [address], under license to [employer]. All rights reserved     Score:  Initial: 16 Most Recent: X (Date: -- )    Interpretation of Tool:  Represents activities that are increasingly more difficult (i.e. Bed mobility, Transfers, Gait). PLAN:   FREQUENCY/DURATION: PT Plan of Care: 3 times/week for duration of hospital stay or until stated goals are met, whichever comes first.    PROBLEM LIST:   (Skilled intervention is medically necessary to address:)  1. Decreased ADL/Functional Activities  2. Decreased Activity Tolerance  3. Decreased AROM/PROM  4. Decreased Balance  5. Decreased Coordination  6. Decreased Gait Ability  7. Decreased Strength  8. Decreased Transfer Abilities   INTERVENTIONS PLANNED:   (Benefits and precautions of physical therapy have been discussed with the patient.)  1. Therapeutic Activity  2. Therapeutic Exercise/HEP  3. Neuromuscular Re-education  4. Gait Training  5. Manual Therapy  6. Education     TREATMENT:     EVALUATION: Low Complexity : (Untimed Charge)    TREATMENT:   ($$ Therapeutic Activity: 8-22 mins    )  Co-Treatment PT/OT necessary due to patient's decreased overall endurance/tolerance levels, as well as need for high level skilled assistance to complete functional transfers/mobility and functional tasks  Therapeutic Activity (16 Minutes): Therapeutic activity included Rolling, Supine to Sit, Scooting, Lateral Scooting, Transfer Training, Ambulation on level ground and Standing balance to improve functional Mobility, Strength, ROM and Activity tolerance.     TREATMENT GRID:  N/A    AFTER TREATMENT POSITION/PRECAUTIONS:  Alarm Activated, Chair, Needs within reach and RN notified    INTERDISCIPLINARY COLLABORATION:  RN/PCT, PT/PTA and OT/PEREZ    TOTAL TREATMENT DURATION:  PT Patient Time In/Time Out  Time In: Gardner Felice  Time Out: Jes Betts Query

## 2021-06-10 PROBLEM — E44.0 MODERATE PROTEIN-CALORIE MALNUTRITION (HCC): Status: ACTIVE | Noted: 2021-01-01

## 2021-06-10 PROBLEM — N17.9 AKI (ACUTE KIDNEY INJURY) (HCC): Status: ACTIVE | Noted: 2021-01-01

## 2021-06-10 PROBLEM — K72.00 ACUTE LIVER FAILURE WITHOUT HEPATIC COMA: Status: ACTIVE | Noted: 2021-01-01

## 2021-06-10 PROBLEM — I26.99 OTHER PULMONARY EMBOLISM WITHOUT ACUTE COR PULMONALE (HCC): Status: ACTIVE | Noted: 2021-01-01

## 2021-06-10 NOTE — PROGRESS NOTES
Updated MD- pt now following commands again. Pupils remain the same. No new orders.  Updated Dr. Rei Blanchard about AM ABG results- see vent changes

## 2021-06-10 NOTE — PROGRESS NOTES
Updated Dr. Reba Telles about patients AM lab results. Potassium was 6.1 this morning. Updated on creatine increase and that the patient is making urine but it is starting to trend down.  See orders

## 2021-06-10 NOTE — ROUTINE PROCESS
Guideline     Guideline Number: [id number]     Title: Management of the Patient with Mechanical Ventilation (including weaning) and ABCDE Bundle    Effective Date:  03/00    Revised Date: 02/09, 03/10, 7/12, 5/13,10/13, 8/14, 11/17, 5/18, 2/19  Reviewed Date: 07/2015, 04/2016, 06/2017, 7/19, 10/2020       I. Policy:  Management of the patient requiring mechanical ventilation, including readiness to wean and weaning protocol. The information provided serves as a guideline for patient management. Included in this guidelines is the ABCDE Bundle to provide guidance to staff for evidence based management of pain, agitation/anxiety and delirium in the intensive care unit. The goals of critical care analgesia and sedation are to facilitate mechanical ventilation, to prevent patient and caregiver injury, and to avoid the psychological and physiologic consequences of inadequate treatment of pain, anxiety, agitation, and delirium by maintaining a light level of sedation. Pain occurs commonly in adult ICU patients, regardless of admitting diagnosis. Therefore, pain should be frequently assessed and analgesic medications titrated to prevent adverse effects associated with either inadequate or excessive analgesia. Once pain has been addressed, anxiolytic and/or antipsychotic medications can be utilized to treat unresolved agitation/anxiety and delirium, with the goal to prevent over- or under sedation by using the Mcduffie Agitation Sedation Scale (RASS). Assertive management of these issues has been shown to reduce costs, improve ICU outcomes such as successful extubation and ICU length of stay, and allow for patients to participate in their own care. II. Purpose: The respiratory care practitioner and the critical care RN will utilize the following guideline to provide the most efficient and effective management of mechanical ventilators and weaning and extubation processes.     Goals of Treatment:  1. Adequate management of patients pain and discomfort while maintaining a light level of sedation (RASS score of 0 to -1)  2. Both chronic and acute sources of pain should be identified and treated. 3. Sedative agents should be considered if patient still expresses discomfort and/or is not at RASS goal of 0 to -1 despite adequate management of pain. 4. Patients requiring neuromuscular blockage must have continuous infusions of analgesic and sedative agents. III. Responsibility: Director Respiratory Care Services and all Respiratory Care Practitioners with documented competency as well as Critical Care RN staff. General Guidelines    1. Introduction to Ventilator Plan Phase  a. Ventilator Management Phase, General Statement  -  The plan should be initiated in patients who have a secure airway/require invasive mechanical ventilation (endotracheal or tracheostomy) only  -   The provider determines the appropriate medications used for analgesia and agitation/anxiety along with the clinical pharmacist    2. Monitoring Levels of Comfort  a. Pain Assessment  - Pain is monitored using the numerical scales  - A pain assessment should be conducted, at a minimum, every 4 hours and as needed and per guidelines. - The level of pain should be determined as satisfactory by the patient based on patients baseline level of pain , considering any chronic pain that the patient may have. - If the patient is unable to communicate pain level, the nurse can assess for nonverbal indicators including facial grimacing, moaning, tachypnea, tachycardia, hypertension, diaphoresis, etc as a cue to begin further pain assessment.     b.  Sedation Assessment  - Sedation is monitored using the Mcduffie Agitation Sedation Scale (RASS)  Target RASS RASS Description   +4 Combative, violent, danger to staff     +3 Pulls or removes tubes(s) or catheters; aggressive   +2 Frequent nonpurposeful movement, fights ventilator   +1 Anxious, apprehensive, but not aggressive   0 Alert and calm   -1 Awakens to voice (eye opening/contact) > 10 sec   -2 Light sedation, briefly awakens to voice (eye opening/contact) < 10 sec   -3 Moderate sedation, movement or eye opening. No eye contact   -4 Deep sedation, no response to voice, but movement or eye opening to physical stimulation   -5 Unarousable, no response to voice or physical stimulation     -  Goal RASS is 0 to -1, unless otherwise specified by providers order.  - Nursing staff should conduct the RASS every 4 hours and as needed to maintain goal RASS of 0 to -1.  - If RASS is outside of goal range, discuss treatment options with provider.  - A RASS score of +2 to +4 requires further assessment by the nurse. Causes of agitation/anxiety that should be considered include:  a. Pulmonary -   endotracheal tube malposition or patency, mode of ventilation, pneumothorax, hypoxemia, hypercarbia  b. Metabolic - hypoglycemia, hyponatremia, acute renal or hepatic failure  c. Emotional upset - with information and awareness of critical condition, prognosis, need for surgical or invasive procedures, other interventions or complications, family or personal stressors  - C. Sedation Assessment while using Neuromusclar Blocking Agents  - D. Delirium Assessment  a. the ICU ([facility] - ICU)   3. Analgesia  The incidence of significant pain has been reported to be 50% or higher in both medical and surgical ICU patients. These patients also experience discomfort during routine/procedural ICU care and at rest.  However, patients may be unable to self-report their pain (either verbally or with other signs) because of an altered level of consciousness, the use of mechanical ventilation, or high doses of sedative agents or neuromuscular blocking agents.   The short and long term consequences of unrelieved or inadequately treated pain are significant and include patient discomfort, decreased satisfaction with care by family and patient, delirium, agitation/anxiety, post traumatic stress disorder and depression. Therefore, routine assessment and treatment of pain should occur in all ICU patients. Causes and Treatment of Pain in the ICU  a. Acute pain (post-operative, procedural pain, discomfort with usual ICU care or other acute episodes of pain-related to underlying disease)  1. Consider use of PCA for alert and oriented patients with pain needs not met by PRN dosing or opoids. 2. Preemptive analgesia should occur prior to chest tube removal, and should be considered for other procedural pain such as turning and repositioning, would drain removal, wound dressing change, tracheal suctioning, femoral catheter removal or place of central venous catheter. 3. Appropriate analgesic medications for preemptive analgesia are short acting intravenous (IV) agents (i.e. fentanyl, morphine, hydromorphone)  a. Administration of analgesia before patient experiences noxious stimuli prevents amplification and hyperexcitability of the central nervous system. b. Analgesia for Mechanically Ventilated Patients:  1. The approach to sedation and analgesia management for mechanically ventilated patients favors use of analgesia first sedation. The primary goal of this strategy is to address pain and discomfort first, and then if necessary, add anxiolytic agent. 2. Analgesia first sedation reduces dose escalation of medications, decreases the duration of mechanical ventilation and the incidence of VAP, improves the probability of successful extubation, and ultimately shortens ICU length of stay. 3. For pain management, analgesic medications are determined by the provider. Intermittent dosing of the analgesic should be attempted first.    If the patient requires more than 3 doses within 1 hour then provider should be contacted to consider continuous infusion. 4.  Analgesic options for mechanically ventilated patients include:  a. Fentanyl which is considered the drug of choice for patients requiring continuous infusion. b.Morphine may be considered for those patients without renal dysfunction who are hemodynamically stable and require intermittent pain medication. Continuous infusions of morphine may be used for patientl who are receiving comfort care as part of end of life care. c.Hydromorphone is reserved for patients who are refractory to fentanyl or morphine and is typically admininstered by intermittent dosing. 4.  Agitation/Anxiety    Background  Agitation and anxiety frequently occur in ICU patients. Anxiolytic/sedation agents may be indicated to help relieve discomfort, improve synchrony with mechanical ventilation, and decrease the overall work of breathing. Pain control alone may be sufficient to make patients comfortable enough to require no anxiolytic/sedative agent. In addition, non-pharmacologic interventions such as repositioning or verbal assurance may be helpful to comfort or redirect an agitated patient. If these methods are unsuccessful, then anxiolytic/sedative medications such as propofol, dexmedetomidine, or benzodiazepines can be used. Selection of an anxiolytic should be based on the pharmacokinetic properties of the medication, patient specific characteristics, and sedation goal.   However, nonbenzodiazepine sedatives (ie propofol or dexmedetomidine) may be preferable over benzodiazepines (ie midazolam or larazepam) due to more favorable outcomes such as delirum. Causes and Treatment of Agitation/Anxiety  a. Possible underlying causes of agitation and anxiety include pain, delirium, hypoxemia, hypoglycemia, hypotension, or withdrawal from alcohol  and other drugs. b. Analgesia first sedation should be attempted initially to manage pain and provide sedation in appropriate patients. Analgesia alone may be adequate to reach RASS goal of 0 to -1.   If patient remains agitated or anxious despite adequate analgesia (ie RASS +2 to +4) then anxiolytic/sedative should be considered. c. The choice of anxiolytic should be based on desired levels of sedation (ie light sedation or deep sedation) with preference for the use of nonbenzodiazepines such as propofol or dexmedetomidine if appropriate. While light sedation (ie RASS 0 to -1) is preferred for most patients, there are instances when deep sedation (ie RASS -4 to -5) is desired. For example, in the setting of ventilator dysynchrony due to ARDS or for patients receiving NMB agents. d. Medications to maintain light sedation (ie RASS 0 to -1) include  1. Propofol continuous infusion can be considered for hemodynamically stable (ie SBP = 100, MAP = 65 and/or not requiring vasopressor support) patients requiring light sedation. Propofol has a quick onset (1-2 minutes) and offset of action, making it a good agent to assess neurological status and facilitate liberation from the mechanical ventilator. 2.Dexmedetomidine continuous infusion is a good option for hemodynamically stable patients requiring light sedation as it allows for a more awake, interactive patient is associated with less delirium. It has an intermediate onset of action (5-10 min). Therefore, abrupt titrations should be avoided, but use of prn haloperidol or benzodiazepine may be useful to manage agitation until the medication takes effect. 3. Antipsychotics are another option. In particular, haloperidol intermittently dosed may be useful for patients with symptoms of agitation/anxiety and delirium. 4.Benzodiazapines can also be considered for light sedation, but should be intermittently doses. Midazolam is an option for patients without renal dysfunction. It has a short onset of action (2-5 minutes) making it a good agent for acute agitation/anxiety, but short duration of action resulting in frequent dosing. Lorazepam is another option.   It has a longer onset of action (15-20 minutes) in comparison to midazolam, but longer duration of action. e. Medications to maintain deep sedation (RASS -4 to -5) include:  1) Propofol continuous infusion should be considered as a first line option for hemodynamically stable patients. 2)Benzodiazepines can be considered as second line options for deep sedation. Studies comparing these agents to other sedatives have shown that they lead to worse outcomes including delirium, oversedation, delayed extubation, and longer time to discharge. Midazolam is one option for patients without renal dysfunction and lorazepam is another options. If patient requires more than 3 doses within 1 hour then contact provider  to consider initiation of continuous infusion. 5.  Daily Sedation Awakening Trial (SAT) from IV Continuous Analgesia/Sedation  a. Patients are to have daily awakening from sedation while on continuous IV analgesia and/or sedation in the ICU. Continuous analgesia infusions may be maintained only if needed for active pain and RASS is at goal 0 - -1. Unit guideline is for the SAT to occur following ICP rounds each morning.    b. The sedation awakening trial (SAT) is done regardless if the patient meets criteria for spontaneous breathing trial (SBT). c. SAT safety screen is assessed and SAT should not be performed if sedation is being used for active seizures, alcohol withdrawal, hemodynamically unstable or requiring support of vasoactive medications , in conjunction with NMB agents, if ICP is greater than  20mmHg or if sedation is being used to control ICP, patients RASS is +3 or +4 (very agitated or combative). Other exclusion criteria are:  if there is documentation of myocardial ischemia in the past 24 hours; or patient is receiving high frequency oscillator ventilation (HFOV) , if the patient has an open chest /abdomen or is receiving comfort care.   d. Criteria for passing the SAT are the patient opened their eyes to verbal stimuli or tolerated sedative interruption without exhibiting failure criteria.  e. Patients fail the SAT if the develop sustained anxiety, agitation, or pain; a respiratory rate of 35 per minutes for 5 minutes or longer, an SpO2 less than 88% for 5 minutes or longer; an acute cardiac dysrhythmia; two or more signs of respiratory distress including tachycardia, bradycardia; use of accessory muscles; diaphoresis; marked dyspnea; or myocardial ischemia. f. Respiratory therapy staff must verify with the nurse that continuous IV analgesia (unless being use  for active pain) and sedation (unless patient is receiving dexmedetomidine) is off prior to placing patient on SBT. g. DO NOT interrupt infusion of analgesia and sedation medications if patient is receiving neuromuscular blockade.  h. Monitor level of wakefulness unless patient is awake and follows commands (RASS 0 to -1) or patient becomes uncomfortable or agitated (RASS +3 to +4)  i. If agitation prevents successful awakening , administer bolus of analgesia and/or sedation then resume infusion of the medication at ½ previous dose and titrate as needed.  j. If oversedation prevents successful awakening, hole infusion until at goal and resume ½ of prior infusion rate/dose if clinically indicated. 6. Delirium  Background  Delirium is characterized by the acute onset of cerebral dysfunction with a change or fluctuation baseline  mental status, inattention, and either disorganized thinking or an altered level of consciousness. It affects up to 80% of mechanically ventilated adult ICU patients, and is associated with increased mortality,   and treatment is important and may in turn allow for a patient to be conscious yet cooperative enough to participate   in ventilator weaning trials and early mobilization efforts. Delirium can only be assessed in patients who are able to sufficiently interact and communicate with bedside  clinicians (ie RASS -3 to +4).     IV. Procedure:  A. Assessment: The following criteria must be assessed prior to the initiation of weaning from mechanical ventilation. Note: The criteria are general guidelines and must be individualized for each patient. The patients primary nurse will be responsible, in coordination with the RT, the Spontaneous Awakening Trial). The RT will perform the SBT. B. Spontaneous Awakening Trials (SATs - also referred to as Sedation Vacation) and Spontaneous Breathing Trials (SBTs) performed to determine readiness to wean. 1. For patients who meet established criteria, such as those without active seizures, alcohol withdrawal and agitation, myocardial ischemia or those requiring cardiac support devices, without increased intracranial pressure and those not receiving neuromuscular blockade, the nurse will reduce the infusions of sedative by 50% of current used for sedation that was used to achieve a level of light sedation (Villa Score 2 or RASS score of 0 to -1) and evaluate patient response to reduction of sedation. Analgesics required for pain control are continued during the test.  Obtain MD order to cover no SAT for that time period if patient has any exclusion criteria as described above. 2. Failure of the spontaneous awakening trial occurs when the patient shows symptoms such as increased agitation, anxiety, pain or signs of respiratory distress including respiratory rate >35/min or oxygen saturation <88% as well as development of acute cardiac arrhythmias. If these symptoms develop during the SAT, the nurse then restarts sedation at 75% of the previous dose and titrates the medications until the patient is comfortable and/or symptoms have abated. 3.  If the patient passes the SAT then the patient moves on to the Spontaneous Breathing Trials as performed by the RT.    The SBT Safety Screen included the following:  No agitation, oxygen saturation > 88%, FIO2 < 50%, PEEP < 7.5 cm H20, no myocardial ischemia, no vasopressor use, and with inspiratory efforts. 4. Patients who pass the spontaneous awakening trial but fail the spontaneous breathing trial are placed back on full ventilator support and reassessed the next day. 5. Failure of the SBT (spontaneous breathing trail) includes any of the following:  Respiratory rate > 35/min, respiratory rate < 8/min, oxygen saturation < 88%, respiratory distress, mental status change, acute cardiac arrhythmia. 6. Extubation is considered for patients who tolerate the spontaneous awakening trial and pass the spontaneous breathing trial.    C. Can the cause of respiratory failure be reversed (i.e. absence of high spinal cord injury or advanced ALS)? D. Is gas exchange adequate? 1. PaO2/FIO2 ratio > 150 - 200,  2. PEEP < 8 cm H20  3. FIO2 < 50  4. pH > 7.30  5. Rapid shallow breathing index (f/VT) < 105  E. Is patient hemodynamically stable? 1. Absence of clinically significant hypotension (minimal vasopressors such as Dopamine < 5mcg/kg/minute)? F. Is there evidence of intact respiratory drive (NIP/NIF >-41 [id number], stable VC02)? G. Does patient have an adequate cough, airway clearance ability? H. Is there absence of excessive secretions? V. Initiation:     A. The therapist shall consult with RN to determine if sedation can be discontinued or significantly decreased. If this can be achieved, the therapist shall implement the                     followin. Identify patient and verify name and account number via ID [email or web address]. 2. Perform hand hygiene per hospital policy utilizing Standard Precautions for all patients and following transmission-based isolation as indicated per hospital policy. 3. Perform a ONE-MINUTE SPONTANEOUS TRIAL AND ASSESSMENT. 4. Measure Rapid Shallow Breathing Index (RSBI) and monitor SpO2 and cardiovascular parameters during the spontaneous breathing assessment.   5. If SpO2 and cardiovascular parameters are stable, continue spontaneous breathing trial for at least 30 minutes and up to 120 minutes, as patient tolerates. 6. Monitor ventilatory status, SpO2, and cardiovascular status during spontaneous breathing trial.  7. If patient has a successful trial, consider patient as a candidate for extubation and obtain order. 8. If patient fails the weaning trial, place back on ventilator and adjust settings to provide a non-fatiguing form of ventilatory support for the remainder of the day and night. 9. One attempt at weaning shall be performed each day until successful weaning occurs. The RCP will make every attempt to begin the spontaneous breathing trials between 0500 and 0600 to provide documentation of the trial when the pulmonologist makes rounds. B.  Assessment of SBT or PST:  1. Is gas exchange acceptable? 2. PaO2 > 60 mm Hg. 3. PH > 7.30  4. Increase in PaCO2  < 10 mm Hg  C. Is patient hemodynamically stable? 1. HR < 120 beats/minute  2. HR  < 20%   3. Systolic BP < 935 and > 90 mmHg  4. BP  < 20%, no vasopressors required  D. Does patient have stable ventilatory pattern? 1. Sustained RR < 30 breaths per minute  2. Normal and stable VCO2  3. Patient is not demonstrating any signs of increased work of breathing, such as increased use of accessory muscles. E. Mental status stable throughout trial?  1. Absence of changes such as somnolence, excessive agitation or anxiety  2. Absence of diaphoresis during trial?  IV. Safety:    A. The RCP shall monitor patient according to the above guidelines. If at any time during the weaning process, the respiratory therapist or nurse feels that the patient is not tolerating weaning, the therapist shall place patient back on previous ventilator settings. B. The patient shall be reassessed and the weaning process should be continued the following day. V. Reportable Conditions:    A. The therapist shall notify the physician, as appropriate, for any of the following         conditions:  1.  FIO2 increase (sustained) at 10% or greater  2. Poor patient/ventilator interface in spite of adjustments  3. Need for increased sedation for respiratory distress  4. Need for increasing ventilating pressures (i.e. PEEP, PIP, MAP)  5. ABG results meeting panic value criteria or other clinical signs indicating deterioration of patients condition. 6. Unplanned extubation. 7. Unexplained sustained increase in PIP greater than 10 cm H2O.  8. Assessment results regarding ventilator discontinuance process. VI. Ventilator protocol management   A. The following items should be maintained for patients who are being mechanically           ventilated. 1. Obtain STAT Chest X-Ray for ET tube placement after insertion. 2. Chest X-Ray q a.m. while on ventilator. 3. ABGs 30 - 60 minutes after being stable on the ventilator. 4. ABG's q a.m. while on ventilator and prn.  5. Do spontaneous breathing trials when patient is hemodynamically stable, responsive, and without fever. 6. Terminate trials if patient exhibits signs of respiratory distress. 7. Therapists should maintain ABG s as follows:      a. pH -  7.30 - 7.50                   b. PaO2 -   60 - 100        8. Racemic Epinephrine (0.5cc) for post extubation stridor (2 UA treatments max.)    VII.   Early Mobilization    Mobility Level Criteria Start at Level 1 if:   PaO2/FIO2 <250   Positive end-expiratory Pressure (PEEP) >=10 cm H2O   O2 saturation <90%   Respiratory Rate (RR) Not within 10-30 per min   Cardiac arrhythmias or ischemia New onset   Heart Rate  (HR) <60 or >120 beats per min   Mean arterial pressure (MAP) <55 or >572 mmHg   Systolic blood pressure (SBP) <90 or > 180 mmHg   Vasopressor infusion New or increasing   Mcduffie Agitation Scale (RASS) < - 3       Level I:   Breathe  (Rass -5 to -3)  HOB Angle - improve VAP protocol compliance   Visually confirm the [facility] is elevated >= 30 degrees to comply with VAP prevention protocols   The Centers for Disease Control and Prevention recommends an HOB angle of 30-45 degrees , unless contraindicated  Additional activities to be implemented   Every 2 hour turning   Passive range of motion   Up to 20 degrees reverse trendelenburg with lower extremity exercise/retracting footboard   Continuous lateral rotation therapy can be considered part of early mobility therapy in patients who are at high risk for pulmonary complications  Move to Level 2 when the patient  - Has acceptable oxygenation/hemodynamics  - Tolerates q 2 turning  - Tolerates HOB > 30 degrees or up to 20 degrees reverse trendelenburg    Level 2 :Tilt  Patient Assessment Rass > -3  (eg, opens eyes, may have profound weakness)  Up to 20 degrees Reverse Trendelenburg position and 10 degrees minimum HOB  - Reverse Trendelenburg positioning allows for orthostatic position in fragile patients  - If available , use in conjunction with retracting foot section to allow for partial weight bearing prior to sitting up in the bed or getting out of bed  Additional activities to be implemented  -  Maintain HOB >/= 30 degrees  - Q 2 hour turning  - Passive/active range of motion  - Legs dependent  - PT consultation       Move to Level 3 when the patient . .    -Tolerates active- assistance exercises 2 times per day    -Tolerates lower extremity exercises against footboard/Up to 20 degrees Reverse Trendelenburg    -Tolerates legs dependent /HOB 45 degrees  Level 3 :  SIT  (Rass >- 1 (eg , weak but may move arms/legs independently)   Full chair position (footboard on)   Full upright positioning allows for diaphragmatic excursion and lung expansion   Sitting with legs in a dependent position facilitates gas exchange  Additional activities to be implemented  - Maintain HOB >= 30 degrees  - Q 2 hour turning (assisted)  - Active range of motion - PT/OT actively involved  - Encourage activities of daily living  - Dangling, if patient can move arm against gravity  Move to Level 4 when the patient . .  - Tolerates increasing active exercise in bed  - Actively assists with every- 2- hour turning or turns independently  - Tolerates full chair position 3 times/day  Level 4:  Stand ( RASS >0 (eg, weak but may tolerate increased activity)      Stand Attempts   Full chair position (footboard off/feet on the floor)   Consider using a sit-to-stand lift   Pivot to chair, it tolerates partial weight bearing  Additional activities to be implemented  - Maintain head of bed >= 30 degrees  - Q 2 hr turning (self/assisted)  - Active range of motion  - Encourage activities of daily living  - PT/OT actively involved      Move to Level 5 when the patient .  - Can successfully comply with all activities  - Tolerates trial periods of full chair position (footboard off/feet on floor) 3 times per day  - Tolerates partial weight-bearing stand and pivots to chair    Level 5 :  Move  (RASS > 0    (eg, weak but may tolerate increased activity)   Achieve out of bed   Utilize mobile floor life to ambulate to bedside chair  Additional activities to be implemented  - Maintain HOB > = 30 degrees  - Q 2 hour turning (self/assisted)  - Active range of motion  - Patient stands/bears weight > 1 minute  - Patient marches in place  - PT/OT actively involved    Patient continues to ambulate progressively longer distances as tolerated until they consistently participate and move independently.         E Approved by [address] 2-19-09   N [facility] Clinical Guidelines             ANASTASIA ZAVALETA [facility] Flowsheet Content Variables to select when addressing section Comments   ANASTASIA Initiated  Yes/No  RN to address minimum q 24 hours (day shift)   Target RASS  0 = alert and oriented   -1 = drowsy   -2 = light sedation   -3= moderate sedation   -4= deep sedation Target on standard ventilator setting should be -2; -4 with oscillator   CAM -ICU  Positive   Negative   Unable to assess Delirium assessment   SAT Safety Screen Passed  Yes   No Select yes if proceeding on to the sedation vacation (reduction of continuous sedative drip by directed by MD)  Select no if your patient has any of the below reasons for not proceeding on to the daily awakening sedation vacation trial   SAT Screen for Failure  Active seizures   Acute delirium tremors   Agitation that threatens accidental line/tube removal   On paralytics   MI (24-48hr)   Abnormal ICP   Open abdomen Select one of the options when the patient will not undergo the sedation vacation - ALSO MUST OBTAIN AN ORDER FOR no SEDATION VACATION written under nursing miscellaneous for now by either the NP or Intensivist   Daily sedation Vacation/assessment of   Yes   No   Not applicable If yes, MUST see the reduction in sedation on the [facility] and please place in the comment section of the sedative sedation vacation started   SBT Safety Screen Passed  Yes   No Select Yes if the patient has none of the below listed reasons for not proceeding on to the SBT following reduction of sedation   SBT Screen Reason for Failure  Agitation   O2 Sat < or = 88%   FIO2 > 50%   PEEP >7   MI   Vasopressor Use   Bilevel setting on Vent   Oscillator in use   Increased resp effort Select reason as appropriate for NOT proceeding on to the SBT                                               Wake Up and Breathe Protocol      05/2013

## 2021-06-10 NOTE — PROGRESS NOTES
IV heparin rate has been adjusted based on the most recent PTT results. Heparin gtt now infusing at 7units/kg/hr. Orders received to give an additional 5000 units per Dr. Speedy Boucher.       Lab Results   Component Value Date/Time    aPTT 46.2 (H) 06/10/2021 09:04 AM

## 2021-06-10 NOTE — PROGRESS NOTES
Bedside and verbal shift change report received from  6 [facility] RN (offgoing nurse). Report included the following information SBAR, Kardex, ED Summary, Procedure Summary, Intake/Output, MAR, Recent Results, Med Rec Status, Cardiac Rhythm NSR and Alarm Parameters .      Dual skin assessment completed at bedside: N/A (list pertinent skin assessment findings)    Dual verification of gtts completed (name of gtts verified): Heparin, Fentanyl

## 2021-06-10 NOTE — PROGRESS NOTES
PT Note    Attempted to see pnt this AM for therapy, but RN advised hold due to difficulty w/ sedation. Will continue to follow.      Thank you,  Sherri Pena, PT, DPT

## 2021-06-10 NOTE — PROCEDURES
Indication:Severe decompensation in the work of breathing , severe respiratory distress     Time out done and correct patient identified. Now using MAC BLADE 4 Noted vocal cords and patient intubated with size 8 ETT. Noted Change in CO2 detector. Patient with B/L breath sounds on auscultation. No sounds noted over abdomen. Patient tolerated procedure Well no complications. Lip line for ETT was 24* cm. CXR ordered. Estimated Blood loss: 00.00    Wendie Laboy MD    Electronically signed.

## 2021-06-10 NOTE — PROGRESS NOTES
Occupational Therapy Note:    OT treatment attempted this a.m. Patient experienced a decline in medical status that required intubation overnight. Discussed POC with RN. RN requesting to hold OT treatment for today and check back tomorrow.     Whitley Herring, OTR/L, CLT

## 2021-06-10 NOTE — PROGRESS NOTES
Bedside, Verbal and Written shift change report given to Khai Pérez (oncoming nurse) by Elzbieta Guerrero (offgoing nurse). Report included the following information SBAR, Kardex, ED Summary, Intake/Output, MAR, Recent Results, Med Rec Status, Cardiac Rhythm NSR w/ PVCs, Alarm Parameters , Pre Procedure Checklist, Quality Measures and Dual Neuro Assessment.

## 2021-06-10 NOTE — PROGRESS NOTES
Discussed with pulmonary Dr. Martinez Spencer, will sign off as on VENT and can resume care when more stable  Audrey Hdz MD

## 2021-06-10 NOTE — PROGRESS NOTES
Pt remains intubated and ETT is secured at the 27 cm vijay at the lip and on the left side. Breath sounds are coarse. Trachea is midlineChest excursion is symmetric Sub Q  Negative . Peripheral pulse present yes Edema Negative. Capillary refill 3, skin color Warm, moist.  Vent check completed and all alarms are set and audible, safety rales up, resus bag is at the [facility].

## 2021-06-10 NOTE — PROGRESS NOTES
Jannie Leonard. Admission Date: 6/8/2021             Daily Progress Note: 6/10/2021    The patient's chart is reviewed and the patient is discussed with the staff. Patient is O 36 y.o.  male seen and evaluated at the request of Dr. Brisa Mcdowell has history of CML, non-small cell lung cancer in 2018 and small cell lung cancer metastasized to liver discovered in 3/2020 currently on palliative chemotherapy, pulmonary emboli on Xarelto, advanced CHF, CKD stage III, debility who presented with 2 to 3 days history of difficulty in breathing and laying flat. Patient reported being in his usual health until 3 to 4 days ago when he noticed sudden onset of worsening shortness of breath which is progressively getting worse.  He does report of cough which is mostly dry. He denies having any fever, chills.  He does report having palpitations, excessive fatigue and weakness, labored breathing.  He denies having any chest pain, nausea vomiting abdominal pain or headache. ER work-up remarkable for chest x-ray showing bilateral infiltrate suggestive of pneumonia along with pleural effusion, blood work remarkable for lactic acid 4.4, creatinine 2.13 (baseline), heart rate ranging from 120-135 irregular, respiratory rate ranging from 30s to 40s.  ABG 7.39/29/39 on FiO2 0.1.     Had issues in the past with pneumonitis, but with evidence recently of both active small and non-small cell cancer he elected for rechallenge with carbo etoposide atezolizumab while continue osimertinib and monitor pneumonitis symptoms when last seen by oncology 5/26. Treated 6/5 most recently.      He was supposed to undergo PleurX placement 2 weeks ago but it was delayed due to starting Xerelto. He has not had a thoracentesis in over a month.      He was transferred to [facility] ICU for ongoing care and support. He arrives hemodynamically stable on BIPAP FiO2 35%, but RR remains in 45s.  He is able to provide history and answer questions appropriately.     Subjective:     Patient was intubated last night due to increased work of breathing that failed to improve with bipap. K elevated at 6.1 treated with kayexalate. 5.3 on repeat this morning. This morning is on PRVC 550, RR 22, PEEP 8 FiO2 30%. Was having intermittent runs of v tach yesterday, on amio drip. Current Facility-Administered Medications   Medication Dose Route Frequency    fentaNYL citrate (PF) injection 50 mcg  50 mcg IntraVENous Q4H PRN    methylPREDNISolone (PF) (SOLU-MEDROL) injection 40 mg  40 mg IntraVENous Q8H    dextrose 40% (GLUTOSE) oral gel 1 Tube  15 g Oral PRN    glucagon (GLUCAGEN) injection 1 mg  1 mg IntraMUSCular PRN    dextrose (D50W) injection syrg 12.5-25 g  25-50 mL IntraVENous PRN    insulin lispro (HUMALOG) injection 0-10 Units  0-10 Units SubCUTAneous AC&HS    amiodarone (CORDARONE) 450 mg in dextrose 5% 250 mL infusion  1 mg/min IntraVENous TITRATE    0.9% sodium chloride infusion 250 mL  250 mL IntraVENous PRN    fentaNYL in normal saline (pf) 25 mcg/mL infusion  0-200 mcg/hr IntraVENous TITRATE    propofol (DIPRIVAN) 10 mg/mL infusion  0-50 mcg/kg/min IntraVENous TITRATE    sodium chloride (NS) flush 5-40 mL  5-40 mL IntraVENous Q8H    sodium chloride (NS) flush 5-40 mL  5-40 mL IntraVENous PRN    pantoprazole (PROTONIX) tablet 40 mg  40 mg Oral Q24H    levalbuterol (XOPENEX) nebulizer soln 1.25 mg/3 mL  1.25 mg Nebulization Q4H RT    ondansetron (ZOFRAN) injection 4 mg  4 mg IntraVENous Q4H PRN    midodrine (PROAMATINE) tablet 10 mg  10 mg Oral TID WITH MEALS    piperacillin-tazobactam (ZOSYN) 4.5 g in 0.9% sodium chloride (MBP/ADV) 100 mL MBP  4.5 g IntraVENous Q8H    heparin 25,000 units in dextrose 500 mL infusion  18-36 Units/kg/hr IntraVENous TITRATE    phenol throat spray (CHLORASEPTIC) 1 Spray  1 Spray Oral PRN       Review of Systems  Unobtainable due to patient status.     Objective:     Vitals: 06/10/21 0859 06/10/21 0900 06/10/21 0902 06/10/21 0946   BP:  99/67     Pulse: 85  86 82   Resp: 27  23 26   Temp: 97.7 °F (36.5 °C)  97.5 °F (36.4 °C)    SpO2: 97%  97% 97%   Weight:       Height:         Intake and Output:   06/08 1901 - 06/10 0700  In: 3406.3 [I.V.:3108]  Out: 1915 [Urine:1915]  No intake/output data recorded. Physical Exam:   Constitution:  the patient is well developed and in no acute distress  EENMT:  Sclera clear, pupils equal, oral mucosa moist  Respiratory: Intubated, mild B crackles  Cardiovascular:  RRR without M,G,R  Gastrointestinal: soft and non-tender; with positive bowel sounds. Musculoskeletal: warm without cyanosis. There is no lower leg edema. Skin:  no jaundice or rashes, no wounds   Neurologic: sedated     Psychiatric:  sedated    CHEST XRAY:   CXR Results  (Last 48 hours)               06/09/21 2213  XR CHEST PORT Final result    Impression:      1. ENDOTRACHEAL TUBE IN EXPECTED POSITION WITHOUT DEFINITE PNEUMOTHORAX OR   OTHER SIGNIFICANT CHANGE FROM CT TODAY. 2.  NASOGASTRIC TUBE PROXIMAL SIDEHOLE IS JUST BELOW THE GASTROESOPHAGEAL   JUNCTION. ENHANCEMENT BY APPROXIMATELY 10 CM IS SUGGESTED. Narrative:  PORTABLE CHEST, June 9, 2021 at 2200 hours,   PORTABLE ABDOMEN, June 9, 2021 at 2155 hours:       CLINICAL HISTORY:  Follow-up intubation and tube placement. COMPARISON:  Portable chest yesterday and CT today. CHEST FINDINGS:  AP semi-erect image demonstrates is of inhomogeneous   edema/infiltrate bilaterally. Endotracheal tube tip overlies the mid trachea. Mediport catheter remains in place. The heart remains enlarged. No definite   pneumothorax. There are overlying radiopaque support devices. ABDOMEN FINDINGS: AP supine images demonstrates the proximal sidehole of the   nasogastric tube just below the gastroesophageal junction. No dilated bowel   loops of the epigastrium.                  LAB  No lab exists for component: [place]   Recent Labs     06/10/21  0314 06/09/21  2014 06/09/21  0255 06/08/21  1405 06/08/21  0546   WBC 7.3  --  6.2 10.1 10.7   HGB 9.2* 8.6* 7.1* 8.2* 8.7*   HCT 28.9* 27.0* 22.6* 25.1* 27.1*   PLT 94*  --  96* 163 202     Recent Labs     06/10/21  0613 06/10/21  0313 06/09/21  2014 06/09/21  0255 06/08/21  0545   NA  --  134* 136 137  --    K 5.3* 6.1* 4.8 5.0  --    CL  --  104 104 104  --    CO2  --  20* 21 23  --    GLU  --  188* 206* 190*  --    BUN  --  74* 66* 60*  --    CREA  --  3.29* 2.76* 2.39*  --    MG  --  3.1*  --  2.5* 2.3   CA  --  8.1* 8.1* 7.7*  --    PHOS  --   --   --  5.7*  --    ALB  --  2.6* 2.7* 2.5*  --      ABG:    Lab Results   Component Value Date/Time    PHI 7.19 (LL) 06/10/2021 03:47 AM    PCO2I 48.7 (H) 06/10/2021 03:47 AM    PO2I 226 (H) 06/10/2021 03:47 AM    HCO3I 18.7 (L) 06/10/2021 03:47 AM    FIO2I 50 06/10/2021 03:47 AM         MICRO    SARS-CoV-2 LAB Results  [facility] Test: No results found for: COV2NT   [facility] Test: No results found for: EDPR  [facility] Test: No components found for: [id number]         Assessment:  (Medical Decision Making)     Hospital Problems  Date Reviewed: 5/26/2021        Codes Class Noted POA    Moderate protein-calorie malnutrition ([facility] ICD-10-CM: E44.0  ICD-9-CM: 263.0  6/10/2021 Yes        TIFFANIE (acute kidney injury) ([facility] ICD-10-CM: N17.9  ICD-9-CM: 584.9  6/10/2021 Unknown        Acute liver failure without hepatic coma ICD-10-CM: K72.00  ICD-9-CM: 570  6/10/2021 Unknown        Other pulmonary embolism without acute cor pulmonale (HCC) ICD-10-CM: I26.99  ICD-9-CM: 415.19  6/10/2021 Unknown        Acute respiratory failure with hypoxia (HCC) ICD-10-CM: J96.01  ICD-9-CM: 518.81  6/8/2021 Unknown        Hospital-acquired bacterial pneumonia ICD-10-CM: J15.9  ICD-9-CM: 482.9  6/8/2021 Unknown        Bilateral pleural effusion ICD-10-CM: J90  ICD-9-CM: 511.9  6/8/2021 Unknown        Lactic acidosis ICD-10-CM: E87.2  ICD-9-CM: 276.2  6/8/2021 Unknown * (Principal) Severe sepsis ([facility] ICD-10-CM: A41.9, R65.20  ICD-9-CM: 038.9, 995.92  6/8/2021 Unknown        Tachycardia ICD-10-CM: R00.0  ICD-9-CM: 785.0  6/8/2021 Unknown        SCLC (small cell lung carcinoma), right (HCC) ICD-10-CM: C34.91  ICD-9-CM: 162.9  3/13/2020 Yes        Immunocompromised ([facility] ICD-10-CM: D84.9  ICD-9-CM: 279.9  11/16/2018 Yes        Stage IV adenocarcinoma of lung (HCC) ICD-10-CM: C34.90  ICD-9-CM: 162.9  7/11/2018 Yes        Pleural effusion ICD-10-CM: J90  ICD-9-CM: 511.9  4/27/2018 Unknown        Chronic systolic congestive heart failure (HCC) ICD-10-CM: I50.22  ICD-9-CM: 428.22, 428.0  5/3/2017 Yes        Depression ICD-10-CM: F32.9  ICD-9-CM: 882  11/2/2012 Yes        CML (chronic myeloid leukemia) ([facility] ICD-10-CM: C92.10  ICD-9-CM: 205.10  8/25/2012 Yes    Overview Addendum 8/26/2012  7:23 AM by Tim Marley MD     8/25/12. Probable diagnosis. Will do bone marrow exam today  8/26/12. Bone marrow aspiration and biopsy done. Marrow aspirate and peripheral blood sent for flow, cytogenetics and molecular testing. No complications with the procedure                   59 y/o male with CML, active SCLC and NSCLC on palliative therapy, severe systolic HR with EF 33% prior pneumonitis on immunotherapy, recently rechallenged, malignant effusions, recent segmental B lower lobe PEs on Xarelto and recently with evidence of progression of both lung cancers. Now intubated with worsening respiratory failure, renal failure, liver failure. Plan:  (Medical Decision Making)   -consult nephrology due to worsening renal failure .  -d/c tylenol with live failure. May need to consult GI if continues to worsen. -increase RR on vent with combination respiratory and metabolic acidosis. -with B infiltrates on CXR ddx includes pneumonitis, pulmonary edema, and infection.  Seems reasonable to covid swab to rule this out and repeat TTE to see if further reduction in EF from last check.   -cont zosyn. Stop vanc with no GPC on cultures. -cont IV steroids to cover possible pneumonitis. -on heparin drip for PE's while in critical care setting.   -plts dropping. Check hit panel.   -hold toprol with borderline BP's.   -will need palliative to cont to talk to family now with multisystem organ failure on top of multiple progressing cancers. Chances of recovery are dwindling.   -start tube feeds. -pantoprazole. Full code currently. Critically ill patient with complicated medical issues and high risk of further decompensation.  Total critical care time spent thus far (exclusive of procedures): 42 minutes        Jhony Esquivel MD

## 2021-06-10 NOTE — PROGRESS NOTES
Dr. Bach  notified about the patient having increased work of breathing, Respiratory rate in the 40s-50s and desating into the high 70s/low 80s even on bipap. Notified about the patient going into NSR w/ bigeminy PVCs on the monitor again. MD to come to bedside to intubate. Called the wife and updated her on patient status. Spoke to her about needing to intubate this patient at this time. Will update again after procedure is done.

## 2021-06-10 NOTE — PROGRESS NOTES
ETT advanced 1cm  to 27 @ teeth per Chest Xray and Dr. Janak Dseai. 06/09/21 2220   Patient Observations   Pulse (Heart Rate) 91   Resp Rate 18   O2 Sat (%) 100 %   Airway - Endotracheal Tube 06/09/21 Oral   Placement Date/Time: 06/09/21 2148   Number of Attempts: 1  Inserted By: Janak Desai  Location: Oral  Placement Verified: Auscultation;BBS;Chest x-ray;EtCO2  Airway Tube Size: 8 mm  Anesthesia ETT Insertion Depth: (c) 26 cm  Anesthesia ETT Line Chris: Teeth   Insertion Depth (cm) 27 cm  (Tube advanced 1cm per CXR / Ismail)   Line Chris Teeth   Side Secured Centered   Cuff Pressure 30 cmH20  (MOV)   Site Assessment Clean, dry, & intact   Respiratory   Respiratory (WDL) X   Patient on Vent Yes - If patient is on vent, add Doc Flowsheet Ventilator ().    Respiratory Pattern Other (comment)  (Assisted)   Chest/Tracheal Assessment Chest expansion, symmetrical   Breath Sounds Bilateral Diminished   Ventilator Measurements   Resp Rate Observed 18

## 2021-06-10 NOTE — CONSULTS
GEN GENERIC H&P/CONSULT    Subjective:   Patient is H 13 y.o.  male with PMH of CML, non-small cell lung cancer in 2018 and small cell lung cancer metastasized to liver discovered in 3/2020, received a palliative treatment with carbo etoposide atezolizumab and osimertinib: 6/5 most recently chemotherapy, pulmonary emboli on Xarelto, advanced CHF, CKD stage III. He presented with 3 to 4 days history of worsening SOB and dry cough. He denied having any fever, chills, CP. He was found to be hypoxic with bilateral infiltrates on chest xray. First, he was placed on BIPAP then required tracheal intubation with mechanical ventilation. He received [address] [place] [address] Street for possible pneumonia. Renal consult requested because of oliguric TIFFANIE. Ref. Range 1/27/2020 00:00 2/24/2020 00:00 3/13/2020 00:00 3/28/2020 14:51 3/29/2020 04:36 3/30/2020 05:08 3/31/2020 05:30 4/6/2020 08:56 4/27/2020 09:21 5/18/2020 08:47 6/8/2020 09:58 6/29/2020 09:11 7/6/2020 10:54 7/27/2020 09:21 8/17/2020 09:35 9/8/2020 09:11 9/28/2020 09:10 10/5/2020 12:56 10/19/2020 13:00 10/26/2020 11:25 11/16/2020 07:50 12/7/2020 13:29 12/28/2020 10:38 1/18/2021 14:07 2/8/2021 09:34 3/1/2021 13:21 4/13/2021 09:14 5/4/2021 10:08 5/26/2021 14:17 6/3/2021 09:24 6/8/2021 05:46 6/9/2021 02:55 6/9/2021 20:14 6/10/2021 03:13   Creatinine Latest Ref Range: 0.8 - 1.5 MG/DL 1.29 (H) 1.61 (H) 1.32 (H) 1.38 1.42 1.06 1.25 1.52 (H) 1.62 (H) 1.60 (H) 1.70 (H) 1.80 (H) 1.80 (H) 1.90 (H) 2.30 (H) 2.50 (H) 2.70 (H) 2.50 (H) 2.40 (H) 2.60 (H) 2.40 (H) 2.70 (H) 2.30 (H) 2.30 (H) 2.60 (H) 2.20 (H) 2.70 (H) 2.50 (H) 2.30 (H) 2.50 (H) 2.13 (H) 2.39 (H) 2.76 (H) 3.29 (H)      Ref.  Range 6/10/2021 03:13   Sodium Latest Ref Range: 138 - 145 mmol/L 134 (L)   Potassium Latest Ref Range: 3.5 - 5.1 mmol/L 6.1 (HH)   Chloride Latest Ref Range: 98 - 107 mmol/L 104   CO2 Latest Ref Range: 21 - 32 mmol/L 20 (L)   Anion gap Latest Ref Range: 7 - 16 mmol/L 10   Glucose Latest Ref Range: 65 - 100 mg/dL 188 (H)   BUN Latest Ref Range: 8 - 23 MG/DL 74 (H)   Creatinine Latest Ref Range: 0.8 - 1.5 MG/DL 3.29 (H)   Calcium Latest Ref Range: 8.3 - 10.4 MG/DL 8.1 (L)   Magnesium Latest Ref Range: 1.8 - 2.4 mg/dL 3.1 (H)   GFR est non-AA Latest Ref Range: >60 ml/min/1.73m2 20 (L)   GFR est AA Latest Ref Range: >60 ml/min/1.73m2 24 (L)   Bilirubin, total Latest Ref Range: 0.2 - 1.1 MG/DL 1.3 (H)   Protein, total Latest Ref Range: 6.3 - 8.2 g/dL 6.6   Albumin Latest Ref Range: 3.2 - 4.6 g/dL 2.6 (L)   Globulin Latest Ref Range: 2.3 - 3.5 g/dL 4.0 (H)   A-G Ratio Latest Ref Range: 1.2 - 3.5   0.7 (L)   ALT Latest Ref Range: 12 - 65 U/L 625 (H)   AST Latest Ref Range: 15 - 37 U/L 910 (H)   Alk. phosphatase Latest Ref Range: 50 - 136 U/L 94      Ref. Range 6/9/2021 02:55 6/10/2021 03:14   Vancomycin, random Latest Units: UG/ML 15.2 13.0      Ref. Range 6/10/2021 03:14   WBC Latest Ref Range: 4.3 - 11.1 K/uL 7.3   RBC Latest Ref Range: 4.23 - 5.6 M/uL 3.48 (L)   HGB Latest Ref Range: 13.6 - 17.2 g/dL 9.2 (L)   HCT Latest Ref Range: 41.1 - 50.3 % 28.9 (L)   MCV Latest Ref Range: 79.6 - 97.8 FL 83.0   MCH Latest Ref Range: 26.1 - 32.9 PG 26.4   MCHC Latest Ref Range: 31.4 - 35.0 g/dL 31.8   RDW Latest Ref Range: 11.9 - 14.6 % 15.3 (H)   PLATELET Latest Ref Range: 150 - 450 K/uL 94 (L)   MPV Latest Ref Range: 9.4 - 12.3 FL 10.1   NEUTROPHILS Latest Ref Range: 43 - 78 % 86 (H)   LYMPHOCYTES Latest Ref Range: 13 - 44 % 2 (L)   MONOCYTES Latest Ref Range: 4.0 - 12.0 % 0 (L)   EOSINOPHILS Latest Ref Range: 0.5 - 7.8 % 0 (L)   BASOPHILS Latest Ref Range: 0.0 - 2.0 % 1   IMMATURE GRANULOCYTES Latest Ref Range: 0.0 - 5.0 % 11 (H)   DF Latest Units:   AUTOMATED   ABSOLUTE NRBC Latest Ref Range: 0.0 - 0.2 K/uL 0.00   ABS. NEUTROPHILS Latest Ref Range: 1.7 - 8.2 K/UL 6.3   ABS. IMM. GRANS. Latest Ref Range: 0.0 - 0.5 K/UL 0.8 (H)   ABS. LYMPHOCYTES Latest Ref Range: 0.5 - 4.6 K/UL 0.1 (L)   ABS.  MONOCYTES Latest Ref Range: 0.1 - 1.3 K/UL 0.0 (L)   ABS. EOSINOPHILS Latest Ref Range: 0.0 - 0.8 K/UL 0.0   ABS. BASOPHILS Latest Ref Range: 0.0 - 0.2 K/UL 0.1       Past Medical History:   Diagnosis Date    Acute liver failure without hepatic coma 6/10/2021    Anxiety     Arthritis     Atrial fibrillation with RVR (HCC) 6/8/2021    Chronic systolic congestive heart failure ([facility] 5/3/2017    CML (chronic myeloid leukemia) ([facility] 8/25/2012    Depression 11/2/2012    Erectile dysfunction     Hypertension     Leukemia, acute ([facility] 8/24/2012    Lung cancer ([facility] 2018    Pulmonary emboli ([facility] 1/6/2019      Past Surgical History:   Procedure Laterality Date    HX HERNIA REPAIR      left inguinal 1996    HX VASCULAR ACCESS      IR INSERT TUNL CVC W PORT OVER 5 YEARS  3/11/2020      Prior to Admission medications    Medication Sig Start Date End Date Taking? Authorizing Provider   rivaroxaban (Xarelto) 20 mg tab tablet Take 1 Tablet by mouth daily. 6/3/21   Miguel Angel Ventura MD   prochlorperazine (Compazine) 10 mg tablet Take 1 Tablet by mouth every six (6) hours as needed for Nausea. 5/26/21   Miguel Angel Ventura MD   ondansetron hcl (ZOFRAN) 8 mg tablet TAKE 1 TABLET BY MOUTH EVERY 8 HOURS AS NEEDED FOR NAUSEA 5/26/21   Miguel Angel Ventura MD   traMADoL Chris Kim) 50 mg tablet Take 1-2 Tabs by mouth every six (6) hours as needed for Pain for up to 30 days. Max Daily Amount: 400 mg. 5/11/21 6/10/21  Miguel Angel Ventura MD   osimertinib (Tagrisso) 80 mg tablet Take 1 Tab by mouth daily. 4/15/21   Miguel Angel Ventura MD   furosemide (LASIX) 40 mg tablet Take 1 Tab by mouth daily as needed for Other (weight gain > 5 lbs in 48 hours. ). 1/28/21   Walker Ferguson MD   metoprolol succinate (TOPROL-XL) 25 mg XL tablet Take 0.5 Tabs by mouth daily.  Indications: chronic heart failure 11/5/20   Walker Ferguson MD   folic acid (FOLVITE) 1 mg tablet TAKE 1 TABLET BY MOUTH EVERY DAY  Patient not taking: Reported on 5/26/2021 6/5/20   Miguel Angel Ventura MD lidocaine-prilocaine (EMLA) topical cream Apply  to affected area as needed for Pain. Apply to port site 45-60 minutes prior to port access. Cover with plastic. 3/2/20   Latricia Husain MD   albuterol (PROVENTIL HFA, VENTOLIN HFA, PROAIR HFA) 90 mcg/actuation inhaler Take 2 Puffs by inhalation every four (4) hours as needed for Wheezing. 9/3/19   Sheila Pablo MD   benzonatate (TESSALON) 200 mg capsule Take 1 Cap by mouth three (3) times daily as needed for Cough. 9/3/19   Sheila Pablo MD   triamcinolone (NASACORT) 55 mcg nasal inhaler 2 Sprays by Both Nostrils route two (2) times a day. Patient not taking: Reported on 5/26/2021 1/28/19   Latricia Husain MD   OXYGEN-AIR DELIVERY SYSTEMS by Does Not Apply route. 3LPM PRN    Provider, Historical     No Known Allergies   Social History     Tobacco Use    Smoking status: Never Smoker    Smokeless tobacco: Never Used   Substance Use Topics    Alcohol use: No      Family History   Problem Relation Age of Onset    Lung Disease Father 79        Black lung\"    Cancer Mother 72        pancreatic cancer          Review of Systems        Objective:       Visit Vitals  BP (!) 88/63   Pulse 79   Temp 96.8 °F (36 °C)   Resp 26   Ht 6' 1\" (1.854 m)   Wt 78.1 kg (172 lb 2.9 oz)   SpO2 100%   BMI 22.72 kg/m²       06/10 0701 - 06/10 1900  In: -   Out: 60 [Urine:60]  06/08 1901 - 06/10 0700  In: 3406.3 [I.V.:3108]  Out: 1945 [LBMVD:0234]    PE:  On vent.   Lung: few bronchi   CV: RR, no rub  Abd: soft, no rebound  Ext: trace edema     Data Review:     Recent Results (from the past 24 hour(s))   GLUCOSE, POC    Collection Time: 06/09/21 11:54 AM   Result Value Ref Range    Glucose (POC) 290 (H) 65 - 100 mg/dL    Performed by Gigi    TYPE & SCREEN    Collection Time: 06/09/21 12:11 PM   Result Value Ref Range    Crossmatch Expiration 06/12/2021,2359     ABO/Rh(D) B POSITIVE     Antibody screen NEG     Unit number [id number]     Blood component type RC LR     Unit division 00     Status of unit TRANSFUSED     Crossmatch result Compatible    RBC, ALLOCATE    Collection Time: 06/09/21  1:00 PM   Result Value Ref Range    HISTORY CHECKED? Historical check performed    HEPARIN XA UFH    Collection Time: 06/09/21  1:38 PM   Result Value Ref Range    Heparin Xa UFH >1.1 (H) 0.3 - 0.7 IU/mL   GLUCOSE, POC    Collection Time: 06/09/21  5:28 PM   Result Value Ref Range    Glucose (POC) 237 (H) 65 - 100 mg/dL    Performed by Gigi    HGB & HCT    Collection Time: 06/09/21  8:14 PM   Result Value Ref Range    HGB 8.6 (L) 13.6 - 17.2 g/dL    HCT 27.0 (L) 41.1 - 27.7 %   METABOLIC PANEL, COMPREHENSIVE    Collection Time: 06/09/21  8:14 PM   Result Value Ref Range    Sodium 136 136 - 145 mmol/L    Potassium 4.8 3.5 - 5.1 mmol/L    Chloride 104 98 - 107 mmol/L    CO2 21 21 - 32 mmol/L    Anion gap 11 7 - 16 mmol/L    Glucose 206 (H) 65 - 100 mg/dL    BUN 66 (H) 8 - 23 MG/DL    Creatinine 2.76 (H) 0.8 - 1.5 MG/DL    GFR est AA 30 (L) >60 ml/min/1.73m2    GFR est non-AA 25 (L) >60 ml/min/1.73m2    Calcium 8.1 (L) 8.3 - 10.4 MG/DL    Bilirubin, total 1.1 0.2 - 1.1 MG/DL    ALT (SGPT) 250 (H) 12 - 65 U/L    AST (SGOT) 260 (H) 15 - 37 U/L    Alk.  phosphatase 104 50 - 136 U/L    Protein, total 6.8 6.3 - 8.2 g/dL    Albumin 2.7 (L) 3.2 - 4.6 g/dL    Globulin 4.1 (H) 2.3 - 3.5 g/dL    A-G Ratio 0.7 (L) 1.2 - 3.5     GLUCOSE, POC    Collection Time: 06/09/21  8:18 PM   Result Value Ref Range    Glucose (POC) 222 (H) 65 - 100 mg/dL    Performed by Briseida    HEPARIN XA UFH    Collection Time: 06/09/21 10:13 PM   Result Value Ref Range    Heparin Xa UFH >1.1 (H) 0.3 - 0.7 IU/mL   BLOOD GAS, ARTERIAL POC    Collection Time: 06/09/21 11:01 PM   Result Value Ref Range    Device: ADULT VENT      FIO2 (POC) 100 %    pH (POC) 7.21 (LL) 7.35 - 7.45      pCO2 (POC) 43.8 35 - 45 MMHG    pO2 (POC) 618 (H) 75 - 100 MMHG    HCO3 (POC) 17.4 (L) 22 - 26 MMOL/L    sO2 (POC) 100.0 (H) 95 - 98 %    Base deficit (POC) 9.8 mmol/L    Mode Pressure regulated volume control      Tidal volume 550 ml    PEEP/CPAP (POC) 8 cmH2O    Mean Airway Pressure 15 cmH2O    Allens test (POC) Positive      Inspiratory Time 0.85 sec    Total resp. rate 18      Site RIGHT RADIAL      Specimen type (POC) ARTERIAL      Performed by [facility]     Critical value read back KAREN     Respiratory comment: [id number]    METABOLIC PANEL, COMPREHENSIVE    Collection Time: 06/10/21  3:13 AM   Result Value Ref Range    Sodium 134 (L) 138 - 145 mmol/L    Potassium 6.1 (HH) 3.5 - 5.1 mmol/L    Chloride 104 98 - 107 mmol/L    CO2 20 (L) 21 - 32 mmol/L    Anion gap 10 7 - 16 mmol/L    Glucose 188 (H) 65 - 100 mg/dL    BUN 74 (H) 8 - 23 MG/DL    Creatinine 3.29 (H) 0.8 - 1.5 MG/DL    GFR est AA 24 (L) >60 ml/min/1.73m2    GFR est non-AA 20 (L) >60 ml/min/1.73m2    Calcium 8.1 (L) 8.3 - 10.4 MG/DL    Bilirubin, total 1.3 (H) 0.2 - 1.1 MG/DL    ALT (SGPT) 625 (H) 12 - 65 U/L    AST (SGOT) 910 (H) 15 - 37 U/L    Alk. phosphatase 94 50 - 136 U/L    Protein, total 6.6 6.3 - 8.2 g/dL    Albumin 2.6 (L) 3.2 - 4.6 g/dL    Globulin 4.0 (H) 2.3 - 3.5 g/dL    A-G Ratio 0.7 (L) 1.2 - 3.5     MAGNESIUM    Collection Time: 06/10/21  3:13 AM   Result Value Ref Range    Magnesium 3.1 (H) 1.8 - 2.4 mg/dL   CBC WITH AUTOMATED DIFF    Collection Time: 06/10/21  3:14 AM   Result Value Ref Range    WBC 7.3 4.3 - 11.1 K/uL    RBC 3.48 (L) 4.23 - 5.6 M/uL    HGB 9.2 (L) 13.6 - 17.2 g/dL    HCT 28.9 (L) 41.1 - 50.3 %    MCV 83.0 79.6 - 97.8 FL    MCH 26.4 26.1 - 32.9 PG    MCHC 31.8 31.4 - 35.0 g/dL    RDW 15.3 (H) 11.9 - 14.6 %    PLATELET 94 (L) 434 - 450 K/uL    MPV 10.1 9.4 - 12.3 FL    ABSOLUTE NRBC 0.00 0.0 - 0.2 K/uL    NEUTROPHILS 86 (H) 43 - 78 %    LYMPHOCYTES 2 (L) 13 - 44 %    MONOCYTES 0 (L) 4.0 - 12.0 %    EOSINOPHILS 0 (L) 0.5 - 7.8 %    BASOPHILS 1 0.0 - 2.0 %    IMMATURE GRANULOCYTES 11 (H) 0.0 - 5.0 %    ABS.  NEUTROPHILS 6.3 1.7 - 8.2 K/UL ABS. LYMPHOCYTES 0.1 (L) 0.5 - 4.6 K/UL    ABS. MONOCYTES 0.0 (L) 0.1 - 1.3 K/UL    ABS. EOSINOPHILS 0.0 0.0 - 0.8 K/UL    ABS. BASOPHILS 0.1 0.0 - 0.2 K/UL    ABS. IMM. GRANS. 0.8 (H) 0.0 - 0.5 K/UL    RBC COMMENTS SLIGHT  ANISOCYTOSIS + POIKILOCYTOSIS        RBC COMMENTS SLIGHT  HYPOCHROMIA        WBC COMMENTS Result Confirmed By Smear      PLATELET COMMENTS MARKED      DF AUTOMATED     VANCOMYCIN, RANDOM    Collection Time: 06/10/21  3:14 AM   Result Value Ref Range    Vancomycin, random 13.0 UG/ML   HEMOGLOBIN A1C WITH EAG    Collection Time: 06/10/21  3:14 AM   Result Value Ref Range    Hemoglobin A1c 5.9 4.2 - 6.3 %    Est. average glucose 123 mg/dL   BLOOD GAS, ARTERIAL POC    Collection Time: 06/10/21  3:47 AM   Result Value Ref Range    Device: ADULT VENT      FIO2 (POC) 50 %    pH (POC) 7.19 (LL) 7.35 - 7.45      pCO2 (POC) 48.7 (H) 35 - 45 MMHG    pO2 (POC) 226 (H) 75 - 100 MMHG    HCO3 (POC) 18.7 (L) 22 - 26 MMOL/L    sO2 (POC) 99.6 (H) 95 - 98 %    Base deficit (POC) 9.0 mmol/L    Mode Pressure regulated volume control      Tidal volume 550 ml    PEEP/CPAP (POC) 8 cmH2O    Allens test (POC) Positive      Inspiratory Time 0.85 sec    Total resp.  rate 22      Site LEFT RADIAL      Specimen type (POC) ARTERIAL      Performed by [facility]     Critical value read back KAREN     Respiratory comment: [id number]    HEPARIN XA UFH    Collection Time: 06/10/21  4:39 AM   Result Value Ref Range    Heparin Xa UFH >1.1 (H) 0.3 - 0.7 IU/mL   POTASSIUM    Collection Time: 06/10/21  6:13 AM   Result Value Ref Range    Potassium 5.3 (H) 3.5 - 5.1 mmol/L   GLUCOSE, POC    Collection Time: 06/10/21  6:44 AM   Result Value Ref Range    Glucose (POC) 171 (H) 65 - 100 mg/dL    Performed by Briseida    GLUCOSE, POC    Collection Time: 06/10/21  8:46 AM   Result Value Ref Range    Glucose (POC) 153 (H) 65 - 100 mg/dL    Performed by Puja    PTT    Collection Time: 06/10/21  9:04 AM   Result Value Ref Range    aPTT 46.2 (H) 24.1 - 35.1 SEC   RESPIRATORY VIRUS PANEL W/COVID-19, PCR    Collection Time: 06/10/21  9:07 AM    Specimen: Nasopharyngeal   Result Value Ref Range    Adenovirus NOT DETECTED NOTDET      Coronavirus 229E NOT DETECTED NOTDET      Coronavirus HKU1 NOT DETECTED NOTDET      Coronavirus CVNL63 NOT DETECTED NOTDET      Coronavirus OC43 NOT DETECTED NOTDET      SARS-CoV-2, PCR NOT DETECTED NOTDET      Metapneumovirus NOT DETECTED NOTDET      Rhinovirus and Enterovirus NOT DETECTED NOTDET      Influenza A NOT DETECTED NOTDET      Influenza B NOT DETECTED NOTDET      Parainfluenza 1 NOT DETECTED NOTDET      Parainfluenza 2 NOT DETECTED NOTDET      Parainfluenza 3 NOT DETECTED NOTDET      Parainfluenza virus 4 NOT DETECTED NOTDET      RSV by PCR NOT DETECTED NOTDET      B. parapertussis, PCR NOT DETECTED NOTDET      Bordetella pertussis - PCR NOT DETECTED NOTDET      Chlamydophila pneumoniae DNA, QL, PCR NOT DETECTED NOTDET      Mycoplasma pneumoniae DNA, QL, PCR NOT DETECTED NOTDET     GLUCOSE, POC    Collection Time: 06/10/21 11:14 AM   Result Value Ref Range    Glucose (POC) 129 (H) 65 - 100 mg/dL    Performed by Gigi            Assessment:     Principal Problem:    Severe sepsis ([facility] (6/8/2021)    Active Problems:    CML (chronic myeloid leukemia) ([facility] (8/25/2012)      Overview: 8/25/12. Probable diagnosis. Will do bone marrow exam today      8/26/12. Bone marrow aspiration and biopsy done. Marrow aspirate and       peripheral blood sent for flow, cytogenetics and molecular testing.  No       complications with the procedure      Depression (11/2/2012)      Chronic systolic congestive heart failure ([address] (5/3/2017)      Pleural effusion (4/27/2018)      Stage IV adenocarcinoma of lung ([facility] (7/11/2018)      Immunocompromised ([facility] (11/16/2018)      SCLC (small cell lung carcinoma), right ([address] (3/13/2020)      Acute respiratory failure with hypoxia ([facility] (6/8/2021) Hospital-acquired bacterial pneumonia (6/8/2021)      Bilateral pleural effusion (6/8/2021)      Lactic acidosis (6/8/2021)      Tachycardia (6/8/2021)      Moderate protein-calorie malnutrition ([facility]) (6/10/2021)      TIFFANIE (acute kidney injury) ([facility]) (6/10/2021)      Acute liver failure without hepatic coma (6/10/2021)      Other pulmonary embolism without acute cor pulmonale ([facility]) (6/10/2021)        Plan:     TIFFANIE ( on CKD IIIb), with hyperkalemia, metabolic and respiratory acidosis, oliguric. Possible causes of TIFFANIE : Chemo. ATB. . Pt seems to be volume expanded with high pro BNP. Check UA, urine protein and renal US. Prognosis seems to be poor. I advice against doing dialysis. Case to be discussed with his wife. Thanks. Dr Megan Junior     .

## 2021-06-10 NOTE — PROGRESS NOTES
A follow up visit was made to the patient. Emotional support, spiritual presence and   prayer were provided for the patient. He has precautions on him and I did not enter the room.       Esequiel Olmos, [address] [facility], [facility]

## 2021-06-10 NOTE — PROGRESS NOTES
Bedside and verbal shift change report received from  Evita Mcdermott (offgoing nurse). Report included the following information SBAR, Intake/Output, MAR, Recent Results and Cardiac Rhythm SR with PVC.      Dual skin assessment completed at bedside: n/a (list pertinent skin assessment findings)    Dual verification of gtts completed (name of gtts verified): Fentanyl, heparin

## 2021-06-10 NOTE — PROGRESS NOTES
Notified MD Marcelle Granger about patients neuro status. Updated that at midnight assessment he was noding his head appropriately and now not following commands. Updated that he has been on a low dose of fentanyl and propofol which is now held. Updated that patient is currently on a heparin drip that has had to be held multiple times. Notified about the right pupil 3 and sluggish and the left pupil is 2 and brisk. After intubation and at the 0000 assessment both were 2 and brisk. No new orders at this time.  Keep heparin drip running at current rate per MD.

## 2021-06-10 NOTE — PROGRESS NOTES
Comprehensive Nutrition Assessment    Type and Reason for Visit: Reassess  Verbal Nutrition Consult during IDT rounds for TF Management. (Pulmonary)    Nutrition Recommendations/Plan:   Enteral Nutrition:  Trophic Enteral Feeds do not advance:  Initiate Vital AF 1.2 via OGT at 10ml/hour. Water flush 10ml/hr. Trophic enteral infusion is not intended to meet nutrient needs. Vitamin and Mineral Supplement Therapy:  Electrolyte management replacement protocol implemented.  Labs per MD - daily BMP and magnesium. Continue POC glucose with SSI coverage. Malnutrition Assessment:  Malnutrition Status: Moderate malnutrition  Context: Chronic illness  Findings of clinical characteristics of malnutrition:   Energy Intake:  Unable to assess  Weight Loss:  7.00 - Greater than 10% over 6 months     Body Fat Loss:  1 - Mild body fat loss, Triceps   Muscle Mass Loss:  1 - Mild muscle mass loss, Clavicles (pectoralis &deltoids)  Fluid Accumulation:  Unable to assess,     Strength:  Not performed     Nutrition Assessment:   Nutrition History: The patient reports that he feels like he lost about 15 pounds in the last 45 days. He reports decreased stamina. He also reports drinking vanilla Boost at home. Highest weight was 190 pounds. Cultural/Restorationism/Ethnic Food Preference(s): None   Nutrition Background: Hailey Henry is a 60 y.o. male with history of CML, small cell lung cancer metastasized to liver currently on palliative chemotherapy, pulmonary emboli on Xarelto, advanced CHF, CKD stage III, debility who presented with 2 to 3 days history of difficulty in breathing and laying flat. ED work-up reveals bilateral PNA. Daily Update:  Intubated last night and OGT placed. Remains sedated with borderline low BP. Will start trophic feeding today while hemodynamically fragile. Abdominal Status (last documented): Intact abdomen with Active  bowel sounds. Last BM 06/08/21.   Pertinent Medications: SSI coverage, Solumedrol, Prevacid, Midodrine  Drips: Cordarone, Fentanyl, Heparin, Giancarlo-synephrine, Diprivan  Pertinent Labs:   Lab Results   Component Value Date/Time    Sodium 134 (L) 06/10/2021 03:13 AM    Potassium 5.3 (H) 06/10/2021 06:13 AM    Chloride 104 06/10/2021 03:13 AM    CO2 20 (L) 06/10/2021 03:13 AM    Anion gap 10 06/10/2021 03:13 AM    Glucose 188 (H) 06/10/2021 03:13 AM    BUN 74 (H) 06/10/2021 03:13 AM    Creatinine 3.29 (H) 06/10/2021 03:13 AM    Calcium 8.1 (L) 06/10/2021 03:13 AM    Albumin 2.6 (L) 06/10/2021 03:13 AM    Magnesium 3.1 (H) 06/10/2021 03:13 AM    Phosphorus 5.7 (H) 06/09/2021 02:55 AM     Lab Results   Component Value Date/Time    Glucose 188 (H) 06/10/2021 03:13 AM    Glucose (POC) 129 (H) 06/10/2021 11:14 AM    Glucose (POC) 153 (H) 06/10/2021 08:46 AM    Glucose (POC) 171 (H) 06/10/2021 06:44 AM    Glucose (POC) 222 (H) 06/09/2021 08:18 PM    Glucose (POC) 237 (H) 06/09/2021 05:28 PM    Glucose (POC) 290 (H) 06/09/2021 11:54 AM     Nutrition Related Findings:   12% weight loss in the last 6 months. 6/10: intubated, and OGT placed. Current Nutrition Therapies:  No diet orders on file    Current Intake:   Average Meal Intake: NPO Average Supplement Intake: Unable to assess      Anthropometric Measures:  Height: 6' 1\" (185.4 cm)  Current Body Wt: 78.1 kg (172 lb 2.9 oz) (6/10/2021 CCU), Weight source: Bed scale  BMI: 22.7, Normal weight (BMI 18.5-24. 9)     Ideal Body Weight (lbs) (Calculated): 184 lbs (84 kg), 87.1 %  Usual Body Wt: 81.6 kg (180 lb), Percent weight change: -11          Edema: Generalized: Non-pitting (6/9/2021  7:14 PM)     Estimated Daily Nutrient Needs: (revised 6/10 for resp failure - vent)  Energy (kcal/day): 6206-1923  (Kcal/kg (25-30 mihai/kg/day using 73 kg - vent), Weight Used: Current)  Protein (g/day):   (1.2-2 gm protein/kg/day using 73 kg - vent) Weight Used: (Current)  Fluid (ml/day):   (1 ml/kcal)    Nutrition Diagnosis:   · Inadequate oral intake related to impaired respiratory function as evidenced by intubation (NPO, start trophic TF while hemodynamically unstable)    Nutrition Interventions:   Food and/or Nutrient Delivery: Continue NPO, Start tube feeding     Coordination of Nutrition Care: Continue to monitor while inpatient, Interdisciplinary rounds  Plan of Care discussed with Humza Moise RN    Goals:   Previous Goal Met: Progress towards goal(s) declining  Active Goal: Meet >75% of daily nutrition needs via TF within 3 days. Nutrition Monitoring and Evaluation:      Food/Nutrient Intake Outcomes: Enteral nutrition intake/tolerance  Physical Signs/Symptoms Outcomes: Biochemical data, GI status, Hemodynamic status    Discharge Planning: Too soon to determine    [place] Mosanto.  Ankit Sportsman RD, LD on 6/10/2021 at 11:43 AM  Contact: [phone]

## 2021-06-10 NOTE — PROGRESS NOTES
Patient was intubated with a number 8.0 ET Tube. Tube placement verified by auscultation and by CXR. ET Tube is secured at the 26 cm vijay at the teeth and in the center. Patient was intubated by [facility] on the 1 attempt. Breath sounds are diminished. Patient is Negative for subcutaneous air and chest excursion is symmetric. Trachea is midline. Patient is also Negative for cyanosis and is Negative for pitting edema. Patient placed on ventilator on documented settings. All alarms are set and audible. Resuscitation bag is at the head of the bed.       Ventilator Settings  Mode FIO2 Rate Tidal Volume Pressure PEEP I:E Ratio   VC+, Assist control  100 % 18  550 ml     8 cm H20  1:2.9      Peak airway pressure: 32 cm H2O   Minute ventilation: 9.95 l/min         Jonathan Borrego RRT

## 2021-06-10 NOTE — PROGRESS NOTES
Patient placed on QHS BiPAP. RN aware. Will continue to monitor. 06/09/21 2054   Oxygen Therapy   O2 Sat (%) 99 %   Pulse via Oximetry 98 beats per minute   O2 Device BIPAP  (QHS/ V60)   Skin Assessment Clean, dry, & intact   FIO2 (%) 35 %   Respiratory   Respiratory (WDL) X   Respiratory Pattern Dyspnea with exertion;Dyspnea at rest   Breath Sounds Bilateral Coarse;Diminished   Cough Non-productive   Skin Integumentary   Skin Integumentary (WDL) WDL   CPAP/BIPAP   CPAP/BIPAP Start/Stop On   Device Mode BIPAP   $$ Bipap Daily Yes   Mask Type and Size Full face   Skin Condition intact   PIP Observed 18 cm H20   IPAP (cm H2O) 16 cm H2O   EPAP (cm H2O) 8 cm H2O   Inspiratory Time (sec) 0.9 seconds   Vt Spont (ml) 667 ml   Ve Observed (l/min) 24 l/min   Backup Rate 12   Total RR (Spontaneous) 33 breaths per minute   Insp Rise Time (sec) 2   Leak (Estimated) 0 L/min   Pt's Home Machine No   Biomedical Check Performed Yes   Settings Verified Yes   Alarm Settings   High Pressure 30   Low Pressure 5   Apnea 20   Low Ve 3   High Rate 55   Low Rate 5   Pulmonary Toilet   Pulmonary Toilet H. O.B elevated

## 2021-06-10 NOTE — PROGRESS NOTES
[facility] CARDIOLOGY PROGRESS NOTE           6/10/2021 7:19 PM    Admit Date: 6/8/2021      Subjective:   Pt req. Intubation and ventilation last PM.Currently sedated on the vent.     Objective:      Vitals:    06/10/21 1714 06/10/21 1729 06/10/21 1744 06/10/21 1758   BP: 107/63 102/61 105/64 104/67   Pulse: 83 80 80 79   Resp: 26 26 26 26   Temp: 97.3 °F (36.3 °C) 97.5 °F (36.4 °C) 97.5 °F (36.4 °C) 97.1 °F (36.2 °C)   SpO2: 99% 98% 98% 98%   Weight:       Height:           Physical Exam:  General-No Acute Distress      Data Review:   Recent Labs     06/10/21  0613 06/10/21  0314 06/10/21  0313 06/09/21  2014 06/09/21  0255   NA  --   --  134* 136 137   K 5.3*  --  6.1* 4.8 5.0   MG  --   --  3.1*  --  2.5*   BUN  --   --  74* 66* 60*   CREA  --   --  3.29* 2.76* 2.39*   GLU  --   --  188* 206* 190*   WBC  --  7.3  --   --  6.2   HGB  --  9.2*  --  8.6* 7.1*   HCT  --  28.9*  --  27.0* 22.6*   PLT  --  94*  --   --  96*     Rhythm;  NSR    Assessment/Plan:     Principal Problem:    Severe sepsis (HCC) (6/8/2021)        Active Problems:    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)          Depression (11/2/2012)          Chronic systolic congestive heart failure (HCC) (5/3/2017)          Pleural effusion (4/27/2018)          Stage IV adenocarcinoma of lung ([facility] (7/11/2018)          Immunocompromised ([facility] (11/16/2018)          SCLC (small cell lung carcinoma), right (HCC) (3/13/2020)          Acute respiratory failure with hypoxia ([facility]) (6/8/2021)          Hospital-acquired bacterial pneumonia (6/8/2021)          Bilateral pleural effusion (6/8/2021)          Lactic acidosis (6/8/2021)          Tachycardia (6/8/2021)          Moderate protein-calorie malnutrition ([facility] (6/10/2021)          TIFFANIE (acute kidney injury) ([facility] (6/10/2021)          Acute liver failure without hepatic coma (6/10/2021)          Other pulmonary embolism without acute cor pulmonale ([facility] (6/10/2021) ////    Cont. Iv amiodarone for now but bad choice for long term.           Benja Keenan MD  6/10/2021 7:19 PM

## 2021-06-10 NOTE — PROGRESS NOTES
Palliative Care Progress Note    Patient: Arlene Doe. MRN: [id number]  SSN: [id number]    YOB: 1955  Age: 60 y.o. Sex: male       Assessment/Plan:     Chief Complaint/Interval History: pt intubated overnight. Renal and hepatic function worsening. Principal Diagnosis:    · Dyspnea  R06.00    Additional Diagnoses:   · Debility, Unspecified  R53.81  · Frailty  R54  · Respiratory Failure, Acute on Chronic  J96.20  · Counseling, Encounter for Medical Advice  Z71.9  · Encounter for Palliative Care  Z51.5    Palliative Performance Scale (PPS)       Medical Decision Making:   Reviewed and summarized labs and imaging. Pt intubated. Spoke with wife via phone. Reviewed overnight events leading to intubation and discussed developing multiorgan failure. Wife remains hopeful for improvement but is aware of fragility of situation. She is hopeful to come visit. Pending covid testing currently. Asked nurse to call spouse was test resulted. Will follow. Will discuss findings with members of the interdisciplinary team.      More than 50% of this 25 minute visit was spent counseling and coordination of care as outlined above. Subjective:     Review of Systems unable to obtain due to mentation. Objective:     Visit Vitals  BP (!) 84/60   Pulse 79   Temp 96.9 °F (36.1 °C)   Resp 26   Ht 6' 1\" (1.854 m)   Wt 172 lb 2.9 oz (78.1 kg)   SpO2 98%   BMI 22.72 kg/m²       Physical Exam:    General:  On vent. Eyes:  Conjunctivae/corneas clear    Nose: Nares normal. Septum midline. Neck: Supple, symmetrical, trachea midline, no JVD   Lungs:   Diffuse coarse bs   Heart:  Regular rate and rhythm, no murmur    Abdomen:   Soft, non-tender, non-distended   Extremities: Normal, atraumatic, no cyanosis or edema   Skin: Skin color, texture, turgor normal. No rash or lesions.    Neurologic: Moves extremities   Psych: sedated     Signed By: Caio Garcia MD     Chari 10, 2021

## 2021-06-11 NOTE — PROGRESS NOTES
Felecia Louise. Admission Date: 6/8/2021             Daily Progress Note: 6/11/2021    The patient's chart is reviewed and the patient is discussed with the staff. Patient is O 49 y.o.  male seen and evaluated at the request of Dr. Cleo Roberts has history of CML, non-small cell lung cancer in 2018 and small cell lung cancer metastasized to liver discovered in 3/2020 currently on palliative chemotherapy, pulmonary emboli on Xarelto, advanced CHF, CKD stage III, debility who presented with 2 to 3 days history of difficulty in breathing and laying flat. Patient reported being in his usual health until 3 to 4 days ago when he noticed sudden onset of worsening shortness of breath which is progressively getting worse.  He does report of cough which is mostly dry. He denies having any fever, chills.  He does report having palpitations, excessive fatigue and weakness, labored breathing.  He denies having any chest pain, nausea vomiting abdominal pain or headache. ER work-up remarkable for chest x-ray showing bilateral infiltrate suggestive of pneumonia along with pleural effusion, blood work remarkable for lactic acid 4.4, creatinine 2.13 (baseline), heart rate ranging from 120-135 irregular, respiratory rate ranging from 30s to 40s.  ABG 7.39/29/39 on FiO2 0.1.     Had issues in the past with pneumonitis, but with evidence recently of both active small and non-small cell cancer he elected for rechallenge with carbo etoposide atezolizumab while continue osimertinib and monitor pneumonitis symptoms when last seen by oncology 5/26. Treated 6/5 most recently.      He was supposed to undergo PleurX placement 2 weeks ago but it was delayed due to starting Xerelto. He has not had a thoracentesis in over a month.      He was transferred to [facility] ICU for ongoing care and support. He arrives hemodynamically stable on BIPAP FiO2 35%, but RR remains in 45s.  He is able to provide history and answer questions appropriately.     Subjective:     Patient remains intubated. Vent day 2. Ongoing AG metabolic acidosis on abg this morning. Worsening renal and liver failure. UOP dropping, down to 800cc in 24 hour period. Seen by nephrology yesterday and not felt to be a dialysis candidate. Remains on amio for vtach runs/afib. On marcelina as well. Propofol now off and just on fentanyl for sedation. Respiratory viral/covid swab negative yesterday.        Current Facility-Administered Medications   Medication Dose Route Frequency    sodium bicarbonate (8.4%) 150 mEq in dextrose 5% 1,000 mL infusion   IntraVENous CONTINUOUS    sodium zirconium cyclosilicate (LOKELMA) powder packet 10 g  10 g Oral NOW    fentaNYL citrate (PF) injection 50 mcg  50 mcg IntraVENous Q4H PRN    PHENYLephrine (MARCELINA-SYNEPHRINE) 30 mg in 0.9% sodium chloride 250 mL infusion   mcg/min IntraVENous TITRATE    midodrine (PROAMATINE) tablet 10 mg  10 mg Per NG tube TID WITH MEALS    lansoprazole (PREVACID) 3 mg/mL oral suspension 30 mg  30 mg Per NG tube DAILY    insulin lispro (HUMALOG) injection 0-10 Units  0-10 Units SubCUTAneous Q6H    methylPREDNISolone (PF) (SOLU-MEDROL) injection 40 mg  40 mg IntraVENous Q8H    dextrose 40% (GLUTOSE) oral gel 1 Tube  15 g Oral PRN    glucagon (GLUCAGEN) injection 1 mg  1 mg IntraMUSCular PRN    dextrose (D50W) injection syrg 12.5-25 g  25-50 mL IntraVENous PRN    amiodarone (CORDARONE) 450 mg in dextrose 5% 250 mL infusion  0.5-1 mg/min IntraVENous TITRATE    0.9% sodium chloride infusion 250 mL  250 mL IntraVENous PRN    fentaNYL in normal saline (pf) 25 mcg/mL infusion  0-200 mcg/hr IntraVENous TITRATE    propofol (DIPRIVAN) 10 mg/mL infusion  0-50 mcg/kg/min IntraVENous TITRATE    sodium chloride (NS) flush 5-40 mL  5-40 mL IntraVENous Q8H    sodium chloride (NS) flush 5-40 mL  5-40 mL IntraVENous PRN    levalbuterol (XOPENEX) nebulizer soln 1.25 mg/3 mL  1.25 mg Nebulization Q4H RT    ondansetron (ZOFRAN) injection 4 mg  4 mg IntraVENous Q4H PRN    piperacillin-tazobactam (ZOSYN) 4.5 g in 0.9% sodium chloride (MBP/ADV) 100 mL MBP  4.5 g IntraVENous Q8H    heparin 25,000 units in dextrose 500 mL infusion  18-36 Units/kg/hr IntraVENous TITRATE    phenol throat spray (CHLORASEPTIC) 1 Spray  1 Spray Oral PRN       Review of Systems  Unobtainable due to patient status. Objective:     Vitals:    06/11/21 0814 06/11/21 0815 06/11/21 0829 06/11/21 0830   BP: (!) 90/54  (!) 88/56    Pulse: 78 78 79 79   Resp: (!) 52 (!) 52 (!) 48 (!) 53   Temp:       SpO2: 100% 100% 100% 100%   Weight:       Height:         Intake and Output:   06/09 1901 - 06/11 0700  In: 3857.3 [I.V.:3376.3]  Out: 1448 [Urine:1448]  No intake/output data recorded. Physical Exam:   Constitution:  the patient is well developed and in no acute distress  EENMT:  Sclera clear, pupils equal, oral mucosa moist  Respiratory: Intubated, mild B crackles  Cardiovascular:  RRR without M,G,R  Gastrointestinal: soft and non-tender; with positive bowel sounds. Musculoskeletal: warm without cyanosis. There is no lower leg edema. Skin:  no jaundice or rashes, no wounds   Neurologic: sedated but alert    Psychiatric:  Sedated but alert     CHEST XRAY:   CXR Results  (Last 48 hours)               06/11/21 0510  XR CHEST SNGL V Final result    Impression:      1. Findings as described above. Narrative:  EXAMINATION: One view chest       HISTORY: respiratory failure, h/o lung cancer       TECHNIQUE: Frontal chest.       COMPARISON: 6/9/2021       FINDINGS:     Stable support devices. Persistent right lung infiltrate. Improved right pleural effusion or atelectasis. No significant pneumothorax. No other significant interval changes. 06/09/21 2213  XR CHEST PORT Final result    Impression:      1.   ENDOTRACHEAL TUBE IN EXPECTED POSITION WITHOUT DEFINITE PNEUMOTHORAX OR   OTHER SIGNIFICANT CHANGE FROM CT TODAY. 2.  NASOGASTRIC TUBE PROXIMAL SIDEHOLE IS JUST BELOW THE GASTROESOPHAGEAL   JUNCTION. ENHANCEMENT BY APPROXIMATELY 10 CM IS SUGGESTED. Narrative:  PORTABLE CHEST, June 9, 2021 at 2200 hours,   PORTABLE ABDOMEN, June 9, 2021 at 2155 hours:       CLINICAL HISTORY:  Follow-up intubation and tube placement. COMPARISON:  Portable chest yesterday and CT today. CHEST FINDINGS:  AP semi-erect image demonstrates is of inhomogeneous   edema/infiltrate bilaterally. Endotracheal tube tip overlies the mid trachea. Mediport catheter remains in place. The heart remains enlarged. No definite   pneumothorax. There are overlying radiopaque support devices. ABDOMEN FINDINGS: AP supine images demonstrates the proximal sidehole of the   nasogastric tube just below the gastroesophageal junction. No dilated bowel   loops of the epigastrium.                    LAB  No lab exists for component: [place]   Recent Labs     06/11/21  0307 06/10/21  0314 06/09/21  2014 06/09/21  0255 06/08/21  1405   WBC 2.1* 7.3  --  6.2 10.1   HGB 7.7* 9.2* 8.6* 7.1* 8.2*   HCT 24.5* 28.9* 27.0* 22.6* 25.1*   PLT 36* 94*  --  96* 163     Recent Labs     06/11/21  0307 06/10/21  0613 06/10/21  0313 06/09/21  2014 06/09/21  0255   *  --  134* 136 137   K 5.5* 5.3* 6.1* 4.8 5.0     --  104 104 104   CO2 20*  --  20* 21 23   *  --  188* 206* 190*   *  --  74* 66* 60*   CREA 4.51*  --  3.29* 2.76* 2.39*   MG 2.9*  --  3.1*  --  2.5*   CA 7.3*  --  8.1* 8.1* 7.7*   PHOS  --   --   --   --  5.7*   ALB 2.5*  --  2.6* 2.7* 2.5*     ABG:    Lab Results   Component Value Date/Time    PHI 7.27 (L) 06/11/2021 03:32 AM    PCO2I 34.8 (L) 06/11/2021 03:32 AM    PO2I 128 (H) 06/11/2021 03:32 AM    HCO3I 15.9 (L) 06/11/2021 03:32 AM    FIO2I 30 06/11/2021 03:32 AM       MICRO    SARS-CoV-2 LAB Results  [facility] Test: No results found for: COV2NT   [facility] Test: No results found for: EDPR  [facility] Test: No components found for: [id number]       Assessment:  (Medical Decision Making)     Hospital Problems  Date Reviewed: 5/26/2021        Codes Class Noted POA    Moderate protein-calorie malnutrition ([facility] ICD-10-CM: E44.0  ICD-9-CM: 263.0  6/10/2021 Yes        TIFFANIE (acute kidney injury) ([facility] ICD-10-CM: N17.9  ICD-9-CM: 584.9  6/10/2021 Unknown        Acute liver failure without hepatic coma ICD-10-CM: K72.00  ICD-9-CM: 570  6/10/2021 Unknown        Other pulmonary embolism without acute cor pulmonale ([facility] ICD-10-CM: I26.99  ICD-9-CM: 415.19  6/10/2021 Unknown        Acute respiratory failure with hypoxia (HCC) ICD-10-CM: J96.01  ICD-9-CM: 518.81  6/8/2021 Unknown        Hospital-acquired bacterial pneumonia ICD-10-CM: J15.9  ICD-9-CM: 482.9  6/8/2021 Unknown        Bilateral pleural effusion ICD-10-CM: J90  ICD-9-CM: 511.9  6/8/2021 Unknown        Lactic acidosis ICD-10-CM: E87.2  ICD-9-CM: 276.2  6/8/2021 Unknown        * (Principal) Severe sepsis ([facility] ICD-10-CM: A41.9, R65.20  ICD-9-CM: 038.9, 995.92  6/8/2021 Unknown        Tachycardia ICD-10-CM: R00.0  ICD-9-CM: 785.0  6/8/2021 Unknown        SCLC (small cell lung carcinoma), right ([facility] ICD-10-CM: C34.91  ICD-9-CM: 162.9  3/13/2020 Yes        Immunocompromised ([facility] ICD-10-CM: D84.9  ICD-9-CM: 279.9  11/16/2018 Yes        Stage IV adenocarcinoma of lung ([facility] ICD-10-CM: C34.90  ICD-9-CM: 162.9  7/11/2018 Yes        Pleural effusion ICD-10-CM: J90  ICD-9-CM: 511.9  4/27/2018 Unknown        Chronic systolic congestive heart failure ([facility] ICD-10-CM: I50.22  ICD-9-CM: 428.22, 428.0  5/3/2017 Yes        Depression ICD-10-CM: F32.9  ICD-9-CM: 510  11/2/2012 Yes        CML (chronic myeloid leukemia) ([facility] ICD-10-CM: C92.10  ICD-9-CM: 205.10  8/25/2012 Yes    Overview Addendum 8/26/2012  7:23 AM by Tory Richard MD     8/25/12. Probable diagnosis. Will do bone marrow exam today  8/26/12. Bone marrow aspiration and biopsy done.  Marrow aspirate and peripheral blood sent for flow, cytogenetics and molecular testing. No complications with the procedure                   61 y/o male with CML, active SCLC and NSCLC on palliative therapy. Severe systolic HR with EF 03% prior pneumonitis on immunotherapy,  malignant effusions, recent segmental B lower lobe PEs on Xarelto and recently with evidence of progression of both lung cancers leading to cycle 1 of cis/etoposide/atezolizumab (had the same about a year ago when his small cell was discovered). Presented with new GGO felt likely to be pneumonitis. Now intubated with worsening respiratory failure, renal failure, liver failure. Covid negative. Plan:  (Medical Decision Making)   -respiratory viral PCR negative.   -cont IV steroids to cover likely pneumonitis. cxr today seems better.   -oxygenation on vent is doing well. However need ongoing mechancial vent support due to severe metabolic acidosis. This is felt to be likely due to renal failure with elevated AG. Repeat lactate now which was normal 2 days ago. -nephrology on board. Not a dialysis candidate.   -consult GI today with ongoing rise in LFT's. Likely due to combination of progressing liver mets and acute illness with periods of hypotension/vtach but want to rule out any other correctable issues.   -cont amio drip per cardiology. -F/u repeat TTE when available. -on mary, cont.   -cont abx. Have stopped vanc but cont zosyn day 4.   -on heparin drip for PE's while in critical care setting.   -plts dropping. F/u hit panel.   -holding toprol with borderline BP's.   -need ongoing palliative talks with family. Prognosis worse every day. -cont tube feeds. -pantoprazole. Full code currently. Critically ill patient with complicated medical issues and high risk of further decompensation.  Total critical care time spent thus far (exclusive of procedures): 36 minutes        Mega Mack MD

## 2021-06-11 NOTE — PROGRESS NOTES
[facility] Hematology & Oncology        Inpatient Hematology / Oncology Progress Note      Admission Date: 6/8/2021 10:31 AM  Reason for Admission/Hospital Course: Hospital-acquired bacterial pneumonia [J15.9]  Pleural effusion [J90]  Atrial fibrillation with rapid ventricular response (HCC) [I48.91]  Lactic acidosis [E87.2]  Acute respiratory failure with hypoxia (HCC) [J96.01]  Severe sepsis (HCC) [A41.9, R65.20]      24 Hour Events:  Counts continue to drop as expected   Pt with acute decompensation--intubated on 6/9  Giancarlo, Amio, Heparin drip  Now with TIFFANIE - nephro consult  Worsening ALT/AST      ROS:  Difficult to assess d/t intubated status    10 point review of systems is otherwise negative with the exception of the elements mentioned above in the HPI.      No Known Allergies    OBJECTIVE:  Patient Vitals for the past 8 hrs:   BP Temp Pulse Resp SpO2   06/11/21 1315 -- -- 74 26 100 %   06/11/21 1314 93/60 -- 74 26 100 %   06/11/21 1300 -- -- 74 26 100 %   06/11/21 1259 95/63 -- 74 26 100 %   06/11/21 1245 -- -- 76 27 99 %   06/11/21 1244 98/66 -- 76 26 97 %   06/11/21 1230 -- -- 77 26 100 %   06/11/21 1229 98/63 -- 77 26 100 %   06/11/21 1216 97/62 -- 79 26 99 %   06/11/21 1215 -- -- 79 26 97 %   06/11/21 1200 -- -- 78 26 100 %   06/11/21 1159 103/68 -- 78 26 100 %   06/11/21 1145 -- -- 76 26 99 %   06/11/21 1144 97/64 -- 76 26 99 %   06/11/21 1130 99/65 -- 76 26 99 %   06/11/21 1115 -- -- 76 26 100 %   06/11/21 1114 103/66 -- 76 26 100 %   06/11/21 1100 99/65 97.5 °F (36.4 °C) 78 26 99 %   06/11/21 1059 95/64 -- 77 26 99 %   06/11/21 1045 -- -- 76 26 100 %   06/11/21 1044 100/68 -- 76 26 100 %   06/11/21 1030 -- -- 79 26 100 %   06/11/21 1029 103/64 -- 79 26 100 %   06/11/21 1015 -- -- 80 26 99 %   06/11/21 1014 108/72 -- 80 26 100 %   06/11/21 1000 100/69 -- 83 27 100 %   06/11/21 0945 -- -- 75 26 99 %   06/11/21 0944 (!) 96/58 -- 75 26 99 %   06/11/21 0930 -- -- 75 (!) 39 100 %   06/11/21 0929 104/64 -- 76 (!) 51 100 %   21 0915 -- -- 75 -- 100 %   21 0914 99/63 -- 74 -- 100 %   21 0900 -- -- 78 -- 99 %   21 0859 108/63 -- 78 -- 99 %   21 0845 -- -- 79 (!) 85 100 %   21 0844 (!) 89/58 -- 79 (!) 56 100 %   2130 -- -- 79 (!) 53 100 %   21 0829 (!) 88/56 -- 79 (!) 48 100 %   2115 -- -- 78 (!) 52 100 %   2114 (!) 90/54 -- 78 (!) 52 100 %   21 -- -- 79 (!) 47 100 %   21 0759 (!) 92/52 -- 79 26 100 %   2145 -- -- 80 26 100 %   21 0744 (!) 94/58 -- 80 26 100 %   2130 -- -- 82 26 100 %   21 0729 98/64 -- 82 26 100 %   2115 -- -- 81 30 100 %   2114 98/66 -- 81 26 100 %   21 0700 98/66 97.9 °F (36.6 °C) 84 26 100 %   2159 108/70 -- 82 -- 100 %   21 0655 115/76 -- 83 -- --   21 0644 115/76 -- 86 -- 100 %   2129 99/67 -- 81 23 100 %   2114 93/63 -- 76 26 100 %   21 0559 91/62 -- 76 26 100 %     Temp (24hrs), Av.1 °F (36.2 °C), Min:95.3 °F (35.2 °C), Max:98.5 °F (36.9 °C)    701 - 1900  In: -   Out: 350 [Urine:350]    Physical Exam:  Constitutional: Ill appearing  male lying in bed. HEENT: Normocephalic and atraumatic. Oropharynx is clear, mucous membranes are moist.  Pupils are equal, round, and reactive to light. Skin Warm and dry. No bruising and no rash noted. No erythema. No pallor. Respiratory Decreased BS scattered rhonchi, ventilated    CVS Normal rate, regular rhythm and normal S1 and S2. No murmurs, gallops, or rubs. Abdomen Soft, nontender and nondistended, normoactive bowel sounds.     Neuro EVERTON    MSK EVERTON    Psych EVERTON        Labs:      Recent Labs     21  0307 06/10/21  0314 21  0255   WBC 2.1* 7.3  --  6.2   RBC 2.97* 3.48*  --  2.76*   HGB 7.7* 9.2* 8.6* 7.1*   HCT 24.5* 28.9* 27.0* 22.6*   MCV 82.5 83.0  --  81.9   MCH 25.9* 26.4  --  25.7*   MCHC 31.4 31.8  --  31.4   RDW 15.4* 15.3*  --  14.6   PLT 36* 94*  --  96*   GRANS 71 86*  --  90*   LYMPH 6* 2*  --  2*   MONOS 1* 0*  --  0*   EOS 0* 0*  --  0*   BASOS 1 1  --  1   IG 21* 11*  --  7*   DF AUTOMATED AUTOMATED  --  AUTOMATED   ANEU 1.6* 6.3  --  5.6   ABL 0.1* 0.1*  --  0.1*   ABM 0.0* 0.0*  --  0.0*   [place] 0.0 0.0  --  0.0   ABB 0.0 0.1  --  0.1   AIG 0.4 0.8*  --  0.4        Recent Labs     06/11/21  0307 06/10/21  0613 06/10/21  0313 06/09/21  2014 06/09/21  0255   *  --  134* 136 137   K 5.5* 5.3* 6.1* 4.8 5.0     --  104 104 104   CO2 20*  --  20* 21 23   AGAP 13  --  10 11 10   *  --  188* 206* 190*   *  --  74* 66* 60*   CREA 4.51*  --  3.29* 2.76* 2.39*   GFRAA 17*  --  24* 30* 35*   GFRNA 14*  --  20* 25* 29*   CA 7.3*  --  8.1* 8.1* 7.7*   AP 79  --  94 104 81   TP 6.0*  --  6.6 6.8 6.4   ALB 2.5*  --  2.6* 2.7* 2.5*   GLOB 3.5  --  4.0* 4.1* 3.9*   AGRAT 0.7*  --  0.7* 0.7* 0.6*   MG 2.9*  --  3.1*  --  2.5*   PHOS  --   --   --   --  5.7*         Imaging:  XR CHEST SNGL V [[id number]] Collected: 06/11/21 0542   Order Status: Completed Updated: 06/11/21 0545   Narrative:     EXAMINATION: One view chest     HISTORY: respiratory failure, h/o lung cancer     TECHNIQUE: Frontal chest.     COMPARISON: 6/9/2021     FINDINGS:     Stable support devices. Persistent right lung infiltrate. Improved right pleural effusion or atelectasis. No significant pneumothorax. No other significant interval changes. Impression:       1. Findings as described above. XR CHEST Herb Bishop [[id number]]    Order Status: No result     [facility] [[id number]] Collected: 06/10/21 2206   Order Status: Completed Updated: 06/10/21 2209   Narrative:     Renal ultrasound. CLINICAL INDICATION:  Acute renal insufficiency     PROCEDURE: Realtime grayscale color Doppler evaluation of the kidneys and   bladder. COMPARISON: No prior similar studies available for direct comparison.      FINDINGS: Right kidney is normal in size at 9.5 cm. There is increased renal   cortical echogenicity. There is no hydronephrosis. There is very limited   evaluation of the left kidney secondary to overlying bowel gas. It measures   roughly 9 cm. There is increased renal cortical atrophy. No definite   hydronephrosis. The bladder is decompressed by Aranda catheter. Ascites is   present in the pelvis. Impression:     1. Increased renal cortical echogenicity bilaterally can be seen with medical   renal disease. Note there is limited evaluation of the left kidney. 2. No definite hydronephrosis. 3. Ascites   XR CHEST Miya Hamzah [[id number]]    Order Status: No result    XR CHEST Miya Hamzah [[id number]]    Order Status: No result    XR CHEST PORT [[id number]] Collected: 06/09/21 2301   Order Status: Completed Updated: 06/09/21 2308   Narrative:     PORTABLE CHEST, June 9, 2021 at 2200 hours,   PORTABLE ABDOMEN, June 9, 2021 at 2155 hours:     CLINICAL HISTORY:  Follow-up intubation and tube placement. COMPARISON:  Portable chest yesterday and CT today. CHEST FINDINGS:  AP semi-erect image demonstrates is of inhomogeneous   edema/infiltrate bilaterally.  Endotracheal tube tip overlies the mid trachea. Mediport catheter remains in place.  The heart remains enlarged.  No definite   pneumothorax.  There are overlying radiopaque support devices.       ABDOMEN FINDINGS: AP supine images demonstrates the proximal sidehole of the   nasogastric tube just below the gastroesophageal junction.  No dilated bowel   loops of the epigastrium. Impression:       1.  ENDOTRACHEAL TUBE IN EXPECTED POSITION WITHOUT DEFINITE PNEUMOTHORAX OR   OTHER SIGNIFICANT CHANGE FROM CT TODAY. 2.  NASOGASTRIC TUBE PROXIMAL SIDEHOLE IS JUST BELOW THE GASTROESOPHAGEAL   JUNCTION.  ENHANCEMENT BY APPROXIMATELY 10 CM IS SUGGESTED.    XR ABD (KUB) [[id number]] Collected: 06/09/21 2301   Order Status: Completed Updated: 06/09/21 2308   Narrative:     PORTABLE CHEST, June 9, 2021 at 2200 hours,   PORTABLE ABDOMEN, June 9, 2021 at 2155 hours:     CLINICAL HISTORY:  Follow-up intubation and tube placement. COMPARISON:  Portable chest yesterday and CT today. CHEST FINDINGS:  AP semi-erect image demonstrates is of inhomogeneous   edema/infiltrate bilaterally.  Endotracheal tube tip overlies the mid trachea. Mediport catheter remains in place.  The heart remains enlarged.  No definite   pneumothorax.  There are overlying radiopaque support devices.       ABDOMEN FINDINGS: AP supine images demonstrates the proximal sidehole of the   nasogastric tube just below the gastroesophageal junction.  No dilated bowel   loops of the epigastrium. Impression:       1.  ENDOTRACHEAL TUBE IN EXPECTED POSITION WITHOUT DEFINITE PNEUMOTHORAX OR   OTHER SIGNIFICANT CHANGE FROM CT TODAY. 2.  NASOGASTRIC TUBE PROXIMAL SIDEHOLE IS JUST BELOW THE GASTROESOPHAGEAL   JUNCTION.  ENHANCEMENT BY APPROXIMATELY 10 CM IS SUGGESTED. CT CHEST WO CONT [[id number]] Collected: 06/09/21 1420   Order Status: Completed Updated: 06/09/21 1426   Narrative:     CT of the chest without contrast.     CLINICAL INDICATION: Small cell lung cancer, metastatic, prior pulmonary emboli     PROCEDURE: Serial thin section axial images obtained from the thoracic inlet   through the upper abdomen without the administration of intravenous contrast.   Radiation dose reduction techniques were used for this study. Our CT scanners   use one or all of the following: Automated exposure control, adjusted of the mA   and/or kV according to patient size, iterative reconstruction     COMPARISON: Chest CT dated 5/12/2021     FINDINGS:     There is nodular pleural thickening throughout the right hemithorax most in   keeping with pleural-based metastatic disease. Multiple prominent mediastinal   lymph nodes are present also concerning for metastatic disease. The heart is   enlarged.  Patchy airspace consolidation with air bronchograms are appreciated at   the right martina unchanged from the prior exam. This may be a posttreatment   change. Pneumonia should be considered. Multiple pulmonary nodules are noted   throughout the right lower lobe concerning for pulmonary metastatic disease. More confluent groundglass opacities noted throughout the left upper lobe and   left lower lobe. This is nonspecific. There is a trace right pleural effusion. Limited evaluation of the upper abdomen shows soft tissue density along the   right peritoneum and right diaphragm also concerning for metastatic disease. No aggressive osseous is identified. Impression:     1. Worsening of the nodular pleural thickening throughout the right hemithorax   with multiple pulmonary nodules throughout the right lower lobe most in keeping   with pulmonary and pleural metastatic disease. There is a small right pleural   effusion also likely metastatic. 2. Stable confluent airspace opacity at the right martina may be a prior treatment   effect. Pneumonia could also be considered. 3. Nonspecific groundglass opacity throughout the left upper lobe, lingula and   lower lobe. 4. Cardiomegaly   5. Mediastinal lymphadenopathy.        Medications:  Current Facility-Administered Medications   Medication Dose Route Frequency    sodium bicarbonate (8.4%) 150 mEq in dextrose 5% 1,000 mL infusion   IntraVENous CONTINUOUS    piperacillin-tazobactam (ZOSYN) 4.5 g in 0.9% sodium chloride (MBP/ADV) 100 mL MBP  4.5 g IntraVENous Q12H    mycophenolate mofetil (CELLCEPT) 200 mg/mL oral suspension 1,000 mg  1,000 mg Per NG tube ACB&D    fentaNYL citrate (PF) injection 50 mcg  50 mcg IntraVENous Q4H PRN    PHENYLephrine (MARCELINA-SYNEPHRINE) 30 mg in 0.9% sodium chloride 250 mL infusion   mcg/min IntraVENous TITRATE    midodrine (PROAMATINE) tablet 10 mg  10 mg Per NG tube TID WITH MEALS    lansoprazole (PREVACID) 3 mg/mL oral suspension 30 mg  30 mg Per NG tube DAILY    insulin lispro (HUMALOG) injection 0-10 Units  0-10 Units SubCUTAneous Q6H    methylPREDNISolone (PF) (SOLU-MEDROL) injection 40 mg  40 mg IntraVENous Q8H    dextrose 40% (GLUTOSE) oral gel 1 Tube  15 g Oral PRN    glucagon (GLUCAGEN) injection 1 mg  1 mg IntraMUSCular PRN    dextrose (D50W) injection syrg 12.5-25 g  25-50 mL IntraVENous PRN    amiodarone (CORDARONE) 450 mg in dextrose 5% 250 mL infusion  0.5-1 mg/min IntraVENous TITRATE    fentaNYL in normal saline (pf) 25 mcg/mL infusion  0-200 mcg/hr IntraVENous TITRATE    propofol (DIPRIVAN) 10 mg/mL infusion  0-50 mcg/kg/min IntraVENous TITRATE    sodium chloride (NS) flush 5-40 mL  5-40 mL IntraVENous Q8H    sodium chloride (NS) flush 5-40 mL  5-40 mL IntraVENous PRN    levalbuterol (XOPENEX) nebulizer soln 1.25 mg/3 mL  1.25 mg Nebulization Q4H RT    ondansetron (ZOFRAN) injection 4 mg  4 mg IntraVENous Q4H PRN    heparin 25,000 units in dextrose 500 mL infusion  18-36 Units/kg/hr IntraVENous TITRATE    phenol throat spray (CHLORASEPTIC) 1 Spray  1 Spray Oral PRN         ASSESSMENT:    Problem List  Date Reviewed: 5/26/2021        Codes Class Noted    Moderate protein-calorie malnutrition ([facility] ICD-10-CM: E44.0  ICD-9-CM: 263.0  6/10/2021        TIFFANIE (acute kidney injury) ([facility] ICD-10-CM: N17.9  ICD-9-CM: 584.9  6/10/2021        Acute liver failure without hepatic coma ICD-10-CM: K72.00  ICD-9-CM: 300  6/10/2021        Other pulmonary embolism without acute cor pulmonale ([facility] ICD-10-CM: I26.99  ICD-9-CM: 415.19  6/10/2021        Acute respiratory failure with hypoxia (HCC) ICD-10-CM: J96.01  ICD-9-CM: 518.81  6/8/2021        Hospital-acquired bacterial pneumonia ICD-10-CM: J15.9  ICD-9-CM: 482.9  6/8/2021        Bilateral pleural effusion ICD-10-CM: J90  ICD-9-CM: 511.9  6/8/2021        Lactic acidosis ICD-10-CM: E87.2  ICD-9-CM: 276.2  6/8/2021        * (Principal) Severe sepsis ([facility] ICD-10-CM: A41.9, R65.20  ICD-9-CM: 038.9, 995.92  6/8/2021 Tachycardia ICD-10-CM: R00.0  ICD-9-CM: 785.0  6/8/2021        Elevated serum creatinine ICD-10-CM: R79.89  ICD-9-CM: 790.99  10/5/2020        Hypomagnesemia ICD-10-CM: E83.42  ICD-9-CM: 275.2  7/27/2020        Shortness of breath ICD-10-CM: R06.02  ICD-9-CM: 786.05  3/28/2020        Pleural effusion on right ICD-10-CM: J90  ICD-9-CM: 511.9  3/28/2020        SCLC (small cell lung carcinoma), right ([facility] ICD-10-CM: C34.91  ICD-9-CM: 162.9  3/13/2020        Acute pulmonary edema (HCC) ICD-10-CM: J81.0  ICD-9-CM: 518.4  9/18/2019        Acute CHF (congestive heart failure) ([facility] ICD-10-CM: I50.9  ICD-9-CM: 428.0  9/16/2019        Pneumonia ICD-10-CM: J18.9  ICD-9-CM: 486  1/6/2019        Pulmonary emboli ([facility] ICD-10-CM: I26.99  ICD-9-CM: 415.19  1/6/2019        Immunocompromised ([facility] ICD-10-CM: D84.9  ICD-9-CM: 279.9  11/16/2018        Stage IV adenocarcinoma of lung ([facility] ICD-10-CM: C34.90  ICD-9-CM: 162.9  7/11/2018        Pleural effusion ICD-10-CM: J90  ICD-9-CM: 511.9  4/27/2018        Abnormal chest CT ICD-10-CM: R93.89  ICD-9-CM: 793.2  4/27/2018        Pulmonary infiltrate ICD-10-CM: R91.8  ICD-9-CM: 793.19  4/27/2018        CAP (community acquired pneumonia) ICD-10-CM: J18.9  ICD-9-CM: 267  3/27/2018        Chronic systolic congestive heart failure ([facility] ICD-10-CM: I50.22  ICD-9-CM: 428.22, 428.0  5/3/2017        Depression ICD-10-CM: F32.9  ICD-9-CM: 742  11/2/2012        CML (chronic myeloid leukemia) ([facility] ICD-10-CM: C92.10  ICD-9-CM: 205.10  8/25/2012    Overview Addendum 8/26/2012  7:23 AM by Princess Michael MD     8/25/12. Probable diagnosis. Will do bone marrow exam today  8/26/12. Bone marrow aspiration and biopsy done. Marrow aspirate and peripheral blood sent for flow, cytogenetics and molecular testing.  No complications with the procedure             Allergic rhinitis ICD-10-CM: J30.9  ICD-9-CM: 477.9  8/24/2012        Erectile dysfunction ICD-10-CM: N52.9  ICD-9-CM: 607.84  8/24/2012 Mr. April Zapata is a 81-year-old man with history of CML (currently on observation, previously on [address]) and non-small cell lung carcinoma 2018 on osimertinib with recurrent disease, and small cell lung cancer with metastasis to liver 3/2020 (started on cis/etoposide and atezo). Most recently on osimertinib for NSCLC and s/p C1 carbo/etop/atezolizumab on 6/3-6/5/21. He met with Dr Aaron Singleton (primary oncologist) end of May to review CT results which showed hepatic POD. He reviewed the complexity of pt's case and tx options. Pt desired to purse rechallenge with triple therapy along with osimertinib. Dr Aaron Singleton reviewed lack of data regarding this combination and potential rxns including pneumonitis. Mr April Zapata was admitted today (6/8) with acute resp failure and worsening s/s in last few days. His med hx is further complicated by CKD, PE on DOAC and HF with EF ~20%. His HR was in 120-130s irreg and he was started on tx for afib with RVR. On IVF/vanc/Zosyn. Moved to intensive care setting and on BiPAP. Pulm evaluated pt and he is currently on IV steroids. DOAC on hold and pt on heparin gtt. We are consulted for onc recommendations.       He was seen in CCU. He is sitting in bed with BiPAP in place. We spent some time talking about his medical condition, how critically ill he is and next steps. He expressed earlier during this admission that he'd like to revoke his DNR/DNI status and is now a FULL code.         PLAN:  Interval increase in bilateral lung infiltrates  6/8 zosyn and vanc  611 Remains on Zosyn. CXR 6/11 with persistent right lung infiltrate      Concern for pneumonitis  6/8 on solumedrol  6/11 Remains on solumedrol 40 mg every 8 hours.   Consult ID per guidelines for life threatening pneumonitis.       PE 5/2012 6/8 Xarelto on hold, switched to heparin for pending procedures  6/11 Continues on Heparin      Afib/RVR   6/8 per cards, on amio gtt   6/9 remains on drip considering moving out of ICU once controlled on oral  6/11 Remains on Amio drip - okay for short term per cards      SCLC sp Carbo etoposide atezolizumab given 6/3-6/5. Expect to see counts to start dropping. Monitor closely. 6/9 counts beginning to drop. Hgb 7.1 transfuse given cardiac history. 6/11 WBC 2.1, Hgb 7.7, Plt 36k.        NSCLC  osimertinib on hold. Heart failure EF 20%  -Daily weights, strict I&Os, transfuse blood products at slow rate discussed with RN    Acute Respiratory Failure  6/11 Now intubated/sedated as of 6/9. TIFFANIE  6/11 Nephro consulted for worsening renal function--not a good candidate for HD    Worsening AST/ALT  6/11 ALT up to 2580, AST 3158. Recommend Mycophenolate 1 gm BID - oral suspension okay per pharmD. Goals and plan of care reviewed with the patient. All questions answered to the best of our ability. Dr. Agueda Mejia discussed case with Dr. Vandana Castillo and he will have family meeting today to discuss [address].               Carmelo Teran NP   [facility] Hematology & Oncology  [id number] [facility] [address]  Office : [phone]  Fax : [phone]

## 2021-06-11 NOTE — ROUTINE PROCESS
Ventilator check complete; patient has a #8. 0 ET tube secured at the 26 at the teeth. Patient is sedated. Patient is able to follow commands. Breath sounds are coarse. Trachea is midline, Negative for subcutaneous air, and chest excursion is symmetric. Patient is also Negative for cyanosis. All alarms are set and audible. Resuscitation bag is at the head of the bed.       Ventilator Settings  Mode FIO2 Rate Tidal Volume Pressure PEEP I:E Ratio   VC+  30 %  26  550 ml     8 cm H20  1:2.10      Peak airway pressure: 34 cm H2O   Minute ventilation: 15.2 l/min         LIZETH Bar, RRT

## 2021-06-11 NOTE — PROGRESS NOTES
PT Note:  Therapist is discontinuing physical therapy at this time due to decline in status. Mr. Nakia Martinez physical therapy goals were not met. Please reorder PT when our services are again appropriate. Thank you.   Robbie Marin, PT, DPT  6/11/2021

## 2021-06-11 NOTE — PROGRESS NOTES
[facility] CARDIOLOGY PROGRESS NOTE           6/11/2021 4:27 PM    Admit Date: 6/8/2021      Subjective: On the vent. Objective:      Vitals:    06/11/21 1430 06/11/21 1445 06/11/21 1459 06/11/21 1500   BP:  92/60 (!) 89/55 (!) 89/55   Pulse: 72 73 72 72   Resp: 26 23 23 29   Temp:    97.4 °F (36.3 °C)   SpO2: 100% 97% (!) 88% 100%   Weight:       Height:           Physical Exam:  General-No Acute Distress, on the vent    Data Review:   Recent Labs     06/11/21  1005 06/11/21  0307 06/10/21  0613 06/10/21  0314 06/10/21  0313 06/09/21  0255   NA  --  136*  --   --  134*  --    K  --  5.5* 5.3*  --  6.1*   < >   MG  --  2.9*  --   --  3.1*  --    BUN  --  106*  --   --  74*  --    CREA  --  4.51*  --   --  3.29*  --    GLU  --  199*  --   --  188*  --    WBC  --  2.1*  --  7.3  --   --    HGB  --  7.7*  --  9.2*  --    < >   HCT  --  24.5*  --  28.9*  --    < >   PLT  --  36*  --  94*  --   --    INR 2.9  --   --   --   --   --     < > = values in this interval not displayed.        Assessment/Plan:     Principal Problem:    Severe sepsis ([facility] (6/8/2021)        Active Problems:    CML (chronic myeloid leukemia) ([facility] (8/25/2012)          Depression (11/2/2012)          Chronic systolic congestive heart failure ([facility] (5/3/2017)          Pleural effusion (4/27/2018)          Stage IV adenocarcinoma of lung ([facility] (7/11/2018)          Immunocompromised ([facility] (11/16/2018)          SCLC (small cell lung carcinoma), right ([facility] (3/13/2020)          Acute respiratory failure with hypoxia ([facility] (6/8/2021)          Hospital-acquired bacterial pneumonia (6/8/2021)          Bilateral pleural effusion (6/8/2021)          Lactic acidosis (6/8/2021)          Tachycardia (6/8/2021)          Moderate protein-calorie malnutrition ([facility] (6/10/2021)          TIFFANIE (acute kidney injury) ([facility] (6/10/2021)          Acute liver failure without hepatic coma (6/10/2021)          Other pulmonary embolism without acute cor pulmonale (HCC) (6/10/2021)      ////    Rhythm stable  Cont.  Iv amiodarone          Jessy Franklin MD  6/11/2021 4:27 PM

## 2021-06-11 NOTE — PROGRESS NOTES
Comprehensive Nutrition Assessment    Type and Reason for Visit: Reassess  Tube Feeding Management (Pulmonary)    Nutrition Recommendations/Plan:   Enteral Nutrition:  Change TF to Nepro via OGT at 10ml/hour, progress by 10ml/12 hours to goal rate of 50ml/hour. Water flush 10ml/hr. At goal will provide 1980 kcal (100% estimated calorie needs), 89 grams protein (100% estimated protein needs) and 1020ml free fluid (renal guidelines) calculations based on 22 hours infusion. IV Fluids:  Bicarb drip per MD to correct acidosis. Vitamin and Mineral Supplement Therapy:  Electrolyte management replacement protocol implemented. Labs:   EN labs: BMP daily, Mg and Phos MWF. Continue SSI coverage. Malnutrition Assessment:  Malnutrition Status: Moderate malnutrition  Context: Chronic illness  Findings of clinical characteristics of malnutrition:   Energy Intake:  Unable to assess  Weight Loss:  7.00 - Greater than 10% over 6 months     Body Fat Loss:  1 - Mild body fat loss, Triceps   Muscle Mass Loss:  1 - Mild muscle mass loss, Clavicles (pectoralis &deltoids)  Fluid Accumulation:  Unable to assess,     Strength:  Not performed     Nutrition Assessment:   Nutrition History: The patient reports that he feels like he lost about 15 pounds in the last 45 days. He reports decreased stamina. He also reports drinking vanilla Boost at home. Highest weight was 190 pounds. Cultural/Christian/Ethnic Food Preference(s): None   Nutrition Background: Nupur Francois is a 60 y.o. male with history of CML, small cell lung cancer metastasized to liver currently on palliative chemotherapy, pulmonary emboli on Xarelto, advanced CHF, CKD stage III, debility who presented with 2 to 3 days history of difficulty in breathing and laying flat. ED work-up reveals bilateral PNA. Daily Update:  Remains ventilated, sedated and on Neosynephrine. Progressive renal and liver dysfunction. Good tolerance of trophic TF.    Abdominal Status (last documented): Intact abdomen with Active  bowel sounds. Last BM 06/11/21. Pertinent Medications: SSI coverage, Solumedrol, Prevacid, Midodrine, Zosyn  Drips: Cordarone, Fentanyl, Heparin, Giancarlo-synephrine, bicarb drip at 100 ml/hr  Pertinent Labs:   Lab Results   Component Value Date/Time    Sodium 136 (L) 06/11/2021 03:07 AM    Potassium 5.5 (H) 06/11/2021 03:07 AM    Chloride 103 06/11/2021 03:07 AM    CO2 20 (L) 06/11/2021 03:07 AM    Anion gap 13 06/11/2021 03:07 AM    Glucose 199 (H) 06/11/2021 03:07 AM     (H) 06/11/2021 03:07 AM    Creatinine 4.51 (H) 06/11/2021 03:07 AM    Calcium 7.3 (L) 06/11/2021 03:07 AM    Albumin 2.5 (L) 06/11/2021 03:07 AM    Magnesium 2.9 (H) 06/11/2021 03:07 AM    Phosphorus 5.7 (H) 06/09/2021 02:55 AM     Lab Results   Component Value Date/Time    Glucose 199 (H) 06/11/2021 03:07 AM    Glucose (POC) 245 (H) 06/11/2021 12:12 PM    Glucose (POC) 188 (H) 06/11/2021 05:18 AM    Glucose (POC) 166 (H) 06/10/2021 11:41 PM    Glucose (POC) 168 (H) 06/10/2021 05:17 PM    Glucose (POC) 129 (H) 06/10/2021 11:14 AM    Glucose (POC) 153 (H) 06/10/2021 08:46 AM       Nutrition Related Findings:   12% weight loss in the last 6 months. 6/10: intubated, and OGT placed. 6/10: worsening renal and liver function. Not a HD candidate. Current Nutrition Therapies:  Current Tube Feeding (TF) Orders:   · Feeding Route: Orogastric  · Formula: Vital AF 1.2  · Schedule:Continuous    · Regimen: 10 ml/hr - trophic  · Additives/Modulars:    · Water Flushes: 10 ml/hr  · Current TF & Flush Orders Provides: trophic for GI stimulation    Current Intake:   Average Meal Intake: NPO  Additional Caloric Sources: bicarb drip contributes 408 calories/day    Anthropometric Measures:  Height: 6' 1\" (185.4 cm)  Current Body Wt: 79.4 kg (175 lb 0.7 oz) (6/10/21 CCU), Weight source: Bed scale  BMI: 23.1, Normal weight (BMI 18.5-24. 9)     Ideal Body Weight (lbs) (Calculated): 184 lbs (84 kg), 87.1 %  Usual Body Wt: 81.6 kg (180 lb), Percent weight change: -11          Edema: Generalized: Non-pitting (6/11/2021  7:00 AM)  LLE: 1+ (6/10/2021  7:44 PM)  RLE: 1+ (6/10/2021  7:44 PM)     Estimated Daily Nutrient Needs:  Energy (kcal/day): 2946-3057  (Kcal/kg (25-30 mihai/kg/day using 73 kg - vent), Weight Used: Current)  Protein (g/day):   (1.2-2 gm protein/kg/day using 73 kg - vent) Weight Used: (Current)  Fluid (ml/day):   (1 ml/kcal)    Nutrition Diagnosis:   · Inadequate oral intake related to impaired respiratory function as evidenced by intubation, nutrition support-enteral nutrition (slow progression of renal TF as tolerated)    Nutrition Interventions:   Food and/or Nutrient Delivery: Modify tube feeding     Coordination of Nutrition Care: Continue to monitor while inpatient, Interdisciplinary rounds  Plan of Care discussed with JUSTIN Blackmon. Goals:   Previous Goal Met: Progressing toward goal(s)  Active Goal: Meet >75% of daily nutrition needs via TF within 3 days. Nutrition Monitoring and Evaluation:      Food/Nutrient Intake Outcomes: Enteral nutrition intake/tolerance  Physical Signs/Symptoms Outcomes: Biochemical data, GI status, Hemodynamic status    Discharge Planning: Too soon to determine    Craige Albe.  Irene Tillman RD, LD on 6/11/2021 at 1:31 PM  Contact: [phone]

## 2021-06-11 NOTE — PROGRESS NOTES
Ventilator check complete; patient has a #8. 0 ET tube secured at the 27 at the teeth. Patient is sedated. Patient is not able to follow commands. Breath sounds are diminished. Trachea is midline, Negative for subcutaneous air, and chest excursion is symmetric. Patient is also Negative for cyanosis and is Negative for pitting edema. All alarms are set and audible. Resuscitation bag is at the head of the bed.       Ventilator Settings  Mode FIO2 Rate Tidal Volume Pressure PEEP I:E Ratio   VC+  30 %   26 550 ml     8 cm H20  1:2.08      Peak airway pressure: 34 cm H2O   Minute ventilation: 15.5 l/min       Klever Gil, RT

## 2021-06-11 NOTE — PROGRESS NOTES
Palliative Care Progress Note    Patient: Veto Randolph. MRN: [id number]  SSN: [id number]    YOB: 1955  Age: 60 y.o. Sex: male       Assessment/Plan:     Chief Complaint/Interval History: pt remains intubated but alert. Nods head yes/no. Now with renal failure and hepatic failure. Principal Diagnosis:    · Dyspnea  R06.00    Additional Diagnoses:   · Debility, Unspecified  R53.81  · Frailty  R54  · Respiratory Failure, Acute on Chronic  J96.20  · Counseling, Encounter for Medical Advice  Z71.9  · Encounter for Palliative Care  Z51.5    Palliative Performance Scale (PPS)       Medical Decision Making:   Reviewed and summarized labs and imaging. Pt intubated but alert and will nod yes/no to questions. Denies any pain, nausea. Spoke with wife via phone and updated on current condition and concerns. Explained developing multiorgan failure and poor prognosis for recovery despite improvement in respiratory function. Wife and daughters to come visit with pt today. They remain hopeful for recovery but understand frailty of situation. Updated pulm on conversation. Will follow. Will discuss findings with members of the interdisciplinary team.      More than 50% of this 25 minute visit was spent counseling and coordination of care as outlined above. Subjective:     Review of Systems negative except as documented in h&p. Objective:     Visit Vitals  BP (!) 96/58   Pulse 75   Temp 97.9 °F (36.6 °C)   Resp 26   Ht 6' 1\" (1.854 m)   Wt 175 lb 0.7 oz (79.4 kg)   SpO2 99%   BMI 23.09 kg/m²       Physical Exam:    General:  On vent. Eyes:  Conjunctivae/corneas clear    Nose: Nares normal. Septum midline.    Neck: Supple, symmetrical, trachea midline, no JVD   Lungs:   Diffuse coarse bs   Heart:  Regular rate and rhythm, no murmur    Abdomen:   Soft, non-tender, non-distended   Extremities: Normal, atraumatic, no cyanosis or edema   Skin: Skin color, texture, turgor normal. No rash or lesions.    Neurologic: Moves extremities   Psych: Alert, calm     Signed By: Nilda Bruno MD     June 11, 2021

## 2021-06-11 NOTE — PROGRESS NOTES
Ventilator check complete; patient has a #8. 0 ET tube secured at the 26 at the teeth. Patient is sedated. Patient is able to follow commands. Breath sounds are coarse and diminished. Trachea is midline, Negative for subcutaneous air, and chest excursion is symmetric. Patient is also Negative for cyanosis. All alarms are set and audible. Resuscitation bag is at the head of the bed.       Ventilator Settings  Mode FIO2 Rate Tidal Volume Pressure PEEP I:E Ratio   VC+  30 %  26  550 ml     8 cm H20  1:2.53      Peak airway pressure: 13 cm H2O   Minute ventilation: 11.1 l/min     Abbi Benson RT

## 2021-06-11 NOTE — PROGRESS NOTES
Chart reviewed and pt discussed in am IDR. Pt remains CCU intubated/vent. Currently on Amio, fentanyl, heparin, mary, bicarb gtts. Palliative care now following as pt worsened condition per MD notes. Renal and liver failure. CM will follow for any assist. LOS 3 days.

## 2021-06-11 NOTE — CONSULTS
Infectious Disease Consult    Today's Date: 6/11/2021   Admit Date: 6/8/2021    Impression:   · 3 primary malignancies: Non-small cell lung CA, Small Cell lung CA, and CML  · Suspected adverse reaction to immunotherapy with hepatitis and pneumonitis  · ? RLL bacterial pneumonia? Plan:   ·  continue pip/tazo for now. He has a more dense LLL infiltrate which could be bacterial  · Check procalcitonin  · This is certainly more consistent with immunotherapy reaction, but I will be thorough and check CMV PCR, EBV PCR, adenovirus PCR, acute viral hepatitis panel, HIV PCR. Anti-infectives:   · Pip/tazo    Subjective:   Date of Consultation:  June 11, 2021  Referring Physician: Trina George    Patient is a 60 y.o. male with 3 different primary malignancies: CML, small cell lung CA with metastasis to liver and non small cell lung CA. He also has CKD, HFrEF (LVEF 20%), and a PE for which he is on a DOAC. He is on palliative chemotherapy/immunotherapy. He was started on cycle 1 of carboplatin/etoposide, atezolizumab on 6/3/2021 - 6/5/2021. He was then admitted 6/8/2021 with acute onset of respiratory distress. He was started on IVF/Vancomycin/Zosyn. He was admitted to ICU and started on corticosteroids. CXR whowed bilateral interstitial infiltrates, nodules, and pleural effusion. Respiratory viral panel is negative. Sputum culture is negative and legionella ag is negative. He has been kept on pip/tazo (day #4) and started on mycophenolate and methylprednisolone. He has also developed severe acute hepatitis. AST/ALT = 3158/2580.         Patient Active Problem List   Diagnosis Code    Allergic rhinitis J30.9    Erectile dysfunction N52.9    CML (chronic myeloid leukemia) (HCC) C92.10    Depression F32.9    Chronic systolic congestive heart failure (HCC) I50.22    CAP (community acquired pneumonia) J18.9    Pleural effusion J90    Abnormal chest CT R93.89    Pulmonary infiltrate R91.8    Stage IV adenocarcinoma of lung (HCC) C34.90    Immunocompromised ([facility] D84.9    Pneumonia J18.9    Pulmonary emboli (HCC) I26.99    Acute CHF (congestive heart failure) (HCC) I50.9    Acute pulmonary edema (HCC) J81.0    SCLC (small cell lung carcinoma), right (HCC) C34.91    Shortness of breath R06.02    Pleural effusion on right J90    Hypomagnesemia E83.42    Elevated serum creatinine R79.89    Acute respiratory failure with hypoxia (HCC) J96.01    Hospital-acquired bacterial pneumonia J15.9    Bilateral pleural effusion J90    Lactic acidosis E87.2    Severe sepsis (HCC) A41.9, R65.20    Tachycardia R00.0    Moderate protein-calorie malnutrition (HCC) E44.0    TIFFANIE (acute kidney injury) ([facility] N17.9    Acute liver failure without hepatic coma K72.00    Other pulmonary embolism without acute cor pulmonale (HCC) I26.99     Past Medical History:   Diagnosis Date    Acute liver failure without hepatic coma 6/10/2021    Anxiety     Arthritis     Atrial fibrillation with RVR (HCC) 6/8/2021    Chronic systolic congestive heart failure ([facility] 5/3/2017    CML (chronic myeloid leukemia) ([facility] 8/25/2012    Depression 11/2/2012    Erectile dysfunction     Hypertension     Leukemia, acute ([facility] [address] 8/24/2012    Lung cancer ([facility] 2018    Pulmonary emboli ([facility] [address] 1/6/2019      Family History   Problem Relation Age of Onset    Lung Disease Father 79        Black lung\"    Cancer Mother 72        pancreatic cancer      Social History     Tobacco Use    Smoking status: Never Smoker    Smokeless tobacco: Never Used   Substance Use Topics    Alcohol use: No     Past Surgical History:   Procedure Laterality Date    HX HERNIA REPAIR      left inguinal 1996    HX VASCULAR ACCESS      IR INSERT TUNL CVC W PORT OVER 5 YEARS  3/11/2020      Prior to Admission medications    Medication Sig Start Date End Date Taking? Authorizing Provider   rivaroxaban (Xarelto) 20 mg tab tablet Take 1 Tablet by mouth daily.  6/3/21 Lyla Lundy MD   prochlorperazine (Compazine) 10 mg tablet Take 1 Tablet by mouth every six (6) hours as needed for Nausea. 5/26/21   Lyla Lundy MD   ondansetron hcl (ZOFRAN) 8 mg tablet TAKE 1 TABLET BY MOUTH EVERY 8 HOURS AS NEEDED FOR NAUSEA 5/26/21   Lyla Lundy MD   traMADoL Mariellen Noss) 50 mg tablet Take 1-2 Tabs by mouth every six (6) hours as needed for Pain for up to 30 days. Max Daily Amount: 400 mg. 5/11/21 6/10/21  Lyla Lundy MD   osimertinib (Tagrisso) 80 mg tablet Take 1 Tab by mouth daily. 4/15/21   Lyla Lundy MD   furosemide (LASIX) 40 mg tablet Take 1 Tab by mouth daily as needed for Other (weight gain > 5 lbs in 48 hours. ). 1/28/21   Peace Schultz MD   metoprolol succinate (TOPROL-XL) 25 mg XL tablet Take 0.5 Tabs by mouth daily. Indications: chronic heart failure 11/5/20   Peace Schultz MD   folic acid (FOLVITE) 1 mg tablet TAKE 1 TABLET BY MOUTH EVERY DAY  Patient not taking: Reported on 5/26/2021 6/5/20   Lyla Lundy MD   lidocaine-prilocaine (EMLA) topical cream Apply  to affected area as needed for Pain. Apply to port site 45-60 minutes prior to port access. Cover with plastic. 3/2/20   Lyla Lundy MD   albuterol (PROVENTIL HFA, VENTOLIN HFA, PROAIR HFA) 90 mcg/actuation inhaler Take 2 Puffs by inhalation every four (4) hours as needed for Wheezing. 9/3/19   Joselyn Shah MD   benzonatate (TESSALON) 200 mg capsule Take 1 Cap by mouth three (3) times daily as needed for Cough. 9/3/19   Joselyn Shah MD   triamcinolone (NASACORT) 55 mcg nasal inhaler 2 Sprays by Both Nostrils route two (2) times a day. Patient not taking: Reported on 5/26/2021 1/28/19   Lyla Lundy MD   OXYGEN-AIR DELIVERY SYSTEMS by Does Not Apply route. 3LPM PRN    Provider, Historical       No Known Allergies     Review of Systems:  A comprehensive review of systems was negative except for that written in the History of Present Illness.     Objective:     Visit Vitals  BP 93/60 Pulse 74   Temp 97.5 °F (36.4 °C)   Resp 26   Ht 6' 1\" (1.854 m)   Wt 78 kg (172 lb)   SpO2 100%   BMI 22.69 kg/m²     Temp (24hrs), Av.1 °F (36.2 °C), Min:95.3 °F (35.2 °C), Max:98.5 °F (36.9 °C)       Lines:  Peripheral IV:       Physical Exam:    General:  Intubated, awake, alert; twin sister is at bedside. Eyes:  Sclera anicteric. Pupils equally round and reactive to light. Mouth/Throat: Mucous membranes normal, oral pharynx clear   Neck: Supple   Lungs:   Clear to auscultation bilaterally, good effort   CV:  Regular rate and rhythm,no murmur, click, rub or gallop   Abdomen:   Soft, non-tender.  bowel sounds normal. non-distended   Extremities: No cyanosis or edema   Skin: Skin color, texture, turgor normal. no acute rash or lesions   Lymph nodes: Cervical and supraclavicular normal   Musculoskeletal: No swelling or deformity   Lines/Devices:  Intact, no erythema, drainage or tenderness   Psych: Alert and oriented, normal mood affect given the setting       Data Review:     CBC:  Recent Labs     06/11/21  0307 06/10/21  0314 06/09/21  2014 06/09/21  0255   WBC 2.1* 7.3  --  6.2   GRANS 71 86*  --  90*   MONOS 1* 0*  --  0*   EOS 0* 0*  --  0*   ANEU 1.6* 6.3  --  5.6   ABL 0.1* 0.1*  --  0.1*   HGB 7.7* 9.2* 8.6* 7.1*   HCT 24.5* 28.9* 27.0* 22.6*   PLT 36* 94*  --  96*       BMP:  Recent Labs     06/11/21  0307 06/10/21  0613 06/10/21  0313 06/09/21  2014   CREA 4.51*  --  3.29* 2.76*   *  --  74* 66*   *  --  134* 136   K 5.5* 5.3* 6.1* 4.8     --  104 104   CO2 20*  --  20* 21   AGAP 13  --  10 11   *  --  188* 206*       LFTS:  Recent Labs     21  0307 06/10/21  0313 21   TBILI 1.1 1.3* 1.1   ALT 2,580* 625* 250*   AP 79 94 104   TP 6.0* 6.6 6.8   ALB 2.5* 2.6* 2.7*       Microbiology:     All Micro Results     Procedure Component Value Units Date/Time    CULTURE, URINE [[id number]] Collected: 06/10/21 1316    Order Status: Completed Specimen: Cath Urine Updated: 06/11/21 1001     Special Requests: NO SPECIAL REQUESTS        Culture result:       NO GROWTH AFTER SHORT PERIOD OF INCUBATION. FURTHER RESULTS TO FOLLOW AFTER OVERNIGHT INCUBATION. CULTURE, RESPIRATORY/SPUTUM/BRONCH Himanshu Gregory [[id number]] Collected: 06/08/21 1804    Order Status: Completed Specimen: Sputum Updated: 06/11/21 0901     Special Requests: NO SPECIAL REQUESTS        GRAM STAIN 4 TO 32 WBC'S/OIF      0 TO 3  EPITHELIAL CELLS SEEN /OIF            MODERATE GRAM POSITIVE COCCI            FEW YEAST         3+ MUCUS PRESENT        Culture result:       MODERATE NORMAL RESPIRATORY MARGO          RESPIRATORY VIRUS PANEL W/COVID-19, PCR [[id number]] Collected: 06/10/21 0907    Order Status: Completed Specimen: Nasopharyngeal Updated: 06/10/21 1115     Adenovirus NOT DETECTED        Coronavirus 229E NOT DETECTED        Coronavirus HKU1 NOT DETECTED        Coronavirus CVNL63 NOT DETECTED        Coronavirus OC43 NOT DETECTED        SARS-CoV-2, PCR NOT DETECTED        Comment: This test has been authorized by the FDA under an Emergency Use Authorization (EUA) for use by authorized laboratories.          Metapneumovirus NOT DETECTED        Rhinovirus and Enterovirus NOT DETECTED        Influenza A NOT DETECTED        Influenza B NOT DETECTED        Parainfluenza 1 NOT DETECTED        Parainfluenza 2 NOT DETECTED        Parainfluenza 3 NOT DETECTED        Parainfluenza virus 4 NOT DETECTED        RSV by PCR NOT DETECTED        B. parapertussis, PCR NOT DETECTED        Bordetella pertussis - PCR NOT DETECTED        Chlamydophila pneumoniae DNA, QL, PCR NOT DETECTED        Mycoplasma pneumoniae DNA, QL, PCR NOT DETECTED       RESPIRATORY PANEL,PCR,NASOPHARYNGEAL [[id number]] Collected: 06/10/21 0469    Order Status: Canceled Specimen: NASOPHARYNGEAL SWAB     MSSA/MRSA SC BY PCR, NASAL SWAB [[id number]] Collected: 06/09/21 1010    Order Status: Completed Specimen: Nasal swab Updated: 06/09/21 1242 Special Requests: NO SPECIAL REQUESTS        Culture result:       SA target not detected. A MRSA NEGATIVE, SA NEGATIVE test result does not preclude MRSA or SA nasal colonization. [facility] [[id number]] Collected: 06/08/21 1803    Order Status: Completed Specimen: Urine Updated: 06/08/21 1817    DIVINA Whitfield, UR/CSF [[id number]] Collected: 06/08/21 1804    Order Status: Completed Updated: 06/08/21 1816          Imaging:   CT chest (6/9/2021)  IMPRESSION  1. Worsening of the nodular pleural thickening throughout the right hemithorax with multiple pulmonary nodules throughout the right lower lobe most in keeping with pulmonary and pleural metastatic disease. There is a small right pleural effusion also likely metastatic. 2. Stable confluent airspace opacity at the right martina may be a prior treatment effect. Pneumonia could also be considered. 3. Nonspecific groundglass opacity throughout the left upper lobe, lingula and lower lobe. 4. Cardiomegaly  5. Mediastinal lymphadenopathy.     Signed By: Jakub Stephens MD     June 11, 2021

## 2021-06-11 NOTE — PROGRESS NOTES
Bedside and verbal shift change report received from  [facility] (offgoing nurse). Report included the following information SBAR, Kardex, ED Summary, Procedure Summary, Intake/Output, MAR, Recent Results, Med Rec Status, Cardiac Rhythm NSR and Alarm Parameters .      Dual skin assessment completed at bedside: N/A (list pertinent skin assessment findings)    Dual verification of gtts completed (name of gtts verified): Heparin, Fentanyl

## 2021-06-11 NOTE — PROGRESS NOTES
Interdisciplinary team rounds were held 6/11/2021 with the following team members:Nursing, Nurse Practitioner, Nutrition, Pastoral Care, Pharmacy, Physician and Respiratory Therapy and the patient. Plan of care discussed. See clinical pathway and/or care plan for interventions and desired outcomes.

## 2021-06-11 NOTE — PROGRESS NOTES
Bedside and Verbal shift change report given to [address] Edgar Hernandez and Murphy RN (oncoming nurse) by All Saini RN (offgoing nurse). Report included the following information SBAR, Kardex, Intake/Output, MAR, Recent Results, Cardiac Rhythm NSR and Alarm Parameters .      Dual skin assessment completed at beside: N/A    Dual verification of gtts completed: heparin, fentanyl

## 2021-06-11 NOTE — PROGRESS NOTES
Per chart review, pt has had decline in medical status. Mr. Angelina Perez occupational therapy goals were not met. Please reorder OT skilled services once pt is medically appropriate.      Thank you,     Yanely Kuhn OTR/L

## 2021-06-11 NOTE — CONSULTS
[facility] Consult Note       Primary GI Physician:     Referring Provider:  Dr Annelise Fuentes Date:  6/11/2021    Admit Date:  6/8/2021    Chief Complaint:  Worsening Liver Failure    Subjective:     History of Present Illness:  Patient is a 60 y.o. male with PMH including but not limited to Severe systolic HR with EF 33%, CML, small cell lung cancer metastasized to liver currently on palliative chemotherapy, pulmonary emboli on Xarelto, advanced CHF, CKD stage III, debility, who is seen in consultation at the request of Dr. Lucy Lay for worsening liver failure. He presented 6/8 with 2 to 3 days history of difficulty in breathing with laying flat. He does report of cough which is mostly dry. ER work-up was remarkable for chest x-ray showing bilateral infiltrate suggestive of pneumonia along with pleural effusion, blood work remarkable for lactic acid 4.4, creatinine 2.13 (baseline), heart rate ranging from 120-135 irregular, respiratory rate ranging from 30s to 40s. ABG 7.39/29/39 on FiO2 0.1. He was dx with non-small cell lung cancer in 2018 and small cell lung cancer metastasized to liver discovered in 3/2020 currently on palliative chemotherapy. Had issues in the past with pneumonitis, but with evidence recently of both active small and non-small cell cancer he elected for rechallenge with carbo etoposide atezolizumab while continue osimertinib and monitor pneumonitis symptoms when last seen by oncology 5/26. Treated 6/5 most recently. He was supposed to undergo PleurX placement 2 weeks ago but it was delayed due to starting [facility] for PE. He is intubated, day 2 for respiratory failure with worsening renal and liver failure. UOP dropping, down to 800cc in 24 hour period. Seen by nephrology yesterday and not felt to be a dialysis candidate, prognosis poor. Remains on amio for vtach runs/afib. On mary for pressure supportl. Propofol now off and just on fentanyl for sedation.  Respiratory viral/covid swab negative yesterday. On Zosyn day 4 and heparin gtt for PE's. Labs today with WBC low 2.1, hgb 7.7, plt 36, TB 1.1, AP 79, AST 3158, ALT 2580, Cr up to 4.51. CT 5/12/21 ABD/P  IMPRESSION  Bilateral lower lobe segmental pulmonary emboli. Multiple new liver  metastases. Right lung nodules and pleural effusion. No biliary dilation noted    Liver mass bx 5/20/21 METASTATIC CARCINOMA CONSISTENT WITH SMALL CELL CARCINOMA. PMH:  Past Medical History:   Diagnosis Date    Acute liver failure without hepatic coma 6/10/2021    Anxiety     Arthritis     Atrial fibrillation with RVR (HCC) 6/8/2021    Chronic systolic congestive heart failure ([facility] 5/3/2017    CML (chronic myeloid leukemia) ([facility] 8/25/2012    Depression 11/2/2012    Erectile dysfunction     Hypertension     Leukemia, acute ([facility] 8/24/2012    Lung cancer ([facility] 2018    Pulmonary emboli ([facility] 1/6/2019       PSH:  Past Surgical History:   Procedure Laterality Date    HX HERNIA REPAIR      left inguinal 1996    HX VASCULAR ACCESS      IR INSERT TUNL CVC W PORT OVER 5 YEARS  3/11/2020       Allergies:  No Known Allergies    Home Medications:  Prior to Admission medications    Medication Sig Start Date End Date Taking? Authorizing Provider   rivaroxaban (Xarelto) 20 mg tab tablet Take 1 Tablet by mouth daily. 6/3/21   Otis Garcia MD   prochlorperazine (Compazine) 10 mg tablet Take 1 Tablet by mouth every six (6) hours as needed for Nausea. 5/26/21   Otis Garcia MD   ondansetron hcl (ZOFRAN) 8 mg tablet TAKE 1 TABLET BY MOUTH EVERY 8 HOURS AS NEEDED FOR NAUSEA 5/26/21   Otis Garcia MD   traMADoL Ova Emiliano) 50 mg tablet Take 1-2 Tabs by mouth every six (6) hours as needed for Pain for up to 30 days. Max Daily Amount: 400 mg. 5/11/21 6/10/21  Otis Garcia MD   osimertinib (Tagrisso) 80 mg tablet Take 1 Tab by mouth daily.  4/15/21   Otis Garcia MD   furosemide (LASIX) 40 mg tablet Take 1 Tab by mouth daily as needed for Other (weight gain > 5 lbs in 48 hours. ). 1/28/21   Peace Schultz MD   metoprolol succinate (TOPROL-XL) 25 mg XL tablet Take 0.5 Tabs by mouth daily. Indications: chronic heart failure 11/5/20   Peace Schultz MD   folic acid (FOLVITE) 1 mg tablet TAKE 1 TABLET BY MOUTH EVERY DAY  Patient not taking: Reported on 5/26/2021 6/5/20   Lyla Lundy MD   lidocaine-prilocaine (EMLA) topical cream Apply  to affected area as needed for Pain. Apply to port site 45-60 minutes prior to port access. Cover with plastic. 3/2/20   Lyla Lundy MD   albuterol (PROVENTIL HFA, VENTOLIN HFA, PROAIR HFA) 90 mcg/actuation inhaler Take 2 Puffs by inhalation every four (4) hours as needed for Wheezing. 9/3/19   Joselyn Shah MD   benzonatate (TESSALON) 200 mg capsule Take 1 Cap by mouth three (3) times daily as needed for Cough. 9/3/19   Joselyn Shah MD   triamcinolone (NASACORT) 55 mcg nasal inhaler 2 Sprays by Both Nostrils route two (2) times a day. Patient not taking: Reported on 5/26/2021 1/28/19   Lyla Lundy MD   OXYGEN-AIR DELIVERY SYSTEMS by Does Not Apply route.  3LPM PRN    Provider, Historical       Hospital Medications:  Current Facility-Administered Medications   Medication Dose Route Frequency    sodium bicarbonate (8.4%) 150 mEq in dextrose 5% 1,000 mL infusion   IntraVENous CONTINUOUS    fentaNYL citrate (PF) injection 50 mcg  50 mcg IntraVENous Q4H PRN    PHENYLephrine (MARCELINA-SYNEPHRINE) 30 mg in 0.9% sodium chloride 250 mL infusion   mcg/min IntraVENous TITRATE    midodrine (PROAMATINE) tablet 10 mg  10 mg Per NG tube TID WITH MEALS    lansoprazole (PREVACID) 3 mg/mL oral suspension 30 mg  30 mg Per NG tube DAILY    insulin lispro (HUMALOG) injection 0-10 Units  0-10 Units SubCUTAneous Q6H    methylPREDNISolone (PF) (SOLU-MEDROL) injection 40 mg  40 mg IntraVENous Q8H    dextrose 40% (GLUTOSE) oral gel 1 Tube  15 g Oral PRN    glucagon (GLUCAGEN) injection 1 mg  1 mg IntraMUSCular PRN    dextrose (D50W) injection syrg 12.5-25 g  25-50 mL IntraVENous PRN    amiodarone (CORDARONE) 450 mg in dextrose 5% 250 mL infusion  0.5-1 mg/min IntraVENous TITRATE    0.9% sodium chloride infusion 250 mL  250 mL IntraVENous PRN    fentaNYL in normal saline (pf) 25 mcg/mL infusion  0-200 mcg/hr IntraVENous TITRATE    propofol (DIPRIVAN) 10 mg/mL infusion  0-50 mcg/kg/min IntraVENous TITRATE    sodium chloride (NS) flush 5-40 mL  5-40 mL IntraVENous Q8H    sodium chloride (NS) flush 5-40 mL  5-40 mL IntraVENous PRN    levalbuterol (XOPENEX) nebulizer soln 1.25 mg/3 mL  1.25 mg Nebulization Q4H RT    ondansetron (ZOFRAN) injection 4 mg  4 mg IntraVENous Q4H PRN    piperacillin-tazobactam (ZOSYN) 4.5 g in 0.9% sodium chloride (MBP/ADV) 100 mL MBP  4.5 g IntraVENous Q8H    heparin 25,000 units in dextrose 500 mL infusion  18-36 Units/kg/hr IntraVENous TITRATE    phenol throat spray (CHLORASEPTIC) 1 Spray  1 Spray Oral PRN       Social History:  Social History     Tobacco Use    Smoking status: Never Smoker    Smokeless tobacco: Never Used   Substance Use Topics    Alcohol use: No       Pt denies any history of drug use, blood transfusions, or tattoos. Family History:  Family History   Problem Relation Age of Onset    Lung Disease Father 79        Black lung\"    Cancer Mother 72        pancreatic cancer       Review of Systems:  A detailed 10 system ROS is obtained, with pertinent positives as listed above. All others are negative. Diet:  TF    Objective:     Physical Exam:  Vitals:  Visit Vitals  BP 99/63   Pulse 74   Temp 98.5 °F (36.9 °C)   Resp (!) 56   Ht 6' 1\" (1.854 m)   Wt 79.4 kg (175 lb 0.7 oz)   SpO2 100%   BMI 23.09 kg/m²     Gen:  Pt is alert, ill appearing  Skin:  Extremities and face reveal no rashes. HEENT: Sclerae anicteric. Extra-occular muscles are intact. No oral ulcers. Cardiovascular: Regular rate and rhythm. No murmurs, gallops, or rubs. Hypotensive  Respiratory:  Course on vent. GI:  Abdomen nondistended, soft, and nontender. Normal active bowel sounds. No enlargement of the liver or spleen. No masses palpable. Rectal:  Deferred  Musculoskeletal:  No pitting edema of the lower legs. Neurological:   Patient is alert and oriented.   Psychiatric:  Unable to ascertain    Laboratory:    Recent Labs     06/11/21  0658 06/11/21  0307 06/10/21  2355 06/10/21  1627 06/10/21  0904 06/10/21  2906 06/10/21  0314 06/10/21  0313 06/09/21 2014 06/09/21  0255 06/08/21  1405   WBC  --  2.1*  --   --   --   --  7.3  --   --  6.2 10.1   HGB  --  7.7*  --   --   --   --  9.2*  --  8.6* 7.1* 8.2*   HCT  --  24.5*  --   --   --   --  28.9*  --  27.0* 22.6* 25.1*   PLT  --  36*  --   --   --   --  94*  --   --  96* 163   MCV  --  82.5  --   --   --   --  83.0  --   --  81.9 81.5   NA  --  136*  --   --   --   --   --  134* 136 137  --    K  --  5.5*  --   --   --  5.3*  --  6.1* 4.8 5.0  --    CL  --  103  --   --   --   --   --  104 104 104  --    CO2  --  20*  --   --   --   --   --  20* 21 23  --    BUN  --  106*  --   --   --   --   --  74* 66* 60*  --    CREA  --  4.51*  --   --   --   --   --  3.29* 2.76* 2.39*  --    CA  --  7.3*  --   --   --   --   --  8.1* 8.1* 7.7*  --    MG  --  2.9*  --   --   --   --   --  3.1*  --  2.5*  --    GLU  --  199*  --   --   --   --   --  188* 206* 190*  --    AP  --  79  --   --   --   --   --  94 104 81  --    AST  --  3,158*  --   --   --   --   --  910* 260* 173*  --    ALT  --  2,580*  --   --   --   --   --  625* 250* 129*  --    TBILI  --  1.1  --   --   --   --   --  1.3* 1.1 0.7  --    ALB  --  2.5*  --   --   --   --   --  2.6* 2.7* 2.5*  --    TP  --  6.0*  --   --   --   --   --  6.6 6.8 6.4  --    APTT 48.9*  --  41.1* 39.0* 46.2*  --   --   --   --   --  52.0*          Assessment:     Principal Problem:    Severe sepsis (HCC) (6/8/2021)    Active Problems:    [facility] (8/25/2012) Overview: 8/25/12. Probable diagnosis. Will do bone marrow exam today      8/26/12. Bone marrow aspiration and biopsy done. Marrow aspirate and       peripheral blood sent for flow, cytogenetics and molecular testing. No       complications with the procedure      Depression (11/2/2012)      Chronic systolic congestive heart failure ([address] (5/3/2017)      Pleural effusion (4/27/2018)      Stage IV adenocarcinoma of lung ([address] (7/11/2018)      Immunocompromised ([address] (11/16/2018)      SCLC (small cell lung carcinoma), right ([address] (3/13/2020)      Acute respiratory failure with hypoxia ([address] (6/8/2021)      Hospital-acquired bacterial pneumonia (6/8/2021)      Bilateral pleural effusion (6/8/2021)      Lactic acidosis (6/8/2021)      Tachycardia (6/8/2021)      Moderate protein-calorie malnutrition ([address] (6/10/2021)      TIFFANIE (acute kidney injury) ([address] (6/10/2021)      Acute liver failure without hepatic coma (6/10/2021)      Other pulmonary embolism without acute cor pulmonale ([address] (6/10/2021)      60 y.o. male with PMH including but not limited to Severe systolic HR with EF 81%, CML, small cell lung cancer metastasized to liver currently on palliative chemotherapy, pulmonary emboli on heparin gtt, advanced CHF, CKD stage III, debility admitted with SOB, respiratory failure now on vent, Giancarlo for pressure support, seen for increasing LFT's. Also TIFFANIE and seen by nephrology, poor prognosis no HD rec. Remains on amio for vtach runs/afib. On giancarlo for pressure supportl. Propofol now off and just on fentanyl for sedation. Respiratory viral/covid swab negative yesterday. On Zosyn day 4 and heparin gtt for PE's. Labs today with WBC low 2.1, hgb 7.7, plt 36, TB 1.1, AP 79, AST 3158, ALT 2580, Cr up to 4.51. Plan:     - Increasing LFT's likely to to ischemic hepatitis (hypotension, V Tach), will get viral hepatitis, INR and RUQ US to evaluate for other sources or biliary obstruction that could possibly be stented. Reyna De Leon NP  Patient is seen and examined in collaboration with Dr. Deirdre Lebron. Assessment and plan as per Dr. Bowen Dial.

## 2021-06-11 NOTE — PROGRESS NOTES
Admit Date: 6/8/2021      Subjective:      Patient is B 52 y.o.  male with PMH of CML, non-small cell lung cancer in 2018 and small cell lung cancer metastasized to liver discovered in 3/2020, received a palliative treatment with carbo etoposide atezolizumab and osimertinib: 6/5 most recently chemotherapy, pulmonary emboli on Xarelto, advanced CHF, CKD stage III. He presented with 3 to 4 days history of worsening SOB and dry cough. He denied having any fever, chills, CP. He was found to be hypoxic with bilateral infiltrates on chest xray. First, he was placed on BIPAP then required tracheal intubation with mechanical ventilation. He received [address] [place] [address] for possible pneumonia. Renal consult requested because of oliguric TIFFANIE.     Review of Systems  On the vent     Objective:     Patient Vitals for the past 8 hrs:   BP Temp Pulse Resp SpO2   06/11/21 0914 99/63 -- 74 -- 100 %   06/11/21 0859 108/63 -- 78 -- 99 %   06/11/21 0844 (!) 89/58 -- 79 (!) 56 100 %   06/11/21 0830 -- -- 79 (!) 53 100 %   06/11/21 0829 (!) 88/56 -- 79 (!) 48 100 %   06/11/21 0815 -- -- 78 (!) 52 100 %   06/11/21 0814 (!) 90/54 -- 78 (!) 52 100 %   06/11/21 0800 -- -- 79 (!) 47 100 %   06/11/21 0759 (!) 92/52 -- 79 26 100 %   06/11/21 0745 -- -- 80 26 100 %   06/11/21 0744 (!) 94/58 -- 80 26 100 %   06/11/21 0730 -- -- 82 26 100 %   06/11/21 0729 98/64 -- 82 26 100 %   06/11/21 0715 -- -- 81 30 100 %   06/11/21 0714 98/66 -- 81 26 100 %   06/11/21 0700 -- -- 84 26 100 %   06/11/21 0659 108/70 -- 82 -- 100 %   06/11/21 0655 115/76 -- 83 -- --   06/11/21 0644 115/76 -- 86 -- 100 %   06/11/21 0629 99/67 -- 81 23 100 %   06/11/21 0614 93/63 -- 76 26 100 %   06/11/21 0559 91/62 -- 76 26 100 %   06/11/21 0544 93/62 -- 76 26 100 %   06/11/21 0529 90/60 -- 75 26 100 %   06/11/21 0514 (!) 89/60 -- 75 26 100 %   06/11/21 0459 92/61 -- 75 26 100 %   06/11/21 0444 (!) 89/60 -- 76 26 100 %   06/11/21 0429 92/60 -- 75 26 100 % 06/11/21 0414 (!) 88/58 -- 76 26 100 %   06/11/21 0359 (!) 86/57 -- 74 26 100 %   06/11/21 0344 (!) 86/55 -- 73 26 100 %   06/11/21 0330 -- -- 74 26 100 %   06/11/21 0329 92/62 -- -- 26 --   06/11/21 0314 (!) 88/59 98.5 °F (36.9 °C) 75 26 100 %   06/11/21 0259 (!) 88/59 -- 74 26 100 %   06/11/21 0244 (!) 90/58 -- 73 26 100 %   06/11/21 0229 (!) 90/58 -- 75 26 100 %   06/11/21 0214 (!) 94/58 -- 75 26 100 %   06/11/21 0159 (!) 85/51 -- 73 26 100 %   06/11/21 0144 (!) 85/52 -- 73 26 100 %     No intake/output data recorded. Physical Exam:   On vent. Lung: few bronchi   CV: RR, no rub  Abd: soft, no rebound  Ext: trace edema         Data Review   Recent Results (from the past 8 hour(s))   MAGNESIUM    Collection Time: 06/11/21  3:07 AM   Result Value Ref Range    Magnesium 2.9 (H) 1.8 - 2.4 mg/dL   CBC WITH AUTOMATED DIFF    Collection Time: 06/11/21  3:07 AM   Result Value Ref Range    WBC 2.1 (L) 4.3 - 11.1 K/uL    RBC 2.97 (L) 4.23 - 5.6 M/uL    HGB 7.7 (L) 13.6 - 17.2 g/dL    HCT 24.5 (L) 41.1 - 50.3 %    MCV 82.5 79.6 - 97.8 FL    MCH 25.9 (L) 26.1 - 32.9 PG    MCHC 31.4 31.4 - 35.0 g/dL    RDW 15.4 (H) 11.9 - 14.6 %    PLATELET 36 (L) 072 - 450 K/uL    MPV 8.9 (L) 9.4 - 12.3 FL    ABSOLUTE NRBC 0.00 0.0 - 0.2 K/uL    NEUTROPHILS 71 43 - 78 %    LYMPHOCYTES 6 (L) 13 - 44 %    MONOCYTES 1 (L) 4.0 - 12.0 %    EOSINOPHILS 0 (L) 0.5 - 7.8 %    BASOPHILS 1 0.0 - 2.0 %    IMMATURE GRANULOCYTES 21 (H) 0.0 - 5.0 %    ABS. NEUTROPHILS 1.6 (L) 1.7 - 8.2 K/UL    ABS. LYMPHOCYTES 0.1 (L) 0.5 - 4.6 K/UL    ABS. MONOCYTES 0.0 (L) 0.1 - 1.3 K/UL    ABS. EOSINOPHILS 0.0 0.0 - 0.8 K/UL    ABS. BASOPHILS 0.0 0.0 - 0.2 K/UL    ABS. IMM. GRANS.  0.4 0.0 - 0.5 K/UL    RBC COMMENTS MODERATE  ANISOCYTOSIS + POIKILOCYTOSIS        RBC COMMENTS SLIGHT  OVALOCYTES        WBC COMMENTS Result Confirmed By Smear      PLATELET COMMENTS MARKED      DF AUTOMATED     METABOLIC PANEL, COMPREHENSIVE    Collection Time: 06/11/21  3:07 AM Result Value Ref Range    Sodium 136 (L) 138 - 145 mmol/L    Potassium 5.5 (H) 3.5 - 5.1 mmol/L    Chloride 103 98 - 107 mmol/L    CO2 20 (L) 21 - 32 mmol/L    Anion gap 13 7 - 16 mmol/L    Glucose 199 (H) 65 - 100 mg/dL     (H) 8 - 23 MG/DL    Creatinine 4.51 (H) 0.8 - 1.5 MG/DL    GFR est AA 17 (L) >60 ml/min/1.73m2    GFR est non-AA 14 (L) >60 ml/min/1.73m2    Calcium 7.3 (L) 8.3 - 10.4 MG/DL    Bilirubin, total 1.1 0.2 - 1.1 MG/DL    ALT (SGPT) 2,580 (H) 12 - 65 U/L    AST (SGOT) 3,158 (H) 15 - 37 U/L    Alk. phosphatase 79 50 - 136 U/L    Protein, total 6.0 (L) 6.3 - 8.2 g/dL    Albumin 2.5 (L) 3.2 - 4.6 g/dL    Globulin 3.5 2.3 - 3.5 g/dL    A-G Ratio 0.7 (L) 1.2 - 3.5     BLOOD GAS, ARTERIAL POC    Collection Time: 06/11/21  3:32 AM   Result Value Ref Range    Device: ADULT VENT      FIO2 (POC) 30 %    pH (POC) 7.27 (L) 7.35 - 7.45      pCO2 (POC) 34.8 (L) 35 - 45 MMHG    pO2 (POC) 128 (H) 75 - 100 MMHG    HCO3 (POC) 15.9 (L) 22 - 26 MMOL/L    sO2 (POC) 98.4 (H) 95 - 98 %    Base deficit (POC) 10.2 mmol/L    Mode ASSIST CONTROL      Tidal volume 550 ml    PEEP/CPAP (POC) 8 cmH2O    Allens test (POC) Positive      Total resp. rate 26      Site RIGHT BRACHIAL      Specimen type (POC) ARTERIAL      Performed by [facility]     Respiratory comment: [id number]    GLUCOSE, POC    Collection Time: 06/11/21  5:18 AM   Result Value Ref Range    Glucose (POC) 188 (H) 65 - 100 mg/dL    Performed by [facility]    PTT    Collection Time: 06/11/21  6:58 AM   Result Value Ref Range    aPTT 48.9 (H) 24.1 - 35.1 SEC           Assessment:     Principal Problem:    Severe [facility] (6/8/2021)    Active Problems:    CML (chronic myeloid leukemia) ([facility] (8/25/2012)      Overview: 8/25/12. Probable diagnosis. Will do bone marrow exam today      8/26/12. Bone marrow aspiration and biopsy done. Marrow aspirate and       peripheral blood sent for flow, cytogenetics and molecular testing.  No complications with the procedure      Depression (11/2/2012)      Chronic systolic congestive heart failure ([address] (5/3/2017)      Pleural effusion (4/27/2018)      Stage IV adenocarcinoma of lung ([address] (7/11/2018)      Immunocompromised ([address] (11/16/2018)      SCLC (small cell lung carcinoma), right ([address] (3/13/2020)      Acute respiratory failure with hypoxia ([address] (6/8/2021)      Hospital-acquired bacterial pneumonia (6/8/2021)      Bilateral pleural effusion (6/8/2021)      Lactic acidosis (6/8/2021)      Tachycardia (6/8/2021)      Moderate protein-calorie malnutrition ([address] (6/10/2021)      TIFFANIE (acute kidney injury) ([address] (6/10/2021)      Acute liver failure without hepatic coma (6/10/2021)      Other pulmonary embolism without acute cor pulmonale ([address] (6/10/2021)        Plan:      TIFFANIE ( on CKD IIIb), with hyperkalemia, AG metabolic and respiratory acidosis, oliguric. Possible causes of TIFFANIE : Chemo. ATB. .   Prognosis seems to be poor. I advice against doing dialysis.      Dr Jose Espino MD

## 2021-06-12 NOTE — PROGRESS NOTES
Ventilator check complete; patient has a #8. 0 ET tube secured at the 26 at the teeth. Patient is sedated. Patient is able to follow commands. Breath sounds are clear. Trachea is midline, Negative for subcutaneous air, and chest excursion is symmetric. Patient is also Negative for cyanosis and is Negative for pitting edema. All alarms are set and audible. Resuscitation bag is at the head of the bed. Ventilator Settings  Mode FIO2 Rate Tidal Volume Pressure PEEP I:E Ratio   VC+  30 %    0.55 ml     8 cm H20  1:2.08      Peak airway pressure: 28 cm H2O   Minute ventilation: 13.5 l/min     ABG: No results for input(s): PH, PCO2, PO2, HCO3 in the last 72 hours.       Griffin Torrez, RT

## 2021-06-12 NOTE — PROGRESS NOTES
Report received from [employer] SHAHID MATHEW. Fentanyl and heparin gtt dual verified. Skin dry and intact.

## 2021-06-12 NOTE — PROGRESS NOTES
[facility] CARDIOLOGY PROGRESS NOTE           6/12/2021 8:43 AM    Admit Date: 6/8/2021      Subjective:     Patient now on ventilator. On IV amnio. He is sedated but able to respond to questions. Denies pain. Review of Systems   Cardiovascular: Negative for chest pain. Objective:      Vitals:    06/12/21 0743 06/12/21 0759 06/12/21 0814 06/12/21 0829   BP: 102/65 (!) 100/56 98/63 100/64   Pulse: 80 82 79 77   Resp: 12 22 10 12   Temp:   97.1 °F (36.2 °C)    SpO2: 100% 97% 100% 100%   Weight:       Height:             Physical Exam  Constitutional:       Appearance: He is well-developed. HENT:      Head: Atraumatic. Eyes:      Pupils: Pupils are equal, round, and reactive to light. Neck:      Vascular: No carotid bruit. Cardiovascular:      Heart sounds: Murmur heard. Systolic murmur is present. Pulmonary:      Effort: Pulmonary effort is normal.   Abdominal:      Palpations: Abdomen is soft. Tenderness: There is no abdominal tenderness. Skin:     General: Skin is warm. Neurological:      Mental Status: He is alert. Cranial Nerves: No cranial nerve deficit.          Data Review:   Recent Labs     06/12/21  0345 06/11/21  1005 06/11/21  0307     --  136*   K 3.5  --  5.5*   MG 2.9*  --  2.9*   *  --  106*   CREA 4.45*  --  4.51*   *  --  199*   WBC 0.4*  --  2.1*   HGB 6.2*  --  7.7*   HCT 18.4*  --  24.5*   PLT 10*  --  36*   INR  --  2.9  --          Intake/Output Summary (Last 24 hours) at 6/12/2021 0843  Last data filed at 6/12/2021 0519  Gross per 24 hour   Intake 3980.88 ml   Output 1775 ml   Net 2205.88 ml     Current Facility-Administered Medications   Medication Dose Route Frequency    0.9% sodium chloride infusion 250 mL  250 mL IntraVENous PRN    insulin glargine (LANTUS) injection 10 Units  10 Units SubCUTAneous DAILY    sodium bicarbonate (8.4%) 150 mEq in dextrose 5% 1,000 mL infusion   IntraVENous CONTINUOUS    piperacillin-tazobactam (ZOSYN) 4.5 g in 0.9% sodium chloride (MBP/ADV) 100 mL MBP  4.5 g IntraVENous Q12H    mycophenolate mofetil (CELLCEPT) 200 mg/mL oral suspension 1,000 mg  1,000 mg Per NG tube ACB&D    fentaNYL citrate (PF) injection 50 mcg  50 mcg IntraVENous Q4H PRN    PHENYLephrine (MARCELINA-SYNEPHRINE) 30 mg in 0.9% sodium chloride 250 mL infusion   mcg/min IntraVENous TITRATE    midodrine (PROAMATINE) tablet 10 mg  10 mg Per NG tube TID WITH MEALS    lansoprazole (PREVACID) 3 mg/mL oral suspension 30 mg  30 mg Per NG tube DAILY    insulin lispro (HUMALOG) injection 0-10 Units  0-10 Units SubCUTAneous Q6H    methylPREDNISolone (PF) (SOLU-MEDROL) injection 40 mg  40 mg IntraVENous Q8H    dextrose 40% (GLUTOSE) oral gel 1 Tube  15 g Oral PRN    glucagon (GLUCAGEN) injection 1 mg  1 mg IntraMUSCular PRN    dextrose (D50W) injection syrg 12.5-25 g  25-50 mL IntraVENous PRN    amiodarone (CORDARONE) 450 mg in dextrose 5% 250 mL infusion  0.5-1 mg/min IntraVENous TITRATE    fentaNYL in normal saline (pf) 25 mcg/mL infusion  0-200 mcg/hr IntraVENous TITRATE    propofol (DIPRIVAN) 10 mg/mL infusion  0-50 mcg/kg/min IntraVENous TITRATE    sodium chloride (NS) flush 5-40 mL  5-40 mL IntraVENous Q8H    sodium chloride (NS) flush 5-40 mL  5-40 mL IntraVENous PRN    levalbuterol (XOPENEX) nebulizer soln 1.25 mg/3 mL  1.25 mg Nebulization Q4H RT    ondansetron (ZOFRAN) injection 4 mg  4 mg IntraVENous Q4H PRN    phenol throat spray (CHLORASEPTIC) 1 Spray  1 Spray Oral PRN     Cardiac History:   1. Echocardiogram 04/13/2017 for dilated left ventricle, profound left ventricular systolic dysfunctio ZF<31%, probable mural thrombus noted in the left ventricular apex. Moderate MR  2. Gleevec therapy discontinued 04/2017. 3. The patient was initiated on heart failure medications 04/13/2017, Entresto, Carvedilol, Lasix. 4. The [facility] for anticoagulation therapy. This was canceled when left ventricular thrombus was excluded on contrasted echocardiogram.  5. Left heart catheterization 04/2017 - no coronary artery disease. 6. The patient presented 05/03/2017. Discontinued Carvedilol. Lisinopril was switched to Losartan. 7. 05/03/2017 - Carvedilol 3.125 mg p.o. b.i.d. initiated. Continue Losartan. Change Lasix to once daily. Change potassium to once daily. 8. Echocardiogram 07/2017 - left ventricular systolic function improved 30 - 35%. 9. Electrophysiology referral rediscussed at appointment 10/11/2017. The patient agrees to see electrophysiology. 10. Echo 10/2017 ef 25-30%  11. Aldactone initiated 10/11/2017. 12. He was hospitalized 03/2018 for pneumonia, multifocal felt community acquired pneumonia. 13. 4/2018 Osimertinib stage IV lung adenocarcinoma  14. 1/7/2019 Echo EF 20-30% Moderate MR  15. 1/11/2019. Low-dose carvedilol resumed, lisinopril 2.5 mg daily. Lasix 20 mg discontinued resumed Aldactone 25 mg stopped potassium  16. 1/28/2019 BMP creatine 1.44 K 4.2   17. Echo 8/2019  MR severe cental jet severe LV diliation. 18. [facility]  · Right atrial pressure showed A-wave of 7, V-wave of 3 with a mean pressure of 4 with a right atrial saturation of 73%. SVC saturation was 73%. IVC saturation was 72.5%. Right ventricular pressure was 35/9 with a right ventricular saturation of 71.5%. Pulmonary pressure was 38/16 with a mean of 26 with a pulmonary artery saturation of 71%. Pulmonary capillary wedge pressure showed A-wave of 25, V-wave 19 and a mean pressure of 17. [place] cardiac output was 5.67 with a cardiac index of 2.67.  Thermodilution cardiac output was 4.72 with a cardiac index of 2.22. There was no significant gradient across the pulmonic or tricuspid valves. 19. Advanced CHF appt September [place] resumed [place]te[place]o   20.   EKG 11/5/2020 Sinus  Rhythm Voltage criteria for LVH  21. 1/2021 echocardiogram EF 22% moderate mitral regurgitation      Assessment/Plan:     60 y.o. male male with history of CML non-small cell lung cancer severe systolic heart failure with ejection fraction less than 20% severe mitral regurgitation. Congestive heart failure severe systolic dysfunction  Not on therapies due to hypotension and active illness. Creatinine now plateaued  Net fluid balance positive for the last 24/48 hours. Delicate fluid balance in light of severe cardiomyopathy.   Not a candidate for ICD based on life expectancy and acute illnesses  Addressed previously      Acute kidney injury  Not a candidate for dialysis  Nonoliguric    Nonsustained ventricular tachycardia  Patient placed on IV amiodarone due NSVT documented 6/9/2021  No recent AF  Not a anticoagulation candidate    Mitral regurgitation  Severe  Not a candidate for mitral clip procedure nor CRT        Berlin Ferro MD  6/12/2021 8:43 AM

## 2021-06-12 NOTE — PROGRESS NOTES
Bedside and verbal shift change report received from  Sarah Jonathon 70 and Homer [id number] (offgoing nurse). Report included the following information SBAR, Kardex, Intake/Output, MAR, Recent Results, Med Rec Status, Cardiac Rhythm NSR and Alarm Parameters .      Dual skin assessment completed at bedside: dry and intact (list pertinent skin assessment findings)    Dual verification of gtts completed (name of gtts verified): Fentanyl @ 75 mcg/hr

## 2021-06-12 NOTE — PROGRESS NOTES
Bedside and Verbal shift change report given to Shelby Saunders RN (oncoming nurse) by Erick Camargo RN (offgoing nurse). Report included the following information SBAR, Kardex, Intake/Output, MAR, Recent Results, Med Rec Status, Cardiac Rhythm NSR and Alarm Parameters .

## 2021-06-12 NOTE — PROGRESS NOTES
Problem: Falls - Risk of  Goal: *Absence of Falls  Description: Document Anthony Fall Risk and appropriate interventions in the flowsheet.   Outcome: Progressing Towards Goal  Note: Fall Risk Interventions:  Mobility Interventions: Bed/chair exit alarm, Communicate number of staff needed for ambulation/transfer, Patient to call before getting OOB    Mentation Interventions: Adequate sleep, hydration, pain control, Door open when patient unattended, Evaluate medications/consider consulting pharmacy, More frequent rounding, Reorient patient    Medication Interventions: Bed/chair exit alarm, Evaluate medications/consider consulting pharmacy, Patient to call before getting OOB    Elimination Interventions: Bed/chair exit alarm, Call light in reach, Toileting schedule/hourly rounds              Problem: Patient Education: Go to Patient Education Activity  Goal: Patient/Family Education  Outcome: Progressing Towards Goal     Problem: Patient Education: Go to Patient Education Activity  Goal: Patient/Family Education  Outcome: Progressing Towards Goal     Problem: Patient Education: Go to Patient Education Activity  Goal: Patient/Family Education  Outcome: Progressing Towards Goal     Problem: Sepsis: Day 3  Goal: Off Pathway (Use only if patient is Off Pathway)  Outcome: Progressing Towards Goal  Goal: *Oxygen saturation within defined limits  Outcome: Progressing Towards Goal  Goal: *Vital sign stability  Outcome: Progressing Towards Goal  Goal: *Tolerating diet  Outcome: Progressing Towards Goal  Goal: *Demonstrates progressive activity  Outcome: Progressing Towards Goal  Goal: Activity/Safety  Outcome: Progressing Towards Goal  Goal: Consults, if ordered  Outcome: Progressing Towards Goal  Goal: Diagnostic Test/Procedures  Outcome: Progressing Towards Goal  Goal: Nutrition/Diet  Outcome: Progressing Towards Goal  Goal: Discharge Planning  Outcome: Progressing Towards Goal  Goal: Medications  Outcome: Progressing Towards Goal  Goal: Respiratory  Outcome: Progressing Towards Goal  Goal: Treatments/Interventions/Procedures  Outcome: Progressing Towards Goal  Goal: Psychosocial  Outcome: Progressing Towards Goal     Problem: Ventilator Management  Goal: *Adequate oxygenation and ventilation  Outcome: Progressing Towards Goal  Goal: *Patient maintains clear airway/free of aspiration  Outcome: Progressing Towards Goal  Goal: *Absence of infection signs and symptoms  Outcome: Progressing Towards Goal  Goal: *Normal spontaneous ventilation  Outcome: Progressing Towards Goal     Problem: Patient Education: Go to Patient Education Activity  Goal: Patient/Family Education  Outcome: Progressing Towards Goal     Problem: Non-Violent Restraints  Goal: Removal from restraints as soon as assessed to be safe  Outcome: Progressing Towards Goal  Goal: No harm/injury to patient while restraints in use  Outcome: Progressing Towards Goal  Goal: Patient's dignity will be maintained  Outcome: Progressing Towards Goal  Goal: Patient Interventions  Outcome: Progressing Towards Goal     Problem: Delirium Treatment  Goal: *Level of consciousness restored to baseline  Outcome: Progressing Towards Goal  Goal: *Level of environmental perceptions restored to baseline  Outcome: Progressing Towards Goal  Goal: *Sensory perception restored to baseline  Outcome: Progressing Towards Goal  Goal: *Emotional stability restored to baseline  Outcome: Progressing Towards Goal  Goal: *Functional assessment restored to baseline  Outcome: Progressing Towards Goal  Goal: *Absence of falls  Outcome: Progressing Towards Goal  Goal: *Will remain free of delirium, CAM Score negative  Outcome: Progressing Towards Goal  Goal: *Cognitive status will be restored to baseline  Outcome: Progressing Towards Goal  Goal: Interventions  Outcome: Progressing Towards Goal

## 2021-06-12 NOTE — PROGRESS NOTES
.  [facility] Progress Note             Date: 6/12/2021    GI Problem: Ischemic hepatitis    History of Present Illness:  Patient is a 60 y.o. male who is seen in consultation for transaminases in the thousands. In setting of being intubated on vent in ICU with low EF. Liver US was unremarkable. Viral hepatitis panel is negative. INR is 2.9. Glad transaminases starting to come down. Elevated lactate. Pancytopenic.     Hospital Medications:  Current Facility-Administered Medications   Medication Dose Route Frequency    0.9% sodium chloride infusion 250 mL  250 mL IntraVENous PRN    insulin glargine (LANTUS) injection 10 Units  10 Units SubCUTAneous DAILY    sodium bicarbonate (8.4%) 150 mEq in dextrose 5% 1,000 mL infusion   IntraVENous CONTINUOUS    piperacillin-tazobactam (ZOSYN) 4.5 g in 0.9% sodium chloride (MBP/ADV) 100 mL MBP  4.5 g IntraVENous Q12H    mycophenolate mofetil (CELLCEPT) 200 mg/mL oral suspension 1,000 mg  1,000 mg Per NG tube ACB&D    fentaNYL citrate (PF) injection 50 mcg  50 mcg IntraVENous Q4H PRN    PHENYLephrine (MARCELINA-SYNEPHRINE) 30 mg in 0.9% sodium chloride 250 mL infusion   mcg/min IntraVENous TITRATE    midodrine (PROAMATINE) tablet 10 mg  10 mg Per NG tube TID WITH MEALS    lansoprazole (PREVACID) 3 mg/mL oral suspension 30 mg  30 mg Per NG tube DAILY    insulin lispro (HUMALOG) injection 0-10 Units  0-10 Units SubCUTAneous Q6H    methylPREDNISolone (PF) (SOLU-MEDROL) injection 40 mg  40 mg IntraVENous Q8H    dextrose 40% (GLUTOSE) oral gel 1 Tube  15 g Oral PRN    glucagon (GLUCAGEN) injection 1 mg  1 mg IntraMUSCular PRN    dextrose (D50W) injection syrg 12.5-25 g  25-50 mL IntraVENous PRN    amiodarone (CORDARONE) 450 mg in dextrose 5% 250 mL infusion  0.5-1 mg/min IntraVENous TITRATE    fentaNYL in normal saline (pf) 25 mcg/mL infusion  0-200 mcg/hr IntraVENous TITRATE    propofol (DIPRIVAN) 10 mg/mL infusion  0-50 mcg/kg/min IntraVENous TITRATE    sodium chloride (NS) flush 5-40 mL  5-40 mL IntraVENous Q8H    sodium chloride (NS) flush 5-40 mL  5-40 mL IntraVENous PRN    levalbuterol (XOPENEX) nebulizer soln 1.25 mg/3 mL  1.25 mg Nebulization Q4H RT    ondansetron (ZOFRAN) injection 4 mg  4 mg IntraVENous Q4H PRN    phenol throat spray (CHLORASEPTIC) 1 Spray  1 Spray Oral PRN       Objective:     Physical Exam:  Vitals:  Visit Vitals  /66   Pulse 80   Temp 97.4 °F (36.3 °C)   Resp 14   Ht 6' 1\" (1.854 m)   Wt 80.1 kg (176 lb 9.4 oz)   SpO2 100%   BMI 23.30 kg/m²       General: No acute distress. Skin:  Extremities and face reveal no rashes. No beavers erythema. No telangiectasias on the chest wall. HEENT: Sclerae anicteric. No oral ulcers. No abnormal pigmentation of the lips. The neck is supple. Cardiovascular: Regular rate and rhythm. No murmurs, gallops, or rubs. Respiratory:  Comfortable breathing  With no accessory muscle use. Clear breath sounds with no wheezes, rales, or rhonchi. GI:  Abdomen nondistended, soft, and nontender. Normal active bowel sounds. No enlargement of the liver or spleen. No masses palpable. Musculoskeletal:  No pitting edema of the lower legs. Extremities have good range of motion. Neurological:  Gross memory appears intact. Patient is alert and oriented. Psychiatric:  Mood appears appropriate with judgement intact. Lymphatic:  No cervical or supraclavicular adenopathy.     Laboratory:    Recent Labs     06/12/21 0345   WBC 0.4*   RBC 2.37*   HGB 6.2*   HCT 18.4*   PLT 10*      Recent Labs     06/12/21 0924 06/12/21 0345   GLU  --  267*   NA  --  139   K 3.4* 3.5   CL  --  101   CO2  --  25   BUN  --  104*   CREA  --  4.45*   CA  --  6.9*     Recent Labs     06/12/21 0924 06/11/21  1005   PTP  --  30.5*   INR  --  2.9   APTT 35.8*  --      Recent Labs     06/12/21 0345   AP 78   ALB 2.3*   TP 5.4*       Assessment:       A 60 y.o. male with multi-organ system failure also with ischemic hepatitis.       Plan:       Keep bp up  Continue supportive care  Agree with blood transfusion  Will sign off for now      Signed By: Derek Potter MD     June 12, 2021

## 2021-06-12 NOTE — PROGRESS NOTES
Cristopher Low. Admission Date: 6/8/2021             Daily Progress Note: 6/12/2021    The patient's chart is reviewed and the patient is discussed with the staff. Patient is M 12 y.o.  male seen and evaluated at the request of Dr. Shira Stafford has history of CML, non-small cell lung cancer in 2018 and small cell lung cancer metastasized to liver discovered in 3/2020 currently on palliative chemotherapy, pulmonary emboli on Xarelto, advanced CHF, CKD stage III, debility who presented with 2 to 3 days history of difficulty in breathing and laying flat. Patient reported being in his usual health until 3 to 4 days ago when he noticed sudden onset of worsening shortness of breath which is progressively getting worse.  He does report of cough which is mostly dry. He denies having any fever, chills.  He does report having palpitations, excessive fatigue and weakness, labored breathing.  He denies having any chest pain, nausea vomiting abdominal pain or headache. ER work-up remarkable for chest x-ray showing bilateral infiltrate suggestive of pneumonia along with pleural effusion, blood work remarkable for lactic acid 4.4, creatinine 2.13 (baseline), heart rate ranging from 120-135 irregular, respiratory rate ranging from 30s to 40s.  ABG 7.39/29/39 on FiO2 0.1.     Had issues in the past with pneumonitis, but with evidence recently of both active small and non-small cell cancer he elected for rechallenge with carbo etoposide atezolizumab while continue osimertinib and monitor pneumonitis symptoms when last seen by oncology 5/26. Treated 6/5 most recently.      He was supposed to undergo PleurX placement 2 weeks ago but it was delayed due to starting Xerelto. He has not had a thoracentesis in over a month.      He was transferred to [facility] ICU for ongoing care and support. He arrives hemodynamically stable on BIPAP FiO2 35%, but RR remains in 45s.  He is able to provide history and answer questions appropriately.     Subjective:     Patient remains intubated. Vent day 3. Started on bicarb drip yesterday and abg improved this morning. Seen by heme/onc yesterday and added mycophenolate. Was seen by GI and ID as well. Checking CMV, EBV, adenovirus, hepatitis, hiv.  RUQ US was negative other than possible GB wall thickening and known liver nodules. TTE yesterday with EF further decreased to < 15% with severe grade 3 LV diastolic dysfunction. Mod to severe MR. Off marcelina. On amio and fentanyl.        Current Facility-Administered Medications   Medication Dose Route Frequency    0.9% sodium chloride infusion 250 mL  250 mL IntraVENous PRN    sodium bicarbonate (8.4%) 150 mEq in dextrose 5% 1,000 mL infusion   IntraVENous CONTINUOUS    piperacillin-tazobactam (ZOSYN) 4.5 g in 0.9% sodium chloride (MBP/ADV) 100 mL MBP  4.5 g IntraVENous Q12H    mycophenolate mofetil (CELLCEPT) 200 mg/mL oral suspension 1,000 mg  1,000 mg Per NG tube ACB&D    fentaNYL citrate (PF) injection 50 mcg  50 mcg IntraVENous Q4H PRN    PHENYLephrine (MARCELINA-SYNEPHRINE) 30 mg in 0.9% sodium chloride 250 mL infusion   mcg/min IntraVENous TITRATE    midodrine (PROAMATINE) tablet 10 mg  10 mg Per NG tube TID WITH MEALS    lansoprazole (PREVACID) 3 mg/mL oral suspension 30 mg  30 mg Per NG tube DAILY    insulin lispro (HUMALOG) injection 0-10 Units  0-10 Units SubCUTAneous Q6H    methylPREDNISolone (PF) (SOLU-MEDROL) injection 40 mg  40 mg IntraVENous Q8H    dextrose 40% (GLUTOSE) oral gel 1 Tube  15 g Oral PRN    glucagon (GLUCAGEN) injection 1 mg  1 mg IntraMUSCular PRN    dextrose (D50W) injection syrg 12.5-25 g  25-50 mL IntraVENous PRN    amiodarone (CORDARONE) 450 mg in dextrose 5% 250 mL infusion  0.5-1 mg/min IntraVENous TITRATE    fentaNYL in normal saline (pf) 25 mcg/mL infusion  0-200 mcg/hr IntraVENous TITRATE    propofol (DIPRIVAN) 10 mg/mL infusion  0-50 mcg/kg/min IntraVENous TITRATE  sodium chloride (NS) flush 5-40 mL  5-40 mL IntraVENous Q8H    sodium chloride (NS) flush 5-40 mL  5-40 mL IntraVENous PRN    levalbuterol (XOPENEX) nebulizer soln 1.25 mg/3 mL  1.25 mg Nebulization Q4H RT    ondansetron (ZOFRAN) injection 4 mg  4 mg IntraVENous Q4H PRN    phenol throat spray (CHLORASEPTIC) 1 Spray  1 Spray Oral PRN       Review of Systems  Unobtainable due to patient status. Objective:     Vitals:    06/12/21 0700 06/12/21 0714 06/12/21 0729 06/12/21 0740   BP:  104/64 104/65    Pulse: 78 78 79 83   Resp: 16 15 10 23   Temp: 96.9 °F (36.1 °C) 97.4 °F (36.3 °C)     SpO2: 100% 100% 100% 100%   Weight:       Height:         Intake and Output:   06/10 1901 - 06/12 0700  In: 5148.6 [I.V.:4410.6]  Out: 2370 [Urine:2370]  No intake/output data recorded. Physical Exam:   Constitution:  the patient is well developed and in no acute distress  EENMT:  Sclera clear, pupils equal, oral mucosa moist  Respiratory: Intubated, mild B crackles  Cardiovascular:  RRR without M,G,R  Gastrointestinal: soft and non-tender; with positive bowel sounds. Musculoskeletal: warm without cyanosis. There is no lower leg edema. Skin:  no jaundice or rashes, no wounds   Neurologic: sedated but alert    Psychiatric:  Sedated but alert     CHEST XRAY:   CXR Results  (Last 48 hours)               06/12/21 0500  XR CHEST SNGL V Final result    Impression:  No interval change. Narrative:  EXAM: Chest x-ray. INDICATION: Dyspnea. COMPARISON: Yesterday's chest x-ray. TECHNIQUE: Frontal view chest x-ray. FINDINGS: There is unchanged right lung infiltrate or mass. Cystic lung changes   or loculated pneumothorax along the right lung base is also unchanged. The left   lung and pleural space are clear. The heart remains enlarged. The endotracheal   tube tip lies just below the level of the clavicles. The enteric tube projects   below the diaphragms.  A right chest wall infusion port catheter remains in   place. 06/11/21 0510  XR CHEST SNGL V Final result    Impression:      1. Findings as described above. Narrative:  EXAMINATION: One view chest       HISTORY: respiratory failure, h/o lung cancer       TECHNIQUE: Frontal chest.       COMPARISON: 6/9/2021       FINDINGS:     Stable support devices. Persistent right lung infiltrate. Improved right pleural effusion or atelectasis. No significant pneumothorax. No other significant interval changes.                    LAB  No lab exists for component: [place]   Recent Labs     06/12/21  0345 06/11/21  1005 06/11/21  0307 06/10/21  0314 06/09/21 2014   WBC 0.4*  --  2.1* 7.3  --    HGB 6.2*  --  7.7* 9.2* 8.6*   HCT 18.4*  --  24.5* 28.9* 27.0*   PLT 10*  --  36* 94*  --    INR  --  2.9  --   --   --      Recent Labs     06/12/21  0345 06/11/21  0307 06/10/21  0613 06/10/21  0313    136*  --  134*   K 3.5 5.5* 5.3* 6.1*    103  --  104   CO2 25 20*  --  20*   * 199*  --  188*   * 106*  --  74*   CREA 4.45* 4.51*  --  3.29*   MG 2.9* 2.9*  --  3.1*   CA 6.9* 7.3*  --  8.1*   ALB 2.3* 2.5*  --  2.6*     ABG:    Lab Results   Component Value Date/Time    PHI 7.48 (H) 06/12/2021 03:57 AM    PCO2I 28.7 (L) 06/12/2021 03:57 AM    PO2I 116 (H) 06/12/2021 03:57 AM    HCO3I 21.1 (L) 06/12/2021 03:57 AM    FIO2I 30 06/12/2021 03:57 AM       MICRO    SARS-CoV-2 LAB Results  [facility] Test: No results found for: COV2NT   [facility] Test: No results found for: EDPR  [facility] Test: No components found for: [id number]       Assessment:  (Medical Decision Making)     Hospital Problems  Date Reviewed: 5/26/2021        Codes Class Noted POA    Moderate protein-calorie malnutrition ([facility] ICD-10-CM: E44.0  ICD-9-CM: 263.0  6/10/2021 Yes        TIFFANIE (acute kidney injury) ([facility] ICD-10-CM: N17.9  ICD-9-CM: 584.9  6/10/2021 Unknown        Acute liver failure without hepatic coma ICD-10-CM: K72.00  ICD-9-CM: 570  6/10/2021 Unknown Other pulmonary embolism without acute cor pulmonale (HCC) ICD-10-CM: I26.99  ICD-9-CM: 415.19  6/10/2021 Unknown        Acute respiratory failure with hypoxia [facility]) ICD-10-CM: J96.01  ICD-9-CM: 518.81  6/8/2021 Unknown        Hospital-acquired bacterial pneumonia ICD-10-CM: J15.9  ICD-9-CM: 482.9  6/8/2021 Unknown        Bilateral pleural effusion ICD-10-CM: J90  ICD-9-CM: 511.9  6/8/2021 Unknown        Lactic acidosis ICD-10-CM: E87.2  ICD-9-CM: 276.2  6/8/2021 Unknown        * (Principal) Severe sepsis ([facility] ICD-10-CM: A41.9, R65.20  ICD-9-CM: 038.9, 995.92  6/8/2021 Unknown        Tachycardia ICD-10-CM: R00.0  ICD-9-CM: 785.0  6/8/2021 Unknown        SCLC (small cell lung carcinoma), right ([facility] ICD-10-CM: C34.91  ICD-9-CM: 162.9  3/13/2020 Yes        Immunocompromised ([facility] ICD-10-CM: D84.9  ICD-9-CM: 279.9  11/16/2018 Yes        Stage IV adenocarcinoma of lung (HCC) ICD-10-CM: C34.90  ICD-9-CM: 162.9  7/11/2018 Yes        Pleural effusion ICD-10-CM: J90  ICD-9-CM: 511.9  4/27/2018 Unknown        Chronic systolic congestive heart failure (HCC) ICD-10-CM: I50.22  ICD-9-CM: 428.22, 428.0  5/3/2017 Yes        Depression ICD-10-CM: F32.9  ICD-9-CM: 727  11/2/2012 Yes        CML (chronic myeloid leukemia) ([facility] ICD-10-CM: C92.10  ICD-9-CM: 205.10  8/25/2012 Yes    Overview Addendum 8/26/2012  7:23 AM by Elsa Sherwood MD     8/25/12. Probable diagnosis. Will do bone marrow exam today  8/26/12. Bone marrow aspiration and biopsy done. Marrow aspirate and peripheral blood sent for flow, cytogenetics and molecular testing. No complications with the procedure                 59 y/o male with CML, active SCLC and NSCLC on palliative therapy.  Severe systolic HR with EF 19% (repeat now < 15%) prior pneumonitis on immunotherapy,  malignant effusions, recent segmental B lower lobe PEs on Xarelto and recently with evidence of progression of both lung cancers leading to cycle 1 of cis/etoposide/atezolizumab (had the same about a year ago when his small cell was discovered). Presented with new GGO felt likely to be pneumonitis. Now intubated with worsening respiratory failure, renal failure, liver failure, heart failure. Covid negative. Being seen by heme/onc, ID, GI, nephrology. Today his creatinine, LFT's have plateaued for the first time instead of getting worse. Bicarb better on bicarb drip. Plan:  (Medical Decision Making)   -begin cautious PS weaning.   -follow up viral serologies ordered by ID and GI.   -cont bicarb drip.   -cont IV steroids and mycophenolate to cover likely pneumonitis.   -add glargine and cont SSI. -nephrology on board. Not a dialysis candidate.   -cont amio drip per cardiology. -now off pressors. Monitor BP.   -cont abx. Have stopped vanc but cont zosyn day 5.   -heparin drip for PE's on hold with plts 10.   -getting PRBC now. May need plts as well. Check with heme.   -holding toprol with borderline BP's.   -need ongoing palliative talks with family. Prognosis still poor though numerically more stable today.   -cont tube feeds. -pantoprazole. Full code currently. Critically ill patient with complicated medical issues and high risk of further decompensation.  Total critical care time spent thus far (exclusive of procedures): 33 minutes        Rosa Dumont MD

## 2021-06-13 PROBLEM — D61.818 PANCYTOPENIA (HCC): Status: ACTIVE | Noted: 2021-01-01

## 2021-06-13 NOTE — PROGRESS NOTES
Admit Date: 6/8/2021      Subjective:      Patient is S 49 y.o.  male with PMH of CML, non-small cell lung cancer in 2018 and small cell lung cancer metastasized to liver discovered in 3/2020, received a palliative treatment with carbo etoposide atezolizumab and osimertinib: 6/5 most recently chemotherapy, pulmonary emboli on Xarelto, advanced CHF, CKD stage III. He presented with 3 to 4 days history of worsening SOB and dry cough. He denied having any fever, chills, CP. He was found to be hypoxic with bilateral infiltrates on chest xray. First, he was placed on BIPAP then required tracheal intubation with mechanical ventilation. He received [address] [place] [address] for possible pneumonia. Renal consult requested because of oliguric TIFFANIE.     Review of Systems  On the vent     Objective:     Patient Vitals for the past 8 hrs:   BP Temp Pulse Resp SpO2 Weight   06/13/21 0859 113/67 -- 77 23 99 % --   06/13/21 0844 117/74 -- 77 29 100 % --   06/13/21 0829 111/67 -- 79 18 100 % --   06/13/21 0815 -- -- 79 13 100 % --   06/13/21 0814 107/67 -- 75 28 100 % --   06/13/21 0809 -- -- 76 23 100 % --   06/13/21 0759 105/65 -- 71 25 100 % --   06/13/21 0744 102/65 -- 73 20 100 % --   06/13/21 0729 104/68 -- 71 22 100 % --   06/13/21 0714 109/70 97.7 °F (36.5 °C) 76 23 100 % --   06/13/21 0659 112/66 -- 71 26 100 % --   06/13/21 0629 109/69 -- 71 25 100 % --   06/13/21 0614 108/67 -- 73 24 100 % --   06/13/21 0559 111/75 -- 74 22 100 % --   06/13/21 0544 110/68 -- 74 23 100 % --   06/13/21 0542 -- -- -- -- -- 80.8 kg (178 lb 2.1 oz)   06/13/21 0529 109/68 -- 75 25 100 % --   06/13/21 0514 118/77 -- 80 28 100 % --   06/13/21 0500 -- -- 80 21 100 % --   06/13/21 0459 111/72 -- 79 20 100 % --   06/13/21 0444 114/70 -- 81 24 100 % --   06/13/21 0429 114/76 -- 84 21 95 % --   06/13/21 0414 108/70 -- 80 23 100 % --   06/13/21 0359 110/67 -- 74 28 100 % --   06/13/21 0352 -- -- 78 24 100 % --   06/13/21 0344 109/66 -- 71 22 100 % --   06/13/21 0329 (!) 107/58 97.3 °F (36.3 °C) 76 24 100 % --   06/13/21 0314 109/67 -- 72 22 100 % --   06/13/21 0300 -- -- 72 22 100 % --   06/13/21 0259 110/66 -- 72 22 100 % --   06/13/21 0244 111/68 -- 74 24 100 % --   06/13/21 0229 113/69 -- 75 22 100 % --   06/13/21 0214 110/67 -- 76 22 100 % --   06/13/21 0159 114/69 -- 75 22 100 % --   06/13/21 0144 107/67 -- 74 23 100 % --   06/13/21 0129 109/66 -- 74 22 100 % --   06/13/21 0114 108/68 -- 73 23 100 % --     No intake/output data recorded. Physical Exam:   On vent. Lung: few bronchi   CV: RR, no rub  Abd: soft, no rebound  Ext: trace edema         Data Review   Recent Results (from the past 8 hour(s))   MAGNESIUM    Collection Time: 06/13/21  3:46 AM   Result Value Ref Range    Magnesium 2.9 (H) 1.8 - 2.4 mg/dL   CBC WITH AUTOMATED DIFF    Collection Time: 06/13/21  3:46 AM   Result Value Ref Range    WBC 0.1 (LL) 4.3 - 11.1 K/uL    RBC 2.74 (L) 4.23 - 5.6 M/uL    HGB 7.3 (L) 13.6 - 17.2 g/dL    HCT 21.6 (L) 41.1 - 50.3 %    MCV 78.8 (L) 79.6 - 97.8 FL    MCH 26.6 26.1 - 32.9 PG    MCHC 33.8 31.4 - 35.0 g/dL    RDW 15.0 (H) 11.9 - 14.6 %    PLATELET 7 (LL) 782 - 450 K/uL    MPV Unable to calculate. Recommend adding IPF. 9.4 - 12.3 FL    ABSOLUTE NRBC 0.00 0.0 - 0.2 K/uL    NEUTROPHILS 50 43 - 78 %    LYMPHOCYTES 36 13 - 44 %    MONOCYTES 7 4.0 - 12.0 %    EOSINOPHILS 0 (L) 0.5 - 7.8 %    BASOPHILS 0 0.0 - 2.0 %    IMMATURE GRANULOCYTES 7 (H) 0.0 - 5.0 %    ABS. NEUTROPHILS 0.1 (L) 1.7 - 8.2 K/UL    ABS. LYMPHOCYTES 0.0 (L) 0.5 - 4.6 K/UL    ABS. MONOCYTES 0.0 (L) 0.1 - 1.3 K/UL    ABS. EOSINOPHILS 0.0 0.0 - 0.8 K/UL    ABS. BASOPHILS 0.0 0.0 - 0.2 K/UL    ABS. IMM.  GRANS. 0.0 0.0 - 0.5 K/UL    RBC COMMENTS MODERATE  ANISOCYTOSIS + POIKILOCYTOSIS        RBC COMMENTS RARE  TARGET CELLS        RBC COMMENTS OCCASIONAL  SCHISTOCYTES        WBC COMMENTS WBC TOO FEW TO DIFFERENTIATE      PLATELET COMMENTS MARKED      DF AUTOMATED     METABOLIC PANEL, COMPREHENSIVE    Collection Time: 06/13/21  3:46 AM   Result Value Ref Range    Sodium 140 136 - 145 mmol/L    Potassium 3.1 (L) 3.5 - 5.1 mmol/L    Chloride 98 98 - 107 mmol/L    CO2 32 21 - 32 mmol/L    Anion gap 10 7 - 16 mmol/L    Glucose 220 (H) 65 - 100 mg/dL    BUN 97 (H) 8 - 23 MG/DL    Creatinine 3.95 (H) 0.8 - 1.5 MG/DL    GFR est AA 20 (L) >60 ml/min/1.73m2    GFR est non-AA 16 (L) >60 ml/min/1.73m2    Calcium 7.4 (L) 8.3 - 10.4 MG/DL    Bilirubin, total 1.1 0.2 - 1.1 MG/DL    ALT (SGPT) 1,471 (H) 12 - 65 U/L    AST (SGOT) 406 (H) 15 - 37 U/L    Alk. phosphatase 84 50 - 136 U/L    Protein, total 5.4 (L) 6.3 - 8.2 g/dL    Albumin 2.2 (L) 3.2 - 4.6 g/dL    Globulin 3.2 2.3 - 3.5 g/dL    A-G Ratio 0.7 (L) 1.2 - 3.5     LACTIC ACID    Collection Time: 06/13/21  3:46 AM   Result Value Ref Range    Lactic acid 3.3 (H) 0.4 - 2.0 MMOL/L   PROTHROMBIN TIME + INR    Collection Time: 06/13/21  3:46 AM   Result Value Ref Range    Prothrombin time 21.7 (H) 12.5 - 14.7 sec    INR 1.9     BLOOD GAS, ARTERIAL POC    Collection Time: 06/13/21  4:04 AM   Result Value Ref Range    Device: ADULT VENT      FIO2 (POC) 30 %    pH (POC) 7.52 (H) 7.35 - 7.45      pCO2 (POC) 36.1 35 - 45 MMHG    pO2 (POC) 91 75 - 100 MMHG    HCO3 (POC) 29.7 (H) 22 - 26 MMOL/L    sO2 (POC) 97.9 95 - 98 %    Base excess (POC) 6.5 mmol/L    Mode ASSIST CONTROL      Tidal volume 550 ml    PEEP/CPAP (POC) 8 cmH2O    Mean Airway Pressure 15 cmH2O    PIP (POC) 32      Allens test (POC) Positive      Inspiratory Time 0.75 sec    Total resp.  rate 22      Site RIGHT RADIAL      Specimen type (POC) ARTERIAL      Performed by Allen Hamilton     Respiratory comment: 13.5ve    GLUCOSE, POC    Collection Time: 06/13/21  5:37 AM   Result Value Ref Range    Glucose (POC) 244 (H) 65 - 100 mg/dL    Performed by Keisha            Assessment:     Principal Problem:    Severe sepsis ([address] (6/8/2021)    Active Problems:    CML (chronic myeloid leukemia) ([address] (8/25/2012)      Overview: 8/25/12. Probable diagnosis. Will do bone marrow exam today      8/26/12. Bone marrow aspiration and biopsy done. Marrow aspirate and       peripheral blood sent for flow, cytogenetics and molecular testing. No       complications with the procedure      Depression (11/2/2012)      Chronic systolic congestive heart failure ([address] (5/3/2017)      Pleural effusion (4/27/2018)      Stage IV adenocarcinoma of lung ([address] (7/11/2018)      Immunocompromised ([address] (11/16/2018)      SCLC (small cell lung carcinoma), right ([address] (3/13/2020)      Acute respiratory failure with hypoxia ([address] (6/8/2021)      Hospital-acquired bacterial pneumonia (6/8/2021)      Bilateral pleural effusion (6/8/2021)      Lactic acidosis (6/8/2021)      Tachycardia (6/8/2021)      Moderate protein-calorie malnutrition ([address] (6/10/2021)      TIFFANIE (acute kidney injury) ([address] (6/10/2021)      Acute liver failure without hepatic coma (6/10/2021)      Other pulmonary embolism without acute cor pulmonale ([address] (6/10/2021)        Plan:      TIFFANIE ( on CKD IIIb),  Possible causes of TIFFANIE : Chemo. ATB. Tierney Parker UOP is up with  improving of renal F.   Met. Alkalosis.  Stop NA HCO3 infusion, replaced with Junie Story MD

## 2021-06-13 NOTE — PROGRESS NOTES
[facility] CARDIOLOGY PROGRESS NOTE           6/13/2021 8:43 AM    Admit Date: 6/8/2021      Subjective:     Patient now on ventilator. On IV amnio. He is sedated but able to respond to questions. Denies pain. Review of Systems   Cardiovascular: Negative for chest pain. Objective:      Vitals:    06/13/21 1014 06/13/21 1029 06/13/21 1044 06/13/21 1059   BP: 115/72 112/71 112/72 109/67   Pulse: 80 79 77 78   Resp: 20 18 25 16   Temp:       SpO2: 100% 100% 99% 99%   Weight:       Height:             Physical Exam  Constitutional:       Appearance: He is well-developed. HENT:      Head: Atraumatic. Eyes:      Pupils: Pupils are equal, round, and reactive to light. Neck:      Vascular: No carotid bruit. Cardiovascular:      Heart sounds: Murmur heard. Systolic murmur is present. Pulmonary:      Effort: Pulmonary effort is normal.   Abdominal:      Palpations: Abdomen is soft. Tenderness: There is no abdominal tenderness. Musculoskeletal:      Comments: Grade I stage I edema    Skin:     General: Skin is warm. Neurological:      General: No focal deficit present. Mental Status: He is alert.       Comments: He is able to nod          Data Review:   Recent Labs     06/13/21  0346 06/12/21  1457 06/12/21  0924 06/12/21  0345 06/11/21  1005     --   --  139  --    K 3.1*  --  3.4* 3.5  --    MG 2.9*  --   --  2.9*  --    BUN 97*  --   --  104*  --    CREA 3.95*  --   --  4.45*  --    *  --   --  267*  --    WBC 0.1*  --   --  0.4*  --    HGB 7.3* 8.0*  --  6.2*  --    HCT 21.6* 23.7*  --  18.4*  --    PLT 7*  --   --  10*  --    INR 1.9  --   --   --  2.9         Intake/Output Summary (Last 24 hours) at 6/13/2021 1110  Last data filed at 6/13/2021 0542  Gross per 24 hour   Intake 4176.68 ml   Output 2450 ml   Net 1726.68 ml     Current Facility-Administered Medications   Medication Dose Route Frequency    0.9% sodium chloride infusion  75 mL/hr IntraVENous CONTINUOUS    [START ON 6/14/2021] insulin glargine (LANTUS) injection 20 Units  20 Units SubCUTAneous DAILY    tbo-filgrastim (GRANIX) injection 480 mcg  480 mcg SubCUTAneous DAILY    0.9% sodium chloride infusion 250 mL  250 mL IntraVENous PRN    diphenhydrAMINE (BENADRYL) injection 25 mg  25 mg IntraVENous ONCE    amiodarone (CORDARONE) tablet 200 mg  200 mg Oral BID    0.9% sodium chloride infusion 250 mL  250 mL IntraVENous PRN    piperacillin-tazobactam (ZOSYN) 4.5 g in 0.9% sodium chloride (MBP/ADV) 100 mL MBP  4.5 g IntraVENous Q12H    mycophenolate mofetil (CELLCEPT) 200 mg/mL oral suspension 1,000 mg  1,000 mg Per NG tube ACB&D    fentaNYL citrate (PF) injection 50 mcg  50 mcg IntraVENous Q4H PRN    PHENYLephrine (MARCELINA-SYNEPHRINE) 30 mg in 0.9% sodium chloride 250 mL infusion   mcg/min IntraVENous TITRATE    midodrine (PROAMATINE) tablet 10 mg  10 mg Per NG tube TID WITH MEALS    lansoprazole (PREVACID) 3 mg/mL oral suspension 30 mg  30 mg Per NG tube DAILY    insulin lispro (HUMALOG) injection 0-10 Units  0-10 Units SubCUTAneous Q6H    methylPREDNISolone (PF) (SOLU-MEDROL) injection 40 mg  40 mg IntraVENous Q8H    dextrose 40% (GLUTOSE) oral gel 1 Tube  15 g Oral PRN    glucagon (GLUCAGEN) injection 1 mg  1 mg IntraMUSCular PRN    dextrose (D50W) injection syrg 12.5-25 g  25-50 mL IntraVENous PRN    fentaNYL in normal saline (pf) 25 mcg/mL infusion  0-200 mcg/hr IntraVENous TITRATE    propofol (DIPRIVAN) 10 mg/mL infusion  0-50 mcg/kg/min IntraVENous TITRATE    sodium chloride (NS) flush 5-40 mL  5-40 mL IntraVENous Q8H    sodium chloride (NS) flush 5-40 mL  5-40 mL IntraVENous PRN    levalbuterol (XOPENEX) nebulizer soln 1.25 mg/3 mL  1.25 mg Nebulization Q4H RT    ondansetron (ZOFRAN) injection 4 mg  4 mg IntraVENous Q4H PRN    phenol throat spray (CHLORASEPTIC) 1 Spray  1 Spray Oral PRN     Cardiac History:   1. Echocardiogram 04/13/2017 for dilated left ventricle, profound left ventricular systolic dysfunctio MK<28%, probable mural thrombus noted in the left ventricular apex. Moderate MR  2. Gleevec therapy discontinued 04/2017. 3. The patient was initiated on heart failure medications 04/13/2017, Entresto, Carvedilol, Lasix. 4. The patient was [facility] for anticoagulation therapy. This was canceled when left ventricular thrombus was excluded on contrasted echocardiogram.  5. Left heart catheterization 04/2017 - no coronary artery disease. 6. The patient presented 05/03/2017. Discontinued Carvedilol. Lisinopril was switched to Losartan. 7. 05/03/2017 - Carvedilol 3.125 mg p.o. b.i.d. initiated. Continue Losartan. Change Lasix to once daily. Change potassium to once daily. 8. Echocardiogram 07/2017 - left ventricular systolic function improved 30 - 35%. 9. Electrophysiology referral rediscussed at appointment 10/11/2017. The patient agrees to see electrophysiology. 10. Echo 10/2017 ef 25-30%  11. Aldactone initiated 10/11/2017. 12. He was hospitalized 03/2018 for pneumonia, multifocal felt community acquired pneumonia. 13. 4/2018 Osimertinib stage IV lung adenocarcinoma  14. 1/7/2019 Echo EF 20-30% Moderate MR  15. 1/11/2019. Low-dose carvedilol resumed, lisinopril 2.5 mg daily. Lasix 20 mg discontinued resumed Aldactone 25 mg stopped potassium  16. 1/28/2019 BMP creatine 1.44 K 4.2   17. Echo 8/2019  MR severe cental jet severe LV diliation. 18. RCH  · Right atrial pressure showed A-wave of 7, V-wave of 3 with a mean pressure of 4 with a right atrial saturation of 73%. SVC saturation was 73%. IVC saturation was 72.5%. Right ventricular pressure was 35/9 with a right ventricular saturation of 71.5%. Pulmonary pressure was 38/16 with a mean of 26 with a pulmonary artery saturation of 71%. Pulmonary capillary wedge pressure showed A-wave of 25, V-wave 19 and a mean pressure of 17.  [place] cardiac output was 5.67 with a cardiac index of 2.67.  Thermodilution cardiac output was 4.72 with a cardiac index of 2.22. There was no significant gradient across the pulmonic or tricuspid valves. 19. Advanced CHF appt September Natalya resumed Entereso   20. EKG 11/5/2020 Sinus  Rhythm Voltage criteria for LVH  21. 1/2021 echocardiogram EF 22% moderate mitral regurgitation      Assessment/Plan:     60 y.o. male male with history of CML non-small cell lung cancer severe systolic heart failure with ejection fraction less than 20% severe mitral regurgitation. Congestive heart failure severe systolic dysfunction  · Not on therapies due to hypotension . · Net fluid balance positive for the last 24/48 hours. · Patient is receiving intravenous fluids for hydration in light of acute kidney injury  · Delicate fluid balance in light of severe cardiomyopathy. · Not a candidate for ICD based on life expectancy and acute illnesses  · Hemodynamics will become more complicated if renal failure persists.     Acute kidney injury  · Not a candidate for dialysis  · Addressed by nephrology now on saline infusion  · Caution with severe cardiomyopathy     Long-term use of anticoagulation therapy  · Previously on heparin drip  · Now thrombocytopenia    Nonsustained ventricular tachycardia  · Patient placed on IV amiodarone due NSVT documented 6/9/2021  · No recent AF  · Change to oral amiodarone    Mitral regurgitation  · Severe  · Not a candidate for mitral clip procedure nor CRT        Kayla Robledo MD  6/13/2021 8:43 AM

## 2021-06-13 NOTE — PROGRESS NOTES
Perfect Serve sent to NP for oncology today with patient's platelets decrease. Orders received to transfuse one unit.

## 2021-06-13 NOTE — PROGRESS NOTES
Georgette Lui. Admission Date: 6/8/2021             Daily Progress Note: 6/13/2021    The patient's chart is reviewed and the patient is discussed with the staff. Patient is Y 89 y.o.  male seen and evaluated at the request of Dr. Angelina Jones has history of CML, non-small cell lung cancer in 2018 and small cell lung cancer metastasized to liver discovered in 3/2020 currently on palliative chemotherapy, pulmonary emboli on Xarelto, advanced CHF, CKD stage III, debility who presented with 2 to 3 days history of difficulty in breathing and laying flat. Patient reported being in his usual health until 3 to 4 days ago when he noticed sudden onset of worsening shortness of breath which is progressively getting worse.  He does report of cough which is mostly dry. He denies having any fever, chills.  He does report having palpitations, excessive fatigue and weakness, labored breathing.  He denies having any chest pain, nausea vomiting abdominal pain or headache. ER work-up remarkable for chest x-ray showing bilateral infiltrate suggestive of pneumonia along with pleural effusion, blood work remarkable for lactic acid 4.4, creatinine 2.13 (baseline), heart rate ranging from 120-135 irregular, respiratory rate ranging from 30s to 40s.  ABG 7.39/29/39 on FiO2 0.1.     Had issues in the past with pneumonitis, but with evidence recently of both active small and non-small cell cancer he elected for rechallenge with carbo etoposide atezolizumab while continue osimertinib and monitor pneumonitis symptoms when last seen by oncology 5/26. Treated 6/5 most recently.      He was supposed to undergo PleurX placement 2 weeks ago but it was delayed due to starting Xerelto. He has not had a thoracentesis in over a month.      He was transferred to [facility] ICU for ongoing care and support. He arrives hemodynamically stable on BIPAP FiO2 35%, but RR remains in 45s.  He is able to provide history and answer questions appropriately.     Subjective:     Patient remains intubated. Vent day 4. Was tried on PS yesterday. Tolerated most of the day but placed back on PRVC yesterday evening with some increased RR. Now back on PS 18. Having some unusual breathing pattern with intermittent small Vt interspersed with Vt of 900. On fentanyl gtt but will awaken and nod. Remains off pressors. Off marcelina. On amio and fentanyl.        Current Facility-Administered Medications   Medication Dose Route Frequency    0.9% sodium chloride infusion  75 mL/hr IntraVENous CONTINUOUS    [START ON 6/14/2021] insulin glargine (LANTUS) injection 20 Units  20 Units SubCUTAneous DAILY    tbo-filgrastim (GRANIX) injection 420 mcg  5 mcg/kg SubCUTAneous DAILY    0.9% sodium chloride infusion 250 mL  250 mL IntraVENous PRN    piperacillin-tazobactam (ZOSYN) 4.5 g in 0.9% sodium chloride (MBP/ADV) 100 mL MBP  4.5 g IntraVENous Q12H    mycophenolate mofetil (CELLCEPT) 200 mg/mL oral suspension 1,000 mg  1,000 mg Per NG tube ACB&D    fentaNYL citrate (PF) injection 50 mcg  50 mcg IntraVENous Q4H PRN    PHENYLephrine (MARCELINA-SYNEPHRINE) 30 mg in 0.9% sodium chloride 250 mL infusion   mcg/min IntraVENous TITRATE    midodrine (PROAMATINE) tablet 10 mg  10 mg Per NG tube TID WITH MEALS    lansoprazole (PREVACID) 3 mg/mL oral suspension 30 mg  30 mg Per NG tube DAILY    insulin lispro (HUMALOG) injection 0-10 Units  0-10 Units SubCUTAneous Q6H    methylPREDNISolone (PF) (SOLU-MEDROL) injection 40 mg  40 mg IntraVENous Q8H    dextrose 40% (GLUTOSE) oral gel 1 Tube  15 g Oral PRN    glucagon (GLUCAGEN) injection 1 mg  1 mg IntraMUSCular PRN    dextrose (D50W) injection syrg 12.5-25 g  25-50 mL IntraVENous PRN    amiodarone (CORDARONE) 450 mg in dextrose 5% 250 mL infusion  0.5-1 mg/min IntraVENous TITRATE    fentaNYL in normal saline (pf) 25 mcg/mL infusion  0-200 mcg/hr IntraVENous TITRATE    propofol (DIPRIVAN) 10 mg/mL infusion  0-50 mcg/kg/min IntraVENous TITRATE    sodium chloride (NS) flush 5-40 mL  5-40 mL IntraVENous Q8H    sodium chloride (NS) flush 5-40 mL  5-40 mL IntraVENous PRN    levalbuterol (XOPENEX) nebulizer soln 1.25 mg/3 mL  1.25 mg Nebulization Q4H RT    ondansetron (ZOFRAN) injection 4 mg  4 mg IntraVENous Q4H PRN    phenol throat spray (CHLORASEPTIC) 1 Spray  1 Spray Oral PRN       Review of Systems  Unobtainable due to patient status. Objective:     Vitals:    06/13/21 0815 06/13/21 0829 06/13/21 0844 06/13/21 0859   BP:  111/67 117/74 113/67   Pulse: 79 79 77 77   Resp: 13 18 29 23   Temp:       SpO2: 100% 100% 100% 99%   Weight:       Height:         Intake and Output:   06/11 1901 - 06/13 0700  In: 6459.6 [I.V.:4688.1]  Out: 1878 [Urine:3575]  No intake/output data recorded. Physical Exam:   Constitution:  the patient is well developed and in no acute distress  EENMT:  Sclera clear, pupils equal, oral mucosa moist  Respiratory: Intubated, mild B crackles  Cardiovascular:  RRR without M,G,R  Gastrointestinal: soft and non-tender; with positive bowel sounds. Musculoskeletal: warm without cyanosis. There is trace B lower leg edema. Skin:  no jaundice or rashes, no wounds   Neurologic: sedated but alert    Psychiatric:  Sedated but alert     CHEST XRAY:   CXR Results  (Last 48 hours)               06/13/21 0524  XR CHEST SNGL V Final result    Impression:  New left lung base infiltrate, otherwise, no change. Narrative:  EXAM: Chest x-ray. INDICATION: Dyspnea. COMPARISON: Yesterday's chest x-ray. TECHNIQUE: Frontal view chest x-ray. FINDINGS: Right lung infiltrate or mass is unchanged, as are cystic lung changes   are loculated pneumothorax along the right lung base. There is new left lung   base infiltrate. The heart remains enlarged. Support catheters appear to be in   good position.            06/12/21 0500  XR CHEST SNGL V Final result    Impression:  No interval change. Narrative:  EXAM: Chest x-ray. INDICATION: Dyspnea. COMPARISON: Yesterday's chest x-ray. TECHNIQUE: Frontal view chest x-ray. FINDINGS: There is unchanged right lung infiltrate or mass. Cystic lung changes   or loculated pneumothorax along the right lung base is also unchanged. The left   lung and pleural space are clear. The heart remains enlarged. The endotracheal   tube tip lies just below the level of the clavicles. The enteric tube projects   below the diaphragms. A right chest wall infusion port catheter remains in   place.                    LAB  No lab exists for component: [place]   Recent Labs     06/13/21  0346 06/12/21  1457 06/12/21  0345 06/11/21  1005 06/11/21  0307   WBC 0.1*  --  0.4*  --  2.1*   HGB 7.3* 8.0* 6.2*  --  7.7*   HCT 21.6* 23.7* 18.4*  --  24.5*   PLT 7*  --  10*  --  36*   INR 1.9  --   --  2.9  --      Recent Labs     06/13/21  0346 06/12/21  0924 06/12/21  0345 06/11/21  0307     --  139 136*   K 3.1* 3.4* 3.5 5.5*   CL 98  --  101 103   CO2 32  --  25 20*   *  --  267* 199*   BUN 97*  --  104* 106*   CREA 3.95*  --  4.45* 4.51*   MG 2.9*  --  2.9* 2.9*   CA 7.4*  --  6.9* 7.3*   ALB 2.2*  --  2.3* 2.5*     ABG:    Lab Results   Component Value Date/Time    PHI 7.52 (H) 06/13/2021 04:04 AM    PCO2I 36.1 06/13/2021 04:04 AM    PO2I 91 06/13/2021 04:04 AM    HCO3I 29.7 (H) 06/13/2021 04:04 AM    FIO2I 30 06/13/2021 04:04 AM       MICRO    SARS-CoV-2 LAB Results  [facility] Test: No results found for: COV2NT   [facility] Test: No results found for: EDPR  [facility] Test: No components found for: [id number]       Assessment:  (Medical Decision Making)     Hospital Problems  Date Reviewed: 5/26/2021        Codes Class Noted POA    Pancytopenia ([facility] ICD-10-CM: B89.323  ICD-9-CM: 284.19  6/13/2021 Unknown        Moderate protein-calorie malnutrition ([facility] ICD-10-CM: E44.0  ICD-9-CM: 263.0  6/10/2021 Yes        TIFFANIE (acute kidney injury) ([facility] ICD-10-CM: N17.9  ICD-9-CM: 584.9  6/10/2021 Unknown        Acute liver failure without hepatic coma ICD-10-CM: K72.00  ICD-9-CM: 867  6/10/2021 Unknown        Other pulmonary embolism without acute cor pulmonale (HCC) ICD-10-CM: I26.99  ICD-9-CM: 415.19  6/10/2021 Unknown        Acute respiratory failure with hypoxia (HCC) ICD-10-CM: J96.01  ICD-9-CM: 518.81  6/8/2021 Unknown        Hospital-acquired bacterial pneumonia ICD-10-CM: J15.9  ICD-9-CM: 482.9  6/8/2021 Unknown        Bilateral pleural effusion ICD-10-CM: J90  ICD-9-CM: 511.9  6/8/2021 Unknown        Lactic acidosis ICD-10-CM: E87.2  ICD-9-CM: 276.2  6/8/2021 Unknown        * (Principal) Severe sepsis ([facility] ICD-10-CM: A41.9, R65.20  ICD-9-CM: 038.9, 995.92  6/8/2021 Unknown        Tachycardia ICD-10-CM: R00.0  ICD-9-CM: 785.0  6/8/2021 Unknown        SCLC (small cell lung carcinoma), right ([facility] ICD-10-CM: C34.91  ICD-9-CM: 162.9  3/13/2020 Yes        Immunocompromised ([facility] ICD-10-CM: D84.9  ICD-9-CM: 279.9  11/16/2018 Yes        Stage IV adenocarcinoma of lung (HCC) ICD-10-CM: C34.90  ICD-9-CM: 162.9  7/11/2018 Yes        Pleural effusion ICD-10-CM: J90  ICD-9-CM: 511.9  4/27/2018 Unknown        Chronic systolic congestive heart failure (HCC) ICD-10-CM: I50.22  ICD-9-CM: 428.22, 428.0  5/3/2017 Yes        Depression ICD-10-CM: F32.9  ICD-9-CM: 223  11/2/2012 Yes        CML (chronic myeloid leukemia) ([address] ICD-10-CM: C92.10  ICD-9-CM: 205.10  8/25/2012 Yes    Overview Addendum 8/26/2012  7:23 AM by Aaron Gilliland MD     8/25/12. Probable diagnosis. Will do bone marrow exam today  8/26/12. Bone marrow aspiration and biopsy done. Marrow aspirate and peripheral blood sent for flow, cytogenetics and molecular testing. No complications with the procedure                 61 y/o male with CML, active SCLC and NSCLC on palliative therapy.  Severe systolic HR with EF 63% (repeat now < 15%) prior pneumonitis on immunotherapy,  malignant effusions, recent segmental B lower lobe PEs on Xarelto and recently with evidence of progression of both lung cancers leading to cycle 1 of cis/etoposide/atezolizumab (had the same about a year ago when his small cell was discovered). Presented with new GGO felt likely to be pneumonitis. Now intubated with worsening respiratory failure, renal failure, liver failure, heart failure. Covid negative. Being seen by heme/onc, ID, GI, nephrology. His creatinine, LFT's have now started to come down. EF was found to be < 15 on repeat TTE. Started on steroids and cellcept for pneumonitis. Plan:  (Medical Decision Making)   -cont PS weaning.   -stop bicarb drip. -follow up viral serologies ordered by ID and GI.   -cont IV steroids and mycophenolate to cover likely pneumonitis. -increase glargine to 20 units and cont SSI. -nephrology on board. Not a dialysis candidate.   -cont amio drip per cardiology. -now off pressors. Monitor BP.   -cont abx. Have stopped vanc but cont zosyn day 6   -heparin drip for PE's on hold with plts 10. Will need to transfuse 1 unit platelets today. Check with heme regarding any special platelet needs given his neutropenia. -holding toprol with borderline BP's.   -need ongoing palliative talks with family. Prognosis still poor though numerically more stable today.   -cont tube feeds. -pantoprazole. -SCD's  Full code currently. Critically ill patient with complicated medical issues and high risk of further decompensation.  Total critical care time spent thus far (exclusive of procedures): 35 minutes        Michelle Long MD

## 2021-06-13 NOTE — PROGRESS NOTES
Ventilator check complete; patient has a #8. 0 ET tube secured at the 26 at the teeth. Patient is sedated. Patient is not able to follow commands. Breath sounds are coarse and diminished. Trachea is midline, Negative for subcutaneous air, and chest excursion is symmetric. Patient is also Negative for cyanosis and is Negative for pitting edema. All alarms are set and audible. Resuscitation bag is at the head of the bed.       Ventilator Settings  Mode FIO2 Rate Tidal Volume Pressure PEEP I:E Ratio   VC+  30 %   22 550 ml  8 cm H2O  8 cm H20  1:2.64      Peak airway pressure: 36 cm H2O   Minute ventilation: 13.9 l/min       Paula Flores, RT

## 2021-06-13 NOTE — PROGRESS NOTES
Bedside and Verbal shift change report given to JUSTIN Coates (oncoming nurse) by Esperanza Mortimer, RN (offgoing nurse). Report included the following information SBAR, Intake/Output, MAR, Recent Results, Med Rec Status and Cardiac Rhythm NSR/PVCs.

## 2021-06-13 NOTE — PROGRESS NOTES
Ventilator check complete; patient has a #8. 0 ET tube secured at the 26 at the teeth. Patient is not sedated. Patient is able to follow commands. Breath sounds are clear. Trachea is midline, Negative for subcutaneous air, and chest excursion is symmetric. Patient is also Negative for cyanosis and is Negative for pitting edema. All alarms are set and audible. Resuscitation bag is at the head of the bed. Ventilator Settings  Mode FIO2 Rate Tidal Volume Pressure PEEP I:E Ratio   Pressure support  29 %      18 cm H2O  8 cm H20        Peak airway pressure: 27 cm H2O   Minute ventilation: 5.64 l/min     ABG: No results for input(s): PH, PCO2, PO2, HCO3 in the last 72 hours.       Alida Mas, RT

## 2021-06-13 NOTE — PROGRESS NOTES
Problem: Falls - Risk of  Goal: *Absence of Falls  Description: Document Cristopher Alex Fall Risk and appropriate interventions in the flowsheet.   Outcome: Progressing Towards Goal  Note: Fall Risk Interventions:  Mobility Interventions: Bed/chair exit alarm, Communicate number of staff needed for ambulation/transfer, Mechanical lift, Patient to call before getting OOB    Mentation Interventions: Adequate sleep, hydration, pain control, Bed/chair exit alarm, Door open when patient unattended, More frequent rounding, Reorient patient    Medication Interventions: Evaluate medications/consider consulting pharmacy, Bed/chair exit alarm, Teach patient to arise slowly    Elimination Interventions: Bed/chair exit alarm, Call light in reach, Patient to call for help with toileting needs, Toileting schedule/hourly rounds              Problem: Patient Education: Go to Patient Education Activity  Goal: Patient/Family Education  Outcome: Progressing Towards Goal     Problem: Patient Education: Go to Patient Education Activity  Goal: Patient/Family Education  Outcome: Progressing Towards Goal     Problem: Patient Education: Go to Patient Education Activity  Goal: Patient/Family Education  Outcome: Progressing Towards Goal     Problem: Patient Education: Go to Patient Education Activity  Goal: Patient/Family Education  Outcome: Progressing Towards Goal     Problem: Sepsis: Day 3  Goal: Off Pathway (Use only if patient is Off Pathway)  Outcome: Progressing Towards Goal  Goal: *Oxygen saturation within defined limits  Outcome: Progressing Towards Goal  Goal: *Vital sign stability  Outcome: Progressing Towards Goal  Goal: *Tolerating diet  Outcome: Progressing Towards Goal  Goal: *Demonstrates progressive activity  Outcome: Progressing Towards Goal  Goal: Activity/Safety  Outcome: Progressing Towards Goal  Goal: Consults, if ordered  Outcome: Progressing Towards Goal  Goal: Diagnostic Test/Procedures  Outcome: Progressing Towards Goal  Goal: Nutrition/Diet  Outcome: Progressing Towards Goal  Goal: Discharge Planning  Outcome: Progressing Towards Goal  Goal: Medications  Outcome: Progressing Towards Goal  Goal: Respiratory  Outcome: Progressing Towards Goal  Goal: Treatments/Interventions/Procedures  Outcome: Progressing Towards Goal  Goal: Psychosocial  Outcome: Progressing Towards Goal     Problem: Ventilator Management  Goal: *Adequate oxygenation and ventilation  Outcome: Progressing Towards Goal  Goal: *Patient maintains clear airway/free of aspiration  Outcome: Progressing Towards Goal  Goal: *Absence of infection signs and symptoms  Outcome: Progressing Towards Goal  Goal: *Normal spontaneous ventilation  Outcome: Progressing Towards Goal     Problem: Patient Education: Go to Patient Education Activity  Goal: Patient/Family Education  Outcome: Progressing Towards Goal     Problem: Non-Violent Restraints  Goal: Removal from restraints as soon as assessed to be safe  Outcome: Progressing Towards Goal  Goal: No harm/injury to patient while restraints in use  Outcome: Progressing Towards Goal  Goal: Patient's dignity will be maintained  Outcome: Progressing Towards Goal  Goal: Patient Interventions  Outcome: Progressing Towards Goal     Problem: Delirium Treatment  Goal: *Level of consciousness restored to baseline  Outcome: Progressing Towards Goal  Goal: *Level of environmental perceptions restored to baseline  Outcome: Progressing Towards Goal  Goal: *Sensory perception restored to baseline  Outcome: Progressing Towards Goal  Goal: *Emotional stability restored to baseline  Outcome: Progressing Towards Goal  Goal: *Functional assessment restored to baseline  Outcome: Progressing Towards Goal  Goal: *Absence of falls  Outcome: Progressing Towards Goal  Goal: *Will remain free of delirium, CAM Score negative  Outcome: Progressing Towards Goal  Goal: *Cognitive status will be restored to baseline  Outcome: Progressing Towards Goal  Goal: Interventions  Outcome: Progressing Towards Goal

## 2021-06-13 NOTE — PROGRESS NOTES
MD notified for patient c/o tightness/pain & distention in abdomen, orders received for STAT KUB at bedside for further evaluation.

## 2021-06-13 NOTE — PROGRESS NOTES
[facility]        Inpatient Hematology / Oncology Progress Note      Admission Date: 2021 10:31 AM  Reason for Admission/Hospital Course: Hospital-acquired bacterial pneumonia [J15.9]  Pleural effusion [J90]  Atrial fibrillation with rapid ventricular response (HCC) [I48.91]  Lactic acidosis [E87.2]  Acute respiratory failure with hypoxia (HCC) [J96.01]  Severe sepsis (HCC) [A41.9, R65.20]      24 Hour Events:  Counts continue to drop as expected   Pt with acute decompensation--intubated on   Amio, Heparin drip  Improving LFTs   Will need plt transfusion - plan to repeat cbc in pm    Son updated at bedside     ROS:  Difficult to assess d/t intubated status    10 point review of systems is otherwise negative with the exception of the elements mentioned above in the HPI.      No Known Allergies    OBJECTIVE:  Patient Vitals for the past 8 hrs:   BP Temp Pulse Resp SpO2   21 1549 107/69 97.4 °F (36.3 °C) 87 13 95 %   21 1522 -- -- 80 19 97 %   21 1459 115/69 97.9 °F (36.6 °C) 84 15 98 %   21 1359 109/71 96.9 °F (36.1 °C) 81 17 99 %   21 1259 111/75 97.5 °F (36.4 °C) 83 25 97 %   21 1239 -- -- 80 26 100 %   21 1237 130/77 97.6 °F (36.4 °C) 79 20 98 %   21 1211 -- -- 79 25 98 %   21 1159 117/80 -- 80 24 98 %   21 1129 116/64 -- 83 24 98 %   21 1114 109/65 97.6 °F (36.4 °C) 82 19 100 %   21 1059 109/67 -- 78 16 99 %   21 1044 112/72 -- 77 25 99 %   21 1029 112/71 -- 79 18 100 %   21 1014 115/72 -- 80 20 100 %   21 0959 126/63 -- 82 24 98 %   21 0944 121/66 -- 82 24 100 %   21 0929 (!) 108/55 -- 82 16 90 %   21 0914 116/73 -- 77 22 100 %   21 0859 113/67 -- 77 23 99 %   21 0844 117/74 -- 77 29 100 %   21 0829 111/67 -- 79 18 100 %   21 0815 -- -- 79 13 100 %   21 0814 107/67 -- 75 28 100 %     Temp (24hrs), Av.4 °F (36.3 °C), Min:96.9 °F (36.1 °C), Max:97.9 °F (36.6 °C)    06/13 0701 - 06/13 1900  In: 876.6 [I.V.:598.3]  Out: 600 [Urine:600]    Physical Exam:  Constitutional: Ill appearing  male lying in bed. Intubates, somewhat more sleepy today but nods approp to answer my questions    HEENT: Normocephalic and atraumatic. Pupils are equal, round, and reactive to light. Skin Warm and dry. No bruising and no rash noted. No erythema. No pallor. Respiratory Decreased BS scattered rhonchi, ventilated    CVS Normal rate, regular rhythm and normal S1 and S2   Abdomen Soft, nontender and nondistended, normoactive bowel sounds.     Neuro EVERTON    MSK EVERTON    Psych EVERTON        Labs:      Recent Labs     06/13/21 0346 06/12/21  1457 06/12/21 0345 06/11/21  0307   WBC 0.1*  --  0.4* 2.1*   RBC 2.74*  --  2.37* 2.97*   HGB 7.3* 8.0* 6.2* 7.7*   HCT 21.6* 23.7* 18.4* 24.5*   MCV 78.8*  --  77.6* 82.5   MCH 26.6  --  26.2 25.9*   MCHC 33.8  --  33.7 31.4   RDW 15.0*  --  15.1* 15.4*   PLT 7*  --  10* 36*   GRANS 50  --   --  71   LYMPH 36  --   --  6*   MONOS 7  --   --  1*   EOS 0*  --   --  0*   BASOS 0  --   --  1   IG 7*  --   --  21*   DF AUTOMATED  --  WBC TOO FEW TO DIFFERENTIATE AUTOMATED   ANEU 0.1*  --   --  1.6*   ABL 0.0*  --   --  0.1*   ABM 0.0*  --   --  0.0*   SALBADOR 0.0  --   --  0.0   ABB 0.0  --   --  0.0   AIG 0.0  --   --  0.4        Recent Labs     06/13/21 0346 06/12/21  0924 06/12/21 0345 06/11/21  0307     --  139 136*   K 3.1* 3.4* 3.5 5.5*   CL 98  --  101 103   CO2 32  --  25 20*   AGAP 10  --  13 13   *  --  267* 199*   BUN 97*  --  104* 106*   CREA 3.95*  --  4.45* 4.51*   GFRAA 20*  --  17* 17*   GFRNA 16*  --  14* 14*   CA 7.4*  --  6.9* 7.3*   AP 84  --  78 79   TP 5.4*  --  5.4* 6.0*   ALB 2.2*  --  2.3* 2.5*   GLOB 3.2  --  3.1 3.5   AGRAT 0.7*  --  0.7* 0.7*   MG 2.9*  --  2.9* 2.9*         Imaging:  XR CHEST SNGL V [[id number]] Collected: 06/11/21 0542   Order Status: Completed Updated: 06/11/21 0545   Narrative:     EXAMINATION: One view chest     HISTORY: respiratory failure, h/o lung cancer     TECHNIQUE: Frontal chest.     COMPARISON: 6/9/2021     FINDINGS:     Stable support devices. Persistent right lung infiltrate. Improved right pleural effusion or atelectasis. No significant pneumothorax. No other significant interval changes. Impression:       1. Findings as described above. XR CHEST Nick Naifis [[id number]]    Order Status: No result     RETROPERITONEUM [facility] [[id number]] Collected: 06/10/21 2206   Order Status: Completed Updated: 06/10/21 2209   Narrative:     Renal ultrasound. CLINICAL INDICATION:  Acute renal insufficiency     PROCEDURE: Realtime grayscale color Doppler evaluation of the kidneys and   bladder. COMPARISON: No prior similar studies available for direct comparison. FINDINGS: Right kidney is normal in size at 9.5 cm. There is increased renal   cortical echogenicity. There is no hydronephrosis. There is very limited   evaluation of the left kidney secondary to overlying bowel gas. It measures   roughly 9 cm. There is increased renal cortical atrophy. No definite   hydronephrosis. The bladder is decompressed by Aranda catheter. Ascites is   present in the pelvis. Impression:     1. Increased renal cortical echogenicity bilaterally can be seen with medical   renal disease. Note there is limited evaluation of the left kidney. 2. No definite hydronephrosis. 3. Ascites   XR CHEST Nick Sofia [[id number]]    Order Status: No result    XR CHEST Nick Fortis [[id number]]    Order Status: No result    XR CHEST PORT [[id number]] Collected: 06/09/21 2301   Order Status: Completed Updated: 06/09/21 2308   Narrative:     PORTABLE CHEST, June 9, 2021 at 2200 hours,   PORTABLE ABDOMEN, June 9, 2021 at 2155 hours:     CLINICAL HISTORY:  Follow-up intubation and tube placement. COMPARISON:  Portable chest yesterday and CT today.      CHEST FINDINGS:  AP semi-erect image demonstrates is of inhomogeneous   edema/infiltrate bilaterally.  Endotracheal tube tip overlies the mid trachea. Mediport catheter remains in place.  The heart remains enlarged.  No definite   pneumothorax.  There are overlying radiopaque support devices.       ABDOMEN FINDINGS: AP supine images demonstrates the proximal sidehole of the   nasogastric tube just below the gastroesophageal junction.  No dilated bowel   loops of the epigastrium. Impression:       1.  ENDOTRACHEAL TUBE IN EXPECTED POSITION WITHOUT DEFINITE PNEUMOTHORAX OR   OTHER SIGNIFICANT CHANGE FROM CT TODAY. 2.  NASOGASTRIC TUBE PROXIMAL SIDEHOLE IS JUST BELOW THE GASTROESOPHAGEAL   JUNCTION.  ENHANCEMENT BY APPROXIMATELY 10 CM IS SUGGESTED. XR ABD (KUB) [[id number]] Collected: 06/09/21 2301   Order Status: Completed Updated: 06/09/21 2308   Narrative:     PORTABLE CHEST, June 9, 2021 at 2200 hours,   PORTABLE ABDOMEN, June 9, 2021 at 2155 hours:     CLINICAL HISTORY:  Follow-up intubation and tube placement. COMPARISON:  Portable chest yesterday and CT today. CHEST FINDINGS:  AP semi-erect image demonstrates is of inhomogeneous   edema/infiltrate bilaterally.  Endotracheal tube tip overlies the mid trachea. Mediport catheter remains in place.  The heart remains enlarged.  No definite   pneumothorax.  There are overlying radiopaque support devices.       ABDOMEN FINDINGS: AP supine images demonstrates the proximal sidehole of the   nasogastric tube just below the gastroesophageal junction.  No dilated bowel   loops of the epigastrium. Impression:       1.  ENDOTRACHEAL TUBE IN EXPECTED POSITION WITHOUT DEFINITE PNEUMOTHORAX OR   OTHER SIGNIFICANT CHANGE FROM CT TODAY. 2.  NASOGASTRIC TUBE PROXIMAL SIDEHOLE IS JUST BELOW THE GASTROESOPHAGEAL   JUNCTION.  ENHANCEMENT BY APPROXIMATELY 10 CM IS SUGGESTED.    CT CHEST WO CONT [[id number]] Collected: 06/09/21 1420   Order Status: Completed Updated: 06/09/21 1426   Narrative:     CT of the chest without contrast.     CLINICAL INDICATION: Small cell lung cancer, metastatic, prior pulmonary emboli     PROCEDURE: Serial thin section axial images obtained from the thoracic inlet   through the upper abdomen without the administration of intravenous contrast.   Radiation dose reduction techniques were used for this study. Our CT scanners   use one or all of the following: Automated exposure control, adjusted of the mA   and/or kV according to patient size, iterative reconstruction     COMPARISON: Chest CT dated 5/12/2021     FINDINGS:     There is nodular pleural thickening throughout the right hemithorax most in   keeping with pleural-based metastatic disease. Multiple prominent mediastinal   lymph nodes are present also concerning for metastatic disease. The heart is   enlarged. Patchy airspace consolidation with air bronchograms are appreciated at   the right martina unchanged from the prior exam. This may be a posttreatment   change. Pneumonia should be considered. Multiple pulmonary nodules are noted   throughout the right lower lobe concerning for pulmonary metastatic disease. More confluent groundglass opacities noted throughout the left upper lobe and   left lower lobe. This is nonspecific. There is a trace right pleural effusion. Limited evaluation of the upper abdomen shows soft tissue density along the   right peritoneum and right diaphragm also concerning for metastatic disease. No aggressive osseous is identified. Impression:     1. Worsening of the nodular pleural thickening throughout the right hemithorax   with multiple pulmonary nodules throughout the right lower lobe most in keeping   with pulmonary and pleural metastatic disease. There is a small right pleural   effusion also likely metastatic. 2. Stable confluent airspace opacity at the right martina may be a prior treatment   effect. Pneumonia could also be considered.    3. Nonspecific groundglass opacity throughout the left upper lobe, lingula and   lower lobe. 4. Cardiomegaly   5. Mediastinal lymphadenopathy.        Medications:  Current Facility-Administered Medications   Medication Dose Route Frequency    0.9% sodium chloride infusion  75 mL/hr IntraVENous CONTINUOUS    [START ON 6/14/2021] insulin glargine (LANTUS) injection 20 Units  20 Units SubCUTAneous DAILY    tbo-filgrastim (GRANIX) injection 480 mcg  480 mcg SubCUTAneous DAILY    0.9% sodium chloride infusion 250 mL  250 mL IntraVENous PRN    amiodarone (CORDARONE) tablet 200 mg  200 mg Per NG tube BID    0.9% sodium chloride infusion 250 mL  250 mL IntraVENous PRN    piperacillin-tazobactam (ZOSYN) 4.5 g in 0.9% sodium chloride (MBP/ADV) 100 mL MBP  4.5 g IntraVENous Q12H    mycophenolate mofetil (CELLCEPT) 200 mg/mL oral suspension 1,000 mg  1,000 mg Per NG tube ACB&D    fentaNYL citrate (PF) injection 50 mcg  50 mcg IntraVENous Q4H PRN    PHENYLephrine (MARCELINA-SYNEPHRINE) 30 mg in 0.9% sodium chloride 250 mL infusion   mcg/min IntraVENous TITRATE    midodrine (PROAMATINE) tablet 10 mg  10 mg Per NG tube TID WITH MEALS    lansoprazole (PREVACID) 3 mg/mL oral suspension 30 mg  30 mg Per NG tube DAILY    insulin lispro (HUMALOG) injection 0-10 Units  0-10 Units SubCUTAneous Q6H    methylPREDNISolone (PF) (SOLU-MEDROL) injection 40 mg  40 mg IntraVENous Q8H    dextrose 40% (GLUTOSE) oral gel 1 Tube  15 g Oral PRN    glucagon (GLUCAGEN) injection 1 mg  1 mg IntraMUSCular PRN    dextrose (D50W) injection syrg 12.5-25 g  25-50 mL IntraVENous PRN    fentaNYL in normal saline (pf) 25 mcg/mL infusion  0-200 mcg/hr IntraVENous TITRATE    propofol (DIPRIVAN) 10 mg/mL infusion  0-50 mcg/kg/min IntraVENous TITRATE    sodium chloride (NS) flush 5-40 mL  5-40 mL IntraVENous Q8H    sodium chloride (NS) flush 5-40 mL  5-40 mL IntraVENous PRN    levalbuterol (XOPENEX) nebulizer soln 1.25 mg/3 mL  1.25 mg Nebulization Q4H RT    ondansetron (ZOFRAN) injection 4 mg  4 mg IntraVENous Q4H PRN    phenol throat spray (CHLORASEPTIC) 1 Spray  1 Spray Oral PRN         ASSESSMENT:    Problem List  Date Reviewed: 5/26/2021        Codes Class Noted    Pancytopenia ([place] [address] ICD-10-CM: M38.856  ICD-9-CM: 284.19  6/13/2021        Moderate protein-calorie malnutrition ([place] [address] ICD-10-CM: E44.0  ICD-9-CM: 263.0  6/10/2021        TIFFANIE (acute kidney injury) ([place] [address] ICD-10-CM: N17.9  ICD-9-CM: 584.9  6/10/2021        Acute liver failure without hepatic coma ICD-10-CM: K72.00  ICD-9-CM: 570  6/10/2021        Other pulmonary embolism without acute cor pulmonale ([place] [address] ICD-10-CM: I26.99  ICD-9-CM: 415.19  6/10/2021        Acute respiratory failure with hypoxia (HCC) ICD-10-CM: J96.01  ICD-9-CM: 518.81  6/8/2021        Hospital-acquired bacterial pneumonia ICD-10-CM: J15.9  ICD-9-CM: 482.9  6/8/2021        Bilateral pleural effusion ICD-10-CM: J90  ICD-9-CM: 511.9  6/8/2021        Lactic acidosis ICD-10-CM: E87.2  ICD-9-CM: 276.2  6/8/2021        * (Principal) Severe sepsis ([place] [address] ICD-10-CM: A41.9, R65.20  ICD-9-CM: 038.9, 995.92  6/8/2021        Tachycardia ICD-10-CM: R00.0  ICD-9-CM: 785.0  6/8/2021        Elevated serum creatinine ICD-10-CM: R79.89  ICD-9-CM: 790.99  10/5/2020        Hypomagnesemia ICD-10-CM: E83.42  ICD-9-CM: 275.2  7/27/2020        Shortness of breath ICD-10-CM: R06.02  ICD-9-CM: 786.05  3/28/2020        Pleural effusion on right ICD-10-CM: J90  ICD-9-CM: 511.9  3/28/2020        SCLC (small cell lung carcinoma), right ([facility] ICD-10-CM: C34.91  ICD-9-CM: 162.9  3/13/2020        Acute pulmonary edema ([facility] ICD-10-CM: J81.0  ICD-9-CM: 518.4  9/18/2019        Acute CHF (congestive heart failure) ([facility] ICD-10-CM: I50.9  ICD-9-CM: 428.0  9/16/2019        Pneumonia ICD-10-CM: J18.9  ICD-9-CM: 486  1/6/2019        Pulmonary emboli ([facility] ICD-10-CM: I26.99  ICD-9-CM: 415.19  1/6/2019        Immunocompromised ([facility] ICD-10-CM: D84.9  ICD-9-CM: 279.9  11/16/2018        Stage IV adenocarcinoma of lung ([facility] ICD-10-CM: C34.90  ICD-9-CM: 162.9  7/11/2018        Pleural effusion ICD-10-CM: J90  ICD-9-CM: 511.9  4/27/2018        Abnormal chest CT ICD-10-CM: R93.89  ICD-9-CM: 793.2  4/27/2018        Pulmonary infiltrate ICD-10-CM: R91.8  ICD-9-CM: 793.19  4/27/2018        CAP (community acquired pneumonia) ICD-10-CM: J18.9  ICD-9-CM: 908  3/27/2018        Chronic systolic congestive heart failure ([facility] ICD-10-CM: I50.22  ICD-9-CM: 428.22, 428.0  5/3/2017        Depression ICD-10-CM: F32.9  ICD-9-CM: 091  11/2/2012        CML (chronic myeloid leukemia) ([facility] ICD-10-CM: C92.10  ICD-9-CM: 205.10  8/25/2012    Overview Addendum 8/26/2012  7:23 AM by Kiara Braxton MD     8/25/12. Probable diagnosis. Will do bone marrow exam today  8/26/12. Bone marrow aspiration and biopsy done. Marrow aspirate and peripheral blood sent for flow, cytogenetics and molecular testing. No complications with the procedure             Allergic rhinitis ICD-10-CM: J30.9  ICD-9-CM: 477.9  8/24/2012        Erectile dysfunction ICD-10-CM: N52.9  ICD-9-CM: 607.84  8/24/2012            Mr. Chey Davis is a 43-year-old man with history of CML (currently on observation, previously on [address]) and non-small cell lung carcinoma 2018 on osimertinib with recurrent disease, and small cell lung cancer with metastasis to liver 3/2020 (started on cis/etoposide and atezo). Most recently on osimertinib for NSCLC and s/p C1 carbo/etop/atezolizumab on 6/3-6/5/21. He met with Dr Sharon Aldridge (primary oncologist) end of May to review CT results which showed hepatic POD. He reviewed the complexity of pt's case and tx options. Pt desired to purse rechallenge with triple therapy along with osimertinib. Dr Sharon Aldridge reviewed lack of data regarding this combination and potential rxns including pneumonitis. Mr Chey Davis was admitted today (6/8) with acute resp failure and worsening s/s in last few days. His med hx is further complicated by CKD, PE on DOAC and HF with EF ~20%.   His HR was in 120-130s irreg and he was started on tx for afib with RVR. On IVF/vanc/Zosyn. Moved to intensive care setting and on BiPAP. Pulm evaluated pt and he is currently on IV steroids. DOAC on hold and pt on heparin gtt. We are consulted for onc recommendations.       He was seen in CCU. He is sitting in bed with BiPAP in place. We spent some time talking about his medical condition, how critically ill he is and next steps. He expressed earlier during this admission that he'd like to revoke his DNR/DNI status and is now a FULL code.         PLAN:  Interval increase in bilateral lung infiltrates  6/8 zosyn and vanc  611 Remains on Zosyn. CXR 6/11 with persistent right lung infiltrate      Concern for pneumonitis  6/8 on solumedrol  6/11 Remains on solumedrol 40 mg every 8 hours. Consult ID per guidelines for life threatening pneumonitis. 6/13 remains vented; on solumedrol and added mycophenolate      PE 5/2012 6/8 Xarelto on hold, switched to heparin for pending procedures  6/11 Continues on Heparin      Afib/RVR   6/8 per cards, on amio gtt   6/9 remains on drip considering moving out of ICU once controlled on oral  6/11 Remains on Amio drip - okay for short term per cards      SCLC sp Carbo etoposide atezolizumab given 6/3-6/5. Expect to see counts to start dropping. Monitor closely. 6/9 counts beginning to drop. Hgb 7.1 transfuse given cardiac history. 6/11 WBC 2.1, Hgb 7.7, Plt 36k.     6/13 wbc 0.1/Hb 7 post transfusion and plts <10 - will get plts today; asked RN to repeat cbc after plts - if Hb <7 and plts <10 will transfuse (slow rate due to low EF reviewed with RN, ie if needs PRBC pls transfuse over 3-4 hrs); Granix now and daily - orders placed for 3 days - will need to re-assess      NSCLC  osimertinib on hold.     Heart failure EF 20%  -Daily weights, strict I&Os, transfuse blood products at slow rate discussed with RN  - repeat echo on 6/11 with EF <15% - pt and son aware     Acute Respiratory Failure  6/11 Now intubated/sedated as of 6/9. TIFFANIE  6/11 Nephro consulted for worsening renal function--not a good candidate for HD    Worsening AST/ALT  6/11 ALT up to 2580, AST 3158. Recommend Mycophenolate 1 gm BID - oral suspension okay per pharmD. 6/13 LFTs much improved; likely component of ischemic hepatitis too     Goals and plan of care reviewed with the patient. All questions answered to the best of our ability.                 Jessica Roblero MD   [facility]emat[facility] On[facility]  [address] [place] [address]  Office : [phone]  Fax : [phone]

## 2021-06-13 NOTE — PROGRESS NOTES
Bedside and verbal shift change report received from  Brendon Connor RN (offgoing nurse). Report included the following information SBAR, Procedure Summary, Intake/Output, MAR and Cardiac Rhythm NSR. Dual skin assessment completed at bedside: WDL (list pertinent skin assessment findings)    Dual verification of gtts completed (name of gtts verified): Fentanyl @ 75mcg.

## 2021-06-14 NOTE — PROGRESS NOTES
Bedside and verbal shift change report received from  Sandra Sebastian (offgoing nurse). Report included the following information SBAR, Kardex, Intake/Output, MAR and Recent Results.      Dual skin assessment completed at bedside: WNL (list pertinent skin assessment findings)    Dual verification of gtts completed (name of gtts verified): Fentanyl

## 2021-06-14 NOTE — PROGRESS NOTES
Comprehensive Nutrition Assessment    Type and Reason for Visit: Reassess  Tube Feeding Management (Pulmonary)    Nutrition Recommendations/Plan:   · Enteral Nutrition:  · Continue TF to Nepro via OGT at 50ml/hour. · Water flush 10ml/hr. · This TF provides 1980 kcal (100% estimated calorie needs), 89 grams protein (100% estimated protein needs) and 1020ml free fluid (renal guidelines) calculations based on 22 hours infusion. · Vitamin and Mineral Supplement Therapy:  · Electrolyte management replacement protocol implemented. · 20 meq KCl. · Labs:   · EN labs: BMP daily, Mg and Phos MWF. Continue SSI coverage     Malnutrition Assessment:  Malnutrition Status: Moderate malnutrition  Context: Chronic illness  Findings of clinical characteristics of malnutrition:   Energy Intake:  Unable to assess  Weight Loss:  7.00 - Greater than 10% over 6 months     Body Fat Loss:  1 - Mild body fat loss, Triceps   Muscle Mass Loss:  1 - Mild muscle mass loss, Clavicles (pectoralis &deltoids)  Fluid Accumulation:  Unable to assess,     Strength:  Not performed     Nutrition Assessment:   Nutrition History: The patient reports that he feels like he lost about 15 pounds in the last 45 days. He reports decreased stamina. He also reports drinking vanilla Boost at home. Highest weight was 190 pounds. Cultural/Episcopal/Ethnic Food Preference(s): None   Nutrition Background: Dante Solis is a 60 y.o. male with history of CML, small cell lung cancer metastasized to liver currently on palliative chemotherapy, pulmonary emboli on Xarelto, advanced CHF, CKD stage III, debility who presented with 2 to 3 days history of difficulty in breathing and laying flat. ED work-up reveals bilateral PNA. Daily Update:  Remains ventilated and TF-dependent. Abdominal Status (last documented): Intact, Semi-soft, Distended abdomen with Active  bowel sounds. Last BM 06/14/21 (fecal tube in place).   Pertinent Medications: Zovirax, Lantus, SSI coverage, Prevacid, Solumedrol, Midodrine, Zosyn, Granix. Drips: Fentanyl  Pertinent Labs:   Lab Results   Component Value Date/Time    Sodium 144 06/14/2021 03:23 AM    Potassium 2.9 (L) 06/14/2021 03:23 AM    Chloride 103 06/14/2021 03:23 AM    CO2 33 (H) 06/14/2021 03:23 AM    Anion gap 8 06/14/2021 03:23 AM    Glucose 143 (H) 06/14/2021 03:23 AM    BUN 93 (H) 06/14/2021 03:23 AM    Creatinine 3.32 (H) 06/14/2021 03:23 AM    Calcium 7.8 (L) 06/14/2021 03:23 AM    Albumin 2.4 (L) 06/14/2021 03:23 AM    Magnesium 2.8 (H) 06/14/2021 03:23 AM    Phosphorus 5.7 (H) 06/09/2021 02:55 AM     Lab Results   Component Value Date/Time    Glucose 143 (H) 06/14/2021 03:23 AM    Glucose (POC) 195 (H) 06/14/2021 11:58 AM    Glucose (POC) 216 (H) 06/14/2021 09:54 AM    Glucose (POC) 74 06/14/2021 05:12 AM    Glucose (POC) 142 (H) 06/13/2021 11:18 PM    Glucose (POC) 212 (H) 06/13/2021 05:40 PM    Glucose (POC) 226 (H) 06/13/2021 11:18 AM     Nutrition Related Findings:   12% weight loss in the last 6 months. 6/10: intubated, and OGT placed. 6/10: worsening renal and liver function. Not a HD candidate.       Current Nutrition Therapies:  Current Tube Feeding (TF) Orders:   · Feeding Route: Orogastric  · Formula: Nepro  · Schedule:Continuous    · Regimen: 50 ml/hr  · Additives/Modulars:    · Water Flushes: 10 ml/hr  · Current TF & Flush Orders Provides: 1980 calories/day, 89 gm protein/day in 1020 ml water/day  · Goal TF & Flush Orders Provides: 1980 calories/day, 89 gm protein/day in 1020 ml water/day    Current Intake:   Average Meal Intake: NPO Average Supplement Intake:  ( ) Additional Caloric Sources:  ( )    Anthropometric Measures:  Height: 6' 1\" (185.4 cm)  Current Body Wt: 80.1 kg (176 lb 9.4 oz) (6/14/2021 ICU), Weight source: Bed scale  BMI: 23.3, Normal weight (BMI 22.0-24.9) age over 72     Ideal Body Weight (lbs) (Calculated): 184 lbs (84 kg), 87.1 %  Usual Body Wt: 81.6 kg (180 lb), Percent weight change: -11          Edema: No data recorded     Estimated Daily Nutrient Needs:  Energy (kcal/day): 5706-1426  (Kcal/kg (25-30 mihai/kg/day using 73 kg - vent), Weight Used: Current)  Protein (g/day):   (1.2-2 gm protein/kg/day using 73 kg - vent) Weight Used: (Current)  Fluid (ml/day):   (1 ml/kcal)    Nutrition Diagnosis:   · Inadequate oral intake related to impaired respiratory function as evidenced by intubation, nutrition support-enteral nutrition (slow progression of renal TF as tolerated)    Nutrition Interventions:   Food and/or Nutrient Delivery: Continue tube feeding     Coordination of Nutrition Care: Continue to monitor while inpatient, Interdisciplinary rounds  Plan of Care discussed with Lizzy Rainey, Gomez. Goals:   Previous Goal Met: Goal(s) achieved  Active Goal: Maintain intake via TF to meet daily nutrition needs. Nutrition Monitoring and Evaluation:      Food/Nutrient Intake Outcomes: Enteral nutrition intake/tolerance  Physical Signs/Symptoms Outcomes: Biochemical data, GI status, Hemodynamic status    Discharge Planning: Too soon to determine    [place].  DAWIT PAGE [facility] RD, LD on 6/14/2021 at 3:46 PM  Contact: [phone]

## 2021-06-14 NOTE — PROGRESS NOTES
Palliative Care Progress Note    Patient: Dandre Flores MRN: [id number]  SSN: [id number]    YOB: 1955  Age: 60 y.o. Sex: male       Assessment/Plan:     Chief Complaint/Interval History: pt remains intubated but alert. Nods head yes/no. Renal and hepatic numbers improving     Principal Diagnosis:    · Dyspnea  R06.00    Additional Diagnoses:   · Debility, Unspecified  R53.81  · Frailty  R54  · Respiratory Failure, Acute on Chronic  J96.20  · Counseling, Encounter for Medical Advice  Z71.9  · Encounter for Palliative Care  Z51.5    Palliative Performance Scale (PPS)       Medical Decision Making:   Reviewed and summarized labs and imaging. Pt remains intubated. Notes frustration with extensive hospitalization. I updated pt wife via phone. Discussed improving numbers however situation remains fragile. Wife expressed her understanding. She remains hopeful for recovery from acute events so that pt may have some time at home with family. She understands significance of underlying cancer and possibility that pt would not tolerate any further disease directed therapy. Will continue to follow. Will discuss findings with members of the interdisciplinary team.      More than 50% of this 25 minute visit was spent counseling and coordination of care as outlined above. Subjective:     Review of Systems negative except as documented in h&p. Objective:     Visit Vitals  /63   Pulse 89   Temp 98.3 °F (36.8 °C)   Resp 19   Ht 6' 1\" (1.854 m)   Wt 176 lb 9.4 oz (80.1 kg)   SpO2 97%   BMI 23.30 kg/m²       Physical Exam:    General:  On vent. Eyes:  Conjunctivae/corneas clear    Nose: Nares normal. Septum midline.    Neck: Supple, symmetrical, trachea midline, no JVD   Lungs:   Diffuse coarse bs   Heart:  Regular rate and rhythm, no murmur    Abdomen:   Soft, non-tender, non-distended   Extremities: Normal, atraumatic, no cyanosis or edema   Skin: Skin color, texture, turgor normal. No rash or lesions.    Neurologic: Moves extremities   Psych: Alert, calm     Signed By: Marily Méndez MD     June 14, 2021

## 2021-06-14 NOTE — PROGRESS NOTES
Admit Date: 6/8/2021      Subjective:     Patient seen and examined. Remains intubated/sedated. Review of Systems  On the vent     Objective:     Patient Vitals for the past 8 hrs:   BP Temp Pulse Resp SpO2 Weight   06/14/21 0813 -- -- 88 22 99 % --   06/14/21 0559 (!) 101/59 -- 81 23 100 % --   06/14/21 0531 -- -- -- -- -- 80.1 kg (176 lb 9.4 oz)   06/14/21 0459 113/65 -- 79 22 100 % --   06/14/21 0400 108/63 -- 84 23 100 % --   06/14/21 0308 -- -- 89 26 100 % --   06/14/21 0300 131/71 98 °F (36.7 °C) 87 22 100 % --   06/14/21 0159 (!) 141/81 -- (!) 106 (!) 48 99 % --   06/14/21 0059 (!) 142/92 -- (!) 104 (!) 32 98 % --   06/14/21 0038 -- -- 96 24 95 % --     No intake/output data recorded. Physical Exam:   On vent. Lung: few bronchi   CV: RR, no rub  Abd: soft, no rebound  Ext: trace edema         Data Review   Recent Results (from the past 8 hour(s))   BLOOD GAS, ARTERIAL POC    Collection Time: 06/14/21  3:12 AM   Result Value Ref Range    Device: ADULT VENT      FIO2 (POC) 40 %    pH (POC) 7.46 (H) 7.35 - 7.45      pCO2 (POC) 43.4 35 - 45 MMHG    pO2 (POC) 127 (H) 75 - 100 MMHG    HCO3 (POC) 30.5 (H) 22 - 26 MMOL/L    sO2 (POC) 99.0 (H) 95 - 98 %    Base excess (POC) 5.9 mmol/L    Mode ASSIST CONTROL      Tidal volume 550 ml    PEEP/CPAP (POC) 8 cmH2O    Mean Airway Pressure 15 cmH2O    PIP (POC) 35      Allens test (POC) Positive      Inspiratory Time 0.75 sec    Total resp.  rate 22      Site RIGHT BRACHIAL      Specimen type (POC) ARTERIAL      Performed by Julieta Yip     Respiratory comment: 12.9ve    MAGNESIUM    Collection Time: 06/14/21  3:23 AM   Result Value Ref Range    Magnesium 2.8 (H) 1.8 - 2.4 mg/dL   CBC WITH AUTOMATED DIFF    Collection Time: 06/14/21  3:23 AM   Result Value Ref Range    WBC 0.1 (LL) 4.3 - 11.1 K/uL    RBC 3.00 (L) 4.23 - 5.6 M/uL    HGB 7.8 (L) 13.6 - 17.2 g/dL    HCT 24.4 (L) 41.1 - 50.3 %    MCV 81.3 79.6 - 97.8 FL    MCH 26.0 (L) 26.1 - 32.9 PG    MCHC 32.0 31.4 - 35.0 g/dL    RDW 15.1 (H) 11.9 - 14.6 %    PLATELET 28 (LL) 442 - 450 K/uL    MPV Unable to calculate. Recommend adding IPF. 9.4 - 12.3 FL    ABSOLUTE NRBC 0.00 0.0 - 0.2 K/uL    DF WBC TOO FEW TO DIFFERENTIATE     METABOLIC PANEL, COMPREHENSIVE    Collection Time: 06/14/21  3:23 AM   Result Value Ref Range    Sodium 144 138 - 145 mmol/L    Potassium 2.9 (L) 3.5 - 5.1 mmol/L    Chloride 103 98 - 107 mmol/L    CO2 33 (H) 21 - 32 mmol/L    Anion gap 8 7 - 16 mmol/L    Glucose 143 (H) 65 - 100 mg/dL    BUN 93 (H) 8 - 23 MG/DL    Creatinine 3.32 (H) 0.8 - 1.5 MG/DL    GFR est AA 24 (L) >60 ml/min/1.73m2    GFR est non-AA 20 (L) >60 ml/min/1.73m2    Calcium 7.8 (L) 8.3 - 10.4 MG/DL    Bilirubin, total 1.2 (H) 0.2 - 1.1 MG/DL    ALT (SGPT) 1,035 (H) 12 - 65 U/L    AST (SGOT) 214 (H) 15 - 37 U/L    Alk. phosphatase 84 50 - 136 U/L    Protein, total 5.6 (L) 6.3 - 8.2 g/dL    Albumin 2.4 (L) 3.2 - 4.6 g/dL    Globulin 3.2 2.3 - 3.5 g/dL    A-G Ratio 0.8 (L) 1.2 - 3.5     PROTHROMBIN TIME + INR    Collection Time: 06/14/21  3:23 AM   Result Value Ref Range    Prothrombin time 20.8 (H) 12.5 - 14.7 sec    INR 1.8     FERRITIN    Collection Time: 06/14/21  3:23 AM   Result Value Ref Range    Ferritin 2,270 (H) 8 - 388 NG/ML   LACTIC ACID    Collection Time: 06/14/21  3:29 AM   Result Value Ref Range    Lactic acid 2.0 0.4 - 2.0 MMOL/L   GLUCOSE, POC    Collection Time: 06/14/21  5:12 AM   Result Value Ref Range    Glucose (POC) 74 65 - 100 mg/dL    Performed by Gregroia            Assessment:     Principal Problem:    Severe sepsis ([facility] (6/8/2021)    Active Problems:    CML (chronic myeloid leukemia) ([facility] (8/25/2012)      Overview: 8/25/12. Probable diagnosis. Will do bone marrow exam today      8/26/12. Bone marrow aspiration and biopsy done. Marrow aspirate and       peripheral blood sent for flow, cytogenetics and molecular testing.  No       complications with the procedure      Depression (11/2/2012)      Chronic systolic congestive heart failure ([address] (5/3/2017)      Pleural effusion (4/27/2018)      Stage IV adenocarcinoma of lung ([address] (7/11/2018)      Immunocompromised ([address] (11/16/2018)      SCLC (small cell lung carcinoma), right ([address] (3/13/2020)      Acute respiratory failure with hypoxia ([address] (6/8/2021)      Hospital-acquired bacterial pneumonia (6/8/2021)      Bilateral pleural effusion (6/8/2021)      Lactic acidosis (6/8/2021)      Tachycardia (6/8/2021)      Moderate protein-calorie malnutrition ([address] (6/10/2021)      TIFFANIE (acute kidney injury) ([address] (6/10/2021)      Acute liver failure without hepatic coma (6/10/2021)      Other pulmonary embolism without acute cor pulmonale ([address] (6/10/2021)      Pancytopenia ([address] (6/13/2021)        Plan:      TIFFANIE ( on CKD IIIb),  Possible causes of TIFFANIE : Chemo. ATB. Clovia Carls Renal function continues to improve. Good UOP  Hypokalemia- replete PRN    We will sign off. Please call with questions.         Julius Barraza MD

## 2021-06-14 NOTE — PROGRESS NOTES
Bedside, Verbal and Written shift change report given to JUSTNI Flores (oncoming nurse) by Ingrid Mckeon RN (offgoing nurse). Report included the following information SBAR, Intake/Output, MAR, Recent Results, Med Rec Status, Alarm Parameters  and Quality Measures. Fentanyl gtt rate dual verified at bedside.

## 2021-06-14 NOTE — PROGRESS NOTES
Bedside and verbal shift change report received from  JUSTIN Dee (offgoing nurse). Report included the following information SBAR, Kardex, ED Summary, Intake/Output, MAR, Recent Results, Med Rec Status, Cardiac Rhythm NSR with frequent PVCs and Alarm Parameters .      Dual skin assessment completed at bedside: no pressure sores or injuries noted     Dual verification of gtts completed:  Fentanyl @ 25 mcg/hr

## 2021-06-14 NOTE — PROGRESS NOTES
A follow up visit was made to the patient. Emotional support, spiritual presence and   prayer were provided for the patient. He was awake and intubated.       Louise Kate, [address] [facility], [facility]

## 2021-06-14 NOTE — PROGRESS NOTES
Interdisciplinary team rounds were held 6/14/2021 with the following team members:Nursing, Nurse Practitioner, Nutrition, Palliative Care, Pastoral Care, Pharmacy, Physician, Respiratory Therapy and Clinical Coordinator and the patient. Plan of care discussed. See clinical pathway and/or care plan for interventions and desired outcomes.

## 2021-06-14 NOTE — PROGRESS NOTES
[facility] CARDIOLOGY PROGRESS NOTE           6/14/2021 8:43 AM    Admit Date: 6/8/2021         Subjective: Exceedingly poor prognosis. LVEF is less than 15%, resp failure on a vent, metastatic NSCLC, underlying CLL, now with neutropenia and thrombocytopenia as well as anemia. Afib seems controlled on amiodarone. ROS:  Could not complete with ventilated patient. Objective:      Vitals:    06/14/21 0800 06/14/21 0813 06/14/21 0815 06/14/21 0830   BP: (!) 122/59      Pulse: 79 88 82 80   Resp: 22 22 22 22   Temp:       SpO2: 99% 99% 99% 99%   Weight:       Height:           On telemetry: SR with PVCs      Physical Exam:  General: acutely ill mechanically venitlated  Neck: supple, no JVD  Heart: S1S2 with RRR without murmurs or gallops  Lungs: Crackles  Abd: soft, nontender, nondistended, with good bowel sounds  Ext: no edema bilaterally  Skin: warm and dry      Data Review:   Recent Labs     06/14/21  0323 06/13/21  1705 06/13/21  0346     --  140   K 2.9*  --  3.1*   MG 2.8*  --  2.9*   BUN 93*  --  97*   CREA 3.32*  --  3.95*   *  --  220*   WBC 0.1* 0.1* 0.1*   HGB 7.8* 7.8* 7.3*   HCT 24.4* 23.9* 21.6*   PLT 28* 38* 7*   INR 1.8  --  1.9       No results for input(s): TNIPOC, TROIQ in the last 72 hours. Assessment/Plan:     Principal Problem:    Severe sepsis ([facility] (6/8/2021)    Active Problems:    CML (chronic myeloid leukemia) ([facility] (8/25/2012)      Overview: 8/25/12. Probable diagnosis. Will do bone marrow exam today      8/26/12. Bone marrow aspiration and biopsy done. Marrow aspirate and       peripheral blood sent for flow, cytogenetics and molecular testing.  No       complications with the procedure      Depression (11/2/2012)      Chronic systolic congestive heart failure ([address] (5/3/2017)      Pleural effusion (4/27/2018)      Stage IV adenocarcinoma of lung ([facility] (7/11/2018)      Immunocompromised ([facility] (11/16/2018)      SCLC (small cell lung carcinoma), right ([address] (3/13/2020)      Acute respiratory failure with hypoxia ([address] (6/8/2021)      Hospital-acquired bacterial pneumonia (6/8/2021)      Bilateral pleural effusion (6/8/2021)      Lactic acidosis (6/8/2021)      Tachycardia (6/8/2021)      Moderate protein-calorie malnutrition ([address] (6/10/2021)      TIFFANIE (acute kidney injury) ([address] (6/10/2021)      Acute liver failure without hepatic coma (6/10/2021)      Other pulmonary embolism without acute cor pulmonale ([address] (6/10/2021)      Pancytopenia ([address] (6/13/2021)    A/P  1) Mulisystem organ failure - poor prognosis  2) sCHF - off HF medications with hypotension  3) hypoxic respiratory failure - per icu team, ventilated  4) AoCKD - per ICU team  5) Anemia/neuropenia/thrombocytopenia - per H/O no anticoag  6) shock - likely combined cardiogenic and septic on pressor support  7) NSCLC - metastatic, per H/O recommendations  8) Afib - controlled with amiodarone in SR, no anticoag with the above listed issues      Dewain Lesches, MD  6/14/2021 8:43 AM

## 2021-06-14 NOTE — PROGRESS NOTES
Infectious Disease Progress Note    Today's Date: 2021   Admit Date: 2021    Impression:   · Acute hepatitis / pneumonitis - suspected adverse reaction to chemotherapy  · Hypoxic respiratory failure due to pneumonitis / HCAP  · Pancytopenia due to chemotherapy  · Immunosuppression with cellcept / prednisone  · Systolic CHF / MR / NSVT  · TIFFANIE  · Elevated procalcitonin - suspect due to TIFFANIE and small cell lung cancer  · 3 primary malignancies: Non-small cell lung CA, Small Cell lung CA, and CML  · Suspected adverse reaction to immunotherapy with hepatitis and pneumonitis    Plan:   · Continue pip/tazo overnight to complete 7 days. · Give ACV prophylaxis. · Given pancytopenia, he will likely need ongoing bacterial prophylaxis. If we expect pancytopenia to persist for a prolonged period, then anti-fungal will need to be considered. · Would not repeat procalcitonin - it is generally elevated in cases of small cell lung cancer and will not give any useful guidance. Anti-infectives:   · Pip/tazo    Subjective: Interval history: intubated, sedated; nods head to voice; afebrile; No Known Allergies     Review of Systems:  Review of systems not obtained due to patient factors. Objective:     Visit Vitals  BP (!) 101/59   Pulse 81   Temp 98 °F (36.7 °C)   Resp 23   Ht 6' 1\" (1.854 m)   Wt 80.1 kg (176 lb 9.4 oz)   SpO2 100%   BMI 23.30 kg/m²     Temp (24hrs), Av.7 °F (36.5 °C), Min:96.9 °F (36.1 °C), Max:98.1 °F (36.7 °C)       Lines:  Peripheral IV:       Physical Exam:    General:  Intubated, nods head to avoice   Eyes:  Sclera anicteric. Mouth/Throat: Intubated;    Neck: Supple   Lungs:   Coarse breath sounds bilaterally   CV:  Regular rate; irregular rhythm; no murmur   Abdomen:   Soft, non-tender.  bowel sounds normal. non-distended   Extremities: No cyanosis or edema; weak pulses;   Skin: no acute rash or lesions   Lymph nodes:    Musculoskeletal: No swelling or deformity   Lines/Devices: Intact, no erythema, drainage or tenderness   Psych: Sedated and comfortable on vent       Data Review:     CBC:  Recent Labs     06/14/21 0323 06/13/21  1705 06/13/21  0346   WBC 0.1* 0.1* 0.1*   GRANS  --  42* 50   MONOS  --  8 7   EOS  --  0* 0*   ANEU  --  0.0* 0.1*   ABL  --  0.0* 0.0*   HGB 7.8* 7.8* 7.3*   HCT 24.4* 23.9* 21.6*   PLT 28* 38* 7*       BMP:  Recent Labs     06/14/21 0323 06/13/21  0346 06/12/21  0924 06/12/21  0345   CREA 3.32* 3.95*  --  4.45*   BUN 93* 97*  --  104*    140  --  139   K 2.9* 3.1* 3.4* 3.5    98  --  101   CO2 33* 32  --  25   AGAP 8 10  --  13   * 220*  --  267*       LFTS:  Recent Labs     06/14/21 0323 06/13/21  0346 06/12/21  0345   TBILI 1.2* 1.1 1.0   ALT 1,035* 1,471* 2,067*   AP 84 84 78   TP 5.6* 5.4* 5.4*   ALB 2.4* 2.2* 2.3*       Microbiology:     All Micro Results     Procedure Component Value Units Date/Time    CULTURE, URINE [[id number]] Collected: 06/10/21 1316    Order Status: Completed Specimen: Cath Urine Updated: 06/13/21 0734     Special Requests: NO SPECIAL REQUESTS        Culture result: NO GROWTH 2 DAYS       MARSHALL Mcmullen [[id number]] Collected: 06/08/21 1803    Order Status: Completed Specimen: Urine, random Updated: 06/12/21 1236     Source URINE        L pneumophila S1 Ag, urine Negative        Comment: (NOTE)  Presumptive negative for L. pneumophila serogroup 1 antigen in urine,  suggesting no recent or current infection. Legionnaires' disease  cannot be ruled out since other serogroups and species may also  cause disease. Performed At: [address] [id number]  Aletha Dozier MD EL:[id number]         CMV BY PCR, QT [[id number]] Collected: 06/11/21 1531    Order Status: Completed Updated: 06/12/21 0640    S. Shellie Severance, UR/CSF [[id number]] Collected: 06/08/21 1804    Order Status: Completed Specimen: Miscellaneous sample Updated: 06/11/21 1835     Source URINE        Specimen Urine Streptococcus pneumoniae Ag Negative        Fluid culture Not indicated. Organism ID Not indicated. Please note Comment        Comment: (NOTE)  College of [employer]ologists standards require a culture to be  performed on CSF specimens submitted for bacterial antigen testing. (CAP [id number]) Urine specimens will not be cultured. Performed At: [address] [id number]  Ubaldo Baker MD UM:[id number]         CMV BY PCR, QT [[id number]] Collected: 06/11/21 1531    Order Status: Canceled Specimen: Blood     CMV BY PCR, QT [[id number]] Collected: 06/11/21 1531    Order Status: No result     CULTURE, RESPIRATORY/SPUTUM/BRONCH Marcelino Diver STAIN [[id number]] Collected: 06/08/21 1804    Order Status: Completed Specimen: Sputum Updated: 06/11/21 0901     Special Requests: NO SPECIAL REQUESTS        GRAM STAIN 4 TO 32 WBC'S/OIF      0 TO 3  EPITHELIAL CELLS SEEN /OIF            MODERATE GRAM POSITIVE COCCI            FEW YEAST         3+ MUCUS PRESENT        Culture result:       MODERATE NORMAL RESPIRATORY MARGO          RESPIRATORY VIRUS PANEL W/COVID-19, PCR [[id number]] Collected: 06/10/21 0907    Order Status: Completed Specimen: Nasopharyngeal Updated: 06/10/21 1115     Adenovirus NOT DETECTED        Coronavirus 229E NOT DETECTED        Coronavirus HKU1 NOT DETECTED        Coronavirus CVNL63 NOT DETECTED        Coronavirus OC43 NOT DETECTED        SARS-CoV-2, PCR NOT DETECTED        Comment: This test has been authorized by the FDA under an Emergency Use Authorization (EUA) for use by authorized laboratories.          Metapneumovirus NOT DETECTED        Rhinovirus and Enterovirus NOT DETECTED        Influenza A NOT DETECTED        Influenza B NOT DETECTED        Parainfluenza 1 NOT DETECTED        Parainfluenza 2 NOT DETECTED        Parainfluenza 3 NOT DETECTED        Parainfluenza virus 4 NOT DETECTED        RSV by PCR NOT DETECTED        B. parapertussis, PCR NOT DETECTED Bordetella pertussis - PCR NOT DETECTED        Chlamydophila pneumoniae DNA, QL, PCR NOT DETECTED        Mycoplasma pneumoniae DNA, QL, PCR NOT DETECTED       RESPIRATORY PANEL,PCR,NASOPHARYNGEAL [[id number]] Collected: 06/10/21 6839    Order Status: Canceled Specimen: NASOPHARYNGEAL SWAB     MSSA/MRSA SC BY PCR, NASAL SWAB [[id number]] Collected: 21 1010    Order Status: Completed Specimen: Nasal swab Updated: 21 1242     Special Requests: NO SPECIAL REQUESTS        Culture result:       SA target not detected. A MRSA NEGATIVE, SA NEGATIVE test result does not preclude MRSA or SA nasal colonization. Imagin21 CXR: FINDINGS: Right lung infiltrate or mass is unchanged, as is a loculated  pneumothorax or cystic lung changes along the right lung base. Left lung base  infiltrate is also unchanged. The heart remains enlarged. Support catheters  appear to be in good position.     Signed By: Joel Finn MD     2021

## 2021-06-14 NOTE — PROGRESS NOTES
Ventilator check complete; patient has a #8. 0 ET tube secured at the 24 at the teeth. Patient is sedated. Patient is able to follow commands. Breath sounds are diminished. Trachea is midline, Negative for subcutaneous air, and chest excursion is symmetric. Patient is also Negative for cyanosis and is Negative for pitting edema. All alarms are set and audible. Resuscitation bag is at the head of the bed. Ventilator Settings  Mode FIO2 Rate Tidal Volume Pressure PEEP I:E Ratio   VC+  30 %     14 cm H2O  8 cm H20  1:2.64      Peak airway pressure: 23 cm H2O   Minute ventilation: 7.49 l/min     ABG: No results for input(s): PH, PCO2, PO2, HCO3 in the last 72 hours.       Abena Gill, RT

## 2021-06-14 NOTE — PROGRESS NOTES
Pt vent alarming, nurse and RT,at bedside. Pt pulling at ET tube, pt pulled tube to 19cm. RT push back tube to 24cm. MD, at bedside, CXR ordered.

## 2021-06-14 NOTE — PROGRESS NOTES
Restraints off when family visited, pt alert, nods appropriately.  Will discontinue restraints at this time per protocol

## 2021-06-14 NOTE — PROGRESS NOTES
[facility]        Inpatient Hematology / Oncology Progress Note      Admission Date: 2021 10:31 AM  Reason for Admission/Hospital Course: Hospital-acquired bacterial pneumonia [J15.9]  Pleural effusion [J90]  Atrial fibrillation with rapid ventricular response (HCC) [I48.91]  Lactic acidosis [E87.2]  Acute respiratory failure with hypoxia (HCC) [J96.01]  Severe sepsis (HCC) [A41.9, R65.20]      24 Hour Events:  Afebrile, VSS  Counts as expected, con't Granix  Remains intubated on vent    ROS:  Unable to assess d/t intubated status    10 point review of systems is otherwise negative with the exception of the elements mentioned above in the HPI. No Known Allergies    OBJECTIVE:  Patient Vitals for the past 8 hrs:   BP Temp Pulse Resp SpO2 Weight   21 0909 -- -- 86 17 91 % --   21 0900 (!) 120/58 -- 74 22 99 % --   21 0845 -- -- 78 22 98 % --   21 0830 -- -- 80 22 99 % --   21 0815 -- -- 82 22 99 % --   21 0813 -- -- 88 22 99 % --   21 0800 (!) 122/59 -- 79 22 99 % --   21 0745 -- -- 87 22 99 % --   21 0730 (!) 122/59 98.5 °F (36.9 °C) 84 22 99 % --   21 0715 -- -- 78 28 99 % --   21 0700 (!) 101/56 -- 83 22 100 % --   21 0559 (!) 101/59 -- 81 23 100 % --   21 0531 -- -- -- -- -- 176 lb 9.4 oz (80.1 kg)   21 0459 113/65 -- 79 22 100 % --   21 0400 108/63 -- 84 23 100 % --   21 0308 -- -- 89 26 100 % --   21 0300 131/71 98 °F (36.7 °C) 87 22 100 % --     Temp (24hrs), Av.8 °F (36.6 °C), Min:96.9 °F (36.1 °C), Max:98.5 °F (36.9 °C)     0701 -  1900  In: 333.9 [I.V.:283.9]  Out: 450 [Urine:450]    Physical Exam:  Constitutional: Ill appearing  male lying in bed. Intubated   HEENT: Normocephalic and atraumatic. Skin Warm and dry. No bruising and no rash noted. No erythema. No pallor.     Respiratory Decreased BS scattered rhonchi, ventilated    CVS Normal rate, regular rhythm and normal S1 and S2   Abdomen Soft, nontender and nondistended, normoactive bowel sounds. Neuro EVERTON    MSK EVERTON    Psych EVERTON        Labs:      Recent Labs     06/14/21 0323 06/13/21  1705 06/13/21  0346   WBC 0.1* 0.1* 0.1*   RBC 3.00* 2.95* 2.74*   HGB 7.8* 7.8* 7.3*   HCT 24.4* 23.9* 21.6*   MCV 81.3 81.0 78.8*   MCH 26.0* 26.4 26.6   MCHC 32.0 32.6 33.8   RDW 15.1* 14.9* 15.0*   PLT 28* 38* 7*   GRANS  --  42* 50   LYMPH  --  33 36   MONOS  --  8 7   EOS  --  0* 0*   BASOS  --  0 0   IG  --  17* 7*   DF WBC TOO FEW TO DIFFERENTIATE AUTOMATED AUTOMATED   ANEU  --  0.0* 0.1*   ABL  --  0.0* 0.0*   ABM  --  0.0* 0.0*   S[place]OR  --  0.0 0.0   ABB  --  0.0 0.0   AIG  --  0.0 0.0        Recent Labs     06/14/21 0323 06/13/21  0346 06/12/21  0924 06/12/21  0345    140  --  139   K 2.9* 3.1* 3.4* 3.5    98  --  101   CO2 33* 32  --  25   AGAP 8 10  --  13   * 220*  --  267*   BUN 93* 97*  --  104*   CREA 3.32* 3.95*  --  4.45*   GFRAA 24* 20*  --  17*   GFRNA 20* 16*  --  14*   CA 7.8* 7.4*  --  6.9*   AP 84 84  --  78   TP 5.6* 5.4*  --  5.4*   ALB 2.4* 2.2*  --  2.3*   GLOB 3.2 3.2  --  3.1   AGRAT 0.8* 0.7*  --  0.7*   MG 2.8* 2.9*  --  2.9*         Imaging:  XR CHEST SNGL V [[id number]] Collected: 06/11/21 0542   Order Status: Completed Updated: 06/11/21 0545   Narrative:     EXAMINATION: One view chest     HISTORY: respiratory failure, h/o lung cancer     TECHNIQUE: Frontal chest.     COMPARISON: 6/9/2021     FINDINGS:     Stable support devices. Persistent right lung infiltrate. Improved right pleural effusion or atelectasis. No significant pneumothorax. No other significant interval changes. Impression:       1. Findings as described above. XR CHEST Popjanet Schneider [[id number]]    Order Status: No result     RETROPERITONEUM [facility] [[id number]] Collected: 06/10/21 2206   Order Status: Completed Updated: 06/10/21 2209   Narrative:     Renal ultrasound.      CLINICAL INDICATION:  Acute renal insufficiency     PROCEDURE: Realtime grayscale color Doppler evaluation of the kidneys and   bladder. COMPARISON: No prior similar studies available for direct comparison. FINDINGS: Right kidney is normal in size at 9.5 cm. There is increased renal   cortical echogenicity. There is no hydronephrosis. There is very limited   evaluation of the left kidney secondary to overlying bowel gas. It measures   roughly 9 cm. There is increased renal cortical atrophy. No definite   hydronephrosis. The bladder is decompressed by Aranda catheter. Ascites is   present in the pelvis. Impression:     1. Increased renal cortical echogenicity bilaterally can be seen with medical   renal disease. Note there is limited evaluation of the left kidney. 2. No definite hydronephrosis. 3. Ascites   XR CHEST Adelaida Lulas [[id number]]    Order Status: No result    XR CHEST Adelaida Lulas [[id number]]    Order Status: No result    XR CHEST PORT [[id number]] Collected: 06/09/21 2301   Order Status: Completed Updated: 06/09/21 2308   Narrative:     PORTABLE CHEST, June 9, 2021 at 2200 hours,   PORTABLE ABDOMEN, June 9, 2021 at 2155 hours:     CLINICAL HISTORY:  Follow-up intubation and tube placement. COMPARISON:  Portable chest yesterday and CT today. CHEST FINDINGS:  AP semi-erect image demonstrates is of inhomogeneous   edema/infiltrate bilaterally.  Endotracheal tube tip overlies the mid trachea. Mediport catheter remains in place.  The heart remains enlarged.  No definite   pneumothorax.  There are overlying radiopaque support devices.       ABDOMEN FINDINGS: AP supine images demonstrates the proximal sidehole of the   nasogastric tube just below the gastroesophageal junction.  No dilated bowel   loops of the epigastrium. Impression:       1.  ENDOTRACHEAL TUBE IN EXPECTED POSITION WITHOUT DEFINITE PNEUMOTHORAX OR   OTHER SIGNIFICANT CHANGE FROM CT TODAY.      2.  NASOGASTRIC TUBE PROXIMAL SIDEHOLE IS JUST BELOW THE GASTROESOPHAGEAL   JUNCTION.  ENHANCEMENT BY APPROXIMATELY 10 CM IS SUGGESTED. XR ABD (KUB) [[id number]] Collected: 06/09/21 2301   Order Status: Completed Updated: 06/09/21 2308   Narrative:     PORTABLE CHEST, June 9, 2021 at 2200 hours,   PORTABLE ABDOMEN, June 9, 2021 at 2155 hours:     CLINICAL HISTORY:  Follow-up intubation and tube placement. COMPARISON:  Portable chest yesterday and CT today. CHEST FINDINGS:  AP semi-erect image demonstrates is of inhomogeneous   edema/infiltrate bilaterally.  Endotracheal tube tip overlies the mid trachea. Mediport catheter remains in place.  The heart remains enlarged.  No definite   pneumothorax.  There are overlying radiopaque support devices.       ABDOMEN FINDINGS: AP supine images demonstrates the proximal sidehole of the   nasogastric tube just below the gastroesophageal junction.  No dilated bowel   loops of the epigastrium. Impression:       1.  ENDOTRACHEAL TUBE IN EXPECTED POSITION WITHOUT DEFINITE PNEUMOTHORAX OR   OTHER SIGNIFICANT CHANGE FROM CT TODAY. 2.  NASOGASTRIC TUBE PROXIMAL SIDEHOLE IS JUST BELOW THE GASTROESOPHAGEAL   JUNCTION.  ENHANCEMENT BY APPROXIMATELY 10 CM IS SUGGESTED. CT CHEST WO CONT [[id number]] Collected: 06/09/21 1420   Order Status: Completed Updated: 06/09/21 1426   Narrative:     CT of the chest without contrast.     CLINICAL INDICATION: Small cell lung cancer, metastatic, prior pulmonary emboli     PROCEDURE: Serial thin section axial images obtained from the thoracic inlet   through the upper abdomen without the administration of intravenous contrast.   Radiation dose reduction techniques were used for this study.  Our CT scanners   use one or all of the following: Automated exposure control, adjusted of the mA   and/or kV according to patient size, iterative reconstruction     COMPARISON: Chest CT dated 5/12/2021     FINDINGS:     There is nodular pleural thickening throughout the right hemithorax most in   keeping with pleural-based metastatic disease. Multiple prominent mediastinal   lymph nodes are present also concerning for metastatic disease. The heart is   enlarged. Patchy airspace consolidation with air bronchograms are appreciated at   the right martina unchanged from the prior exam. This may be a posttreatment   change. Pneumonia should be considered. Multiple pulmonary nodules are noted   throughout the right lower lobe concerning for pulmonary metastatic disease. More confluent groundglass opacities noted throughout the left upper lobe and   left lower lobe. This is nonspecific. There is a trace right pleural effusion. Limited evaluation of the upper abdomen shows soft tissue density along the   right peritoneum and right diaphragm also concerning for metastatic disease. No aggressive osseous is identified. Impression:     1. Worsening of the nodular pleural thickening throughout the right hemithorax   with multiple pulmonary nodules throughout the right lower lobe most in keeping   with pulmonary and pleural metastatic disease. There is a small right pleural   effusion also likely metastatic. 2. Stable confluent airspace opacity at the right martina may be a prior treatment   effect. Pneumonia could also be considered. 3. Nonspecific groundglass opacity throughout the left upper lobe, lingula and   lower lobe. 4. Cardiomegaly   5. Mediastinal lymphadenopathy.        Medications:  Current Facility-Administered Medications   Medication Dose Route Frequency    acyclovir (ZOVIRAX) 200 mg/5 mL oral suspension 400 mg  400 mg Per NG tube Q12H    insulin glargine (LANTUS) injection 20 Units  20 Units SubCUTAneous DAILY    tbo-filgrastim (GRANIX) injection 480 mcg  480 mcg SubCUTAneous DAILY    0.9% sodium chloride infusion 250 mL  250 mL IntraVENous PRN    amiodarone (CORDARONE) tablet 200 mg  200 mg Per NG tube BID    0.9% sodium chloride infusion 250 mL  250 mL IntraVENous PRN    piperacillin-tazobactam (ZOSYN) 4.5 g in 0.9% sodium chloride (MBP/ADV) 100 mL MBP  4.5 g IntraVENous Q12H    mycophenolate mofetil (CELLCEPT) 200 mg/mL oral suspension 1,000 mg  1,000 mg Per NG tube ACB&D    fentaNYL citrate (PF) injection 50 mcg  50 mcg IntraVENous Q4H PRN    midodrine (PROAMATINE) tablet 10 mg  10 mg Per NG tube TID WITH MEALS    lansoprazole (PREVACID) 3 mg/mL oral suspension 30 mg  30 mg Per NG tube DAILY    insulin lispro (HUMALOG) injection 0-10 Units  0-10 Units SubCUTAneous Q6H    methylPREDNISolone (PF) (SOLU-MEDROL) injection 40 mg  40 mg IntraVENous Q8H    dextrose 40% (GLUTOSE) oral gel 1 Tube  15 g Oral PRN    glucagon (GLUCAGEN) injection 1 mg  1 mg IntraMUSCular PRN    dextrose (D50W) injection syrg 12.5-25 g  25-50 mL IntraVENous PRN    fentaNYL in normal saline (pf) 25 mcg/mL infusion  0-200 mcg/hr IntraVENous TITRATE    sodium chloride (NS) flush 5-40 mL  5-40 mL IntraVENous Q8H    sodium chloride (NS) flush 5-40 mL  5-40 mL IntraVENous PRN    levalbuterol (XOPENEX) nebulizer soln 1.25 mg/3 mL  1.25 mg Nebulization Q4H RT    ondansetron (ZOFRAN) injection 4 mg  4 mg IntraVENous Q4H PRN    phenol throat spray (CHLORASEPTIC) 1 Spray  1 Spray Oral PRN         ASSESSMENT:    Problem List  Date Reviewed: 5/26/2021        Codes Class Noted    Pancytopenia ([facility] ICD-10-CM: F46.253  ICD-9-CM: 284.19  6/13/2021        Moderate protein-calorie malnutrition ([facility] ICD-10-CM: E44.0  ICD-9-CM: 263.0  6/10/2021        TIFFANIE (acute kidney injury) ([facility] ICD-10-CM: N17.9  ICD-9-CM: 584.9  6/10/2021        Acute liver failure without hepatic coma ICD-10-CM: K72.00  ICD-9-CM: 140  6/10/2021        Other pulmonary embolism without acute cor pulmonale ([facility] ICD-10-CM: I26.99  ICD-9-CM: 415.19  6/10/2021        Acute respiratory failure with hypoxia (HCC) ICD-10-CM: J96.01  ICD-9-CM: 518.81  6/8/2021        Hospital-acquired bacterial pneumonia ICD-10-CM: J15.9  ICD-9-CM: 482.9  6/8/2021 Bilateral pleural effusion ICD-10-CM: J90  ICD-9-CM: 511.9  6/8/2021        Lactic acidosis ICD-10-CM: E87.2  ICD-9-CM: 276.2  6/8/2021        * (Principal) Severe sepsis ([facility] ICD-10-CM: A41.9, R65.20  ICD-9-CM: 038.9, 995.92  6/8/2021        Tachycardia ICD-10-CM: R00.0  ICD-9-CM: 785.0  6/8/2021        Elevated serum creatinine ICD-10-CM: R79.89  ICD-9-CM: 790.99  10/5/2020        Hypomagnesemia ICD-10-CM: E83.42  ICD-9-CM: 275.2  7/27/2020        Shortness of breath ICD-10-CM: R06.02  ICD-9-CM: 786.05  3/28/2020        Pleural effusion on right ICD-10-CM: J90  ICD-9-CM: 511.9  3/28/2020        SCLC (small cell lung carcinoma), right ([facility] ICD-10-CM: C34.91  ICD-9-CM: 162.9  3/13/2020        Acute pulmonary edema (HCC) ICD-10-CM: J81.0  ICD-9-CM: 518.4  9/18/2019        Acute CHF (congestive heart failure) ([facility] ICD-10-CM: I50.9  ICD-9-CM: 428.0  9/16/2019        Pneumonia ICD-10-CM: J18.9  ICD-9-CM: 486  1/6/2019        Pulmonary emboli ([facility] ICD-10-CM: I26.99  ICD-9-CM: 415.19  1/6/2019        Immunocompromised ([facility] ICD-10-CM: D84.9  ICD-9-CM: 279.9  11/16/2018        Stage IV adenocarcinoma of lung ([facility] ICD-10-CM: C34.90  ICD-9-CM: 162.9  7/11/2018        Pleural effusion ICD-10-CM: J90  ICD-9-CM: 511.9  4/27/2018        Abnormal chest CT ICD-10-CM: R93.89  ICD-9-CM: 793.2  4/27/2018        Pulmonary infiltrate ICD-10-CM: R91.8  ICD-9-CM: 793.19  4/27/2018        CAP (community acquired pneumonia) ICD-10-CM: J18.9  ICD-9-CM: 674  3/27/2018        Chronic systolic congestive heart failure ([facility] ICD-10-CM: I50.22  ICD-9-CM: 428.22, 428.0  5/3/2017        Depression ICD-10-CM: F32.9  ICD-9-CM: 254  11/2/2012        CML (chronic myeloid leukemia) ([facility] ICD-10-CM: C92.10  ICD-9-CM: 205.10  8/25/2012    Overview Addendum 8/26/2012  7:23 AM by Princess Michael MD     8/25/12. Probable diagnosis. Will do bone marrow exam today  8/26/12. Bone marrow aspiration and biopsy done.  Marrow aspirate and peripheral blood sent for flow, cytogenetics and molecular testing. No complications with the procedure             Allergic rhinitis ICD-10-CM: J30.9  ICD-9-CM: 477.9  8/24/2012        Erectile dysfunction ICD-10-CM: N52.9  ICD-9-CM: 607.84  8/24/2012            Mr. Sanaz Sanchez is a 42-year-old man with history of CML (currently on observation, previously on [address]) and non-small cell lung carcinoma 2018 on osimertinib with recurrent disease, and small cell lung cancer with metastasis to liver 3/2020 (started on cis/etoposide and atezo). Most recently on osimertinib for NSCLC and s/p C1 carbo/etop/atezolizumab on 6/3-6/5/21. He met with Dr Alessandra Jay (primary oncologist) end of May to review CT results which showed hepatic POD. He reviewed the complexity of pt's case and tx options. Pt desired to purse rechallenge with triple therapy along with osimertinib. Dr Alessandra Jay reviewed lack of data regarding this combination and potential rxns including pneumonitis. Mr Sanaz Sanchez was admitted today (6/8) with acute resp failure and worsening s/s in last few days. His med hx is further complicated by CKD, PE on DOAC and HF with EF ~20%. His HR was in 120-130s irreg and he was started on tx for afib with RVR. On IVF/vanc/Zosyn. Moved to intensive care setting and on BiPAP. Pulm evaluated pt and he is currently on IV steroids. DOAC on hold and pt on heparin gtt. We are consulted for onc recommendations.       He was seen in CCU. He is sitting in bed with BiPAP in place. We spent some time talking about his medical condition, how critically ill he is and next steps. He expressed earlier during this admission that he'd like to revoke his DNR/DNI status and is now a FULL code.         PLAN:  Interval increase in bilateral lung infiltrates  6/8 zosyn and vanc  6/11 Remains on Zosyn. CXR 6/11 with persistent right lung infiltrate   6/14 on Zosyn     Concern for pneumonitis  6/8 on solumedrol  6/11 Remains on solumedrol 40 mg every 8 hours. Consult ID per guidelines for life threatening pneumonitis. 6/14 remains vented; on solumedrol and mycophenolate      PE 5/2012 6/8 Xarelto on hold, switched to heparin for pending procedures  6/11 Continues on Heparin   6/14 AC contraindicated d/t thrombocytopenia     Afib/RVR   6/8 per cards, on amio gtt   6/9 remains on drip considering moving out of ICU once controlled on oral  6/11 Remains on Amio drip - okay for short term per cards   6/14 Cards managing. No longer on amio gtt     SCLC sp Carbo etoposide atezolizumab given 6/3-6/5. Expect to see counts to start dropping. Monitor closely. 6/9 counts beginning to drop. Hgb 7.1 transfuse given cardiac history. 6/11 WBC 2.1, Hgb 7.7, Plt 36k.     6/13 wbc 0.1/Hb 7 post transfusion and plts <10 - will get plts today; asked RN to repeat cbc after plts - if Hb <7 and plts <10 will transfuse (slow rate due to low EF reviewed with RN, ie if needs PRBC pls transfuse over 3-4 hrs); Granix now and daily - orders placed for 3 days - will need to re-assess   6/14 , Hgb 7.8, Plt 28k. Con't Granix.     NSCLC  osimertinib on hold. Heart failure EF 20%  -Daily weights, strict I&Os, transfuse blood products at slow rate discussed with RN  - repeat echo on 6/11 with EF <15% - pt and son aware     Acute Respiratory Failure  6/11 Now intubated/sedated as of 6/9.    6/14 remains intubated on vent    TIFFANIE  6/11 Nephro consulted for worsening renal function--not a candidate for HD    Worsening AST/ALT  6/11 ALT up to 2580, AST 3158. Recommend Mycophenolate 1 gm BID - oral suspension okay per pharmD. 6/13 LFTs much improved; likely component of ischemic hepatitis too   6/14 LFTs con't to improve    Goals and plan of care reviewed. Thank you for allowing us to participate in the care of Mr. Sanaz Sanchez. We will continue to follow along.             Allie Barajas NP   [facility]  [address] [address]  Office : [phone]  Fax : [phone]       Attending Addendum:  I have personally performed a face to face diagnostic evaluation on this patient. I have reviewed and agree with the care plan as documented by Allie Barajas N.P. My findings are as follows:  He has lung cancer and CML, appears intubated, heart rate regular tacycardic, abdomen is non-tender, bowel sounds are positive, we will continue steroids and MMF, his prognosis is very poor.               Nikita Sierra MD      [facility]  [address] [place] [address]  Office : [phone]  Fax : [phone]

## 2021-06-14 NOTE — PROGRESS NOTES
Veto Randolph. Admission Date: 6/8/2021             Daily Progress Note: 6/14/2021   61 y/o male with CML, active SCLC and NSCLC on palliative therapy. Severe systolic HR with EF 00% (repeat now < 15%) prior pneumonitis on immunotherapy,  malignant effusions, recent segmental B lower lobe PEs on Xarelto and recently with evidence of progression of both lung cancers leading to cycle 1 of cis/etoposide/atezolizumab (had the same about a year ago when his small cell was discovered). Presented with new GGO felt likely to be pneumonitis. Now intubated with worsening respiratory failure, renal failure, liver failure, heart failure. Covid negative. Being seen by heme/onc, ID, GI, nephrology. . EF was found to be < 15 on repeat TTE. Started on steroids and cellcept for pneumonitis. Subjective:       Over the past 24 hours:   Remains in ICU. On fentanyl drip on ventilator. Cr now 3.32.  D 6 on ventilator     Review of Systems  Constitutional: negative for fevers, chills and sweats  Respiratory: negative for dyspnea on exertion  Cardiovascular: negative for chest pain, chest pressure/discomfort, palpitations, irregular heart beats, near-syncope    Current Facility-Administered Medications   Medication Dose Route Frequency    0.9% sodium chloride infusion  75 mL/hr IntraVENous CONTINUOUS    insulin glargine (LANTUS) injection 20 Units  20 Units SubCUTAneous DAILY    tbo-filgrastim (GRANIX) injection 480 mcg  480 mcg SubCUTAneous DAILY    0.9% sodium chloride infusion 250 mL  250 mL IntraVENous PRN    amiodarone (CORDARONE) tablet 200 mg  200 mg Per NG tube BID    0.9% sodium chloride infusion 250 mL  250 mL IntraVENous PRN    piperacillin-tazobactam (ZOSYN) 4.5 g in 0.9% sodium chloride (MBP/ADV) 100 mL MBP  4.5 g IntraVENous Q12H    mycophenolate mofetil (CELLCEPT) 200 mg/mL oral suspension 1,000 mg  1,000 mg Per NG tube ACB&D    fentaNYL citrate (PF) injection 50 mcg  50 mcg IntraVENous Q4H PRN    PHENYLephrine (MARCELINA-SYNEPHRINE) 30 mg in 0.9% sodium chloride 250 mL infusion   mcg/min IntraVENous TITRATE    midodrine (PROAMATINE) tablet 10 mg  10 mg Per NG tube TID WITH MEALS    lansoprazole (PREVACID) 3 mg/mL oral suspension 30 mg  30 mg Per NG tube DAILY    insulin lispro (HUMALOG) injection 0-10 Units  0-10 Units SubCUTAneous Q6H    methylPREDNISolone (PF) (SOLU-MEDROL) injection 40 mg  40 mg IntraVENous Q8H    dextrose 40% (GLUTOSE) oral gel 1 Tube  15 g Oral PRN    glucagon (GLUCAGEN) injection 1 mg  1 mg IntraMUSCular PRN    dextrose (D50W) injection syrg 12.5-25 g  25-50 mL IntraVENous PRN    fentaNYL in normal saline (pf) 25 mcg/mL infusion  0-200 mcg/hr IntraVENous TITRATE    propofol (DIPRIVAN) 10 mg/mL infusion  0-50 mcg/kg/min IntraVENous TITRATE    sodium chloride (NS) flush 5-40 mL  5-40 mL IntraVENous Q8H    sodium chloride (NS) flush 5-40 mL  5-40 mL IntraVENous PRN    levalbuterol (XOPENEX) nebulizer soln 1.25 mg/3 mL  1.25 mg Nebulization Q4H RT    ondansetron (ZOFRAN) injection 4 mg  4 mg IntraVENous Q4H PRN    phenol throat spray (CHLORASEPTIC) 1 Spray  1 Spray Oral PRN         Objective:     Vitals:    06/14/21 0800 06/14/21 0813 06/14/21 0815 06/14/21 0830   BP: (!) 122/59      Pulse: 79 88 82 80   Resp: 22 22 22 22   Temp:       SpO2: 99% 99% 99% 99%   Weight:       Height:         Intake and Output:   06/12 1901 - 06/14 0700  In: 5975.2 [I.V.:3833.9]  Out: 7578 [Urine:4230; Drains:100]  06/14 0701 - 06/14 1900  In: -   Out: 450 [Urine:450]      Intake/Output Summary (Last 24 hours) at 6/14/2021 0838  Last data filed at 6/14/2021 0730  Gross per 24 hour   Intake 3871.8 ml   Output 3431 ml   Net 440.8 ml       Physical Exam:          Constitutional: the patient is ill appearing on vent  HEENT: ETT  Lungs: + secretions, B rhonchi on vent  Cardiovascular: RRR without M,G,R  Abd/GI: soft and non-tender; with positive bowel sounds. Ext: warm without cyanosis.  There is no lower leg edema. Musculoskeletal: moves all four extremities with equal strength  Skin: no jaundice or rashes, no wounds   Neuro: alert, follows commands.        Lines/Drains:  Venous device  PIV  NG  ETT  Fecal management system with + watery, yellow stool  Aranda      Nutrition: tube feedings    Ventilator Settings  Mode FIO2 Rate Tidal Volume Pressure PEEP   VC+  30 %    55 ml  8 cm H2O  8 cm H20      Peak airway pressure: 34 cm H2O   Minute ventilation: 12.7 l/min         CHEST XRAY:       LAB  Recent Labs     06/14/21 0323 06/13/21  1705 06/13/21 0346   WBC 0.1* 0.1* 0.1*   HGB 7.8* 7.8* 7.3*   HCT 24.4* 23.9* 21.6*   PLT 28* 38* 7*     Recent Labs     06/14/21 0323 06/13/21  0346 06/12/21  0924 06/12/21  0345 06/11/21  1005    140  --  139  --    K 2.9* 3.1* 3.4* 3.5  --     98  --  101  --    CO2 33* 32  --  25  --    * 220*  --  267*  --    BUN 93* 97*  --  104*  --    CREA 3.32* 3.95*  --  4.45*  --    MG 2.8* 2.9*  --  2.9*  --    INR 1.8 1.9  --   --  2.9     ABG:    Lab Results   Component Value Date/Time    PHI 7.46 (H) 06/14/2021 03:12 AM    PCO2I 43.4 06/14/2021 03:12 AM    PO2I 127 (H) 06/14/2021 03:12 AM    HCO3I 30.5 (H) 06/14/2021 03:12 AM    FIO2I 40 06/14/2021 03:12 AM       Respiratory viral panel- negative  Culture -negative sputum  legionella negative  Blood negative  Strep pneumo negative   Urine negative    Assessment:     Hospital Problems  Date Reviewed: 5/26/2021        Codes Class Noted POA    Pancytopenia ([facility] ICD-10-CM: W27.978  ICD-9-CM: 284.19  6/13/2021 Unknown        Moderate protein-calorie malnutrition (HCC) ICD-10-CM: E44.0  ICD-9-CM: 263.0  6/10/2021 Yes        TIFFANIE (acute kidney injury) ([facility] ICD-10-CM: N17.9  ICD-9-CM: 584.9  6/10/2021 Unknown        Acute liver failure without hepatic coma ICD-10-CM: K72.00  ICD-9-CM: 570  6/10/2021 Unknown        Other pulmonary embolism without acute cor pulmonale (HCC) ICD-10-CM: I26.99  ICD-9-CM: 415.19 6/10/2021 Unknown        Acute respiratory failure with hypoxia [facility]) ICD-10-CM: J96.01  ICD-9-CM: 518.81  6/8/2021 Unknown        Hospital-acquired bacterial pneumonia ICD-10-CM: J15.9  ICD-9-CM: 482.9  6/8/2021 Unknown        Bilateral pleural effusion ICD-10-CM: J90  ICD-9-CM: 511.9  6/8/2021 Unknown        Lactic acidosis ICD-10-CM: E87.2  ICD-9-CM: 276.2  6/8/2021 Unknown        * (Principal) Severe sepsis ([facility] ICD-10-CM: A41.9, R65.20  ICD-9-CM: 038.9, 995.92  6/8/2021 Unknown        Tachycardia ICD-10-CM: R00.0  ICD-9-CM: 785.0  6/8/2021 Unknown        SCLC (small cell lung carcinoma), right ([facility] ICD-10-CM: C34.91  ICD-9-CM: 162.9  3/13/2020 Yes        Immunocompromised ([facility] ICD-10-CM: D84.9  ICD-9-CM: 279.9  11/16/2018 Yes        Stage IV adenocarcinoma of lung (HCC) ICD-10-CM: C34.90  ICD-9-CM: 162.9  7/11/2018 Yes        Pleural effusion ICD-10-CM: J90  ICD-9-CM: 511.9  4/27/2018 Unknown        Chronic systolic congestive heart failure (HCC) ICD-10-CM: I50.22  ICD-9-CM: 428.22, 428.0  5/3/2017 Yes        Depression ICD-10-CM: F32.9  ICD-9-CM: 433  11/2/2012 Yes        CML (chronic myeloid leukemia) ([facility] ICD-10-CM: C92.10  ICD-9-CM: 205.10  8/25/2012 Yes    Overview Addendum 8/26/2012  7:23 AM by Marleni Starr MD     8/25/12. Probable diagnosis. Will do bone marrow exam today  8/26/12. Bone marrow aspiration and biopsy done. Marrow aspirate and peripheral blood sent for flow, cytogenetics and molecular testing. No complications with the procedure                 Here with respiratory failure with PNA in the setting of 3 primary malignancies: Non-small cell lung CA, Small Cell lung CA, and CML, pancytopenia S/P chemo, acute hepatitis/ pneumonitis, CHF and TIFFANIE. He was supposed to undergo PleurX placement 2 weeks ago but it was delayed due to starting Xerelto. He has not had a thoracentesis in over a month. WF found to be <15 on repeat echo. Prevacid, Plt <30,000.      PLAN:     Nephrology signed off. Stop IV fluids  Started on steroids and cellcept for pneumonitis  PS trials today, D 6 on vent. Prognosis poor with MSOF- liver, kidney, heart and respiratory failure with 3 primary malignancies, and EF <15 %. PC following, need to discuss extubation and not re-intubating if decompensates. Illene Music, NP-C    More than 50% of time documented was spent in face-to-face contact with the patient and in the care of the patient on the floor/unit where the patient is located. Lungs:  Diminished   Heart:  RRR with no Murmur/Rubs/Gallops    Additional Comments:  Continue PS - down to 12, need to discussed long term goals, D 6 on vent-     I have spoken with and examined the patient. I agree with the above assessment and plan as documented.     Anastasia Bence, MD

## 2021-06-15 PROBLEM — E44.0 MODERATE PROTEIN-CALORIE MALNUTRITION (HCC): Chronic | Status: ACTIVE | Noted: 2021-01-01

## 2021-06-15 PROBLEM — D84.9 IMMUNOCOMPROMISED (HCC): Chronic | Status: ACTIVE | Noted: 2018-11-16

## 2021-06-15 PROBLEM — R00.0 TACHYCARDIA: Status: ACTIVE | Noted: 2021-01-01

## 2021-06-15 PROBLEM — C34.90 STAGE IV ADENOCARCINOMA OF LUNG (HCC): Chronic | Status: ACTIVE | Noted: 2018-07-11

## 2021-06-15 PROBLEM — I50.22 CHRONIC SYSTOLIC CONGESTIVE HEART FAILURE (HCC): Chronic | Status: ACTIVE | Noted: 2017-05-03

## 2021-06-15 PROBLEM — C34.91 SCLC (SMALL CELL LUNG CARCINOMA), RIGHT (HCC): Chronic | Status: ACTIVE | Noted: 2020-03-13

## 2021-06-15 NOTE — PROGRESS NOTES
Patient having increased ectopy including multiple PVCs and runs of SVT. Dr Timbo Rinaldi notified and in to see patient.

## 2021-06-15 NOTE — PROGRESS NOTES
Interdisciplinary team rounds were held 6/15/2021 with the following team members:Nursing, Nurse Practitioner, Nutrition, Palliative Care, Pastoral Care, Pharmacy, Physical Therapy, Physician, Respiratory Therapy and Clinical Coordinator and the patient. Plan of care discussed. See clinical pathway and/or care plan for interventions and desired outcomes.

## 2021-06-15 NOTE — PROGRESS NOTES
Infectious Disease Progress Note    Today's Date: 6/15/2021   Admit Date: 2021    Impression:   · Acute hepatitis / pneumonitis - suspected adverse reaction to chemotherapy  · Hypoxic respiratory failure due to pneumonitis / HCAP  · Pancytopenia due to chemotherapy  · Immunosuppression with cellcept / prednisone  · Systolic CHF / MR / NSVT  · TIFFANIE  · Elevated procalcitonin - suspect due to TIFFANIE and small cell lung cancer  · 3 primary malignancies: Non-small cell lung CA, Small Cell lung CA, and CML  · Suspected adverse reaction to immunotherapy with hepatitis and pneumonitis    Plan:   · Start IV cefepime today for prophylaxis while still pancytopenic and critically ill. · Start low dose fluconazole for thrush - targeting 7 day course. Watch amiodarone interaction. · Continue ACV. · Await count recovery. · CMV, EBV negative. Anti-infectives:   · Pip/tazo    Subjective: Interval history: intubated, responds to voice; on low vent settings. No Known Allergies     Review of Systems:  Review of systems not obtained due to patient factors. Objective:     Visit Vitals  /85 (BP 1 Location: Left upper arm, BP Patient Position: At rest;Lying)   Pulse 94   Temp 98.7 °F (37.1 °C)   Resp 23   Ht 6' 1\" (1.854 m)   Wt 77.6 kg (171 lb 1.2 oz)   SpO2 98%   BMI 22.57 kg/m²     Temp (24hrs), Av.7 °F (36.5 °C), Min:96.8 °F (36 °C), Max:98.7 °F (37.1 °C)       Lines:  Peripheral IV:       Physical Exam:    General:  Intubated, nods head to voice   Eyes:  Sclera anicteric.     Mouth/Throat: Intubated; thrush noted on pharynx    Neck: Supple   Lungs:   Coarse breath sounds bilaterally; no wheezing;   CV:  Regular rate; irregular rhythm; no murmur   Abdomen:   non-distended   Extremities: No cyanosis or edema   Skin: no acute rash or lesions   Lymph nodes:    Musculoskeletal: No swelling or deformity   Lines/Devices:  Intact, no erythema, drainage or tenderness   Psych: Comfortable; appears alert       Data Review:     CBC:  Recent Labs     06/15/21  0309 06/14/21 0323 06/13/21  1705   WBC 0.1* 0.1* 0.1*   GRANS 50  --  42*   MONOS 7  --  8   EOS 0*  --  0*   ANEU 0.1*  --  0.0*   ABL 0.1*  --  0.0*   HGB 9.4* 7.8* 7.8*   HCT 28.8* 24.4* 23.9*   PLT 24* 28* 38*       BMP:  Recent Labs     06/15/21  0723 06/15/21  0309 06/14/21  2341 06/14/21 0323 06/13/21  0346   CREA  --  2.70*  --  3.32* 3.95*   BUN  --  82*  --  93* 97*   NA  --  149*  --  144 140   K 2.9* 3.0* 3.0* 2.9* 3.1*   CL  --  110*  --  103 98   CO2  --  31  --  33* 32   AGAP  --  8  --  8 10   GLU  --  174*  --  143* 220*       LFTS:  Recent Labs     06/15/21  0309 06/14/21  0323 06/13/21  0346   TBILI 1.5* 1.2* 1.1   * 1,035* 1,471*   AP 77 84 84   TP 5.5* 5.6* 5.4*   ALB 2.1* 2.4* 2.2*       Microbiology:     All Micro Results     Procedure Component Value Units Date/Time    CMV BY PCR, QT [[id number]] Collected: 06/11/21 1531    Order Status: Completed Specimen: Plasma Updated: 06/14/21 1835     CMV IU/mL Negative IU/mL      Comment: (NOTE)  No CMV DNA detected. The quantitative range of this assay is 200 to 1 million IU/mL. Performed At: [address] [id number]  Laurence Adkins MD WK:[id number]          CMV log 10 IU/mL TEST NOT PERFORMED log10 IU/mL      Comment: (NOTE)  Unable to calculate result since non-numeric result obtained for  component test.         CULTURE, URINE [[id number]] Collected: 06/10/21 1316    Order Status: Completed Specimen: Cath Urine Updated: 06/13/21 0734     Special Requests: NO SPECIAL REQUESTS        Culture result: NO GROWTH 2 DAYS       MARSHALL Goss [[id number]] Collected: 06/08/21 1803    Order Status: Completed Specimen: Urine, random Updated: 06/12/21 1236     Source URINE        L pneumophila S1 Ag, urine Negative        Comment: (NOTE)  Presumptive negative for L. pneumophila serogroup 1 antigen in urine,  suggesting no recent or current infection. Legionnaires' disease  cannot be ruled out since other serogroups and species may also  cause disease. Performed At: [address] [id number]  Laurence Adkins MD JW:[id number]         CMV BY PCR, QT [[id number]] Collected: 06/11/21 1531    Order Status: Completed Updated: 06/12/21 0640    DIVINA Blackmon, UR/CSF [[id number]] Collected: 06/08/21 1804    Order Status: Completed Specimen: Miscellaneous sample Updated: 06/11/21 1835     Source URINE        Specimen Urine     Streptococcus pneumoniae Ag Negative        Fluid culture Not indicated. Organism ID Not indicated. Please note Comment        Comment: (NOTE)  College of American [employer]ologists standards require a culture to be  performed on CSF specimens submitted for bacterial antigen testing. (CAP [id number]) Urine specimens will not be cultured. Performed At: [facility]  [employer] [id number]  Laurence Adkins MD FC:[id number]         CMV BY PCR, QT [[id number]] Collected: 06/11/21 1531    Order Status: Canceled Specimen: Blood     CULTURE, RESPIRATORY/SPUTUM/BRONCH Jerry Cantu [[id number]] Collected: 06/08/21 1804    Order Status: Completed Specimen: Sputum Updated: 06/11/21 0901     Special Requests: NO SPECIAL REQUESTS        GRAM STAIN 4 TO 32 WBC'S/OIF      0 TO 3  EPITHELIAL CELLS SEEN /OIF            MODERATE GRAM POSITIVE COCCI            FEW YEAST         3+ MUCUS PRESENT        Culture result:       MODERATE NORMAL RESPIRATORY MARGO          RESPIRATORY VIRUS PANEL W/COVID-19, PCR [[id number]] Collected: 06/10/21 0907    Order Status: Completed Specimen: Nasopharyngeal Updated: 06/10/21 1115     Adenovirus NOT DETECTED        Coronavirus 229E NOT DETECTED        Coronavirus HKU1 NOT DETECTED        Coronavirus CVNL63 NOT DETECTED        Coronavirus OC43 NOT DETECTED        SARS-CoV-2, PCR NOT DETECTED        Comment:  This test has been authorized by the FDA under an Emergency Use Authorization (EUA) for use by authorized laboratories. Metapneumovirus NOT DETECTED        Rhinovirus and Enterovirus NOT DETECTED        Influenza A NOT DETECTED        Influenza B NOT DETECTED        Parainfluenza 1 NOT DETECTED        Parainfluenza 2 NOT DETECTED        Parainfluenza 3 NOT DETECTED        Parainfluenza virus 4 NOT DETECTED        RSV by PCR NOT DETECTED        B. parapertussis, PCR NOT DETECTED        Bordetella pertussis - PCR NOT DETECTED        Chlamydophila pneumoniae DNA, QL, PCR NOT DETECTED        Mycoplasma pneumoniae DNA, QL, PCR NOT DETECTED       RESPIRATORY PANEL,PCR,NASOPHARYNGEAL [[id number]] Collected: 06/10/21 9966    Order Status: Canceled Specimen: NASOPHARYNGEAL SWAB     MSSA/MRSA SC BY PCR, NASAL SWAB [[id number]] Collected: 21 1010    Order Status: Completed Specimen: Nasal swab Updated: 21 1242     Special Requests: NO SPECIAL REQUESTS        Culture result:       SA target not detected. A MRSA NEGATIVE, SA NEGATIVE test result does not preclude MRSA or SA nasal colonization. Imagin/15/21 CXR: FINDINGS: Right lung mass or infiltrate and loculated pneumothorax or cystic  changes along the lung bases are stable in appearance. There is mildly  progressed left lung base infiltrate. The heart size is stable. Support  catheters appear to be in good position.     Signed By: Candace Espana MD     Chari 15, 2021

## 2021-06-15 NOTE — PROGRESS NOTES
Ventilator check complete; patient has a #7.5 ET tube secured at the 23 at the lip. Patient is not sedated. Patient is able to follow commands. Breath sounds are diminished. Trachea is midline, Negative for subcutaneous air, and chest excursion is symmetric. Patient is also Negative for cyanosis and is Negative for pitting edema. All alarms are set and audible. Resuscitation bag is at the head of the bed. Ventilator Settings  Mode FIO2 Rate Tidal Volume Pressure PEEP I:E Ratio   vc+ 30 %  20  0.45 ml  8 cm H2O (weaned post ABG)  8 cm H20  1:2.64      Peak airway pressure: 15 cm H2O   Minute ventilation: 17.4 l/min     ABG: No results for input(s): PH, PCO2, PO2, HCO3 in the last 72 hours.       Haider Arrington, RT

## 2021-06-15 NOTE — PROGRESS NOTES
Comprehensive Nutrition Assessment    Type and Reason for Visit: Reassess   TF Management (Pulmonary)    Nutrition Recommendations/Plan:   · Enteral Nutrition:  · Change TF to Jevity 1.5 @ 60 ml/hr. · Change water flush to 50ml/hr.    · This TF will provide 1980 kcal (100% estimated calorie needs), 84 grams protein (100% estimated protein needs) and 2103ml free fluid (~1 ml/mihai/day) calculations based on 22 hours infusion. · Vitamin and Mineral Supplement Therapy:  · Electrolyte management replacement protocol implemented. · 80 mEq IV KCl today. · Labs:   · EN labs: BMP daily, Mg and Phos MWF. Continue SSI coverage     Malnutrition Assessment:  Malnutrition Status: Moderate malnutrition  Context: Chronic illness  Findings of clinical characteristics of malnutrition:   Energy Intake:  Unable to assess  Weight Loss:  7.00 - Greater than 10% over 6 months     Body Fat Loss:  1 - Mild body fat loss, Triceps   Muscle Mass Loss:  1 - Mild muscle mass loss, Clavicles (pectoralis &deltoids)  Fluid Accumulation:  Unable to assess,     Strength:  Not performed     Nutrition Assessment:   Nutrition History: The patient reports that he feels like he lost about 15 pounds in the last 45 days. He reports decreased stamina. He also reports drinking vanilla Boost at home. Highest weight was 190 pounds. Cultural/Muslim/Ethnic Food Preference(s): None   Nutrition Background: Vanita Sorto. is a 60 y.o. male with history of CML, small cell lung cancer metastasized to liver currently on palliative chemotherapy, pulmonary emboli on Xarelto, advanced CHF, CKD stage III, debility who presented with 2 to 3 days history of difficulty in breathing and laying flat. ED work-up reveals bilateral PNA. Daily Update:  Remains ventilated and TF-dependent. Staff reports diarrhea. Abdominal Status (last documented): Intact, Semi-soft, Tender, Passing flatus abdomen with Active  bowel sounds. Last BM 06/15/21.   Pertinent Medications: Zovirax, Maxipeme, Diflucan, Lantus, SSI coverage, Prevacid, Solumedrol, Midodrine, Granix. Drips: Fentanyl  Pertinent Labs:   Lab Results   Component Value Date/Time    Sodium 149 (H) 06/15/2021 03:09 AM    Potassium 2.9 (L) 06/15/2021 07:23 AM    Chloride 110 (H) 06/15/2021 03:09 AM    CO2 31 06/15/2021 03:09 AM    Anion gap 8 06/15/2021 03:09 AM    Glucose 174 (H) 06/15/2021 03:09 AM    BUN 82 (H) 06/15/2021 03:09 AM    Creatinine 2.70 (H) 06/15/2021 03:09 AM    Calcium 8.2 (L) 06/15/2021 03:09 AM    Albumin 2.1 (L) 06/15/2021 03:09 AM    Magnesium 2.7 (H) 06/15/2021 03:09 AM    Phosphorus 3.1 06/15/2021 03:09 AM     Lab Results   Component Value Date/Time    Glucose 174 (H) 06/15/2021 03:09 AM    Glucose (POC) 146 (H) 06/15/2021 08:30 AM    Glucose (POC) 134 (H) 06/15/2021 06:07 AM    Glucose (POC) 26 (LL) 06/14/2021 11:35 PM    Glucose (POC) 157 (H) 06/14/2021 05:55 PM    Glucose (POC) 195 (H) 06/14/2021 11:58 AM    Glucose (POC) 216 (H) 06/14/2021 09:54 AM       Nutrition Related Findings:   12% weight loss in the last 6 months. 6/9 Solumedrol started. 6/10: intubated, and OGT placed. 6/10: worsening renal and liver function. Not a HD candidate. 6/12 Lantus started. 6/13 acidosis resolved with bicarb drip. 6/14 Loose stools/diarrhea started. Current Nutrition Therapies:  ADULT TUBE FEEDING Orogastric; Renal; Delivery Method: Continuous; Continuous Initial Rate (mL/hr): 10; Continuous Advance Tube Feeding: Yes; Advancement Volume (mL/hr): 10; Advancement Frequency: Q 12 hours; Continuous Goal Rate (mL/hr): 50; Wate. ..   Current Tube Feeding (TF) Orders:   · Feeding Route: Orogastric  · Formula: Nepro  · Schedule:Continuous    · Regimen: 50 ml/hr  · Additives/Modulars:    · Water Flushes: 10 ml/hr  · Current TF & Flush Orders Provides: 1980 calories/day, 89 gm protein/day in 1020 ml water/day  · Goal TF & Flush Orders Provides: 1980 calories/day, 89 gm protein/day in 1020 ml water/day    Current Intake:   Average Meal Intake: NPO Average Supplement Intake:  ( )     Anthropometric Measures:  Height: 6' 1\" (185.4 cm)  Current Body Wt: 77.6 kg (171 lb 1.2 oz) (6/15/2021 ICU), Weight source: Bed scale  BMI: 22.6, Normal weight (BMI 22.0-24.9) age over 72     Ideal Body Weight (lbs) (Calculated): 184 lbs (84 kg), 87.1 %  Usual Body Wt: 81.6 kg (180 lb), Percent weight change: -11          Edema: Generalized: No Edema (6/15/2021  7:01 AM)     Estimated Daily Nutrient Needs:  Energy (kcal/day): 3112-0563  (Kcal/kg (25-30 mihai/kg/day using 73 kg - vent), Weight Used: Current)  Protein (g/day): 58-88  (0.8-1.2 gm protein/kg/day using 73 kg) Weight Used: (Current)  Fluid (ml/day):   (1 ml/kcal)    Nutrition Diagnosis:   · Inadequate oral intake related to impaired respiratory function as evidenced by intubation, nutrition support-enteral nutrition    · Altered GI function related to impaired nutrient utilization (possible TF intolerance) as evidenced by diarrhea    Nutrition Interventions:   Food and/or Nutrient Delivery: Modify tube feeding     Coordination of Nutrition Care: Continue to monitor while inpatient, Interdisciplinary rounds  Plan of Care discussed with Gomez Da Silva    Goals:   Previous Goal Met: Goal(s) achieved  Active Goal: Maintain intake via TF to meet daily nutrition needs. Nutrition Monitoring and Evaluation:      Food/Nutrient Intake Outcomes: Enteral nutrition intake/tolerance  Physical Signs/Symptoms Outcomes: Biochemical data, GI status, Hemodynamic status    Discharge Planning: Too soon to determine    Mita Eubanks.  Marc Cochran RD, LD on 6/15/2021 at 10:45 AM  Contact: [phone]

## 2021-06-15 NOTE — PROGRESS NOTES
Patient agitated and restless. Heart rate sustained in the 120s with increased ectopy. Patient appears uncomfortable. IV Ativan PRN ordered and given. Patient condition not changed. Dr Bryanna Bates notified and received orders for an EKG.

## 2021-06-15 NOTE — PROGRESS NOTES
POC blood glucose check error occurred at 2335 and resulted as 26. Recheck at [id number], blood glucose level 146.

## 2021-06-15 NOTE — PROGRESS NOTES
A follow up visit was made to the patient. Emotional support, spiritual presence and   prayer were provided for the patient. He was receptive to the prayer.       Wily Argueta, [address] [facility], [facility]

## 2021-06-15 NOTE — PROGRESS NOTES
Patient with continued agitated/anxiety, tachycardia, and tachypnea despite IV Ativan administration. Dr Tk Frye notified of patient's condition. Orders received for a one time dose of Morphine. Morphine given. Will continue to monitor patient's condition.

## 2021-06-15 NOTE — PROGRESS NOTES
Palliative Care Progress Note    Patient: Jovany Marcos MRN: [id number]  SSN: [id number]    YOB: 1955  Age: 60 y.o. Sex: male       Assessment/Plan:     Chief Complaint/Interval History: pt remains intubated but alert. Nods head yes/no. Renal and hepatic numbers improving     Principal Diagnosis:    · Dyspnea  R06.00    Additional Diagnoses:   · Debility, Unspecified  R53.81  · Frailty  R54  · Respiratory Failure, Acute on Chronic  J96.20  · Counseling, Encounter for Medical Advice  Z71.9  · Encounter for Palliative Care  Z51.5    Palliative Performance Scale (PPS)       Medical Decision Making:   Reviewed and summarized labs and imaging. Pt remains intubated. Tachypnea noted. Pt notes some dyspnea and abdominal pain today. I discussed weaning off vent with pt. I asked pt if his breathing failed after potential extubation would he wish for reintubation and pt shook his head no. I clarified that this would mean you would die if you were unable to adequately breath at this time and he expressed his understanding. Updated pt wife and spoke with his son at bedside. Will continue to work with family for support and goals. Will start ativan 1 mg iv q 4 hr prn anxiety. On fentanyl drip for pain. Will discuss findings with members of the interdisciplinary team.      More than 50% of this 25 minute visit was spent counseling and coordination of care as outlined above. Subjective:     Review of Systems negative except as documented in h&p. Objective:     Visit Vitals  /85 (BP 1 Location: Left upper arm, BP Patient Position: At rest;Lying)   Pulse (!) 121   Temp 98.7 °F (37.1 °C)   Resp (!) 36   Ht 6' 1\" (1.854 m)   Wt 171 lb 1.2 oz (77.6 kg)   SpO2 91%   BMI 22.57 kg/m²       Physical Exam:    General:  On vent. Eyes:  Conjunctivae/corneas clear    Nose: Nares normal. Septum midline.    Neck: Supple, symmetrical, trachea midline, no JVD   Lungs:   Diffuse coarse bs Heart:  Regular rate and rhythm, no murmur    Abdomen:   Soft, non-tender, non-distended   Extremities: Normal, atraumatic, no cyanosis or edema   Skin: Skin color, texture, turgor normal. No rash or lesions.    Neurologic: Moves extremities   Psych: Alert, calm     Signed By: Bhupendra Salazar MD     Chari 15, 2021

## 2021-06-15 NOTE — PROGRESS NOTES
Bedside and verbal shift change report received from  John Phelps RN (offgoing nurse). Report included the following information SBAR, Kardex, ED Summary, Intake/Output, MAR, Recent Results, Med Rec Status and Cardiac Rhythm NSR/ST with ectopy.      Dual skin assessment completed at bedside: no pressure sores or injuries noted    Dual verification of gtts completed:  Fentanyl @ 25 mcg/hr

## 2021-06-15 NOTE — PROGRESS NOTES
[facility] CARDIOLOGY PROGRESS NOTE           6/15/2021 9:19 AM    Admit Date: 6/8/2021      Subjective:   Patient remains intubated in the ICU. Is primarily in sinus rhythm with runs of SVT likely atrial tachycardia and frequent PACs. Remains on amiodarone 200 mg twice a day. Significant hypokalemia. Recurrent diarrhea. ROS:  UNABLE TO OBTAIN DUE TO INABILITY OF PATIENT TO COMMUNICATE SECONDARY TO CURRENT INTUBATION AND NEED FOR MECHANICAL VENTILATION. Objective:      Vitals:    06/15/21 0700 06/15/21 0727 06/15/21 0728 06/15/21 0729   BP: 130/85      Pulse: 99 97 94 94   Resp: 24 27 25 23   Temp: 98.7 °F (37.1 °C)      SpO2: 96% 98% 98% 98%   Weight:       Height:           Physical Exam:  General-Intubated. Neck- supple, no JVD  CV- regular rate and rhythm with frequent ectopy  Lung- clear bilaterally  Abd- soft, nontender, nondistended  Ext- no edema bilaterally. Skin- warm and dry  Psychiatric:  Unable to accurately assess due to intubated and sedated status. Neurologic:  Unable to accurately assess due to intubated and sedated status. Data Review:   Labs:   Recent Labs     06/15/21  0723 06/15/21  0309 06/14/21  0323   NA  --  149* 144   K 2.9* 3.0* 2.9*   MG  --  2.7* 2.8*   BUN  --  82* 93*   CREA  --  2.70* 3.32*   GLU  --  174* 143*   WBC  --  0.1* 0.1*   HGB  --  9.4* 7.8*   HCT  --  28.8* 24.4*   PLT  --  24* 28*   INR  --  1.8 1.8      No results found for: TROIQ, JIMMY, TROPT, TNIPOC    TELEMETRY:  Sinus with PACs    Assessment/Plan:     Principal Problem:    Severe sepsis ([facility] (6/8/2021)  Continue broad spectrum antibiotics. Active Problems:    CML (chronic myeloid leukemia) ([facility] (8/25/2012)   Defer management to primary team.       Chronic systolic congestive heart failure ([facility] (5/3/2017)  BP now normal.  Stop midodrine. Restart low dose Toprol      Stage IV adenocarcinoma of lung ([facility] (7/11/2018)  Poor prognosis. PC seeing.         Immunocompromised ([facility] (11/16/2018)  Per primary. SCLC (small cell lung carcinoma), right ([facility] (3/13/2020)  Per primary      Acute respiratory failure with hypoxia ([facility] (6/8/2021)  Intubated      Hospital-acquired bacterial pneumonia (6/8/2021)  On antibiotics. Tachycardia:  Continue amiodarone.             Elieser Ortiz MD  6/15/2021 9:19 AM

## 2021-06-15 NOTE — PROGRESS NOTES
Zoe Fontanez. Admission Date: 6/8/2021             Daily Progress Note: 6/15/2021   61 y/o male with CML, active SCLC and NSCLC on palliative therapy. Severe systolic HR with EF 75% (repeat now < 15%) prior pneumonitis on immunotherapy,  malignant effusions, recent segmental B lower lobe PEs on Xarelto and recently with evidence of progression of both lung cancers leading to cycle 1 of cis/etoposide/atezolizumab (had the same about a year ago when his small cell was discovered). Presented with new GGO felt likely to be pneumonitis. Now intubated with worsening respiratory failure, renal failure, liver failure, heart failure. Covid negative. Being seen by heme/onc, ID, GI, nephrology. EF was found to be < 15 on repeat TTE. Started on steroids and cellcept for pneumonitis. Subjective:       Over the past 24 hours:   Remains in ICU. On fentanyl drip and ventilator- D 7. Cr now 2.7. looks tired, critically ill.       Review of Systems  Constitutional: negative for fevers, chills and sweats  Respiratory: negative for dyspnea on exertion  Cardiovascular: negative for chest pain, chest pressure/discomfort, palpitations, irregular heart beats, near-syncope    Current Facility-Administered Medications   Medication Dose Route Frequency    potassium chloride 20 mEq in 100 ml IVPB  20 mEq IntraVENous Q2H    cefepime (MAXIPIME) 2 g in 0.9% sodium chloride (MBP/ADV) 100 mL MBP  2 g IntraVENous Q24H    acyclovir (ZOVIRAX) 200 mg/5 mL oral suspension 400 mg  400 mg Per NG tube Q12H    NUTRITIONAL SUPPORT ELECTROLYTE PRN ORDERS   Does Not Apply PRN    levalbuterol (XOPENEX) nebulizer soln 1.25 mg/3 mL  1.25 mg Nebulization Q6H RT    insulin glargine (LANTUS) injection 20 Units  20 Units SubCUTAneous DAILY    tbo-filgrastim (GRANIX) injection 480 mcg  480 mcg SubCUTAneous DAILY    amiodarone (CORDARONE) tablet 200 mg  200 mg Per NG tube BID    mycophenolate mofetil (CELLCEPT) 200 mg/mL oral suspension 1,000 mg  1,000 mg Per NG tube ACB&D    fentaNYL citrate (PF) injection 50 mcg  50 mcg IntraVENous Q4H PRN    midodrine (PROAMATINE) tablet 10 mg  10 mg Per NG tube TID WITH MEALS    lansoprazole (PREVACID) 3 mg/mL oral suspension 30 mg  30 mg Per NG tube DAILY    insulin lispro (HUMALOG) injection 0-10 Units  0-10 Units SubCUTAneous Q6H    methylPREDNISolone (PF) (SOLU-MEDROL) injection 40 mg  40 mg IntraVENous Q8H    dextrose 40% (GLUTOSE) oral gel 1 Tube  15 g Oral PRN    glucagon (GLUCAGEN) injection 1 mg  1 mg IntraMUSCular PRN    dextrose (D50W) injection syrg 12.5-25 g  25-50 mL IntraVENous PRN    fentaNYL in normal saline (pf) 25 mcg/mL infusion  0-200 mcg/hr IntraVENous TITRATE    sodium chloride (NS) flush 5-40 mL  5-40 mL IntraVENous Q8H    sodium chloride (NS) flush 5-40 mL  5-40 mL IntraVENous PRN    ondansetron (ZOFRAN) injection 4 mg  4 mg IntraVENous Q4H PRN    phenol throat spray (CHLORASEPTIC) 1 Spray  1 Spray Oral PRN         Objective:     Vitals:    06/15/21 0700 06/15/21 0727 06/15/21 0728 06/15/21 0729   BP: 130/85      Pulse: 99 97 94 94   Resp: 24 27 25 23   Temp: 98.7 °F (37.1 °C)      SpO2: 96% 98% 98% 98%   Weight:       Height:         Intake and Output:   06/13 1901 - 06/15 0700  In: 4089.7 [I.V.:1949.7]  Out: 5601 [Urine:4500; Drains:1100]  No intake/output data recorded. Intake/Output Summary (Last 24 hours) at 6/15/2021 0846  Last data filed at 6/15/2021 0700  Gross per 24 hour   Intake 2315.42 ml   Output 3490 ml   Net -1174.58 ml       Physical Exam:          Constitutional: the patient is ill appearing on vent  HEENT: ETT  Lungs: + secretions,scattered, coarse B rhonchi on vent  Cardiovascular: IRR with M,no G,R  Abd/GI: soft and non-tender; with positive bowel sounds. Ext: warm without cyanosis. There is no lower leg edema. Musculoskeletal: moves all four extremities with equal strength  Skin: no jaundice or rashes, no wounds   Neuro: alert, follows commands. Lines/Drains:  Venous device  PIV  NG  ETT  Fecal management system with + watery, yellow stool  Aranda      Nutrition: tube feedings    Ventilator Settings  Mode FIO2 Rate Tidal Volume Pressure PEEP   Pressure control  30 %    0.55 ml  8 cm H2O (weaned post ABG)  8 cm H20      Peak airway pressure: 22 cm H2O   Minute ventilation: 12.7 l/min         CHEST XRAY:       LAB  Recent Labs     06/15/21  0309 06/14/21  0323 06/13/21  1705   WBC 0.1* 0.1* 0.1*   HGB 9.4* 7.8* 7.8*   HCT 28.8* 24.4* 23.9*   PLT 24* 28* 38*     Recent Labs     06/15/21  0723 06/15/21  0309 06/14/21  2341 06/14/21 0323 06/13/21  0346   NA  --  149*  --  144 140   K 2.9* 3.0* 3.0* 2.9* 3.1*   CL  --  110*  --  103 98   CO2  --  31  --  33* 32   GLU  --  174*  --  143* 220*   BUN  --  82*  --  93* 97*   CREA  --  2.70*  --  3.32* 3.95*   MG  --  2.7*  --  2.8* 2.9*   PHOS  --  3.1  --   --   --    INR  --  1.8  --  1.8 1.9     ABG:    Lab Results   Component Value Date/Time    PHI 7.46 (H) 06/15/2021 04:26 AM    PCO2I 42.4 06/15/2021 04:26 AM    PO2I 77 06/15/2021 04:26 AM    HCO3I 30.4 (H) 06/15/2021 04:26 AM    FIO2I 30 06/15/2021 04:26 AM       Respiratory viral panel- negative  Culture -negative sputum  legionella negative  Blood negative  Strep pneumo negative   Urine negative    Assessment:     Hospital Problems  Date Reviewed: 5/26/2021        Codes Class Noted POA    Pancytopenia ([facility] ICD-10-CM: F30.880  ICD-9-CM: 284.19  6/13/2021 Yes        Moderate protein-calorie malnutrition (HCC) (Chronic) ICD-10-CM: E44.0  ICD-9-CM: 263.0  6/10/2021 Yes        TIFFANIE (acute kidney injury) ([facility] ICD-10-CM: N17.9  ICD-9-CM: 584.9  6/10/2021 Yes        Acute liver failure without hepatic coma ICD-10-CM: K72.00  ICD-9-CM: 393  6/10/2021 Yes        Other pulmonary embolism without acute cor pulmonale (HCC) ICD-10-CM: I26.99  ICD-9-CM: 415.19  6/10/2021 Yes        Acute respiratory failure with hypoxia (HCC) ICD-10-CM: J96.01  ICD-9-CM: 518.81 6/8/2021 Yes        Hospital-acquired bacterial pneumonia ICD-10-CM: J15.9  ICD-9-CM: 482.9  6/8/2021 Yes        Bilateral pleural effusion ICD-10-CM: J90  ICD-9-CM: 511.9  6/8/2021 Unknown        * (Principal) Severe sepsis ([facility] ICD-10-CM: A41.9, R65.20  ICD-9-CM: 038.9, 995.92  6/8/2021 Yes        SCLC (small cell lung carcinoma), right (HCC) (Chronic) ICD-10-CM: C34.91  ICD-9-CM: 162.9  3/13/2020 Yes        Immunocompromised (HCC) (Chronic) ICD-10-CM: D84.9  ICD-9-CM: 279.9  11/16/2018 Yes        Stage IV adenocarcinoma of lung (HCC) (Chronic) ICD-10-CM: C34.90  ICD-9-CM: 162.9  7/11/2018 Yes        Pleural effusion ICD-10-CM: J90  ICD-9-CM: 511.9  4/27/2018 Yes        Chronic systolic congestive heart failure (HCC) (Chronic) ICD-10-CM: I50.22  ICD-9-CM: 428.22, 428.0  5/3/2017 Yes        Depression (Chronic) ICD-10-CM: F32.9  ICD-9-CM: 060  11/2/2012 Yes        CML (chronic myeloid leukemia) (HCC) (Chronic) ICD-10-CM: C92.10  ICD-9-CM: 205.10  8/25/2012 Yes    Overview Addendum 8/26/2012  7:23 AM by Stephan Owens MD     8/25/12. Probable diagnosis. Will do bone marrow exam today  8/26/12. Bone marrow aspiration and biopsy done. Marrow aspirate and peripheral blood sent for flow, cytogenetics and molecular testing. No complications with the procedure                 Here with respiratory failure with PNA in the setting of 3 primary malignancies: Non-small cell lung CA, Small Cell lung CA, and CML, pancytopenia S/P chemo, acute hepatitis/ pneumonitis, CHF and TIFFANIE. He was supposed to undergo PleurX placement 2 weeks ago but it was delayed due to starting Xerelto. He has not had a thoracentesis in over a month. EF found to be <15 on repeat echo. Prevacid, Plt <24,000. PLAN:     Nephrology signed off. Replete K+, increase free water. Started on steroids and cellcept for pneumonitis  PS trials today as able, D 7 on vent.   Zosyn D 5   Prognosis poor with MSOF- liver, kidney, heart and respiratory failure with 3 primary malignancies, and EF <15 %. PC following, he expressed that he did not want prolonged vent. Akila Hayden NP-C    More than 50% of time documented was spent in face-to-face contact with the patient and in the care of the patient on the floor/unit where the patient is located. Lungs: coarse sounds  Heart S1 and S2 audible, no murmers or rubs appreciated  Other     patient having difficult with PS and also changed multiple modes while in room with respiratory. Spoke with palliative care and patient confirmed that does not want to be reintubated if extubated. Family here and Dr. Geri Solomon speaking with them. Overall unfortunately poor prognosis. I have spoken with and examined the patient. I have reviewed the history, examination, assessment, and plan and agree with the above. Juan Hinton MD      This note was signed electronically. Errors are unfortunately her likely due to dictation software.

## 2021-06-15 NOTE — PROGRESS NOTES
Problem: Pressure Injury - Risk of  Goal: *Prevention of pressure injury  Description: Document Tigre Scale and appropriate interventions in the flowsheet. Outcome: Progressing Towards Goal  Note: Pressure Injury Interventions:  Sensory Interventions: Assess changes in LOC, Assess need for specialty bed, Avoid rigorous massage over bony prominences, Check visual cues for pain, Float heels, Discuss PT/OT consult with provider, Keep linens dry and wrinkle-free, Maintain/enhance activity level, Minimize linen layers, Monitor skin under medical devices, Pad between skin to skin, Pressure redistribution bed/mattress (bed type), Turn and reposition approx. every two hours (pillows and wedges if needed)    Moisture Interventions: Absorbent underpads, Apply protective barrier, creams and emollients, Check for incontinence Q2 hours and as needed, Internal/External fecal devices, Internal/External urinary devices, Minimize layers    Activity Interventions: Assess need for specialty bed, Increase time out of bed, Pressure redistribution bed/mattress(bed type), PT/OT evaluation    Mobility Interventions: Assess need for specialty bed, Float heels, Pressure redistribution bed/mattress (bed type), Turn and reposition approx.  every two hours(pillow and wedges)    Nutrition Interventions: Document food/fluid/supplement intake, Discuss nutritional consult with provider    Friction and Shear Interventions: Apply protective barrier, creams and emollients, Foam dressings/transparent film/skin sealants, Lift sheet                Problem: Patient Education: Go to Patient Education Activity  Goal: Patient/Family Education  Outcome: Progressing Towards Goal

## 2021-06-15 NOTE — PROGRESS NOTES
Ventilator check complete; patient has a #8. 0 ET tube secured at the 24 at the lip. Patient is not sedated. Patient is able to follow commands. Breath sounds are coarse. Trachea is midline, Negative for subcutaneous air, and chest excursion is symmetric. Patient is also Negative for cyanosis and is Negative for pitting edema. All alarms are set and audible. Resuscitation bag is at the head of the bed.       Ventilator Settings  Mode FIO2 Rate  Pressure PEEP I:E Ratio   Pressure support  30 %   12 cm H2O  8 cm H20       Peak airway pressure: 22 cm H2O   Minute ventilation: 12 l/min       Mariah Mora, RT

## 2021-06-15 NOTE — PROGRESS NOTES
Patient's FSBS reading 11. Recheck FSBS=71 from other hand with different glucometer. Blood drawn from port showed BO=861. Patient medicated with 2 units Humalog per sliding scale.

## 2021-06-15 NOTE — PROGRESS NOTES
[facility]        Inpatient Hematology / Oncology Progress Note      Admission Date: 6/8/2021 10:31 AM  Reason for Admission/Hospital Course: Hospital-acquired bacterial pneumonia [J15.9]  Pleural effusion [J90]  Atrial fibrillation with rapid ventricular response (HCC) [I48.91]  Lactic acidosis [E87.2]  Acute respiratory failure with hypoxia (HCC) [J96.01]  Severe sepsis (HCC) [A41.9, R65.20]      24 Hour Events:  Afebrile, VSS  Counts as expected, con't Granix  Remains intubated on vent    ROS:  Unable to assess d/t intubated status    10 point review of systems is otherwise negative with the exception of the elements mentioned above in the HPI.      No Known Allergies    OBJECTIVE:  Patient Vitals for the past 8 hrs:   BP Temp Pulse Resp SpO2 Weight   06/15/21 0729 -- -- 94 23 98 % --   06/15/21 0728 -- -- 94 25 98 % --   06/15/21 0727 -- -- 97 27 98 % --   06/15/21 0700 130/85 98.7 °F (37.1 °C) 99 24 96 % --   06/15/21 0654 -- -- -- -- -- 171 lb 1.2 oz (77.6 kg)   06/15/21 0629 138/85 -- (!) 103 30 100 % --   06/15/21 0615 -- -- (!) 111 (!) 31 97 % --   06/15/21 0603 -- -- 95 25 97 % --   06/15/21 0600 -- -- 96 28 98 % --   06/15/21 0559 129/77 -- 99 27 99 % --   06/15/21 0545 -- -- 95 24 99 % --   06/15/21 0530 -- -- (!) 103 28 94 % --   06/15/21 0529 132/76 -- (!) 109 (!) 38 97 % --   06/15/21 0515 -- -- 99 27 97 % --   06/15/21 0459 (!) 146/81 -- 95 27 96 % --   06/15/21 0458 -- -- 96 26 96 % --   06/15/21 0429 (!) 147/78 -- 92 25 95 % --   06/15/21 0428 -- -- 99 23 98 % --   06/15/21 0415 -- -- 89 (!) 48 99 % --   06/15/21 0400 -- -- 86 22 94 % --   06/15/21 0359 137/89 -- 86 22 95 % --   06/15/21 0344 129/73 -- 97 (!) 31 99 % --   06/15/21 0300 137/81 97.4 °F (36.3 °C) 96 26 98 % --   06/15/21 0259 137/81 -- 96 26 98 % --   06/15/21 0245 -- -- 91 -- (!) 87 % --   06/15/21 0244 -- -- 83 30 99 % --   06/15/21 0230 -- -- 94 23 100 % --   06/15/21 0215 -- -- 99 26 100 % --     Temp (24hrs), Av.7 °F (36.5 °C), Min:96.8 °F (36 °C), Max:98.7 °F (37.1 °C)    No intake/output data recorded. Physical Exam:  Constitutional: Ill appearing  male lying in bed. Intubated   HEENT: Normocephalic and atraumatic. Skin Warm and dry. No bruising and no rash noted. No erythema. No pallor. Respiratory Decreased BS scattered rhonchi, ventilated    CVS Normal rate, regular rhythm and normal S1 and S2   Abdomen Soft, nontender and nondistended, normoactive bowel sounds.     Neuro EVERTON    MSK EVERTON    Psych EVERTON        Labs:      Recent Labs     06/15/21  0309 21  0323 21  1705 21  0346   WBC 0.1* 0.1* 0.1* 0.1*   RBC 3.54* 3.00* 2.95* 2.74*   HGB 9.4* 7.8* 7.8* 7.3*   HCT 28.8* 24.4* 23.9* 21.6*   MCV 81.4 81.3 81.0 78.8*   MCH 26.6 26.0* 26.4 26.6   MCHC 32.6 32.0 32.6 33.8   RDW 15.1* 15.1* 14.9* 15.0*   PLT 24* 28* 38* 7*   GRANS 50  --  42* 50   LYMPH 43  --  33 36   MONOS 7  --  8 7   EOS 0*  --  0* 0*   BASOS 0  --  0 0   IG 0  --  17* 7*   DF AUTOMATED WBC TOO FEW TO DIFFERENTIATE AUTOMATED AUTOMATED   ANEU 0.1*  --  0.0* 0.1*   ABL 0.1*  --  0.0* 0.0*   ABM 0.0*  --  0.0* 0.0*   [place] 0.0  --  0.0 0.0   ABB 0.0  --  0.0 0.0   AIG 0.0  --  0.0 0.0        Recent Labs     06/15/21  0723 06/15/21  0309 21  2341 21  0323 21  0346   NA  --  149*  --  144 140   K 2.9* 3.0* 3.0* 2.9* 3.1*   CL  --  110*  --  103 98   CO2  --  31  --  33* 32   AGAP  --  8  --  8 10   GLU  --  174*  --  143* 220*   BUN  --  82*  --  93* 97*   CREA  --  2.70*  --  3.32* 3.95*   GFRAA  --  31*  --  24* 20*   GFRNA  --  25*  --  20* 16*   CA  --  8.2*  --  7.8* 7.4*   AP  --  77  --  84 84   TP  --  5.5*  --  5.6* 5.4*   ALB  --  2.1*  --  2.4* 2.2*   GLOB  --  3.4  --  3.2 3.2   AGRAT  --  0.6*  --  0.8* 0.7*   MG  --  2.7*  --  2.8* 2.9*   PHOS  --  3.1  --   --   --          Imaging:  XR CHEST SNGL V [[id number]] Collected: 21 0542   Order Status: Completed Updated: 21 1906 Narrative:     EXAMINATION: One view chest     HISTORY: respiratory failure, h/o lung cancer     TECHNIQUE: Frontal chest.     COMPARISON: 6/9/2021     FINDINGS:     Stable support devices. Persistent right lung infiltrate. Improved right pleural effusion or atelectasis. No significant pneumothorax. No other significant interval changes. Impression:       1. Findings as described above. XR CHEST [facility] [place] [[id number]]    Order Status: No result     RETROPERITONEUM [facility] [[id number]] Collected: 06/10/21 2206   Order Status: Completed Updated: 06/10/21 2209   Narrative:     Renal ultrasound. CLINICAL INDICATION:  Acute renal insufficiency     PROCEDURE: Realtime grayscale color Doppler evaluation of the kidneys and   bladder. COMPARISON: No prior similar studies available for direct comparison. FINDINGS: Right kidney is normal in size at 9.5 cm. There is increased renal   cortical echogenicity. There is no hydronephrosis. There is very limited   evaluation of the left kidney secondary to overlying bowel gas. It measures   roughly 9 cm. There is increased renal cortical atrophy. No definite   hydronephrosis. The bladder is decompressed by Aranda catheter. Ascites is   present in the pelvis. Impression:     1. Increased renal cortical echogenicity bilaterally can be seen with medical   renal disease. Note there is limited evaluation of the left kidney. 2. No definite hydronephrosis. 3. Ascites   XR CHEST Benay [place] [[id number]]    Order Status: No result    XR CHEST [facility] [place] [[id number]]    Order Status: No result    XR CHEST PORT [[id number]] Collected: 06/09/21 2301   Order Status: Completed Updated: 06/09/21 2308   Narrative:     PORTABLE CHEST, June 9, 2021 at 2200 hours,   PORTABLE ABDOMEN, June 9, 2021 at 2155 hours:     CLINICAL HISTORY:  Follow-up intubation and tube placement. COMPARISON:  Portable chest yesterday and CT today.      CHEST FINDINGS:  AP semi-erect image demonstrates is of inhomogeneous   edema/infiltrate bilaterally.  Endotracheal tube tip overlies the mid trachea. Mediport catheter remains in place.  The heart remains enlarged.  No definite   pneumothorax.  There are overlying radiopaque support devices.       ABDOMEN FINDINGS: AP supine images demonstrates the proximal sidehole of the   nasogastric tube just below the gastroesophageal junction.  No dilated bowel   loops of the epigastrium. Impression:       1.  ENDOTRACHEAL TUBE IN EXPECTED POSITION WITHOUT DEFINITE PNEUMOTHORAX OR   OTHER SIGNIFICANT CHANGE FROM CT TODAY. 2.  NASOGASTRIC TUBE PROXIMAL SIDEHOLE IS JUST BELOW THE GASTROESOPHAGEAL   JUNCTION.  ENHANCEMENT BY APPROXIMATELY 10 CM IS SUGGESTED. XR ABD (KUB) [[id number]] Collected: 06/09/21 2301   Order Status: Completed Updated: 06/09/21 2308   Narrative:     PORTABLE CHEST, June 9, 2021 at 2200 hours,   PORTABLE ABDOMEN, June 9, 2021 at 2155 hours:     CLINICAL HISTORY:  Follow-up intubation and tube placement. COMPARISON:  Portable chest yesterday and CT today. CHEST FINDINGS:  AP semi-erect image demonstrates is of inhomogeneous   edema/infiltrate bilaterally.  Endotracheal tube tip overlies the mid trachea. Mediport catheter remains in place.  The heart remains enlarged.  No definite   pneumothorax.  There are overlying radiopaque support devices.       ABDOMEN FINDINGS: AP supine images demonstrates the proximal sidehole of the   nasogastric tube just below the gastroesophageal junction.  No dilated bowel   loops of the epigastrium. Impression:       1.  ENDOTRACHEAL TUBE IN EXPECTED POSITION WITHOUT DEFINITE PNEUMOTHORAX OR   OTHER SIGNIFICANT CHANGE FROM CT TODAY. 2.  NASOGASTRIC TUBE PROXIMAL SIDEHOLE IS JUST BELOW THE GASTROESOPHAGEAL   JUNCTION.  ENHANCEMENT BY APPROXIMATELY 10 CM IS SUGGESTED.    CT CHEST WO CONT [[id number]] Collected: 06/09/21 1420   Order Status: Completed Updated: 06/09/21 1426   Narrative:     CT of the chest without contrast.     CLINICAL INDICATION: Small cell lung cancer, metastatic, prior pulmonary emboli     PROCEDURE: Serial thin section axial images obtained from the thoracic inlet   through the upper abdomen without the administration of intravenous contrast.   Radiation dose reduction techniques were used for this study. Our CT scanners   use one or all of the following: Automated exposure control, adjusted of the mA   and/or kV according to patient size, iterative reconstruction     COMPARISON: Chest CT dated 5/12/2021     FINDINGS:     There is nodular pleural thickening throughout the right hemithorax most in   keeping with pleural-based metastatic disease. Multiple prominent mediastinal   lymph nodes are present also concerning for metastatic disease. The heart is   enlarged. Patchy airspace consolidation with air bronchograms are appreciated at   the right martina unchanged from the prior exam. This may be a posttreatment   change. Pneumonia should be considered. Multiple pulmonary nodules are noted   throughout the right lower lobe concerning for pulmonary metastatic disease. More confluent groundglass opacities noted throughout the left upper lobe and   left lower lobe. This is nonspecific. There is a trace right pleural effusion. Limited evaluation of the upper abdomen shows soft tissue density along the   right peritoneum and right diaphragm also concerning for metastatic disease. No aggressive osseous is identified. Impression:     1. Worsening of the nodular pleural thickening throughout the right hemithorax   with multiple pulmonary nodules throughout the right lower lobe most in keeping   with pulmonary and pleural metastatic disease. There is a small right pleural   effusion also likely metastatic. 2. Stable confluent airspace opacity at the right martina may be a prior treatment   effect. Pneumonia could also be considered.    3. Nonspecific groundglass opacity throughout the left upper lobe, lingula and   lower lobe. 4. Cardiomegaly   5. Mediastinal lymphadenopathy.        Medications:  Current Facility-Administered Medications   Medication Dose Route Frequency    potassium chloride 20 mEq in 100 ml IVPB  20 mEq IntraVENous Q2H    cefepime (MAXIPIME) 2 g in 0.9% sodium chloride (MBP/ADV) 100 mL MBP  2 g IntraVENous Q24H    metoprolol succinate (TOPROL-XL) XL tablet 12.5 mg  12.5 mg Oral DAILY    fluconazole (DIFLUCAN) tablet 100 mg  100 mg Oral DAILY    acyclovir (ZOVIRAX) 200 mg/5 mL oral suspension 400 mg  400 mg Per NG tube Q12H    NUTRITIONAL SUPPORT ELECTROLYTE PRN ORDERS   Does Not Apply PRN    levalbuterol (XOPENEX) nebulizer soln 1.25 mg/3 mL  1.25 mg Nebulization Q6H RT    insulin glargine (LANTUS) injection 20 Units  20 Units SubCUTAneous DAILY    tbo-filgrastim (GRANIX) injection 480 mcg  480 mcg SubCUTAneous DAILY    amiodarone (CORDARONE) tablet 200 mg  200 mg Per NG tube BID    mycophenolate mofetil (CELLCEPT) 200 mg/mL oral suspension 1,000 mg  1,000 mg Per NG tube ACB&D    fentaNYL citrate (PF) injection 50 mcg  50 mcg IntraVENous Q4H PRN    lansoprazole (PREVACID) 3 mg/mL oral suspension 30 mg  30 mg Per NG tube DAILY    insulin lispro (HUMALOG) injection 0-10 Units  0-10 Units SubCUTAneous Q6H    methylPREDNISolone (PF) (SOLU-MEDROL) injection 40 mg  40 mg IntraVENous Q8H    dextrose 40% (GLUTOSE) oral gel 1 Tube  15 g Oral PRN    glucagon (GLUCAGEN) injection 1 mg  1 mg IntraMUSCular PRN    dextrose (D50W) injection syrg 12.5-25 g  25-50 mL IntraVENous PRN    fentaNYL in normal saline (pf) 25 mcg/mL infusion  0-200 mcg/hr IntraVENous TITRATE    sodium chloride (NS) flush 5-40 mL  5-40 mL IntraVENous Q8H    sodium chloride (NS) flush 5-40 mL  5-40 mL IntraVENous PRN    ondansetron (ZOFRAN) injection 4 mg  4 mg IntraVENous Q4H PRN    phenol throat spray (CHLORASEPTIC) 1 Spray  1 Spray Oral PRN         ASSESSMENT:    Problem List  Date Reviewed: 5/26/2021        Codes Class Noted    Tachycardia ICD-10-CM: R00.0  ICD-9-CM: 785.0  6/15/2021        Pancytopenia ([facility] ICD-10-CM: M51.007  ICD-9-CM: 284.19  6/13/2021        Moderate protein-calorie malnutrition (HCC) (Chronic) ICD-10-CM: E44.0  ICD-9-CM: 263.0  6/10/2021        TIFFANIE (acute kidney injury) ([facility] ICD-10-CM: N17.9  ICD-9-CM: 584.9  6/10/2021        Acute liver failure without hepatic coma ICD-10-CM: K72.00  ICD-9-CM: 570  6/10/2021        Other pulmonary embolism without acute cor pulmonale (HCC) ICD-10-CM: I26.99  ICD-9-CM: 415.19  6/10/2021        Acute respiratory failure with hypoxia (HCC) ICD-10-CM: J96.01  ICD-9-CM: 518.81  6/8/2021        Hospital-acquired bacterial pneumonia ICD-10-CM: J15.9  ICD-9-CM: 482.9  6/8/2021        Bilateral pleural effusion ICD-10-CM: J90  ICD-9-CM: 511.9  6/8/2021        * (Principal) Severe sepsis ([place] [address].) ICD-10-CM: A41.9, R65.20  ICD-9-CM: 038.9, 995.92  6/8/2021        Elevated serum creatinine ICD-10-CM: R79.89  ICD-9-CM: 790.99  10/5/2020        Hypomagnesemia ICD-10-CM: E83.42  ICD-9-CM: 275.2  7/27/2020        Shortness of breath ICD-10-CM: R06.02  ICD-9-CM: 786.05  3/28/2020        Pleural effusion on right ICD-10-CM: J90  ICD-9-CM: 511.9  3/28/2020        SCLC (small cell lung carcinoma), right (HCC) (Chronic) ICD-10-CM: C34.91  ICD-9-CM: 162.9  3/13/2020        Acute pulmonary edema ([facility] ICD-10-CM: J81.0  ICD-9-CM: 518.4  9/18/2019        Acute CHF (congestive heart failure) ([facility] ICD-10-CM: I50.9  ICD-9-CM: 428.0  9/16/2019        Pneumonia ICD-10-CM: J18.9  ICD-9-CM: 486  1/6/2019        Pulmonary emboli (HCC) ICD-10-CM: I26.99  ICD-9-CM: 415.19  1/6/2019        Immunocompromised ([facility] (Chronic) ICD-10-CM: D84.9  ICD-9-CM: 279.9  11/16/2018        Stage IV adenocarcinoma of lung (HCC) (Chronic) ICD-10-CM: C34.90  ICD-9-CM: 162.9  7/11/2018        Pleural effusion ICD-10-CM: J90  ICD-9-CM: 511.9  4/27/2018        Abnormal chest CT ICD-10-CM: R93.89  ICD-9-CM: 793.2  4/27/2018        Pulmonary infiltrate ICD-10-CM: R91.8  ICD-9-CM: 793.19  4/27/2018        CAP (community acquired pneumonia) ICD-10-CM: J18.9  ICD-9-CM: 366  3/27/2018        Chronic systolic congestive heart failure (HCC) (Chronic) ICD-10-CM: I50.22  ICD-9-CM: 428.22, 428.0  5/3/2017        Depression (Chronic) ICD-10-CM: F32.9  ICD-9-CM: 427  11/2/2012        CML (chronic myeloid leukemia) (HCC) (Chronic) ICD-10-CM: C92.10  ICD-9-CM: 205.10  8/25/2012    Overview Addendum 8/26/2012  7:23 AM by Noe Mascorro MD     8/25/12. Probable diagnosis. Will do bone marrow exam today  8/26/12. Bone marrow aspiration and biopsy done. Marrow aspirate and peripheral blood sent for flow, cytogenetics and molecular testing. No complications with the procedure             Allergic rhinitis ICD-10-CM: J30.9  ICD-9-CM: 477.9  8/24/2012        Erectile dysfunction ICD-10-CM: N52.9  ICD-9-CM: 607.84  8/24/2012            Mr. Curtis Bashir is a 29-year-old man with history of CML (currently on observation, previously on [address]) and non-small cell lung carcinoma 2018 on osimertinib with recurrent disease, and small cell lung cancer with metastasis to liver 3/2020 (started on cis/etoposide and atezo). Most recently on osimertinib for NSCLC and s/p C1 carbo/etop/atezolizumab on 6/3-6/5/21. He met with Dr Chikis Godinez (primary oncologist) end of May to review CT results which showed hepatic POD. He reviewed the complexity of pt's case and tx options. Pt desired to purse rechallenge with triple therapy along with osimertinib. Dr Chikis Godinez reviewed lack of data regarding this combination and potential rxns including pneumonitis. Mr Curtis Bashir was admitted today (6/8) with acute resp failure and worsening s/s in last few days. His med hx is further complicated by CKD, PE on DOAC and HF with EF ~20%. His HR was in 120-130s irreg and he was started on tx for afib with RVR. On IVF/vanc/Zosyn.   Moved to intensive care setting and on BiPAP. Pulm evaluated pt and he is currently on IV steroids. DOAC on hold and pt on heparin gtt. We are consulted for onc recommendations.       He was seen in CCU. He is sitting in bed with BiPAP in place. We spent some time talking about his medical condition, how critically ill he is and next steps. He expressed earlier during this admission that he'd like to revoke his DNR/DNI status and is now a FULL code.         PLAN:  Interval increase in bilateral lung infiltrates  6/8 zosyn and vanc  6/11 Remains on Zosyn. CXR 6/11 with persistent right lung infiltrate   6/14 on Zosyn  6/15 Completed Zosyn today     Concern for pneumonitis  6/8 on solumedrol  6/11 Remains on solumedrol 40 mg every 8 hours. Consult ID per guidelines for life threatening pneumonitis. 6/15 remains on vent; on solumedrol and mycophenolate      PE 5/2012 6/8 Xarelto on hold, switched to heparin for pending procedures  6/11 Continues on Heparin   6/14 AC contraindicated d/t thrombocytopenia     Afib/RVR   6/8 per cards, on amio gtt   6/9 remains on drip considering moving out of ICU once controlled on oral  6/11 Remains on Amio drip - okay for short term per cards   6/14 Cards managing. No longer on amio gtt     SCLC sp Carbo etoposide atezolizumab given 6/3-6/5. Expect to see counts to start dropping. Monitor closely. 6/9 counts beginning to drop. Hgb 7.1 transfuse given cardiac history. 6/11 WBC 2.1, Hgb 7.7, Plt 36k.     6/13 wbc 0.1/Hb 7 post transfusion and plts <10 - will get plts today; asked RN to repeat cbc after plts - if Hb <7 and plts <10 will transfuse (slow rate due to low EF reviewed with RN, ie if needs PRBC pls transfuse over 3-4 hrs); Granix now and daily - orders placed for 3 days - will need to re-assess   6/15 , Hgb 9.4, Plt 24k.   Con't Granix.     NSCLC / SCLC with liver mets   - osimertinib on hold  - s/p C1 Tecentriq/Carbo/Etop on 6/3 - 6/5    Heart failure EF 20%  -Daily weights, strict I&Os, transfuse blood products at slow rate discussed with RN  - repeat echo on 6/11 with EF <15% - pt and son aware     Acute Respiratory Failure  6/11 Now intubated/sedated as of 6/9.    6/15 remains intubated on vent    TIFFANIE  6/11 Nephro consulted for worsening renal function--not a candidate for HD  6/15 Cr 2.7    Worsening AST/ALT  6/11 ALT up to 2580, AST 3158. Recommend Mycophenolate 1 gm BID - oral suspension okay per pharmD. 6/13 LFTs much improved; likely component of ischemic hepatitis too   6/15 LFTs con't to improve    Goals and plan of care reviewed. Thank you for allowing us to participate in the care of Mr. Kenia Lazcano. We will continue to follow along. Prognosis is very poor. Mando Christianson NP   [facility]  [address] [place] [address]  Office : [phone]  Fax : [phone]         Attending Addendum:  I have personally performed a face to face diagnostic evaluation on this patient. I have reviewed and agree with the care plan as documented by Mando Christianson N.P. My findings are as follows:  He has CML and lung cancer, appears intubated, heart rate regular without murmurs, abdomen is non-tender, bowel sounds are positive, we will continue steroids and MMF along with G-CSF.               Srinivas Phipps MD      [facility]  [address] [place] [address]  Office : [phone]  Fax : [phone]

## 2021-06-16 NOTE — PROGRESS NOTES
Interdisciplinary team rounds were held 6/16/2021 with the following team members:Care Management, Nursing, Nurse Practitioner, Nutrition, Palliative Care, Pharmacy, Physician, Respiratory Therapy and Clinical Coordinator and the patient. Plan of care discussed. See clinical pathway and/or care plan for interventions and desired outcomes. Dr Anne Milian aware of AM labs.

## 2021-06-16 NOTE — PROGRESS NOTES
[facility] CARDIOLOGY PROGRESS NOTE    6/16/2021 8:12 AM    Admit Date: 6/8/2021        Subjective:   Debated, sedated, critically ill with marginal blood pressure. 32 beat of wide-complex tachycardia noted, with frequent PACs and PVCs as well overnight. See below. Review of systems unobtainable. Objective:      Vitals:    06/16/21 0614 06/16/21 0629 06/16/21 0700 06/16/21 0759   BP: 109/61 106/60 99/61    Pulse: 98 (!) 103 100 (!) 109   Resp: 23 24 26 25   Temp:   98.7 °F (37.1 °C)    SpO2: 100% 100% 100% 100%   Weight:       Height:            ROS:  UNABLE TO OBTAIN DUE TO INABILITY OF PATIENT TO COMMUNICATE SECONDARY TO CURRENT INTUBATION AND NEED FOR MECHANICAL VENTILATION. Physical Exam:  Neck- supple, cannot assess JVD  CV-tachycardic but fairly regular, 2/6 TR murmur, 1/6 MR murmur  Lung-coarse diffusely anterolaterally  Abd- soft, nontender, nondistended  Ext- no edema  Skin- warm and dry    Data Review:   Recent Labs     06/16/21  0332 06/16/21  0308 06/15/21  2107 06/15/21  0309   NA  --  153*  --  149*   K  --  3.4* 3.7 3.0*   MG  --  2.8*  --  2.7*   BUN  --  86*  --  82*   CREA  --  2.79*  --  2.70*   GLU  --  208*  --  174*   WBC  --  0.2*  --  0.1*   HGB  --  8.7*  --  9.4*   HCT  --  28.0*  --  28.8*   PLT  --  15*  --  24*   INR 1.8  --   --  1.8       Assessment and Plan:     Principal Problem:    Severe sepsis ([facility] (6/8/2021)  Continue broad spectrum antibiotics. Remains critically ill.      Active Problems:    CML (chronic myeloid leukemia) ([facility] (8/25/2012)   Defer management to primary team.        Chronic systolic congestive heart failure ([facility] (5/3/2017)  BP now normal.   Restarted low dose Toprol, BP will not allow up-titration at present. Resume midodrine as needed for worsening hypotension. Poor prognosis.        Stage IV adenocarcinoma of lung ([facility] (7/11/2018)  Poor prognosis.         Immunocompromised ([facility] (11/16/2018)  Per primary.          SCLC (small cell lung carcinoma), right ([facility] (3/13/2020)  Per primary       Acute respiratory failure with hypoxia ([facility] (6/8/2021)  Intubated, sedated- per critical care       Hospital-acquired bacterial pneumonia (6/8/2021)  On antibiotics.      Tachycardia-telemetry reviewed, likely short bursts of atrial fibrillation with aberrancy, cannot definitively exclude short bursts of nonsustained ventricular tachycardia. Regardless, conservative medical therapy is best option. Replete electrolytes as needed. Recheck BMP and magnesium in the morning. Continue amiodarone increased to 400 mg twice daily for 2 to 3 days. Convert to intravenous continuous drip if arrhythmias recur despite augmented twice daily oral/per tube dosing. .          LIZETH Galvez MD  [facility] Cardiology  Pager [phone]

## 2021-06-16 NOTE — PROGRESS NOTES
Palliative Care Progress Note    Patient: Cornelia Crump. MRN: [id number]  SSN: [id number]    YOB: 1955  Age: 60 y.o. Sex: male       Assessment/Plan:     Chief Complaint/Interval History: pt remains intubated, lethargic after ativan this am    Principal Diagnosis:    · Dyspnea  R06.00    Additional Diagnoses:   · Debility, Unspecified  R53.81  · Frailty  R54  · Respiratory Failure, Acute on Chronic  J96.20  · Counseling, Encounter for Medical Advice  Z71.9  · Encounter for Palliative Care  Z51.5    Palliative Performance Scale (PPS)       Medical Decision Making:   Reviewed and summarized labs and imaging. Pt remains intubated. Calmer after ativan given earlier. No significant improvement in respiratory function currently. Updated wife via phone. Wife understands poor prognosis and pt with terminal illness. Family remains hopeful for pt to improve enough to come home with them. Pt currently partial code per wishes. Family not ready to stop aggressive care at this time  Will continue to follow. Will discuss findings with members of the interdisciplinary team.      More than 50% of this 25 minute visit was spent counseling and coordination of care as outlined above. Subjective:     Review of Systems negative except as documented in h&p. Objective:     Visit Vitals  /71 (BP 1 Location: Left upper arm, BP Patient Position: At rest)   Pulse (!) 114   Temp 98.1 °F (36.7 °C)   Resp 18   Ht 6' 1\" (1.854 m)   Wt 171 lb 1.2 oz (77.6 kg)   SpO2 98%   BMI 22.57 kg/m²       Physical Exam:    General:  On vent. Eyes:  Conjunctivae/corneas clear    Nose: Nares normal. Septum midline. Neck: Supple, symmetrical, trachea midline, no JVD   Lungs:   Diffuse coarse bs   Heart:  Regular rate and rhythm, no murmur    Abdomen:   Soft, non-tender, non-distended   Extremities: Normal, atraumatic, no cyanosis or edema   Skin: Skin color, texture, turgor normal. No rash or lesions. Neurologic: Moves extremities   Psych: Alert, calm     Signed By: Grant Tam MD     June 16, 2021

## 2021-06-16 NOTE — PROGRESS NOTES
[facility] Hematology & Oncology        Inpatient Hematology / Oncology Progress Note      Admission Date: 2021 10:31 AM  Reason for Admission/Hospital Course: Hospital-acquired bacterial pneumonia [J15.9]  Pleural effusion [J90]  Atrial fibrillation with rapid ventricular response (HCC) [I48.91]  Lactic acidosis [E87.2]  Acute respiratory failure with hypoxia (HCC) [J96.01]  Severe sepsis (HCC) [A41.9, R65.20]      24 Hour Events:  Afebrile, tachycardic at times  Counts as expected, con't Granix  Remains intubated on vent    ROS:  Unable to assess d/t intubated status    10 point review of systems is otherwise negative with the exception of the elements mentioned above in the HPI.      No Known Allergies    OBJECTIVE:  Patient Vitals for the past 8 hrs:   BP Temp Pulse Resp SpO2   21 0759 -- -- (!) 109 25 100 %   21 0700 99/61 98.7 °F (37.1 °C) 100 26 100 %   21 0629 106/60 -- (!) 103 24 100 %   21 0614 109/61 -- 98 23 100 %   21 0559 105/60 -- (!) 103 (!) 32 100 %   21 0544 100/61 -- 97 26 100 %   21 0529 99/60 -- 98 (!) 33 100 %   21 0514 (!) 99/56 -- 98 25 100 %   21 0500 97/60 -- 99 19 100 %   21 0444 138/70 -- (!) 116 29 100 %   21 0429 117/63 -- 98 18 98 %   21 0414 124/71 -- (!) 109 29 99 %   21 0359 110/63 -- (!) 114 26 100 %   21 0344 (!) 104/55 -- 90 20 100 %   21 0329 (!) 100/58 -- 91 16 100 %   21 0314 103/60 -- 91 27 100 %   21 0259 123/74 98.6 °F (37 °C) 97 21 100 %   21 0245 -- -- 98 23 100 %   21 0244 (!) 92/58 -- 97 24 100 %   21 0229 (!) 97/57 -- 89 21 100 %   21 0214 (!) 88/52 -- 90 22 100 %   21 0159 (!) 97/55 -- 99 17 100 %   21 0144 (!) 99/55 -- 91 17 100 %   21 0129 112/67 -- -- -- --   21 0128 -- -- 97 29 100 %   21 0114 113/61 -- 96 (!) 34 100 %     Temp (24hrs), Av.3 °F (36.8 °C), Min:97.2 °F (36.2 °C), Max:98.9 °F (37.2 °C)    No intake/output data recorded. Physical Exam:  Constitutional: Ill appearing  male lying in bed. Intubated   HEENT: Normocephalic and atraumatic. Skin Warm and dry. No bruising and no rash noted. No erythema. No pallor. Respiratory Decreased BS scattered rhonchi, ventilated    CVS Normal rate, regular rhythm and normal S1 and S2   Abdomen Soft, nontender and nondistended, normoactive bowel sounds.     Neuro EVERTON    MSK EVERTON    Psych EVERTON        Labs:      Recent Labs     06/16/21  0308 06/15/21  0309 06/14/21  0323 06/13/21  1705   WBC 0.2* 0.1* 0.1* 0.1*   RBC 3.39* 3.54* 3.00* 2.95*   HGB 8.7* 9.4* 7.8* 7.8*   HCT 28.0* 28.8* 24.4* 23.9*   MCV 82.6 81.4 81.3 81.0   MCH 25.7* 26.6 26.0* 26.4   MCHC 31.1* 32.6 32.0 32.6   RDW 15.3* 15.1* 15.1* 14.9*   PLT 15* 24* 28* 38*   GRANS 46 50  --  42*   LYMPH 33 43  --  33   MONOS 8 7  --  8   EOS 0* 0*  --  0*   BASOS 0 0  --  0   IG 13* 0  --  17*   DF AUTOMATED AUTOMATED WBC TOO FEW TO DIFFERENTIATE AUTOMATED   ANEU 0.1* 0.1*  --  0.0*   ABL 0.1* 0.1*  --  0.0*   ABM 0.0* 0.0*  --  0.0*   [place] 0.0 0.0  --  0.0   ABB 0.0 0.0  --  0.0   AIG 0.0 0.0  --  0.0        Recent Labs     06/16/21  0308 06/15/21  2107 06/15/21  1335 06/15/21  0309 06/14/21  0323   *  --   --  149* 144   K 3.4* 3.7 3.5 3.0* 2.9*   *  --   --  110* 103   CO2 32  --   --  31 33*   AGAP 5*  --   --  8 8   *  --   --  174* 143*   BUN 86*  --   --  82* 93*   CREA 2.79*  --   --  2.70* 3.32*   GFRAA 30*  --   --  31* 24*   GFRNA 24*  --   --  25* 20*   CA 8.2*  --   --  8.2* 7.8*   AP 61  --   --  77 84   TP 5.1*  --   --  5.5* 5.6*   ALB 2.0*  --   --  2.1* 2.4*   GLOB 3.1  --   --  3.4 3.2   AGRAT 0.6*  --   --  0.6* 0.8*   MG 2.8*  --   --  2.7* 2.8*   PHOS 3.1  --   --  3.1  --          Imaging:  XR CHEST SNGL V [[id number]] Collected: 06/11/21 0542   Order Status: Completed Updated: 06/11/21 0545   Narrative:     EXAMINATION: One view chest     HISTORY: respiratory failure, h/o lung cancer     TECHNIQUE: Frontal chest.     COMPARISON: 6/9/2021     FINDINGS:     Stable support devices. Persistent right lung infiltrate. Improved right pleural effusion or atelectasis. No significant pneumothorax. No other significant interval changes. Impression:       1. Findings as described above. XR CHEST Derrick Chol [[id number]]    Order Status: No result     RETROPERITONEUM LTD [[id number]] Collected: 06/10/21 2206   Order Status: Completed Updated: 06/10/21 2209   Narrative:     Renal ultrasound. CLINICAL INDICATION:  Acute renal insufficiency     PROCEDURE: Realtime grayscale color Doppler evaluation of the kidneys and   bladder. COMPARISON: No prior similar studies available for direct comparison. FINDINGS: Right kidney is normal in size at 9.5 cm. There is increased renal   cortical echogenicity. There is no hydronephrosis. There is very limited   evaluation of the left kidney secondary to overlying bowel gas. It measures   roughly 9 cm. There is increased renal cortical atrophy. No definite   hydronephrosis. The bladder is decompressed by Aranda catheter. Ascites is   present in the pelvis. Impression:     1. Increased renal cortical echogenicity bilaterally can be seen with medical   renal disease. Note there is limited evaluation of the left kidney. 2. No definite hydronephrosis. 3. Ascites   XR CHEST Derrick Chol [[id number]]    Order Status: No result    XR CHEST Derrick Chol [[id number]]    Order Status: No result    XR CHEST PORT [[id number]] Collected: 06/09/21 2301   Order Status: Completed Updated: 06/09/21 2308   Narrative:     PORTABLE CHEST, June 9, 2021 at 2200 hours,   PORTABLE ABDOMEN, June 9, 2021 at 2155 hours:     CLINICAL HISTORY:  Follow-up intubation and tube placement. COMPARISON:  Portable chest yesterday and CT today.      CHEST FINDINGS:  AP semi-erect image demonstrates is of inhomogeneous   edema/infiltrate bilaterally.  Endotracheal tube tip overlies the mid trachea. Mediport catheter remains in place.  The heart remains enlarged.  No definite   pneumothorax.  There are overlying radiopaque support devices.       ABDOMEN FINDINGS: AP supine images demonstrates the proximal sidehole of the   nasogastric tube just below the gastroesophageal junction.  No dilated bowel   loops of the epigastrium. Impression:       1.  ENDOTRACHEAL TUBE IN EXPECTED POSITION WITHOUT DEFINITE PNEUMOTHORAX OR   OTHER SIGNIFICANT CHANGE FROM CT TODAY. 2.  NASOGASTRIC TUBE PROXIMAL SIDEHOLE IS JUST BELOW THE GASTROESOPHAGEAL   JUNCTION.  ENHANCEMENT BY APPROXIMATELY 10 CM IS SUGGESTED. XR ABD (KUB) [[id number]] Collected: 06/09/21 2301   Order Status: Completed Updated: 06/09/21 2308   Narrative:     PORTABLE CHEST, June 9, 2021 at 2200 hours,   PORTABLE ABDOMEN, June 9, 2021 at 2155 hours:     CLINICAL HISTORY:  Follow-up intubation and tube placement. COMPARISON:  Portable chest yesterday and CT today. CHEST FINDINGS:  AP semi-erect image demonstrates is of inhomogeneous   edema/infiltrate bilaterally.  Endotracheal tube tip overlies the mid trachea. Mediport catheter remains in place.  The heart remains enlarged.  No definite   pneumothorax.  There are overlying radiopaque support devices.       ABDOMEN FINDINGS: AP supine images demonstrates the proximal sidehole of the   nasogastric tube just below the gastroesophageal junction.  No dilated bowel   loops of the epigastrium. Impression:       1.  ENDOTRACHEAL TUBE IN EXPECTED POSITION WITHOUT DEFINITE PNEUMOTHORAX OR   OTHER SIGNIFICANT CHANGE FROM CT TODAY. 2.  NASOGASTRIC TUBE PROXIMAL SIDEHOLE IS JUST BELOW THE GASTROESOPHAGEAL   JUNCTION.  ENHANCEMENT BY APPROXIMATELY 10 CM IS SUGGESTED.    CT CHEST WO CONT [[id number]] Collected: 06/09/21 1420   Order Status: Completed Updated: 06/09/21 1426   Narrative:     CT of the chest without contrast.     CLINICAL INDICATION: Small cell lung cancer, metastatic, prior pulmonary emboli     PROCEDURE: Serial thin section axial images obtained from the thoracic inlet   through the upper abdomen without the administration of intravenous contrast.   Radiation dose reduction techniques were used for this study. Our CT scanners   use one or all of the following: Automated exposure control, adjusted of the mA   and/or kV according to patient size, iterative reconstruction     COMPARISON: Chest CT dated 5/12/2021     FINDINGS:     There is nodular pleural thickening throughout the right hemithorax most in   keeping with pleural-based metastatic disease. Multiple prominent mediastinal   lymph nodes are present also concerning for metastatic disease. The heart is   enlarged. Patchy airspace consolidation with air bronchograms are appreciated at   the right martina unchanged from the prior exam. This may be a posttreatment   change. Pneumonia should be considered. Multiple pulmonary nodules are noted   throughout the right lower lobe concerning for pulmonary metastatic disease. More confluent groundglass opacities noted throughout the left upper lobe and   left lower lobe. This is nonspecific. There is a trace right pleural effusion. Limited evaluation of the upper abdomen shows soft tissue density along the   right peritoneum and right diaphragm also concerning for metastatic disease. No aggressive osseous is identified. Impression:     1. Worsening of the nodular pleural thickening throughout the right hemithorax   with multiple pulmonary nodules throughout the right lower lobe most in keeping   with pulmonary and pleural metastatic disease. There is a small right pleural   effusion also likely metastatic. 2. Stable confluent airspace opacity at the right martina may be a prior treatment   effect. Pneumonia could also be considered. 3. Nonspecific groundglass opacity throughout the left upper lobe, lingula and   lower lobe. 4. Cardiomegaly   5. Mediastinal lymphadenopathy.        Medications:  Current Facility-Administered Medications   Medication Dose Route Frequency    0.9% sodium chloride infusion 250 mL  250 mL IntraVENous PRN    dextrose 5% infusion  75 mL/hr IntraVENous CONTINUOUS    amiodarone (CORDARONE) tablet 400 mg  400 mg Per NG tube BID    cefepime (MAXIPIME) 2 g in 0.9% sodium chloride (MBP/ADV) 100 mL MBP  2 g IntraVENous Q24H    metoprolol succinate (TOPROL-XL) XL tablet 12.5 mg  12.5 mg Oral DAILY    fluconazole (DIFLUCAN) tablet 100 mg  100 mg Oral DAILY    LORazepam (ATIVAN) injection 1 mg  1 mg IntraVENous Q4H PRN    morphine injection 2 mg  2 mg IntraVENous Q4H PRN    acyclovir (ZOVIRAX) 200 mg/5 mL oral suspension 400 mg  400 mg Per NG tube Q12H    NUTRITIONAL SUPPORT ELECTROLYTE PRN ORDERS   Does Not Apply PRN    levalbuterol (XOPENEX) nebulizer soln 1.25 mg/3 mL  1.25 mg Nebulization Q6H RT    insulin glargine (LANTUS) injection 20 Units  20 Units SubCUTAneous DAILY    mycophenolate mofetil (CELLCEPT) 200 mg/mL oral suspension 1,000 mg  1,000 mg Per NG tube ACB&D    fentaNYL citrate (PF) injection 50 mcg  50 mcg IntraVENous Q4H PRN    lansoprazole (PREVACID) 3 mg/mL oral suspension 30 mg  30 mg Per NG tube DAILY    insulin lispro (HUMALOG) injection 0-10 Units  0-10 Units SubCUTAneous Q6H    methylPREDNISolone (PF) (SOLU-MEDROL) injection 40 mg  40 mg IntraVENous Q8H    dextrose 40% (GLUTOSE) oral gel 1 Tube  15 g Oral PRN    glucagon (GLUCAGEN) injection 1 mg  1 mg IntraMUSCular PRN    dextrose (D50W) injection syrg 12.5-25 g  25-50 mL IntraVENous PRN    fentaNYL in normal saline (pf) 25 mcg/mL infusion  0-200 mcg/hr IntraVENous TITRATE    sodium chloride (NS) flush 5-40 mL  5-40 mL IntraVENous Q8H    sodium chloride (NS) flush 5-40 mL  5-40 mL IntraVENous PRN    ondansetron (ZOFRAN) injection 4 mg  4 mg IntraVENous Q4H PRN    phenol throat spray (CHLORASEPTIC) 1 Spray  1 Spray Oral PRN ASSESSMENT:    Problem List  Date Reviewed: 5/26/2021        Codes Class Noted    Tachycardia ICD-10-CM: R00.0  ICD-9-CM: 785.0  6/15/2021        Pancytopenia ([facility] ICD-10-CM: Z73.046  ICD-9-CM: 284.19  6/13/2021        Moderate protein-calorie malnutrition (HCC) (Chronic) ICD-10-CM: E44.0  ICD-9-CM: 263.0  6/10/2021        TIFFANIE (acute kidney injury) ([facility] ICD-10-CM: N17.9  ICD-9-CM: 584.9  6/10/2021        Acute liver failure without hepatic coma ICD-10-CM: K72.00  ICD-9-CM: 570  6/10/2021        Other pulmonary embolism without acute cor pulmonale (HCC) ICD-10-CM: I26.99  ICD-9-CM: 415.19  6/10/2021        Acute respiratory failure with hypoxia (HCC) ICD-10-CM: J96.01  ICD-9-CM: 518.81  6/8/2021        Hospital-acquired bacterial pneumonia ICD-10-CM: J15.9  ICD-9-CM: 482.9  6/8/2021        Bilateral pleural effusion ICD-10-CM: J90  ICD-9-CM: 511.9  6/8/2021        * (Principal) Severe sepsis ([facility] ICD-10-CM: A41.9, R65.20  ICD-9-CM: 038.9, 995.92  6/8/2021        Elevated serum creatinine ICD-10-CM: R79.89  ICD-9-CM: 790.99  10/5/2020        Hypomagnesemia ICD-10-CM: E83.42  ICD-9-CM: 275.2  7/27/2020        Shortness of breath ICD-10-CM: R06.02  ICD-9-CM: 786.05  3/28/2020        Pleural effusion on right ICD-10-CM: J90  ICD-9-CM: 511.9  3/28/2020        SCLC (small cell lung carcinoma), right (HCC) (Chronic) ICD-10-CM: C34.91  ICD-9-CM: 162.9  3/13/2020        Acute pulmonary edema ([facility] ICD-10-CM: J81.0  ICD-9-CM: 518.4  9/18/2019        Acute CHF (congestive heart failure) ([facility] ICD-10-CM: I50.9  ICD-9-CM: 428.0  9/16/2019        Pneumonia ICD-10-CM: J18.9  ICD-9-CM: 486  1/6/2019        Pulmonary emboli ([facility] ICD-10-CM: I26.99  ICD-9-CM: 415.19  1/6/2019        Immunocompromised ([facility] (Chronic) ICD-10-CM: D84.9  ICD-9-CM: 279.9  11/16/2018        Stage IV adenocarcinoma of lung (HCC) (Chronic) ICD-10-CM: C34.90  ICD-9-CM: 162.9  7/11/2018        Pleural effusion ICD-10-CM: J90  ICD-9-CM: 511.9 4/27/2018        Abnormal chest CT ICD-10-CM: R93.89  ICD-9-CM: 793.2  4/27/2018        Pulmonary infiltrate ICD-10-CM: R91.8  ICD-9-CM: 793.19  4/27/2018        CAP (community acquired pneumonia) ICD-10-CM: J18.9  ICD-9-CM: 468  3/27/2018        Chronic systolic congestive heart failure (HCC) (Chronic) ICD-10-CM: I50.22  ICD-9-CM: 428.22, 428.0  5/3/2017        Depression (Chronic) ICD-10-CM: F32.9  ICD-9-CM: 607  11/2/2012        CML (chronic myeloid leukemia) (HCC) (Chronic) ICD-10-CM: C92.10  ICD-9-CM: 205.10  8/25/2012    Overview Addendum 8/26/2012  7:23 AM by Grant Traylor MD     8/25/12. Probable diagnosis. Will do bone marrow exam today  8/26/12. Bone marrow aspiration and biopsy done. Marrow aspirate and peripheral blood sent for flow, cytogenetics and molecular testing. No complications with the procedure             Allergic rhinitis ICD-10-CM: J30.9  ICD-9-CM: 477.9  8/24/2012        Erectile dysfunction ICD-10-CM: N52.9  ICD-9-CM: 607.84  8/24/2012            Mr. Mikey Oliver is a 27-year-old man with history of CML (currently on observation, previously on [address]) and non-small cell lung carcinoma 2018 on osimertinib with recurrent disease, and small cell lung cancer with metastasis to liver 3/2020 (started on cis/etoposide and atezo). Most recently on osimertinib for NSCLC and s/p C1 carbo/etop/atezolizumab on 6/3-6/5/21. He met with Dr Ab Trevizo (primary oncologist) end of May to review CT results which showed hepatic POD. He reviewed the complexity of pt's case and tx options. Pt desired to purse rechallenge with triple therapy along with osimertinib. Dr Ab Trevizo reviewed lack of data regarding this combination and potential rxns including pneumonitis. Mr Mikey Oliver was admitted today (6/8) with acute resp failure and worsening s/s in last few days. His med hx is further complicated by CKD, PE on DOAC and HF with EF ~20%. His HR was in 120-130s irreg and he was started on tx for afib with RVR.   On IVF/vanc/Zosyn. Moved to intensive care setting and on BiPAP. Pulm evaluated pt and he is currently on IV steroids. DOAC on hold and pt on heparin gtt. We are consulted for onc recommendations.       He was seen in CCU. He is sitting in bed with BiPAP in place. We spent some time talking about his medical condition, how critically ill he is and next steps. He expressed earlier during this admission that he'd like to revoke his DNR/DNI status and is now a FULL code.         PLAN:  Interval increase in bilateral lung infiltrates  6/8 zosyn and vanc  6/11 Remains on Zosyn. CXR 6/11 with persistent right lung infiltrate   6/14 on Zosyn  6/15 Completed Zosyn today  6/16 ID added Cef while neutropenic.     Concern for pneumonitis  6/8 on solumedrol  6/11 Remains on solumedrol 40 mg every 8 hours. Consult ID per guidelines for life threatening pneumonitis. 6/16 remains on vent; on solumedrol and mycophenolate      PE 5/2012 6/8 Xarelto on hold, switched to heparin for pending procedures  6/11 Continues on Heparin   6/14 AC contraindicated d/t thrombocytopenia     Afib/RVR   6/8 per cards, on amio gtt   6/9 remains on drip considering moving out of ICU once controlled on oral  6/11 Remains on Amio drip - okay for short term per cards   6/14 Cards managing. No longer on amio gtt     SCLC sp Carbo etoposide atezolizumab given 6/3-6/5. Expect to see counts to start dropping. Monitor closely. 6/9 counts beginning to drop. Hgb 7.1 transfuse given cardiac history. 6/11 WBC 2.1, Hgb 7.7, Plt 36k.     6/13 wbc 0.1/Hb 7 post transfusion and plts <10 - will get plts today; asked RN to repeat cbc after plts - if Hb <7 and plts <20 will transfuse (slow rate due to low EF reviewed with RN, ie if needs PRBC pls transfuse over 3-4 hrs); Granix now and daily - orders placed for 3 days - will need to re-assess   6/16 , Hgb 8.7, Plt 15k. Con't Granix.   Transfuse plt.     NSCLC / SCLC with liver mets   - osimertinib on hold  - s/p C1 Tecentriq/Carbo/Etop on 6/3 - 6/5    Heart failure EF 20%  -Daily weights, strict I&Os, transfuse blood products at slow rate discussed with RN  - repeat echo on 6/11 with EF <15% - pt and son aware     Acute Respiratory Failure  6/11 Now intubated/sedated as of 6/9.    6/16 remains intubated on vent    TIFFANIE  6/11 Nephro consulted for worsening renal function--not a candidate for HD  6/16 Cr 2.79    Worsening AST/ALT  6/11 ALT up to 2580, AST 3158. Recommend Mycophenolate 1 gm BID - oral suspension okay per pharmD. 6/13 LFTs much improved; likely component of ischemic hepatitis too   6/16 LFTs con't to improve    Goals and plan of care reviewed. Thank you for allowing us to participate in the care of Mr. Wood Alejo. We will continue to follow along. Prognosis is very poor. Ap Ford NP   [facility] On[facility]  [address] [place] [address]  Office : [phone]  Fax : [phone]           Attending Addendum:  I have personally performed a face to face diagnostic evaluation on this patient. I have reviewed and agree with the care plan as documented by Ap Ford, RASHMI. My findings are as follows:  He has CML and lung cancer, appears lethargic, heart rate tacycardic, abdomen is non-tender, bowel sounds are positive, we will continue G-CSF, steroids and MMF.               Jacquelin Alexis MD      [facility] Hematology/Oncology  [address] [place] [address]  Office : [phone]  Fax : [phone]

## 2021-06-16 NOTE — PROGRESS NOTES
Ventilator check complete; patient has a #8. 0 ET tube secured at the 24 at the teeth. Patient is not sedated. Patient is able to follow commands. Breath sounds are coarse and rhonchi. Trachea is midline, Negative for subcutaneous air, and chest excursion is symmetric. Patient is also Negative for cyanosis and is Negative for pitting edema. All alarms are set and audible. Resuscitation bag is at the head of the bed. Ventilator Settings  Mode FIO2 Rate Tidal Volume Pressure PEEP I:E Ratio   VC+  40 %    0.5 ml  15 cm H2O  8 cm H20  1:2.3      Peak airway pressure: 24 cm H2O   Minute ventilation: 9.89 l/min     ABG: No results for input(s): PH, PCO2, PO2, HCO3 in the last 72 hours.       Carolina Marker, RT

## 2021-06-16 NOTE — PROGRESS NOTES
At 00:00, pt's heart rhythm went into v-tach for 32 beats and then converted back to NSR. Vitals signs were otherwise stable. Pt was resting quietly in bed with no complaints. Yuli Long NP with [facility] cardiology notified. Instructions received to ensure a BMP and magnesium are drawn with morning labs. No other orders. Continue to monitor.

## 2021-06-16 NOTE — PROGRESS NOTES
Elba Robertmahesh. Admission Date: 6/8/2021             Daily Progress Note: 6/16/2021   59 y/o male with CML, active SCLC and NSCLC on palliative therapy. Severe systolic HR with EF 65% (repeat now < 15%) prior pneumonitis on immunotherapy,  malignant effusions, recent segmental B lower lobe PEs on Xarelto and recently with evidence of progression of both lung cancers leading to cycle 1 of cis/etoposide/atezolizumab (had the same about a year ago when his small cell was discovered). Presented with new GGO felt likely to be pneumonitis. Now intubated with worsening respiratory failure, renal failure, liver failure, heart failure. Covid negative. Being seen by heme/onc, ID, GI, nephrology. EF was found to be < 15 on repeat TTE. Started on steroids and cellcept for pneumonitis. Subjective:       Over the past 24 hours:   Remains on ventilator D 8, failed weaning efforts yesterday  PC having ongoing discussions with patient and family. A fib, Short run of NSVT overnight per RN.  Ativan this am and lethargic, also on fentanyl drip     Review of Systems  Constitutional: negative for fevers, chills and sweats  Respiratory: negative for dyspnea on exertion  Cardiovascular: negative for chest pain, chest pressure/discomfort, palpitations, irregular heart beats, near-syncope    Current Facility-Administered Medications   Medication Dose Route Frequency    potassium bicarb-citric acid (EFFER-K) tablet 40 mEq  40 mEq Per G Tube BID    cefepime (MAXIPIME) 2 g in 0.9% sodium chloride (MBP/ADV) 100 mL MBP  2 g IntraVENous Q24H    metoprolol succinate (TOPROL-XL) XL tablet 12.5 mg  12.5 mg Oral DAILY    fluconazole (DIFLUCAN) tablet 100 mg  100 mg Oral DAILY    LORazepam (ATIVAN) injection 1 mg  1 mg IntraVENous Q4H PRN    morphine injection 2 mg  2 mg IntraVENous Q4H PRN    acyclovir (ZOVIRAX) 200 mg/5 mL oral suspension 400 mg  400 mg Per NG tube Q12H    NUTRITIONAL SUPPORT ELECTROLYTE PRN ORDERS Does Not Apply PRN    levalbuterol (XOPENEX) nebulizer soln 1.25 mg/3 mL  1.25 mg Nebulization Q6H RT    insulin glargine (LANTUS) injection 20 Units  20 Units SubCUTAneous DAILY    amiodarone (CORDARONE) tablet 200 mg  200 mg Per NG tube BID    mycophenolate mofetil (CELLCEPT) 200 mg/mL oral suspension 1,000 mg  1,000 mg Per NG tube ACB&D    fentaNYL citrate (PF) injection 50 mcg  50 mcg IntraVENous Q4H PRN    lansoprazole (PREVACID) 3 mg/mL oral suspension 30 mg  30 mg Per NG tube DAILY    insulin lispro (HUMALOG) injection 0-10 Units  0-10 Units SubCUTAneous Q6H    methylPREDNISolone (PF) (SOLU-MEDROL) injection 40 mg  40 mg IntraVENous Q8H    dextrose 40% (GLUTOSE) oral gel 1 Tube  15 g Oral PRN    glucagon (GLUCAGEN) injection 1 mg  1 mg IntraMUSCular PRN    dextrose (D50W) injection syrg 12.5-25 g  25-50 mL IntraVENous PRN    fentaNYL in normal saline (pf) 25 mcg/mL infusion  0-200 mcg/hr IntraVENous TITRATE    sodium chloride (NS) flush 5-40 mL  5-40 mL IntraVENous Q8H    sodium chloride (NS) flush 5-40 mL  5-40 mL IntraVENous PRN    ondansetron (ZOFRAN) injection 4 mg  4 mg IntraVENous Q4H PRN    phenol throat spray (CHLORASEPTIC) 1 Spray  1 Spray Oral PRN         Objective:     Vitals:    06/16/21 0559 06/16/21 0614 06/16/21 0629 06/16/21 0700   BP: 105/60 109/61 106/60 99/61   Pulse: (!) 103 98 (!) 103 100   Resp: (!) 32 23 24 26   Temp:    98.7 °F (37.1 °C)   SpO2: 100% 100% 100% 100%   Weight:       Height:         Intake and Output:   06/14 1901 - 06/16 0700  In: 3621.9 [I.V.:1093.9]  Out: 3514 [Urine:2955; Drains:1400]  No intake/output data recorded.       Intake/Output Summary (Last 24 hours) at 6/16/2021 0723  Last data filed at 6/16/2021 0700  Gross per 24 hour   Intake 2506.94 ml   Output 2300 ml   Net 206.94 ml       Physical Exam:          Constitutional: the patient is ill appearing on vent  HEENT: ETT  Lungs:  B rhonchi on vent  Cardiovascular: IRR with M,no G,R  Abd/GI: soft and non-tender; with positive bowel sounds. Ext: warm without cyanosis. There is no lower leg edema.   Musculoskeletal: moves all four extremities with equal strength  Skin: no jaundice or rashes, no wounds   Neuro: lethargic, sedated       Lines/Drains:  Venous device  PIV  NG  ETT  Fecal management system with + watery, yellow stool  Aranda      Nutrition: tube feedings, free water    Ventilator Settings  Mode FIO2 Rate Tidal Volume Pressure PEEP   VC+  39 %    500 ml  15 cm H2O  8 cm H20      Peak airway pressure: 29 cm H2O   Minute ventilation: 11.3 l/min         CHEST XRAY:       LAB  Recent Labs     06/16/21  0308 06/15/21  0309 06/14/21  0323   WBC 0.2* 0.1* 0.1*   HGB 8.7* 9.4* 7.8*   HCT 28.0* 28.8* 24.4*   PLT 15* 24* 28*     Recent Labs     06/16/21  0332 06/16/21  0308 06/15/21  2107 06/15/21  1335 06/15/21  0309 06/14/21  0323   NA  --  153*  --   --  149* 144   K  --  3.4* 3.7 3.5 3.0* 2.9*   CL  --  116*  --   --  110* 103   CO2  --  32  --   --  31 33*   GLU  --  208*  --   --  174* 143*   BUN  --  86*  --   --  82* 93*   CREA  --  2.79*  --   --  2.70* 3.32*   MG  --  2.8*  --   --  2.7* 2.8*   PHOS  --  3.1  --   --  3.1  --    INR 1.8  --   --   --  1.8 1.8     ABG:    Lab Results   Component Value Date/Time    PHI 7.43 06/16/2021 03:51 AM    PCO2I 46.4 (H) 06/16/2021 03:51 AM    PO2I 94 06/16/2021 03:51 AM    HCO3I 30.4 (H) 06/16/2021 03:51 AM    FIO2I 40 06/16/2021 03:51 AM       Respiratory viral panel- negative  Culture -negative sputum  Legionella negative  Blood negative  Strep pneumo negative   Urine negative    Assessment:     Hospital Problems  Date Reviewed: 5/26/2021        Codes Class Noted POA    Tachycardia ICD-10-CM: R00.0  ICD-9-CM: 785.0  6/15/2021 Unknown        Pancytopenia ([facility] ICD-10-CM: S91.045  ICD-9-CM: 284.19  6/13/2021 Yes        Moderate protein-calorie malnutrition (HCC) (Chronic) ICD-10-CM: E44.0  ICD-9-CM: 263.0  6/10/2021 Yes        TIFFANIE (acute kidney injury) ([facility] ICD-10-CM: N17.9  ICD-9-CM: 584.9  6/10/2021 Yes        Acute liver failure without hepatic coma ICD-10-CM: K72.00  ICD-9-CM: 250  6/10/2021 Yes        Other pulmonary embolism without acute cor pulmonale (HCC) ICD-10-CM: I26.99  ICD-9-CM: 415.19  6/10/2021 Yes        Acute respiratory failure with hypoxia [facility]) ICD-10-CM: J96.01  ICD-9-CM: 518.81  6/8/2021 Yes        Hospital-acquired bacterial pneumonia ICD-10-CM: J15.9  ICD-9-CM: 482.9  6/8/2021 Yes        Bilateral pleural effusion ICD-10-CM: J90  ICD-9-CM: 511.9  6/8/2021 Unknown        * (Principal) Severe sepsis ([facility] [address] [facility] ICD-10-CM: A41.9, R65.20  ICD-9-CM: 038.9, 995.92  6/8/2021 Yes        SCLC (small cell lung carcinoma), right (HCC) (Chronic) ICD-10-CM: C34.91  ICD-9-CM: 162.9  3/13/2020 Yes        Immunocompromised (HCC) (Chronic) ICD-10-CM: D84.9  ICD-9-CM: 279.9  11/16/2018 Yes        Stage IV adenocarcinoma of lung (HCC) (Chronic) ICD-10-CM: C34.90  ICD-9-CM: 162.9  7/11/2018 Yes        Pleural effusion ICD-10-CM: J90  ICD-9-CM: 511.9  4/27/2018 Yes        Chronic systolic congestive heart failure (HCC) (Chronic) ICD-10-CM: I50.22  ICD-9-CM: 428.22, 428.0  5/3/2017 Yes        Depression (Chronic) ICD-10-CM: F32.9  ICD-9-CM: 441  11/2/2012 Yes        CML (chronic myeloid leukemia) (HCC) (Chronic) ICD-10-CM: C92.10  ICD-9-CM: 205.10  8/25/2012 Yes    Overview Addendum 8/26/2012  7:23 AM by Noe Mascorro MD     8/25/12. Probable diagnosis. Will do bone marrow exam today  8/26/12. Bone marrow aspiration and biopsy done. Marrow aspirate and peripheral blood sent for flow, cytogenetics and molecular testing. No complications with the procedure                 Here with respiratory failure with PNA in the setting of 3 primary malignancies: Non-small cell lung CA, Small Cell lung CA, and CML, pancytopenia S/P chemo, acute hepatitis/ pneumonitis, CHF and TIFFANIE.  He was supposed to undergo PleurX placement 2 weeks ago but it was delayed due to starting Aaron. He has not had a thoracentesis in over a month. EF found to be <15 on repeat echo. Prevacid, Plt <24,000. PLAN:     Increase free water again  Started on steroids and cellcept for pneumonitis- ? Wean solumedrol  Now on cefepime D 2, diflucan, acyclovir   D 8 on vent, failed PS trials yesterday. PC discussed LT ventilation and reintubation and he does not want  plt 15,000  Prognosis poor with MSOF- liver, kidney, heart and respiratory failure with 3 primary malignancies, and EF <15 %. If unable to wean to extubate, need to discuss compassionate extubation if he does not want LT vent. Jac Kim, NP-C    Lungs:  coarse  Heart:  RRR with no Murmur/Rubs/Gallops    Additional Comments:  D 8 on vent, prognosis poor,at this point does not look like he can be extubated and sustain it     I have spoken with and examined the patient. I agree with the above assessment and plan as documented.     Princess Lopez MD

## 2021-06-16 NOTE — PROGRESS NOTES
Ventilator check complete; patient has a #8. 0 ET tube secured at the 23 at the teeth. Patient is not sedated. Patient is not able to follow commands. Breath sounds are coarse and rhonchi. Trachea is midline, Negative for subcutaneous air, and chest excursion is symmetric. Patient is also Negative for cyanosis and is Positive for pitting edema. All alarms are set and audible. Resuscitation bag is at the head of the bed.       Ventilator Settings  Mode FIO2 Rate PC PS PEEP I:E Ratio   SIMV, Pressure control, Pressure support  40 % 15 15 cm H20 15 cm H2O  8 cm H20 (decreased due to spO2 of 100%)  1:2.64      Peak airway pressure: 23 cm H2O   Minute ventilation: 10.5 l/min       Diego Carmen, RT

## 2021-06-16 NOTE — PROGRESS NOTES
Bedside and verbal shift change report received from  [facility] (offgoing nurse). Report included the following information SBAR, Kardex, Intake/Output, MAR and Cardiac Rhythm Sinus tach.      Dual skin assessment completed at bedside: (list pertinent skin assessment findings)    Dual verification of gtts completed (name of gtts verified): Fentanyl @ 100 mcg/hr

## 2021-06-16 NOTE — PROGRESS NOTES
Infectious Disease Progress Note    Today's Date: 2021   Admit Date: 2021    Impression:   · Acute hepatitis / pneumonitis - suspected adverse reaction to chemotherapy  · Hypoxic respiratory failure due to pneumonitis / HCAP  · Pancytopenia due to chemotherapy  · Immunosuppression with cellcept / prednisone  · Systolic CHF / MR / NSVT  · TIFFANIE  · Elevated procalcitonin - suspect due to TIFFANIE and small cell lung cancer  · 3 primary malignancies: Non-small cell lung CA, Small Cell lung CA, and CML  · Suspected adverse reaction to immunotherapy with hepatitis and pneumonitis    Plan:   · Continue current antibiotics / antifungals. · Watch cardiac status - I would ideally like him to remain on fluconazole, but if there is concern about cardiac arrhythmia while on amiodarone, then we can adjust.  · Overall prognosis is poor if he doesn't recover soon from acute pneumonitis. Anti-infectives:   · IV cefepime  · PO fluconazole  · PO ACV    Subjective: Interval history: afebrile; sedated on vent; No Known Allergies     Review of Systems:  Review of systems not obtained due to patient factors. Objective:     Visit Vitals  BP 99/61   Pulse (!) 109   Temp 98.7 °F (37.1 °C)   Resp 25   Ht 6' 1\" (1.854 m)   Wt 77.6 kg (171 lb 1.2 oz)   SpO2 100%   BMI 22.57 kg/m²     Temp (24hrs), Av.3 °F (36.8 °C), Min:97.2 °F (36.2 °C), Max:98.9 °F (37.2 °C)       Lines:  Peripheral IV:       Physical Exam:    General:  Intubated, sedated, cachectic   Eyes:  Sclera anicteric.     Mouth/Throat: Intubated; OP clear   Neck: Supple   Lungs:   Coarse breath sounds bilaterally with rhonchi   CV:  Tachy, irregular rhythm; no murmur   Abdomen:   non-distended   Extremities: No cyanosis or edema   Skin: no acute rash or lesions   Lymph nodes:    Musculoskeletal: No swelling or deformity   Lines/Devices:  Intact, no erythema, drainage or tenderness   Psych: Comfortable on vent;       Data Review:     CBC:  Recent Labs 06/16/21  0308 06/15/21  0309 06/14/21  0323   WBC 0.2* 0.1* 0.1*   GRANS 46 50  --    MONOS 8 7  --    EOS 0* 0*  --    ANEU 0.1* 0.1*  --    ABL 0.1* 0.1*  --    HGB 8.7* 9.4* 7.8*   HCT 28.0* 28.8* 24.4*   PLT 15* 24* 28*       BMP:  Recent Labs     06/16/21  0308 06/15/21  2107 06/15/21  1335 06/15/21  0309 06/14/21  0323   CREA 2.79*  --   --  2.70* 3.32*   BUN 86*  --   --  82* 93*   *  --   --  149* 144   K 3.4* 3.7 3.5 3.0* 2.9*   *  --   --  110* 103   CO2 32  --   --  31 33*   AGAP 5*  --   --  8 8   *  --   --  174* 143*       LFTS:  Recent Labs     06/16/21  0308 06/15/21  0309 06/14/21  0323   TBILI 2.0* 1.5* 1.2*   * 638* 1,035*   AP 61 77 84   TP 5.1* 5.5* 5.6*   ALB 2.0* 2.1* 2.4*       Microbiology:     All Micro Results     Procedure Component Value Units Date/Time    CMV BY PCR, QT [[id number]] Collected: 06/11/21 1531    Order Status: Completed Specimen: Plasma Updated: 06/14/21 1835     CMV IU/mL Negative IU/mL      Comment: (NOTE)  No CMV DNA detected. The quantitative range of this assay is 200 to 1 million IU/mL. Performed At: [address] [id number]  Marisela Stearns MD HK:[id number]          CMV log 10 IU/mL TEST NOT PERFORMED log10 IU/mL      Comment: (NOTE)  Unable to calculate result since non-numeric result obtained for  component test.         CULTURE, URINE [[id number]] Collected: 06/10/21 1316    Order Status: Completed Specimen: Cath Urine Updated: 06/13/21 0734     Special Requests: NO SPECIAL REQUESTS        Culture result: NO GROWTH 2 DAYS       MARSHALL Lea [[id number]] Collected: 06/08/21 1803    Order Status: Completed Specimen: Urine, random Updated: 06/12/21 1236     Source URINE        L pneumophila S1 Ag, urine Negative        Comment: (NOTE)  Presumptive negative for L. pneumophila serogroup 1 antigen in urine,  suggesting no recent or current infection.  Legionnaires' disease  cannot be ruled out since other serogroups and species may also  cause disease. Performed At: [address] [id number]  Tian Stanley MD YZ:[id number]         CMV BY PCR, QT [[id number]] Collected: 06/11/21 1531    Order Status: Completed Updated: 06/12/21 0640    DIVINA Bautista, UR/CSF [[id number]] Collected: 06/08/21 1804    Order Status: Completed Specimen: Miscellaneous sample Updated: 06/11/21 1835     Source URINE        Specimen Urine     Streptococcus pneumoniae Ag Negative        Fluid culture Not indicated. Organism ID Not indicated. Please note Comment        Comment: (NOTE)  College of [employer]ologists standards require a culture to be  performed on CSF specimens submitted for bacterial antigen testing. (CAP [id number]) Urine specimens will not be cultured. Performed At: [address] [id number]  Tian Stanley MD HC:[id number]         CMV BY PCR, QT [[id number]] Collected: 06/11/21 1531    Order Status: Canceled Specimen: Blood     CULTURE, RESPIRATORY/SPUTUM/BRONCH Aureliano Primrose [[id number]] Collected: 06/08/21 1804    Order Status: Completed Specimen: Sputum Updated: 06/11/21 0901     Special Requests: NO SPECIAL REQUESTS        GRAM STAIN 4 TO 32 WBC'S/OIF      0 TO 3  EPITHELIAL CELLS SEEN /OIF            MODERATE GRAM POSITIVE COCCI            FEW YEAST         3+ MUCUS PRESENT        Culture result:       MODERATE NORMAL RESPIRATORY MARGO          RESPIRATORY VIRUS PANEL W/COVID-19, PCR [[id number]] Collected: 06/10/21 0907    Order Status: Completed Specimen: Nasopharyngeal Updated: 06/10/21 1115     Adenovirus NOT DETECTED        Coronavirus 229E NOT DETECTED        Coronavirus HKU1 NOT DETECTED        Coronavirus CVNL63 NOT DETECTED        Coronavirus OC43 NOT DETECTED        SARS-CoV-2, PCR NOT DETECTED        Comment:  This test has been authorized by the FDA under an Emergency Use Authorization (EUA) for use by authorized laboratories. Metapneumovirus NOT DETECTED        Rhinovirus and Enterovirus NOT DETECTED        Influenza A NOT DETECTED        Influenza B NOT DETECTED        Parainfluenza 1 NOT DETECTED        Parainfluenza 2 NOT DETECTED        Parainfluenza 3 NOT DETECTED        Parainfluenza virus 4 NOT DETECTED        RSV by PCR NOT DETECTED        B. parapertussis, PCR NOT DETECTED        Bordetella pertussis - PCR NOT DETECTED        Chlamydophila pneumoniae DNA, QL, PCR NOT DETECTED        Mycoplasma pneumoniae DNA, QL, PCR NOT DETECTED       RESPIRATORY PANEL,PCR,NASOPHARYNGEAL [[id number]] Collected: 06/10/21 9196    Order Status: Canceled Specimen: NASOPHARYNGEAL SWAB     MSSA/MRSA SC BY PCR, NASAL SWAB [[id number]] Collected: 21 1010    Order Status: Completed Specimen: Nasal swab Updated: 21 1242     Special Requests: NO SPECIAL REQUESTS        Culture result:       SA target not detected. A MRSA NEGATIVE, SA NEGATIVE test result does not preclude MRSA or SA nasal colonization. Imagin21 CXR: FINDINGS: Right lung mass or infiltrate and basilar cystic lung changes or  related pneumothorax is unchanged, as is left lung base atelectasis. The heart  size is stable. Support catheters appear to be in good position.     Signed By: Soledad Spears MD     2021

## 2021-06-16 NOTE — PROGRESS NOTES
Bedside shift change report given to [address] (oncoming nurse) by Geraldine Centeno RN (offgoing nurse). Report included the following information SBAR, Kardex, ED Summary, Intake/Output, MAR, Recent Results, Med Rec Status, Cardiac Rhythm Sinus Tach, Alarm Parameters  and Quality Measures.

## 2021-06-17 PROBLEM — I46.9 CARDIAC ARREST (HCC): Status: ACTIVE | Noted: 2021-01-01

## 2021-06-17 NOTE — PROGRESS NOTES
1400: Patient eleanor to 45s  1402: PEA arrest, CPR started  1403: 1 mg Epi   1405: ROSC -     Dr Lynda Coffey at bedside to assess patient -   Pupils 2 mm, equal, sluggish. MD aware.  Fentanyl decreased to 50 mg/hr

## 2021-06-17 NOTE — PROGRESS NOTES
Comprehensive Nutrition Assessment    Type and Reason for Visit: Reassess  TF Management (Pulmonary)    Nutrition Recommendations/Plan:    Hold TF while hemodynamically unstable. Malnutrition Assessment:  Malnutrition Status: Moderate malnutrition  Context: Chronic illness  Findings of clinical characteristics of malnutrition:   Energy Intake:  Unable to assess  Weight Loss:  7.00 - Greater than 10% over 6 months     Body Fat Loss:  1 - Mild body fat loss, Triceps   Muscle Mass Loss:  1 - Mild muscle mass loss, Clavicles (pectoralis &deltoids)  Fluid Accumulation:  Unable to assess,     Strength:  Not performed       Nutrition Assessment:   Nutrition History: The patient reports that he feels like he lost about 15 pounds in the last 45 days. He reports decreased stamina. He also reports drinking vanilla Boost at home. Highest weight was 190 pounds. Cultural/Anglican/Ethnic Food Preference(s): None   Nutrition Background: Spring Peña is a 60 y.o. male with history of CML, small cell lung cancer metastasized to liver currently on palliative chemotherapy, pulmonary emboli on Xarelto, advanced CHF, CKD stage III, debility who presented with 2 to 3 days history of difficulty in breathing and laying flat. ED work-up reveals bilateral PNA. Daily Update:  Continues to decline daily with worsening respiratory and renal status. Code blue this afternoon, now on Levophed and Giancarlo-synephrine drips. Family discussing POC. Abdominal Status (last documented): Intact, Semi-soft abdomen with Active  bowel sounds. Last BM 06/17/21.   Pertinent Medications: Maxipeme, Diflucan, Lantus, SSI coverage, Solumedrol, Cellcept, Florastor, Granix  Drips: Cordarone, Fentanyl, Levophed, Giancarlo-synephrine  Pertinent Labs:   Lab Results   Component Value Date/Time    Sodium 153 (H) 06/17/2021 03:02 AM    Potassium 3.8 06/17/2021 03:02 AM    Chloride 115 (H) 06/17/2021 03:02 AM    CO2 31 06/17/2021 03:02 AM    Anion gap 7 06/17/2021 03:02 AM    Glucose 197 (H) 06/17/2021 03:02 AM    BUN 78 (H) 06/17/2021 03:02 AM    Creatinine 3.03 (H) 06/17/2021 03:02 AM    Calcium 7.8 (L) 06/17/2021 03:02 AM    Albumin 1.7 (L) 06/17/2021 03:02 AM    Magnesium 2.6 (H) 06/17/2021 03:02 AM    Phosphorus 3.1 06/16/2021 03:08 AM     Lab Results   Component Value Date/Time    Glucose 197 (H) 06/17/2021 03:02 AM    Glucose (POC) 201 (H) 06/17/2021 10:58 AM    Glucose (POC) 197 (H) 06/17/2021 09:09 AM    Glucose (POC) 39 (LL) 06/17/2021 05:55 AM    Glucose (POC) 33 (LL) 06/16/2021 11:47 PM    Glucose (POC) 225 (H) 06/16/2021 05:21 PM    Glucose (POC) 261 (H) 06/16/2021 11:03 AM     Nutrition Related Findings:   12% weight loss in the last 6 months. 6/9 Solumedrol started. 6/10: intubated, and OGT placed. 6/10: worsening renal and liver function. Not a HD candidate. 6/12 Lantus started. 6/13 acidosis resolved with bicarb drip. 6/14 Loose stools/diarrhea started. 6/17: Code blue      Current Nutrition Therapies:  Current Tube Feeding (TF) Orders:   · Feeding Route: Orogastric  · Formula: Jevity 1.5  · Schedule:Continuous    · Regimen: 60 ml/hr - on hold 6/17  · Additives/Modulars:    · Water Flushes: 75 ml/hr  · Current TF & Flush Orders Provides: 1980 kcal (100% estimated calorie needs), 84 grams protein (100% estimated protein needs) and 2653ml free fluid   · Goal TF & Flush Orders Provides: 1980 kcal (100% estimated calorie needs), 84 grams protein (100% estimated protein needs) and 2653ml free fluid     Current Intake:   Average Meal Intake: NPO Average Supplement Intake:  ( ) Additional Caloric Sources: D5W @ 75 ml/hr contributes 306 calories/day.     Anthropometric Measures:  Height: 6' 1\" (185.4 cm)  Current Body Wt: 77.6 kg (171 lb 1.2 oz) (6/15/2021 ICU), Weight source: Bed scale  BMI: 22.6, Normal weight (BMI 22.0-24.9) age over 72     Ideal Body Weight (lbs) (Calculated): 184 lbs (84 kg), 87.1 %  Usual Body Wt: 81.6 kg (180 lb), Percent weight change: -11          Edema: Generalized: Trace (6/16/2021  7:15 PM)  LLE: Trace (6/16/2021  7:15 PM)  LUE: Trace (6/16/2021  7:15 PM)  RLE: Trace (6/16/2021  7:15 PM)  RUE: Trace (6/16/2021  7:15 PM)     Estimated Daily Nutrient Needs:  Energy (kcal/day): 8388-0792  (Kcal/kg (25-30 mihai/kg/day using 73 kg - vent), Weight Used: Current)  Protein (g/day): 58-88  (0.8-1.2 gm protein/kg/day using 73 kg) Weight Used: (Current)  Fluid (ml/day):   (1 ml/kcal)    Nutrition Diagnosis:   · Inadequate oral intake related to impaired respiratory function as evidenced by intubation, nutrition support-enteral nutrition    · Altered GI function related to impaired nutrient utilization (possible TF intolerance) as evidenced by diarrhea    Nutrition Interventions:   Food and/or Nutrient Delivery:  (Hold TF for now - s/p code blue)     Coordination of Nutrition Care: Continue to monitor while inpatient, Interdisciplinary rounds  Plan of Care discussed with Sara Cadet RN    Goals:   Previous Goal Met: Progress towards goal(s) declining  Active Goal: Resume TF when hemodynamically stable if aggressive care is to continue. Nutrition Monitoring and Evaluation:      Food/Nutrient Intake Outcomes: Enteral nutrition intake/tolerance  Physical Signs/Symptoms Outcomes: Biochemical data, GI status, Hemodynamic status    Discharge Planning: Too soon to determine    Horacio Clements.  Magi Quijano RD, LD on 6/17/2021 at 3:27 PM  Contact: [phone]

## 2021-06-17 NOTE — PROGRESS NOTES
Bedside and verbal shift change report received from  [place] [id number] [facility] (offgoing nurse). Report included the following information SBAR, Kardex, Intake/Output, MAR, Recent Results, Cardiac Rhythm Sinus Tach and Quality Measures. Dual skin assessment completed at bedside: No pressure injury noted. Allevyn clean, dry and intact.  (list pertinent skin assessment findings)    Dual verification of gtts completed (name of gtts verified):   Fentanyl @ 100

## 2021-06-17 NOTE — PROGRESS NOTES
Ventilator check complete; patient has a #8. 0 ET tube secured at the 24 at the teeth. Patient is not sedated. Patient is not able to follow commands. Breath sounds are coarse and rhonchi. Trachea is midline, Negative for subcutaneous air, and chest excursion is symmetric. Patient is also Negative for cyanosis and is Negative for pitting edema. All alarms are set and audible. Resuscitation bag is at the head of the bed.       Ventilator Settings  Mode FIO2 Rate  PS PEEP I:E Ratio   Pressure support  40 %   12 cm H2O  8 cm H20  1:2.3      Peak airway pressure: 22 cm H2O   Minute ventilation: 9.6 l/min       RT Ayaka

## 2021-06-17 NOTE — PROGRESS NOTES
[facility]. Admission Date: 6/8/2021             Daily Progress Note: 6/17/2021   59 y/o male with CML, active SCLC and NSCLC on palliative therapy. Severe systolic HR with EF 96% (repeat now < 15%) prior pneumonitis on immunotherapy,  malignant effusions, recent segmental B lower lobe PEs on Xarelto and recently with evidence of progression of both lung cancers leading to cycle 1 of cis/etoposide/atezolizumab (had the same about a year ago when his small cell was discovered). Presented with new GGO felt likely to be pneumonitis. Now intubated with worsening respiratory failure, renal failure, liver failure, heart failure. Covid negative. Being seen by heme/onc, ID, GI, nephrology. EF was found to be < 15 on repeat TTE. Started on steroids and cellcept for pneumonitis.      Subjective:       Over the past 24 hours:   Remains on ventilator D 9, having runs of NSVT again vs a fib abbarency- Rate 140-150's's this am.  On full vent support, continues to deteriorate daily      Review of Systems  Constitutional: negative for fevers, chills and sweats  Respiratory: negative for dyspnea on exertion  Cardiovascular: negative for chest pain, chest pressure/discomfort, palpitations, irregular heart beats, near-syncope    Current Facility-Administered Medications   Medication Dose Route Frequency    amiodarone (CORDARONE) 450 mg in dextrose 5% 250 mL infusion  0.5-1 mg/min IntraVENous TITRATE    amiodarone (CORDARONE) 150 mg in dextrose 5% 100 mL bolus infusion  150 mg IntraVENous ONCE    0.9% sodium chloride infusion 250 mL  250 mL IntraVENous PRN    dextrose 5% infusion  75 mL/hr IntraVENous CONTINUOUS    [Held by provider] amiodarone (CORDARONE) tablet 400 mg  400 mg Per NG tube BID    tbo-filgrastim (GRANIX) injection 300 mcg  300 mcg SubCUTAneous DAILY    methylPREDNISolone (PF) (SOLU-MEDROL) injection 40 mg  40 mg IntraVENous Q12H    Saccharomyces boulardii (FLORASTOR) capsule 250 mg  250 mg Per NG tube BID    cefepime (MAXIPIME) 2 g in 0.9% sodium chloride (MBP/ADV) 100 mL MBP  2 g IntraVENous Q24H    metoprolol succinate (TOPROL-XL) XL tablet 12.5 mg  12.5 mg Oral DAILY    fluconazole (DIFLUCAN) tablet 100 mg  100 mg Oral DAILY    LORazepam (ATIVAN) injection 1 mg  1 mg IntraVENous Q4H PRN    morphine injection 2 mg  2 mg IntraVENous Q4H PRN    acyclovir (ZOVIRAX) 200 mg/5 mL oral suspension 400 mg  400 mg Per NG tube Q12H    NUTRITIONAL SUPPORT ELECTROLYTE PRN ORDERS   Does Not Apply PRN    levalbuterol (XOPENEX) nebulizer soln 1.25 mg/3 mL  1.25 mg Nebulization Q6H RT    insulin glargine (LANTUS) injection 20 Units  20 Units SubCUTAneous DAILY    mycophenolate mofetil (CELLCEPT) 200 mg/mL oral suspension 1,000 mg  1,000 mg Per NG tube ACB&D    fentaNYL citrate (PF) injection 50 mcg  50 mcg IntraVENous Q4H PRN    lansoprazole (PREVACID) 3 mg/mL oral suspension 30 mg  30 mg Per NG tube DAILY    insulin lispro (HUMALOG) injection 0-10 Units  0-10 Units SubCUTAneous Q6H    dextrose 40% (GLUTOSE) oral gel 1 Tube  15 g Oral PRN    glucagon (GLUCAGEN) injection 1 mg  1 mg IntraMUSCular PRN    dextrose (D50W) injection syrg 12.5-25 g  25-50 mL IntraVENous PRN    fentaNYL in normal saline (pf) 25 mcg/mL infusion  0-200 mcg/hr IntraVENous TITRATE    sodium chloride (NS) flush 5-40 mL  5-40 mL IntraVENous Q8H    sodium chloride (NS) flush 5-40 mL  5-40 mL IntraVENous PRN    ondansetron (ZOFRAN) injection 4 mg  4 mg IntraVENous Q4H PRN    phenol throat spray (CHLORASEPTIC) 1 Spray  1 Spray Oral PRN         Objective:     Vitals:    06/17/21 0614 06/17/21 0629 06/17/21 0700 06/17/21 0712   BP: 124/71 137/74 125/63    Pulse: (!) 128 (!) 126 (!) 119 (!) 123   Resp: (!) 38 (!) 31 24 30   Temp:   98.7 °F (37.1 °C)    SpO2: 96% 96% 93% 97%   Weight:       Height:         Intake and Output:   06/15 1901 - 06/17 0700  In: 5618.2 [I.V.:1240.4]  Out: 5605 [Urine:2805; Drains:2800]  No intake/output data recorded. Intake/Output Summary (Last 24 hours) at 6/17/2021 0729  Last data filed at 6/17/2021 0615  Gross per 24 hour   Intake 4465.93 ml   Output 3880 ml   Net 585.93 ml       Physical Exam:          Constitutional: the patient is ill appearing on vent  HEENT: ETT  Lungs:  B rhonchi on vent  Cardiovascular: IRR with M,no G,R, rate 140-150's  Abd/GI: soft and non-tender; with positive bowel sounds. Ext: warm without cyanosis. There is no lower leg edema.   Musculoskeletal: moves all four extremities with equal strength  Skin: no jaundice or rashes, no wounds   Neuro: sedated       Lines/Drains:  Venous device  PIV  NG  ETT  Fecal management system with + watery, yellow stool  Aranda    Fentanyl and D5     Nutrition: tube feedings, free water    Ventilator Settings  Mode FIO2 Rate Tidal Volume Pressure PEEP   Pressure support  39 %    0.5 ml  12 cm H2O  8 cm H20      Peak airway pressure: 22 cm H2O   Minute ventilation: 15.6 l/min         CHEST XRAY:       LAB  Recent Labs     06/17/21  0302 06/16/21  1507 06/16/21  0308 06/15/21  0309   WBC 0.3*  --  0.2* 0.1*   HGB 7.4*  --  8.7* 9.4*   HCT 24.0*  --  28.0* 28.8*   PLT 24* 40* 15* 24*     Recent Labs     06/17/21  0302 06/16/21  1103 06/16/21  0332 06/16/21  0308 06/15/21  0309   * 154*  --  153* 149*   K 3.8 4.1  --  3.4* 3.0*   *  --   --  116* 110*   CO2 31  --   --  32 31   *  --   --  208* 174*   BUN 78*  --   --  86* 82*   CREA 3.03*  --   --  2.79* 2.70*   MG 2.6*  --   --  2.8* 2.7*   PHOS  --   --   --  3.1 3.1   INR 1.8  --  1.8  --  1.8     ABG:    Lab Results   Component Value Date/Time    FIO2I 40 06/17/2021 04:52 AM       Respiratory viral panel- negative  Culture -negative sputum  Legionella negative  Blood negative  Strep pneumo negative   Urine negative    Assessment:     Hospital Problems  Date Reviewed: 5/26/2021        Codes Class Noted POA    Tachycardia ICD-10-CM: R00.0  ICD-9-CM: 785.0  6/15/2021 Unknown Pancytopenia ([facility] ICD-10-CM: Z42.152  ICD-9-CM: 284.19  6/13/2021 Yes        Moderate protein-calorie malnutrition (HCC) (Chronic) ICD-10-CM: E44.0  ICD-9-CM: 263.0  6/10/2021 Yes        TIFFANIE (acute kidney injury) ([facility] ICD-10-CM: N17.9  ICD-9-CM: 584.9  6/10/2021 Yes        Acute liver failure without hepatic coma ICD-10-CM: K72.00  ICD-9-CM: 428  6/10/2021 Yes        Other pulmonary embolism without acute cor pulmonale (HCC) ICD-10-CM: I26.99  ICD-9-CM: 415.19  6/10/2021 Yes        Acute respiratory failure with hypoxia [facility]) ICD-10-CM: J96.01  ICD-9-CM: 518.81  6/8/2021 Yes        Hospital-acquired bacterial pneumonia ICD-10-CM: J15.9  ICD-9-CM: 482.9  6/8/2021 Yes        Bilateral pleural effusion ICD-10-CM: J90  ICD-9-CM: 511.9  6/8/2021 Unknown        * (Principal) Severe sepsis ([facility] ICD-10-CM: A41.9, R65.20  ICD-9-CM: 038.9, 995.92  6/8/2021 Yes        SCLC (small cell lung carcinoma), right (HCC) (Chronic) ICD-10-CM: C34.91  ICD-9-CM: 162.9  3/13/2020 Yes        Immunocompromised (HCC) (Chronic) ICD-10-CM: D84.9  ICD-9-CM: 279.9  11/16/2018 Yes        Stage IV adenocarcinoma of lung (HCC) (Chronic) ICD-10-CM: C34.90  ICD-9-CM: 162.9  7/11/2018 Yes        Pleural effusion ICD-10-CM: J90  ICD-9-CM: 511.9  4/27/2018 Yes        Chronic systolic congestive heart failure (HCC) (Chronic) ICD-10-CM: I50.22  ICD-9-CM: 428.22, 428.0  5/3/2017 Yes        Depression (Chronic) ICD-10-CM: F32.9  ICD-9-CM: 786  11/2/2012 Yes        CML (chronic myeloid leukemia) (HCC) (Chronic) ICD-10-CM: C92.10  ICD-9-CM: 205.10  8/25/2012 Yes    Overview Addendum 8/26/2012  7:23 AM by Brianna Lorenzo MD     8/25/12. Probable diagnosis. Will do bone marrow exam today  8/26/12. Bone marrow aspiration and biopsy done. Marrow aspirate and peripheral blood sent for flow, cytogenetics and molecular testing.  No complications with the procedure                 Here with respiratory failure with PNA in the setting of 3 primary malignancies: Non-small cell lung CA, Small Cell lung CA, and CML, pancytopenia S/P chemo, acute hepatitis/ pneumonitis, CHF and TIFFANIE. He was supposed to undergo PleurX placement 2 weeks ago but it was delayed due to starting Xerelto. He has not had a thoracentesis in over a month. EF found to be <15 on repeat echo. PC following with prognosis poor with MSOF- liver, kidney, heart and respiratory failure with 3 primary malignancies, and EF <15 %. Prevacid, Plt <24,000. PLAN:     Free water adjusted, watch Na+  Started on steroids and cellcept for pneumonitis  Now on cefepime D 3, diflucan, acyclovir   D 9 on vent, failed weaning trials and now on full vent support with HR in 140's-150's,   plt 24,000  Unable to extubate, day 9 on vent. Need to discuss compassionate extubation if he continues to deteriorate and he does not want LT aggressive care. Illene Music, NP-C      Lungs:  Coarse   Heart:  RRR with no Murmur/Rubs/Gallops    Additional Comments:  Looking bad, on vent, on PS but unable to wean it more, prognosis poor and unlikely will be able to be successfully extubated to be able to go home   I have spoken with and examined the patient. I agree with the above assessment and plan as documented.     Anastasia Bence, MD

## 2021-06-17 NOTE — PROGRESS NOTES
Discussion of possible end of life care soon with physicians and family. [facility] notified. Spoke to Corby Aguilar @ [phone]. Patient not a candidate. When patient expires, [facility] to be notified with time of death.

## 2021-06-17 NOTE — PROGRESS NOTES
Dr Norman Campos aware of patient's agonal breathing despite being adequately sedated. Respiratory notified and changed vent settings. No new orders. Dr Norman Campos also notified of low grade axillary temp - orders received for pan cultures.

## 2021-06-17 NOTE — PROGRESS NOTES
responded to Code Blue initiated for patient. He received CPR and was revived. I provided assistance to the patient's wife, son and two daughters. They were grieving his decline and the code he experienced. A spiritual presence, empathy, and prayer were offered to them. I was with Dr. Stone Alba when he talked with the patient's family.       Safia Medina

## 2021-06-17 NOTE — PROGRESS NOTES
Blood glucose was 215. The noted BS of 33 is a false critical result. I had to draw from port instead of the finger.

## 2021-06-17 NOTE — PROGRESS NOTES
[facility]        Inpatient Hematology / Oncology Progress Note      Admission Date: 6/8/2021 10:31 AM  Reason for Admission/Hospital Course: Hospital-acquired bacterial pneumonia [J15.9]  Pleural effusion [J90]  Atrial fibrillation with rapid ventricular response (HCC) [I48.91]  Lactic acidosis [E87.2]  Acute respiratory failure with hypoxia (HCC) [J96.01]  Severe sepsis (HCC) [A41.9, R65.20]      24 Hour Events:  Afebrile, tachycardic  Remains pancytopenic - on daily Granix  Remains intubated on vent - sedated/agonal breathing  Overall status declining - unable to wean from vent  Afib with RVR/SVT overnight and now on amiodarone gtt    ROS:  Unable to assess d/t intubated status    10 point review of systems is otherwise negative with the exception of the elements mentioned above in the HPI.      No Known Allergies    OBJECTIVE:  Patient Vitals for the past 8 hrs:   BP Temp Pulse Resp SpO2   06/17/21 0900 123/72 -- (!) 129 29 98 %   06/17/21 0845 98/71 -- (!) 129 30 96 %   06/17/21 0830 96/69 -- (!) 123 (!) 32 98 %   06/17/21 0815 90/69 -- (!) 131 30 96 %   06/17/21 0800 (!) 104/52 -- (!) 128 24 91 %   06/17/21 0745 (!) 107/53 -- (!) 132 21 95 %   06/17/21 0730 (!) 120/55 -- (!) 120 24 95 %   06/17/21 0715 (!) 131/58 -- (!) 125 22 95 %   06/17/21 0712 -- -- (!) 123 30 97 %   06/17/21 0700 125/63 98.7 °F (37.1 °C) (!) 119 24 93 %   06/17/21 0629 137/74 -- (!) 126 (!) 31 96 %   06/17/21 0614 124/71 -- (!) 128 (!) 38 96 %   06/17/21 0559 107/62 -- (!) 120 30 99 %   06/17/21 0544 114/66 -- (!) 117 20 98 %   06/17/21 0529 (!) 105/57 -- (!) 117 20 100 %   06/17/21 0514 (!) 116/58 -- (!) 117 (!) 35 98 %   06/17/21 0459 129/70 -- (!) 116 (!) 39 97 %   06/17/21 0445 -- -- (!) 114 (!) 31 99 %   06/17/21 0441 -- -- (!) 104 22 98 %   06/17/21 0429 (!) 94/55 99.6 °F (37.6 °C) (!) 104 25 99 %   06/17/21 0415 -- -- (!) 106 30 99 %   06/17/21 0414 (!) 110/49 -- (!) 106 (!) 34 100 %   06/17/21 0400 -- -- (!) 104 30 98 %   21 0359 111/65 -- (!) 113 (!) 47 98 %   21 0345 131/67 -- (!) 115 (!) 39 100 %   21 0329 (!) 118/58 -- (!) 110 (!) 42 97 %   21 0316 (!) 95/59 -- (!) 116 (!) 33 99 %   21 0315 -- -- (!) 113 (!) 33 99 %   21 0300 -- -- (!) 103 (!) 31 99 %   21 0246 (!) 124/59 -- -- -- --   21 0245 -- -- (!) 104 26 100 %   21 0230 -- -- (!) 104 (!) 32 99 %   21 0229 (!) 95/54 -- (!) 112 28 100 %   21 0215 -- -- (!) 114 29 98 %   21 0214 (!) 93/54 -- (!) 103 28 98 %   21 0200 -- -- (!) 111 26 97 %   21 0159 (!) 84/52 -- (!) 108 20 97 %   21 0150 109/65 -- (!) 113 24 100 %   21 0145 -- -- (!) 102 -- 97 %     Temp (24hrs), Av °F (37.2 °C), Min:97.9 °F (36.6 °C), Max:101 °F (38.3 °C)    No intake/output data recorded. Physical Exam:  Constitutional: Ill appearing  male lying in bed. Intubated   HEENT: Normocephalic and atraumatic. Skin Warm and dry. No bruising and no rash noted. No erythema. No pallor. Respiratory +agonal breathing. Decreased BS scattered rhonchi, ventilated    CVS Normal rate, regular rhythm and normal S1 and S2   Abdomen Soft, nontender and nondistended, normoactive bowel sounds.     Neuro EVERTON    MSK EVERTON    Psych EVERTON        Labs:      Recent Labs     06/17/21  1507 21  0308 06/15/21  0309   WBC 0.3*  --  0.2* 0.1*   RBC 2.80*  --  3.39* 3.54*   HGB 7.4*  --  8.7* 9.4*   HCT 24.0*  --  28.0* 28.8*   MCV 85.7  --  82.6 81.4   MCH 26.4  --  25.7* 26.6   MCHC 30.8*  --  31.1* 32.6   RDW 15.6*  --  15.3* 15.1*   PLT 24* 40* 15* 24*   GRANS  --   --  46 50   LYMPH  --   --  33 43   MONOS  --   --  8 7   EOS  --   --  0* 0*   BASOS  --   --  0 0   IG  --   --  13* 0   DF WBC TOO FEW TO DIFFERENTIATE  --  AUTOMATED AUTOMATED   ANEU  --   --  0.1* 0.1*   ABL  --   --  0.1* 0.1*   ABM  --   --  0.0* 0.0*   SALBADOR  --   --  0.0 0.0   ABB  --   --  0.0 0.0   AIG  --   --  0.0 0.0 Recent Labs     06/17/21  0302 06/16/21  1103 06/16/21  0308 06/15/21  0309   * 154* 153* 149*   K 3.8 4.1 3.4* 3.0*   *  --  116* 110*   CO2 31  --  32 31   AGAP 7  --  5* 8   *  --  208* 174*   BUN 78*  --  86* 82*   CREA 3.03*  --  2.79* 2.70*   GFRAA 27*  --  30* 31*   GFRNA 22*  --  24* 25*   CA 7.8*  --  8.2* 8.2*   AP 56  --  61 77   TP 4.8*  --  5.1* 5.5*   ALB 1.7*  --  2.0* 2.1*   GLOB 3.1  --  3.1 3.4   AGRAT 0.5*  --  0.6* 0.6*   MG 2.6*  --  2.8* 2.7*   PHOS  --   --  3.1 3.1         Imaging:  XR CHEST SNGL V [[id number]] Collected: 06/11/21 0542   Order Status: Completed Updated: 06/11/21 0545   Narrative:     EXAMINATION: One view chest     HISTORY: respiratory failure, h/o lung cancer     TECHNIQUE: Frontal chest.     COMPARISON: 6/9/2021     FINDINGS:     Stable support devices. Persistent right lung infiltrate. Improved right pleural effusion or atelectasis. No significant pneumothorax. No other significant interval changes. Impression:       1. Findings as described above. XR CHEST Kimani Olvera [[id number]]    Order Status: No result     RETROPERITONEUM LTD [[id number]] Collected: 06/10/21 2206   Order Status: Completed Updated: 06/10/21 2209   Narrative:     Renal ultrasound. CLINICAL INDICATION:  Acute renal insufficiency     PROCEDURE: Realtime grayscale color Doppler evaluation of the kidneys and   bladder. COMPARISON: No prior similar studies available for direct comparison. FINDINGS: Right kidney is normal in size at 9.5 cm. There is increased renal   cortical echogenicity. There is no hydronephrosis. There is very limited   evaluation of the left kidney secondary to overlying bowel gas. It measures   roughly 9 cm. There is increased renal cortical atrophy. No definite   hydronephrosis. The bladder is decompressed by Aranda catheter. Ascites is   present in the pelvis. Impression:     1.  Increased renal cortical echogenicity bilaterally can be seen with medical   renal disease. Note there is limited evaluation of the left kidney. 2. No definite hydronephrosis. 3. Ascites   XR CHEST Poc[place]ontas Olives [[id number]]    Order Status: No result    XR CHEST [place] Olives [[id number]]    Order Status: No result    XR CHEST PORT [[id number]] Collected: 06/09/21 2301   Order Status: Completed Updated: 06/09/21 2308   Narrative:     PORTABLE CHEST, June 9, 2021 at 2200 hours,   PORTABLE ABDOMEN, June 9, 2021 at 2155 hours:     CLINICAL HISTORY:  Follow-up intubation and tube placement. COMPARISON:  Portable chest yesterday and CT today. CHEST FINDINGS:  AP semi-erect image demonstrates is of inhomogeneous   edema/infiltrate bilaterally.  Endotracheal tube tip overlies the mid trachea. Mediport catheter remains in place.  The heart remains enlarged.  No definite   pneumothorax.  There are overlying radiopaque support devices.       ABDOMEN FINDINGS: AP supine images demonstrates the proximal sidehole of the   nasogastric tube just below the gastroesophageal junction.  No dilated bowel   loops of the epigastrium. Impression:       1.  ENDOTRACHEAL TUBE IN EXPECTED POSITION WITHOUT DEFINITE PNEUMOTHORAX OR   OTHER SIGNIFICANT CHANGE FROM CT TODAY. 2.  NASOGASTRIC TUBE PROXIMAL SIDEHOLE IS JUST BELOW THE GASTROESOPHAGEAL   JUNCTION.  ENHANCEMENT BY APPROXIMATELY 10 CM IS SUGGESTED. XR ABD (KUB) [[id number]] Collected: 06/09/21 2301   Order Status: Completed Updated: 06/09/21 2308   Narrative:     PORTABLE CHEST, June 9, 2021 at 2200 hours,   PORTABLE ABDOMEN, June 9, 2021 at 2155 hours:     CLINICAL HISTORY:  Follow-up intubation and tube placement. COMPARISON:  Portable chest yesterday and CT today. CHEST FINDINGS:  AP semi-erect image demonstrates is of inhomogeneous   edema/infiltrate bilaterally.  Endotracheal tube tip overlies the mid trachea.    Mediport catheter remains in place.  The heart remains enlarged.  No definite   pneumothorax. Kayla Booker are overlying radiopaque support devices.       ABDOMEN FINDINGS: AP supine images demonstrates the proximal sidehole of the   nasogastric tube just below the gastroesophageal junction.  No dilated bowel   loops of the epigastrium. Impression:       1.  ENDOTRACHEAL TUBE IN EXPECTED POSITION WITHOUT DEFINITE PNEUMOTHORAX OR   OTHER SIGNIFICANT CHANGE FROM CT TODAY. 2.  NASOGASTRIC TUBE PROXIMAL SIDEHOLE IS JUST BELOW THE GASTROESOPHAGEAL   JUNCTION.  ENHANCEMENT BY APPROXIMATELY 10 CM IS SUGGESTED. CT CHEST WO CONT [[id number]] Collected: 06/09/21 1420   Order Status: Completed Updated: 06/09/21 1426   Narrative:     CT of the chest without contrast.     CLINICAL INDICATION: Small cell lung cancer, metastatic, prior pulmonary emboli     PROCEDURE: Serial thin section axial images obtained from the thoracic inlet   through the upper abdomen without the administration of intravenous contrast.   Radiation dose reduction techniques were used for this study. Our CT scanners   use one or all of the following: Automated exposure control, adjusted of the mA   and/or kV according to patient size, iterative reconstruction     COMPARISON: Chest CT dated 5/12/2021     FINDINGS:     There is nodular pleural thickening throughout the right hemithorax most in   keeping with pleural-based metastatic disease. Multiple prominent mediastinal   lymph nodes are present also concerning for metastatic disease. The heart is   enlarged. Patchy airspace consolidation with air bronchograms are appreciated at   the right martina unchanged from the prior exam. This may be a posttreatment   change. Pneumonia should be considered. Multiple pulmonary nodules are noted   throughout the right lower lobe concerning for pulmonary metastatic disease. More confluent groundglass opacities noted throughout the left upper lobe and   left lower lobe. This is nonspecific. There is a trace right pleural effusion.      Limited evaluation of the upper abdomen shows soft tissue density along the   right peritoneum and right diaphragm also concerning for metastatic disease. No aggressive osseous is identified. Impression:     1. Worsening of the nodular pleural thickening throughout the right hemithorax   with multiple pulmonary nodules throughout the right lower lobe most in keeping   with pulmonary and pleural metastatic disease. There is a small right pleural   effusion also likely metastatic. 2. Stable confluent airspace opacity at the right martina may be a prior treatment   effect. Pneumonia could also be considered. 3. Nonspecific groundglass opacity throughout the left upper lobe, lingula and   lower lobe. 4. Cardiomegaly   5. Mediastinal lymphadenopathy.        Medications:  Current Facility-Administered Medications   Medication Dose Route Frequency    amiodarone (CORDARONE) 450 mg in dextrose 5% 250 mL infusion  0.5-1 mg/min IntraVENous TITRATE    0.9% sodium chloride infusion 250 mL  250 mL IntraVENous PRN    dextrose 5% infusion  75 mL/hr IntraVENous CONTINUOUS    [Held by provider] amiodarone (CORDARONE) tablet 400 mg  400 mg Per NG tube BID    tbo-filgrastim (GRANIX) injection 300 mcg  300 mcg SubCUTAneous DAILY    methylPREDNISolone (PF) (SOLU-MEDROL) injection 40 mg  40 mg IntraVENous Q12H    Saccharomyces boulardii (FLORASTOR) capsule 250 mg  250 mg Per NG tube BID    cefepime (MAXIPIME) 2 g in 0.9% sodium chloride (MBP/ADV) 100 mL MBP  2 g IntraVENous Q24H    metoprolol succinate (TOPROL-XL) XL tablet 12.5 mg  12.5 mg Oral DAILY    fluconazole (DIFLUCAN) tablet 100 mg  100 mg Oral DAILY    LORazepam (ATIVAN) injection 1 mg  1 mg IntraVENous Q4H PRN    morphine injection 2 mg  2 mg IntraVENous Q4H PRN    acyclovir (ZOVIRAX) 200 mg/5 mL oral suspension 400 mg  400 mg Per NG tube Q12H    NUTRITIONAL SUPPORT ELECTROLYTE PRN ORDERS   Does Not Apply PRN    levalbuterol (XOPENEX) nebulizer soln 1.25 mg/3 mL  1.25 mg Nebulization Q6H RT  insulin glargine (LANTUS) injection 20 Units  20 Units SubCUTAneous DAILY    mycophenolate mofetil (CELLCEPT) 200 mg/mL oral suspension 1,000 mg  1,000 mg Per NG tube ACB&D    fentaNYL citrate (PF) injection 50 mcg  50 mcg IntraVENous Q4H PRN    lansoprazole (PREVACID) 3 mg/mL oral suspension 30 mg  30 mg Per NG tube DAILY    insulin lispro (HUMALOG) injection 0-10 Units  0-10 Units SubCUTAneous Q6H    dextrose 40% (GLUTOSE) oral gel 1 Tube  15 g Oral PRN    glucagon (GLUCAGEN) injection 1 mg  1 mg IntraMUSCular PRN    dextrose (D50W) injection syrg 12.5-25 g  25-50 mL IntraVENous PRN    fentaNYL in normal saline (pf) 25 mcg/mL infusion  0-200 mcg/hr IntraVENous TITRATE    sodium chloride (NS) flush 5-40 mL  5-40 mL IntraVENous Q8H    sodium chloride (NS) flush 5-40 mL  5-40 mL IntraVENous PRN    ondansetron (ZOFRAN) injection 4 mg  4 mg IntraVENous Q4H PRN    phenol throat spray (CHLORASEPTIC) 1 Spray  1 Spray Oral PRN         ASSESSMENT:    Problem List  Date Reviewed: 5/26/2021        Codes Class Noted    Tachycardia ICD-10-CM: R00.0  ICD-9-CM: 785.0  6/15/2021        Pancytopenia ([facility] ICD-10-CM: X66.736  ICD-9-CM: 284.19  6/13/2021        Moderate protein-calorie malnutrition (HCC) (Chronic) ICD-10-CM: E44.0  ICD-9-CM: 263.0  6/10/2021        TIFFANIE (acute kidney injury) ([facility] ICD-10-CM: N17.9  ICD-9-CM: 584.9  6/10/2021        Acute liver failure without hepatic coma ICD-10-CM: K72.00  ICD-9-CM: 491  6/10/2021        Other pulmonary embolism without acute cor pulmonale ([facility] ICD-10-CM: I26.99  ICD-9-CM: 415.19  6/10/2021        Acute respiratory failure with hypoxia (HCC) ICD-10-CM: J96.01  ICD-9-CM: 518.81  6/8/2021        Hospital-acquired bacterial pneumonia ICD-10-CM: J15.9  ICD-9-CM: 482.9  6/8/2021        Bilateral pleural effusion ICD-10-CM: J90  ICD-9-CM: 511.9  6/8/2021        * (Principal) Severe sepsis ([facility] ICD-10-CM: A41.9, R65.20  ICD-9-CM: 038.9, 995.92  6/8/2021        Elevated serum creatinine ICD-10-CM: R79.89  ICD-9-CM: 790.99  10/5/2020        Hypomagnesemia ICD-10-CM: E83.42  ICD-9-CM: 275.2  7/27/2020        Shortness of breath ICD-10-CM: R06.02  ICD-9-CM: 786.05  3/28/2020        Pleural effusion on right ICD-10-CM: J90  ICD-9-CM: 511.9  3/28/2020        SCLC (small cell lung carcinoma), right (HCC) (Chronic) ICD-10-CM: C34.91  ICD-9-CM: 162.9  3/13/2020        Acute pulmonary edema ([facility] ICD-10-CM: J81.0  ICD-9-CM: 518.4  9/18/2019        Acute CHF (congestive heart failure) ([facility] ICD-10-CM: I50.9  ICD-9-CM: 428.0  9/16/2019        Pneumonia ICD-10-CM: J18.9  ICD-9-CM: 486  1/6/2019        Pulmonary emboli (HCC) ICD-10-CM: I26.99  ICD-9-CM: 415.19  1/6/2019        Immunocompromised ([facility] (Chronic) ICD-10-CM: D84.9  ICD-9-CM: 279.9  11/16/2018        Stage IV adenocarcinoma of lung (HCC) (Chronic) ICD-10-CM: C34.90  ICD-9-CM: 162.9  7/11/2018        Pleural effusion ICD-10-CM: J90  ICD-9-CM: 511.9  4/27/2018        Abnormal chest CT ICD-10-CM: R93.89  ICD-9-CM: 793.2  4/27/2018        Pulmonary infiltrate ICD-10-CM: R91.8  ICD-9-CM: 793.19  4/27/2018        CAP (community acquired pneumonia) ICD-10-CM: J18.9  ICD-9-CM: 200  3/27/2018        Chronic systolic congestive heart failure (HCC) (Chronic) ICD-10-CM: I50.22  ICD-9-CM: 428.22, 428.0  5/3/2017        Depression (Chronic) ICD-10-CM: F32.9  ICD-9-CM: 481  11/2/2012        CML (chronic myeloid leukemia) ([facility] (Chronic) ICD-10-CM: C92.10  ICD-9-CM: 205.10  8/25/2012    Overview Addendum 8/26/2012  7:23 AM by Lior Rubio MD     8/25/12. Probable diagnosis. Will do bone marrow exam today  8/26/12. Bone marrow aspiration and biopsy done. Marrow aspirate and peripheral blood sent for flow, cytogenetics and molecular testing.  No complications with the procedure             Allergic rhinitis ICD-10-CM: J30.9  ICD-9-CM: 477.9  8/24/2012        Erectile dysfunction ICD-10-CM: N52.9  ICD-9-CM: 607.84  8/24/2012             Maryanne Edwards is a 56-year-old man with history of CML (currently on observation, previously on [address]) and non-small cell lung carcinoma 2018 on osimertinib with recurrent disease, and small cell lung cancer with metastasis to liver 3/2020 (started on cis/etoposide and atezo). Most recently on osimertinib for NSCLC and s/p C1 carbo/etop/atezolizumab on 6/3-6/5/21. He met with Dr [place] Graves (primary oncologist) end of May to review CT results which showed hepatic POD. He reviewed the complexity of pt's case and tx options. Pt desired to purse rechallenge with triple therapy along with osimertinib. Dr [place] Graves reviewed lack of data regarding this combination and potential rxns including pneumonitis. Mr Maryanne Edwards was admitted today (6/8) with acute resp failure and worsening s/s in last few days. His med hx is further complicated by CKD, PE on DOAC and HF with EF ~20%. His HR was in 120-130s irreg and he was started on tx for afib with RVR. On IVF/vanc/Zosyn. Moved to intensive care setting and on BiPAP. Pulm evaluated pt and he is currently on IV steroids. DOAC on hold and pt on heparin gtt. We are consulted for onc recommendations.       He was seen in CCU. He is sitting in bed with BiPAP in place. We spent some time talking about his medical condition, how critically ill he is and next steps. He expressed earlier during this admission that he'd like to revoke his DNR/DNI status and is now a FULL code.         PLAN:    Interval increase in bilateral lung infiltrates  6/8 zosyn and vanc  6/11 Remains on Zosyn. CXR 6/11 with persistent right lung infiltrate   6/14 on Zosyn  6/15 Completed Zosyn today  6/16 ID added Cef while neutropenic.     Concern for pneumonitis  6/8 on solumedrol  6/11 Remains on solumedrol 40 mg every 8 hours. Consult ID per guidelines for life threatening pneumonitis.     6/16 remains on vent; on solumedrol and mycophenolate      PE 5/2012 6/8 Xarelto on hold, switched to heparin for pending procedures  6/11 Continues on Heparin   6/14 AC contraindicated d/t thrombocytopenia     Afib/RVR   6/8 per cards, on amio gtt   6/9 remains on drip considering moving out of ICU once controlled on oral  6/11 Remains on Amio drip - okay for short term per cards   6/14 Cards managing. No longer on amio gtt  6/17 Afib with RVR/SVT overnight - back on amio gtt.     SCLC sp Carbo etoposide atezolizumab given 6/3-6/5. Expect to see counts to start dropping. Monitor closely. 6/9 counts beginning to drop. Hgb 7.1 transfuse given cardiac history. 6/11 WBC 2.1, Hgb 7.7, Plt 36k.     6/13 wbc 0.1/Hb 7 post transfusion and plts <10 - will get plts today; asked RN to repeat cbc after plts - if Hb <7 and plts <20 will transfuse (slow rate due to low EF reviewed with RN, ie if needs PRBC pls transfuse over 3-4 hrs); Granix now and daily - orders placed for 3 days - will need to re-assess   6/16 , Hgb 8.7, Plt 15k. Con't Granix. Transfuse plt. 6/17 , Hgb 7.4, Plts 24k after transfusion yesterday. Remains on daily Granix.     NSCLC / SCLC with liver mets   - osimertinib on hold  - s/p C1 Tecentriq/Carbo/Etop on 6/3 - 6/5    Heart failure EF 20%  -Daily weights, strict I&Os, transfuse blood products at slow rate discussed with RN  - repeat echo on 6/11 with EF <15% - pt and son aware     Acute Respiratory Failure  6/11 Now intubated/sedated as of 6/9.    6/16 remains intubated on vent    TIFFANIE  6/11 Nephro consulted for worsening renal function--not a candidate for HD  6/16 Cr 2.79  6/17 Cr up to 3.03    Worsening AST/ALT  6/11 ALT up to 2580, AST 3158. Recommend Mycophenolate 1 gm BID - oral suspension okay per pharmD. 6/13 LFTs much improved; likely component of ischemic hepatitis too   6/16 LFTs con't to improve    Goals and plan of care reviewed. Thank you for allowing us to participate in the care of Mr. Manish Kumar. We will continue to follow along. Prognosis is very poor.             Heather Singleton, [facility]  [address] [place] [address]  Office : [phone]  Fax : [phone]           Attending Addendum:  I have personally performed a face to face diagnostic evaluation on this patient.  I have reviewed and agree with the care plan as documented by Michelle Baca N.P. My findings are as follows:  He has CML and lung cancer, appears sedated on ventilator, heart rate tachycardic, abdomen is non-tender, bowel sounds are sluggish, we will continue steroids and MMF, his prognosis is dismal.              Libby Reyes MD      [facility] Hematology/Oncology  [address] [id number] [facility]  Office : [phone]  Fax : [phone]

## 2021-06-17 NOTE — PROGRESS NOTES
A follow up visit was made to the patient. Emotional support, spiritual presence and   prayer were provided for the patient. The patient's nurse requested that I come and offer a prayer for additional family members who have come to visit the patient and his immediate family. The patient experienced a Code Blue this afternoon.       Aria Vaughan, [address] [facility], [facility]

## 2021-06-17 NOTE — PROGRESS NOTES
[facility] CARDIOLOGY PROGRESS NOTE           6/17/2021 7:07 AM    Admit Date: 6/8/2021         Subjective: Again had some runs of NSVT vis afib with abbarency. Remains on the vent (Day 8). ROS:  Cardiovascular:  As noted above    Objective:      Vitals:    06/17/21 0544 06/17/21 0559 06/17/21 0614 06/17/21 0629   BP: 114/66 107/62 124/71 137/74   Pulse: (!) 117 (!) 120 (!) 128 (!) 126   Resp: 20 30 (!) 38 (!) 31   Temp:       SpO2: 98% 99% 96% 96%   Weight:       Height:           On telemetry: afib with RVR      Physical Exam:  General: acutely ill ventilated  Neck: supple, no JVD  Heart: S1S2 with RRR without murmurs or gallops  Lungs: rhonchi bilaterally   Abd: soft, nontender, nondistended, with good bowel sounds  Ext: no edema bilaterally  Skin: warm and dry      Data Review:   Recent Labs     06/17/21  0302 06/16/21  1507 06/16/21  1103 06/16/21  0332 06/16/21  0308 06/15/21  0309   *  --  154*  --  153*   < >   K 3.8  --  4.1  --  3.4*   < >   MG 2.6*  --   --   --  2.8*  --    BUN 78*  --   --   --  86*  --    CREA 3.03*  --   --   --  2.79*  --    *  --   --   --  208*  --    WBC 0.3*  --   --   --  0.2*  --    HGB 7.4*  --   --   --  8.7*  --    HCT 24.0*  --   --   --  28.0*  --    PLT 24* 40*  --   --  15*   < >   INR 1.8  --   --  1.8  --   --     < > = values in this interval not displayed. No results for input(s): [facility] in the last 72 hours. Assessment/Plan:     Principal Problem:    Severe sepsis ([facility] (6/8/2021)    Active Problems:    CML (chronic myeloid leukemia) ([address] (8/25/2012)      Overview: 8/25/12. Probable diagnosis. Will do bone marrow exam today      8/26/12. Bone marrow aspiration and biopsy done. Marrow aspirate and       peripheral blood sent for flow, cytogenetics and molecular testing.  No       complications with the procedure      Depression (11/2/2012)      Chronic systolic congestive heart failure ([facility] (5/3/2017) Pleural effusion (4/27/2018)      Stage IV adenocarcinoma of lung ([address] (7/11/2018)      Immunocompromised ([address] (11/16/2018)      SCLC (small cell lung carcinoma), right ([address] (3/13/2020)      Acute respiratory failure with hypoxia ([address] (6/8/2021)      Hospital-acquired bacterial pneumonia (6/8/2021)      Bilateral pleural effusion (6/8/2021)      Moderate protein-calorie malnutrition ([address] (6/10/2021)      TIFFANIE (acute kidney injury) ([address] (6/10/2021)      Acute liver failure without hepatic coma (6/10/2021)      Other pulmonary embolism without acute cor pulmonale (HCC) (6/10/2021)      Pancytopenia (HCC) (6/13/2021)      Tachycardia (6/15/2021)    A/P  1) Mulisystem organ failure - poor prognosis  2) sCHF - off HF medications with hypotension, now off pressors BP is marginal  3) hypoxic respiratory failure - per icu team, ventilated day 8   4) AoCKD - per ICU team, worse today  5) Anemia/neuropenia/thrombocytopenia - per H/O no anticoag  6) NSCLC - metastatic, per H/O recommendations  7) Afib - back in RVR will restart amiodarone gtt with bolus today      Francia Roa MD  6/17/2021 7:07 AM

## 2021-06-17 NOTE — PROGRESS NOTES
Ventilator check complete; patient has a #7.5 ET tube secured at the 24 at the lip. Patient is sedated. Patient is not able to follow commands. Breath sounds are coarse and crackles. Trachea is midline, Negative for subcutaneous air, and chest excursion is symmetric. Patient is also Negative for cyanosis and is Negative for pitting edema. All alarms are set and audible. Resuscitation bag is at the head of the bed. Ventilator Settings  Mode FIO2 Rate Tidal Volume Pressure PEEP I:E Ratio   Pressure support  39 %    0.5 ml  12 cm H2O  8 cm H20  1:2.3      Peak airway pressure: 22 cm H2O   Minute ventilation: 15.6 l/min     ABG: No results for input(s): PH, PCO2, PO2, HCO3 in the last 72 hours.       Aaliyah Torrez, RT

## 2021-06-17 NOTE — PROGRESS NOTES
Chart reviewed. Pt not seen today. Cardiac arrest noted. QT has been reassuring over the past few checks, but would hold fluconazole for now. Overall near-term prognosis is poor with ongoing failure to wean. Long-term prognosis also poor. ID will see tomorrow if goals remain aggressive care.

## 2021-06-17 NOTE — PROGRESS NOTES
Interdisciplinary team rounds were held 6/17/2021 with the following team members:Care Management, Nursing, Nurse Practitioner, Pharmacy, Physician and Respiratory Therapy and the patient. Plan of care discussed. See clinical pathway and/or care plan for interventions and desired outcomes. Dr Medardo Pugh aware of patient condition and critical labs.

## 2021-06-17 NOTE — PROGRESS NOTES
Palliative Care Progress Note    Patient: Joan Apley. MRN: [id number]  SSN: [id number]    YOB: 1955  Age: 60 y.o. Sex: male       Assessment/Plan:     Chief Complaint/Interval History: pt remains intubated, lethargic after morphine this am    Principal Diagnosis:    · Dyspnea  R06.00    Additional Diagnoses:   · Debility, Unspecified  R53.81  · Frailty  R54  · Respiratory Failure, Acute on Chronic  J96.20  · Counseling, Encounter for Medical Advice  Z71.9  · Encounter for Palliative Care  Z51.5    Palliative Performance Scale (PPS)       Medical Decision Making:   Reviewed and summarized labs and imaging. Pt remains intubated. Scr trending up. Spoke with wife via phone and updated on current condition. I discussed planning for home with hospice and reviewed code status with her as well. I have arranged a family meeting for tomorrow morning at 10. Will discuss findings with members of the interdisciplinary team.      More than 50% of this 25 minute visit was spent counseling and coordination of care as outlined above. Subjective:     Review of Systems negative except as documented in h&p. Objective:     Visit Vitals  /67   Pulse (!) 131   Temp 98.7 °F (37.1 °C)   Resp 28   Ht 6' 1\" (1.854 m)   Wt 171 lb 1.2 oz (77.6 kg)   SpO2 99%   BMI 22.57 kg/m²       Physical Exam:    General:  On vent. Eyes:  Conjunctivae/corneas clear    Nose: Nares normal. Septum midline. Neck: Supple, symmetrical, trachea midline, no JVD   Lungs:   Diffuse coarse bs   Heart:  Regular rate and rhythm, no murmur    Abdomen:   Soft, non-tender, non-distended   Extremities: Normal, atraumatic, no cyanosis or edema   Skin: Skin color, texture, turgor normal. No rash or lesions.    Neurologic: Moves extremities   Psych: Alert, calm     Signed By: Som Marquez MD     June 17, 2021

## 2021-06-17 NOTE — PROGRESS NOTES
Code Blue Note :    Responded to code blue called. Patient was noted to have agonal breathing and suddenly became bradycardic and pulseless. CPR was initiated and 1 amp of IV epinephrine is given. Patient had ROSC after 2 minutes CPR. He was currently on amiodarone drip along with fentanyl drip. Stat chest x-ray was done and revealed persistent perihilar atelectasis and infiltrate and loculated pneumothorax or pneumatocele at the right base. The patient currently is more hypotensive and he will be started on Levophed drip and other pressors if needed. I met with his family and discussed with them in detail the critical condition of Mr. Aster Valencia and he had multisystem organ failure which likely indicating terminal prognosis. I advised him that doing CPR in case of another cardiac arrest would likely to be more harmful than helpful and will not improve his quality of life rather than prolong his suffering. They are going to think about that and have other family members to come and see him.   The  and primary RN were present at the time of the meeting    Total critical care time spent with patient care so far is 28 min            Megan Chan MD

## 2021-06-18 NOTE — PROGRESS NOTES
Pt passed away at 1000 post compassionate extubation and withdrawal of continuous gtts. All pertinent staff made fully aware including charge nurse, Dr. Skinny Peralta MD, Pallative care MD, , respiratory therapist. Nursing Supervisor contacted. Family at bedside including pt's spouse and children. Bereavement cart supplied.

## 2021-06-18 NOTE — PROGRESS NOTES
Ventilator check complete; patient has a #8. 0 ET tube secured at the 25 at the lip. Patient is sedated. Patient is not able to follow commands. Breath sounds are coarse. Trachea is midline, Negative for subcutaneous air, and chest excursion is symmetric. Patient is also Negative for cyanosis and is Positive for pitting edema. All alarms are set and audible. Resuscitation bag is at the head of the bed.       Ventilator Settings  Mode FIO2 Rate Tidal Volume Pressure PEEP I:E Ratio   VC+  98 %   24 bpm  500 ml    8 cm H20  1:1.5      Peak airway pressure: 30 cm H2O   Minute ventilation: 11.4 l/min           [facility], RT

## 2021-06-18 NOTE — PROGRESS NOTES
Family is at the bedside and has decided to pursue comfort care. Awaitng family arrival and plans to compassionately extubate once here.     Tierney Alexis, NP

## 2021-06-18 NOTE — PROGRESS NOTES
Called to pronounce patient who was made comfort care today. Patient does not respond to voice and tactile stimuli, pupils are fixed and non responsive. Has no heart ,breath sounds,    Patient pronounce at 10.05 am. Family at the bedside.     Casie Ying MD

## 2021-06-18 NOTE — PROGRESS NOTES
[facility] CARDIOLOGY PROGRESS NOTE           6/18/2021 10:51 AM    Admit Date: 6/8/2021         Subjective: Continues to have exceedingly poor prognosis, code event yesterday, achieved ROSC, on pressor support. ROS:  Cardiovascular:  As noted above    Objective:      Vitals:    06/18/21 0930 06/18/21 0940 06/18/21 0950 06/18/21 1000   BP: (!) 114/57 (!) 103/55 (!) 74/44    Pulse: (!) 105 (!) 107 (!) 53 (!) 0   Resp: 24 (!) 35 (!) 35 (!) 0   Temp:       SpO2: 98%      Weight:       Height:             Physical Exam:  General: acutely ill ventilated  Neck: supple, no JVD  Heart: S1S2 with RRR without murmurs or gallops  Lungs: rhonchi bilaterally   Abd: soft, nontender, nondistended, with good bowel sounds  Ext: no edema bilaterally  Skin: warm and dry    Data Review:   Recent Labs     06/18/21  0334 06/17/21  1720 06/17/21  0302   * 151* 153*   K 4.1 4.7 3.8   MG 2.6*  --  2.6*   BUN 98* 85* 78*   CREA 3.98* 3.46* 3.03*   * 189* 197*   WBC 0.4* 0.9* 0.3*   HGB 7.1* 7.6* 7.4*   HCT 23.2* 25.5* 24.0*   PLT 10* 23* 24*   INR 1.9  --  1.8       No results for input(s): Maximiliano Carter in the last 72 hours. Assessment/Plan:     Principal Problem:    Severe sepsis ([facility] (6/8/2021)    Active Problems:    CML (chronic myeloid leukemia) ([facility] (8/25/2012)      Overview: 8/25/12. Probable diagnosis. Will do bone marrow exam today      8/26/12. Bone marrow aspiration and biopsy done. Marrow aspirate and       peripheral blood sent for flow, cytogenetics and molecular testing.  No       complications with the procedure      Depression (11/2/2012)      Chronic systolic congestive heart failure ([facility] (5/3/2017)      Pleural effusion (4/27/2018)      Stage IV adenocarcinoma of lung ([facility] (7/11/2018)      Immunocompromised ([facility] (11/16/2018)      SCLC (small cell lung carcinoma), right ([facility] (3/13/2020)      Acute respiratory failure with hypoxia ([facility] (6/8/2021)      Hospital-acquired bacterial pneumonia (6/8/2021)      Bilateral pleural effusion (6/8/2021)      Moderate protein-calorie malnutrition ([facility] (6/10/2021)      TIFFANIE (acute kidney injury) ([facility] (6/10/2021)      Acute liver failure without hepatic coma (6/10/2021)      Other pulmonary embolism without acute cor pulmonale (HCC) (6/10/2021)      Pancytopenia (HCC) (6/13/2021)      Tachycardia (6/15/2021)      Cardiac arrest ([facility] (6/17/2021)    A/P  1) Mulisystem organ failure - poor prognosis  2) sCHF - off HF medications with hypotension, now back on pressor  3) hypoxic respiratory failure - per icu team, ventilated day 9  4) AoCKD - per ICU team, worse today  5) Anemia/neuropenia/thrombocytopenia - per H/O no anticoag  6) NSCLC - metastatic, per H/O recommendations  7) Afib - sinus today    Continue amiodarone gtt, exceedingly poor prognosis.  Cardiology will plan to sign off, if there are further areas we can be of assistance please re-consult cardiology team.      Lasha Friedman MD  6/18/2021 10:51 AM

## 2021-06-18 NOTE — PROGRESS NOTES
Palliative Care Progress Note    Patient: Cornelia Crump. MRN: [id number]  SSN: [id number]    YOB: 1955  Age: 60 y.o. Sex: male       Assessment/Plan:     Chief Complaint/Interval History: coded overnight and yesterday. Now on comfort measures    Principal Diagnosis:    · Dyspnea  R06.00    Additional Diagnoses:   · Debility, Unspecified  R53.81  · Frailty  R54  · Respiratory Failure, Acute on Chronic  J96.20  · Counseling, Encounter for Medical Advice  Z71.9  · Encounter for Palliative Care  Z51.5    Palliative Performance Scale (PPS)       Medical Decision Making:   Reviewed and summarized labs and imaging. Pt extubated this am.  On comfort measures after multiple codes yesterday and overnight  Morphine 2 mg iv q 30 min prn pain, dyspnea  Ativan 1 mg iv q 30 min prn anxiety  Robinul 0.1 mg iv q 6 hr prn secretions. Will discuss findings with members of the interdisciplinary team.      More than 50% of this 25 minute visit was spent counseling and coordination of care as outlined above. Subjective:     Review of Systems unable to obtain due to mentation. Objective:     Visit Vitals  BP (!) 94/51   Pulse (!) 104   Temp (!) 101.9 °F (38.8 °C)   Resp 26   Ht 6' 1\" (1.854 m)   Wt 171 lb 1.2 oz (77.6 kg)   SpO2 100%   BMI 22.57 kg/m²       Physical Exam:    General:  unresponsive. Eyes:  Conjunctivae/corneas clear    Nose: Nares normal. Septum midline. Neck: Supple, symmetrical, trachea midline, no JVD   Lungs:   Diffuse coarse bs   Heart:  Regular rate and rhythm, no murmur    Abdomen:   Soft, non-tender, non-distended   Extremities: Normal, atraumatic, no cyanosis or edema   Skin: Skin color, texture, turgor normal. No rash or lesions.    Neurologic: unresponsive   Psych: unresponsive     Signed By: Sonia Arguello MD     June 18, 2021

## 2021-06-18 NOTE — PROGRESS NOTES
Ventilator check complete; patient has a #8. 0 ET tube secured at the 25 at the lip. Patient is sedated. Patient is not able to follow commands. Breath sounds are coarse. Trachea is midline, Negative for subcutaneous air, and chest excursion is symmetric. Patient is also Negative for cyanosis and is Positive for pitting edema. All alarms are set and audible. Resuscitation bag is at the head of the bed.       Ventilator Settings  Mode FIO2 Rate Tidal Volume  PEEP I:E Ratio   VC+  50 % 24 500 ml    8 cm H20  1:2.3      Peak airway pressure: 21 cm H2O   Minute ventilation: 11.3 l/min       Mariah Mora, RT

## 2021-06-18 NOTE — DISCHARGE SUMMARY
Death Summary    [facility].   Admission date:  6/8/2021  Discharge date:  6/18/2021    Admitting Diagnosis:  Hospital-acquired bacterial pneumonia [J15.9]  Pleural effusion [J90]  Atrial fibrillation with rapid ventricular response (HCC) [I48.91]  Lactic acidosis [E87.2]  Acute respiratory failure with hypoxia (HCC) [J96.01]  Severe sepsis ([facility] [A41.9, R65.20]  Discharge Diagnosis:    Problem List as of 6/18/2021 Date Reviewed: 5/26/2021        Codes Class Noted - Resolved    Cardiac arrest [facility]) ICD-10-CM: I46.9  ICD-9-CM: 427.5  6/17/2021 - Present        Tachycardia ICD-10-CM: R00.0  ICD-9-CM: 785.0  6/15/2021 - Present        Pancytopenia ([facility] ICD-10-CM: N16.378  ICD-9-CM: 284.19  6/13/2021 - Present        Moderate protein-calorie malnutrition ([facility] (Chronic) ICD-10-CM: E44.0  ICD-9-CM: 263.0  6/10/2021 - Present        TIFFANIE (acute kidney injury) ([facility] ICD-10-CM: N17.9  ICD-9-CM: 584.9  6/10/2021 - Present        Acute liver failure without hepatic coma ICD-10-CM: K72.00  ICD-9-CM: 570  6/10/2021 - Present        Other pulmonary embolism without acute cor pulmonale (HCC) ICD-10-CM: I26.99  ICD-9-CM: 415.19  6/10/2021 - Present        Acute respiratory failure with hypoxia ([facility] ICD-10-CM: J96.01  ICD-9-CM: 518.81  6/8/2021 - Present        Hospital-acquired bacterial pneumonia ICD-10-CM: J15.9  ICD-9-CM: 482.9  6/8/2021 - Present        Bilateral pleural effusion ICD-10-CM: J90  ICD-9-CM: 511.9  6/8/2021 - Present        * (Principal) Severe sepsis ([address] ICD-10-CM: A41.9, R65.20  ICD-9-CM: 038.9, 995.92  6/8/2021 - Present        Elevated serum creatinine ICD-10-CM: R79.89  ICD-9-CM: 790.99  10/5/2020 - Present        Hypomagnesemia ICD-10-CM: E83.42  ICD-9-CM: 275.2  7/27/2020 - Present        Shortness of breath ICD-10-CM: R06.02  ICD-9-CM: 786.05  3/28/2020 - Present        Pleural effusion on right ICD-10-CM: J90  ICD-9-CM: 511.9  3/28/2020 - Present        SCLC (small cell lung carcinoma), right (HCC) (Chronic) ICD-10-CM: C34.91  ICD-9-CM: 162.9  3/13/2020 - Present        Acute pulmonary edema (HCC) ICD-10-CM: J81.0  ICD-9-CM: 518.4  9/18/2019 - Present        Acute CHF (congestive heart failure) ([facility] ICD-10-CM: I50.9  ICD-9-CM: 428.0  9/16/2019 - Present        Pneumonia ICD-10-CM: J18.9  ICD-9-CM: 486  1/6/2019 - Present        Pulmonary emboli (HCC) ICD-10-CM: I26.99  ICD-9-CM: 415.19  1/6/2019 - Present        Immunocompromised (HCC) (Chronic) ICD-10-CM: D84.9  ICD-9-CM: 279.9  11/16/2018 - Present        Stage IV adenocarcinoma of lung (HCC) (Chronic) ICD-10-CM: C34.90  ICD-9-CM: 162.9  7/11/2018 - Present        Pleural effusion ICD-10-CM: J90  ICD-9-CM: 511.9  4/27/2018 - Present        Abnormal chest CT ICD-10-CM: R93.89  ICD-9-CM: 793.2  4/27/2018 - Present        Pulmonary infiltrate ICD-10-CM: R91.8  ICD-9-CM: 793.19  4/27/2018 - Present        CAP (community acquired pneumonia) ICD-10-CM: J18.9  ICD-9-CM: 280  3/27/2018 - Present        Chronic systolic congestive heart failure (HCC) (Chronic) ICD-10-CM: I50.22  ICD-9-CM: 428.22, 428.0  5/3/2017 - Present        Depression (Chronic) ICD-10-CM: F32.9  ICD-9-CM: 049  11/2/2012 - Present        CML (chronic myeloid leukemia) ([facility] (Chronic) ICD-10-CM: C92.10  ICD-9-CM: 205.10  8/25/2012 - Present    Overview Addendum 8/26/2012  7:23 AM by Kane Marks MD     8/25/12. Probable diagnosis. Will do bone marrow exam today  8/26/12. Bone marrow aspiration and biopsy done. Marrow aspirate and peripheral blood sent for flow, cytogenetics and molecular testing.  No complications with the procedure             Allergic rhinitis ICD-10-CM: J30.9  ICD-9-CM: 477.9  8/24/2012 - Present        Erectile dysfunction ICD-10-CM: N52.9  ICD-9-CM: 607.84  8/24/2012 - Present        RESOLVED: Paroxysmal atrial fibrillation (HCC) ICD-10-CM: I48.0  ICD-9-CM: 427.31  6/8/2021 - 6/8/2021        RESOLVED: Atrial fibrillation with rapid ventricular response ([facility] ICD-10-CM: I48.91  ICD-9-CM: 427.31  6/8/2021 - 6/8/2021        RESOLVED: Fatigue ICD-10-CM: R53.83  ICD-9-CM: 780.79  11/2/2012 - 4/4/2018        RESOLVED: Cramp in muscle ICD-10-CM: R25.2  ICD-9-CM: 729.82  10/22/2012 - 4/4/2018        RESOLVED: Hyperuricemia ICD-10-CM: E79.0  ICD-9-CM: 790.6  8/26/2012 - 4/4/2018    Overview Signed 8/26/2012  7:24 AM by Kane Marks MD     Elevated uric acid due to increased turnover of cells. Started on 8/25/12 on allopurinol 100 mg po qd             RESOLVED: Muscle ache ICD-10-CM: M79.10  ICD-9-CM: 729.1  8/24/2012 - 4/4/2018        RESOLVED: Leukemia, acute [facility]) ICD-10-CM: C95.00  ICD-9-CM: 208.00  8/24/2012 - 9/5/2012              Consultants:  Infectious disease  Cardiology   Nephrology  Hem/onc      Please refer to medical chart for imaging and cultures. Hospital course:  61 y/o male with CML, active SCLC and NSCLC on palliative therapy. Severe systolic HR with EF 04% (repeat now < 15%) prior pneumonitis on immunotherapy,  malignant effusions, recent segmental B lower lobe PEs on Xarelto and recently with evidence of progression of both lung cancers leading to cycle 1 of cis/etoposide/atezolizumab (had the same about a year ago when his small cell was discovered). Presented with new GGO felt likely to be pneumonitis. Now intubated with worsening respiratory failure, renal failure, liver failure, heart failure. Covid negative. Being seen by heme/onc, ID, GI, nephrology. EF was found to be < 15 on repeat TTE. Started on steroids and cellcept for pneumonitis.  He developed renal failure, liver failure and worsening respiratory failure. He continued to deteriorate and unable to wean. PC consulted and he was made a partial code and on 6/17 he suffered a code blue. He survived the code but family discussions regarding disposition. He suffered another code blue early 6/18 and family decided to make him comfort care.  He was compassionately extubated and  at 1005. Family present.         Final:  --Pronounced dead at 1005. --Total discharge greater than 30 minutes in duration.     Carmen Zheng, NP

## 2021-06-18 NOTE — PROGRESS NOTES
Bedside and verbal shift change report received from  [place] [id number] [facility] (offgoing nurse). Report included the following information SBAR, Kardex, Intake/Output, MAR, Recent Results, Cardiac Rhythm A Fib, Sinus Tach, Alarm Parameters  and Quality Measures. Dual skin assessment completed at bedside: No pressure injury noted; allevyn in place. Dual verification of gtts completed (name of gtts verified):   Fentanyl @ 50 mcg/h  Amio @ 0.5 mg/min  Levo @ 10 mcg/min    Patient's family at beside.

## 2021-06-18 NOTE — PROGRESS NOTES
Dr. Bam Rojas and I made a visit to the family reasssuring and supporting them as the patient was in the process of being compassionately extubated. A large group of family and friends were present. I talked specifically with the patient's wife, Alok Floyd. Family leaders were dealing with their grief.       Louise Kate, [address] [facility][note 3], [facility]

## 2021-06-18 NOTE — PROGRESS NOTES
Code Raj @ [id number]. Patient eleanor to 45s and went into PEA. CPR started. (See Code Notes). Charge nurse at bedside to assist.  MD at bedside to assess patient. Family notified.   Giancarlo restarted @ 300 mcg/min  Fentanyl @ 75 mcg/h   Amio @ 0.5 mcg/min  Levo increased to 30 mcg/min

## 2021-06-18 NOTE — PROGRESS NOTES
Patient  with his family at the bedside. His family members are grieving appropriately. A large group of family members were present.  provided empathy, comfort, a spiritual presence, emotional support and the assurance of prayer.         ROSSY Gongora

## 2021-06-20 LAB
BACTERIA SPEC CULT: ABNORMAL
SERVICE CMNT-IMP: ABNORMAL

## 2021-06-28 NOTE — CDMP QUERY
Pt admitted with Acute Resp Failure with hypoxia  and has anemia documented. Please further specify type and acuity of anemia in the medical record. ? Anemia due to acute blood loss ? Anemia due to chronic blood loss ? Anemia due to iron deficiency ? Anemia due to chronic disease, please specify disease ? Dilutional anemia 
? Other, please specify ? Clinically unable to determine The medical record reflects the following: 
   Risk Factors: Metastatic Lung CA, CML, Clinical Indicators: Thoracentesis with bloody fluid, HBG 9.9 dropped to 8.7    Treatment: Monitoring, Pul consult for Thoracentesis, Hold Xarelto, ordered transfuse PRN HBG less than 7  
 Dapsone Pregnancy And Lactation Text: This medication is Pregnancy Category C and is not considered safe during pregnancy or breast feeding.

## 2021-06-29 LAB — ADENOVIRUS DNA DETEC,XADNPT: NORMAL

## 2021-06-30 LAB
FUNGUS ISLT: NORMAL
RESULT 1, 080100: NORMAL
SPECIMEN SOURCE: NORMAL

## 2021-07-01 LAB
BACTERIA SPEC CULT: ABNORMAL
GRAM STN SPEC: ABNORMAL
SERVICE CMNT-IMP: ABNORMAL

## (undated) DEVICE — SINGLE USE SUCTION VALVE MAJ-209: Brand: SINGLE USE SUCTION VALVE (STERILE)

## (undated) DEVICE — STERILE POLYISOPRENE POWDER-FREE SURGICAL GLOVES: Brand: PROTEXIS

## (undated) DEVICE — GEL MEDC ULTRASOUND 5L -- REPLACED BY 326862

## (undated) DEVICE — LUER-LOK 360°: Brand: CONNECTA, LUER-LOK

## (undated) DEVICE — KENDALL RADIOLUCENT FOAM MONITORING ELECTRODE RECTANGULAR SHAPE: Brand: KENDALL

## (undated) DEVICE — KIT THORCENT 8FR L5IN POLYUR W/ 18/22/25GA NDL 3 W STPCOCK

## (undated) DEVICE — SYR 50ML SLIP TIP NSAF LF STRL --

## (undated) DEVICE — SINGLE USE BIOPSY VALVE MAJ-210: Brand: SINGLE USE BIOPSY VALVE (STERILE)

## (undated) DEVICE — CANNULA NSL ORAL AD FOR CAPNOFLEX CO2 O2 AIRLFE

## (undated) DEVICE — BRONCHOSCOPY PACK: Brand: MEDLINE INDUSTRIES, INC.